# Patient Record
Sex: FEMALE | Race: WHITE | Employment: OTHER | URBAN - NONMETROPOLITAN AREA
[De-identification: names, ages, dates, MRNs, and addresses within clinical notes are randomized per-mention and may not be internally consistent; named-entity substitution may affect disease eponyms.]

---

## 2017-01-18 RX ORDER — HYDROCODONE BITARTRATE AND ACETAMINOPHEN 10; 325 MG/1; MG/1
1 TABLET ORAL 4 TIMES DAILY PRN
Qty: 120 TABLET | Refills: 0 | Status: SHIPPED | OUTPATIENT
Start: 2017-01-26 | End: 2017-02-16 | Stop reason: SDUPTHER

## 2017-02-17 RX ORDER — HYDROCODONE BITARTRATE AND ACETAMINOPHEN 10; 325 MG/1; MG/1
1 TABLET ORAL 4 TIMES DAILY PRN
Qty: 120 TABLET | Refills: 0 | Status: SHIPPED | OUTPATIENT
Start: 2017-02-25 | End: 2017-03-23 | Stop reason: SDUPTHER

## 2017-03-23 ENCOUNTER — HOSPITAL ENCOUNTER (OUTPATIENT)
Dept: PAIN MANAGEMENT | Age: 67
Discharge: HOME | End: 2017-03-23
Payer: MEDICARE

## 2017-03-23 VITALS
OXYGEN SATURATION: 100 % | RESPIRATION RATE: 18 BRPM | SYSTOLIC BLOOD PRESSURE: 122 MMHG | DIASTOLIC BLOOD PRESSURE: 52 MMHG | HEART RATE: 63 BPM

## 2017-03-23 VITALS
RESPIRATION RATE: 18 BRPM | TEMPERATURE: 97.1 F | HEART RATE: 67 BPM | HEIGHT: 67 IN | BODY MASS INDEX: 23.23 KG/M2 | SYSTOLIC BLOOD PRESSURE: 105 MMHG | DIASTOLIC BLOOD PRESSURE: 53 MMHG | WEIGHT: 148 LBS | OXYGEN SATURATION: 93 %

## 2017-03-23 DIAGNOSIS — G89.29 CHRONIC PAIN OF BOTH KNEES: ICD-10-CM

## 2017-03-23 DIAGNOSIS — M51.36 LUMBAR DEGENERATIVE DISC DISEASE: ICD-10-CM

## 2017-03-23 DIAGNOSIS — M54.10 BACK PAIN WITH LEFT-SIDED RADICULOPATHY: ICD-10-CM

## 2017-03-23 DIAGNOSIS — M54.50 CHRONIC BILATERAL LOW BACK PAIN WITHOUT SCIATICA: ICD-10-CM

## 2017-03-23 DIAGNOSIS — M25.562 CHRONIC PAIN OF BOTH KNEES: ICD-10-CM

## 2017-03-23 DIAGNOSIS — M51.16 LUMBAR DISC DISEASE WITH RADICULOPATHY: ICD-10-CM

## 2017-03-23 DIAGNOSIS — G89.29 CHRONIC BILATERAL LOW BACK PAIN WITHOUT SCIATICA: ICD-10-CM

## 2017-03-23 DIAGNOSIS — M25.561 CHRONIC PAIN OF BOTH KNEES: ICD-10-CM

## 2017-03-23 PROCEDURE — 62323 NJX INTERLAMINAR LMBR/SAC: CPT

## 2017-03-23 PROCEDURE — 6360000002 HC RX W HCPCS

## 2017-03-23 PROCEDURE — G0260 INJ FOR SACROILIAC JT ANESTH: HCPCS

## 2017-03-23 PROCEDURE — 99152 MOD SED SAME PHYS/QHP 5/>YRS: CPT

## 2017-03-23 PROCEDURE — 2500000003 HC RX 250 WO HCPCS

## 2017-03-23 PROCEDURE — 2580000003 HC RX 258

## 2017-03-23 PROCEDURE — 3209999900 FLUORO FOR SURGICAL PROCEDURES

## 2017-03-23 RX ORDER — METHYLPREDNISOLONE ACETATE 80 MG/ML
INJECTION, SUSPENSION INTRA-ARTICULAR; INTRALESIONAL; INTRAMUSCULAR; SOFT TISSUE
Status: COMPLETED | OUTPATIENT
Start: 2017-03-23 | End: 2017-03-23

## 2017-03-23 RX ORDER — TRIAMCINOLONE ACETONIDE 40 MG/ML
INJECTION, SUSPENSION INTRA-ARTICULAR; INTRAMUSCULAR
Status: COMPLETED | OUTPATIENT
Start: 2017-03-23 | End: 2017-03-23

## 2017-03-23 RX ORDER — TIZANIDINE 4 MG/1
4 TABLET ORAL 3 TIMES DAILY PRN
Qty: 45 TABLET | Refills: 2 | Status: SHIPPED | OUTPATIENT
Start: 2017-03-23 | End: 2017-07-05 | Stop reason: SDUPTHER

## 2017-03-23 RX ORDER — 0.9 % SODIUM CHLORIDE 0.9 %
VIAL (ML) INJECTION
Status: COMPLETED | OUTPATIENT
Start: 2017-03-23 | End: 2017-03-23

## 2017-03-23 RX ORDER — LIDOCAINE HYDROCHLORIDE 10 MG/ML
INJECTION, SOLUTION EPIDURAL; INFILTRATION; INTRACAUDAL; PERINEURAL
Status: COMPLETED | OUTPATIENT
Start: 2017-03-23 | End: 2017-03-23

## 2017-03-23 RX ORDER — MIDAZOLAM HYDROCHLORIDE 1 MG/ML
INJECTION INTRAMUSCULAR; INTRAVENOUS
Status: COMPLETED | OUTPATIENT
Start: 2017-03-23 | End: 2017-03-23

## 2017-03-23 RX ORDER — HYDROCODONE BITARTRATE AND ACETAMINOPHEN 10; 325 MG/1; MG/1
1 TABLET ORAL 4 TIMES DAILY PRN
Qty: 120 TABLET | Refills: 0 | Status: SHIPPED | OUTPATIENT
Start: 2017-03-27 | End: 2017-04-13 | Stop reason: SDUPTHER

## 2017-03-23 RX ORDER — BUPIVACAINE HYDROCHLORIDE 2.5 MG/ML
INJECTION, SOLUTION EPIDURAL; INFILTRATION; INTRACAUDAL
Status: COMPLETED | OUTPATIENT
Start: 2017-03-23 | End: 2017-03-23

## 2017-03-23 RX ORDER — BUPIVACAINE HYDROCHLORIDE 5 MG/ML
INJECTION, SOLUTION EPIDURAL; INTRACAUDAL
Status: COMPLETED | OUTPATIENT
Start: 2017-03-23 | End: 2017-03-23

## 2017-03-23 RX ADMIN — METHYLPREDNISOLONE ACETATE 80 MG: 80 INJECTION, SUSPENSION INTRA-ARTICULAR; INTRALESIONAL; INTRAMUSCULAR; SOFT TISSUE at 17:11

## 2017-03-23 RX ADMIN — MIDAZOLAM HYDROCHLORIDE 2 MG: 1 INJECTION INTRAMUSCULAR; INTRAVENOUS at 17:03

## 2017-03-23 RX ADMIN — BUPIVACAINE HYDROCHLORIDE 4.5 ML: 5 INJECTION, SOLUTION EPIDURAL; INTRACAUDAL at 17:11

## 2017-03-23 RX ADMIN — BUPIVACAINE HYDROCHLORIDE 5 ML: 2.5 INJECTION, SOLUTION EPIDURAL; INFILTRATION; INTRACAUDAL at 17:10

## 2017-03-23 RX ADMIN — Medication 4 ML: at 17:10

## 2017-03-23 RX ADMIN — TRIAMCINOLONE ACETONIDE 20 MG: 40 INJECTION, SUSPENSION INTRA-ARTICULAR; INTRAMUSCULAR at 17:11

## 2017-03-23 RX ADMIN — MIDAZOLAM HYDROCHLORIDE 2 MG: 1 INJECTION INTRAMUSCULAR; INTRAVENOUS at 17:07

## 2017-03-23 RX ADMIN — LIDOCAINE HYDROCHLORIDE 3 ML: 10 INJECTION, SOLUTION EPIDURAL; INFILTRATION; INTRACAUDAL; PERINEURAL at 17:07

## 2017-03-23 ASSESSMENT — PAIN DESCRIPTION - DESCRIPTORS: DESCRIPTORS: ACHING;CONSTANT;THROBBING;RADIATING

## 2017-03-23 ASSESSMENT — PAIN - FUNCTIONAL ASSESSMENT
PAIN_FUNCTIONAL_ASSESSMENT: 0-10
PAIN_FUNCTIONAL_ASSESSMENT: 0-10

## 2017-03-23 ASSESSMENT — ACTIVITIES OF DAILY LIVING (ADL): EFFECT OF PAIN ON DAILY ACTIVITIES: DAILY CHORES AND ACTIVITIES

## 2017-03-27 RX ORDER — GABAPENTIN 300 MG/1
600 CAPSULE ORAL 4 TIMES DAILY
Qty: 240 CAPSULE | Refills: 5 | Status: SHIPPED | OUTPATIENT
Start: 2017-03-27 | End: 2017-10-10 | Stop reason: SDUPTHER

## 2017-04-14 RX ORDER — HYDROCODONE BITARTRATE AND ACETAMINOPHEN 10; 325 MG/1; MG/1
1 TABLET ORAL 4 TIMES DAILY PRN
Qty: 120 TABLET | Refills: 0 | Status: SHIPPED | OUTPATIENT
Start: 2017-04-26 | End: 2017-05-16 | Stop reason: SDUPTHER

## 2017-05-05 ENCOUNTER — HOSPITAL ENCOUNTER (OUTPATIENT)
Dept: PAIN MANAGEMENT | Age: 67
Discharge: HOME | End: 2017-05-05

## 2017-05-17 RX ORDER — HYDROCODONE BITARTRATE AND ACETAMINOPHEN 10; 325 MG/1; MG/1
1 TABLET ORAL 4 TIMES DAILY PRN
Qty: 120 TABLET | Refills: 0 | Status: SHIPPED | OUTPATIENT
Start: 2017-05-26 | End: 2017-06-26 | Stop reason: SDUPTHER

## 2017-05-31 ENCOUNTER — PROCEDURE VISIT (OUTPATIENT)
Dept: UROLOGY | Age: 67
End: 2017-05-31
Payer: MEDICARE

## 2017-05-31 VITALS
WEIGHT: 145 LBS | HEART RATE: 73 BPM | HEIGHT: 66 IN | OXYGEN SATURATION: 95 % | TEMPERATURE: 97.4 F | BODY MASS INDEX: 23.3 KG/M2

## 2017-05-31 DIAGNOSIS — N39.41 URGE INCONTINENCE: ICD-10-CM

## 2017-05-31 DIAGNOSIS — R31.29 MICROSCOPIC HEMATURIA: Primary | ICD-10-CM

## 2017-05-31 LAB
BILIRUBIN, POC: NORMAL
BLOOD URINE, POC: NORMAL
CLARITY, POC: NORMAL
COLOR, POC: NORMAL
GLUCOSE URINE, POC: NORMAL
KETONES, POC: NORMAL
LEUKOCYTE EST, POC: NORMAL
NITRITE, POC: NORMAL
PH, POC: 6
PROTEIN, POC: NORMAL
SPECIFIC GRAVITY, POC: 1.01
UROBILINOGEN, POC: 1

## 2017-05-31 PROCEDURE — 52000 CYSTOURETHROSCOPY: CPT | Performed by: UROLOGY

## 2017-05-31 PROCEDURE — 81002 URINALYSIS NONAUTO W/O SCOPE: CPT | Performed by: UROLOGY

## 2017-05-31 PROCEDURE — 4004F PT TOBACCO SCREEN RCVD TLK: CPT | Performed by: UROLOGY

## 2017-06-26 ENCOUNTER — HOSPITAL ENCOUNTER (OUTPATIENT)
Dept: PAIN MANAGEMENT | Age: 67
Discharge: HOME | End: 2017-06-26
Payer: MEDICARE

## 2017-06-26 VITALS
RESPIRATION RATE: 18 BRPM | HEART RATE: 56 BPM | HEIGHT: 66 IN | DIASTOLIC BLOOD PRESSURE: 68 MMHG | BODY MASS INDEX: 22.98 KG/M2 | WEIGHT: 143 LBS | OXYGEN SATURATION: 99 % | TEMPERATURE: 97.6 F | SYSTOLIC BLOOD PRESSURE: 143 MMHG

## 2017-06-26 DIAGNOSIS — M25.562 CHRONIC PAIN OF BOTH KNEES: ICD-10-CM

## 2017-06-26 DIAGNOSIS — M25.561 CHRONIC PAIN OF BOTH KNEES: ICD-10-CM

## 2017-06-26 DIAGNOSIS — G89.29 CHRONIC BILATERAL LOW BACK PAIN WITHOUT SCIATICA: ICD-10-CM

## 2017-06-26 DIAGNOSIS — G89.29 CHRONIC PAIN OF BOTH KNEES: ICD-10-CM

## 2017-06-26 DIAGNOSIS — M51.16 LUMBAR DISC DISEASE WITH RADICULOPATHY: ICD-10-CM

## 2017-06-26 DIAGNOSIS — M54.10 BACK PAIN WITH LEFT-SIDED RADICULOPATHY: ICD-10-CM

## 2017-06-26 DIAGNOSIS — M54.50 CHRONIC BILATERAL LOW BACK PAIN WITHOUT SCIATICA: ICD-10-CM

## 2017-06-26 PROCEDURE — 99214 OFFICE O/P EST MOD 30 MIN: CPT

## 2017-06-26 PROCEDURE — 80307 DRUG TEST PRSMV CHEM ANLYZR: CPT

## 2017-06-26 RX ORDER — HYDROCODONE BITARTRATE AND ACETAMINOPHEN 10; 325 MG/1; MG/1
1 TABLET ORAL 4 TIMES DAILY PRN
Qty: 120 TABLET | Refills: 0 | Status: SHIPPED | OUTPATIENT
Start: 2017-06-26 | End: 2017-07-13 | Stop reason: SDUPTHER

## 2017-06-26 ASSESSMENT — ACTIVITIES OF DAILY LIVING (ADL): EFFECT OF PAIN ON DAILY ACTIVITIES: DAILY CHORES AND ACTIVITIES

## 2017-06-26 ASSESSMENT — PAIN DESCRIPTION - ORIENTATION: ORIENTATION: LOWER

## 2017-06-26 ASSESSMENT — PAIN SCALES - GENERAL: PAINLEVEL_OUTOF10: 7

## 2017-06-26 ASSESSMENT — PAIN DESCRIPTION - DIRECTION: RADIATING_TOWARDS: DOWN THE LEFT LEG

## 2017-06-26 ASSESSMENT — PAIN DESCRIPTION - FREQUENCY: FREQUENCY: CONTINUOUS

## 2017-06-26 ASSESSMENT — PAIN DESCRIPTION - PAIN TYPE: TYPE: CHRONIC PAIN

## 2017-06-26 ASSESSMENT — PAIN DESCRIPTION - ONSET: ONSET: ON-GOING

## 2017-06-26 ASSESSMENT — PAIN DESCRIPTION - PROGRESSION: CLINICAL_PROGRESSION: GRADUALLY WORSENING

## 2017-06-26 ASSESSMENT — PAIN DESCRIPTION - LOCATION: LOCATION: BACK

## 2017-07-02 LAB
AMPHETAMINES, URINE: NEGATIVE NG/ML
BARBITURATES, URINE: NEGATIVE NG/ML
BENZODIAZEPINES, URINE: ABNORMAL NG/ML
BENZODIAZEPINES, URINE: NEGATIVE NG/ML
CANNABINOIDS, URINE: NEGATIVE NG/ML
COCAINE METABOLITE, URINE: NEGATIVE NG/ML
CODEINE, URINE: NEGATIVE
CREATININE, URINE: 20.7 MG/DL (ref 20–300)
ETHANOL U, QUAN: NEGATIVE %
FENTANYL URINE: NEGATIVE PG/ML
HYDROCODONE, UR CONF: 958 NG/ML
HYDROCODONE, URINE: POSITIVE
HYDROMORPHONE, UR CONF: 548 NG/ML
HYDROMORPHONE, URINE: POSITIVE
MEPERIDINE, UR: NEGATIVE NG/ML
METHADONE SCREEN, URINE: NEGATIVE NG/ML
MORPHINE URINE: NEGATIVE
OPIATES, URINE: ABNORMAL NG/ML
OPIATES, URINE: POSITIVE NG/ML
OXYCODONE/OXYMORPHONE, UR: NEGATIVE NG/ML
PH, URINE: 6.4 (ref 4.5–8.9)
PHENCYCLIDINE, URINE: NEGATIVE NG/ML
PROPOXYPHENE, URINE: NEGATIVE NG/ML

## 2017-07-05 RX ORDER — TIZANIDINE 4 MG/1
4 TABLET ORAL 3 TIMES DAILY PRN
Qty: 45 TABLET | Refills: 2 | Status: SHIPPED | OUTPATIENT
Start: 2017-07-05 | End: 2017-10-10 | Stop reason: SDUPTHER

## 2017-07-13 ENCOUNTER — HOSPITAL ENCOUNTER (OUTPATIENT)
Dept: PAIN MANAGEMENT | Age: 67
Discharge: HOME | End: 2017-07-13
Payer: MEDICARE

## 2017-07-13 VITALS
HEIGHT: 66 IN | OXYGEN SATURATION: 98 % | WEIGHT: 148 LBS | SYSTOLIC BLOOD PRESSURE: 120 MMHG | DIASTOLIC BLOOD PRESSURE: 51 MMHG | RESPIRATION RATE: 16 BRPM | HEART RATE: 68 BPM | TEMPERATURE: 97.7 F | BODY MASS INDEX: 23.78 KG/M2

## 2017-07-13 DIAGNOSIS — M25.561 CHRONIC PAIN OF BOTH KNEES: ICD-10-CM

## 2017-07-13 DIAGNOSIS — G89.29 CHRONIC PAIN OF BOTH KNEES: ICD-10-CM

## 2017-07-13 DIAGNOSIS — M54.10 BACK PAIN WITH LEFT-SIDED RADICULOPATHY: ICD-10-CM

## 2017-07-13 DIAGNOSIS — M51.16 LUMBAR DISC DISEASE WITH RADICULOPATHY: ICD-10-CM

## 2017-07-13 DIAGNOSIS — M25.562 CHRONIC PAIN OF BOTH KNEES: ICD-10-CM

## 2017-07-13 DIAGNOSIS — M54.50 CHRONIC BILATERAL LOW BACK PAIN WITHOUT SCIATICA: ICD-10-CM

## 2017-07-13 DIAGNOSIS — M51.36 LUMBAR DEGENERATIVE DISC DISEASE: ICD-10-CM

## 2017-07-13 DIAGNOSIS — G89.29 CHRONIC BILATERAL LOW BACK PAIN WITHOUT SCIATICA: ICD-10-CM

## 2017-07-13 PROCEDURE — G0260 INJ FOR SACROILIAC JT ANESTH: HCPCS

## 2017-07-13 PROCEDURE — 2580000003 HC RX 258

## 2017-07-13 PROCEDURE — 62323 NJX INTERLAMINAR LMBR/SAC: CPT

## 2017-07-13 PROCEDURE — 2500000003 HC RX 250 WO HCPCS

## 2017-07-13 PROCEDURE — 99152 MOD SED SAME PHYS/QHP 5/>YRS: CPT

## 2017-07-13 PROCEDURE — 6360000002 HC RX W HCPCS

## 2017-07-13 PROCEDURE — 3209999900 FLUORO FOR SURGICAL PROCEDURES

## 2017-07-13 RX ORDER — HYDROCODONE BITARTRATE AND ACETAMINOPHEN 10; 325 MG/1; MG/1
1 TABLET ORAL 4 TIMES DAILY PRN
Qty: 120 TABLET | Refills: 0 | Status: SHIPPED | OUTPATIENT
Start: 2017-07-27 | End: 2017-08-15 | Stop reason: SDUPTHER

## 2017-07-13 RX ORDER — METHYLPREDNISOLONE ACETATE 80 MG/ML
INJECTION, SUSPENSION INTRA-ARTICULAR; INTRALESIONAL; INTRAMUSCULAR; SOFT TISSUE
Status: COMPLETED | OUTPATIENT
Start: 2017-07-13 | End: 2017-07-13

## 2017-07-13 RX ORDER — LIDOCAINE HYDROCHLORIDE 10 MG/ML
INJECTION, SOLUTION EPIDURAL; INFILTRATION; INTRACAUDAL; PERINEURAL
Status: COMPLETED | OUTPATIENT
Start: 2017-07-13 | End: 2017-07-13

## 2017-07-13 RX ORDER — BUPIVACAINE HYDROCHLORIDE 5 MG/ML
INJECTION, SOLUTION EPIDURAL; INTRACAUDAL
Status: COMPLETED | OUTPATIENT
Start: 2017-07-13 | End: 2017-07-13

## 2017-07-13 RX ORDER — MIDAZOLAM HYDROCHLORIDE 1 MG/ML
INJECTION INTRAMUSCULAR; INTRAVENOUS
Status: COMPLETED | OUTPATIENT
Start: 2017-07-13 | End: 2017-07-13

## 2017-07-13 RX ORDER — BUPIVACAINE HYDROCHLORIDE 2.5 MG/ML
INJECTION, SOLUTION EPIDURAL; INFILTRATION; INTRACAUDAL
Status: COMPLETED | OUTPATIENT
Start: 2017-07-13 | End: 2017-07-13

## 2017-07-13 RX ORDER — TRIAMCINOLONE ACETONIDE 40 MG/ML
INJECTION, SUSPENSION INTRA-ARTICULAR; INTRAMUSCULAR
Status: COMPLETED | OUTPATIENT
Start: 2017-07-13 | End: 2017-07-13

## 2017-07-13 RX ORDER — 0.9 % SODIUM CHLORIDE 0.9 %
VIAL (ML) INJECTION
Status: COMPLETED | OUTPATIENT
Start: 2017-07-13 | End: 2017-07-13

## 2017-07-13 RX ADMIN — LIDOCAINE HYDROCHLORIDE 3 ML: 10 INJECTION, SOLUTION EPIDURAL; INFILTRATION; INTRACAUDAL; PERINEURAL at 16:09

## 2017-07-13 RX ADMIN — METHYLPREDNISOLONE ACETATE 80 MG: 80 INJECTION, SUSPENSION INTRA-ARTICULAR; INTRALESIONAL; INTRAMUSCULAR; SOFT TISSUE at 16:10

## 2017-07-13 RX ADMIN — TRIAMCINOLONE ACETONIDE 20 MG: 40 INJECTION, SUSPENSION INTRA-ARTICULAR; INTRAMUSCULAR at 16:11

## 2017-07-13 RX ADMIN — BUPIVACAINE HYDROCHLORIDE 5 ML: 2.5 INJECTION, SOLUTION EPIDURAL; INFILTRATION; INTRACAUDAL at 16:10

## 2017-07-13 RX ADMIN — BUPIVACAINE HYDROCHLORIDE 4.5 ML: 5 INJECTION, SOLUTION EPIDURAL; INTRACAUDAL at 16:11

## 2017-07-13 RX ADMIN — Medication 4 ML: at 16:10

## 2017-07-13 RX ADMIN — MIDAZOLAM HYDROCHLORIDE 2 MG: 1 INJECTION INTRAMUSCULAR; INTRAVENOUS at 16:08

## 2017-07-13 ASSESSMENT — PAIN DESCRIPTION - PAIN TYPE: TYPE: CHRONIC PAIN

## 2017-07-13 ASSESSMENT — PAIN DESCRIPTION - DIRECTION: RADIATING_TOWARDS: LOWER BACK PAIN THAT RADIATES OUT TO THE HIPS AND DOWN THE LEGS.

## 2017-07-13 ASSESSMENT — PAIN DESCRIPTION - LOCATION: LOCATION: BACK;HIP;LEG

## 2017-07-13 ASSESSMENT — PAIN DESCRIPTION - ONSET: ONSET: ON-GOING

## 2017-07-13 ASSESSMENT — PAIN DESCRIPTION - ORIENTATION: ORIENTATION: RIGHT;LEFT;LOWER

## 2017-07-13 ASSESSMENT — PAIN DESCRIPTION - PROGRESSION: CLINICAL_PROGRESSION: NOT CHANGED

## 2017-07-13 ASSESSMENT — PAIN DESCRIPTION - FREQUENCY: FREQUENCY: CONTINUOUS

## 2017-07-13 ASSESSMENT — PAIN SCALES - GENERAL: PAINLEVEL_OUTOF10: 8

## 2017-08-16 RX ORDER — HYDROCODONE BITARTRATE AND ACETAMINOPHEN 10; 325 MG/1; MG/1
1 TABLET ORAL 4 TIMES DAILY PRN
Qty: 120 TABLET | Refills: 0 | Status: SHIPPED | OUTPATIENT
Start: 2017-08-26 | End: 2017-09-14 | Stop reason: SDUPTHER

## 2017-08-28 ENCOUNTER — HOSPITAL ENCOUNTER (OUTPATIENT)
Dept: PAIN MANAGEMENT | Age: 67
Discharge: HOME | End: 2017-08-28
Payer: MEDICARE

## 2017-08-28 VITALS
HEART RATE: 71 BPM | BODY MASS INDEX: 23.14 KG/M2 | TEMPERATURE: 97.7 F | RESPIRATION RATE: 18 BRPM | DIASTOLIC BLOOD PRESSURE: 66 MMHG | OXYGEN SATURATION: 97 % | SYSTOLIC BLOOD PRESSURE: 108 MMHG | HEIGHT: 66 IN | WEIGHT: 144 LBS

## 2017-08-28 DIAGNOSIS — M25.561 CHRONIC PAIN OF BOTH KNEES: ICD-10-CM

## 2017-08-28 DIAGNOSIS — M54.10 BACK PAIN WITH LEFT-SIDED RADICULOPATHY: ICD-10-CM

## 2017-08-28 DIAGNOSIS — G89.29 CHRONIC PAIN OF BOTH KNEES: ICD-10-CM

## 2017-08-28 DIAGNOSIS — M51.16 LUMBAR DISC DISEASE WITH RADICULOPATHY: ICD-10-CM

## 2017-08-28 DIAGNOSIS — M54.50 CHRONIC BILATERAL LOW BACK PAIN WITHOUT SCIATICA: ICD-10-CM

## 2017-08-28 DIAGNOSIS — M25.562 CHRONIC PAIN OF BOTH KNEES: ICD-10-CM

## 2017-08-28 DIAGNOSIS — G89.29 CHRONIC BILATERAL LOW BACK PAIN WITHOUT SCIATICA: ICD-10-CM

## 2017-08-28 PROCEDURE — 99214 OFFICE O/P EST MOD 30 MIN: CPT

## 2017-08-28 ASSESSMENT — PAIN DESCRIPTION - PROGRESSION: CLINICAL_PROGRESSION: NOT CHANGED

## 2017-08-28 ASSESSMENT — PAIN SCALES - GENERAL: PAINLEVEL_OUTOF10: 6

## 2017-08-28 ASSESSMENT — PAIN DESCRIPTION - LOCATION: LOCATION: BACK

## 2017-08-28 ASSESSMENT — ACTIVITIES OF DAILY LIVING (ADL): EFFECT OF PAIN ON DAILY ACTIVITIES: LIMITS ACTIVITY

## 2017-08-28 ASSESSMENT — PAIN DESCRIPTION - ORIENTATION: ORIENTATION: LOWER;RIGHT;LEFT

## 2017-08-28 ASSESSMENT — PAIN DESCRIPTION - ONSET: ONSET: ON-GOING

## 2017-08-28 ASSESSMENT — PAIN DESCRIPTION - PAIN TYPE: TYPE: CHRONIC PAIN

## 2017-08-28 ASSESSMENT — PAIN DESCRIPTION - FREQUENCY: FREQUENCY: CONTINUOUS

## 2017-09-15 RX ORDER — HYDROCODONE BITARTRATE AND ACETAMINOPHEN 10; 325 MG/1; MG/1
1 TABLET ORAL 4 TIMES DAILY PRN
Qty: 120 TABLET | Refills: 0 | Status: SHIPPED | OUTPATIENT
Start: 2017-09-25 | End: 2017-10-13 | Stop reason: SDUPTHER

## 2017-10-10 RX ORDER — TIZANIDINE 4 MG/1
4 TABLET ORAL 3 TIMES DAILY PRN
Qty: 45 TABLET | Refills: 5 | OUTPATIENT
Start: 2017-10-10

## 2017-10-10 RX ORDER — GABAPENTIN 300 MG/1
600 CAPSULE ORAL 4 TIMES DAILY
Qty: 240 CAPSULE | Refills: 5 | OUTPATIENT
Start: 2017-10-10

## 2017-10-10 RX ORDER — GABAPENTIN 300 MG/1
600 CAPSULE ORAL 4 TIMES DAILY
Qty: 240 CAPSULE | Refills: 5 | Status: SHIPPED | OUTPATIENT
Start: 2017-10-10 | End: 2018-03-23 | Stop reason: SDUPTHER

## 2017-10-10 RX ORDER — TIZANIDINE 4 MG/1
4 TABLET ORAL 3 TIMES DAILY PRN
Qty: 45 TABLET | Refills: 5 | Status: SHIPPED | OUTPATIENT
Start: 2017-10-10 | End: 2018-03-23 | Stop reason: SDUPTHER

## 2017-10-13 RX ORDER — HYDROCODONE BITARTRATE AND ACETAMINOPHEN 10; 325 MG/1; MG/1
1 TABLET ORAL 4 TIMES DAILY PRN
Qty: 120 TABLET | Refills: 0 | Status: SHIPPED | OUTPATIENT
Start: 2017-10-25 | End: 2017-11-14 | Stop reason: SDUPTHER

## 2017-11-14 RX ORDER — HYDROCODONE BITARTRATE AND ACETAMINOPHEN 10; 325 MG/1; MG/1
1 TABLET ORAL 4 TIMES DAILY PRN
Qty: 120 TABLET | Refills: 0 | Status: SHIPPED | OUTPATIENT
Start: 2017-11-24 | End: 2017-12-27 | Stop reason: SDUPTHER

## 2017-12-18 ENCOUNTER — TELEPHONE (OUTPATIENT)
Dept: PAIN MANAGEMENT | Age: 67
End: 2017-12-18

## 2017-12-18 NOTE — TELEPHONE ENCOUNTER
Patient called stating that she is in the hospital with the flu and will not be able to make her appointment on Wednesday 12/20/17. Rescheduled patient for 1/5/18 at 1:00 pm.  Instructed her to try her best to make this appointment because she will need her pain script. Instructed her to bring a .

## 2017-12-20 ENCOUNTER — HOSPITAL ENCOUNTER (OUTPATIENT)
Dept: PAIN MANAGEMENT | Age: 67
Discharge: HOME | End: 2017-12-20
Payer: MEDICARE

## 2017-12-27 RX ORDER — HYDROCODONE BITARTRATE AND ACETAMINOPHEN 10; 325 MG/1; MG/1
1 TABLET ORAL 4 TIMES DAILY PRN
Qty: 32 TABLET | Refills: 0 | Status: SHIPPED | OUTPATIENT
Start: 2017-12-27 | End: 2018-01-05 | Stop reason: SDUPTHER

## 2018-01-05 ENCOUNTER — HOSPITAL ENCOUNTER (OUTPATIENT)
Dept: PAIN MANAGEMENT | Age: 68
Discharge: HOME | End: 2018-01-05
Payer: MEDICARE

## 2018-01-05 VITALS
BODY MASS INDEX: 23.95 KG/M2 | RESPIRATION RATE: 20 BRPM | HEART RATE: 57 BPM | DIASTOLIC BLOOD PRESSURE: 42 MMHG | OXYGEN SATURATION: 97 % | SYSTOLIC BLOOD PRESSURE: 107 MMHG | TEMPERATURE: 97.5 F | HEIGHT: 66 IN | WEIGHT: 149 LBS

## 2018-01-05 DIAGNOSIS — M54.10 BACK PAIN WITH LEFT-SIDED RADICULOPATHY: ICD-10-CM

## 2018-01-05 DIAGNOSIS — G89.29 CHRONIC BILATERAL LOW BACK PAIN WITHOUT SCIATICA: ICD-10-CM

## 2018-01-05 DIAGNOSIS — M25.562 CHRONIC PAIN OF BOTH KNEES: ICD-10-CM

## 2018-01-05 DIAGNOSIS — M25.561 CHRONIC PAIN OF BOTH KNEES: ICD-10-CM

## 2018-01-05 DIAGNOSIS — M54.50 CHRONIC BILATERAL LOW BACK PAIN WITHOUT SCIATICA: ICD-10-CM

## 2018-01-05 DIAGNOSIS — G89.29 CHRONIC PAIN OF BOTH KNEES: ICD-10-CM

## 2018-01-05 DIAGNOSIS — M51.36 LUMBAR DEGENERATIVE DISC DISEASE: ICD-10-CM

## 2018-01-05 DIAGNOSIS — M51.16 LUMBAR DISC DISEASE WITH RADICULOPATHY: ICD-10-CM

## 2018-01-05 PROCEDURE — 6360000002 HC RX W HCPCS

## 2018-01-05 PROCEDURE — 62323 NJX INTERLAMINAR LMBR/SAC: CPT

## 2018-01-05 PROCEDURE — 99152 MOD SED SAME PHYS/QHP 5/>YRS: CPT

## 2018-01-05 PROCEDURE — G0260 INJ FOR SACROILIAC JT ANESTH: HCPCS

## 2018-01-05 PROCEDURE — 3209999900 FLUORO FOR SURGICAL PROCEDURES

## 2018-01-05 PROCEDURE — 2580000003 HC RX 258

## 2018-01-05 PROCEDURE — 2500000003 HC RX 250 WO HCPCS

## 2018-01-05 RX ORDER — BUPIVACAINE HYDROCHLORIDE 5 MG/ML
INJECTION, SOLUTION EPIDURAL; INTRACAUDAL
Status: COMPLETED | OUTPATIENT
Start: 2018-01-05 | End: 2018-01-05

## 2018-01-05 RX ORDER — TRIAMCINOLONE ACETONIDE 40 MG/ML
INJECTION, SUSPENSION INTRA-ARTICULAR; INTRAMUSCULAR
Status: COMPLETED | OUTPATIENT
Start: 2018-01-05 | End: 2018-01-05

## 2018-01-05 RX ORDER — LIDOCAINE HYDROCHLORIDE 10 MG/ML
INJECTION, SOLUTION EPIDURAL; INFILTRATION; INTRACAUDAL; PERINEURAL
Status: COMPLETED | OUTPATIENT
Start: 2018-01-05 | End: 2018-01-05

## 2018-01-05 RX ORDER — BUPIVACAINE HYDROCHLORIDE 2.5 MG/ML
INJECTION, SOLUTION EPIDURAL; INFILTRATION; INTRACAUDAL
Status: COMPLETED | OUTPATIENT
Start: 2018-01-05 | End: 2018-01-05

## 2018-01-05 RX ORDER — HYDROCODONE BITARTRATE AND ACETAMINOPHEN 10; 325 MG/1; MG/1
1 TABLET ORAL 4 TIMES DAILY PRN
Qty: 120 TABLET | Refills: 0 | Status: SHIPPED | OUTPATIENT
Start: 2018-01-05 | End: 2018-01-23 | Stop reason: SDUPTHER

## 2018-01-05 RX ORDER — 0.9 % SODIUM CHLORIDE 0.9 %
VIAL (ML) INJECTION
Status: COMPLETED | OUTPATIENT
Start: 2018-01-05 | End: 2018-01-05

## 2018-01-05 RX ORDER — HYDROCODONE BITARTRATE AND ACETAMINOPHEN 10; 325 MG/1; MG/1
1 TABLET ORAL 4 TIMES DAILY PRN
Qty: 32 TABLET | Refills: 0 | Status: SHIPPED | OUTPATIENT
Start: 2018-01-05 | End: 2018-01-05 | Stop reason: SDUPTHER

## 2018-01-05 RX ORDER — METHYLPREDNISOLONE ACETATE 80 MG/ML
INJECTION, SUSPENSION INTRA-ARTICULAR; INTRALESIONAL; INTRAMUSCULAR; SOFT TISSUE
Status: COMPLETED | OUTPATIENT
Start: 2018-01-05 | End: 2018-01-05

## 2018-01-05 RX ORDER — MIDAZOLAM HYDROCHLORIDE 1 MG/ML
INJECTION INTRAMUSCULAR; INTRAVENOUS
Status: COMPLETED | OUTPATIENT
Start: 2018-01-05 | End: 2018-01-05

## 2018-01-05 RX ADMIN — LIDOCAINE HYDROCHLORIDE 3 ML: 10 INJECTION, SOLUTION EPIDURAL; INFILTRATION; INTRACAUDAL; PERINEURAL at 14:53

## 2018-01-05 RX ADMIN — MIDAZOLAM HYDROCHLORIDE 2 MG: 1 INJECTION INTRAMUSCULAR; INTRAVENOUS at 14:52

## 2018-01-05 RX ADMIN — METHYLPREDNISOLONE ACETATE 80 MG: 80 INJECTION, SUSPENSION INTRA-ARTICULAR; INTRALESIONAL; INTRAMUSCULAR; SOFT TISSUE at 14:54

## 2018-01-05 RX ADMIN — BUPIVACAINE HYDROCHLORIDE 2.5 ML: 2.5 INJECTION, SOLUTION EPIDURAL; INFILTRATION; INTRACAUDAL at 14:54

## 2018-01-05 RX ADMIN — Medication 1.5 ML: at 14:54

## 2018-01-05 RX ADMIN — TRIAMCINOLONE ACETONIDE 40 MG: 40 INJECTION, SUSPENSION INTRA-ARTICULAR; INTRAMUSCULAR at 14:55

## 2018-01-05 RX ADMIN — BUPIVACAINE HYDROCHLORIDE 9 ML: 5 INJECTION, SOLUTION EPIDURAL; INTRACAUDAL at 14:55

## 2018-01-05 ASSESSMENT — ACTIVITIES OF DAILY LIVING (ADL): EFFECT OF PAIN ON DAILY ACTIVITIES: DAILY CHORES AND ACTIVITIES

## 2018-01-05 ASSESSMENT — PAIN DESCRIPTION - DESCRIPTORS: DESCRIPTORS: ACHING;CONSTANT;RADIATING;THROBBING

## 2018-01-05 ASSESSMENT — PAIN - FUNCTIONAL ASSESSMENT: PAIN_FUNCTIONAL_ASSESSMENT: 0-10

## 2018-01-05 NOTE — PROCEDURES
DATE: 1/5/2018      REASON FOR VISIT: Lumbar Degenerative Disc Disease, Lumbar Radiculopathy  PROCEDURE: Lumbar Epidural Steroid Injection. [] Moderate Sedation    DESCRIPTION OF PROCEDURE:              After obtaining informed consent, the patient was taken to the procedure room, positioned prone, and sterilely prepped.  The procedure was performed under fluoroscopic guidance.  First 5 ml of 1% Xylocaine was used at L3-4 for local anesthesia.  A 19-gauge CCP [place] needle was advanced to the epidural space.  The was confirmed on the fluoroscope with an injection of [x] 2ml   Contrast.  Then, [x] 5ml of 0.25% Marcaine, [x] 4ml of Normal Saline, and [x] 80 mg of Depo Medrol was gently injected.           There were no complications. DIAGNOSES:  [] Low Back Pain  [x] *Lumbar Radiculopathy  [x] *Lumbar Degenerative Disc Disease  [] *Lumbar Spinal Stenosis  [] *Lumbar Postlaminectomy Syndrome  [] Other            REASON FOR VISIT: Bilateral Sacroilitis  PROCEDURE:  Sacroiliac Joint Injection. [] Moderate Sedation    DESCRIPTION OF PROCEDURE:    After obtaining informed consent, the patient was taken to the procedure room, positioned prone, and sterilely prepped. The fluoroscope was positioned for guidance. Fluoroscopic imaging confirmation, of intra-articular needle positioning, was confirmed within the [x] Left [] Right sacroiliac joint. Then, 5ml of 0.5% Marcaine and 20 mg of Kenalog was gently injected. There were no complications. [x] The fluoroscope was repositioned to the [] Left [x] Right and fluoroscopic imaging confirmation, of intra-articular needle positioning, was confirmed. The 5ml of 0.5%Marcaine and 20 mg Kenalog was gently injected. There were no complications. DIAGNOSES:  [x] *Sacroiliitis  [] Left [] Right [x] Bilateral  [] Unspecified Arthropathy involving pelvic region and thigh.       .  Procedure:  Level of Consciousness: [x]Alert [x]Oriented []Disoriented []Lethargic  Anxiety
[x]WNL                    []Comments:      Informed Consent:  The risks and benefits of the procedure have been discussed with either the patient or if they cannot consent their representative. Assessment:  Patient examined and appropriate for the planned sedation and procedure. Plan:  Proceed with planned sedation and procedure as above. Mallampati Airway Assessment: Adequate      ASA STATUS:  []1. Normal Healty  [x]2. Mild Systemice Disease, doesn't limit activity eg HTN, mild DM  []3. Severe Systemic Disease, does limit activity eg stable angina, DM with vascular         disease          []4. Severe Systemic Disease constant threat eg CHF, renal failure  []5.  Moribund not expected to survive without procedure          José Miguel Jones RN

## 2018-01-05 NOTE — PROGRESS NOTES
Where na  Labs during the last 12 months: Yes    Education Provided:  [x] Review of Jean Cuellar  [] Agreement Review  [] Compliance Issues Discussed    [] Cognitive Behavior Needs [x] Exercise [] Review of Test [] Financial Issues  [x] Tobacco/Alcohol Use [x] Teaching [] New Patient [] Picture Obtained    Physician Plan:  [] Outgoing Referral  [] Pharmacy Consult  [] Test Ordered   [] Obtained Test Results / Consult Notes  [] UDS due at next visit, verified per EPIC      [] Suspected Physical Abuse or Suicide Risk assessed - IF YES COMPLETE QUESTIONS BELOW    If any of the following questions are answered yes - contact attending physician for referral:    Has been considering harming self to escape stress, pain problems? [] YES  [x] NO  Has a suicide plan? [] YES  [x] NO  Has attempted suicide in the past?   [] YES  [x] NO  Has a close friend or family member who committed suicide? [] YES  [x] NO    Patient Referred To :      Additional Notes:    Assessment Completed by:  Electronically signed by Azalia Chiu RN on 1/5/2018 at 1:26 PM

## 2018-01-23 RX ORDER — HYDROCODONE BITARTRATE AND ACETAMINOPHEN 10; 325 MG/1; MG/1
1 TABLET ORAL 4 TIMES DAILY PRN
Qty: 120 TABLET | Refills: 0 | Status: SHIPPED | OUTPATIENT
Start: 2018-02-04 | End: 2018-02-20 | Stop reason: SDUPTHER

## 2018-02-20 RX ORDER — HYDROCODONE BITARTRATE AND ACETAMINOPHEN 10; 325 MG/1; MG/1
1 TABLET ORAL 4 TIMES DAILY PRN
Qty: 120 TABLET | Refills: 0 | Status: SHIPPED | OUTPATIENT
Start: 2018-03-06 | End: 2018-03-23 | Stop reason: SDUPTHER

## 2018-03-14 ENCOUNTER — TELEPHONE (OUTPATIENT)
Dept: PAIN MANAGEMENT | Age: 68
End: 2018-03-14

## 2018-03-23 ENCOUNTER — HOSPITAL ENCOUNTER (OUTPATIENT)
Dept: PAIN MANAGEMENT | Age: 68
Discharge: HOME | End: 2018-03-23
Payer: MEDICARE

## 2018-03-23 VITALS
HEART RATE: 70 BPM | RESPIRATION RATE: 18 BRPM | BODY MASS INDEX: 24.59 KG/M2 | HEIGHT: 66 IN | OXYGEN SATURATION: 96 % | DIASTOLIC BLOOD PRESSURE: 61 MMHG | TEMPERATURE: 97.1 F | WEIGHT: 153 LBS | SYSTOLIC BLOOD PRESSURE: 106 MMHG

## 2018-03-23 DIAGNOSIS — M54.50 CHRONIC BILATERAL LOW BACK PAIN WITHOUT SCIATICA: ICD-10-CM

## 2018-03-23 DIAGNOSIS — M25.562 CHRONIC PAIN OF BOTH KNEES: ICD-10-CM

## 2018-03-23 DIAGNOSIS — G89.29 CHRONIC PAIN OF BOTH KNEES: ICD-10-CM

## 2018-03-23 DIAGNOSIS — G89.29 CHRONIC BILATERAL LOW BACK PAIN WITHOUT SCIATICA: ICD-10-CM

## 2018-03-23 DIAGNOSIS — M51.16 LUMBAR DISC DISEASE WITH RADICULOPATHY: ICD-10-CM

## 2018-03-23 DIAGNOSIS — M54.10 BACK PAIN WITH LEFT-SIDED RADICULOPATHY: ICD-10-CM

## 2018-03-23 DIAGNOSIS — M25.561 CHRONIC PAIN OF BOTH KNEES: ICD-10-CM

## 2018-03-23 PROCEDURE — 99214 OFFICE O/P EST MOD 30 MIN: CPT

## 2018-03-23 RX ORDER — TIZANIDINE 4 MG/1
4 TABLET ORAL 2 TIMES DAILY PRN
Qty: 45 TABLET | Refills: 2 | Status: SHIPPED | OUTPATIENT
Start: 2018-03-23 | End: 2018-06-21 | Stop reason: SDUPTHER

## 2018-03-23 RX ORDER — HYDROCODONE BITARTRATE AND ACETAMINOPHEN 10; 325 MG/1; MG/1
1 TABLET ORAL 4 TIMES DAILY PRN
Qty: 120 TABLET | Refills: 0 | Status: SHIPPED | OUTPATIENT
Start: 2018-04-05 | End: 2018-04-24 | Stop reason: SDUPTHER

## 2018-03-23 RX ORDER — GABAPENTIN 300 MG/1
600 CAPSULE ORAL 4 TIMES DAILY
Qty: 240 CAPSULE | Refills: 2 | Status: SHIPPED | OUTPATIENT
Start: 2018-03-23 | End: 2018-06-21 | Stop reason: SDUPTHER

## 2018-03-23 ASSESSMENT — PAIN DESCRIPTION - ONSET: ONSET: ON-GOING

## 2018-03-23 ASSESSMENT — PAIN DESCRIPTION - LOCATION: LOCATION: BACK

## 2018-03-23 ASSESSMENT — PAIN DESCRIPTION - PROGRESSION: CLINICAL_PROGRESSION: NOT CHANGED

## 2018-03-23 ASSESSMENT — PAIN DESCRIPTION - DIRECTION: RADIATING_TOWARDS: INTO BILATERAL LEGS

## 2018-03-23 ASSESSMENT — PAIN DESCRIPTION - DESCRIPTORS: DESCRIPTORS: ACHING;CONSTANT;THROBBING;RADIATING

## 2018-03-23 ASSESSMENT — ACTIVITIES OF DAILY LIVING (ADL): EFFECT OF PAIN ON DAILY ACTIVITIES: LIMITS ACTIVITIES

## 2018-03-23 ASSESSMENT — PAIN DESCRIPTION - PAIN TYPE: TYPE: CHRONIC PAIN

## 2018-03-23 ASSESSMENT — PAIN DESCRIPTION - ORIENTATION: ORIENTATION: LOWER

## 2018-03-23 ASSESSMENT — PAIN DESCRIPTION - FREQUENCY: FREQUENCY: CONTINUOUS

## 2018-03-23 ASSESSMENT — PAIN SCALES - GENERAL: PAINLEVEL_OUTOF10: 7

## 2018-03-23 NOTE — PROGRESS NOTES
[facility]  Patient Pain Assessment  Progress Note      Chief Complaint   Patient presents with    Lower Back Pain     Pain Assessment  Pain Assessment: 0-10  Pain Level: 7  Pain Type: Chronic pain  Pain Location: Back  Pain Orientation: Lower  Pain Radiating Towards: into bilateral legs  Pain Descriptors: Aching, Constant, Throbbing, Radiating  Pain Frequency: Continuous  Pain Onset: On-going  Clinical Progression: Not changed  Effect of Pain on Daily Activities: limits activities       []  Issues of Concern:     [x]  Reports current medication is helping, but continues to have on-going pain    [x]  Reports current pain medication increases ability to do activities of daily living     []  No current narcotic pain medications      [x]  Discussed possible medication side effects, risk of tolerance and/or dependence, alternative treatments    [x]  Encouraged to set goals of decreasing daily narcotic intake    [x]  Discussed effects of long term narcotic use    [x]  Reviewed pain management contract     [x]  Injection options discussed; she does not want to repeat at present time     []Yes [x]No  Current medication side effects     []Yes [x]No   Acute bladder or bowel changes    Previous Procedure / Percentage of pain control / Imaging / PT History:  Percentage of Pain Relief after Last Procedure:  0%    How long lasted:  0 days     Radiology exams received during the last 12 months: No  MRI exams received in the past 2 years: No  Physical therapy during the last 6 months: No     BMI: Body mass index is 24.69 kg/m².     [x]  Nutrient rich, low fat, low carbohydrate diet discussed   [x]  Positive effect of weight management on pain control discussed    Activity:   [x] Exercise discussed as beneficial to pain reduction, encouraged stretching exercises and to set daily goals    Tobacco use:    [x] Cessation discussed, as applicable    Social History     Social History    Marital status:  Spouse name: N/A    Number of children: N/A    Years of education: N/A     Social History Main Topics    Smoking status: Current Every Day Smoker     Packs/day: 0.50     Years: 30.00     Types: Cigarettes    Smokeless tobacco: Never Used    Alcohol use No    Drug use: No    Sexual activity: Not Asked     Other Topics Concern    None     Social History Narrative    None                                                            Past Medical History:       Diagnosis Date    Arthritis     Bilateral low back pain without sciatica 12/22/2015    Cancer ([address]     Hx of blood clots     Hyperlipidemia     Pain in both knees 12/22/2015    Thyroid disease     Urinary incontinence      Surgical History:  Past Surgical History:   Procedure Laterality Date    CHOLECYSTECTOMY, LAPAROSCOPIC      HYSTERECTOMY      JOINT REPLACEMENT      LOBECTOMY      RIGHT PARTIAL    SKIN CANCER EXCISION       Family History:  family history includes Cancer in her mother; Heart Attack in her father; Hypertension in her mother. Allergies:  Codeine; Erythromycin; Pcn [penicillins]; and Talwin [pentazocine]     Medications:  Current Outpatient Prescriptions   Medication Sig Dispense Refill    HYDROcodone-acetaminophen (NORCO)  MG per tablet Take 1 tablet by mouth 4 times daily as needed for Pain (may fill 3/6/18) for up to 30 days.  120 tablet 0    gabapentin (NEURONTIN) 300 MG capsule Take 2 capsules by mouth 4 times daily 240 capsule 5    tiZANidine (ZANAFLEX) 4 MG tablet Take 1 tablet by mouth 3 times daily as needed (muscle spasms - month supply) 45 tablet 5    nystatin (MYCOSTATIN) 938370 UNIT/ML suspension       promethazine (PHENERGAN) 25 MG tablet       omeprazole (PRILOSEC) 40 MG delayed release capsule TK 1 C PO QD PRN  5    sucralfate (CARAFATE) 1 GM/10ML suspension Take 1 g by mouth 4 times daily      ALPRAZolam (XANAX) 0.5 MG tablet Take 0.5 mg by mouth 3 times daily as needed      levothyroxine (SYNTHROID) 100 MCG tablet Take 100 mcg by mouth daily       No current facility-administered medications for this encounter. UDS:     [x] Reviewed and monitoring, see labs         Vitals:  /61   Pulse 70   Temp 97.1 °F (36.2 °C) (Oral)   Resp 18   Ht 5' 6\" (1.676 m)   Wt 153 lb (69.4 kg)   SpO2 96%   BMI 24.69 kg/m²       Physical Exam:  General appearance: no acute distress   Head: NCAT, EOMI  Skin: Warm, Dry   Musculoskeletal: ambulatory per self, steady gait  Neurologic: alert and oriented X 3, speech clear  Mood and affect: appropriate, no SI or HI    Assessment:    *      Lumbar DDD  *      Lumbar Radiculopathy    Plan:   [x]  Patient is to call with any questions or concerns which may arise prior to the next office visit    [x]  Continue current medications per our office, see medication tabWILLY reviewed   []  Add. .. []  Discontinue. .. []  Imaging order given to patient   []  PT order given to patient   []  Procedure scheduled for next visit   []  . .. Controlled Substance Monitoring: Discussed with patient possible medication side effects, risk of tolerance and/or dependence, and alternative treatments. Discussed the effects of long term narcotic use. Patient encouraged to set daily goals of exercising and decreasing daily narcotic intake.       Electronically signed by LIVE Fang

## 2018-04-30 RX ORDER — HYDROCODONE BITARTRATE AND ACETAMINOPHEN 10; 325 MG/1; MG/1
1 TABLET ORAL 4 TIMES DAILY PRN
Qty: 120 TABLET | Refills: 0 | Status: SHIPPED | OUTPATIENT
Start: 2018-05-05 | End: 2018-05-29 | Stop reason: SDUPTHER

## 2018-05-29 RX ORDER — HYDROCODONE BITARTRATE AND ACETAMINOPHEN 10; 325 MG/1; MG/1
1 TABLET ORAL 4 TIMES DAILY PRN
Qty: 120 TABLET | Refills: 0 | Status: SHIPPED | OUTPATIENT
Start: 2018-06-04 | End: 2018-06-21 | Stop reason: SDUPTHER

## 2018-06-21 ENCOUNTER — HOSPITAL ENCOUNTER (OUTPATIENT)
Dept: PAIN MANAGEMENT | Age: 68
Discharge: HOME | End: 2018-06-21
Payer: MEDICARE

## 2018-06-21 VITALS
HEIGHT: 66 IN | OXYGEN SATURATION: 97 % | HEART RATE: 65 BPM | BODY MASS INDEX: 23.63 KG/M2 | DIASTOLIC BLOOD PRESSURE: 62 MMHG | TEMPERATURE: 98 F | SYSTOLIC BLOOD PRESSURE: 117 MMHG | RESPIRATION RATE: 18 BRPM | WEIGHT: 147 LBS

## 2018-06-21 DIAGNOSIS — M51.16 LUMBAR DISC DISEASE WITH RADICULOPATHY: ICD-10-CM

## 2018-06-21 DIAGNOSIS — M54.16 LUMBAR RADICULOPATHY: Primary | ICD-10-CM

## 2018-06-21 DIAGNOSIS — M54.10 BACK PAIN WITH LEFT-SIDED RADICULOPATHY: ICD-10-CM

## 2018-06-21 DIAGNOSIS — G89.29 CHRONIC PAIN OF BOTH KNEES: ICD-10-CM

## 2018-06-21 DIAGNOSIS — M25.562 CHRONIC PAIN OF BOTH KNEES: ICD-10-CM

## 2018-06-21 DIAGNOSIS — M54.50 CHRONIC BILATERAL LOW BACK PAIN WITHOUT SCIATICA: ICD-10-CM

## 2018-06-21 DIAGNOSIS — M25.561 CHRONIC PAIN OF BOTH KNEES: ICD-10-CM

## 2018-06-21 DIAGNOSIS — G89.29 CHRONIC BILATERAL LOW BACK PAIN WITHOUT SCIATICA: ICD-10-CM

## 2018-06-21 PROCEDURE — 99213 OFFICE O/P EST LOW 20 MIN: CPT | Performed by: NURSE PRACTITIONER

## 2018-06-21 PROCEDURE — 99213 OFFICE O/P EST LOW 20 MIN: CPT

## 2018-06-21 RX ORDER — PANTOPRAZOLE SODIUM 40 MG/1
40 TABLET, DELAYED RELEASE ORAL DAILY
Status: ON HOLD | COMMUNITY
Start: 2018-06-15 | End: 2022-04-28 | Stop reason: HOSPADM

## 2018-06-21 RX ORDER — GABAPENTIN 300 MG/1
600 CAPSULE ORAL 4 TIMES DAILY
Qty: 240 CAPSULE | Refills: 0 | Status: SHIPPED | OUTPATIENT
Start: 2018-06-21 | End: 2019-05-02 | Stop reason: ALTCHOICE

## 2018-06-21 RX ORDER — HYDROCODONE BITARTRATE AND ACETAMINOPHEN 10; 325 MG/1; MG/1
1 TABLET ORAL 4 TIMES DAILY PRN
Qty: 120 TABLET | Refills: 0 | Status: SHIPPED | OUTPATIENT
Start: 2018-07-05 | End: 2018-08-06 | Stop reason: SDUPTHER

## 2018-06-21 RX ORDER — TIZANIDINE 4 MG/1
4 TABLET ORAL 2 TIMES DAILY PRN
Qty: 45 TABLET | Refills: 2 | Status: SHIPPED | OUTPATIENT
Start: 2018-06-21 | End: 2018-09-26 | Stop reason: SDUPTHER

## 2018-06-21 ASSESSMENT — PAIN DESCRIPTION - PAIN TYPE: TYPE: CHRONIC PAIN

## 2018-06-21 ASSESSMENT — ENCOUNTER SYMPTOMS
CONSTIPATION: 0
BACK PAIN: 1

## 2018-06-21 ASSESSMENT — PAIN DESCRIPTION - PROGRESSION: CLINICAL_PROGRESSION: NOT CHANGED

## 2018-06-21 ASSESSMENT — PAIN DESCRIPTION - FREQUENCY: FREQUENCY: CONTINUOUS

## 2018-06-21 ASSESSMENT — PAIN DESCRIPTION - DIRECTION: RADIATING_TOWARDS: RADIATES INTO BILATERAL LOWER EXTREMITIES

## 2018-06-21 ASSESSMENT — PAIN DESCRIPTION - ORIENTATION: ORIENTATION: LOWER

## 2018-06-21 ASSESSMENT — PAIN DESCRIPTION - ONSET: ONSET: ON-GOING

## 2018-06-21 ASSESSMENT — ACTIVITIES OF DAILY LIVING (ADL): EFFECT OF PAIN ON DAILY ACTIVITIES: LIMITS ACTIVITY

## 2018-06-21 ASSESSMENT — PAIN SCALES - GENERAL: PAINLEVEL_OUTOF10: 6

## 2018-06-21 ASSESSMENT — PAIN DESCRIPTION - LOCATION: LOCATION: BACK;HEAD

## 2018-06-21 ASSESSMENT — PAIN DESCRIPTION - DESCRIPTORS: DESCRIPTORS: ACHING;CONSTANT;THROBBING;RADIATING

## 2018-08-03 ENCOUNTER — TELEPHONE (OUTPATIENT)
Dept: PAIN MANAGEMENT | Age: 68
End: 2018-08-03

## 2018-08-03 NOTE — TELEPHONE ENCOUNTER
Patient called stating she needed to talk to a nurse because she has problems with her medications and her pharmacy told her to call us. I placed call to patient's [facility] in [facility] and spoke with the pharmacist.  They stated that her Tizanidine was filled without a problem but that she has no refills on her gabapentin. Reviewed chart and noted that Lizabeth Renner escribed a gabapentin refill on June 21 with a stop date of September 21, however the refill stated zero. I requested that the pharmacist change the refill amount to two refills. Also in reviewing the record, I dont see where the patient called a refill request for Norco for August.  Called patient and she stated that Pastor Palmer has never had to call for her refill before, Dr. Bartolo Zuleta always calls it in and no one told me\". Tried to explain to the patient that it has always been the procedure/practice that refills have to be requested each month for narcotics. Patient not understanding and continuing to state that she has never had to call it in. Instructed patient that I would forward her request to the prescription line, but that it would not be processed until at least Tuesday 8/7. Patient did clarify with me that she is staying at [facility] ([facility] and not transferring to Dr. Yesica Gallagher office.

## 2018-08-06 DIAGNOSIS — M54.16 LUMBAR RADICULOPATHY: ICD-10-CM

## 2018-08-07 RX ORDER — HYDROCODONE BITARTRATE AND ACETAMINOPHEN 10; 325 MG/1; MG/1
1 TABLET ORAL 4 TIMES DAILY PRN
Qty: 120 TABLET | Refills: 0 | Status: SHIPPED | OUTPATIENT
Start: 2018-08-07 | End: 2018-09-04 | Stop reason: SDUPTHER

## 2018-08-10 ENCOUNTER — OFFICE VISIT (OUTPATIENT)
Dept: GASTROENTEROLOGY | Facility: CLINIC | Age: 68
End: 2018-08-10

## 2018-08-10 VITALS
WEIGHT: 149 LBS | DIASTOLIC BLOOD PRESSURE: 80 MMHG | SYSTOLIC BLOOD PRESSURE: 138 MMHG | BODY MASS INDEX: 23.95 KG/M2 | HEIGHT: 66 IN | OXYGEN SATURATION: 99 % | TEMPERATURE: 97.2 F | HEART RATE: 75 BPM

## 2018-08-10 DIAGNOSIS — R11.2 NAUSEA AND VOMITING, INTRACTABILITY OF VOMITING NOT SPECIFIED, UNSPECIFIED VOMITING TYPE: ICD-10-CM

## 2018-08-10 DIAGNOSIS — R10.10 PAIN OF UPPER ABDOMEN: Primary | ICD-10-CM

## 2018-08-10 DIAGNOSIS — Z72.0 TOBACCO USE: ICD-10-CM

## 2018-08-10 DIAGNOSIS — K59.00 CONSTIPATION, UNSPECIFIED CONSTIPATION TYPE: ICD-10-CM

## 2018-08-10 PROCEDURE — 99204 OFFICE O/P NEW MOD 45 MIN: CPT | Performed by: NURSE PRACTITIONER

## 2018-08-10 RX ORDER — PHENOL 1.4 %
600 AEROSOL, SPRAY (ML) MUCOUS MEMBRANE DAILY
Status: ON HOLD | COMMUNITY
End: 2018-11-02

## 2018-08-10 RX ORDER — SUCRALFATE ORAL 1 G/10ML
1 SUSPENSION ORAL 3 TIMES DAILY PRN
COMMUNITY

## 2018-08-10 RX ORDER — GABAPENTIN 300 MG/1
600 CAPSULE ORAL
COMMUNITY
Start: 2018-06-21 | End: 2018-09-19

## 2018-08-10 RX ORDER — LEVOTHYROXINE SODIUM 0.1 MG/1
100 TABLET ORAL DAILY
COMMUNITY
Start: 2015-10-20

## 2018-08-10 RX ORDER — DICYCLOMINE HYDROCHLORIDE 10 MG/1
10 CAPSULE ORAL 3 TIMES DAILY PRN
COMMUNITY

## 2018-08-10 RX ORDER — PANTOPRAZOLE SODIUM 40 MG/1
40 TABLET, DELAYED RELEASE ORAL DAILY
COMMUNITY
Start: 2018-06-15

## 2018-08-10 RX ORDER — PROMETHAZINE HYDROCHLORIDE 25 MG/1
25 TABLET ORAL EVERY 8 HOURS PRN
COMMUNITY
Start: 2016-10-03 | End: 2018-11-03 | Stop reason: HOSPADM

## 2018-08-10 RX ORDER — HYDROCODONE BITARTRATE AND ACETAMINOPHEN 10; 325 MG/1; MG/1
TABLET ORAL
COMMUNITY
Start: 2018-08-07 | End: 2018-09-06

## 2018-08-10 RX ORDER — TIZANIDINE 4 MG/1
4 TABLET ORAL EVERY 8 HOURS
COMMUNITY
Start: 2018-06-21

## 2018-08-10 RX ORDER — ALPRAZOLAM 0.5 MG/1
0.5 TABLET ORAL 3 TIMES DAILY PRN
COMMUNITY

## 2018-08-10 RX ORDER — ONDANSETRON 4 MG/1
4 TABLET, FILM COATED ORAL EVERY 6 HOURS PRN
COMMUNITY
End: 2018-11-03 | Stop reason: HOSPADM

## 2018-08-10 NOTE — PROGRESS NOTES
"[facility] GASTROENTEROLOGY - OFFICE NOTE    8/10/2018    Sammi Sorenson   1950    Primary Physician: Zak Alan MD    Chief Complaint   Patient presents with   • Abdominal Pain     diarrhea vs constipation,    • Nausea   • Vomiting         HISTORY OF PRESENT ILLNESS    Sammi Sorenson is a 67 y.o. female presents  with nausea x 9 months. Occurs daily. Has vomiting daily.  She also has upper abdominal pain.  Started on pantoprazole 2 months ago which has not helped.  Started Carafate 2 weeks ago which has not helped.  She started on Bentyl a few weeks ago which has helped some.  She was also on steroids recently \"for my stomach\", prescribed by Dr. Alan.  She does have constipation.  She has a bowel movement once every 4-5 days.  Typically after several days of not having a bowel movement she will have diarrhea.  She has been taking MiraLAX over the last several months daily.  She was also started on Linzess within the last 2 weeks and initially was on the 72 µg.  She was increased to 145 µg daily recently.  States that she has been hospitalized several times at [place] for these symptoms.  No black or bright red blood per rectum.  No fevers.  Denies weight loss.  No hematemesis.  No aspirin or NSAIDs.    Was hospitalized at [facility] July 2018 for same symptoms.  Labs chemistry panel showed normal LFTs, normal amylase and lipase, normal troponin, normal white blood cell count, hemoglobin normal, platelet count low.  Ultrasound of the abdomen/right upper quadrant 07/25/2018 at [place], findings within normal limits, post cholecystectomy.  CT scan of the abdomen and pelvis without contrast 07/25/2018 at [place], nothing acute, diverticulosis without diverticulitis.  CT scan of the chest without contrast on July 25 impression was bilateral lower airway thickening, suggesting small airways infection/inflammation, with associated mucus plugging right lower lobe, stable scattered " micro-nodules.      Last colonoscopy April 2015 noting polyps (tubular adenomatous), diverticulosis, internal hemorrhoid, recommended recall 3 years.  Last EGD April 2015 hiatal hernia, H. pylori was negative.    Past Medical History:   Diagnosis Date   • Anxiety    • Colon polyp    • Disease of thyroid gland    • Diverticulosis    • Hemorrhoid    • Hiatal hernia    • History of adenomatous polyp of colon    • Ovarian cancer (CMS/HCC)        Past Surgical History:   Procedure Laterality Date   • CHOLECYSTECTOMY     • COLONOSCOPY  04/10/2015    5 polyps, tubular adenomas, melanosis, diverticulosis, internal hemorrhoids,    • CYST REMOVAL     • ENDOSCOPY  04/10/2015    hiatus hernia   • HYSTERECTOMY     • LUNG LOBECTOMY     • REPLACEMENT TOTAL KNEE BILATERAL         Outpatient Prescriptions Marked as Taking for the 8/10/18 encounter (Office Visit) with Crissy Ewing APRN   Medication Sig Dispense Refill   • ALPRAZolam (XANAX) 0.5 MG tablet Take 0.5 mg by mouth.     • calcium carbonate (OS-SAMY) 600 MG tablet Take 600 mg by mouth Daily.     • dicyclomine (BENTYL) 10 MG capsule Take 10 mg by mouth 3 (Three) Times a Day As Needed.     • gabapentin (NEURONTIN) 300 MG capsule Take 600 mg by mouth.     • HYDROcodone-acetaminophen (NORCO)  MG per tablet Take  by mouth.     • levothyroxine (SYNTHROID, LEVOTHROID) 100 MCG tablet Take  by mouth.     • ondansetron (ZOFRAN) 4 MG tablet Take 4 mg by mouth Every 6 (Six) Hours As Needed for Nausea or Vomiting.     • pantoprazole (PROTONIX) 40 MG EC tablet 40 mg 2 (Two) Times a Day.     • POTASSIUM PO Take  by mouth Daily.     • sucralfate (CARAFATE) 1 GM/10ML suspension Take 1 g by mouth.     • tiZANidine (ZANAFLEX) 4 MG tablet Take 4 mg by mouth.         Allergies   Allergen Reactions   • Azithromycin Nausea And Vomiting     All mycin   • Bactrim [Sulfamethoxazole-Trimethoprim] Other (See Comments)     Muscle pain,joint stiffness   • Penicillins Other (See Comments)      "whelps   • Sulfa Antibiotics Nausea And Vomiting     Muscle pain     • Talwin [Pentazocine] Nausea And Vomiting       Social History     Social History   • Marital status:      Spouse name: N/A   • Number of children: N/A   • Years of education: N/A     Occupational History   • Not on file.     Social History Main Topics   • Smoking status: Current Every Day Smoker   • Smokeless tobacco: Never Used   • Alcohol use No   • Drug use: No   • Sexual activity: Defer     Other Topics Concern   • Not on file     Social History Narrative   • No narrative on file       No family history on file.    Review of Systems   Constitutional: Negative for appetite change, chills, fatigue, fever and unexpected weight change.   HENT: Negative for sore throat and trouble swallowing.    Eyes: Negative for visual disturbance.   Respiratory: Negative for cough, chest tightness, shortness of breath and wheezing.    Cardiovascular: Negative for chest pain and palpitations.   Gastrointestinal: Positive for abdominal pain, constipation, nausea and vomiting. Negative for abdominal distention, anal bleeding, blood in stool, diarrhea and rectal pain.        As mentioned in hpi   Genitourinary: Negative for difficulty urinating and hematuria.   Musculoskeletal: Negative for arthralgias and back pain.   Skin: Negative for color change and rash.   Neurological: Positive for headaches (chronic). Negative for dizziness, seizures, syncope and light-headedness.   Hematological: Negative for adenopathy.   Psychiatric/Behavioral: Negative for confusion. The patient is not nervous/anxious.         Vitals:    08/10/18 1033   BP: 138/80   BP Location: Left arm   Patient Position: Sitting   Cuff Size: Adult   Pulse: 75   Temp: 97.2 °F (36.2 °C)   SpO2: 99%   Weight: 67.6 kg (149 lb)   Height: 167.6 cm (66\")      Body mass index is 24.05 kg/m².    Physical Exam   Constitutional: She appears well-developed and well-nourished. No distress.   HENT:   Head: " Normocephalic and atraumatic.   Eyes: EOM are normal. No scleral icterus.   Neck: Neck supple. No JVD present.   Cardiovascular: Normal rate, regular rhythm and normal heart sounds.    Pulmonary/Chest: Effort normal and breath sounds normal. No stridor.   Abdominal: Soft. Bowel sounds are normal. She exhibits no distension and no mass. There is tenderness (shireen tenderness). There is no rebound and no guarding.   Musculoskeletal: Normal range of motion. She exhibits no deformity.   Neurological: She is alert.   Skin: Skin is warm and dry. No rash noted.   Psychiatric: She has a normal mood and affect. Her behavior is normal.   Vitals reviewed.              ASSESSMENT AND PLAN    Assessment/Plan     Sammi was seen today for abdominal pain, nausea and vomiting.    Diagnoses and all orders for this visit:    Pain of upper abdomen  -     External Facility Surgical/Procedural Request; Future    Nausea and vomiting, intractability of vomiting not specified, unspecified vomiting type  -     External Facility Surgical/Procedural Request; Future    Constipation, unspecified constipation type    Tobacco use    Differential diagnosis discussed.  Recommend continue taking pantoprazole daily.  Plan for EGD.  ER worsening symptoms.    Regards to constipation, recommend continue MiraLAX on a daily basis and increase as needed as discussed.  He is going to continue taking the Linzess on a daily basis for now as well.  Recommend increase water intake and take a daily fiber supplement.  May consider repeating colonoscopy however at this point we will proceed with upper endoscopy since she is localizing her pain to the upper abdomen.  Also I am not so sure she could tolerate a prep at this point.    EGD   Risk, benefits, and alternatives of endoscopy were explained in full.  They understand that there is a risk of bleeding, perforation, and infection.  The risk of perforation goes up with esophageal dilation.  Other options to  evaluate UGI complaints could involve barium swallow or UGI series, but these would be diagnostic tests only.  Patient was given time to ask questions.  I answered them to their satisfaction and they are agreeable to proceeding        I advised Sammi of the risks of continuing to use tobacco, and I provided her with tobacco cessation educational materials in the After Visit Summary.     During this visit, I spent > 3  minutes counseling the patient regarding tobacco cessation.       Body mass index is 24.05 kg/m².    Patient's Body mass index is 24.05 kg/m². BMI is within normal parameters. No follow-up required.                 HUMA Brewer    EMR Dragon/transcription disclaimer:  Much of this encounter note is electronic transcription/translation of spoken language to printed text.  The electronic translation of spoken language may be erroneous, or at times, nonsensical words or phrases may be inadvertently transcribed.  Although I have reviewed the note for such errors, some may still exist.

## 2018-08-28 ENCOUNTER — OUTSIDE FACILITY SERVICE (OUTPATIENT)
Dept: GASTROENTEROLOGY | Facility: CLINIC | Age: 68
End: 2018-08-28

## 2018-08-28 PROCEDURE — 43239 EGD BIOPSY SINGLE/MULTIPLE: CPT | Performed by: INTERNAL MEDICINE

## 2018-09-04 ENCOUNTER — TELEPHONE (OUTPATIENT)
Dept: GASTROENTEROLOGY | Facility: CLINIC | Age: 68
End: 2018-09-04

## 2018-09-04 DIAGNOSIS — M54.16 LUMBAR RADICULOPATHY: ICD-10-CM

## 2018-09-04 NOTE — TELEPHONE ENCOUNTER
Rec. tx for h. Pylori with Biaxin 500mg bid for 10 days and Amoxicillin 1 gram bid for 10 days.

## 2018-09-04 NOTE — TELEPHONE ENCOUNTER
Called PT.  She is allergic to Penicillins.  Said she can't take any of the cillins,  She breaks out in large whelps.

## 2018-09-05 RX ORDER — HYDROCODONE BITARTRATE AND ACETAMINOPHEN 10; 325 MG/1; MG/1
1 TABLET ORAL 4 TIMES DAILY PRN
Qty: 120 TABLET | Refills: 0 | Status: SHIPPED | OUTPATIENT
Start: 2018-09-06 | End: 2018-09-26 | Stop reason: SDUPTHER

## 2018-09-06 ENCOUNTER — TELEPHONE (OUTPATIENT)
Dept: GASTROENTEROLOGY | Facility: CLINIC | Age: 68
End: 2018-09-06

## 2018-09-06 NOTE — TELEPHONE ENCOUNTER
Pt states she has been taking linzess daily along with stool softner and miralax 2-3 times a day and is having BMs every 3-4 days. States she took 2 linzess this morning.

## 2018-09-06 NOTE — TELEPHONE ENCOUNTER
She can increase her MiraLAX to take even more.  She also should be scheduled for colonoscopy exam already please make certain that she is scheduled

## 2018-09-07 ENCOUNTER — PREP FOR SURGERY (OUTPATIENT)
Dept: OTHER | Facility: HOSPITAL | Age: 68
End: 2018-09-07

## 2018-09-07 DIAGNOSIS — Z86.010 HX OF COLONIC POLYPS: Primary | ICD-10-CM

## 2018-09-25 ENCOUNTER — OUTSIDE FACILITY SERVICE (OUTPATIENT)
Dept: GASTROENTEROLOGY | Facility: CLINIC | Age: 68
End: 2018-09-25

## 2018-09-25 PROCEDURE — 45378 DIAGNOSTIC COLONOSCOPY: CPT | Performed by: INTERNAL MEDICINE

## 2018-09-26 DIAGNOSIS — M54.16 LUMBAR RADICULOPATHY: ICD-10-CM

## 2018-09-26 RX ORDER — TIZANIDINE 4 MG/1
4 TABLET ORAL 2 TIMES DAILY PRN
Qty: 45 TABLET | Refills: 2 | Status: SHIPPED | OUTPATIENT
Start: 2018-09-27 | End: 2018-11-06 | Stop reason: SDUPTHER

## 2018-09-26 RX ORDER — HYDROCODONE BITARTRATE AND ACETAMINOPHEN 10; 325 MG/1; MG/1
1 TABLET ORAL 4 TIMES DAILY PRN
Qty: 76 TABLET | Refills: 0 | Status: SHIPPED | OUTPATIENT
Start: 2018-10-06 | End: 2019-02-21 | Stop reason: SDUPTHER

## 2018-10-29 NOTE — PROGRESS NOTES
YOB: 1950  Location: [facility]  Location Address: [address] [place] [address], [facility], [address] [place] [address]  Location Phone: [phone]    Chief Complaint   Patient presents with   • Swollen Glands     Left side of neck       History of Present Illness  Sammi Sorenson is a 68 y.o. female.  Sammi Sorenson is here for evaluation of ENT complaints. The patient has had problems with submandibular mass. Patient states the mass appeared one month ago. The area is swollen, tender and red. She has headaches and ear pain. She was seen by Dr. Gallegos who gave her ten days of antibiotics and ordered a CT. Dr. Gallegos referred her to Dr. Alan who ordered another CT and started her on ten days of Cipro.  She had a fine-needle aspiration which was negative for purulent discharge.  Dr. Alan admitted patient and she was placed on IV antibiotics-Levaquin. She is having GI problems associated with nausea which is been present for over 10 months.  She has been evaluated by Dr. Blankenship according to the patient and he felt as though her problems have been related to H. pylori.  She still has difficulty with nausea however.  This antedates the difficulty with her submandibular gland.      I discussed the patient with Dr. Alan and reviewed her chart from the hospital admission and since she was at no time febrile and did never exhibited a high white blood count.  Patient has had difficulty with this for over a month.                     Past Medical History:   Diagnosis Date   • Anxiety    • Chronic abdominal pain    • Chronic back pain    • Chronic gastritis    • CKD (chronic kidney disease)    • Colon polyp    • COPD (chronic obstructive pulmonary disease) (CMS/HCC)    • Disease of thyroid gland    • Diverticulosis    • Dyslipidemia    • GERD (gastroesophageal reflux disease)    • Hemorrhoid    • Hiatal hernia    • History of adenomatous polyp of colon    • Migraine    • Neuropathy    • Osteoarthritis    • Ovarian  cancer (CMS/HCC)        Past Surgical History:   Procedure Laterality Date   • CHOLECYSTECTOMY     • COLONOSCOPY  04/10/2015    5 polyps, tubular adenomas, melanosis, diverticulosis, internal hemorrhoids,    • CYST REMOVAL     • ENDOSCOPY  04/10/2015    hiatus hernia   • HYSTERECTOMY     • LUNG LOBECTOMY     • REPLACEMENT TOTAL KNEE BILATERAL         Outpatient Prescriptions Marked as Taking for the 10/30/18 encounter (Office Visit) with Duncan Ewing MD   Medication Sig Dispense Refill   • ALPRAZolam (XANAX) 0.5 MG tablet Take 0.5 mg by mouth.     • dicyclomine (BENTYL) 10 MG capsule Take 10 mg by mouth 3 (Three) Times a Day As Needed.     • gabapentin (NEURONTIN) 300 MG capsule Take 600 mg by mouth.     • HYDROcodone-acetaminophen (NORCO)  MG per tablet Take  by mouth.     • levothyroxine (SYNTHROID, LEVOTHROID) 100 MCG tablet Take  by mouth.     • linaclotide (LINZESS) 290 MCG capsule capsule Take 1 capsule by mouth Every Morning Before Breakfast. 30 capsule 3   • ondansetron (ZOFRAN) 4 MG tablet Take 4 mg by mouth Every 6 (Six) Hours As Needed for Nausea or Vomiting.     • pantoprazole (PROTONIX) 40 MG EC tablet 40 mg 2 (Two) Times a Day.     • sucralfate (CARAFATE) 1 GM/10ML suspension Take 1 g by mouth.     • tiZANidine (ZANAFLEX) 4 MG tablet Take 4 mg by mouth.         Azithromycin; Bactrim [sulfamethoxazole-trimethoprim]; Codeine; Penicillins; Sulfa antibiotics; and Talwin [pentazocine]    History reviewed. No pertinent family history.    Social History     Social History   • Marital status:      Spouse name: N/A   • Number of children: N/A   • Years of education: N/A     Occupational History   • Not on file.     Social History Main Topics   • Smoking status: Current Every Day Smoker   • Smokeless tobacco: Never Used   • Alcohol use No   • Drug use: No   • Sexual activity: Defer     Other Topics Concern   • Not on file     Social History Narrative   • No narrative on file       Review of  Systems   Constitutional: Negative.    HENT: Positive for ear pain.         Swelling, tenderness and redness of submandibular gland   Eyes: Negative.    Respiratory: Negative.    Cardiovascular: Negative.    Gastrointestinal: Negative.    Endocrine: Negative.    Genitourinary: Negative.    Musculoskeletal: Negative.    Allergic/Immunologic: Negative.    Neurological: Positive for headaches.   Hematological: Negative.    Psychiatric/Behavioral: Negative.        Vitals:    10/30/18 0912   BP: 140/80   Temp: 97.5 °F (36.4 °C)       Body mass index is 24.37 kg/m².    Objective     Physical Exam  CONSTITUTIONAL: well nourished, alert, oriented, in no acute distress     COMMUNICATION AND VOICE: able to communicate normally, normal voice quality    HEAD: normocephalic, no lesions, atraumatic, no tenderness, no masses     FACE: appearance normal, no lesions, no tenderness, no deformities, facial motion symmetric    SALIVARY GLANDS: parotid glands with no tenderness, no swelling, no masses, submandibular glands with normal size, nontender on the right.  Left submandibular gland is associated with a obvious mass externally which is currently protuberant and nontender but not significantly erythematous    EYES: ocular motility normal, eyelids normal, orbits normal, no proptosis, conjunctiva normal , pupils equal, round     EARS:  Hearing: response to conversational voice normal bilaterally   External Ears: auricles without lesions  Otoscopic: tympanic membrane appearance normal, no lesions, no perforation, normal mobility, no fluid    NOSE:  External Nose: structure normal, no tenderness on palpation, no nasal discharge, no lesions, no evidence of trauma, nostrils patent   Intranasal Exam: nasal mucosa normal, vestibule within normal limits, inferior turbinate normal, nasal septum midline   Nasopharynx:     ORAL:  Lips: upper and lower lips without lesion   Teeth: She is edentulous.  Dentures removed for exam   Gums: gingivae  healthy   Oral Mucosa: oral mucosa normal, no mucosal lesions   Floor of Mouth: Warthin’s duct patent, mucosa normal with no purulent expression on bimanual palpation though the mass in the left upper neck is exquisitely tender.  There is no floor of mouth edema or erythema or palpable stone in the duct.  Tongue: lingual mucosa normal without lesions, normal tongue mobility   Palate: soft and hard palates with normal mucosa and structure  Oropharynx: oropharyngeal mucosa normal    HYPOPHARYNX:   LARYNX:   NECK: Approximate 2 cm firm but mobile mass is appreciated level I in the left neck.  It is  from the scan which moves separately.  As noted above there is mild to moderate tenderness but no overlying erythema of significance.    THYROID: no overt thyromegaly, no tenderness, nodules or mass present on palpation, position midline     LYMPH NODES: no lymphadenopathy    CHEST/RES  PIRATORY: respiratory effort normal, normal breath sounds     CARDIOVASCULAR: rate and rhythm normal, extremities without cyanosis or edema      NEUROLOGIC/PSYCHIATRIC: oriented to time, place and person, mood normal, affect appropriate, CN II-XII intact grossly    Assessment/Plan   Sammi was seen today for swollen glands.    Diagnoses and all orders for this visit:    Mass of left submandibular region  -     Case Request; Standing  -     Comprehensive Metabolic Panel; Future  -     CBC and Differential; Future  -     ECG 12 Lead; Future  -     XR Chest 2 View; Future  -     Case Request    Sialoadenitis of submandibular gland  Comments:  Cannot rule out  Orders:  -     Case Request; Standing  -     Comprehensive Metabolic Panel; Future  -     CBC and Differential; Future  -     ECG 12 Lead; Future  -     XR Chest 2 View; Future  -     Case Request    Tobacco abuse    Other orders  -     Follow Anesthesia Guidelines / Standing Orders; Future  -     Obtain Informed Consent; Future  -     Follow Anesthesia Guidelines / Standing  Orders; Standing  -     NPO Diet; Standing  -     Verify NPO Status; Standing  -     Obtain Informed Consent; Standing      SUBMANDIBULAR GLAND/LYMPH NODE EXCISION (Left)  Orders Placed This Encounter   Procedures   • XR Chest 2 View     Standing Status:   Future     Standing Expiration Date:   10/30/2019     Order Specific Question:   Reason for Exam:     Answer:   Excision of left submandibular gland\mass   • Comprehensive Metabolic Panel     Standing Status:   Future     Standing Expiration Date:   10/30/2019   • Follow Anesthesia Guidelines / Standing Orders     Standing Status:   Future   • Obtain Informed Consent     Standing Status:   Future     Order Specific Question:   Informed Consent Given For     Answer:   Excision of left submandibular gland\mass   • ECG 12 Lead     Standing Status:   Future     Standing Expiration Date:   10/30/2019     Order Specific Question:   Reason for Exam:     Answer:   Excision of left submandibular gland\mass   • CBC and Differential     Standing Status:   Future     Standing Expiration Date:   10/30/2019     Order Specific Question:   Manual Differential     Answer:   No     No Follow-up on file.       Patient Instructions   SUBMANDIBULAR GLAND EXCISION/Excision of left neck mass: The risks, benefits, and alternatives of submandibular gland excision including but not limited to pain, bleeding, infection, marginal mandibular nerve weakness, tongue numbness, persistent and or recurrent disease, and risks of the anesthesia were discussed full with the patient and questions were answered. Questions were answered. No guarantees were made or implied.      I reiterated to the patient that the risk of injury to the marginal mandibular branch of the facial nerve and the lingual nerve was higher in the presence of active salivary gland infection.    Patient and family were instructed on the proper use of scheduled prescription drugs including their impact on driving and the potential  effects during pregnancy.  The potential for overdose was discussed and their safe storage and proper disposal.  The website www.[employer].[place].gov which contains other materials in this regard.      I advised the patient of the risks in continuing to use tobacco and recommended complete cessation, The inherent risks including the risk of disability, development of a malignancy and/or death was discussed.  The patient indicated understanding.

## 2018-10-30 ENCOUNTER — OFFICE VISIT (OUTPATIENT)
Dept: OTOLARYNGOLOGY | Facility: CLINIC | Age: 68
End: 2018-10-30

## 2018-10-30 ENCOUNTER — APPOINTMENT (OUTPATIENT)
Dept: PREADMISSION TESTING | Facility: HOSPITAL | Age: 68
End: 2018-10-30

## 2018-10-30 VITALS
WEIGHT: 151.46 LBS | SYSTOLIC BLOOD PRESSURE: 148 MMHG | RESPIRATION RATE: 20 BRPM | BODY MASS INDEX: 24.34 KG/M2 | HEIGHT: 66 IN | HEART RATE: 64 BPM | OXYGEN SATURATION: 98 % | DIASTOLIC BLOOD PRESSURE: 57 MMHG

## 2018-10-30 VITALS
HEIGHT: 66 IN | BODY MASS INDEX: 24.27 KG/M2 | TEMPERATURE: 97.5 F | WEIGHT: 151 LBS | DIASTOLIC BLOOD PRESSURE: 80 MMHG | SYSTOLIC BLOOD PRESSURE: 140 MMHG

## 2018-10-30 DIAGNOSIS — R22.0 MASS OF LEFT SUBMANDIBULAR REGION: Primary | ICD-10-CM

## 2018-10-30 DIAGNOSIS — K11.20 SIALOADENITIS OF SUBMANDIBULAR GLAND: ICD-10-CM

## 2018-10-30 DIAGNOSIS — R22.0 MASS OF LEFT SUBMANDIBULAR REGION: ICD-10-CM

## 2018-10-30 DIAGNOSIS — Z72.0 TOBACCO ABUSE: ICD-10-CM

## 2018-10-30 PROCEDURE — 99204 OFFICE O/P NEW MOD 45 MIN: CPT | Performed by: OTOLARYNGOLOGY

## 2018-10-30 RX ORDER — HYDROCODONE BITARTRATE AND ACETAMINOPHEN 10; 325 MG/1; MG/1
1 TABLET ORAL EVERY 6 HOURS PRN
Status: ON HOLD | COMMUNITY
Start: 2018-10-06 | End: 2018-11-02

## 2018-10-30 RX ORDER — GABAPENTIN 300 MG/1
900 CAPSULE ORAL 3 TIMES DAILY
COMMUNITY
Start: 2018-06-21 | End: 2022-03-29

## 2018-10-30 NOTE — PATIENT INSTRUCTIONS
SUBMANDIBULAR GLAND EXCISION/Excision of left neck mass: The risks, benefits, and alternatives of submandibular gland excision including but not limited to pain, bleeding, infection, marginal mandibular nerve weakness, tongue numbness, persistent and or recurrent disease, and risks of the anesthesia were discussed full with the patient and questions were answered. Questions were answered. No guarantees were made or implied.      I reiterated to the patient that the risk of injury to the marginal mandibular branch of the facial nerve and the lingual nerve was higher in the presence of active salivary gland infection.    Patient and family were instructed on the proper use of scheduled prescription drugs including their impact on driving and the potential effects during pregnancy.  The potential for overdose was discussed and their safe storage and proper disposal.  The website www.[employer].ky.gov which contains other materials in this regard.      I advised the patient of the risks in continuing to use tobacco and recommended complete cessation, The inherent risks including the risk of disability, development of a malignancy and/or death was discussed.  The patient indicated understanding.

## 2018-11-01 ENCOUNTER — TELEPHONE (OUTPATIENT)
Dept: PAIN MANAGEMENT | Age: 68
End: 2018-11-01

## 2018-11-01 NOTE — TELEPHONE ENCOUNTER
Patient called to report that she is having surgery tomorrow and she wanted to know if we will give her something for pain. She said that the other doctors wont give her anything because she is in pain management and she doesn't want to have surgery without pain medicine. Returned her call and instructed her per her contract that she can receive pain medication from the surgeon. Let her know that the providers here do not manage post op pain and they do want the surgeon to prescribe post op pain medication. Instructed her to let the surgeon know and that she will not be in violation of her contract to fill that script. Patient verbalized understanding.

## 2018-11-02 ENCOUNTER — ANESTHESIA (OUTPATIENT)
Dept: PERIOP | Facility: HOSPITAL | Age: 68
End: 2018-11-02

## 2018-11-02 ENCOUNTER — HOSPITAL ENCOUNTER (OUTPATIENT)
Facility: HOSPITAL | Age: 68
Discharge: HOME | End: 2018-11-03
Attending: OTOLARYNGOLOGY | Admitting: OTOLARYNGOLOGY

## 2018-11-02 ENCOUNTER — ANESTHESIA EVENT (OUTPATIENT)
Dept: PERIOP | Facility: HOSPITAL | Age: 68
End: 2018-11-02

## 2018-11-02 DIAGNOSIS — R22.0 MASS OF LEFT SUBMANDIBULAR REGION: ICD-10-CM

## 2018-11-02 DIAGNOSIS — K11.20 SIALOADENITIS OF SUBMANDIBULAR GLAND: ICD-10-CM

## 2018-11-02 PROCEDURE — 42440 EXCISE SUBMAXILLARY GLAND: CPT | Performed by: OTOLARYNGOLOGY

## 2018-11-02 PROCEDURE — 25010000002 PROPOFOL 10 MG/ML EMULSION: Performed by: NURSE ANESTHETIST, CERTIFIED REGISTERED

## 2018-11-02 PROCEDURE — 25810000003 POTASSIUM CHLORIDE PER 2 MEQ: Performed by: OTOLARYNGOLOGY

## 2018-11-02 PROCEDURE — 63710000001 MUPIROCIN 2 % OINTMENT 22 G TUBE: Performed by: OTOLARYNGOLOGY

## 2018-11-02 PROCEDURE — 25010000002 DEXAMETHASONE PER 1 MG: Performed by: NURSE ANESTHETIST, CERTIFIED REGISTERED

## 2018-11-02 PROCEDURE — 88304 TISSUE EXAM BY PATHOLOGIST: CPT | Performed by: OTOLARYNGOLOGY

## 2018-11-02 PROCEDURE — 63710000001 GABAPENTIN 300 MG CAPSULE: Performed by: OTOLARYNGOLOGY

## 2018-11-02 PROCEDURE — 25010000002 ONDANSETRON PER 1 MG: Performed by: OTOLARYNGOLOGY

## 2018-11-02 PROCEDURE — A9270 NON-COVERED ITEM OR SERVICE: HCPCS | Performed by: OTOLARYNGOLOGY

## 2018-11-02 PROCEDURE — 25010000002 NEOSTIGMINE PER 0.5 MG: Performed by: NURSE ANESTHETIST, CERTIFIED REGISTERED

## 2018-11-02 PROCEDURE — 87075 CULTR BACTERIA EXCEPT BLOOD: CPT | Performed by: OTOLARYNGOLOGY

## 2018-11-02 PROCEDURE — 87205 SMEAR GRAM STAIN: CPT | Performed by: OTOLARYNGOLOGY

## 2018-11-02 PROCEDURE — 63710000001 TIZANIDINE 4 MG TABLET: Performed by: OTOLARYNGOLOGY

## 2018-11-02 PROCEDURE — 63710000001 PANTOPRAZOLE 40 MG TABLET DELAYED-RELEASE: Performed by: OTOLARYNGOLOGY

## 2018-11-02 PROCEDURE — 25010000002 DEXAMETHASONE PER 1 MG: Performed by: ANESTHESIOLOGY

## 2018-11-02 PROCEDURE — 63710000001 SUCRALFATE 1 GM/10ML SUSPENSION: Performed by: OTOLARYNGOLOGY

## 2018-11-02 PROCEDURE — 25010000002 METOCLOPRAMIDE PER 10 MG: Performed by: ANESTHESIOLOGY

## 2018-11-02 PROCEDURE — 87070 CULTURE OTHR SPECIMN AEROBIC: CPT | Performed by: OTOLARYNGOLOGY

## 2018-11-02 PROCEDURE — 25010000002 FENTANYL CITRATE (PF) 100 MCG/2ML SOLUTION: Performed by: ANESTHESIOLOGY

## 2018-11-02 PROCEDURE — 25010000002 ONDANSETRON PER 1 MG: Performed by: NURSE ANESTHETIST, CERTIFIED REGISTERED

## 2018-11-02 PROCEDURE — 25010000002 FENTANYL CITRATE (PF) 100 MCG/2ML SOLUTION: Performed by: NURSE ANESTHETIST, CERTIFIED REGISTERED

## 2018-11-02 PROCEDURE — 63710000001 HYDROCODONE-ACETAMINOPHEN 5-325 MG TABLET: Performed by: OTOLARYNGOLOGY

## 2018-11-02 PROCEDURE — 87015 SPECIMEN INFECT AGNT CONCNTJ: CPT | Performed by: OTOLARYNGOLOGY

## 2018-11-02 RX ORDER — TIZANIDINE 4 MG/1
4 TABLET ORAL 3 TIMES DAILY
Status: DISCONTINUED | OUTPATIENT
Start: 2018-11-02 | End: 2018-11-03 | Stop reason: HOSPADM

## 2018-11-02 RX ORDER — LIDOCAINE HYDROCHLORIDE 20 MG/ML
INJECTION, SOLUTION INFILTRATION; PERINEURAL AS NEEDED
Status: DISCONTINUED | OUTPATIENT
Start: 2018-11-02 | End: 2018-11-02 | Stop reason: SURG

## 2018-11-02 RX ORDER — DEXAMETHASONE SODIUM PHOSPHATE 4 MG/ML
4 INJECTION, SOLUTION INTRA-ARTICULAR; INTRALESIONAL; INTRAMUSCULAR; INTRAVENOUS; SOFT TISSUE ONCE AS NEEDED
Status: COMPLETED | OUTPATIENT
Start: 2018-11-02 | End: 2018-11-02

## 2018-11-02 RX ORDER — LEVOTHYROXINE SODIUM 0.1 MG/1
100 TABLET ORAL
Status: DISCONTINUED | OUTPATIENT
Start: 2018-11-03 | End: 2018-11-03 | Stop reason: HOSPADM

## 2018-11-02 RX ORDER — ONDANSETRON 2 MG/ML
INJECTION INTRAMUSCULAR; INTRAVENOUS AS NEEDED
Status: DISCONTINUED | OUTPATIENT
Start: 2018-11-02 | End: 2018-11-02 | Stop reason: SURG

## 2018-11-02 RX ORDER — ACETAMINOPHEN 500 MG
1000 TABLET ORAL ONCE
Status: COMPLETED | OUTPATIENT
Start: 2018-11-02 | End: 2018-11-02

## 2018-11-02 RX ORDER — ONDANSETRON 4 MG/1
4 TABLET, FILM COATED ORAL EVERY 6 HOURS PRN
Status: DISCONTINUED | OUTPATIENT
Start: 2018-11-02 | End: 2018-11-03 | Stop reason: HOSPADM

## 2018-11-02 RX ORDER — PANTOPRAZOLE SODIUM 40 MG/1
40 TABLET, DELAYED RELEASE ORAL
Status: DISCONTINUED | OUTPATIENT
Start: 2018-11-02 | End: 2018-11-03 | Stop reason: HOSPADM

## 2018-11-02 RX ORDER — METOCLOPRAMIDE HYDROCHLORIDE 5 MG/ML
5 INJECTION INTRAMUSCULAR; INTRAVENOUS
Status: COMPLETED | OUTPATIENT
Start: 2018-11-02 | End: 2018-11-02

## 2018-11-02 RX ORDER — SODIUM CHLORIDE 0.9 % (FLUSH) 0.9 %
3 SYRINGE (ML) INJECTION AS NEEDED
Status: DISCONTINUED | OUTPATIENT
Start: 2018-11-02 | End: 2018-11-02 | Stop reason: HOSPADM

## 2018-11-02 RX ORDER — IPRATROPIUM BROMIDE AND ALBUTEROL SULFATE 2.5; .5 MG/3ML; MG/3ML
3 SOLUTION RESPIRATORY (INHALATION) ONCE AS NEEDED
Status: DISCONTINUED | OUTPATIENT
Start: 2018-11-02 | End: 2018-11-02 | Stop reason: HOSPADM

## 2018-11-02 RX ORDER — SODIUM CHLORIDE AND POTASSIUM CHLORIDE 150; 450 MG/100ML; MG/100ML
100 INJECTION, SOLUTION INTRAVENOUS CONTINUOUS
Status: DISCONTINUED | OUTPATIENT
Start: 2018-11-02 | End: 2018-11-03 | Stop reason: HOSPADM

## 2018-11-02 RX ORDER — FENTANYL CITRATE 50 UG/ML
INJECTION, SOLUTION INTRAMUSCULAR; INTRAVENOUS AS NEEDED
Status: DISCONTINUED | OUTPATIENT
Start: 2018-11-02 | End: 2018-11-02 | Stop reason: SURG

## 2018-11-02 RX ORDER — OXYCODONE AND ACETAMINOPHEN 10; 325 MG/1; MG/1
1 TABLET ORAL ONCE AS NEEDED
Status: DISCONTINUED | OUTPATIENT
Start: 2018-11-02 | End: 2018-11-02 | Stop reason: HOSPADM

## 2018-11-02 RX ORDER — SUCRALFATE ORAL 1 G/10ML
1 SUSPENSION ORAL
Status: DISCONTINUED | OUTPATIENT
Start: 2018-11-02 | End: 2018-11-03 | Stop reason: HOSPADM

## 2018-11-02 RX ORDER — OXYCODONE AND ACETAMINOPHEN 7.5; 325 MG/1; MG/1
2 TABLET ORAL ONCE AS NEEDED
Status: DISCONTINUED | OUTPATIENT
Start: 2018-11-02 | End: 2018-11-02 | Stop reason: HOSPADM

## 2018-11-02 RX ORDER — ONDANSETRON 2 MG/ML
4 INJECTION INTRAMUSCULAR; INTRAVENOUS ONCE AS NEEDED
Status: DISCONTINUED | OUTPATIENT
Start: 2018-11-02 | End: 2018-11-02 | Stop reason: HOSPADM

## 2018-11-02 RX ORDER — HYDROCODONE BITARTRATE AND ACETAMINOPHEN 5; 325 MG/1; MG/1
1 TABLET ORAL EVERY 4 HOURS PRN
Qty: 15 TABLET | Refills: 0 | Status: SHIPPED | OUTPATIENT
Start: 2018-11-02 | End: 2018-11-05

## 2018-11-02 RX ORDER — MEPERIDINE HYDROCHLORIDE 25 MG/ML
12.5 INJECTION INTRAMUSCULAR; INTRAVENOUS; SUBCUTANEOUS
Status: DISCONTINUED | OUTPATIENT
Start: 2018-11-02 | End: 2018-11-02 | Stop reason: HOSPADM

## 2018-11-02 RX ORDER — ALPRAZOLAM 0.5 MG/1
0.5 TABLET ORAL 3 TIMES DAILY PRN
Status: DISCONTINUED | OUTPATIENT
Start: 2018-11-02 | End: 2018-11-03 | Stop reason: HOSPADM

## 2018-11-02 RX ORDER — HYDROCODONE BITARTRATE AND ACETAMINOPHEN 5; 325 MG/1; MG/1
1 TABLET ORAL EVERY 4 HOURS PRN
Status: DISCONTINUED | OUTPATIENT
Start: 2018-11-02 | End: 2018-11-03 | Stop reason: HOSPADM

## 2018-11-02 RX ORDER — IBUPROFEN 600 MG/1
600 TABLET ORAL ONCE AS NEEDED
Status: DISCONTINUED | OUTPATIENT
Start: 2018-11-02 | End: 2018-11-02 | Stop reason: HOSPADM

## 2018-11-02 RX ORDER — GABAPENTIN 300 MG/1
600 CAPSULE ORAL 4 TIMES DAILY
Status: DISCONTINUED | OUTPATIENT
Start: 2018-11-02 | End: 2018-11-03 | Stop reason: HOSPADM

## 2018-11-02 RX ORDER — FENTANYL CITRATE 50 UG/ML
25 INJECTION, SOLUTION INTRAMUSCULAR; INTRAVENOUS AS NEEDED
Status: COMPLETED | OUTPATIENT
Start: 2018-11-02 | End: 2018-11-02

## 2018-11-02 RX ORDER — ONDANSETRON 2 MG/ML
4 INJECTION INTRAMUSCULAR; INTRAVENOUS ONCE AS NEEDED
Status: COMPLETED | OUTPATIENT
Start: 2018-11-02 | End: 2018-11-02

## 2018-11-02 RX ORDER — CLINDAMYCIN HYDROCHLORIDE 300 MG/1
300 CAPSULE ORAL 3 TIMES DAILY
Qty: 30 CAPSULE | Refills: 0 | Status: SHIPPED | OUTPATIENT
Start: 2018-11-02 | End: 2018-11-12

## 2018-11-02 RX ORDER — LIDOCAINE HYDROCHLORIDE 40 MG/ML
SOLUTION TOPICAL AS NEEDED
Status: DISCONTINUED | OUTPATIENT
Start: 2018-11-02 | End: 2018-11-02 | Stop reason: SURG

## 2018-11-02 RX ORDER — ROCURONIUM BROMIDE 10 MG/ML
INJECTION, SOLUTION INTRAVENOUS AS NEEDED
Status: DISCONTINUED | OUTPATIENT
Start: 2018-11-02 | End: 2018-11-02 | Stop reason: SURG

## 2018-11-02 RX ORDER — SODIUM CHLORIDE, SODIUM LACTATE, POTASSIUM CHLORIDE, CALCIUM CHLORIDE 600; 310; 30; 20 MG/100ML; MG/100ML; MG/100ML; MG/100ML
100 INJECTION, SOLUTION INTRAVENOUS CONTINUOUS
Status: DISCONTINUED | OUTPATIENT
Start: 2018-11-02 | End: 2018-11-02 | Stop reason: HOSPADM

## 2018-11-02 RX ORDER — SODIUM CHLORIDE 0.9 % (FLUSH) 0.9 %
1-10 SYRINGE (ML) INJECTION AS NEEDED
Status: DISCONTINUED | OUTPATIENT
Start: 2018-11-02 | End: 2018-11-02 | Stop reason: HOSPADM

## 2018-11-02 RX ORDER — DEXAMETHASONE SODIUM PHOSPHATE 4 MG/ML
INJECTION, SOLUTION INTRA-ARTICULAR; INTRALESIONAL; INTRAMUSCULAR; INTRAVENOUS; SOFT TISSUE AS NEEDED
Status: DISCONTINUED | OUTPATIENT
Start: 2018-11-02 | End: 2018-11-02 | Stop reason: SURG

## 2018-11-02 RX ORDER — SODIUM CHLORIDE, SODIUM LACTATE, POTASSIUM CHLORIDE, CALCIUM CHLORIDE 600; 310; 30; 20 MG/100ML; MG/100ML; MG/100ML; MG/100ML
1000 INJECTION, SOLUTION INTRAVENOUS CONTINUOUS
Status: DISCONTINUED | OUTPATIENT
Start: 2018-11-02 | End: 2018-11-02 | Stop reason: HOSPADM

## 2018-11-02 RX ORDER — NALOXONE HCL 0.4 MG/ML
0.4 VIAL (ML) INJECTION
Status: DISCONTINUED | OUTPATIENT
Start: 2018-11-02 | End: 2018-11-03 | Stop reason: HOSPADM

## 2018-11-02 RX ORDER — LABETALOL HYDROCHLORIDE 5 MG/ML
5 INJECTION, SOLUTION INTRAVENOUS
Status: DISCONTINUED | OUTPATIENT
Start: 2018-11-02 | End: 2018-11-02 | Stop reason: HOSPADM

## 2018-11-02 RX ORDER — LIDOCAINE HYDROCHLORIDE AND EPINEPHRINE 10; 10 MG/ML; UG/ML
INJECTION, SOLUTION INFILTRATION; PERINEURAL AS NEEDED
Status: DISCONTINUED | OUTPATIENT
Start: 2018-11-02 | End: 2018-11-02 | Stop reason: HOSPADM

## 2018-11-02 RX ORDER — PROPOFOL 10 MG/ML
VIAL (ML) INTRAVENOUS AS NEEDED
Status: DISCONTINUED | OUTPATIENT
Start: 2018-11-02 | End: 2018-11-02 | Stop reason: SURG

## 2018-11-02 RX ORDER — SODIUM CHLORIDE 0.9 % (FLUSH) 0.9 %
3 SYRINGE (ML) INJECTION EVERY 12 HOURS SCHEDULED
Status: DISCONTINUED | OUTPATIENT
Start: 2018-11-02 | End: 2018-11-02 | Stop reason: HOSPADM

## 2018-11-02 RX ORDER — IBUPROFEN 200 MG
400 TABLET ORAL EVERY 6 HOURS PRN
Status: DISCONTINUED | OUTPATIENT
Start: 2018-11-02 | End: 2018-11-03 | Stop reason: HOSPADM

## 2018-11-02 RX ORDER — GLYCOPYRROLATE 0.2 MG/ML
INJECTION INTRAMUSCULAR; INTRAVENOUS AS NEEDED
Status: DISCONTINUED | OUTPATIENT
Start: 2018-11-02 | End: 2018-11-02 | Stop reason: SURG

## 2018-11-02 RX ORDER — NALOXONE HCL 0.4 MG/ML
0.4 VIAL (ML) INJECTION AS NEEDED
Status: DISCONTINUED | OUTPATIENT
Start: 2018-11-02 | End: 2018-11-02 | Stop reason: HOSPADM

## 2018-11-02 RX ORDER — ONDANSETRON 2 MG/ML
INJECTION INTRAMUSCULAR; INTRAVENOUS
Status: DISPENSED
Start: 2018-11-02 | End: 2018-11-02

## 2018-11-02 RX ADMIN — HYDROCODONE BITARTRATE AND ACETAMINOPHEN 1 TABLET: 5; 325 TABLET ORAL at 14:29

## 2018-11-02 RX ADMIN — GABAPENTIN 600 MG: 300 CAPSULE ORAL at 20:30

## 2018-11-02 RX ADMIN — LIDOCAINE HYDROCHLORIDE 100 MG: 20 INJECTION, SOLUTION INFILTRATION; PERINEURAL at 10:26

## 2018-11-02 RX ADMIN — FENTANYL CITRATE 25 MCG: 50 INJECTION INTRAMUSCULAR; INTRAVENOUS at 12:12

## 2018-11-02 RX ADMIN — SUCRALFATE 1 G: 1 SUSPENSION ORAL at 17:13

## 2018-11-02 RX ADMIN — SODIUM CHLORIDE, POTASSIUM CHLORIDE, SODIUM LACTATE AND CALCIUM CHLORIDE 100 ML/HR: 600; 310; 30; 20 INJECTION, SOLUTION INTRAVENOUS at 12:12

## 2018-11-02 RX ADMIN — DEXAMETHASONE SODIUM PHOSPHATE 4 MG: 4 INJECTION, SOLUTION INTRAMUSCULAR; INTRAVENOUS at 10:30

## 2018-11-02 RX ADMIN — DEXAMETHASONE SODIUM PHOSPHATE 4 MG: 4 INJECTION, SOLUTION INTRA-ARTICULAR; INTRALESIONAL; INTRAMUSCULAR; INTRAVENOUS; SOFT TISSUE at 09:31

## 2018-11-02 RX ADMIN — PROPOFOL 100 MG: 10 INJECTION, EMULSION INTRAVENOUS at 10:26

## 2018-11-02 RX ADMIN — EPHEDRINE SULFATE 10 MG: 50 INJECTION INTRAMUSCULAR; INTRAVENOUS; SUBCUTANEOUS at 10:44

## 2018-11-02 RX ADMIN — ONDANSETRON HYDROCHLORIDE 4 MG: 2 SOLUTION INTRAMUSCULAR; INTRAVENOUS at 10:30

## 2018-11-02 RX ADMIN — POTASSIUM CHLORIDE AND SODIUM CHLORIDE 100 ML/HR: 450; 150 INJECTION, SOLUTION INTRAVENOUS at 14:05

## 2018-11-02 RX ADMIN — TIZANIDINE 4 MG: 4 TABLET ORAL at 20:30

## 2018-11-02 RX ADMIN — METOCLOPRAMIDE 5 MG: 5 INJECTION, SOLUTION INTRAMUSCULAR; INTRAVENOUS at 12:14

## 2018-11-02 RX ADMIN — METOCLOPRAMIDE 5 MG: 5 INJECTION, SOLUTION INTRAMUSCULAR; INTRAVENOUS at 12:09

## 2018-11-02 RX ADMIN — SODIUM CHLORIDE, POTASSIUM CHLORIDE, SODIUM LACTATE AND CALCIUM CHLORIDE 1000 ML: 600; 310; 30; 20 INJECTION, SOLUTION INTRAVENOUS at 08:55

## 2018-11-02 RX ADMIN — FENTANYL CITRATE 25 MCG: 50 INJECTION INTRAMUSCULAR; INTRAVENOUS at 12:15

## 2018-11-02 RX ADMIN — ACETAMINOPHEN 1000 MG: 500 TABLET, FILM COATED ORAL at 09:31

## 2018-11-02 RX ADMIN — GLYCOPYRROLATE 0.4 MG: 0.2 INJECTION, SOLUTION INTRAMUSCULAR; INTRAVENOUS at 11:15

## 2018-11-02 RX ADMIN — TIZANIDINE 4 MG: 4 TABLET ORAL at 17:13

## 2018-11-02 RX ADMIN — LIDOCAINE HYDROCHLORIDE 1 EACH: 40 SOLUTION TOPICAL at 10:27

## 2018-11-02 RX ADMIN — PANTOPRAZOLE SODIUM 40 MG: 40 TABLET, DELAYED RELEASE ORAL at 17:13

## 2018-11-02 RX ADMIN — GABAPENTIN 600 MG: 300 CAPSULE ORAL at 17:13

## 2018-11-02 RX ADMIN — ONDANSETRON HYDROCHLORIDE 4 MG: 2 INJECTION INTRAMUSCULAR; INTRAVENOUS at 19:41

## 2018-11-02 RX ADMIN — FENTANYL CITRATE 100 MCG: 50 INJECTION, SOLUTION INTRAMUSCULAR; INTRAVENOUS at 10:26

## 2018-11-02 RX ADMIN — FENTANYL CITRATE 25 MCG: 50 INJECTION INTRAMUSCULAR; INTRAVENOUS at 12:07

## 2018-11-02 RX ADMIN — LIDOCAINE HYDROCHLORIDE 0.5 ML: 10 INJECTION, SOLUTION EPIDURAL; INFILTRATION; INTRACAUDAL; PERINEURAL at 08:53

## 2018-11-02 RX ADMIN — FENTANYL CITRATE 25 MCG: 50 INJECTION INTRAMUSCULAR; INTRAVENOUS at 12:09

## 2018-11-02 RX ADMIN — Medication 3 MG: at 11:15

## 2018-11-02 RX ADMIN — ROCURONIUM BROMIDE 20 MG: 10 INJECTION INTRAVENOUS at 10:26

## 2018-11-02 NOTE — H&P (VIEW-ONLY)
YOB: 1950  Location: [facility]  Location Address: [address] [place] [address], [facility] [address], [address] [place] [address]  Location Phone: [phone]    Chief Complaint   Patient presents with   • Swollen Glands     Left side of neck       History of Present Illness  Sammi Sorenson is a 68 y.o. female.  Sammi Sorenson is here for evaluation of ENT complaints. The patient has had problems with submandibular mass. Patient states the mass appeared one month ago. The area is swollen, tender and red. She has headaches and ear pain. She was seen by Dr. Gallegos who gave her ten days of antibiotics and ordered a CT. Dr. Gallegos referred her to Dr. Alan who ordered another CT and started her on ten days of Cipro.  She had a fine-needle aspiration which was negative for purulent discharge.  Dr. Alan admitted patient and she was placed on IV antibiotics-Levaquin. She is having GI problems associated with nausea which is been present for over 10 months.  She has been evaluated by Dr. Blankenship according to the patient and he felt as though her problems have been related to H. pylori.  She still has difficulty with nausea however.  This antedates the difficulty with her submandibular gland.      I discussed the patient with Dr. Alan and reviewed her chart from the hospital admission and since she was at no time febrile and did never exhibited a high white blood count.  Patient has had difficulty with this for over a month.                     Past Medical History:   Diagnosis Date   • Anxiety    • Chronic abdominal pain    • Chronic back pain    • Chronic gastritis    • CKD (chronic kidney disease)    • Colon polyp    • COPD (chronic obstructive pulmonary disease) (CMS/HCC)    • Disease of thyroid gland    • Diverticulosis    • Dyslipidemia    • GERD (gastroesophageal reflux disease)    • Hemorrhoid    • Hiatal hernia    • History of adenomatous polyp of colon    • Migraine    • Neuropathy    • Osteoarthritis    • Ovarian  cancer (CMS/HCC)        Past Surgical History:   Procedure Laterality Date   • CHOLECYSTECTOMY     • COLONOSCOPY  04/10/2015    5 polyps, tubular adenomas, melanosis, diverticulosis, internal hemorrhoids,    • CYST REMOVAL     • ENDOSCOPY  04/10/2015    hiatus hernia   • HYSTERECTOMY     • LUNG LOBECTOMY     • REPLACEMENT TOTAL KNEE BILATERAL         Outpatient Prescriptions Marked as Taking for the 10/30/18 encounter (Office Visit) with Duncan Ewing MD   Medication Sig Dispense Refill   • ALPRAZolam (XANAX) 0.5 MG tablet Take 0.5 mg by mouth.     • dicyclomine (BENTYL) 10 MG capsule Take 10 mg by mouth 3 (Three) Times a Day As Needed.     • gabapentin (NEURONTIN) 300 MG capsule Take 600 mg by mouth.     • HYDROcodone-acetaminophen (NORCO)  MG per tablet Take  by mouth.     • levothyroxine (SYNTHROID, LEVOTHROID) 100 MCG tablet Take  by mouth.     • linaclotide (LINZESS) 290 MCG capsule capsule Take 1 capsule by mouth Every Morning Before Breakfast. 30 capsule 3   • ondansetron (ZOFRAN) 4 MG tablet Take 4 mg by mouth Every 6 (Six) Hours As Needed for Nausea or Vomiting.     • pantoprazole (PROTONIX) 40 MG EC tablet 40 mg 2 (Two) Times a Day.     • sucralfate (CARAFATE) 1 GM/10ML suspension Take 1 g by mouth.     • tiZANidine (ZANAFLEX) 4 MG tablet Take 4 mg by mouth.         Azithromycin; Bactrim [sulfamethoxazole-trimethoprim]; Codeine; Penicillins; Sulfa antibiotics; and Talwin [pentazocine]    History reviewed. No pertinent family history.    Social History     Social History   • Marital status:      Spouse name: N/A   • Number of children: N/A   • Years of education: N/A     Occupational History   • Not on file.     Social History Main Topics   • Smoking status: Current Every Day Smoker   • Smokeless tobacco: Never Used   • Alcohol use No   • Drug use: No   • Sexual activity: Defer     Other Topics Concern   • Not on file     Social History Narrative   • No narrative on file       Review of  Systems   Constitutional: Negative.    HENT: Positive for ear pain.         Swelling, tenderness and redness of submandibular gland   Eyes: Negative.    Respiratory: Negative.    Cardiovascular: Negative.    Gastrointestinal: Negative.    Endocrine: Negative.    Genitourinary: Negative.    Musculoskeletal: Negative.    Allergic/Immunologic: Negative.    Neurological: Positive for headaches.   Hematological: Negative.    Psychiatric/Behavioral: Negative.        Vitals:    10/30/18 0912   BP: 140/80   Temp: 97.5 °F (36.4 °C)       Body mass index is 24.37 kg/m².    Objective     Physical Exam  CONSTITUTIONAL: well nourished, alert, oriented, in no acute distress     COMMUNICATION AND VOICE: able to communicate normally, normal voice quality    HEAD: normocephalic, no lesions, atraumatic, no tenderness, no masses     FACE: appearance normal, no lesions, no tenderness, no deformities, facial motion symmetric    SALIVARY GLANDS: parotid glands with no tenderness, no swelling, no masses, submandibular glands with normal size, nontender on the right.  Left submandibular gland is associated with a obvious mass externally which is currently protuberant and nontender but not significantly erythematous    EYES: ocular motility normal, eyelids normal, orbits normal, no proptosis, conjunctiva normal , pupils equal, round     EARS:  Hearing: response to conversational voice normal bilaterally   External Ears: auricles without lesions  Otoscopic: tympanic membrane appearance normal, no lesions, no perforation, normal mobility, no fluid    NOSE:  External Nose: structure normal, no tenderness on palpation, no nasal discharge, no lesions, no evidence of trauma, nostrils patent   Intranasal Exam: nasal mucosa normal, vestibule within normal limits, inferior turbinate normal, nasal septum midline   Nasopharynx:     ORAL:  Lips: upper and lower lips without lesion   Teeth: She is edentulous.  Dentures removed for exam   Gums: gingivae  healthy   Oral Mucosa: oral mucosa normal, no mucosal lesions   Floor of Mouth: Warthin’s duct patent, mucosa normal with no purulent expression on bimanual palpation though the mass in the left upper neck is exquisitely tender.  There is no floor of mouth edema or erythema or palpable stone in the duct.  Tongue: lingual mucosa normal without lesions, normal tongue mobility   Palate: soft and hard palates with normal mucosa and structure  Oropharynx: oropharyngeal mucosa normal    HYPOPHARYNX:   LARYNX:   NECK: Approximate 2 cm firm but mobile mass is appreciated level I in the left neck.  It is  from the scan which moves separately.  As noted above there is mild to moderate tenderness but no overlying erythema of significance.    THYROID: no overt thyromegaly, no tenderness, nodules or mass present on palpation, position midline     LYMPH NODES: no lymphadenopathy    CHEST/RES  PIRATORY: respiratory effort normal, normal breath sounds     CARDIOVASCULAR: rate and rhythm normal, extremities without cyanosis or edema      NEUROLOGIC/PSYCHIATRIC: oriented to time, place and person, mood normal, affect appropriate, CN II-XII intact grossly    Assessment/Plan   Sammi was seen today for swollen glands.    Diagnoses and all orders for this visit:    Mass of left submandibular region  -     Case Request; Standing  -     Comprehensive Metabolic Panel; Future  -     CBC and Differential; Future  -     ECG 12 Lead; Future  -     XR Chest 2 View; Future  -     Case Request    Sialoadenitis of submandibular gland  Comments:  Cannot rule out  Orders:  -     Case Request; Standing  -     Comprehensive Metabolic Panel; Future  -     CBC and Differential; Future  -     ECG 12 Lead; Future  -     XR Chest 2 View; Future  -     Case Request    Tobacco abuse    Other orders  -     Follow Anesthesia Guidelines / Standing Orders; Future  -     Obtain Informed Consent; Future  -     Follow Anesthesia Guidelines / Standing  Orders; Standing  -     NPO Diet; Standing  -     Verify NPO Status; Standing  -     Obtain Informed Consent; Standing      SUBMANDIBULAR GLAND/LYMPH NODE EXCISION (Left)  Orders Placed This Encounter   Procedures   • XR Chest 2 View     Standing Status:   Future     Standing Expiration Date:   10/30/2019     Order Specific Question:   Reason for Exam:     Answer:   Excision of left submandibular gland\mass   • Comprehensive Metabolic Panel     Standing Status:   Future     Standing Expiration Date:   10/30/2019   • Follow Anesthesia Guidelines / Standing Orders     Standing Status:   Future   • Obtain Informed Consent     Standing Status:   Future     Order Specific Question:   Informed Consent Given For     Answer:   Excision of left submandibular gland\mass   • ECG 12 Lead     Standing Status:   Future     Standing Expiration Date:   10/30/2019     Order Specific Question:   Reason for Exam:     Answer:   Excision of left submandibular gland\mass   • CBC and Differential     Standing Status:   Future     Standing Expiration Date:   10/30/2019     Order Specific Question:   Manual Differential     Answer:   No     No Follow-up on file.       Patient Instructions   SUBMANDIBULAR GLAND EXCISION/Excision of left neck mass: The risks, benefits, and alternatives of submandibular gland excision including but not limited to pain, bleeding, infection, marginal mandibular nerve weakness, tongue numbness, persistent and or recurrent disease, and risks of the anesthesia were discussed full with the patient and questions were answered. Questions were answered. No guarantees were made or implied.      I reiterated to the patient that the risk of injury to the marginal mandibular branch of the facial nerve and the lingual nerve was higher in the presence of active salivary gland infection.    Patient and family were instructed on the proper use of scheduled prescription drugs including their impact on driving and the potential  effects during pregnancy.  The potential for overdose was discussed and their safe storage and proper disposal.  The website www.[employer].[place].gov which contains other materials in this regard.      I advised the patient of the risks in continuing to use tobacco and recommended complete cessation, The inherent risks including the risk of disability, development of a malignancy and/or death was discussed.  The patient indicated understanding.

## 2018-11-02 NOTE — ANESTHESIA PROCEDURE NOTES
Airway  Urgency: elective    Airway not difficult    General Information and Staff    Patient location during procedure: OR  CRNA: VIOLETA FARRELL    Indications and Patient Condition  Indications for airway management: airway protection    Preoxygenated: yes  Mask difficulty assessment: 1 - vent by mask    Final Airway Details  Final airway type: endotracheal airway      Successful airway: ETT  Cuffed: yes   Successful intubation technique: direct laryngoscopy  Endotracheal tube insertion site: oral  Blade: Duffy  Blade size: 2  ETT size: 7.0 mm  Cormack-Lehane Classification: grade I - full view of glottis  Placement verified by: chest auscultation and capnometry   Cuff volume (mL): 6  Measured from: teeth  ETT to teeth (cm): 20  Number of attempts at approach: 1

## 2018-11-02 NOTE — ANESTHESIA POSTPROCEDURE EVALUATION
Patient: Sammi Sorenson    Procedure Summary     Date:  11/02/18 Room / Location:   [facility] 02 /  [facility]    Anesthesia Start:  1024 Anesthesia Stop:  1127    Procedure:  Incision and drainage of left submandibular region abscess with excision of left submandibular region mass mass consistent with probable lymph node (Left Neck) Diagnosis:       Sialoadenitis of submandibular gland      Mass of left submandibular region      (Sialoadenitis of submandibular gland [K11.20])      (Mass of left submandibular region [R22.0])    Surgeon:  Duncan Ewing MD Provider:  Guanaco Simon CRNA    Anesthesia Type:  general ASA Status:  3          Anesthesia Type: general  Last vitals  BP   123/50 (11/02/18 1427)   Temp   98 °F (36.7 °C) (11/02/18 1427)   Pulse   75 (11/02/18 1427)   Resp   16 (11/02/18 1427)     SpO2   94 % (11/02/18 1427)     Post Anesthesia Care and Evaluation    Patient location during evaluation: PACU  Patient participation: complete - patient participated  Level of consciousness: awake and alert  Pain management: adequate  Airway patency: patent  Anesthetic complications: No anesthetic complications  PONV Status: controlled  Cardiovascular status: acceptable and hemodynamically stable  Respiratory status: acceptable  Hydration status: acceptable    Comments: Patient discharged from PACU prior to anesthesia evaluation based on Arash Score.  For details, see RN note.     /50 (BP Location: Right arm, Patient Position: Sitting)   Pulse 75   Temp 98 °F (36.7 °C) (Oral)   Resp 16   SpO2 94%

## 2018-11-02 NOTE — PLAN OF CARE
Problem: Patient Care Overview  Goal: Plan of Care Review  Outcome: Ongoing (interventions implemented as appropriate)  Pt had and I&D of a submandibular abscess neck incision mepilex  Clean dry intact pain meds given as ordered    Goal: Individualization and Mutuality  Outcome: Ongoing (interventions implemented as appropriate)    Goal: Discharge Needs Assessment  Outcome: Ongoing (interventions implemented as appropriate)

## 2018-11-02 NOTE — OP NOTE
OPERATIVE NOTE  11/2/2018    NAME: Sammi Sorenson    YOB: 1950  MRN: [id number]    PRE-OPERATIVE DIAGNOSIS:    Sialoadenitis of submandibular gland [K11.20]  Mass of left submandibular region [R22.0]    POST-OPERATIVE DIAGNOSIS:   Post-Op Diagnosis Codes:     * Sialoadenitis of submandibular gland [K11.20]     * Mass of left submandibular region [R22.0]    PROCEDURE PERFORMED:   Incision and drainage of left submandibular region abscess with excision of left submandibular region mass mass consistent with probable lymph node    SURGEON:   Duncan Ewing MD    ASSISTANT(S):   None    ANESTHESIA:   General Anesthesia via Endotracheal Tube    INDICATIONS: The patient is a 68 y.o. female with Sialoadenitis of submandibular gland [K11.20]  Mass of left submandibular region [R22.0]    PROCEDURE:  The patient was brought to the operating room, given General Anesthesia via Endotracheal Tube, and prepped and draped in the usual manner.        Approximately 8 mL 1% Xylocaine with epinephrine buffered 8-2 with pediatric bicarbonate solution was injected into the planned incision site 22 fingerbreadths below the body of the mandible on the left.  Incision was made with a #15 blade for approximately 5 cm and incision carried down through the superficial layer of deep cervical fascia.  Xomed nerve integrity monitor was utilized to monitor the facial nerve on the left throughout the case.  Marginal mandibular branch of the facial nerve was identified and retracted superiorly and kept from harm's way throughout the case.      Upon penetration of the subplatysmal space large amount of purulent debris exuded from the area immediately superiorly.  Aerobic and anaerobic cultures were taken approximately 7.5-10 mL of gross pus was suctioned from the wound.  Dissection demonstrated several small lymph nodes largest of which appeared necrotic and these were dissected utilizing DeBakey forceps hemostat and curved iris  scissors.  These were closely associated with the inferior aspect of the body of the mandible.  Facial artery and vein were also in close proximity posteriorly.  The mass are matted group of nodes were submitted for permanent pathologic examination and the wound was subsequently irrigated with copious amounts of bacitracin irrigation.  The submandibular gland palpated normally there are no masses induration or abnormalities noted within the substance of the gland.  Subsequently incision was closed with interrupted 4-0 Vicryl subcutaneously and 2 reapproximate the platysmal layer.  Interrupted 5-0 nylon was utilized to reapproximate the epidermis following placement of a rubber band drain in the depths of the wound which was brought out the posterior aspect of the incision.    Steri-Strip,s Mastisol, Bactroban and a sterile dressing were applied and the procedure terminated.     The patient tolerated the procedure well without complications and was transported to the postanesthesia care unit in stable condition.    SPECIMENS:  A: Aerobic and anaerobic culture of abscess drainage  B: Left submandibular mass consistent with necrotic node grossly    COMPLICATIONS: NONE    ESTIMATED BLOOD LOSS:  Minimal    Duncan Gage Ewing MD  11/2/2018

## 2018-11-02 NOTE — ANESTHESIA PREPROCEDURE EVALUATION
Anesthesia Evaluation     Patient summary reviewed   no history of anesthetic complications:  NPO Solid Status: > 8 hours  NPO Liquid Status: > 8 hours           Airway   Mallampati: II  TM distance: >3 FB  Neck ROM: full  Dental    (+) upper dentures, lower dentures and edentulous    Pulmonary - normal exam    breath sounds clear to auscultation  (+) a smoker (0.5 ppd) Current, COPD,   (-) asthma, recent URI, sleep apnea    ROS comment: No home O2 use at home  Cardiovascular - normal exam  Exercise tolerance: good (4-7 METS)    ECG reviewed  Rhythm: regular  Rate: normal    (-) pacemaker, hypertension, past MI, angina, cardiac stents, CABG      Neuro/Psych  (+) psychiatric history Anxiety,     (-) seizures, TIA, CVA  GI/Hepatic/Renal/Endo    (+)  GERD well controlled,  hypothyroidism,   (-) liver disease, no renal disease, diabetes, hyperthyroidism    Musculoskeletal         ROS comment: Fibromyalgia  Abdominal    Substance History      OB/GYN          Other                      Anesthesia Plan    ASA 3     general     intravenous induction   Anesthetic plan, all risks, benefits, and alternatives have been provided, discussed and informed consent has been obtained with: patient.

## 2018-11-03 VITALS
HEART RATE: 62 BPM | BODY MASS INDEX: 24.34 KG/M2 | DIASTOLIC BLOOD PRESSURE: 42 MMHG | OXYGEN SATURATION: 96 % | WEIGHT: 151.46 LBS | TEMPERATURE: 97.6 F | SYSTOLIC BLOOD PRESSURE: 114 MMHG | HEIGHT: 66 IN | RESPIRATION RATE: 16 BRPM

## 2018-11-03 PROCEDURE — 63710000001 HYDROCODONE-ACETAMINOPHEN 5-325 MG TABLET: Performed by: OTOLARYNGOLOGY

## 2018-11-03 PROCEDURE — 99024 POSTOP FOLLOW-UP VISIT: CPT | Performed by: NURSE PRACTITIONER

## 2018-11-03 PROCEDURE — A9270 NON-COVERED ITEM OR SERVICE: HCPCS | Performed by: OTOLARYNGOLOGY

## 2018-11-03 PROCEDURE — 63710000001 TIZANIDINE 4 MG TABLET: Performed by: OTOLARYNGOLOGY

## 2018-11-03 PROCEDURE — 63710000001 GABAPENTIN 300 MG CAPSULE: Performed by: OTOLARYNGOLOGY

## 2018-11-03 PROCEDURE — 63710000001 SUCRALFATE 1 GM/10ML SUSPENSION: Performed by: OTOLARYNGOLOGY

## 2018-11-03 PROCEDURE — 25810000003 POTASSIUM CHLORIDE PER 2 MEQ: Performed by: OTOLARYNGOLOGY

## 2018-11-03 PROCEDURE — 63710000001 PANTOPRAZOLE 40 MG TABLET DELAYED-RELEASE: Performed by: OTOLARYNGOLOGY

## 2018-11-03 PROCEDURE — 63710000001 LEVOTHYROXINE 100 MCG TABLET: Performed by: OTOLARYNGOLOGY

## 2018-11-03 RX ADMIN — TIZANIDINE 4 MG: 4 TABLET ORAL at 08:03

## 2018-11-03 RX ADMIN — SUCRALFATE 1 G: 1 SUSPENSION ORAL at 08:00

## 2018-11-03 RX ADMIN — POTASSIUM CHLORIDE AND SODIUM CHLORIDE 100 ML/HR: 450; 150 INJECTION, SOLUTION INTRAVENOUS at 00:56

## 2018-11-03 RX ADMIN — HYDROCODONE BITARTRATE AND ACETAMINOPHEN 1 TABLET: 5; 325 TABLET ORAL at 07:59

## 2018-11-03 RX ADMIN — HYDROCODONE BITARTRATE AND ACETAMINOPHEN 1 TABLET: 5; 325 TABLET ORAL at 01:40

## 2018-11-03 RX ADMIN — GABAPENTIN 600 MG: 300 CAPSULE ORAL at 08:00

## 2018-11-03 RX ADMIN — PANTOPRAZOLE SODIUM 40 MG: 40 TABLET, DELAYED RELEASE ORAL at 07:59

## 2018-11-03 RX ADMIN — LEVOTHYROXINE SODIUM 100 MCG: 100 TABLET ORAL at 05:25

## 2018-11-03 NOTE — PLAN OF CARE
Problem: Patient Care Overview  Goal: Plan of Care Review  Outcome: Ongoing (interventions implemented as appropriate)   11/03/18 0158   Coping/Psychosocial   Plan of Care Reviewed With patient   Plan of Care Review   Progress no change   OTHER   Outcome Summary pain pill requested and given; nausea med given       Problem: Skin and Soft Tissue Infection (Adult)  Goal: Signs and Symptoms of Listed Potential Problems Will be Absent, Minimized or Managed (Skin and Soft Tissue Infection)  Outcome: Ongoing (interventions implemented as appropriate)   11/03/18 0158   Goal/Outcome Evaluation   Problems Assessed (Skin and Soft Tissue Infection) pain;infection progression   Problems Present (Skin/Soft Tissue Inf) pain

## 2018-11-03 NOTE — DISCHARGE SUMMARY
ENT  HUAM Britt Dr, MD  Hospital Discharge Summary:    Date of Admission: 11/2/2018  Date of Discharge:  11/3/2018    Discharge Diagnosis: s/p excision of left neck abscess    Presenting Problem/History of Present Illness  Sialoadenitis of submandibular gland [K11.20]  Mass of left submandibular region [R22.0]  Mass of left submandibular region [R22.0]     Hospital Course  Patient is a 68 y.o. female presented with left neck swelling that has not been responsive to outpatient treatment therefore was admitted for surgery.  She has done great except for postop pain. VSS.  Rubber band drain discontinued, incision stable with sutures/steri strip - no bleeding or swelling.    Procedures Performed  Procedure(s):  Incision and drainage of left submandibular region abscess with excision of left submandibular region mass mass consistent with probable lymph node         Condition on Discharge:  good    Vital Signs  Temp:  [97.5 °F (36.4 °C)-98.6 °F (37 °C)] 97.6 °F (36.4 °C)  Heart Rate:  [56-87] 62  Resp:  [10-20] 16  BP: ()/(39-70) 114/42    Physical Exam:   Physical Exam    CONSTITUTIONAL: well nourished, alert, oriented, in no acute distress     COMMUNICATION AND VOICE: able to communicate normally, normal voice quality    HEAD: normocephalic, no lesions, atraumatic, no tenderness, no masses     FACE: appearance normal, no lesions, no tenderness, no deformities, facial motion symmetric    SALIVARY GLANDS: parotid glands with no tenderness, no swelling, no masses, submandibular glands with normal size, nontender    EYES: ocular motility normal, eyelids normal, orbits normal, no proptosis, conjunctiva normal , pupils equal, round     EARS:  Hearing: response to conversational voice normal bilaterally   External Ears: auricles without lesions    NOSE:  External Nose: structure normal, no tenderness on palpation, no nasal discharge, no lesions, no evidence of trauma, nostrils patent     ORAL:  Lips:  upper and lower lips without lesion   Teeth: dentition within normal limits for age   Gums: gingivae healthy   Oral Mucosa: oral mucosa normal, no mucosal lesions   Floor of Mouth: Warthin’s duct patent, mucosa normal  Tongue: lingual mucosa normal without lesions, normal tongue mobility   Palate: soft and hard palates with normal mucosa and structure  Oropharynx: oropharyngeal mucosa normal      NECK: left neck incision intact with sutures steri strips      LYMPH NODES: no lymphadenopathy    CHEST/RESPIRATORY: respiratory effort normal,     CARDIOVASCULAR:  extremities without cyanosis or edema      NEUROLOGIC/PSYCHIATRIC: oriented to time, place and person, mood normal, affect appropriate, CN II-XII intact grossly    Discharge Disposition  Home or Self Care    Discharge Medications     Discharge Medications      New Medications      Instructions Start Date   clindamycin 300 MG capsule  Commonly known as:  CLEOCIN   300 mg, Oral, 3 Times Daily      HYDROcodone-acetaminophen 5-325 MG per tablet  Commonly known as:  NORCO   1 tablet, Oral, Every 4 Hours PRN         Continue These Medications      Instructions Start Date   ALPRAZolam 0.5 MG tablet  Commonly known as:  XANAX   0.5 mg, Oral, 3 Times Daily PRN      dicyclomine 10 MG capsule  Commonly known as:  BENTYL   10 mg, Oral, 3 Times Daily PRN      gabapentin 300 MG capsule  Commonly known as:  NEURONTIN   600 mg, Oral, 4 Times Daily      levothyroxine 100 MCG tablet  Commonly known as:  SYNTHROID, LEVOTHROID   100 mcg, Oral, Daily      pantoprazole 40 MG EC tablet  Commonly known as:  PROTONIX   40 mg, 2 Times Daily      sucralfate 1 GM/10ML suspension  Commonly known as:  CARAFATE   1 g, Oral, 3 Times Daily Before Meals      tiZANidine 4 MG tablet  Commonly known as:  ZANAFLEX   4 mg, Oral, 3 Times Daily         Stop These Medications    linaclotide 290 MCG capsule capsule  Commonly known as:  LINZESS     ondansetron 4 MG tablet  Commonly known as:  ZOFRAN      promethazine 25 MG tablet  Commonly known as:  PHENERGAN            Discharge Diet:   Diet Instructions     Advance Diet as Tolerated             Activity at Discharge:   Activity Instructions     Discharge Activity       1) No driving for 1 week and no longer taking narcotics.   2) Return to school / work in 2 week.  3) May shower / sponge bathe for 48 hours.  4) Do not lift / push / pull more then 5 lbs.          Follow-up Appointments  No future appointments.  Additional Instructions for the Follow-ups that You Need to Schedule     Discharge Follow-up with Specified Provider: huma    As directed      To:  huma    Follow Up Details:  10 days                 HUMA Naranjo  11/03/18  8:27 AM

## 2018-11-05 LAB
CYTO UR: NORMAL
LAB AP CASE REPORT: NORMAL
PATH REPORT.FINAL DX SPEC: NORMAL
PATH REPORT.GROSS SPEC: NORMAL

## 2018-11-06 ENCOUNTER — HOSPITAL ENCOUNTER (OUTPATIENT)
Dept: PAIN MANAGEMENT | Age: 68
Discharge: HOME | End: 2018-11-06
Payer: MEDICARE

## 2018-11-06 VITALS
HEART RATE: 69 BPM | TEMPERATURE: 97.5 F | WEIGHT: 147 LBS | OXYGEN SATURATION: 98 % | RESPIRATION RATE: 18 BRPM | BODY MASS INDEX: 23.63 KG/M2 | SYSTOLIC BLOOD PRESSURE: 152 MMHG | HEIGHT: 66 IN | DIASTOLIC BLOOD PRESSURE: 77 MMHG

## 2018-11-06 DIAGNOSIS — M54.10 BACK PAIN WITH LEFT-SIDED RADICULOPATHY: ICD-10-CM

## 2018-11-06 DIAGNOSIS — G89.29 CHRONIC BILATERAL LOW BACK PAIN WITHOUT SCIATICA: ICD-10-CM

## 2018-11-06 DIAGNOSIS — M25.562 CHRONIC PAIN OF BOTH KNEES: ICD-10-CM

## 2018-11-06 DIAGNOSIS — G89.29 CHRONIC PAIN OF BOTH KNEES: ICD-10-CM

## 2018-11-06 DIAGNOSIS — M54.50 CHRONIC BILATERAL LOW BACK PAIN WITHOUT SCIATICA: ICD-10-CM

## 2018-11-06 DIAGNOSIS — M51.16 LUMBAR DISC DISEASE WITH RADICULOPATHY: ICD-10-CM

## 2018-11-06 DIAGNOSIS — M25.561 CHRONIC PAIN OF BOTH KNEES: ICD-10-CM

## 2018-11-06 PROCEDURE — 99213 OFFICE O/P EST LOW 20 MIN: CPT

## 2018-11-06 PROCEDURE — 99213 OFFICE O/P EST LOW 20 MIN: CPT | Performed by: NURSE PRACTITIONER

## 2018-11-06 RX ORDER — TIZANIDINE 4 MG/1
4 TABLET ORAL 2 TIMES DAILY PRN
Qty: 45 TABLET | Refills: 2 | Status: SHIPPED | OUTPATIENT
Start: 2018-11-06 | End: 2019-02-07 | Stop reason: SDUPTHER

## 2018-11-06 RX ORDER — GABAPENTIN 300 MG/1
600 CAPSULE ORAL 4 TIMES DAILY
Qty: 240 CAPSULE | Refills: 2 | Status: SHIPPED | OUTPATIENT
Start: 2018-11-06 | End: 2019-02-07 | Stop reason: SDUPTHER

## 2018-11-06 RX ORDER — GABAPENTIN 300 MG/1
600 CAPSULE ORAL 4 TIMES DAILY
Qty: 240 CAPSULE | Refills: 2 | Status: SHIPPED | OUTPATIENT
Start: 2018-11-06 | End: 2018-11-06 | Stop reason: CLARIF

## 2018-11-06 RX ORDER — HYDROCODONE BITARTRATE AND ACETAMINOPHEN 10; 325 MG/1; MG/1
1 TABLET ORAL EVERY 6 HOURS PRN
Qty: 120 TABLET | Refills: 0 | Status: SHIPPED | OUTPATIENT
Start: 2018-11-06 | End: 2018-11-06 | Stop reason: CLARIF

## 2018-11-06 RX ORDER — HYDROCODONE BITARTRATE AND ACETAMINOPHEN 10; 325 MG/1; MG/1
1 TABLET ORAL 4 TIMES DAILY PRN
Qty: 120 TABLET | Refills: 0 | Status: SHIPPED | OUTPATIENT
Start: 2018-11-06 | End: 2018-11-29 | Stop reason: SDUPTHER

## 2018-11-06 ASSESSMENT — ACTIVITIES OF DAILY LIVING (ADL): EFFECT OF PAIN ON DAILY ACTIVITIES: LIMITS ACTIVITY

## 2018-11-06 ASSESSMENT — PAIN DESCRIPTION - LOCATION: LOCATION: BACK

## 2018-11-06 ASSESSMENT — PAIN DESCRIPTION - DESCRIPTORS: DESCRIPTORS: ACHING;CONSTANT;THROBBING;RADIATING

## 2018-11-06 ASSESSMENT — ENCOUNTER SYMPTOMS
CONSTIPATION: 0
BACK PAIN: 1

## 2018-11-06 ASSESSMENT — PAIN DESCRIPTION - PROGRESSION: CLINICAL_PROGRESSION: NOT CHANGED

## 2018-11-06 ASSESSMENT — PAIN DESCRIPTION - ONSET: ONSET: ON-GOING

## 2018-11-06 ASSESSMENT — PAIN DESCRIPTION - ORIENTATION: ORIENTATION: LOWER

## 2018-11-06 ASSESSMENT — PAIN DESCRIPTION - DIRECTION: RADIATING_TOWARDS: INTO BILATERAL LOWER EXTREMITIES

## 2018-11-06 ASSESSMENT — PAIN DESCRIPTION - FREQUENCY: FREQUENCY: CONTINUOUS

## 2018-11-06 ASSESSMENT — PAIN DESCRIPTION - PAIN TYPE: TYPE: CHRONIC PAIN

## 2018-11-06 ASSESSMENT — PAIN SCALES - GENERAL: PAINLEVEL_OUTOF10: 8

## 2018-11-07 LAB
BACTERIA FLD CULT: NORMAL
BACTERIA SPEC ANAEROBE CULT: NORMAL
GRAM STN SPEC: NORMAL
GRAM STN SPEC: NORMAL

## 2018-11-20 ENCOUNTER — OFFICE VISIT (OUTPATIENT)
Dept: OTOLARYNGOLOGY | Facility: CLINIC | Age: 68
End: 2018-11-20

## 2018-11-20 VITALS
WEIGHT: 151.13 LBS | HEIGHT: 66 IN | SYSTOLIC BLOOD PRESSURE: 141 MMHG | HEART RATE: 76 BPM | BODY MASS INDEX: 24.29 KG/M2 | TEMPERATURE: 97.5 F | DIASTOLIC BLOOD PRESSURE: 72 MMHG

## 2018-11-20 DIAGNOSIS — L02.11 ABSCESS, NECK: ICD-10-CM

## 2018-11-20 DIAGNOSIS — K11.20 SIALOADENITIS OF SUBMANDIBULAR GLAND: Primary | ICD-10-CM

## 2018-11-20 PROCEDURE — 99024 POSTOP FOLLOW-UP VISIT: CPT | Performed by: NURSE PRACTITIONER

## 2018-11-20 RX ORDER — HYDROCODONE BITARTRATE AND ACETAMINOPHEN 10; 325 MG/1; MG/1
TABLET ORAL
Refills: 0 | Status: ON HOLD | COMMUNITY
Start: 2018-11-06 | End: 2019-07-23

## 2018-11-20 NOTE — PROGRESS NOTES
YOB: 1950  Location: [facility]  Location Address: [address] [place] [address], [facility], [address] [place] [address]  Location Phone: [phone]    Chief Complaint   Patient presents with   • neck abscess       History of Present Illness  Sammi Sorenson is a 68 y.o. female.  Sammi Sorenson is here for follow up of ENT complaints. The patient has had problems with s/p I&D neck abscess  The symptoms are localized to the left side. The patient has had improving symptoms. The symptoms have been present for the last several weeks The symptoms are aggravated by  no identifiable factors. The symptoms are improved by surgery.       Past Medical History:   Diagnosis Date   • Anxiety    • Chronic abdominal pain    • Chronic back pain    • Chronic gastritis    • Chronic pain     sees pain vilma. since    • CKD (chronic kidney disease)    • Colon polyp    • COPD (chronic obstructive pulmonary disease) (CMS/HCC)    • Disease of thyroid gland    • Diverticulosis    • Dyslipidemia    • GERD (gastroesophageal reflux disease)    • Hemorrhoid    • Hiatal hernia    • History of adenomatous polyp of colon    • Leaky heart valve    • Migraine    • Neuropathy    • Osteoarthritis    • Ovarian cancer (CMS/HCC)    • Submandibular gland mass        Past Surgical History:   Procedure Laterality Date   • CHOLECYSTECTOMY     • COLONOSCOPY  04/10/2015    5 polyps, tubular adenomas, melanosis, diverticulosis, internal hemorrhoids,    • CYST REMOVAL     • ENDOSCOPY  04/10/2015    hiatus hernia   • HYSTERECTOMY     • LUNG LOBECTOMY Right     [address] [place] [address]   • REPLACEMENT TOTAL KNEE BILATERAL         Outpatient Medications Marked as Taking for the 18 encounter (Office Visit) with Sandra Cline APRN   Medication Sig Dispense Refill   • ALPRAZolam (XANAX) 0.5 MG tablet Take 0.5 mg by mouth 3 (Three) Times a Day As Needed for Anxiety.     • dicyclomine (BENTYL) 10 MG capsule Take 10 mg by mouth 3 (Three) Times a Day As  Needed.     • gabapentin (NEURONTIN) 300 MG capsule Take 600 mg by mouth 4 (Four) Times a Day.     • HYDROcodone-acetaminophen (NORCO)  MG per tablet TK 1 T PO QID PRN P  0   • levothyroxine (SYNTHROID, LEVOTHROID) 100 MCG tablet Take 100 mcg by mouth Daily.     • pantoprazole (PROTONIX) 40 MG EC tablet 40 mg 2 (Two) Times a Day.     • sucralfate (CARAFATE) 1 GM/10ML suspension Take 1 g by mouth 3 (Three) Times a Day Before Meals.     • tiZANidine (ZANAFLEX) 4 MG tablet Take 4 mg by mouth 3 (Three) Times a Day.         Bactrim [sulfamethoxazole-trimethoprim]; Penicillins; Azithromycin; Talwin [pentazocine]; Codeine; and Sulfa antibiotics    Family History   Problem Relation Age of Onset   • No Known Problems Mother    • No Known Problems Father        Social History     Socioeconomic History   • Marital status:      Spouse name: Not on file   • Number of children: Not on file   • Years of education: Not on file   • Highest education level: Not on file   Social Needs   • Financial resource strain: Not on file   • Food insecurity - worry: Not on file   • Food insecurity - inability: Not on file   • Transportation needs - medical: Not on file   • Transportation needs - non-medical: Not on file   Occupational History   • Not on file   Tobacco Use   • Smoking status: Current Every Day Smoker     Packs/day: 0.50     Years: 20.00     Pack years: 10.00     Types: Cigarettes   • Smokeless tobacco: Never Used   Substance and Sexual Activity   • Alcohol use: No   • Drug use: No   • Sexual activity: Defer   Other Topics Concern   • Not on file   Social History Narrative   • Not on file       Review of Systems    Vitals:    11/20/18 1310   BP: 141/72   Pulse: 76   Temp: 97.5 °F (36.4 °C)       Body mass index is 24.39 kg/m².    Objective     Physical Exam  CONSTITUTIONAL: well nourished, alert, oriented, in no acute distress     COMMUNICATION AND VOICE: able to communicate normally, normal voice quality    HEAD:  normocephalic, no lesions, atraumatic, no tenderness, no masses     FACE: appearance normal, no lesions, no tenderness, no deformities, facial motion symmetric    SALIVARY GLANDS: parotid glands with no tenderness, no swelling, no masses, submandibular glands with normal size, nontender    EYES: ocular motility normal, eyelids normal, orbits normal, no proptosis, conjunctiva normal , pupils equal, round     EARS:  Hearing: response to conversational voice normal bilaterally   External Ears: auricles without lesions  Otoscopic: tympanic membrane appearance normal, no lesions, no perforation, normal mobility, no fluid    NOSE:  External Nose: structure normal, no tenderness on palpation, no nasal discharge, no lesions, no evidence of trauma, nostrils patent   Intranasal Exam: nasal mucosa normal, vestibule within normal limits, inferior turbinate normal, nasal septum midline     ORAL:  Lips: upper and lower lips without lesion   Teeth:dentures  Gums: gingivae healthy   Oral Mucosa: oral mucosa normal, no mucosal lesions   Floor of Mouth: Warthin’s duct patent, mucosa normal  Tongue: lingual mucosa normal without lesions, normal tongue mobility   Palate: soft and hard palates with normal mucosa and structure  Oropharynx: oropharyngeal mucosa normal    NECK: left neck incision healing well with sutures removed (mild swelling)    LYMPH NODES: no lymphadenopathy    CHEST/RESPIRATORY: respiratory effort normal,    CARDIOVASCULAR:  extremities without cyanosis or edema      NEUROLOGIC/PSYCHIATRIC: oriented to time, place and person, mood normal, affect appropriate, CN II-XII intact grossly    Assessment/Plan   Sammi was seen today for neck abscess.    Diagnoses and all orders for this visit:    Sialoadenitis of submandibular gland    Abscess, neck s/p I&D      * Surgery not found *  No orders of the defined types were placed in this encounter.    Return in about 4 weeks (around 12/18/2018).       Patient Instructions    Massage scar PRN    Call for problems or worsening symptoms

## 2018-11-29 DIAGNOSIS — M51.16 LUMBAR DISC DISEASE WITH RADICULOPATHY: ICD-10-CM

## 2018-11-29 RX ORDER — HYDROCODONE BITARTRATE AND ACETAMINOPHEN 10; 325 MG/1; MG/1
1 TABLET ORAL 4 TIMES DAILY PRN
Qty: 120 TABLET | Refills: 0 | Status: SHIPPED | OUTPATIENT
Start: 2018-12-06 | End: 2019-01-02 | Stop reason: SDUPTHER

## 2018-12-18 ENCOUNTER — OFFICE VISIT (OUTPATIENT)
Dept: OTOLARYNGOLOGY | Facility: CLINIC | Age: 68
End: 2018-12-18

## 2018-12-18 VITALS
SYSTOLIC BLOOD PRESSURE: 145 MMHG | DIASTOLIC BLOOD PRESSURE: 78 MMHG | HEART RATE: 69 BPM | HEIGHT: 66 IN | WEIGHT: 147 LBS | TEMPERATURE: 97.8 F | BODY MASS INDEX: 23.63 KG/M2

## 2018-12-18 DIAGNOSIS — K11.20 SIALOADENITIS OF SUBMANDIBULAR GLAND: Primary | ICD-10-CM

## 2018-12-18 PROCEDURE — 99024 POSTOP FOLLOW-UP VISIT: CPT | Performed by: NURSE PRACTITIONER

## 2018-12-18 RX ORDER — PROMETHAZINE HYDROCHLORIDE 25 MG/1
25 TABLET ORAL 2 TIMES DAILY PRN
COMMUNITY

## 2018-12-18 RX ORDER — NEOMYCIN SULFATE, POLYMYXIN B SULFATE, HYDROCORTISONE 3.5; 10000; 1 MG/ML; [USP'U]/ML; MG/ML
3 SOLUTION/ DROPS AURICULAR (OTIC) 2 TIMES DAILY
Qty: 10 ML | Refills: 0 | Status: ON HOLD | OUTPATIENT
Start: 2018-12-18 | End: 2019-07-23

## 2018-12-18 RX ORDER — POLYETHYLENE GLYCOL 3350 17 G/17G
17 POWDER, FOR SOLUTION ORAL DAILY
COMMUNITY

## 2018-12-19 NOTE — PROGRESS NOTES
YOB: 1950  Location: [facility]  Location Address: [address] [place] [address], [facility], [address] [place] [address]  Location Phone: [phone]    Chief Complaint   Patient presents with   • salivary gland       History of Present Illness  Sammi Sorenson is a 68 y.o. female.  Sammi Sorenson is here for follow up of ENT complaints. The patient has had problems with s/p I&D neck abscess  The symptoms are localized to the left side. The patient has had improving symptoms. The symptoms have been present for the last several weeks The symptoms are aggravated by  no identifiable factors. The symptoms are improved by surgery.  She is signifcantly improved.       Past Medical History:   Diagnosis Date   • Anxiety    • Chronic abdominal pain    • Chronic back pain    • Chronic gastritis    • Chronic pain     sees pain vilma. since    • CKD (chronic kidney disease)    • Colon polyp    • COPD (chronic obstructive pulmonary disease) (CMS/HCC)    • Disease of thyroid gland    • Diverticulosis    • Dyslipidemia    • GERD (gastroesophageal reflux disease)    • Hemorrhoid    • Hiatal hernia    • History of adenomatous polyp of colon    • Leaky heart valve    • Migraine    • Neuropathy    • Osteoarthritis    • Ovarian cancer (CMS/HCC)    • Submandibular gland mass        Past Surgical History:   Procedure Laterality Date   • CHOLECYSTECTOMY     • COLONOSCOPY  04/10/2015    5 polyps, tubular adenomas, melanosis, diverticulosis, internal hemorrhoids,    • CYST REMOVAL     • ENDOSCOPY  04/10/2015    hiatus hernia   • HYSTERECTOMY     • LUNG LOBECTOMY Right     ,[facility]   • REPLACEMENT TOTAL KNEE BILATERAL     • SUBMANDIBULAR GLAND EXCISION Left 2018    Procedure: Incision and drainage of left submandibular region abscess with excision of left submandibular region mass mass consistent with probable lymph node;  Surgeon: Duncan Ewing MD;  Location: [facility];  Service: ENT       Outpatient  Medications Marked as Taking for the 12/18/18 encounter (Office Visit) with Sandra Cline APRN   Medication Sig Dispense Refill   • ALPRAZolam (XANAX) 0.5 MG tablet Take 0.5 mg by mouth 3 (Three) Times a Day As Needed for Anxiety.     • dicyclomine (BENTYL) 10 MG capsule Take 10 mg by mouth 3 (Three) Times a Day As Needed.     • gabapentin (NEURONTIN) 300 MG capsule Take 600 mg by mouth 4 (Four) Times a Day.     • HYDROcodone-acetaminophen (NORCO)  MG per tablet TK 1 T PO QID PRN P  0   • levothyroxine (SYNTHROID, LEVOTHROID) 100 MCG tablet Take 100 mcg by mouth Daily.     • Linaclotide (LINZESS PO) Take  by mouth.     • pantoprazole (PROTONIX) 40 MG EC tablet 40 mg 2 (Two) Times a Day.     • polyethylene glycol (MIRALAX) powder Take 17 g by mouth Daily.     • promethazine (PHENERGAN) 25 MG tablet Take 25 mg by mouth Every 6 (Six) Hours As Needed for Nausea or Vomiting.     • sucralfate (CARAFATE) 1 GM/10ML suspension Take 1 g by mouth 3 (Three) Times a Day Before Meals.     • tiZANidine (ZANAFLEX) 4 MG tablet Take 4 mg by mouth 3 (Three) Times a Day.         Bactrim [sulfamethoxazole-trimethoprim]; Penicillins; Azithromycin; Talwin [pentazocine]; Codeine; and Sulfa antibiotics    Family History   Problem Relation Age of Onset   • No Known Problems Mother    • No Known Problems Father        Social History     Socioeconomic History   • Marital status:      Spouse name: Not on file   • Number of children: Not on file   • Years of education: Not on file   • Highest education level: Not on file   Social Needs   • Financial resource strain: Not on file   • Food insecurity - worry: Not on file   • Food insecurity - inability: Not on file   • Transportation needs - medical: Not on file   • Transportation needs - non-medical: Not on file   Occupational History   • Not on file   Tobacco Use   • Smoking status: Current Every Day Smoker     Packs/day: 0.50     Years: 20.00     Pack years: 10.00     Types:  Cigarettes   • Smokeless tobacco: Never Used   Substance and Sexual Activity   • Alcohol use: No   • Drug use: No   • Sexual activity: Defer   Other Topics Concern   • Not on file   Social History Narrative   • Not on file       Review of Systems   Constitutional: Negative.    HENT:        SEE HPI   Eyes: Negative.    Respiratory: Negative.    Cardiovascular: Negative.    Gastrointestinal: Negative.    Endocrine: Negative.    Genitourinary: Negative.    Musculoskeletal: Negative.    Skin: Negative.    Allergic/Immunologic: Negative.    Neurological: Negative.    Hematological: Negative.    Psychiatric/Behavioral: Negative.        Vitals:    12/18/18 1438   BP: 145/78   Pulse: 69   Temp: 97.8 °F (36.6 °C)       Body mass index is 23.73 kg/m².    Objective     Physical Exam  CONSTITUTIONAL: well nourished, alert, oriented, in no acute distress     COMMUNICATION AND VOICE: able to communicate normally, normal voice quality    HEAD: normocephalic, no lesions, atraumatic, no tenderness, no masses     FACE: appearance normal, no lesions, no tenderness, no deformities, facial motion symmetric    SALIVARY GLANDS: parotid glands with no tenderness, no swelling, no masses, submandibular glands with normal size, nontender    EYES: ocular motility normal, eyelids normal, orbits normal, no proptosis, conjunctiva normal , pupils equal, round     EARS:  Hearing: response to conversational voice normal bilaterally   External Ears: auricles without lesions  Otoscopic: tympanic membrane appearance normal, no lesions, no perforation, normal mobility, no fluid    NOSE:  External Nose: structure normal, no tenderness on palpation, no nasal discharge, no lesions, no evidence of trauma, nostrils patent   Intranasal Exam: nasal mucosa normal, vestibule within normal limits, inferior turbinate normal, nasal septum midline     ORAL:  Lips: upper and lower lips without lesion   Teeth:dentures  Gums: gingivae healthy   Oral Mucosa: oral mucosa  normal, no mucosal lesions   Floor of Mouth: Warthin’s duct patent, mucosa normal  Tongue: lingual mucosa normal without lesions, normal tongue mobility   Palate: soft and hard palates with normal mucosa and structure  Oropharynx: oropharyngeal mucosa normal    NECK: left neck incision well healed    LYMPH NODES: no lymphadenopathy    CHEST/RESPIRATORY: respiratory effort normal,    CARDIOVASCULAR:  extremities without cyanosis or edema      NEUROLOGIC/PSYCHIATRIC: oriented to time, place and person, mood normal, affect appropriate, CN II-XII intact grossly    Assessment/Plan   Sammi was seen today for salivary gland.    Diagnoses and all orders for this visit:    Sialoadenitis of submandibular gland resolved    Other orders  -     neomycin-polymyxin-hydrocortisone (CORTISPORIN) 1 % solution otic solution; Administer 3 drops to the right ear 2 (Two) Times a Day.      * Surgery not found *  No orders of the defined types were placed in this encounter.    Return if symptoms worsen or fail to improve.       Patient Instructions   Call for problems or worsening symptoms

## 2019-01-02 DIAGNOSIS — M51.16 LUMBAR DISC DISEASE WITH RADICULOPATHY: ICD-10-CM

## 2019-01-03 RX ORDER — HYDROCODONE BITARTRATE AND ACETAMINOPHEN 10; 325 MG/1; MG/1
1 TABLET ORAL 4 TIMES DAILY PRN
Qty: 120 TABLET | Refills: 0 | Status: SHIPPED | OUTPATIENT
Start: 2019-01-05 | End: 2019-02-07 | Stop reason: SDUPTHER

## 2019-02-07 DIAGNOSIS — M51.16 LUMBAR DISC DISEASE WITH RADICULOPATHY: ICD-10-CM

## 2019-02-07 RX ORDER — TIZANIDINE 4 MG/1
4 TABLET ORAL 2 TIMES DAILY PRN
Qty: 45 TABLET | Refills: 0 | Status: SHIPPED | OUTPATIENT
Start: 2019-02-07 | End: 2019-02-21 | Stop reason: SDUPTHER

## 2019-02-07 RX ORDER — GABAPENTIN 300 MG/1
600 CAPSULE ORAL 4 TIMES DAILY
Qty: 240 CAPSULE | Refills: 0 | Status: SHIPPED | OUTPATIENT
Start: 2019-02-07 | End: 2019-02-21 | Stop reason: SDUPTHER

## 2019-02-07 RX ORDER — HYDROCODONE BITARTRATE AND ACETAMINOPHEN 10; 325 MG/1; MG/1
1 TABLET ORAL 4 TIMES DAILY PRN
Qty: 52 TABLET | Refills: 0 | Status: SHIPPED | OUTPATIENT
Start: 2019-02-08 | End: 2019-02-21 | Stop reason: SDUPTHER

## 2019-02-11 ENCOUNTER — TELEPHONE (OUTPATIENT)
Dept: PAIN MANAGEMENT | Age: 69
End: 2019-02-11

## 2019-02-21 ENCOUNTER — HOSPITAL ENCOUNTER (OUTPATIENT)
Dept: PAIN MANAGEMENT | Age: 69
Discharge: HOME | End: 2019-02-21
Payer: MEDICARE

## 2019-02-21 VITALS
HEART RATE: 70 BPM | TEMPERATURE: 97.4 F | DIASTOLIC BLOOD PRESSURE: 73 MMHG | SYSTOLIC BLOOD PRESSURE: 119 MMHG | RESPIRATION RATE: 16 BRPM | OXYGEN SATURATION: 97 %

## 2019-02-21 DIAGNOSIS — R10.30 DEEP GROIN PAIN: ICD-10-CM

## 2019-02-21 DIAGNOSIS — M51.16 LUMBAR DISC DISEASE WITH RADICULOPATHY: ICD-10-CM

## 2019-02-21 PROCEDURE — 99214 OFFICE O/P EST MOD 30 MIN: CPT | Performed by: NURSE PRACTITIONER

## 2019-02-21 PROCEDURE — 99214 OFFICE O/P EST MOD 30 MIN: CPT

## 2019-02-21 RX ORDER — GABAPENTIN 300 MG/1
600 CAPSULE ORAL 4 TIMES DAILY
Qty: 240 CAPSULE | Refills: 2 | Status: SHIPPED | OUTPATIENT
Start: 2019-02-21 | End: 2019-05-02 | Stop reason: DRUGHIGH

## 2019-02-21 RX ORDER — TIZANIDINE 4 MG/1
4 TABLET ORAL 2 TIMES DAILY PRN
Qty: 45 TABLET | Refills: 2 | Status: SHIPPED | OUTPATIENT
Start: 2019-02-21 | End: 2019-05-02 | Stop reason: SDUPTHER

## 2019-02-21 RX ORDER — HYDROCODONE BITARTRATE AND ACETAMINOPHEN 10; 325 MG/1; MG/1
1 TABLET ORAL 4 TIMES DAILY PRN
Qty: 120 TABLET | Refills: 0 | Status: SHIPPED | OUTPATIENT
Start: 2019-02-21 | End: 2019-03-18 | Stop reason: SDUPTHER

## 2019-02-21 ASSESSMENT — ENCOUNTER SYMPTOMS
CONSTIPATION: 0
BACK PAIN: 1

## 2019-02-21 ASSESSMENT — PAIN DESCRIPTION - PAIN TYPE: TYPE: CHRONIC PAIN

## 2019-02-21 ASSESSMENT — PAIN SCALES - GENERAL: PAINLEVEL_OUTOF10: 7

## 2019-02-21 ASSESSMENT — PAIN DESCRIPTION - FREQUENCY: FREQUENCY: CONTINUOUS

## 2019-02-21 ASSESSMENT — PAIN DESCRIPTION - LOCATION: LOCATION: BACK

## 2019-02-21 ASSESSMENT — PAIN DESCRIPTION - PROGRESSION: CLINICAL_PROGRESSION: GRADUALLY WORSENING

## 2019-02-21 ASSESSMENT — PAIN - FUNCTIONAL ASSESSMENT: PAIN_FUNCTIONAL_ASSESSMENT: PREVENTS OR INTERFERES SOME ACTIVE ACTIVITIES AND ADLS

## 2019-02-21 ASSESSMENT — PAIN DESCRIPTION - ONSET: ONSET: ON-GOING

## 2019-02-24 PROBLEM — R10.30 DEEP GROIN PAIN: Status: ACTIVE | Noted: 2019-02-24

## 2019-03-01 ENCOUNTER — HOSPITAL ENCOUNTER (OUTPATIENT)
Dept: PAIN MANAGEMENT | Age: 69
Discharge: HOME | End: 2019-03-01
Payer: MEDICARE

## 2019-03-01 VITALS
TEMPERATURE: 96.9 F | RESPIRATION RATE: 18 BRPM | HEART RATE: 66 BPM | OXYGEN SATURATION: 98 % | DIASTOLIC BLOOD PRESSURE: 40 MMHG | SYSTOLIC BLOOD PRESSURE: 119 MMHG

## 2019-03-01 DIAGNOSIS — M51.36 LUMBAR DEGENERATIVE DISC DISEASE: ICD-10-CM

## 2019-03-01 PROCEDURE — 2580000003 HC RX 258

## 2019-03-01 PROCEDURE — 6360000002 HC RX W HCPCS

## 2019-03-01 PROCEDURE — 2500000003 HC RX 250 WO HCPCS

## 2019-03-01 PROCEDURE — 3209999900 FLUORO FOR SURGICAL PROCEDURES

## 2019-03-01 PROCEDURE — 62323 NJX INTERLAMINAR LMBR/SAC: CPT

## 2019-03-01 RX ORDER — 0.9 % SODIUM CHLORIDE 0.9 %
VIAL (ML) INJECTION
Status: COMPLETED | OUTPATIENT
Start: 2019-03-01 | End: 2019-03-01

## 2019-03-01 RX ORDER — METHYLPREDNISOLONE ACETATE 80 MG/ML
INJECTION, SUSPENSION INTRA-ARTICULAR; INTRALESIONAL; INTRAMUSCULAR; SOFT TISSUE
Status: COMPLETED | OUTPATIENT
Start: 2019-03-01 | End: 2019-03-01

## 2019-03-01 RX ORDER — LIDOCAINE HYDROCHLORIDE 10 MG/ML
INJECTION, SOLUTION EPIDURAL; INFILTRATION; INTRACAUDAL; PERINEURAL
Status: COMPLETED | OUTPATIENT
Start: 2019-03-01 | End: 2019-03-01

## 2019-03-01 RX ADMIN — METHYLPREDNISOLONE ACETATE 80 MG: 80 INJECTION, SUSPENSION INTRA-ARTICULAR; INTRALESIONAL; INTRAMUSCULAR; SOFT TISSUE at 11:14

## 2019-03-01 RX ADMIN — Medication 5 ML: at 11:14

## 2019-03-01 RX ADMIN — LIDOCAINE HYDROCHLORIDE 5 ML: 10 INJECTION, SOLUTION EPIDURAL; INFILTRATION; INTRACAUDAL; PERINEURAL at 11:13

## 2019-03-01 ASSESSMENT — PAIN SCALES - GENERAL: PAINLEVEL_OUTOF10: 9

## 2019-03-01 ASSESSMENT — PAIN - FUNCTIONAL ASSESSMENT: PAIN_FUNCTIONAL_ASSESSMENT: 0-10

## 2019-03-04 ENCOUNTER — TELEPHONE (OUTPATIENT)
Dept: PAIN MANAGEMENT | Age: 69
End: 2019-03-04

## 2019-03-18 DIAGNOSIS — M51.16 LUMBAR DISC DISEASE WITH RADICULOPATHY: ICD-10-CM

## 2019-03-18 RX ORDER — HYDROCODONE BITARTRATE AND ACETAMINOPHEN 10; 325 MG/1; MG/1
1 TABLET ORAL 4 TIMES DAILY PRN
Qty: 120 TABLET | Refills: 0 | Status: SHIPPED | OUTPATIENT
Start: 2019-03-23 | End: 2019-04-17 | Stop reason: SDUPTHER

## 2019-03-19 ENCOUNTER — TELEPHONE (OUTPATIENT)
Dept: PAIN MANAGEMENT | Age: 69
End: 2019-03-19

## 2019-04-17 DIAGNOSIS — M51.16 LUMBAR DISC DISEASE WITH RADICULOPATHY: ICD-10-CM

## 2019-04-17 RX ORDER — HYDROCODONE BITARTRATE AND ACETAMINOPHEN 10; 325 MG/1; MG/1
1 TABLET ORAL 4 TIMES DAILY PRN
Qty: 120 TABLET | Refills: 0 | Status: SHIPPED | OUTPATIENT
Start: 2019-04-22 | End: 2019-05-02 | Stop reason: SDUPTHER

## 2019-05-02 ENCOUNTER — HOSPITAL ENCOUNTER (OUTPATIENT)
Dept: PAIN MANAGEMENT | Age: 69
Discharge: HOME | End: 2019-05-02
Payer: MEDICARE

## 2019-05-02 VITALS
DIASTOLIC BLOOD PRESSURE: 68 MMHG | WEIGHT: 146.5 LBS | BODY MASS INDEX: 23.54 KG/M2 | RESPIRATION RATE: 18 BRPM | TEMPERATURE: 98.6 F | HEART RATE: 58 BPM | HEIGHT: 66 IN | SYSTOLIC BLOOD PRESSURE: 136 MMHG | OXYGEN SATURATION: 96 %

## 2019-05-02 DIAGNOSIS — G89.29 CHRONIC PAIN OF BOTH KNEES: ICD-10-CM

## 2019-05-02 DIAGNOSIS — M51.16 LUMBAR DISC DISEASE WITH RADICULOPATHY: ICD-10-CM

## 2019-05-02 DIAGNOSIS — M25.562 CHRONIC PAIN OF BOTH KNEES: ICD-10-CM

## 2019-05-02 DIAGNOSIS — M25.561 CHRONIC PAIN OF BOTH KNEES: ICD-10-CM

## 2019-05-02 DIAGNOSIS — M54.50 CHRONIC BILATERAL LOW BACK PAIN WITHOUT SCIATICA: ICD-10-CM

## 2019-05-02 DIAGNOSIS — M54.10 BACK PAIN WITH LEFT-SIDED RADICULOPATHY: ICD-10-CM

## 2019-05-02 DIAGNOSIS — G89.29 CHRONIC BILATERAL LOW BACK PAIN WITHOUT SCIATICA: ICD-10-CM

## 2019-05-02 PROCEDURE — 99214 OFFICE O/P EST MOD 30 MIN: CPT

## 2019-05-02 PROCEDURE — 99214 OFFICE O/P EST MOD 30 MIN: CPT | Performed by: NURSE PRACTITIONER

## 2019-05-02 PROCEDURE — 99213 OFFICE O/P EST LOW 20 MIN: CPT

## 2019-05-02 RX ORDER — HYDROCODONE BITARTRATE AND ACETAMINOPHEN 10; 325 MG/1; MG/1
1 TABLET ORAL 4 TIMES DAILY PRN
Qty: 120 TABLET | Refills: 0 | Status: SHIPPED | OUTPATIENT
Start: 2019-05-22 | End: 2019-06-17 | Stop reason: SDUPTHER

## 2019-05-02 RX ORDER — TIZANIDINE 4 MG/1
4 TABLET ORAL EVERY 8 HOURS PRN
Qty: 90 TABLET | Refills: 2 | Status: SHIPPED | OUTPATIENT
Start: 2019-05-02 | End: 2019-08-19 | Stop reason: SDUPTHER

## 2019-05-02 RX ORDER — GABAPENTIN 600 MG/1
900 TABLET ORAL 3 TIMES DAILY
Qty: 135 TABLET | Refills: 2 | Status: SHIPPED | OUTPATIENT
Start: 2019-05-22 | End: 2019-08-27 | Stop reason: SDUPTHER

## 2019-05-02 ASSESSMENT — PAIN DESCRIPTION - LOCATION: LOCATION: BACK

## 2019-05-02 ASSESSMENT — PAIN DESCRIPTION - PAIN TYPE: TYPE: CHRONIC PAIN

## 2019-05-02 ASSESSMENT — PAIN DESCRIPTION - ORIENTATION: ORIENTATION: LOWER

## 2019-05-02 ASSESSMENT — PAIN DESCRIPTION - DIRECTION: RADIATING_TOWARDS: INTO LEFT LOWER EXTREMITY

## 2019-05-02 ASSESSMENT — PAIN - FUNCTIONAL ASSESSMENT: PAIN_FUNCTIONAL_ASSESSMENT: PREVENTS OR INTERFERES SOME ACTIVE ACTIVITIES AND ADLS

## 2019-05-02 ASSESSMENT — PAIN SCALES - GENERAL: PAINLEVEL_OUTOF10: 8

## 2019-05-02 ASSESSMENT — PAIN DESCRIPTION - FREQUENCY: FREQUENCY: INTERMITTENT

## 2019-05-02 ASSESSMENT — PAIN DESCRIPTION - PROGRESSION: CLINICAL_PROGRESSION: NOT CHANGED

## 2019-05-02 ASSESSMENT — ACTIVITIES OF DAILY LIVING (ADL): EFFECT OF PAIN ON DAILY ACTIVITIES: LIMITS ACTIVITY

## 2019-05-02 ASSESSMENT — PAIN DESCRIPTION - DESCRIPTORS: DESCRIPTORS: SQUEEZING;TIGHTNESS

## 2019-05-02 ASSESSMENT — PAIN DESCRIPTION - ONSET: ONSET: ON-GOING

## 2019-05-02 NOTE — PROGRESS NOTES
[facility] Physical & Pain Medicine  Office Note    Patient Name: Alfonso Avilez    MR #: [id number]    Account #: [de-identified]    : 1950    Age: 77 y.o. Sex: female    Date: May 2, 2019    PCP: Francie Tejada MD    Chief Complaint:   Chief Complaint   Patient presents with    Lower Back Pain       History of Present Illness:     Alfonso Avilez is a 77 y.o. female who presents to the office for a follow up procedure. Patient had a lumbar epidural L3-4 on 3/1/2019. Patient reports she received 50% relief for about 1.5 months. Patient states that LESI gave her good relief but did not make the pain go completely away. However she was able to do aggravating factors a little bit longer before the pain became too severe that she would have to take a break and rest. Patient cancelled procedure appointment for left inguinal nerve block. Pain had resolved prior to injection. However patient states that if pain was to return she would like to get injection. Past Visit HPI:  2019  Alfonso Avilez is a 77 y.o. female who presents to office today for a 3 month follow up related to chronic low back pain and chronic migraines. Injections have been on hold due to chronic illness. Patient is finally feeling better and she is ready to start injections again.      Patient states that lowe back is hurting and she is ready to repeat LESI of L3/L4. She has had good results with this injection in the past. However patient has new complaint of pain in her left groin. She states this pain is a deep pain. She states this pain is sharp squeezing pain that is getting worse. 2018  Alfonso Avilez is a 77 y.o. female who presents to the office for 3 month follow-up of chronic low back pain and chronic migraines. Patient had surgery on 2018 at [facility]. Prior to that she was hospitalized on 10/23 at [facility] for glandular infection and she was placed on IV antibiotics.  She was referred to Dr. Gaby Alston who preformed surgery on . Patient has an incision that is erythremic with sutures in place and edges approximated. Drainage noted as well. Incision is on the of left submandibular and gland with mass was removed. Patient states she has missed previous appointments related to being in the hospital. She did receive pain medication from PCP during hospitalization and after surgery. Patient did bring arm band from hospitalizations for proof of admission. Currently, Patient is out of pain medication and she would like to resume injections as soon as her incision is fully healed. 2018  Charles Adkins is a 69 y.o. female who presents to the office for 3 month follow-up of chronic low back pian. Patient's last injection was 18 of LESI of L3/L4. Just days prior to the injection, patient had been hospitalized with the Flu. Patient had her injections and she became deathly ill and was admitted back into the hospital. Patient does not want to have any further injections. See pain assessment below. Opioids Prescribed: Norco 10mg 4 times daily PRN #120    Last Filled:     Was medication brought the appointment today:  No    Current PEG:    Past PE    Annual Screens    ORT Score: 5    PHQ-9 Score: 17    Current Pain Assessment  Pain Assessment  Pain Assessment: 0-10  Pain Level: 8  Patient's Stated Pain Goal: No pain  Pain Type: Chronic pain  Pain Location: Back  Pain Orientation: Lower  Pain Radiating Towards: into left lower extremity  Pain Descriptors: Squeezing, Tightness  Pain Frequency: Intermittent  Pain Onset: On-going  Clinical Progression: Not changed  Effect of Pain on Daily Activities: limits activity  Functional Pain Assessment: Prevents or interferes some active activities and ADLs  Non-Pharmaceutical Pain Intervention(s): Repositioned, Rest  Multiple Pain Sites: No    Employment: Retired    Previous Injury: No    Previous Physical Therapy In the last 6 months? Nursing note and vitals reviewed. LAST UDS: UDS Today    Labs:  Lab Results   Component Value Date     04/18/2011    K 4.6 04/18/2011     04/18/2011    CO2 29 04/18/2011    BUN 8 04/18/2011    CREATININE 1.0 11/10/2016    CREATININE 0.8 04/18/2011    GLUCOSE 129 04/18/2011    CALCIUM 10.1 04/18/2011        No results found for: WBC, HGB, HCT, MCV, PLT    Recent Imaging: None    Assessment:  Principal Problem:    Bilateral low back pain without sciatica  Active Problems:    Pain in both knees    Back pain with left-sided radiculopathy    Lumbar disc disease with radiculopathy  Resolved Problems:    * No resolved hospital problems. *      Plan:  1. Continue Zanaflex and Norco medications escribed to the pharmacy  2.  reschedule LESI L3/L4 as patient has had good results in the past.  3. Patient may call to reschedule Schedule Left Inguinal nerve block for groin pain. 4. Follow up in post procedures or sooner if needed  5. May discuss DUPREE treatments at next appointment  6. Increase gabapentin to 900 mg 3 times a day medications sent to pharmacy    Discussion: Discussed exam findings and plan of care with patient. Patient agreed with POC and questions were asked and answered. Activity: discussed exercise as beneficial to pain reduction, encouraged stretching exercisewith a focus on torso strengthening. Education Provided by Provider:  Review of [facility] Care / Agreement Review / ORT Calculated / PHQ-9 Reviewed / Compliance Issues Discussed / Cognitive Behavior Needs / Exercise / Review of Test / Financial Issues / Teaching / Smoking Cessation / Tobacco/Alcohol Use    Controlled Substance Monitoring:   Discussed with patient possible medication side effects, risk of tolerance, dependenceand alternative treatments. Discussed the growing epidemic in the [place] with the overprescribing and at times the abuse of narcotics. Discussed the detrimental effects of long term narcotic use in younger patients. Patientencouraged to set daily goals of exercising and if on narcotics decreasing daily narcotic intake. Discussed with the patient about the development of hyperalgesia with long term narcotic intake. CC:  MD Fredrick Kumari, LIVE - CNP, 5/2/2019 at 3:59 PM    EMR dragon/transcription disclaimer: Much of this encounter note is electronic transcription/translation of spoken language to printed tach. Electronic translation of spoken language may be erroneous, or at times, nonsensical words or phrases may be inadvertently transcribed.  Although, I have reviewed the note for such errors, some may still exist.    Education Provided by Nurse  Review of Patricia Ing / Agreement Review / ORT Calculated / PHQ-9 Reviewed / PEG calculated / Compliance Issues Discussed / Cognitive Behavior Needs / Exercise / Review of Test / Financial Issues / Teaching / Smoking Cessation / Tobacco/Alcohol Use Reviewed  Electronically signed by Judithe Mcburney, RN on 5/2/19 at 4:29 PM      Providers Plan   Outgoing Referral / Pharmacy Consult / Test ordered / Obtained Test Results / Consults    New/HP Patient Picture Obtained / Prescriptions ordered 3

## 2019-05-07 ASSESSMENT — ENCOUNTER SYMPTOMS
BACK PAIN: 1
CONSTIPATION: 0

## 2019-06-17 DIAGNOSIS — M51.16 LUMBAR DISC DISEASE WITH RADICULOPATHY: ICD-10-CM

## 2019-06-18 RX ORDER — HYDROCODONE BITARTRATE AND ACETAMINOPHEN 10; 325 MG/1; MG/1
1 TABLET ORAL 4 TIMES DAILY PRN
Qty: 120 TABLET | Refills: 0 | Status: SHIPPED | OUTPATIENT
Start: 2019-06-21 | End: 2019-07-17 | Stop reason: SDUPTHER

## 2019-07-17 DIAGNOSIS — M51.16 LUMBAR DISC DISEASE WITH RADICULOPATHY: ICD-10-CM

## 2019-07-18 RX ORDER — HYDROCODONE BITARTRATE AND ACETAMINOPHEN 10; 325 MG/1; MG/1
1 TABLET ORAL 4 TIMES DAILY PRN
Qty: 120 TABLET | Refills: 0 | Status: SHIPPED | OUTPATIENT
Start: 2019-07-21 | End: 2019-08-01 | Stop reason: ALTCHOICE

## 2019-07-22 ENCOUNTER — HOSPITAL ENCOUNTER (INPATIENT)
Facility: HOSPITAL | Age: 69
LOS: 5 days | Discharge: HOME | End: 2019-07-27
Attending: FAMILY MEDICINE | Admitting: FAMILY MEDICINE

## 2019-07-22 DIAGNOSIS — L03.211 FACIAL CELLULITIS: Primary | ICD-10-CM

## 2019-07-22 DIAGNOSIS — L03.211 FACIAL CELLULITIS: ICD-10-CM

## 2019-07-22 PROBLEM — L03.90 CELLULITIS: Status: ACTIVE | Noted: 2019-07-22

## 2019-07-22 RX ORDER — SODIUM CHLORIDE 9 MG/ML
50 INJECTION, SOLUTION INTRAVENOUS CONTINUOUS
Status: DISCONTINUED | OUTPATIENT
Start: 2019-07-23 | End: 2019-07-27 | Stop reason: HOSPADM

## 2019-07-22 RX ORDER — ESCITALOPRAM OXALATE 10 MG/1
10 TABLET ORAL DAILY
COMMUNITY

## 2019-07-22 RX ORDER — SODIUM CHLORIDE 9 MG/ML
75 INJECTION, SOLUTION INTRAVENOUS CONTINUOUS
Status: CANCELLED | OUTPATIENT
Start: 2019-07-22

## 2019-07-23 PROBLEM — L03.211 CELLULITIS OF FACE: Status: ACTIVE | Noted: 2019-07-23

## 2019-07-23 PROBLEM — R22.0 MASS OF LEFT SUBMANDIBULAR REGION: Status: RESOLVED | Noted: 2018-10-30 | Resolved: 2019-07-23

## 2019-07-23 PROBLEM — L03.211 CELLULITIS OF FACE: Status: ACTIVE | Noted: 2019-07-22

## 2019-07-23 PROBLEM — K11.20 SIALOADENITIS OF SUBMANDIBULAR GLAND: Status: RESOLVED | Noted: 2018-10-30 | Resolved: 2019-07-23

## 2019-07-23 LAB
ALBUMIN SERPL-MCNC: 3.9 G/DL (ref 3.5–5)
ALBUMIN/GLOB SERPL: 1.3 G/DL (ref 1.1–2.5)
ALP SERPL-CCNC: 56 U/L (ref 24–120)
ALT SERPL W P-5'-P-CCNC: 17 U/L (ref 0–54)
ANION GAP SERPL CALCULATED.3IONS-SCNC: 7 MMOL/L (ref 4–13)
AST SERPL-CCNC: 18 U/L (ref 7–45)
BASOPHILS # BLD AUTO: 0.02 10*3/MM3 (ref 0–0.2)
BASOPHILS NFR BLD AUTO: 0.3 % (ref 0–2)
BILIRUB SERPL-MCNC: 0.4 MG/DL (ref 0.1–1)
BUN BLD-MCNC: 14 MG/DL (ref 5–21)
BUN/CREAT SERPL: 20.3 (ref 7–25)
CALCIUM SPEC-SCNC: 9.1 MG/DL (ref 8.4–10.4)
CHLORIDE SERPL-SCNC: 101 MMOL/L (ref 98–110)
CO2 SERPL-SCNC: 29 MMOL/L (ref 24–31)
CREAT BLD-MCNC: 0.69 MG/DL (ref 0.5–1.4)
DEPRECATED RDW RBC AUTO: 41 FL (ref 40–54)
EOSINOPHIL # BLD AUTO: 0.03 10*3/MM3 (ref 0–0.7)
EOSINOPHIL NFR BLD AUTO: 0.4 % (ref 0–4)
ERYTHROCYTE [DISTWIDTH] IN BLOOD BY AUTOMATED COUNT: 12.1 % (ref 12–15)
GFR SERPL CREATININE-BSD FRML MDRD: 84 ML/MIN/1.73
GLOBULIN UR ELPH-MCNC: 3 GM/DL
GLUCOSE BLD-MCNC: 96 MG/DL (ref 70–100)
HCT VFR BLD AUTO: 39.9 % (ref 37–47)
HGB BLD-MCNC: 13.4 G/DL (ref 12–16)
IMM GRANULOCYTES # BLD AUTO: 0.02 10*3/MM3 (ref 0–0.05)
IMM GRANULOCYTES NFR BLD AUTO: 0.3 % (ref 0–5)
LYMPHOCYTES # BLD AUTO: 2.06 10*3/MM3 (ref 0.72–4.86)
LYMPHOCYTES NFR BLD AUTO: 27.8 % (ref 15–45)
MCH RBC QN AUTO: 30.8 PG (ref 28–32)
MCHC RBC AUTO-ENTMCNC: 33.6 G/DL (ref 33–36)
MCV RBC AUTO: 91.7 FL (ref 82–98)
MONOCYTES # BLD AUTO: 0.68 10*3/MM3 (ref 0.19–1.3)
MONOCYTES NFR BLD AUTO: 9.2 % (ref 4–12)
NEUTROPHILS # BLD AUTO: 4.6 10*3/MM3 (ref 1.87–8.4)
NEUTROPHILS NFR BLD AUTO: 62 % (ref 39–78)
NRBC BLD AUTO-RTO: 0 /100 WBC (ref 0–0.2)
PLATELET # BLD AUTO: 129 10*3/MM3 (ref 130–400)
PMV BLD AUTO: 12.2 FL (ref 6–12)
POTASSIUM BLD-SCNC: 3.7 MMOL/L (ref 3.5–5.3)
PROT SERPL-MCNC: 6.9 G/DL (ref 6.3–8.7)
RBC # BLD AUTO: 4.35 10*6/MM3 (ref 4.2–5.4)
SODIUM BLD-SCNC: 137 MMOL/L (ref 135–145)
WBC NRBC COR # BLD: 7.41 10*3/MM3 (ref 4.8–10.8)

## 2019-07-23 PROCEDURE — 80053 COMPREHEN METABOLIC PANEL: CPT | Performed by: FAMILY MEDICINE

## 2019-07-23 PROCEDURE — 94760 N-INVAS EAR/PLS OXIMETRY 1: CPT

## 2019-07-23 PROCEDURE — 85025 COMPLETE CBC W/AUTO DIFF WBC: CPT | Performed by: FAMILY MEDICINE

## 2019-07-23 PROCEDURE — 99222 1ST HOSP IP/OBS MODERATE 55: CPT | Performed by: OTOLARYNGOLOGY

## 2019-07-23 PROCEDURE — 25010000002 DEXAMETHASONE PER 1 MG: Performed by: OTOLARYNGOLOGY

## 2019-07-23 PROCEDURE — 94799 UNLISTED PULMONARY SVC/PX: CPT

## 2019-07-23 PROCEDURE — 25010000002 VANCOMYCIN 10 G RECONSTITUTED SOLUTION: Performed by: FAMILY MEDICINE

## 2019-07-23 PROCEDURE — 99221 1ST HOSP IP/OBS SF/LOW 40: CPT | Performed by: FAMILY MEDICINE

## 2019-07-23 PROCEDURE — 63710000001 PROMETHAZINE PER 25 MG: Performed by: FAMILY MEDICINE

## 2019-07-23 RX ORDER — HYDROCODONE BITARTRATE AND ACETAMINOPHEN 10; 325 MG/1; MG/1
1 TABLET ORAL EVERY 4 HOURS PRN
Status: DISCONTINUED | OUTPATIENT
Start: 2019-07-23 | End: 2019-07-24

## 2019-07-23 RX ORDER — GABAPENTIN 300 MG/1
900 CAPSULE ORAL 3 TIMES DAILY
Status: DISCONTINUED | OUTPATIENT
Start: 2019-07-23 | End: 2019-07-27 | Stop reason: HOSPADM

## 2019-07-23 RX ORDER — HYDROXYZINE HYDROCHLORIDE 25 MG/1
25 TABLET, FILM COATED ORAL 3 TIMES DAILY PRN
Status: DISCONTINUED | OUTPATIENT
Start: 2019-07-23 | End: 2019-07-27 | Stop reason: HOSPADM

## 2019-07-23 RX ORDER — CLINDAMYCIN PHOSPHATE 900 MG/50ML
900 INJECTION INTRAVENOUS EVERY 8 HOURS
Status: COMPLETED | OUTPATIENT
Start: 2019-07-23 | End: 2019-07-24

## 2019-07-23 RX ORDER — ESCITALOPRAM OXALATE 10 MG/1
10 TABLET ORAL DAILY
Status: DISCONTINUED | OUTPATIENT
Start: 2019-07-23 | End: 2019-07-27 | Stop reason: HOSPADM

## 2019-07-23 RX ORDER — ALPRAZOLAM 0.5 MG/1
0.5 TABLET ORAL 3 TIMES DAILY PRN
Status: DISCONTINUED | OUTPATIENT
Start: 2019-07-23 | End: 2019-07-24

## 2019-07-23 RX ORDER — PANTOPRAZOLE SODIUM 40 MG/1
40 TABLET, DELAYED RELEASE ORAL 2 TIMES DAILY
Status: DISCONTINUED | OUTPATIENT
Start: 2019-07-23 | End: 2019-07-27 | Stop reason: HOSPADM

## 2019-07-23 RX ORDER — ACETAMINOPHEN 325 MG/1
650 TABLET ORAL EVERY 4 HOURS PRN
Status: DISCONTINUED | OUTPATIENT
Start: 2019-07-23 | End: 2019-07-27 | Stop reason: HOSPADM

## 2019-07-23 RX ORDER — HYDROCODONE BITARTRATE AND ACETAMINOPHEN 10; 325 MG/1; MG/1
1 TABLET ORAL 4 TIMES DAILY PRN
COMMUNITY
End: 2022-04-19 | Stop reason: HOSPADM

## 2019-07-23 RX ORDER — DICYCLOMINE HYDROCHLORIDE 10 MG/1
10 CAPSULE ORAL 4 TIMES DAILY
Status: DISCONTINUED | OUTPATIENT
Start: 2019-07-23 | End: 2019-07-27 | Stop reason: HOSPADM

## 2019-07-23 RX ORDER — POLYETHYLENE GLYCOL 3350 17 G/17G
17 POWDER, FOR SOLUTION ORAL DAILY
Status: DISCONTINUED | OUTPATIENT
Start: 2019-07-23 | End: 2019-07-24

## 2019-07-23 RX ORDER — PROMETHAZINE HYDROCHLORIDE 25 MG/1
25 TABLET ORAL EVERY 6 HOURS PRN
Status: DISCONTINUED | OUTPATIENT
Start: 2019-07-23 | End: 2019-07-27 | Stop reason: HOSPADM

## 2019-07-23 RX ORDER — SUCRALFATE ORAL 1 G/10ML
1 SUSPENSION ORAL
Status: DISCONTINUED | OUTPATIENT
Start: 2019-07-23 | End: 2019-07-27 | Stop reason: HOSPADM

## 2019-07-23 RX ORDER — TIZANIDINE 4 MG/1
4 TABLET ORAL 3 TIMES DAILY
Status: DISCONTINUED | OUTPATIENT
Start: 2019-07-23 | End: 2019-07-25

## 2019-07-23 RX ORDER — LEVOTHYROXINE SODIUM 0.1 MG/1
100 TABLET ORAL
Status: DISCONTINUED | OUTPATIENT
Start: 2019-07-23 | End: 2019-07-27 | Stop reason: HOSPADM

## 2019-07-23 RX ORDER — DEXAMETHASONE SODIUM PHOSPHATE 4 MG/ML
10 INJECTION, SOLUTION INTRA-ARTICULAR; INTRALESIONAL; INTRAMUSCULAR; INTRAVENOUS; SOFT TISSUE EVERY 8 HOURS
Status: DISCONTINUED | OUTPATIENT
Start: 2019-07-23 | End: 2019-07-24

## 2019-07-23 RX ORDER — ONDANSETRON 2 MG/ML
4 INJECTION INTRAMUSCULAR; INTRAVENOUS EVERY 6 HOURS PRN
Status: DISCONTINUED | OUTPATIENT
Start: 2019-07-23 | End: 2019-07-27 | Stop reason: HOSPADM

## 2019-07-23 RX ADMIN — GABAPENTIN 900 MG: 300 CAPSULE ORAL at 20:26

## 2019-07-23 RX ADMIN — DEXAMETHASONE SODIUM PHOSPHATE 10 MG: 4 INJECTION, SOLUTION INTRA-ARTICULAR; INTRALESIONAL; INTRAMUSCULAR; INTRAVENOUS; SOFT TISSUE at 17:07

## 2019-07-23 RX ADMIN — TIZANIDINE 4 MG: 4 TABLET ORAL at 20:26

## 2019-07-23 RX ADMIN — HYDROCODONE BITARTRATE AND ACETAMINOPHEN 1 TABLET: 10; 325 TABLET ORAL at 20:37

## 2019-07-23 RX ADMIN — LINACLOTIDE 145 MCG: 145 CAPSULE, GELATIN COATED ORAL at 09:12

## 2019-07-23 RX ADMIN — CLINDAMYCIN IN 5 PERCENT DEXTROSE 900 MG: 18 INJECTION, SOLUTION INTRAVENOUS at 17:07

## 2019-07-23 RX ADMIN — TIZANIDINE 4 MG: 4 TABLET ORAL at 08:19

## 2019-07-23 RX ADMIN — PROMETHAZINE HYDROCHLORIDE 25 MG: 25 TABLET ORAL at 20:37

## 2019-07-23 RX ADMIN — DICYCLOMINE HYDROCHLORIDE 10 MG: 10 CAPSULE ORAL at 08:19

## 2019-07-23 RX ADMIN — VANCOMYCIN HYDROCHLORIDE 1250 MG: 10 INJECTION, POWDER, LYOPHILIZED, FOR SOLUTION INTRAVENOUS at 02:47

## 2019-07-23 RX ADMIN — GABAPENTIN 900 MG: 300 CAPSULE ORAL at 08:19

## 2019-07-23 RX ADMIN — SODIUM CHLORIDE 75 ML/HR: 9 INJECTION, SOLUTION INTRAVENOUS at 00:38

## 2019-07-23 RX ADMIN — HYDROMORPHONE HYDROCHLORIDE 1 MG: 1 INJECTION, SOLUTION INTRAMUSCULAR; INTRAVENOUS; SUBCUTANEOUS at 08:17

## 2019-07-23 RX ADMIN — PANTOPRAZOLE SODIUM 40 MG: 40 TABLET, DELAYED RELEASE ORAL at 02:21

## 2019-07-23 RX ADMIN — ALPRAZOLAM 0.5 MG: 0.5 TABLET ORAL at 08:24

## 2019-07-23 RX ADMIN — TIZANIDINE 4 MG: 4 TABLET ORAL at 15:36

## 2019-07-23 RX ADMIN — PANTOPRAZOLE SODIUM 40 MG: 40 TABLET, DELAYED RELEASE ORAL at 08:19

## 2019-07-23 RX ADMIN — HYDROXYZINE HYDROCHLORIDE 25 MG: 25 TABLET, FILM COATED ORAL at 15:34

## 2019-07-23 RX ADMIN — PANTOPRAZOLE SODIUM 40 MG: 40 TABLET, DELAYED RELEASE ORAL at 20:25

## 2019-07-23 RX ADMIN — VANCOMYCIN HYDROCHLORIDE 1250 MG: 10 INJECTION, POWDER, LYOPHILIZED, FOR SOLUTION INTRAVENOUS at 21:59

## 2019-07-23 RX ADMIN — LINACLOTIDE 145 MCG: 145 CAPSULE, GELATIN COATED ORAL at 09:13

## 2019-07-23 RX ADMIN — LEVOTHYROXINE SODIUM 100 MCG: 100 TABLET ORAL at 05:59

## 2019-07-23 RX ADMIN — HYDROCODONE BITARTRATE AND ACETAMINOPHEN 1 TABLET: 10; 325 TABLET ORAL at 05:59

## 2019-07-23 RX ADMIN — DICYCLOMINE HYDROCHLORIDE 10 MG: 10 CAPSULE ORAL at 17:07

## 2019-07-23 RX ADMIN — DICYCLOMINE HYDROCHLORIDE 10 MG: 10 CAPSULE ORAL at 12:48

## 2019-07-23 RX ADMIN — HYDROMORPHONE HYDROCHLORIDE 1 MG: 1 INJECTION, SOLUTION INTRAMUSCULAR; INTRAVENOUS; SUBCUTANEOUS at 15:31

## 2019-07-23 RX ADMIN — GABAPENTIN 900 MG: 300 CAPSULE ORAL at 15:33

## 2019-07-23 RX ADMIN — HYDROMORPHONE HYDROCHLORIDE 1 MG: 1 INJECTION, SOLUTION INTRAMUSCULAR; INTRAVENOUS; SUBCUTANEOUS at 00:37

## 2019-07-23 RX ADMIN — SODIUM CHLORIDE 75 ML/HR: 9 INJECTION, SOLUTION INTRAVENOUS at 15:32

## 2019-07-23 RX ADMIN — ESCITALOPRAM 10 MG: 10 TABLET, FILM COATED ORAL at 08:19

## 2019-07-24 LAB
ALBUMIN SERPL-MCNC: 3.7 G/DL (ref 3.5–5)
ALBUMIN/GLOB SERPL: 1.2 G/DL (ref 1.1–2.5)
ALP SERPL-CCNC: 53 U/L (ref 24–120)
ALT SERPL W P-5'-P-CCNC: 15 U/L (ref 0–54)
ANION GAP SERPL CALCULATED.3IONS-SCNC: 7 MMOL/L (ref 4–13)
AST SERPL-CCNC: 20 U/L (ref 7–45)
BASOPHILS # BLD AUTO: 0.01 10*3/MM3 (ref 0–0.2)
BASOPHILS NFR BLD AUTO: 0.1 % (ref 0–2)
BILIRUB SERPL-MCNC: 0.3 MG/DL (ref 0.1–1)
BUN BLD-MCNC: 12 MG/DL (ref 5–21)
BUN/CREAT SERPL: 22.6 (ref 7–25)
CALCIUM SPEC-SCNC: 9.1 MG/DL (ref 8.4–10.4)
CHLORIDE SERPL-SCNC: 107 MMOL/L (ref 98–110)
CO2 SERPL-SCNC: 25 MMOL/L (ref 24–31)
CREAT BLD-MCNC: 0.53 MG/DL (ref 0.5–1.4)
DEPRECATED RDW RBC AUTO: 39.3 FL (ref 40–54)
DEPRECATED RDW RBC AUTO: 39.7 FL (ref 40–54)
EOSINOPHIL # BLD AUTO: 0 10*3/MM3 (ref 0–0.7)
EOSINOPHIL NFR BLD AUTO: 0 % (ref 0–4)
ERYTHROCYTE [DISTWIDTH] IN BLOOD BY AUTOMATED COUNT: 12 % (ref 12–15)
ERYTHROCYTE [DISTWIDTH] IN BLOOD BY AUTOMATED COUNT: 12.1 % (ref 12–15)
GFR SERPL CREATININE-BSD FRML MDRD: 114 ML/MIN/1.73
GLOBULIN UR ELPH-MCNC: 3.2 GM/DL
GLUCOSE BLD-MCNC: 159 MG/DL (ref 70–100)
HCT VFR BLD AUTO: 33.6 % (ref 37–47)
HCT VFR BLD AUTO: 37.7 % (ref 37–47)
HGB BLD-MCNC: 11.3 G/DL (ref 12–16)
HGB BLD-MCNC: 12.7 G/DL (ref 12–16)
IMM GRANULOCYTES # BLD AUTO: 0.03 10*3/MM3 (ref 0–0.05)
IMM GRANULOCYTES NFR BLD AUTO: 0.3 % (ref 0–5)
LYMPHOCYTES # BLD AUTO: 1.59 10*3/MM3 (ref 0.72–4.86)
LYMPHOCYTES NFR BLD AUTO: 16.5 % (ref 15–45)
MCH RBC QN AUTO: 30.1 PG (ref 28–32)
MCH RBC QN AUTO: 30.4 PG (ref 28–32)
MCHC RBC AUTO-ENTMCNC: 33.6 G/DL (ref 33–36)
MCHC RBC AUTO-ENTMCNC: 33.7 G/DL (ref 33–36)
MCV RBC AUTO: 89.4 FL (ref 82–98)
MCV RBC AUTO: 90.2 FL (ref 82–98)
MONOCYTES # BLD AUTO: 0.74 10*3/MM3 (ref 0.19–1.3)
MONOCYTES NFR BLD AUTO: 7.7 % (ref 4–12)
NEUTROPHILS # BLD AUTO: 7.28 10*3/MM3 (ref 1.87–8.4)
NEUTROPHILS NFR BLD AUTO: 75.4 % (ref 39–78)
NRBC BLD AUTO-RTO: 0 /100 WBC (ref 0–0.2)
PLATELET # BLD AUTO: 105 10*3/MM3 (ref 130–400)
PLATELET # BLD AUTO: 125 10*3/MM3 (ref 130–400)
PMV BLD AUTO: 12.5 FL (ref 6–12)
PMV BLD AUTO: 12.8 FL (ref 6–12)
POTASSIUM BLD-SCNC: 4.2 MMOL/L (ref 3.5–5.3)
PROT SERPL-MCNC: 6.9 G/DL (ref 6.3–8.7)
RBC # BLD AUTO: 3.76 10*6/MM3 (ref 4.2–5.4)
RBC # BLD AUTO: 4.18 10*6/MM3 (ref 4.2–5.4)
SODIUM BLD-SCNC: 139 MMOL/L (ref 135–145)
VANCOMYCIN TROUGH SERPL-MCNC: 6.32 MCG/ML (ref 10–20)
WBC NRBC COR # BLD: 3.21 10*3/MM3 (ref 4.8–10.8)
WBC NRBC COR # BLD: 9.65 10*3/MM3 (ref 4.8–10.8)

## 2019-07-24 PROCEDURE — 25010000002 VANCOMYCIN PER 500 MG: Performed by: FAMILY MEDICINE

## 2019-07-24 PROCEDURE — 80202 ASSAY OF VANCOMYCIN: CPT | Performed by: FAMILY MEDICINE

## 2019-07-24 PROCEDURE — 80053 COMPREHEN METABOLIC PANEL: CPT | Performed by: FAMILY MEDICINE

## 2019-07-24 PROCEDURE — 85027 COMPLETE CBC AUTOMATED: CPT | Performed by: FAMILY MEDICINE

## 2019-07-24 PROCEDURE — 85025 COMPLETE CBC W/AUTO DIFF WBC: CPT | Performed by: INTERNAL MEDICINE

## 2019-07-24 PROCEDURE — 25010000002 DEXAMETHASONE PER 1 MG: Performed by: OTOLARYNGOLOGY

## 2019-07-24 PROCEDURE — 99232 SBSQ HOSP IP/OBS MODERATE 35: CPT | Performed by: OTOLARYNGOLOGY

## 2019-07-24 PROCEDURE — 99231 SBSQ HOSP IP/OBS SF/LOW 25: CPT | Performed by: FAMILY MEDICINE

## 2019-07-24 RX ORDER — ALPRAZOLAM 0.5 MG/1
0.5 TABLET ORAL EVERY 8 HOURS
Status: DISCONTINUED | OUTPATIENT
Start: 2019-07-25 | End: 2019-07-25

## 2019-07-24 RX ORDER — VANCOMYCIN HYDROCHLORIDE 1 G/200ML
1000 INJECTION, SOLUTION INTRAVENOUS EVERY 12 HOURS
Status: DISCONTINUED | OUTPATIENT
Start: 2019-07-24 | End: 2019-07-25

## 2019-07-24 RX ORDER — ALPRAZOLAM 0.5 MG/1
0.5 TABLET ORAL 3 TIMES DAILY
Status: DISCONTINUED | OUTPATIENT
Start: 2019-07-24 | End: 2019-07-24

## 2019-07-24 RX ORDER — HYDROCODONE BITARTRATE AND ACETAMINOPHEN 10; 325 MG/1; MG/1
1 TABLET ORAL 4 TIMES DAILY
Status: DISCONTINUED | OUTPATIENT
Start: 2019-07-24 | End: 2019-07-24

## 2019-07-24 RX ORDER — HYDROCODONE BITARTRATE AND ACETAMINOPHEN 10; 325 MG/1; MG/1
1 TABLET ORAL EVERY 6 HOURS
Status: DISCONTINUED | OUTPATIENT
Start: 2019-07-25 | End: 2019-07-27 | Stop reason: HOSPADM

## 2019-07-24 RX ADMIN — LINACLOTIDE 145 MCG: 145 CAPSULE, GELATIN COATED ORAL at 08:29

## 2019-07-24 RX ADMIN — POLYETHYLENE GLYCOL (3350) 17 G: 17 POWDER, FOR SOLUTION ORAL at 10:45

## 2019-07-24 RX ADMIN — CLINDAMYCIN IN 5 PERCENT DEXTROSE 900 MG: 18 INJECTION, SOLUTION INTRAVENOUS at 08:29

## 2019-07-24 RX ADMIN — GABAPENTIN 900 MG: 300 CAPSULE ORAL at 16:05

## 2019-07-24 RX ADMIN — DICYCLOMINE HYDROCHLORIDE 10 MG: 10 CAPSULE ORAL at 17:47

## 2019-07-24 RX ADMIN — MUPIROCIN: 20 OINTMENT TOPICAL at 10:46

## 2019-07-24 RX ADMIN — HYDROCODONE BITARTRATE AND ACETAMINOPHEN 1 TABLET: 10; 325 TABLET ORAL at 14:53

## 2019-07-24 RX ADMIN — SUCRALFATE 1 G: 1 SUSPENSION ORAL at 11:22

## 2019-07-24 RX ADMIN — ALPRAZOLAM 0.5 MG: 0.5 TABLET ORAL at 23:49

## 2019-07-24 RX ADMIN — ALPRAZOLAM 0.5 MG: 0.5 TABLET ORAL at 16:05

## 2019-07-24 RX ADMIN — HYDROCODONE BITARTRATE AND ACETAMINOPHEN 1 TABLET: 10; 325 TABLET ORAL at 20:40

## 2019-07-24 RX ADMIN — LEVOTHYROXINE SODIUM 100 MCG: 100 TABLET ORAL at 05:48

## 2019-07-24 RX ADMIN — GABAPENTIN 900 MG: 300 CAPSULE ORAL at 20:40

## 2019-07-24 RX ADMIN — GABAPENTIN 900 MG: 300 CAPSULE ORAL at 08:29

## 2019-07-24 RX ADMIN — ESCITALOPRAM 10 MG: 10 TABLET, FILM COATED ORAL at 08:29

## 2019-07-24 RX ADMIN — MUPIROCIN: 20 OINTMENT TOPICAL at 20:42

## 2019-07-24 RX ADMIN — SUCRALFATE 1 G: 1 SUSPENSION ORAL at 08:29

## 2019-07-24 RX ADMIN — DICYCLOMINE HYDROCHLORIDE 10 MG: 10 CAPSULE ORAL at 08:28

## 2019-07-24 RX ADMIN — VANCOMYCIN HYDROCHLORIDE 1000 MG: 1 INJECTION, SOLUTION INTRAVENOUS at 23:49

## 2019-07-24 RX ADMIN — POLYETHYLENE GLYCOL 3350 17 G: 17 POWDER, FOR SOLUTION ORAL at 10:47

## 2019-07-24 RX ADMIN — SUCRALFATE 1 G: 1 SUSPENSION ORAL at 17:47

## 2019-07-24 RX ADMIN — TIZANIDINE 4 MG: 4 TABLET ORAL at 20:41

## 2019-07-24 RX ADMIN — ALPRAZOLAM 0.5 MG: 0.5 TABLET ORAL at 08:36

## 2019-07-24 RX ADMIN — TIZANIDINE 4 MG: 4 TABLET ORAL at 16:05

## 2019-07-24 RX ADMIN — LINACLOTIDE 145 MCG: 145 CAPSULE, GELATIN COATED ORAL at 08:30

## 2019-07-24 RX ADMIN — DEXAMETHASONE SODIUM PHOSPHATE 10 MG: 4 INJECTION, SOLUTION INTRA-ARTICULAR; INTRALESIONAL; INTRAMUSCULAR; INTRAVENOUS; SOFT TISSUE at 00:35

## 2019-07-24 RX ADMIN — TIZANIDINE 4 MG: 4 TABLET ORAL at 08:29

## 2019-07-24 RX ADMIN — SODIUM CHLORIDE 75 ML/HR: 9 INJECTION, SOLUTION INTRAVENOUS at 07:41

## 2019-07-24 RX ADMIN — DICYCLOMINE HYDROCHLORIDE 10 MG: 10 CAPSULE ORAL at 11:22

## 2019-07-24 RX ADMIN — SODIUM CHLORIDE 75 ML/HR: 9 INJECTION, SOLUTION INTRAVENOUS at 21:52

## 2019-07-24 RX ADMIN — PANTOPRAZOLE SODIUM 40 MG: 40 TABLET, DELAYED RELEASE ORAL at 20:40

## 2019-07-24 RX ADMIN — CLINDAMYCIN IN 5 PERCENT DEXTROSE 900 MG: 18 INJECTION, SOLUTION INTRAVENOUS at 00:36

## 2019-07-24 RX ADMIN — DICYCLOMINE HYDROCHLORIDE 10 MG: 10 CAPSULE ORAL at 20:41

## 2019-07-24 RX ADMIN — PANTOPRAZOLE SODIUM 40 MG: 40 TABLET, DELAYED RELEASE ORAL at 08:29

## 2019-07-25 ENCOUNTER — TELEPHONE (OUTPATIENT)
Dept: PAIN MANAGEMENT | Age: 69
End: 2019-07-25

## 2019-07-25 LAB
ANION GAP SERPL CALCULATED.3IONS-SCNC: 5 MMOL/L (ref 4–13)
BUN BLD-MCNC: 10 MG/DL (ref 5–21)
BUN/CREAT SERPL: 20 (ref 7–25)
CALCIUM SPEC-SCNC: 8.7 MG/DL (ref 8.4–10.4)
CHLORIDE SERPL-SCNC: 110 MMOL/L (ref 98–110)
CO2 SERPL-SCNC: 26 MMOL/L (ref 24–31)
CREAT BLD-MCNC: 0.5 MG/DL (ref 0.5–1.4)
GFR SERPL CREATININE-BSD FRML MDRD: 122 ML/MIN/1.73
GLUCOSE BLD-MCNC: 100 MG/DL (ref 70–100)
POTASSIUM BLD-SCNC: 3.8 MMOL/L (ref 3.5–5.3)
SODIUM BLD-SCNC: 141 MMOL/L (ref 135–145)

## 2019-07-25 PROCEDURE — 99231 SBSQ HOSP IP/OBS SF/LOW 25: CPT | Performed by: FAMILY MEDICINE

## 2019-07-25 PROCEDURE — 25010000002 VANCOMYCIN PER 500 MG: Performed by: FAMILY MEDICINE

## 2019-07-25 PROCEDURE — 99232 SBSQ HOSP IP/OBS MODERATE 35: CPT | Performed by: OTOLARYNGOLOGY

## 2019-07-25 PROCEDURE — 80048 BASIC METABOLIC PNL TOTAL CA: CPT | Performed by: FAMILY MEDICINE

## 2019-07-25 RX ORDER — ALPRAZOLAM 0.5 MG/1
0.5 TABLET ORAL 3 TIMES DAILY
Status: DISCONTINUED | OUTPATIENT
Start: 2019-07-25 | End: 2019-07-27 | Stop reason: HOSPADM

## 2019-07-25 RX ORDER — MAGNESIUM CARB/ALUMINUM HYDROX 105-160MG
296 TABLET,CHEWABLE ORAL ONCE
Status: COMPLETED | OUTPATIENT
Start: 2019-07-25 | End: 2019-07-25

## 2019-07-25 RX ORDER — CLINDAMYCIN PHOSPHATE 600 MG/50ML
600 INJECTION INTRAVENOUS EVERY 8 HOURS
Status: DISCONTINUED | OUTPATIENT
Start: 2019-07-25 | End: 2019-07-27

## 2019-07-25 RX ORDER — TIZANIDINE 4 MG/1
4 TABLET ORAL 3 TIMES DAILY
Status: DISCONTINUED | OUTPATIENT
Start: 2019-07-25 | End: 2019-07-27 | Stop reason: HOSPADM

## 2019-07-25 RX ADMIN — HYDROCODONE BITARTRATE AND ACETAMINOPHEN 1 TABLET: 10; 325 TABLET ORAL at 15:10

## 2019-07-25 RX ADMIN — ALPRAZOLAM 0.5 MG: 0.5 TABLET ORAL at 15:10

## 2019-07-25 RX ADMIN — LINACLOTIDE 145 MCG: 145 CAPSULE, GELATIN COATED ORAL at 08:27

## 2019-07-25 RX ADMIN — GABAPENTIN 900 MG: 300 CAPSULE ORAL at 21:16

## 2019-07-25 RX ADMIN — POLYETHYLENE GLYCOL (3350) 17 G: 17 POWDER, FOR SOLUTION ORAL at 08:27

## 2019-07-25 RX ADMIN — GABAPENTIN 900 MG: 300 CAPSULE ORAL at 15:10

## 2019-07-25 RX ADMIN — PANTOPRAZOLE SODIUM 40 MG: 40 TABLET, DELAYED RELEASE ORAL at 21:17

## 2019-07-25 RX ADMIN — ALPRAZOLAM 0.5 MG: 0.5 TABLET ORAL at 08:28

## 2019-07-25 RX ADMIN — GABAPENTIN 900 MG: 300 CAPSULE ORAL at 08:27

## 2019-07-25 RX ADMIN — MUPIROCIN: 20 OINTMENT TOPICAL at 08:39

## 2019-07-25 RX ADMIN — MUPIROCIN: 20 OINTMENT TOPICAL at 21:21

## 2019-07-25 RX ADMIN — HYDROCODONE BITARTRATE AND ACETAMINOPHEN 1 TABLET: 10; 325 TABLET ORAL at 08:28

## 2019-07-25 RX ADMIN — DICYCLOMINE HYDROCHLORIDE 10 MG: 10 CAPSULE ORAL at 08:27

## 2019-07-25 RX ADMIN — CLINDAMYCIN IN 5 PERCENT DEXTROSE 600 MG: 12 INJECTION, SOLUTION INTRAVENOUS at 20:25

## 2019-07-25 RX ADMIN — SODIUM CHLORIDE 75 ML/HR: 9 INJECTION, SOLUTION INTRAVENOUS at 08:38

## 2019-07-25 RX ADMIN — LEVOTHYROXINE SODIUM 100 MCG: 100 TABLET ORAL at 05:09

## 2019-07-25 RX ADMIN — SODIUM CHLORIDE 75 ML/HR: 9 INJECTION, SOLUTION INTRAVENOUS at 19:57

## 2019-07-25 RX ADMIN — Medication 296 ML: at 18:39

## 2019-07-25 RX ADMIN — SUCRALFATE 1 G: 1 SUSPENSION ORAL at 08:33

## 2019-07-25 RX ADMIN — TIZANIDINE 4 MG: 4 TABLET ORAL at 08:27

## 2019-07-25 RX ADMIN — TIZANIDINE 4 MG: 4 TABLET ORAL at 21:16

## 2019-07-25 RX ADMIN — ESCITALOPRAM 10 MG: 10 TABLET, FILM COATED ORAL at 08:27

## 2019-07-25 RX ADMIN — VANCOMYCIN HYDROCHLORIDE 1000 MG: 1 INJECTION, SOLUTION INTRAVENOUS at 11:40

## 2019-07-25 RX ADMIN — TIZANIDINE 4 MG: 4 TABLET ORAL at 15:10

## 2019-07-25 RX ADMIN — HYDROCODONE BITARTRATE AND ACETAMINOPHEN 1 TABLET: 10; 325 TABLET ORAL at 03:53

## 2019-07-25 RX ADMIN — LINACLOTIDE 145 MCG: 145 CAPSULE, GELATIN COATED ORAL at 08:33

## 2019-07-25 RX ADMIN — PANTOPRAZOLE SODIUM 40 MG: 40 TABLET, DELAYED RELEASE ORAL at 08:32

## 2019-07-25 RX ADMIN — ALPRAZOLAM 0.5 MG: 0.5 TABLET ORAL at 21:16

## 2019-07-25 RX ADMIN — HYDROCODONE BITARTRATE AND ACETAMINOPHEN 1 TABLET: 10; 325 TABLET ORAL at 21:17

## 2019-07-26 PROBLEM — B37.0 THRUSH: Status: ACTIVE | Noted: 2019-07-26

## 2019-07-26 PROCEDURE — 99222 1ST HOSP IP/OBS MODERATE 55: CPT | Performed by: NURSE PRACTITIONER

## 2019-07-26 PROCEDURE — 99231 SBSQ HOSP IP/OBS SF/LOW 25: CPT | Performed by: FAMILY MEDICINE

## 2019-07-26 PROCEDURE — 99232 SBSQ HOSP IP/OBS MODERATE 35: CPT | Performed by: PHYSICIAN ASSISTANT

## 2019-07-26 RX ADMIN — DICYCLOMINE HYDROCHLORIDE 10 MG: 10 CAPSULE ORAL at 18:23

## 2019-07-26 RX ADMIN — LEVOTHYROXINE SODIUM 100 MCG: 100 TABLET ORAL at 06:01

## 2019-07-26 RX ADMIN — HYDROCODONE BITARTRATE AND ACETAMINOPHEN 1 TABLET: 10; 325 TABLET ORAL at 14:40

## 2019-07-26 RX ADMIN — TIZANIDINE 4 MG: 4 TABLET ORAL at 08:19

## 2019-07-26 RX ADMIN — DICYCLOMINE HYDROCHLORIDE 10 MG: 10 CAPSULE ORAL at 20:30

## 2019-07-26 RX ADMIN — SUCRALFATE 1 G: 1 SUSPENSION ORAL at 10:36

## 2019-07-26 RX ADMIN — MUPIROCIN: 20 OINTMENT TOPICAL at 20:33

## 2019-07-26 RX ADMIN — CLINDAMYCIN IN 5 PERCENT DEXTROSE 600 MG: 12 INJECTION, SOLUTION INTRAVENOUS at 20:32

## 2019-07-26 RX ADMIN — TIZANIDINE 4 MG: 4 TABLET ORAL at 21:56

## 2019-07-26 RX ADMIN — ALPRAZOLAM 0.5 MG: 0.5 TABLET ORAL at 08:19

## 2019-07-26 RX ADMIN — PANTOPRAZOLE SODIUM 40 MG: 40 TABLET, DELAYED RELEASE ORAL at 20:30

## 2019-07-26 RX ADMIN — TIZANIDINE 4 MG: 4 TABLET ORAL at 14:40

## 2019-07-26 RX ADMIN — NYSTATIN 500000 UNITS: 100000 SUSPENSION ORAL at 20:30

## 2019-07-26 RX ADMIN — HYDROCODONE BITARTRATE AND ACETAMINOPHEN 1 TABLET: 10; 325 TABLET ORAL at 08:19

## 2019-07-26 RX ADMIN — NYSTATIN 500000 UNITS: 100000 SUSPENSION ORAL at 18:23

## 2019-07-26 RX ADMIN — HYDROMORPHONE HYDROCHLORIDE 1 MG: 1 INJECTION, SOLUTION INTRAMUSCULAR; INTRAVENOUS; SUBCUTANEOUS at 22:11

## 2019-07-26 RX ADMIN — HYDROCODONE BITARTRATE AND ACETAMINOPHEN 1 TABLET: 10; 325 TABLET ORAL at 03:53

## 2019-07-26 RX ADMIN — HYDROCODONE BITARTRATE AND ACETAMINOPHEN 1 TABLET: 10; 325 TABLET ORAL at 20:31

## 2019-07-26 RX ADMIN — PANTOPRAZOLE SODIUM 40 MG: 40 TABLET, DELAYED RELEASE ORAL at 08:19

## 2019-07-26 RX ADMIN — HYDROMORPHONE HYDROCHLORIDE 1 MG: 1 INJECTION, SOLUTION INTRAMUSCULAR; INTRAVENOUS; SUBCUTANEOUS at 10:20

## 2019-07-26 RX ADMIN — SUCRALFATE 1 G: 1 SUSPENSION ORAL at 06:42

## 2019-07-26 RX ADMIN — GABAPENTIN 900 MG: 300 CAPSULE ORAL at 16:54

## 2019-07-26 RX ADMIN — GABAPENTIN 900 MG: 300 CAPSULE ORAL at 08:19

## 2019-07-26 RX ADMIN — ALPRAZOLAM 0.5 MG: 0.5 TABLET ORAL at 14:40

## 2019-07-26 RX ADMIN — NYSTATIN 500000 UNITS: 100000 SUSPENSION ORAL at 12:28

## 2019-07-26 RX ADMIN — DICYCLOMINE HYDROCHLORIDE 10 MG: 10 CAPSULE ORAL at 12:28

## 2019-07-26 RX ADMIN — DICYCLOMINE HYDROCHLORIDE 10 MG: 10 CAPSULE ORAL at 08:19

## 2019-07-26 RX ADMIN — LINACLOTIDE 145 MCG: 145 CAPSULE, GELATIN COATED ORAL at 06:42

## 2019-07-26 RX ADMIN — MUPIROCIN: 20 OINTMENT TOPICAL at 08:20

## 2019-07-26 RX ADMIN — SODIUM CHLORIDE 50 ML/HR: 9 INJECTION, SOLUTION INTRAVENOUS at 10:34

## 2019-07-26 RX ADMIN — CLINDAMYCIN IN 5 PERCENT DEXTROSE 600 MG: 12 INJECTION, SOLUTION INTRAVENOUS at 03:52

## 2019-07-26 RX ADMIN — SUCRALFATE 1 G: 1 SUSPENSION ORAL at 18:23

## 2019-07-26 RX ADMIN — GABAPENTIN 900 MG: 300 CAPSULE ORAL at 20:30

## 2019-07-26 RX ADMIN — CLINDAMYCIN IN 5 PERCENT DEXTROSE 600 MG: 12 INJECTION, SOLUTION INTRAVENOUS at 12:32

## 2019-07-26 RX ADMIN — ALPRAZOLAM 0.5 MG: 0.5 TABLET ORAL at 21:56

## 2019-07-26 RX ADMIN — ESCITALOPRAM 10 MG: 10 TABLET, FILM COATED ORAL at 08:19

## 2019-07-27 VITALS
OXYGEN SATURATION: 95 % | BODY MASS INDEX: 21.24 KG/M2 | RESPIRATION RATE: 16 BRPM | TEMPERATURE: 98.2 F | DIASTOLIC BLOOD PRESSURE: 41 MMHG | WEIGHT: 132.2 LBS | HEIGHT: 66 IN | SYSTOLIC BLOOD PRESSURE: 103 MMHG | HEART RATE: 64 BPM

## 2019-07-27 LAB
ALBUMIN SERPL-MCNC: 2.8 G/DL (ref 3.5–5)
ALBUMIN/GLOB SERPL: 1.1 G/DL (ref 1.1–2.5)
ALP SERPL-CCNC: 48 U/L (ref 24–120)
ALT SERPL W P-5'-P-CCNC: 22 U/L (ref 0–54)
ANION GAP SERPL CALCULATED.3IONS-SCNC: 4 MMOL/L (ref 4–13)
AST SERPL-CCNC: 12 U/L (ref 7–45)
BILIRUB SERPL-MCNC: <0.1 MG/DL (ref 0.1–1)
BUN BLD-MCNC: 12 MG/DL (ref 5–21)
BUN/CREAT SERPL: 16.9 (ref 7–25)
CALCIUM SPEC-SCNC: 8.2 MG/DL (ref 8.4–10.4)
CHLORIDE SERPL-SCNC: 105 MMOL/L (ref 98–110)
CO2 SERPL-SCNC: 29 MMOL/L (ref 24–31)
CREAT BLD-MCNC: 0.71 MG/DL (ref 0.5–1.4)
DEPRECATED RDW RBC AUTO: 43 FL (ref 40–54)
ERYTHROCYTE [DISTWIDTH] IN BLOOD BY AUTOMATED COUNT: 12.5 % (ref 12–15)
GFR SERPL CREATININE-BSD FRML MDRD: 81 ML/MIN/1.73
GLOBULIN UR ELPH-MCNC: 2.5 GM/DL
GLUCOSE BLD-MCNC: 81 MG/DL (ref 70–100)
HCT VFR BLD AUTO: 32.2 % (ref 37–47)
HGB BLD-MCNC: 10.3 G/DL (ref 12–16)
MCH RBC QN AUTO: 29.8 PG (ref 28–32)
MCHC RBC AUTO-ENTMCNC: 32 G/DL (ref 33–36)
MCV RBC AUTO: 93.1 FL (ref 82–98)
PLATELET # BLD AUTO: 114 10*3/MM3 (ref 130–400)
PMV BLD AUTO: 13.6 FL (ref 6–12)
POTASSIUM BLD-SCNC: 4.2 MMOL/L (ref 3.5–5.3)
PROT SERPL-MCNC: 5.3 G/DL (ref 6.3–8.7)
RBC # BLD AUTO: 3.46 10*6/MM3 (ref 4.2–5.4)
SODIUM BLD-SCNC: 138 MMOL/L (ref 135–145)
WBC NRBC COR # BLD: 6.43 10*3/MM3 (ref 4.8–10.8)

## 2019-07-27 PROCEDURE — 85027 COMPLETE CBC AUTOMATED: CPT | Performed by: FAMILY MEDICINE

## 2019-07-27 PROCEDURE — 80053 COMPREHEN METABOLIC PANEL: CPT | Performed by: FAMILY MEDICINE

## 2019-07-27 PROCEDURE — 99231 SBSQ HOSP IP/OBS SF/LOW 25: CPT | Performed by: OTOLARYNGOLOGY

## 2019-07-27 PROCEDURE — 99238 HOSP IP/OBS DSCHRG MGMT 30/<: CPT | Performed by: FAMILY MEDICINE

## 2019-07-27 RX ORDER — CLINDAMYCIN HYDROCHLORIDE 150 MG/1
450 CAPSULE ORAL EVERY 6 HOURS SCHEDULED
Qty: 21 CAPSULE | Refills: 0 | Status: SHIPPED | OUTPATIENT
Start: 2019-07-27 | End: 2019-07-29

## 2019-07-27 RX ORDER — CLINDAMYCIN HYDROCHLORIDE 150 MG/1
450 CAPSULE ORAL EVERY 6 HOURS SCHEDULED
Status: DISCONTINUED | OUTPATIENT
Start: 2019-07-27 | End: 2019-07-27 | Stop reason: HOSPADM

## 2019-07-27 RX ADMIN — HYDROCODONE BITARTRATE AND ACETAMINOPHEN 1 TABLET: 10; 325 TABLET ORAL at 14:35

## 2019-07-27 RX ADMIN — CLINDAMYCIN IN 5 PERCENT DEXTROSE 600 MG: 12 INJECTION, SOLUTION INTRAVENOUS at 03:09

## 2019-07-27 RX ADMIN — HYDROCODONE BITARTRATE AND ACETAMINOPHEN 1 TABLET: 10; 325 TABLET ORAL at 03:08

## 2019-07-27 RX ADMIN — HYDROMORPHONE HYDROCHLORIDE 1 MG: 1 INJECTION, SOLUTION INTRAMUSCULAR; INTRAVENOUS; SUBCUTANEOUS at 12:05

## 2019-07-27 RX ADMIN — TIZANIDINE 4 MG: 4 TABLET ORAL at 14:35

## 2019-07-27 RX ADMIN — GABAPENTIN 900 MG: 300 CAPSULE ORAL at 08:36

## 2019-07-27 RX ADMIN — LINACLOTIDE 145 MCG: 145 CAPSULE, GELATIN COATED ORAL at 08:38

## 2019-07-27 RX ADMIN — DICYCLOMINE HYDROCHLORIDE 10 MG: 10 CAPSULE ORAL at 12:05

## 2019-07-27 RX ADMIN — SUCRALFATE 1 G: 1 SUSPENSION ORAL at 12:05

## 2019-07-27 RX ADMIN — LEVOTHYROXINE SODIUM 100 MCG: 100 TABLET ORAL at 06:58

## 2019-07-27 RX ADMIN — SUCRALFATE 1 G: 1 SUSPENSION ORAL at 08:36

## 2019-07-27 RX ADMIN — GABAPENTIN 900 MG: 300 CAPSULE ORAL at 16:35

## 2019-07-27 RX ADMIN — ALPRAZOLAM 0.5 MG: 0.5 TABLET ORAL at 08:36

## 2019-07-27 RX ADMIN — HYDROCODONE BITARTRATE AND ACETAMINOPHEN 1 TABLET: 10; 325 TABLET ORAL at 08:36

## 2019-07-27 RX ADMIN — TIZANIDINE 4 MG: 4 TABLET ORAL at 08:36

## 2019-07-27 RX ADMIN — LINACLOTIDE 145 MCG: 145 CAPSULE, GELATIN COATED ORAL at 08:37

## 2019-07-27 RX ADMIN — PANTOPRAZOLE SODIUM 40 MG: 40 TABLET, DELAYED RELEASE ORAL at 08:36

## 2019-07-27 RX ADMIN — CLINDAMYCIN HYDROCHLORIDE 450 MG: 150 CAPSULE ORAL at 12:30

## 2019-07-27 RX ADMIN — POLYETHYLENE GLYCOL (3350) 17 G: 17 POWDER, FOR SOLUTION ORAL at 08:36

## 2019-07-27 RX ADMIN — NYSTATIN 500000 UNITS: 100000 SUSPENSION ORAL at 12:06

## 2019-07-27 RX ADMIN — ALPRAZOLAM 0.5 MG: 0.5 TABLET ORAL at 14:35

## 2019-07-27 RX ADMIN — SODIUM CHLORIDE 50 ML/HR: 9 INJECTION, SOLUTION INTRAVENOUS at 08:38

## 2019-07-27 RX ADMIN — MUPIROCIN: 20 OINTMENT TOPICAL at 08:41

## 2019-07-27 RX ADMIN — DICYCLOMINE HYDROCHLORIDE 10 MG: 10 CAPSULE ORAL at 08:36

## 2019-07-27 RX ADMIN — DIPHENHYDRAMINE HYDROCHLORIDE 30 ML: 12.5 SOLUTION ORAL at 16:35

## 2019-07-27 RX ADMIN — NYSTATIN 500000 UNITS: 100000 SUSPENSION ORAL at 08:36

## 2019-07-27 RX ADMIN — ESCITALOPRAM 10 MG: 10 TABLET, FILM COATED ORAL at 08:36

## 2019-07-28 ENCOUNTER — READMISSION MANAGEMENT (OUTPATIENT)
Dept: CALL CENTER | Facility: HOSPITAL | Age: 69
End: 2019-07-28

## 2019-07-28 NOTE — OUTREACH NOTE
Prep Survey      Responses   Facility patient discharged from?  [facility]   Is patient eligible?  Yes   Discharge diagnosis  cellulitis of face,  thrush   Does the patient have one of the following disease processes/diagnoses(primary or secondary)?  Other   Does the patient have Home health ordered?  No   Is there a DME ordered?  No   Comments regarding appointments  see AVS   Prep survey completed?  Yes          Mary Ann Maya RN

## 2019-07-29 ENCOUNTER — READMISSION MANAGEMENT (OUTPATIENT)
Dept: CALL CENTER | Facility: HOSPITAL | Age: 69
End: 2019-07-29

## 2019-07-29 ENCOUNTER — NURSE TRIAGE (OUTPATIENT)
Dept: CALL CENTER | Facility: HOSPITAL | Age: 69
End: 2019-07-29

## 2019-07-29 NOTE — TELEPHONE ENCOUNTER
"Transferred call to [facility] department    Reason for Disposition  • Health Information question, no triage required and triager able to answer question    Additional Information  • Negative: [1] Caller is not with the adult (patient) AND [2] reporting urgent symptoms  • Negative: Lab result questions  • Negative: Medication questions  • Negative: Caller cannot be reached by phone  • Negative: Caller has already spoken to PCP or another triager  • Negative: RN needs further essential information from caller in order to complete triage  • Negative: Requesting regular office appointment  • Negative: [1] Caller requesting NON-URGENT health information AND [2] PCP's office is the best resource    Answer Assessment - Initial Assessment Questions  1. REASON FOR CALL or QUESTION: \"What is your reason for calling today?\" or \"How can I best help you?\" or \"What question do you have that I can help answer?\"      Request medical records    Protocols used: INFORMATION ONLY CALL-ADULT-      "

## 2019-07-29 NOTE — OUTREACH NOTE
Medical Week 1 Survey      Responses   Facility patient discharged from?  [place] [facility]   Does the patient have one of the following disease processes/diagnoses(primary or secondary)?  Other   Is there a successful TCM telephone encounter documented?  No   Week 1 attempt successful?  No   Unsuccessful attempts  Attempt 1          Ruby Peralta RN

## 2019-07-30 ENCOUNTER — READMISSION MANAGEMENT (OUTPATIENT)
Dept: CALL CENTER | Facility: HOSPITAL | Age: 69
End: 2019-07-30

## 2019-07-30 NOTE — OUTREACH NOTE
Medical Week 1 Survey      Responses   Facility patient discharged from?  [place]   Does the patient have one of the following disease processes/diagnoses(primary or secondary)?  Other   Is there a successful TCM telephone encounter documented?  No   Week 1 attempt successful?  Yes   Call start time  1149   Call end time  1158   Discharge diagnosis  cellulitis of face,  thrush   Is patient permission given to speak with other caregiver?  No   Meds reviewed with patient/caregiver?  Yes   Is the patient having any side effects they believe may be caused by any medication additions or changes?  Yes   Side effects comments   Bactroban makes her face burn worse, had to stop using it   Does the patient have all medications ordered at discharge?  Yes   Is the patient taking all medications as directed (includes completed medication regime)?  Yes   Comments regarding appointments  see AVS   Does the patient have a primary care provider?   Yes   Does the patient have an appointment with their PCP within 7 days of discharge?  Yes   Has the patient kept scheduled appointments due by today?  N/A   Has home health visited the patient within 72 hours of discharge?  N/A   Psychosocial issues?  No   Did the patient receive a copy of their discharge instructions?  Yes   Nursing interventions  Reviewed instructions with patient   What is the patient's perception of their health status since discharge?  Returned to baseline/stable   Is the patient/caregiver able to teach back signs and symptoms related to disease process for when to call PCP?  Yes   Is the patient/caregiver able to teach back signs and symptoms related to disease process for when to call 911?  Yes   Is the patient/caregiver able to teach back the hierarchy of who to call/visit for symptoms/problems? PCP, Specialist, Home health nurse, Urgent Care, ED, 911  Yes   Week 1 call completed?  Yes   Wrap up additional comments  Thrush is improved but still present. Face is  still red but not as red. Had to stop using bactroban ointment due to burning sensation. It broke her face out.  Temp 99.  Leg edema is decreased but still present. States she will keep her appts.           Megha Martins RN

## 2019-08-01 ENCOUNTER — HOSPITAL ENCOUNTER (OUTPATIENT)
Dept: PAIN MANAGEMENT | Age: 69
Discharge: HOME | End: 2019-08-01
Payer: MEDICARE

## 2019-08-01 VITALS
DIASTOLIC BLOOD PRESSURE: 76 MMHG | HEIGHT: 66 IN | HEART RATE: 61 BPM | BODY MASS INDEX: 21.41 KG/M2 | WEIGHT: 133.25 LBS | TEMPERATURE: 97.8 F | SYSTOLIC BLOOD PRESSURE: 157 MMHG | OXYGEN SATURATION: 99 %

## 2019-08-01 DIAGNOSIS — G89.29 CHRONIC PAIN OF BOTH KNEES: ICD-10-CM

## 2019-08-01 DIAGNOSIS — M25.562 CHRONIC PAIN OF BOTH KNEES: ICD-10-CM

## 2019-08-01 DIAGNOSIS — M54.50 CHRONIC BILATERAL LOW BACK PAIN WITHOUT SCIATICA: ICD-10-CM

## 2019-08-01 DIAGNOSIS — G89.29 CHRONIC BILATERAL LOW BACK PAIN WITHOUT SCIATICA: ICD-10-CM

## 2019-08-01 DIAGNOSIS — M25.561 CHRONIC PAIN OF BOTH KNEES: ICD-10-CM

## 2019-08-01 DIAGNOSIS — M51.16 LUMBAR DISC DISEASE WITH RADICULOPATHY: ICD-10-CM

## 2019-08-01 PROCEDURE — 99213 OFFICE O/P EST LOW 20 MIN: CPT | Performed by: NURSE PRACTITIONER

## 2019-08-01 PROCEDURE — 99213 OFFICE O/P EST LOW 20 MIN: CPT

## 2019-08-01 RX ORDER — OXYCODONE AND ACETAMINOPHEN 10; 325 MG/1; MG/1
1 TABLET ORAL EVERY 6 HOURS PRN
Qty: 12 TABLET | Refills: 0 | Status: SHIPPED | OUTPATIENT
Start: 2019-08-01 | End: 2019-08-01 | Stop reason: SDUPTHER

## 2019-08-01 ASSESSMENT — PAIN DESCRIPTION - DIRECTION: RADIATING_TOWARDS: INTO LEFT LOWER EXTREMITY

## 2019-08-01 ASSESSMENT — PAIN DESCRIPTION - ONSET: ONSET: ON-GOING

## 2019-08-01 ASSESSMENT — PAIN DESCRIPTION - FREQUENCY: FREQUENCY: INTERMITTENT

## 2019-08-01 ASSESSMENT — PAIN DESCRIPTION - LOCATION: LOCATION: BACK

## 2019-08-01 ASSESSMENT — ENCOUNTER SYMPTOMS
DIARRHEA: 1
SINUS PRESSURE: 1
BACK PAIN: 1
SINUS PAIN: 1
CONSTIPATION: 0

## 2019-08-01 ASSESSMENT — PAIN DESCRIPTION - ORIENTATION: ORIENTATION: LOWER

## 2019-08-01 ASSESSMENT — PAIN DESCRIPTION - PROGRESSION: CLINICAL_PROGRESSION: NOT CHANGED

## 2019-08-01 ASSESSMENT — PAIN - FUNCTIONAL ASSESSMENT: PAIN_FUNCTIONAL_ASSESSMENT: PREVENTS OR INTERFERES SOME ACTIVE ACTIVITIES AND ADLS

## 2019-08-01 ASSESSMENT — PAIN DESCRIPTION - PAIN TYPE: TYPE: CHRONIC PAIN

## 2019-08-01 ASSESSMENT — PAIN DESCRIPTION - DESCRIPTORS: DESCRIPTORS: TIGHTNESS;SQUEEZING

## 2019-08-01 ASSESSMENT — ACTIVITIES OF DAILY LIVING (ADL): EFFECT OF PAIN ON DAILY ACTIVITIES: LIMITS ACTIVITY

## 2019-08-01 ASSESSMENT — PAIN SCALES - GENERAL: PAINLEVEL_OUTOF10: 9

## 2019-08-01 NOTE — PROGRESS NOTES
pantoprazole (PROTONIX) 40 MG tablet       nystatin (MYCOSTATIN) 228026 UNIT/ML suspension       promethazine (PHENERGAN) 25 MG tablet       sucralfate (CARAFATE) 1 GM/10ML suspension Take 1 g by mouth 4 times daily      ALPRAZolam (XANAX) 0.5 MG tablet Take 0.5 mg by mouth 2 times daily as needed. Ximena Gardner levothyroxine (SYNTHROID) 100 MCG tablet Take 100 mcg by mouth daily       No current facility-administered medications for this encounter. Review of Systems:  Review of Systems   Constitutional: Negative for activity change. HENT: Positive for facial swelling, sinus pressure and sinus pain. Gastrointestinal: Positive for diarrhea. Negative for constipation. Musculoskeletal: Positive for arthralgias, back pain, myalgias and neck stiffness. Negative for neck pain. Neurological: Positive for headaches. Negative for weakness and numbness. Psychiatric/Behavioral: Negative for agitation, self-injury and suicidal ideas. The patient is not nervous/anxious. 14 point ROS negative besides that noted in HPI    Physical Exam:    Vitals:    08/01/19 1427   BP: (!) 157/76   Pulse: 61   Temp: 97.8 °F (36.6 °C)   TempSrc: Temporal   SpO2: 99%   Weight: 133 lb 4 oz (60.4 kg)   Height: 5' 6\" (1.676 m)       Body mass index is 21.51 kg/m². Physical Exam   Constitutional: She is oriented to person, place, and time. She appears well-developed and well-nourished. No distress. HENT:   Head: Normocephalic. Right Ear: External ear normal.   Left Ear: External ear normal.   Nose: Nose normal.   Eyes: Pupils are equal, round, and reactive to light. Conjunctivae and EOM are normal.   Neck: Normal range of motion. Neck supple. Cardiovascular: Normal rate and regular rhythm. Pulmonary/Chest: Effort normal and breath sounds normal.   Abdominal: Soft. Bowel sounds are normal. There is no hepatosplenomegaly. Musculoskeletal:        Lumbar back: She exhibits tenderness, pain and spasm.    Neurological: She

## 2019-08-02 RX ORDER — OXYCODONE AND ACETAMINOPHEN 10; 325 MG/1; MG/1
1 TABLET ORAL EVERY 6 HOURS PRN
Qty: 120 TABLET | Refills: 0 | Status: SHIPPED | OUTPATIENT
Start: 2019-08-05 | End: 2019-08-26 | Stop reason: SDUPTHER

## 2019-08-06 ENCOUNTER — READMISSION MANAGEMENT (OUTPATIENT)
Dept: CALL CENTER | Facility: HOSPITAL | Age: 69
End: 2019-08-06

## 2019-08-07 ENCOUNTER — READMISSION MANAGEMENT (OUTPATIENT)
Dept: CALL CENTER | Facility: HOSPITAL | Age: 69
End: 2019-08-07

## 2019-08-07 NOTE — OUTREACH NOTE
Medical Week 2 Survey      Responses   Facility patient discharged from?  [place]   Does the patient have one of the following disease processes/diagnoses(primary or secondary)?  Other   Week 2 attempt successful?  Yes   Call start time  1321   Discharge diagnosis  cellulitis of face,  thrush   Call end time  1326   Meds reviewed with patient/caregiver?  Yes   Is the patient having any side effects they believe may be caused by any medication additions or changes?  Yes   Side effects comments   Vaginal yeast infection   Does the patient have all medications ordered at discharge?  Yes   Is the patient taking all medications as directed (includes completed medication regime)?  Yes   Does the patient have a primary care provider?   Yes   Has the patient kept scheduled appointments due by today?  Yes   Comments  Will also f/u with ENT 8/14   Psychosocial issues?  No   Did the patient receive a copy of their discharge instructions?  Yes   Nursing interventions  Reviewed instructions with patient   What is the patient's perception of their health status since discharge?  Same   Week 2 Call Completed?  Yes   Wrap up additional comments  Still with low grade fever, swelling and pain in both ears          Flora Bills RN

## 2019-08-11 ASSESSMENT — ENCOUNTER SYMPTOMS: FACIAL SWELLING: 1

## 2019-08-15 ENCOUNTER — READMISSION MANAGEMENT (OUTPATIENT)
Dept: CALL CENTER | Facility: HOSPITAL | Age: 69
End: 2019-08-15

## 2019-08-15 NOTE — OUTREACH NOTE
Medical Week 3 Survey      Responses   Facility patient discharged from?  [place]   Does the patient have one of the following disease processes/diagnoses(primary or secondary)?  Other   Week 3 attempt successful?  No   Unsuccessful attempts  Attempt 1          Ximena Dale RN

## 2019-08-16 ENCOUNTER — READMISSION MANAGEMENT (OUTPATIENT)
Dept: CALL CENTER | Facility: HOSPITAL | Age: 69
End: 2019-08-16

## 2019-08-16 NOTE — OUTREACH NOTE
Medical Week 3 Survey      Responses   Facility patient discharged from?  [place]   Does the patient have one of the following disease processes/diagnoses(primary or secondary)?  Other   Week 3 attempt successful?  Yes   Call start time  1505   Call end time  1510   Medication alerts for this patient  no changes in mediction   Meds reviewed with patient/caregiver?  Yes   Is the patient taking all medications as directed (includes completed medication regime)?  Yes   Comments regarding appointments  saw ENT who cleaned her ears and informed her the swelling will decrease   Has the patient kept scheduled appointments due by today?  Yes   What is the patient's perception of their health status since discharge?  Same [has chronic nausea, was told to stay out of sun and heat]   Week 3 Call Completed?  Yes   Wrap up additional comments  still has swelling of face           Ximena Dale RN

## 2019-08-19 DIAGNOSIS — M51.16 LUMBAR DISC DISEASE WITH RADICULOPATHY: ICD-10-CM

## 2019-08-19 RX ORDER — TIZANIDINE 4 MG/1
4 TABLET ORAL EVERY 8 HOURS PRN
Qty: 90 TABLET | Refills: 2 | Status: SHIPPED | OUTPATIENT
Start: 2019-08-19 | End: 2019-11-04 | Stop reason: SDUPTHER

## 2019-08-23 ENCOUNTER — READMISSION MANAGEMENT (OUTPATIENT)
Dept: CALL CENTER | Facility: HOSPITAL | Age: 69
End: 2019-08-23

## 2019-08-23 NOTE — OUTREACH NOTE
Medical Week 4 Survey      Responses   Facility patient discharged from?  [place]   Does the patient have one of the following disease processes/diagnoses(primary or secondary)?  Other   Week 4 attempt successful?  No          Candice Liu RN

## 2019-08-26 DIAGNOSIS — M54.50 CHRONIC BILATERAL LOW BACK PAIN WITHOUT SCIATICA: ICD-10-CM

## 2019-08-26 DIAGNOSIS — G89.29 CHRONIC BILATERAL LOW BACK PAIN WITHOUT SCIATICA: ICD-10-CM

## 2019-08-27 DIAGNOSIS — M51.16 LUMBAR DISC DISEASE WITH RADICULOPATHY: ICD-10-CM

## 2019-08-27 RX ORDER — OXYCODONE AND ACETAMINOPHEN 10; 325 MG/1; MG/1
1 TABLET ORAL EVERY 6 HOURS PRN
Qty: 120 TABLET | Refills: 0 | Status: SHIPPED | OUTPATIENT
Start: 2019-09-04 | End: 2019-10-03 | Stop reason: SDUPTHER

## 2019-08-28 RX ORDER — GABAPENTIN 600 MG/1
900 TABLET ORAL 3 TIMES DAILY
Qty: 135 TABLET | Refills: 2 | Status: SHIPPED | OUTPATIENT
Start: 2019-08-28 | End: 2019-11-04 | Stop reason: SDUPTHER

## 2019-09-04 ENCOUNTER — TELEPHONE (OUTPATIENT)
Dept: PAIN MANAGEMENT | Age: 69
End: 2019-09-04

## 2019-09-09 ENCOUNTER — HOSPITAL ENCOUNTER (OUTPATIENT)
Dept: PAIN MANAGEMENT | Age: 69
Discharge: HOME | End: 2019-09-09
Payer: MEDICARE

## 2019-09-09 VITALS
SYSTOLIC BLOOD PRESSURE: 110 MMHG | RESPIRATION RATE: 18 BRPM | HEART RATE: 63 BPM | DIASTOLIC BLOOD PRESSURE: 57 MMHG | WEIGHT: 132.5 LBS | OXYGEN SATURATION: 96 % | HEIGHT: 66 IN | BODY MASS INDEX: 21.29 KG/M2 | TEMPERATURE: 96.9 F

## 2019-09-09 DIAGNOSIS — M25.561 CHRONIC PAIN OF BOTH KNEES: ICD-10-CM

## 2019-09-09 DIAGNOSIS — G89.29 CHRONIC BILATERAL LOW BACK PAIN WITHOUT SCIATICA: ICD-10-CM

## 2019-09-09 DIAGNOSIS — M54.50 CHRONIC BILATERAL LOW BACK PAIN WITHOUT SCIATICA: ICD-10-CM

## 2019-09-09 DIAGNOSIS — G89.29 CHRONIC PAIN OF BOTH KNEES: ICD-10-CM

## 2019-09-09 DIAGNOSIS — M25.562 CHRONIC PAIN OF BOTH KNEES: ICD-10-CM

## 2019-09-09 DIAGNOSIS — M51.16 LUMBAR DISC DISEASE WITH RADICULOPATHY: ICD-10-CM

## 2019-09-09 PROCEDURE — 99213 OFFICE O/P EST LOW 20 MIN: CPT

## 2019-09-09 PROCEDURE — 99213 OFFICE O/P EST LOW 20 MIN: CPT | Performed by: NURSE PRACTITIONER

## 2019-09-09 RX ORDER — HYDROCODONE BITARTRATE AND ACETAMINOPHEN 10; 325 MG/1; MG/1
1 TABLET ORAL EVERY 6 HOURS PRN
Qty: 120 TABLET | Refills: 0 | Status: SHIPPED | OUTPATIENT
Start: 2019-09-09 | End: 2019-10-07 | Stop reason: SDUPTHER

## 2019-09-09 ASSESSMENT — ENCOUNTER SYMPTOMS
FACIAL SWELLING: 1
BACK PAIN: 1
SINUS PAIN: 1
NAUSEA: 1
SINUS PRESSURE: 1
VOMITING: 1
CONSTIPATION: 0

## 2019-09-09 ASSESSMENT — PAIN DESCRIPTION - PROGRESSION: CLINICAL_PROGRESSION: NOT CHANGED

## 2019-09-09 ASSESSMENT — PAIN DESCRIPTION - FREQUENCY: FREQUENCY: CONTINUOUS

## 2019-09-09 ASSESSMENT — PAIN DESCRIPTION - PAIN TYPE: TYPE: CHRONIC PAIN

## 2019-09-09 ASSESSMENT — ACTIVITIES OF DAILY LIVING (ADL): EFFECT OF PAIN ON DAILY ACTIVITIES: LIMITS ACTIVITY

## 2019-09-09 ASSESSMENT — PAIN - FUNCTIONAL ASSESSMENT: PAIN_FUNCTIONAL_ASSESSMENT: PREVENTS OR INTERFERES SOME ACTIVE ACTIVITIES AND ADLS

## 2019-09-09 ASSESSMENT — PAIN DESCRIPTION - ORIENTATION: ORIENTATION: LOWER

## 2019-09-09 ASSESSMENT — PAIN DESCRIPTION - LOCATION: LOCATION: BACK

## 2019-09-09 ASSESSMENT — PAIN DESCRIPTION - ONSET: ONSET: ON-GOING

## 2019-09-09 ASSESSMENT — PAIN SCALES - GENERAL: PAINLEVEL_OUTOF10: 6

## 2019-09-09 NOTE — PROGRESS NOTES
Ordered [x] Prescription Ordered/Changed [] Blood Thinner Request Form  [] Obtained Test Results / Consult Notes  [] UDS due at next visit, verified per EPIC      [] Suspected Physical Abuse or Suicide Risk assessed - IF YES COMPLETE QUESTIONS BELOW    If any of the following questions are answered yes - contact attending physician for referral:    Has been considering harming self to escape stress, pain problems? [] YES  [x] NO  Has a suicide plan? [] YES  [x] NO  Has attempted suicide in the past?   [] YES  [x] NO  Has a close friend or family member who committed suicide?   [] YES  [x] NO    Assessment Completed by:  Electronically signed by Nita Ferreira RN on 9/9/2019 at 9:39 AM

## 2019-10-03 ENCOUNTER — TELEPHONE (OUTPATIENT)
Dept: PAIN MANAGEMENT | Age: 69
End: 2019-10-03

## 2019-10-03 DIAGNOSIS — G89.29 CHRONIC BILATERAL LOW BACK PAIN WITHOUT SCIATICA: ICD-10-CM

## 2019-10-03 DIAGNOSIS — M54.50 CHRONIC BILATERAL LOW BACK PAIN WITHOUT SCIATICA: ICD-10-CM

## 2019-10-04 RX ORDER — OXYCODONE AND ACETAMINOPHEN 10; 325 MG/1; MG/1
1 TABLET ORAL EVERY 6 HOURS PRN
Qty: 120 TABLET | Refills: 0 | Status: SHIPPED | OUTPATIENT
Start: 2019-10-04 | End: 2019-10-07 | Stop reason: CLARIF

## 2019-10-07 DIAGNOSIS — M51.16 LUMBAR DISC DISEASE WITH RADICULOPATHY: ICD-10-CM

## 2019-10-07 RX ORDER — HYDROCODONE BITARTRATE AND ACETAMINOPHEN 10; 325 MG/1; MG/1
1 TABLET ORAL EVERY 6 HOURS PRN
Qty: 120 TABLET | Refills: 0 | Status: SHIPPED | OUTPATIENT
Start: 2019-10-09 | End: 2019-11-04 | Stop reason: SDUPTHER

## 2019-11-04 ENCOUNTER — HOSPITAL ENCOUNTER (OUTPATIENT)
Dept: PAIN MANAGEMENT | Age: 69
Discharge: HOME | End: 2019-11-04
Payer: MEDICARE

## 2019-11-04 VITALS
RESPIRATION RATE: 18 BRPM | BODY MASS INDEX: 21.56 KG/M2 | SYSTOLIC BLOOD PRESSURE: 110 MMHG | WEIGHT: 134.13 LBS | TEMPERATURE: 97.8 F | OXYGEN SATURATION: 96 % | DIASTOLIC BLOOD PRESSURE: 60 MMHG | HEART RATE: 61 BPM | HEIGHT: 66 IN

## 2019-11-04 DIAGNOSIS — M25.561 CHRONIC PAIN OF BOTH KNEES: ICD-10-CM

## 2019-11-04 DIAGNOSIS — M25.562 CHRONIC PAIN OF BOTH KNEES: ICD-10-CM

## 2019-11-04 DIAGNOSIS — M54.50 CHRONIC BILATERAL LOW BACK PAIN WITHOUT SCIATICA: ICD-10-CM

## 2019-11-04 DIAGNOSIS — M51.16 LUMBAR DISC DISEASE WITH RADICULOPATHY: ICD-10-CM

## 2019-11-04 DIAGNOSIS — G89.29 CHRONIC PAIN OF BOTH KNEES: ICD-10-CM

## 2019-11-04 DIAGNOSIS — G89.29 CHRONIC BILATERAL LOW BACK PAIN WITHOUT SCIATICA: ICD-10-CM

## 2019-11-04 PROCEDURE — 99213 OFFICE O/P EST LOW 20 MIN: CPT | Performed by: NURSE PRACTITIONER

## 2019-11-04 PROCEDURE — 99215 OFFICE O/P EST HI 40 MIN: CPT

## 2019-11-04 RX ORDER — GABAPENTIN 600 MG/1
900 TABLET ORAL 3 TIMES DAILY
Qty: 135 TABLET | Refills: 2 | Status: SHIPPED | OUTPATIENT
Start: 2019-11-04 | End: 2020-02-04 | Stop reason: SDUPTHER

## 2019-11-04 RX ORDER — HYDROCODONE BITARTRATE AND ACETAMINOPHEN 10; 325 MG/1; MG/1
1 TABLET ORAL EVERY 6 HOURS PRN
Qty: 120 TABLET | Refills: 0 | Status: SHIPPED | OUTPATIENT
Start: 2019-11-08 | End: 2019-12-03 | Stop reason: SDUPTHER

## 2019-11-04 RX ORDER — ESCITALOPRAM OXALATE 10 MG/1
10 TABLET ORAL DAILY
Status: ON HOLD | COMMUNITY
Start: 2019-10-08 | End: 2022-04-28 | Stop reason: SDUPTHER

## 2019-11-04 RX ORDER — TIZANIDINE 4 MG/1
4 TABLET ORAL EVERY 8 HOURS PRN
Qty: 90 TABLET | Refills: 2 | Status: SHIPPED | OUTPATIENT
Start: 2019-11-04 | End: 2020-01-27 | Stop reason: SDUPTHER

## 2019-11-04 ASSESSMENT — PAIN - FUNCTIONAL ASSESSMENT: PAIN_FUNCTIONAL_ASSESSMENT: PREVENTS OR INTERFERES SOME ACTIVE ACTIVITIES AND ADLS

## 2019-11-04 ASSESSMENT — PAIN DESCRIPTION - ONSET: ONSET: ON-GOING

## 2019-11-04 ASSESSMENT — PAIN DESCRIPTION - PROGRESSION: CLINICAL_PROGRESSION: GRADUALLY WORSENING

## 2019-11-04 ASSESSMENT — PAIN DESCRIPTION - FREQUENCY: FREQUENCY: CONTINUOUS

## 2019-11-04 ASSESSMENT — PAIN SCALES - GENERAL: PAINLEVEL_OUTOF10: 10

## 2019-11-04 ASSESSMENT — PAIN DESCRIPTION - LOCATION: LOCATION: BACK

## 2019-11-04 ASSESSMENT — PAIN DESCRIPTION - ORIENTATION: ORIENTATION: LOWER

## 2019-11-04 ASSESSMENT — PAIN DESCRIPTION - DIRECTION: RADIATING_TOWARDS: INTO BILATERAL LOWER EXTREMITIES

## 2019-11-04 ASSESSMENT — ACTIVITIES OF DAILY LIVING (ADL): EFFECT OF PAIN ON DAILY ACTIVITIES: LIMITS ACTIVITY

## 2019-11-04 ASSESSMENT — PAIN DESCRIPTION - PAIN TYPE: TYPE: CHRONIC PAIN

## 2019-11-10 ASSESSMENT — ENCOUNTER SYMPTOMS
FACIAL SWELLING: 1
CONSTIPATION: 0
BACK PAIN: 1

## 2019-12-03 DIAGNOSIS — M51.16 LUMBAR DISC DISEASE WITH RADICULOPATHY: ICD-10-CM

## 2019-12-04 RX ORDER — HYDROCODONE BITARTRATE AND ACETAMINOPHEN 10; 325 MG/1; MG/1
1 TABLET ORAL EVERY 6 HOURS PRN
Qty: 120 TABLET | Refills: 0 | Status: SHIPPED | OUTPATIENT
Start: 2019-12-08 | End: 2020-01-02 | Stop reason: SDUPTHER

## 2019-12-13 ENCOUNTER — HOSPITAL ENCOUNTER (OUTPATIENT)
Dept: PAIN MANAGEMENT | Age: 69
Discharge: HOME | End: 2019-12-13
Payer: MEDICARE

## 2019-12-13 VITALS
OXYGEN SATURATION: 96 % | RESPIRATION RATE: 18 BRPM | HEART RATE: 65 BPM | DIASTOLIC BLOOD PRESSURE: 72 MMHG | SYSTOLIC BLOOD PRESSURE: 141 MMHG

## 2019-12-13 DIAGNOSIS — R52 PAIN MANAGEMENT: ICD-10-CM

## 2019-12-13 PROCEDURE — 2580000003 HC RX 258

## 2019-12-13 PROCEDURE — 6360000002 HC RX W HCPCS

## 2019-12-13 PROCEDURE — 2500000003 HC RX 250 WO HCPCS

## 2019-12-13 PROCEDURE — 3209999900 FLUORO FOR SURGICAL PROCEDURES

## 2019-12-13 PROCEDURE — 62323 NJX INTERLAMINAR LMBR/SAC: CPT

## 2019-12-13 RX ORDER — SODIUM CHLORIDE 9 MG/ML
INJECTION INTRAVENOUS
Status: COMPLETED | OUTPATIENT
Start: 2019-12-13 | End: 2019-12-13

## 2019-12-13 RX ORDER — LIDOCAINE HYDROCHLORIDE 10 MG/ML
INJECTION, SOLUTION EPIDURAL; INFILTRATION; INTRACAUDAL; PERINEURAL
Status: COMPLETED | OUTPATIENT
Start: 2019-12-13 | End: 2019-12-13

## 2019-12-13 RX ORDER — METHYLPREDNISOLONE ACETATE 80 MG/ML
INJECTION, SUSPENSION INTRA-ARTICULAR; INTRALESIONAL; INTRAMUSCULAR; SOFT TISSUE
Status: COMPLETED | OUTPATIENT
Start: 2019-12-13 | End: 2019-12-13

## 2019-12-13 RX ADMIN — SODIUM CHLORIDE 5 ML: 9 INJECTION INTRAVENOUS at 09:10

## 2019-12-13 RX ADMIN — METHYLPREDNISOLONE ACETATE 80 MG: 80 INJECTION, SUSPENSION INTRA-ARTICULAR; INTRALESIONAL; INTRAMUSCULAR; SOFT TISSUE at 09:10

## 2019-12-13 RX ADMIN — LIDOCAINE HYDROCHLORIDE 5 ML: 10 INJECTION, SOLUTION EPIDURAL; INFILTRATION; INTRACAUDAL; PERINEURAL at 09:10

## 2019-12-13 ASSESSMENT — PAIN - FUNCTIONAL ASSESSMENT: PAIN_FUNCTIONAL_ASSESSMENT: 0-10

## 2019-12-18 ENCOUNTER — TELEPHONE (OUTPATIENT)
Dept: PAIN MANAGEMENT | Age: 69
End: 2019-12-18

## 2020-01-02 RX ORDER — HYDROCODONE BITARTRATE AND ACETAMINOPHEN 10; 325 MG/1; MG/1
1 TABLET ORAL EVERY 6 HOURS PRN
Qty: 120 TABLET | Refills: 0 | Status: SHIPPED | OUTPATIENT
Start: 2020-01-07 | End: 2020-02-04 | Stop reason: SDUPTHER

## 2020-01-27 RX ORDER — TIZANIDINE 4 MG/1
4 TABLET ORAL EVERY 8 HOURS PRN
Qty: 90 TABLET | Refills: 2 | Status: SHIPPED | OUTPATIENT
Start: 2020-02-01 | End: 2020-02-04 | Stop reason: SDUPTHER

## 2020-02-04 ENCOUNTER — HOSPITAL ENCOUNTER (OUTPATIENT)
Dept: PAIN MANAGEMENT | Age: 70
Discharge: HOME | End: 2020-02-04
Payer: MEDICARE

## 2020-02-04 ENCOUNTER — HOSPITAL ENCOUNTER (OUTPATIENT)
Dept: GENERAL RADIOLOGY | Age: 70
Discharge: HOME | End: 2020-02-04
Payer: MEDICARE

## 2020-02-04 VITALS
BODY MASS INDEX: 20.79 KG/M2 | HEIGHT: 66 IN | DIASTOLIC BLOOD PRESSURE: 70 MMHG | SYSTOLIC BLOOD PRESSURE: 113 MMHG | OXYGEN SATURATION: 96 % | HEART RATE: 79 BPM | WEIGHT: 129.38 LBS | RESPIRATION RATE: 18 BRPM | TEMPERATURE: 98.2 F

## 2020-02-04 PROBLEM — M25.552 HIP PAIN, ACUTE, LEFT: Status: ACTIVE | Noted: 2020-02-04
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
## 2020-02-04 PROCEDURE — 99214 OFFICE O/P EST MOD 30 MIN: CPT | Performed by: NURSE PRACTITIONER

## 2020-02-04 PROCEDURE — 72110 X-RAY EXAM L-2 SPINE 4/>VWS: CPT

## 2020-02-04 PROCEDURE — 73521 X-RAY EXAM HIPS BI 2 VIEWS: CPT

## 2020-02-04 PROCEDURE — 99214 OFFICE O/P EST MOD 30 MIN: CPT

## 2020-02-04 RX ORDER — TIZANIDINE 4 MG/1
4 TABLET ORAL EVERY 8 HOURS PRN
Qty: 90 TABLET | Refills: 2 | Status: SHIPPED | OUTPATIENT
Start: 2020-02-04 | End: 2020-05-05 | Stop reason: SDUPTHER

## 2020-02-04 RX ORDER — HYDROCODONE BITARTRATE AND ACETAMINOPHEN 10; 325 MG/1; MG/1
1 TABLET ORAL EVERY 6 HOURS PRN
Qty: 120 TABLET | Refills: 0 | Status: SHIPPED | OUTPATIENT
Start: 2020-02-06 | End: 2020-03-02 | Stop reason: SDUPTHER

## 2020-02-04 RX ORDER — GABAPENTIN 600 MG/1
900 TABLET ORAL 3 TIMES DAILY
Qty: 135 TABLET | Refills: 2 | Status: SHIPPED | OUTPATIENT
Start: 2020-02-04 | End: 2020-05-05 | Stop reason: SDUPTHER

## 2020-02-04 ASSESSMENT — PAIN DESCRIPTION - ORIENTATION: ORIENTATION: LOWER

## 2020-02-04 ASSESSMENT — PAIN SCALES - GENERAL: PAINLEVEL_OUTOF10: 10

## 2020-02-04 ASSESSMENT — PAIN DESCRIPTION - LOCATION: LOCATION: BACK

## 2020-02-04 ASSESSMENT — ENCOUNTER SYMPTOMS: BACK PAIN: 1

## 2020-02-04 ASSESSMENT — PAIN DESCRIPTION - PAIN TYPE: TYPE: CHRONIC PAIN

## 2020-02-04 NOTE — PROGRESS NOTES
Goal: No pain  Pain Type: Chronic pain  Pain Location: Back  Pain Orientation: Lower    Past Visit HPI:  11/4/19  Annual Review  presents to the office for annual exam with primary complaints of chronic low back pain. Patient has been established with this practice for years. She has been receiving LESI of L3-L4 however injections had to be postponed due to cellulitis to her face. Patient had numerous hospitalizations and antibiotics and treatment.      Patient takes Norco, gabapentin and Zanaflex. Patient states that current medication regimen helps to keep patient's pain at a tolerable level however she is ready to repeat injections. Patient denies any side effects to medications at this time. Past Medical Histoy  Past Medical History:   Diagnosis Date    Arthritis     Bilateral low back pain without sciatica 12/22/2015    Cancer ([facility] [address]     Hx of blood clots     Hyperlipidemia     Pain in both knees 12/22/2015    Thyroid disease     Urinary incontinence        Surgery History  Past Surgical History:   Procedure Laterality Date    CHOLECYSTECTOMY, LAPAROSCOPIC      CYST INCISION AND DRAINAGE      left Cheek    HYSTERECTOMY      JOINT REPLACEMENT      LOBECTOMY      RIGHT PARTIAL    SKIN CANCER EXCISION          Family History  family history includes Cancer in her mother; Heart Attack in her father; Hypertension in her mother. Social History    Social History     Tobacco Use    Smoking status: Current Every Day Smoker     Packs/day: 0.50     Years: 30.00     Pack years: 15.00     Types: Cigarettes    Smokeless tobacco: Never Used    Tobacco comment: on chantix   Substance Use Topics    Alcohol use: No       Allergies  Sulfamethoxazole-trimethoprim; Codeine; Erythromycin; Pcn [penicillins];  Talwin [pentazocine]; and Sulfa antibiotics     Current Medications  Current Outpatient Medications   Medication Sig Dispense Refill    tiZANidine (ZANAFLEX) 4 MG tablet Take 1 tablet by mouth every 8 hours as needed (muscle spasms) 90 tablet 2    HYDROcodone-acetaminophen (NORCO)  MG per tablet Take 1 tablet by mouth every 6 hours as needed for Pain for up to 30 days. (may fill 1/7/20) 120 tablet 0    escitalopram (LEXAPRO) 10 MG tablet Take 10 mg by mouth daily      gabapentin (NEURONTIN) 600 MG tablet Take 1.5 tablets by mouth 3 times daily for 90 days. 135 tablet 2    pantoprazole (PROTONIX) 40 MG tablet       nystatin (MYCOSTATIN) 495936 UNIT/ML suspension       promethazine (PHENERGAN) 25 MG tablet       sucralfate (CARAFATE) 1 GM/10ML suspension Take 1 g by mouth 4 times daily      ALPRAZolam (XANAX) 0.5 MG tablet Take 0.5 mg by mouth 2 times daily as needed. Donnalee Safer levothyroxine (SYNTHROID) 100 MCG tablet Take 100 mcg by mouth daily       No current facility-administered medications for this encounter. Review of Systems:  Review of Systems   Constitutional: Negative for activity change. Gastrointestinal: Negative for constipation. Musculoskeletal: Positive for arthralgias, back pain, gait problem and myalgias. Negative for neck pain. Neurological: Negative for weakness and numbness. Psychiatric/Behavioral: Negative for agitation, self-injury and suicidal ideas. The patient is not nervous/anxious. 14 point ROS negative besides that noted in HPI    Physical Exam:    Vitals:    02/04/20 1322   BP: 113/70   Pulse: 79   Resp: 18   Temp: 98.2 °F (36.8 °C)   TempSrc: Oral   SpO2: 96%   Weight: 129 lb 6 oz (58.7 kg)   Height: 5' 6\" (1.676 m)       Body mass index is 20.88 kg/m². Physical Exam  Vitals signs and nursing note reviewed. Constitutional:       General: She is not in acute distress. Appearance: She is well-developed. HENT:      Head: Normocephalic. Right Ear: External ear normal.      Left Ear: External ear normal.      Nose: Nose normal.   Eyes:      Conjunctiva/sclera: Conjunctivae normal.      Pupils: Pupils are equal, round, and reactive to light. Neck:      Vascular: No JVD. Trachea: No tracheal deviation. Cardiovascular:      Rate and Rhythm: Normal rate. Pulmonary:      Effort: Pulmonary effort is normal.   Abdominal:      General: There is no distension. Tenderness: There is no abdominal tenderness. Musculoskeletal:      Lumbar back: She exhibits tenderness, pain and spasm. Skin:     General: Skin is warm and dry. Neurological:      Mental Status: She is alert and oriented to person, place, and time. Cranial Nerves: No cranial nerve deficit. Psychiatric:         Behavior: Behavior normal.         Thought Content: Thought content normal.         Judgment: Judgment normal.     MRI exams received in the past 2 years:  No       When na                                              Where na       Imaging on chart: No         Imaging records requested: No     CT exam received during the last 12 months: No       When na                                              Where na       Imaging on chart: No         Imaging records requested: No     X-ray exam received during the last 12 months: No       When na                                              Where na       Imaging on chart: No         Imaging records requested: No     Nerve Conduction Study/EMG:  No       When na                                              Where na       Imaging on chart: No         Imaging records requested:  No     Physical therapy during the last 6 months: No       When: na                                               Where  na     Behavior Health No       When: na                                               Where  na     Labs  Yes       When: 7/2019                                             Where  Care Everywhere       Labs:  Lab Results   Component Value Date     04/18/2011    K 4.6 04/18/2011     04/18/2011    CO2 29 04/18/2011    BUN 8 04/18/2011    CREATININE 1.0 11/10/2016    CREATININE 0.8 04/18/2011    GLUCOSE 129 04/18/2011

## 2020-02-04 NOTE — PROGRESS NOTES
Nursing Admission Record    Current Issues / Falls / ER Visits: Follow up procedure    Was Percentage of Pain Relief after Lumbar Epidural L3-4 on 12/13/2019:  60 %    How long lasted:  6 weeks Yes  Were you able to decrease pain medication after treatment? Yes  Were you able to increase activity after treatment? Yes  Did you have any adverse reactions to treatment? No    Opioids Prescribed: Norco 10mg Q6HRS PRN #120    Was Medication Brought to Appt: No    Hx of any Neck/Back Surgeries? None    Is Patient currently taking a blood thinner? None    MRI exams received in the past 2 years:  No       When na                                              Where na       Imaging on chart: No         Imaging records requested: No     CT exam received during the last 12 months: No       When na                                              Where na       Imaging on chart: No         Imaging records requested: No     X-ray exam received during the last 12 months: No       When na                                              Where na       Imaging on chart: No         Imaging records requested: No     Nerve Conduction Study/EMG:  No       When na                                              Where na       Imaging on chart: No         Imaging records requested:  No     Physical therapy during the last 6 months: No       When: na                                               Where  na    Behavior Health No       When: na                        Where  na    Labs  Yes       When: 7/2019                                             Where  Care Everywhere    PEG Score: 9.7    Last PEG Score: 9    Annual ORT Score: 5    Annual PHQ Score: 16    Last UDS Results: 8/1/2019 compliant    Education Provided:  [x] Review of Grace Dawson  [] Agreement Review  [x] PEG Score Calculated [] PHQ Score Calculated [] ORT Score Calculated    [] Compliance Issues Discussed [] Cognitive Behavior Needs [x] Exercise [] Review of Test [] Financial Issues  [x]

## 2020-02-27 ASSESSMENT — ENCOUNTER SYMPTOMS: CONSTIPATION: 0

## 2020-03-03 RX ORDER — HYDROCODONE BITARTRATE AND ACETAMINOPHEN 10; 325 MG/1; MG/1
1 TABLET ORAL EVERY 6 HOURS PRN
Qty: 120 TABLET | Refills: 0 | Status: SHIPPED | OUTPATIENT
Start: 2020-03-07 | End: 2020-04-02 | Stop reason: SDUPTHER
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
## 2020-04-06 RX ORDER — HYDROCODONE BITARTRATE AND ACETAMINOPHEN 10; 325 MG/1; MG/1
1 TABLET ORAL EVERY 6 HOURS PRN
Qty: 120 TABLET | Refills: 0 | Status: SHIPPED | OUTPATIENT
Start: 2020-04-06 | End: 2020-05-04 | Stop reason: SDUPTHER

## 2020-05-04 RX ORDER — HYDROCODONE BITARTRATE AND ACETAMINOPHEN 10; 325 MG/1; MG/1
1 TABLET ORAL EVERY 6 HOURS PRN
Qty: 120 TABLET | Refills: 0 | Status: SHIPPED | OUTPATIENT
Start: 2020-05-06 | End: 2020-05-05 | Stop reason: SDUPTHER

## 2020-05-05 ENCOUNTER — HOSPITAL ENCOUNTER (OUTPATIENT)
Dept: PAIN MANAGEMENT | Age: 70
Discharge: HOME | End: 2020-05-05
Payer: MEDICARE

## 2020-05-05 VITALS
SYSTOLIC BLOOD PRESSURE: 96 MMHG | BODY MASS INDEX: 20.73 KG/M2 | DIASTOLIC BLOOD PRESSURE: 51 MMHG | HEART RATE: 62 BPM | OXYGEN SATURATION: 96 % | TEMPERATURE: 96.5 F | WEIGHT: 129 LBS | HEIGHT: 66 IN | RESPIRATION RATE: 18 BRPM

## 2020-05-05 PROBLEM — M53.3 SI (SACROILIAC) JOINT DYSFUNCTION: Status: ACTIVE | Noted: 2020-05-05

## 2020-05-05 PROCEDURE — 99214 OFFICE O/P EST MOD 30 MIN: CPT

## 2020-05-05 PROCEDURE — 99214 OFFICE O/P EST MOD 30 MIN: CPT | Performed by: NURSE PRACTITIONER

## 2020-05-05 RX ORDER — TIZANIDINE 4 MG/1
4 TABLET ORAL EVERY 8 HOURS PRN
Qty: 90 TABLET | Refills: 2 | Status: SHIPPED | OUTPATIENT
Start: 2020-05-05 | End: 2020-07-27 | Stop reason: SDUPTHER

## 2020-05-05 RX ORDER — HYDROCODONE BITARTRATE AND ACETAMINOPHEN 10; 325 MG/1; MG/1
1 TABLET ORAL EVERY 4 HOURS PRN
Qty: 150 TABLET | Refills: 0 | Status: SHIPPED | OUTPATIENT
Start: 2020-05-05 | End: 2020-06-01 | Stop reason: SDUPTHER

## 2020-05-05 RX ORDER — GABAPENTIN 600 MG/1
900 TABLET ORAL 3 TIMES DAILY
Qty: 135 TABLET | Refills: 2 | Status: SHIPPED | OUTPATIENT
Start: 2020-05-05 | End: 2020-07-27 | Stop reason: SDUPTHER

## 2020-05-05 ASSESSMENT — PAIN SCALES - GENERAL: PAINLEVEL_OUTOF10: 10

## 2020-05-05 ASSESSMENT — PAIN DESCRIPTION - LOCATION: LOCATION: BACK

## 2020-05-05 ASSESSMENT — PAIN DESCRIPTION - PAIN TYPE: TYPE: CHRONIC PAIN

## 2020-05-05 ASSESSMENT — ENCOUNTER SYMPTOMS
BACK PAIN: 1
CONSTIPATION: 0

## 2020-05-05 ASSESSMENT — PAIN DESCRIPTION - ORIENTATION: ORIENTATION: LOWER

## 2020-05-05 NOTE — PROGRESS NOTES
[facility]  Office Note    Patient Name: Florina Posada    MR #: [id number]    Account #: [de-identified]    : 1950    Age: 68 y.o. Sex: female    Date: 2020    PCP: Yony Nair MD    Chief Complaint:   Chief Complaint   Patient presents with    Lower Back Pain       History of Present Illness:     Florina Posada is a 68 y.o. female who presents for follow-up of imaging and office visit. X-rays reviewed. Patient does have hip arthritis. Patient is still having increase in pain since fall. Patient states that pain medication is barely controlling pain. If patient does increase activity pain medication is not controlling pain. Back Pain   This is a recurrent problem. The current episode started 1 to 4 weeks ago. The problem occurs constantly. The problem has been waxing and waning since onset. The pain is present in the lumbar spine and sacro-iliac. The quality of the pain is described as shooting, cramping and aching. The symptoms are aggravated by bending, sitting, standing and twisting. Pertinent negatives include no numbness or weakness. Last Procedure:     NA    Screening Tools:    PEG Score: 9.7     Last PEG Score: 9.7     Annual ORT Score: 5     Annual PHQ Score: 16    Current Pain Assessment  Pain Assessment  Pain Assessment: 0-10  Pain Level: 10  Pain Type: Chronic pain  Pain Location: Back  Pain Orientation: Lower    Past Visit HPI:  2020  presents to the office for follow up of procedure. Patient is having worsening left hip pain s/p fall from the shower. Patient had been ill she thinks she lost LOC in the shower. She remembers falling and hitting the space heater with her head. Patient never lost LOC after fall. Patient had headache with low back pain and left hip pain. Patient followed up with PCP post fall. However pain is getting worse. Patient can not sit on left hip. Patient denies radiating pain.  Patient states that pain is a constant ache. She cannot get comfortable. Patient is having problem ambulating due to pain. Patient is requesting xrays. Back Pain is a recurrent problem. The current episode started 1 to 4 weeks ago. The problem occurs constantly. The problem has been rapidly worsening since onset. The pain is present in the lumbar spine and sacro-iliac. The quality of the pain is described as aching and shooting. The symptoms are aggravated by bending, sitting and standing. Pertinent negatives include no numbness or weakness. 11/4/19  Annual Review  presents to the office for annual exam with primary complaints of chronic low back pain. Patient has been established with this practice for years. She has been receiving LESI of L3-L4 however injections had to be postponed due to cellulitis to her face. Patient had numerous hospitalizations and antibiotics and treatment.      Patient takes Norco, gabapentin and Zanaflex. Patient states that current medication regimen helps to keep patient's pain at a tolerable level however she is ready to repeat injections. Patient denies any side effects to medications at this time. Past Medical Histoy  Past Medical History:   Diagnosis Date    Arthritis     Bilateral low back pain without sciatica 12/22/2015    Cancer ([facility] [address]     Hx of blood clots     Hyperlipidemia     Pain in both knees 12/22/2015    Thyroid disease     Urinary incontinence        Surgery History  Past Surgical History:   Procedure Laterality Date    CHOLECYSTECTOMY, LAPAROSCOPIC      CYST INCISION AND DRAINAGE      left Cheek    HYSTERECTOMY      JOINT REPLACEMENT      LOBECTOMY      RIGHT PARTIAL    SKIN CANCER EXCISION          Family History  family history includes Cancer in her mother; Heart Attack in her father; Hypertension in her mother.     Social History    Social History     Tobacco Use    Smoking status: Current Every Day Smoker     Packs/day: 0.50     Years: 30.00     Pack years: 15.00 Types: Cigarettes    Smokeless tobacco: Never Used    Tobacco comment: on chantix   Substance Use Topics    Alcohol use: No       Allergies  Sulfamethoxazole-trimethoprim; Codeine; Erythromycin; Pcn [penicillins]; Talwin [pentazocine]; and Sulfa antibiotics     Current Medications  Current Outpatient Medications   Medication Sig Dispense Refill    gabapentin (NEURONTIN) 600 MG tablet Take 1.5 tablets by mouth 3 times daily for 90 days. 135 tablet 2    HYDROcodone-acetaminophen (NORCO)  MG per tablet Take 1 tablet by mouth every 4 hours as needed for Pain for up to 30 days. May fill 5/6/20 150 tablet 0    tiZANidine (ZANAFLEX) 4 MG tablet Take 1 tablet by mouth every 8 hours as needed (muscle spasms) 90 tablet 2    escitalopram (LEXAPRO) 10 MG tablet Take 10 mg by mouth daily      pantoprazole (PROTONIX) 40 MG tablet       nystatin (MYCOSTATIN) 747742 UNIT/ML suspension       promethazine (PHENERGAN) 25 MG tablet       sucralfate (CARAFATE) 1 GM/10ML suspension Take 1 g by mouth 4 times daily      ALPRAZolam (XANAX) 0.5 MG tablet Take 0.5 mg by mouth 2 times daily as needed. Miguel A Barrientos levothyroxine (SYNTHROID) 100 MCG tablet Take 100 mcg by mouth daily       No current facility-administered medications for this encounter. Review of Systems:  Review of Systems   Constitutional: Negative for activity change. Gastrointestinal: Negative for constipation. Musculoskeletal: Positive for arthralgias, back pain, gait problem and myalgias. Negative for neck pain. Neurological: Negative for weakness and numbness. Psychiatric/Behavioral: Negative for agitation, self-injury and suicidal ideas. The patient is not nervous/anxious.       14 point ROS negative besides that noted in HPI    Physical Exam:    Vitals:    05/05/20 0959   BP: (!) 96/51   Pulse: 62   Resp: 18   Temp: 96.5 °F (35.8 °C)   TempSrc: Temporal   SpO2: 96%   Weight: 129 lb (58.5 kg)   Height: 5' 6\" (1.676 m)       Body mass index

## 2020-05-13 ENCOUNTER — HOSPITAL ENCOUNTER (OUTPATIENT)
Dept: PAIN MANAGEMENT | Age: 70
Discharge: HOME | End: 2020-05-13
Payer: MEDICARE

## 2020-05-13 VITALS
SYSTOLIC BLOOD PRESSURE: 143 MMHG | HEART RATE: 63 BPM | OXYGEN SATURATION: 95 % | TEMPERATURE: 97.1 F | DIASTOLIC BLOOD PRESSURE: 57 MMHG | RESPIRATION RATE: 18 BRPM

## 2020-05-13 PROCEDURE — 20611 DRAIN/INJ JOINT/BURSA W/US: CPT

## 2020-05-13 PROCEDURE — 2500000003 HC RX 250 WO HCPCS

## 2020-05-13 PROCEDURE — 20552 NJX 1/MLT TRIGGER POINT 1/2: CPT | Performed by: NURSE PRACTITIONER

## 2020-05-13 PROCEDURE — 6360000002 HC RX W HCPCS

## 2020-05-13 PROCEDURE — 76942 ECHO GUIDE FOR BIOPSY: CPT | Performed by: NURSE PRACTITIONER

## 2020-05-13 RX ORDER — LIDOCAINE HYDROCHLORIDE 10 MG/ML
2 INJECTION, SOLUTION EPIDURAL; INFILTRATION; INTRACAUDAL; PERINEURAL ONCE
Status: DISCONTINUED | OUTPATIENT
Start: 2020-05-13 | End: 2020-05-15 | Stop reason: HOSPADM

## 2020-05-13 RX ORDER — TRIAMCINOLONE ACETONIDE 40 MG/ML
40 INJECTION, SUSPENSION INTRA-ARTICULAR; INTRAMUSCULAR ONCE
Status: DISCONTINUED | OUTPATIENT
Start: 2020-05-13 | End: 2020-05-15 | Stop reason: HOSPADM

## 2020-05-13 RX ORDER — BUPIVACAINE HYDROCHLORIDE 5 MG/ML
2 INJECTION, SOLUTION EPIDURAL; INTRACAUDAL ONCE
Status: DISCONTINUED | OUTPATIENT
Start: 2020-05-13 | End: 2020-05-15 | Stop reason: HOSPADM

## 2020-05-18 ENCOUNTER — TELEPHONE (OUTPATIENT)
Dept: PAIN MANAGEMENT | Age: 70
End: 2020-05-18

## 2020-06-01 RX ORDER — HYDROCODONE BITARTRATE AND ACETAMINOPHEN 10; 325 MG/1; MG/1
1 TABLET ORAL EVERY 4 HOURS PRN
Qty: 150 TABLET | Refills: 0 | Status: SHIPPED | OUTPATIENT
Start: 2020-06-05 | End: 2020-06-29 | Stop reason: SDUPTHER

## 2020-06-05 ENCOUNTER — HOSPITAL ENCOUNTER (OUTPATIENT)
Dept: PAIN MANAGEMENT | Age: 70
Discharge: HOME | End: 2020-06-05
Payer: MEDICARE

## 2020-06-05 VITALS
DIASTOLIC BLOOD PRESSURE: 58 MMHG | OXYGEN SATURATION: 94 % | SYSTOLIC BLOOD PRESSURE: 110 MMHG | RESPIRATION RATE: 18 BRPM | TEMPERATURE: 96.6 F | HEART RATE: 69 BPM

## 2020-06-05 PROCEDURE — 2580000003 HC RX 258

## 2020-06-05 PROCEDURE — 6360000002 HC RX W HCPCS

## 2020-06-05 PROCEDURE — 3209999900 FLUORO FOR SURGICAL PROCEDURES

## 2020-06-05 PROCEDURE — 2500000003 HC RX 250 WO HCPCS

## 2020-06-05 PROCEDURE — 62323 NJX INTERLAMINAR LMBR/SAC: CPT

## 2020-06-05 RX ORDER — LIDOCAINE HYDROCHLORIDE 10 MG/ML
INJECTION, SOLUTION EPIDURAL; INFILTRATION; INTRACAUDAL; PERINEURAL
Status: COMPLETED | OUTPATIENT
Start: 2020-06-05 | End: 2020-06-05

## 2020-06-05 RX ORDER — SODIUM CHLORIDE 9 MG/ML
INJECTION INTRAVENOUS
Status: COMPLETED | OUTPATIENT
Start: 2020-06-05 | End: 2020-06-05

## 2020-06-05 RX ORDER — METHYLPREDNISOLONE ACETATE 80 MG/ML
INJECTION, SUSPENSION INTRA-ARTICULAR; INTRALESIONAL; INTRAMUSCULAR; SOFT TISSUE
Status: COMPLETED | OUTPATIENT
Start: 2020-06-05 | End: 2020-06-05

## 2020-06-05 RX ADMIN — LIDOCAINE HYDROCHLORIDE 5 ML: 10 INJECTION, SOLUTION EPIDURAL; INFILTRATION; INTRACAUDAL; PERINEURAL at 09:13

## 2020-06-05 RX ADMIN — METHYLPREDNISOLONE ACETATE 80 MG: 80 INJECTION, SUSPENSION INTRA-ARTICULAR; INTRALESIONAL; INTRAMUSCULAR; SOFT TISSUE at 09:13

## 2020-06-05 RX ADMIN — SODIUM CHLORIDE 5 ML: 9 INJECTION INTRAVENOUS at 09:13

## 2020-06-05 ASSESSMENT — PAIN - FUNCTIONAL ASSESSMENT
PAIN_FUNCTIONAL_ASSESSMENT: 0-10
PAIN_FUNCTIONAL_ASSESSMENT: 0-10

## 2020-06-05 NOTE — INTERVAL H&P NOTE
Update History & Physical    The patient's History and Physical  was reviewed with the patient and I examined the patient. There was  NO CHANGE:[id number]}. The surgical site was confirmed by the patient and me. Plan: The risks, benefits, expected outcome, and alternative to the recommended procedure have been discussed with the patient. Patient understands and wants to proceed with the procedure.      Electronically signed by Maeve Gardner on 6/5/2020 at 9:15 AM

## 2020-06-11 ENCOUNTER — TELEPHONE (OUTPATIENT)
Dept: PAIN MANAGEMENT | Age: 70
End: 2020-06-11

## 2020-07-01 RX ORDER — HYDROCODONE BITARTRATE AND ACETAMINOPHEN 10; 325 MG/1; MG/1
1 TABLET ORAL EVERY 4 HOURS PRN
Qty: 150 TABLET | Refills: 0 | Status: SHIPPED | OUTPATIENT
Start: 2020-07-03 | End: 2020-08-03 | Stop reason: SDUPTHER

## 2020-07-27 ENCOUNTER — HOSPITAL ENCOUNTER (OUTPATIENT)
Dept: PAIN MANAGEMENT | Age: 70
Discharge: HOME | End: 2020-07-27
Payer: MEDICARE

## 2020-07-27 VITALS
RESPIRATION RATE: 20 BRPM | HEIGHT: 66 IN | DIASTOLIC BLOOD PRESSURE: 74 MMHG | SYSTOLIC BLOOD PRESSURE: 141 MMHG | WEIGHT: 148 LBS | HEART RATE: 72 BPM | BODY MASS INDEX: 23.78 KG/M2 | OXYGEN SATURATION: 98 % | TEMPERATURE: 98 F

## 2020-07-27 PROBLEM — Z02.89 PAIN MANAGEMENT CONTRACT AGREEMENT: Status: ACTIVE | Noted: 2020-07-27

## 2020-07-27 PROCEDURE — 99214 OFFICE O/P EST MOD 30 MIN: CPT | Performed by: NURSE PRACTITIONER

## 2020-07-27 PROCEDURE — 99213 OFFICE O/P EST LOW 20 MIN: CPT

## 2020-07-27 RX ORDER — TIZANIDINE 4 MG/1
4 TABLET ORAL EVERY 8 HOURS PRN
Qty: 90 TABLET | Refills: 2 | Status: SHIPPED | OUTPATIENT
Start: 2020-07-27 | End: 2020-11-26 | Stop reason: SDUPTHER

## 2020-07-27 RX ORDER — GABAPENTIN 600 MG/1
900 TABLET ORAL 3 TIMES DAILY
Qty: 135 TABLET | Refills: 2 | Status: SHIPPED | OUTPATIENT
Start: 2020-07-27 | End: 2020-11-23 | Stop reason: SDUPTHER

## 2020-07-27 ASSESSMENT — PAIN DESCRIPTION - FREQUENCY: FREQUENCY: CONTINUOUS

## 2020-07-27 ASSESSMENT — PAIN DESCRIPTION - ONSET: ONSET: ON-GOING

## 2020-07-27 ASSESSMENT — PAIN DESCRIPTION - PROGRESSION: CLINICAL_PROGRESSION: GRADUALLY WORSENING

## 2020-07-27 ASSESSMENT — ENCOUNTER SYMPTOMS
BACK PAIN: 1
CONSTIPATION: 0

## 2020-07-27 ASSESSMENT — PAIN SCALES - GENERAL: PAINLEVEL_OUTOF10: 7

## 2020-07-27 ASSESSMENT — PAIN DESCRIPTION - ORIENTATION: ORIENTATION: LEFT

## 2020-07-27 ASSESSMENT — PAIN DESCRIPTION - LOCATION: LOCATION: BACK;HIP

## 2020-07-27 ASSESSMENT — PAIN DESCRIPTION - DESCRIPTORS: DESCRIPTORS: ACHING;CONSTANT;DISCOMFORT

## 2020-07-27 ASSESSMENT — PAIN - FUNCTIONAL ASSESSMENT: PAIN_FUNCTIONAL_ASSESSMENT: PREVENTS OR INTERFERES SOME ACTIVE ACTIVITIES AND ADLS

## 2020-07-27 ASSESSMENT — PAIN DESCRIPTION - PAIN TYPE: TYPE: CHRONIC PAIN

## 2020-07-27 NOTE — PROGRESS NOTES
Nursing Admission Record    Current Issues / Falls / ER Visits: Follow up procedure    Was Percentage of Pain Relief after Left Sacroiliac Joint Injection on 5/13/2020:  60 %    How long lasted:  6 weeks   Were you able to decrease pain medication after treatment? No  Were you able to increase activity after treatment? Yes  Did you have any adverse reactions to treatment? No    Was Percentage of Pain Relief after Lumbar Epidural L3-4 on 6/5/2020:  60 %    How long lasted:  6 weeks   Were you able to decrease pain medication after treatment? No  Were you able to increase activity after treatment? Yes  Did you have any adverse reactions to treatment? No    Opioids Prescribed: Norco 10mg Q6HRS PRN     Was Medication Brought to Appt: N/A     Hx of any Neck/Back Surgeries? None     Is Patient currently taking a blood thinner? None     MRI exams received in the past 2 years:  No       When na                                              Where na       Imaging on chart: No         Imaging records requested: No     CT exam received during the last 12 months: No       When na                                              Where na       Imaging on chart: No         Imaging records requested: No     X-ray exam received during the last 12 months: Yes XR Bilateral Hips/pelvis & XR Lumbar Spine       When 2020                                              Where [facility]       Imaging on chart: Yes         Imaging records requested: No     Nerve Conduction Study/EMG:  No       When na                                              Where na       Imaging on chart: No         Imaging records requested:  No     Physical therapy: No       When: na                                               Where  na     Behavior Health No       When: na                                               Where  na     Labs  Yes       When: 7/2019                                             Where  Care Everywhere    PEG Score: 9    Last PEG Score: 9.7    Annual ORT Score: 5    Annual PHQ Score: 16    Last UDS Results: UDS Today    Education Provided:  [x] Review of Damon  [] Agreement Review  [x] PEG Score Calculated [] PHQ Score Calculated [] ORT Score Calculated    [] Compliance Issues Discussed [] Cognitive Behavior Needs [x] Exercise [] Review of Test [] Financial Issues  [x] Tobacco/Alcohol Use Reviewed [x] Teaching [] New Patient [] Picture Obtained    Physician Plan:  [] Outgoing Referral  [] Pharmacy Consult  [] Test Ordered [x] Prescription Ordered/Changed [] Blood Thinner Request Form  [] Obtained Test Results / Consult Notes  [] UDS due at next visit, verified per EPIC      [] Suspected Physical Abuse or Suicide Risk assessed - IF YES COMPLETE QUESTIONS BELOW    If any of the following questions are answered yes - contact attending physician for referral:    Has been considering harming self to escape stress, pain problems? [] YES  [] NO  Has a suicide plan? [] YES  [] NO  Has attempted suicide in the past?   [] YES  [] NO  Has a close friend or family member who committed suicide?   [] YES  [] NO    Assessment Completed by:  Electronically signed by Carmella Nair RN on 7/27/2020 at 11:58 AM

## 2020-07-27 NOTE — PROGRESS NOTES
[facility] Physical & Pain Medicine  Office Note    Patient Name: Nai Mercado    MR #: [id number]    Account #: [de-identified]    : 1950    Age: 68 y.o. Sex: female    Date: 2020    PCP: Eduardo Sullivan MD    Chief Complaint:   Chief Complaint   Patient presents with    Back Pain       History of Present Illness:     Nai Mercado is a 68 y.o. female who presents after procedure follow up. Patient had LESI of L3/L4 and Left SI. Patient had good results from injections and she would like to repeat. However, patient has extreme tenderness in the buttocks especially while sitting down. This tenderness remained after injections. Back Pain   This is a recurrent problem. The current episode started more than 1 month ago. The problem occurs constantly. The pain is present in the lumbar spine and sacro-iliac. The quality of the pain is described as aching, cramping and shooting. The symptoms are aggravated by bending, sitting, standing and twisting. Pertinent negatives include no numbness or weakness. Last Procedure:     Was Percentage of Pain Relief after Left Sacroiliac Joint Injection on 2020:  60 %    How long lasted:  6 weeks   Were you able to decrease pain medication after treatment? No  Were you able to increase activity after treatment? Yes  Did you have any adverse reactions to treatment? No     Was Percentage of Pain Relief after Lumbar Epidural L3-4 on 2020:  60 %    How long lasted:  6 weeks   Were you able to decrease pain medication after treatment? No  Were you able to increase activity after treatment? Yes  Did you have any adverse reactions to treatment?  No    Screening Tools:    PEG Score: 9     Last PEG Score: 9.7     Annual ORT Score: 5     Annual PHQ Score: 16    Current Pain Assessment  Pain Assessment  Pain Assessment: 0-10  Pain Level: 7  Patient's Stated Pain Goal: 1  Pain Type: Chronic pain  Pain Location: Back, Hip  Pain Orientation: Left  Pain Radiating Towards: increased in left hip  Pain Descriptors: Aching, Constant, Discomfort  Pain Frequency: Continuous  Pain Onset: On-going  Clinical Progression: Gradually worsening  Functional Pain Assessment: Prevents or interferes some active activities and ADLs    Past Visit HPI:  5/5/2020  presents for follow-up of imaging and office visit. X-rays reviewed. Patient does have hip arthritis. Patient is still having increase in pain since fall. Patient states that pain medication is barely controlling pain. If patient does increase activity pain medication is not controlling pain. Back Pain is a recurrent problem. The current episode started 1 to 4 weeks ago. The problem occurs constantly. The problem has been waxing and waning since onset. The pain is present in the lumbar spine and sacro-iliac. The quality of the pain is described as shooting, cramping and aching. The symptoms are aggravated by bending, sitting, standing and twisting. Pertinent negatives include no numbness or weakness. 2/4/2020  presents to the office for follow up of procedure. Patient is having worsening left hip pain s/p fall from the shower. Patient had been ill she thinks she lost LOC in the shower. She remembers falling and hitting the space heater with her head. Patient never lost LOC after fall. Patient had headache with low back pain and left hip pain. Patient followed up with PCP post fall. However pain is getting worse. Patient can not sit on left hip. Patient denies radiating pain. Patient states that pain is a constant ache. She cannot get comfortable. Patient is having problem ambulating due to pain. Patient is requesting xrays. Back Pain is a recurrent problem. The current episode started 1 to 4 weeks ago. The problem occurs constantly. The problem has been rapidly worsening since onset. The pain is present in the lumbar spine and sacro-iliac. The quality of the pain is described as aching and shooting.  The symptoms are aggravated by bending, sitting and standing. Pertinent negatives include no numbness or weakness. 11/4/19  Annual Review  presents to the office for annual exam with primary complaints of chronic low back pain. Patient has been established with this practice for years. She has been receiving LESI of L3-L4 however injections had to be postponed due to cellulitis to her face. Patient had numerous hospitalizations and antibiotics and treatment.      Patient takes Norco, gabapentin and Zanaflex. Patient states that current medication regimen helps to keep patient's pain at a tolerable level however she is ready to repeat injections. Patient denies any side effects to medications at this time. Past Medical Histoy  Past Medical History:   Diagnosis Date    Arthritis     Bilateral low back pain without sciatica 12/22/2015    Cancer ([facility] [address]     Hx of blood clots     Hyperlipidemia     Pain in both knees 12/22/2015    Thyroid disease     Urinary incontinence        Surgery History  Past Surgical History:   Procedure Laterality Date    CHOLECYSTECTOMY, LAPAROSCOPIC      CYST INCISION AND DRAINAGE      left Cheek    HYSTERECTOMY      JOINT REPLACEMENT      LOBECTOMY      RIGHT PARTIAL    SKIN CANCER EXCISION          Family History  family history includes Cancer in her mother; Heart Attack in her father; Hypertension in her mother. Social History    Social History     Tobacco Use    Smoking status: Current Every Day Smoker     Packs/day: 0.50     Years: 30.00     Pack years: 15.00     Types: Cigarettes    Smokeless tobacco: Never Used    Tobacco comment: on chantix   Substance Use Topics    Alcohol use: No       Allergies  Sulfamethoxazole-trimethoprim; Codeine; Erythromycin; Pcn [penicillins];  Talwin [pentazocine]; and Sulfa antibiotics     Current Medications  Current Outpatient Medications   Medication Sig Dispense Refill    HYDROcodone-acetaminophen (NORCO)  MG per tablet Take 1 tablet by mouth every 4 hours as needed for Pain for up to 30 days. May fill 7/3/20 must last till 8/4/20 150 tablet 0    gabapentin (NEURONTIN) 600 MG tablet Take 1.5 tablets by mouth 3 times daily for 90 days. 135 tablet 2    tiZANidine (ZANAFLEX) 4 MG tablet Take 1 tablet by mouth every 8 hours as needed (muscle spasms) 90 tablet 2    escitalopram (LEXAPRO) 10 MG tablet Take 10 mg by mouth daily      pantoprazole (PROTONIX) 40 MG tablet       nystatin (MYCOSTATIN) 896760 UNIT/ML suspension       promethazine (PHENERGAN) 25 MG tablet       sucralfate (CARAFATE) 1 GM/10ML suspension Take 1 g by mouth 4 times daily      ALPRAZolam (XANAX) 0.5 MG tablet Take 0.5 mg by mouth 2 times daily as needed. Jeannette Beards levothyroxine (SYNTHROID) 100 MCG tablet Take 100 mcg by mouth daily       No current facility-administered medications for this encounter. Review of Systems:  Review of Systems   Constitutional: Negative for activity change. Gastrointestinal: Negative for constipation. Musculoskeletal: Positive for arthralgias, back pain, gait problem and myalgias. Negative for neck pain. Neurological: Negative for weakness and numbness. Psychiatric/Behavioral: Negative for agitation, self-injury and suicidal ideas. The patient is not nervous/anxious. 14 point ROS negative besides that noted in HPI    Physical Exam:    Vitals:    07/27/20 1147 07/27/20 1151   BP: (!) 141/74    Pulse: 72    Resp: 20    Temp: 98 °F (36.7 °C)    TempSrc: Temporal    SpO2: 98%    Weight: 148 lb (67.1 kg)    Height:  5' 6\" (1.676 m)       Body mass index is 23.89 kg/m². Physical Exam  Vitals signs and nursing note reviewed. Constitutional:       General: She is not in acute distress. Appearance: She is well-developed. HENT:      Head: Normocephalic.       Right Ear: External ear normal.      Left Ear: External ear normal.      Nose: Nose normal.   Eyes:      Conjunctiva/sclera: Conjunctivae normal. Pupils: Pupils are equal, round, and reactive to light. Neck:      Vascular: No JVD. Trachea: No tracheal deviation. Cardiovascular:      Rate and Rhythm: Normal rate. Pulmonary:      Effort: Pulmonary effort is normal.   Abdominal:      General: There is no distension. Tenderness: There is no abdominal tenderness. Musculoskeletal:      Lumbar back: She exhibits tenderness, pain and spasm. Comments: Patient points to on left SI joint as the source of low back pain  TTP of SI joint on left  positive Kalia's on left, positive Distraction on left, positive Thigh Thrust on left  TTP to left ischial bursa   Skin:     General: Skin is warm and dry. Neurological:      Mental Status: She is alert and oriented to person, place, and time. Cranial Nerves: No cranial nerve deficit. Psychiatric:         Behavior: Behavior normal.         Thought Content: Thought content normal.         Judgment: Judgment normal.     MRI exams received in the past 2 years:  No       When na                                              Where na       Imaging on chart: No         Imaging records requested: No     CT exam received during the last 12 months: No       When na                                              Where na       Imaging on chart: No         Imaging records requested: No     X-ray exam received during the last 12 months: Yes XR Bilateral Hips/pelvis & XR Lumbar Spine       When 2020                                              Where [facility]       Imaging on chart: Yes         Imaging records requested: No     Nerve Conduction Study/EMG:  No       When na                                              Where na       Imaging on chart: No         Imaging records requested:  No     Physical therapy during the last 6 months: No       When: na                                               Where  na     Behavior Health No       When: na                                               Where  na     Labs Yes       When: 7/2019                                             Where  Care Everywhere       Labs:  Lab Results   Component Value Date     04/18/2011    K 4.6 04/18/2011     04/18/2011    CO2 29 04/18/2011    BUN 8 04/18/2011    CREATININE 1.0 11/10/2016    CREATININE 0.8 04/18/2011    GLUCOSE 129 04/18/2011    CALCIUM 10.1 04/18/2011        No results found for: WBC, HGB, HCT, MCV, PLT      Assessment:  Principal Problem:    Bilateral low back pain without sciatica  Active Problems:    Pain in both knees    Lumbar disc disease with radiculopathy    Hip pain, acute, left    SI (sacroiliac) joint dysfunction    Pain management contract agreement  Resolved Problems:    * No resolved hospital problems. *      Plan: For Chronic low back pain   Schedule Repeat Left SI and Left ischial bursa with US with NP   Schedule Repeat LESI L3/L4 under fluoroscopy with Dr Phoenix Ball Hydrocodone 10 mg Take 1 tablet by mouth every 4 hours as needed for Pain for up to 30 days. Disp-150 tablet,R-0 will decrease prescription at next fill. Continue zanaflex Take 1 tablet by mouth every 8 hours as needed (muscle spasms), Disp-90 tablet,R-2 with refill sent at the appointment   Continue gabapentin Take 1.5 tablets by mouth 3 times daily for 90 days. , Disp-135 tablet,R-2 with refill sent at the appointment    [x] Follow up    [] 4 weeks   [x] 6-8 weeks   [] 10-12 weeks   [] 3 months  [x] Post procedure to evaluate effectiveness of treatment  [] To evaluate medications changes made at office visit. [] To review diagnostics ordered at last visit. [] To evaluate response to therapy    [x] For management of controlled substance  [x] Random UDS as indicated by ORT score or if indicated by abberent behaviors    Discussion: Discussed exam findings and plan of care with patient. Patient agreed with POC and questions were asked and answered.      Activity: Discussed exercise as beneficial to pain reduction, encouraged daily stretching with a focus on torso strengthening. Goal:  Pain Management Goals of Therapy:   [] Resolution in pain  [x] Decrease in pain level  [x] Improvement in ADL's  [x] Increase in activities with less pain  [] Decrease in medication      Controlled substance monitoring:    [x] Discussed medication side effects, risk of tolerance and/or dependence, and/or alternative treatment  [] Discussed the detrimental effects of long term narcotic use in younger patients especially women of childbearing years. [x] No signs and symptoms of potential drug abuse or diversion were identified  [] Signs of potential drug abuse or diversion were identified   [] ORT Score   [] UDS non-compliant   [] See Note  [] Random urine drug screen sent today  [x] Random urine drug screen not completed today   [] Deferred New Patient  [x] Compliant  8/1/19  [] Not Compliant see note  [] Medication agreement with provider signed today  [x] Medication agreement with provider on file under media  [] Medication regimen effective and continued   [] New patient continuing current medication while developing POC   [] On going assessment and evaluation of medication regimen  [x] Medication regimen not effective see plan for changes  [x] Lisa Holm reviewed & on file under media     CC:  MD Ziggy Gomez, APRN - CNP, 7/27/2020 at 11:53 AM    EMR dragon/transcription disclaimer: Much of this encounter note is electronic transcription/translation of spoken language to printed tach. Electronic translation of spoken language may be erroneous, or at times, nonsensical words or phrases may be inadvertently transcribed.  Although, I have reviewed the note for such errors, some may still exist.

## 2020-08-03 NOTE — TELEPHONE ENCOUNTER
Patient was seen in the office on 7/27/20. Script for norco was not sent at that time. Her script is due on 8/4/20. Sending script to NP to send to the pharmacy.

## 2020-08-05 RX ORDER — HYDROCODONE BITARTRATE AND ACETAMINOPHEN 10; 325 MG/1; MG/1
1 TABLET ORAL EVERY 4 HOURS PRN
Qty: 150 TABLET | Refills: 0 | Status: SHIPPED | OUTPATIENT
Start: 2020-08-05 | End: 2020-08-27 | Stop reason: SDUPTHER

## 2020-08-23 NOTE — PROGRESS NOTES
LESI of L3/L4. Just days prior to the injection, patient had been hospitalized with the Flu. Patient had her injections and she became deathly ill and was admitted back into the hospital. Patient does not want to have any further injections. See pain assessment below. Current PE.3    Past PE    Annual Screens:    ORT Score: 5    PHQ-9 Score: 17    Current Pain Assessment  Pain Assessment  Pain Assessment: 0-10  Pain Level: 6  Patient's Stated Pain Goal: No pain  Pain Type: Chronic pain  Pain Location: Back  Pain Orientation: Lower  Pain Frequency: Continuous  Pain Onset: On-going  Clinical Progression: Not changed  Effect of Pain on Daily Activities: limits activity  Functional Pain Assessment: Prevents or interferes some active activities and ADLs  Non-Pharmaceutical Pain Intervention(s): Repositioned, Rest  Multiple Pain Sites: No    Past Medical Histoy  Past Medical History:   Diagnosis Date    Arthritis     Bilateral low back pain without sciatica 2015    Cancer ([facility] [address]     Hx of blood clots     Hyperlipidemia     Pain in both knees 2015    Thyroid disease     Urinary incontinence        Surgery History  Past Surgical History:   Procedure Laterality Date    CHOLECYSTECTOMY, LAPAROSCOPIC      CYST INCISION AND DRAINAGE      left Cheek    HYSTERECTOMY      JOINT REPLACEMENT      LOBECTOMY      RIGHT PARTIAL    SKIN CANCER EXCISION          Family History  family history includes Cancer in her mother; Heart Attack in her father; Hypertension in her mother. Social History    Social History     Tobacco Use    Smoking status: Current Every Day Smoker     Packs/day: 0.50     Years: 30.00     Pack years: 15.00     Types: Cigarettes    Smokeless tobacco: Never Used    Tobacco comment: on chantix   Substance Use Topics    Alcohol use: No       Allergies  Sulfamethoxazole-trimethoprim; Codeine; Erythromycin; Pcn [penicillins];  Talwin [pentazocine]; and Sulfa antibiotics     Current External ear normal.   Left Ear: External ear normal.   Nose: Nose normal.   Eyes: Pupils are equal, round, and reactive to light. Conjunctivae and EOM are normal.   Neck: Normal range of motion. Neck supple. Cardiovascular: Normal rate and regular rhythm. Pulmonary/Chest: Effort normal and breath sounds normal.   Abdominal: Soft. Bowel sounds are normal. There is no hepatosplenomegaly. Musculoskeletal:        Lumbar back: She exhibits tenderness, pain and spasm. Neurological: She is alert and oriented to person, place, and time. No cranial nerve deficit. Skin: Skin is warm and dry. Psychiatric: She has a normal mood and affect. Her behavior is normal. Judgment and thought content normal.   Nursing note and vitals reviewed. LAST UDS: 5/2/19 + for alprozolam.  UDS repeated today however patient did have current prescription for alprazolam.    Labs:  Per Care Every 7/24/19 CMP renal function normal & Platelets 556    Recent Imaging: none    Assessment:  Principal Problem:    Bilateral low back pain without sciatica  Active Problems:    Pain in both knees    Lumbar disc disease with radiculopathy    Deep groin pain  Resolved Problems:    * No resolved hospital problems. *      Plan:  1. Discontinue Percocet. Patient instructed to take remaining medication to pharmacy when she goes to refill new prescription of hydrocodone 10 1 every 6 hours as needed for pain with a quantity 120.  2.  Patient does not need any refills of gabapentin or tizanidine at this time. 3.  Keep previously scheduled follow-up in November. Will discuss repeating LESI at this time. Patient still has evidence of cellulitis therefore do not want to do epidural injection until completely cleared. Discussion: Discussed exam findings and plan of care with patient. Patient agreed with POC and questions were asked and answered.      Activity: discussed exercise as beneficial to pain reduction, encouraged stretching exercise with a present

## 2020-08-26 ENCOUNTER — HOSPITAL ENCOUNTER (OUTPATIENT)
Dept: PAIN MANAGEMENT | Age: 70
Discharge: HOME | End: 2020-08-26
Payer: MEDICARE

## 2020-08-26 ENCOUNTER — HOSPITAL ENCOUNTER (OUTPATIENT)
Dept: GENERAL RADIOLOGY | Age: 70
Discharge: HOME | End: 2020-08-26
Payer: MEDICARE

## 2020-08-26 PROBLEM — M70.72 ISCHIAL BURSITIS OF LEFT SIDE: Status: ACTIVE | Noted: 2020-08-26

## 2020-08-26 PROCEDURE — 2500000003 HC RX 250 WO HCPCS

## 2020-08-26 PROCEDURE — 20611 DRAIN/INJ JOINT/BURSA W/US: CPT

## 2020-08-26 PROCEDURE — 73560 X-RAY EXAM OF KNEE 1 OR 2: CPT

## 2020-08-26 PROCEDURE — 71046 X-RAY EXAM CHEST 2 VIEWS: CPT

## 2020-08-26 PROCEDURE — 76942 ECHO GUIDE FOR BIOPSY: CPT | Performed by: NURSE PRACTITIONER

## 2020-08-26 PROCEDURE — 20611 DRAIN/INJ JOINT/BURSA W/US: CPT | Performed by: NURSE PRACTITIONER

## 2020-08-26 PROCEDURE — 20552 NJX 1/MLT TRIGGER POINT 1/2: CPT | Performed by: NURSE PRACTITIONER

## 2020-08-26 PROCEDURE — 6360000002 HC RX W HCPCS

## 2020-08-26 RX ORDER — LIDOCAINE HYDROCHLORIDE 10 MG/ML
2 INJECTION, SOLUTION EPIDURAL; INFILTRATION; INTRACAUDAL; PERINEURAL ONCE
Status: DISCONTINUED | OUTPATIENT
Start: 2020-08-26 | End: 2020-08-28 | Stop reason: HOSPADM

## 2020-08-26 RX ORDER — TRIAMCINOLONE ACETONIDE 40 MG/ML
40 INJECTION, SUSPENSION INTRA-ARTICULAR; INTRAMUSCULAR ONCE
Status: DISCONTINUED | OUTPATIENT
Start: 2020-08-26 | End: 2020-08-28 | Stop reason: HOSPADM

## 2020-08-26 RX ORDER — LIDOCAINE HYDROCHLORIDE 10 MG/ML
1 INJECTION, SOLUTION EPIDURAL; INFILTRATION; INTRACAUDAL; PERINEURAL ONCE
Status: DISCONTINUED | OUTPATIENT
Start: 2020-08-26 | End: 2020-08-28 | Stop reason: HOSPADM

## 2020-08-26 RX ORDER — BUPIVACAINE HYDROCHLORIDE 5 MG/ML
1 INJECTION, SOLUTION EPIDURAL; INTRACAUDAL ONCE
Status: DISCONTINUED | OUTPATIENT
Start: 2020-08-26 | End: 2020-08-28 | Stop reason: HOSPADM

## 2020-08-26 RX ORDER — BUPIVACAINE HYDROCHLORIDE 5 MG/ML
2 INJECTION, SOLUTION EPIDURAL; INTRACAUDAL ONCE
Status: DISCONTINUED | OUTPATIENT
Start: 2020-08-26 | End: 2020-08-28 | Stop reason: HOSPADM

## 2020-08-26 NOTE — PROCEDURES
[facility] Physical & Pain Medicine    Patient Name: Titus Romano    : 1950    Age: 68 y.o. Sex: female    Date: 2020    Pre-op Diagnosis: left  Sacroiliac Joint(s) Dysfunction/ Sacroiliitis    Post-op Diagnosis: left  Sacroiliac Joint(s) Dysfunction/ Sacroliliits    Procedure: Ultrasound Guided Injection of  left Sacroiliac Joint(s)     Performing Procedure:  NALINI Kendrick - BC, VA-BC    No data found. left  Sacroiliac Joint(s)     Description of Procedure:    After voluntary, informed and signed consent obtained the patient was placed in a prone position. Appropriate time out was obtained per policy. The Sacroiliac Joint(s) was palpated for area of maximal tenderness. The area was prepped in an aseptic fashion with CHG swab. The ultrasound transducer was used to confirm the appropriate location. The skin was sprayed with Gebauer's Solution. Under aseptic technique and direct ultrasound visualization a 22 gauge 3 inch spinal needle was introduced into the Sacroiliac Joint(s). After a negative aspiration, a solution of 2 ml of 0.5% Marcaine Plain and 2 ml of 1% Lidocaine Plain and 1 ml of Kenalog (40 mg/ml) was injected into the Sacroiliac Joint(s). The needle was withdrawn and a sterile dressing applied. If this was a bilateral procedure, the same steps were followed on the opposite side. Discharge: The patient tolerated the procedure well. There were no complications during the procedure and the patient was discharged home with discharge instructions. The patient has been instructed to contact the office should there be any complications or questions to arise between today and their next appointment. Plan:    The patient will follow up in the office in approximately 6 weeks. LIVE Graf Arm, CNP, 2020 at 3:14 PM   [facility] Physical & Pain Medicine    Patient Name: Titus Romano    : 1950    Age: 68 y.o.     Sex: female    Date: August 26, 2020      Preop Diagnosis: left  Ishial  Bursitis    Postop Diagnosis: left  Ishial  Bursitis    Procedure:  Injection of left  Ishial  Bursa(s)    Performing Procedure:  LAKIA Orlando    No data found. Description of Procedure:    left  Ishial Bursa(s)    After voluntary, informed and signed consent obtained the patient was placed in a lateral recumbent position. Appropriate time out was obtained per policy. The ischial bursa(s) was palpated for area of maximal tenderness. The area was prepped in an aseptic fashion with CHG swab. The ultrasound transducer was used to confirm the appropriate location. The skin was sprayed with Gebauer's Solution. Under aseptic technique a 22 gauge 3 inch spinal needle was introduced into the Ishial Bursa(s). After a negative aspiration a solution of 1 ml of 0.5% Marcaine Plain and 1 ml of 1% Lidocaine Plain and 1 ml of Kenalog (40mg/ml) was injected into the Ischial Bursa(s). The needle was withdrawn and a sterile dressing applied. If this was a bilateral procedure the same steps were followed on the opposite side. Discharge: The patient tolerated the procedure well. There were no complications during the procedure and the patient was discharged home with discharge instructions. The patient has been instructed to contact the office should there be any complications or questions to arise between today and their next appointment. Plan:    The patient will follow up in the office in approximately 6 weeks.      LIVE Ortiz CNP, 8/26/2020 at 3:14 PM

## 2020-08-26 NOTE — PROGRESS NOTES
Subjective:      Shavon Doyle is a 68 y.o. female who presents for evaluation of pain. Records reviewed, and patient examined. Nature of the pain: acute post fall  Location of the pain: left knee and right chest  Intensity: severe  Exacerbating/relieving factors: movement    Review of Systems  Musculoskeletal:positive for arthralgias, bone pain and myalgias      Objective: There were no vitals taken for this visit. General appearance: alert, appears stated age, cooperative, fatigued and mild distress  Back: right chest bruised from fall and patient is tender  Skin: Skin color, texture, turgor normal. No rashes or lesions or bruising to right side of ribs  Radiographs: none and ordering chest xray and left knee      Assessment:      Assessment: questionable bruised ribs vs fractured and knee injury. Plan:       ordered xray of chest and left knee to evaluate for injury.

## 2020-08-30 RX ORDER — HYDROCODONE BITARTRATE AND ACETAMINOPHEN 10; 325 MG/1; MG/1
1 TABLET ORAL EVERY 4 HOURS PRN
Qty: 150 TABLET | Refills: 0 | Status: SHIPPED | OUTPATIENT
Start: 2020-09-04 | End: 2020-09-28 | Stop reason: SDUPTHER

## 2020-08-31 ENCOUNTER — TELEPHONE (OUTPATIENT)
Dept: PAIN MANAGEMENT | Age: 70
End: 2020-08-31

## 2020-09-03 ENCOUNTER — TELEPHONE (OUTPATIENT)
Dept: PAIN MANAGEMENT | Age: 70
End: 2020-09-03

## 2020-09-11 ENCOUNTER — HOSPITAL ENCOUNTER (OUTPATIENT)
Dept: PAIN MANAGEMENT | Age: 70
Discharge: HOME | End: 2020-09-11
Payer: MEDICARE

## 2020-09-11 VITALS
TEMPERATURE: 97.4 F | OXYGEN SATURATION: 97 % | RESPIRATION RATE: 16 BRPM | SYSTOLIC BLOOD PRESSURE: 133 MMHG | HEART RATE: 72 BPM | DIASTOLIC BLOOD PRESSURE: 48 MMHG

## 2020-09-11 PROCEDURE — 62323 NJX INTERLAMINAR LMBR/SAC: CPT

## 2020-09-11 PROCEDURE — 6360000002 HC RX W HCPCS

## 2020-09-11 PROCEDURE — 3209999900 FLUORO FOR SURGICAL PROCEDURES

## 2020-09-11 PROCEDURE — 2580000003 HC RX 258

## 2020-09-11 PROCEDURE — 2500000003 HC RX 250 WO HCPCS

## 2020-09-11 RX ORDER — SODIUM CHLORIDE 9 MG/ML
INJECTION INTRAVENOUS
Status: COMPLETED | OUTPATIENT
Start: 2020-09-11 | End: 2020-09-11

## 2020-09-11 RX ORDER — METHYLPREDNISOLONE ACETATE 80 MG/ML
INJECTION, SUSPENSION INTRA-ARTICULAR; INTRALESIONAL; INTRAMUSCULAR; SOFT TISSUE
Status: COMPLETED | OUTPATIENT
Start: 2020-09-11 | End: 2020-09-11

## 2020-09-11 RX ORDER — LIDOCAINE HYDROCHLORIDE 10 MG/ML
INJECTION, SOLUTION EPIDURAL; INFILTRATION; INTRACAUDAL; PERINEURAL
Status: COMPLETED | OUTPATIENT
Start: 2020-09-11 | End: 2020-09-11

## 2020-09-11 RX ADMIN — SODIUM CHLORIDE 5 ML: 9 INJECTION INTRAVENOUS at 09:45

## 2020-09-11 RX ADMIN — LIDOCAINE HYDROCHLORIDE 5 ML: 10 INJECTION, SOLUTION EPIDURAL; INFILTRATION; INTRACAUDAL; PERINEURAL at 09:45

## 2020-09-11 RX ADMIN — METHYLPREDNISOLONE ACETATE 80 MG: 80 INJECTION, SUSPENSION INTRA-ARTICULAR; INTRALESIONAL; INTRAMUSCULAR; SOFT TISSUE at 09:45

## 2020-09-11 ASSESSMENT — PAIN - FUNCTIONAL ASSESSMENT
PAIN_FUNCTIONAL_ASSESSMENT: 0-10
PAIN_FUNCTIONAL_ASSESSMENT: 0-10

## 2020-09-11 NOTE — INTERVAL H&P NOTE
Update History & Physical    The patient's History and Physical  was reviewed with the patient and I examined the patient. There was  NO CHANGE:[id number]}. The surgical site was confirmed by the patient and me. Plan: The risks, benefits, expected outcome, and alternative to the recommended procedure have been discussed with the patient. Patient understands and wants to proceed with the procedure.      Electronically signed by Jessica Mancera MD on 9/11/2020 at 10:04 AM

## 2020-09-17 ENCOUNTER — TELEPHONE (OUTPATIENT)
Dept: PAIN MANAGEMENT | Age: 70
End: 2020-09-17

## 2020-09-17 NOTE — TELEPHONE ENCOUNTER
I called the patient as a follow up from her injection and she reported that she is doing better and not hurting as bad. Her pain was a 8/10 everyday and now it is a 4/5.

## 2020-09-30 RX ORDER — HYDROCODONE BITARTRATE AND ACETAMINOPHEN 10; 325 MG/1; MG/1
1 TABLET ORAL EVERY 4 HOURS PRN
Qty: 150 TABLET | Refills: 0 | Status: SHIPPED | OUTPATIENT
Start: 2020-10-04 | End: 2020-10-26 | Stop reason: SDUPTHER

## 2020-10-26 RX ORDER — HYDROCODONE BITARTRATE AND ACETAMINOPHEN 10; 325 MG/1; MG/1
1 TABLET ORAL EVERY 4 HOURS PRN
Qty: 150 TABLET | Refills: 0 | Status: SHIPPED | OUTPATIENT
Start: 2020-11-03 | End: 2020-11-23 | Stop reason: SDUPTHER

## 2020-11-26 RX ORDER — GABAPENTIN 600 MG/1
900 TABLET ORAL 3 TIMES DAILY
Qty: 135 TABLET | Refills: 2 | Status: SHIPPED | OUTPATIENT
Start: 2020-11-27 | End: 2021-03-03 | Stop reason: SDUPTHER

## 2020-11-26 RX ORDER — HYDROCODONE BITARTRATE AND ACETAMINOPHEN 10; 325 MG/1; MG/1
1 TABLET ORAL EVERY 4 HOURS PRN
Qty: 150 TABLET | Refills: 0 | Status: SHIPPED | OUTPATIENT
Start: 2020-12-03 | End: 2021-03-29

## 2020-11-26 RX ORDER — TIZANIDINE 4 MG/1
4 TABLET ORAL EVERY 8 HOURS PRN
Qty: 90 TABLET | Refills: 2 | Status: SHIPPED | OUTPATIENT
Start: 2020-11-26 | End: 2021-03-05 | Stop reason: SDUPTHER

## 2020-12-09 ENCOUNTER — HOSPITAL ENCOUNTER (OUTPATIENT)
Dept: PAIN MANAGEMENT | Age: 70
Discharge: HOME | End: 2020-12-09
Payer: MEDICARE

## 2020-12-09 VITALS
HEART RATE: 69 BPM | HEIGHT: 66 IN | BODY MASS INDEX: 25.17 KG/M2 | SYSTOLIC BLOOD PRESSURE: 147 MMHG | OXYGEN SATURATION: 97 % | DIASTOLIC BLOOD PRESSURE: 69 MMHG | RESPIRATION RATE: 20 BRPM | TEMPERATURE: 98.4 F | WEIGHT: 156.6 LBS

## 2020-12-09 PROCEDURE — 99214 OFFICE O/P EST MOD 30 MIN: CPT | Performed by: NURSE PRACTITIONER

## 2020-12-09 PROCEDURE — 99213 OFFICE O/P EST LOW 20 MIN: CPT

## 2020-12-09 RX ORDER — HYDROCODONE BITARTRATE AND ACETAMINOPHEN 10; 325 MG/1; MG/1
1 TABLET ORAL
Qty: 150 TABLET | Refills: 0 | Status: SHIPPED | OUTPATIENT
Start: 2020-12-09 | End: 2021-01-27 | Stop reason: SDUPTHER

## 2020-12-09 ASSESSMENT — ENCOUNTER SYMPTOMS
CONSTIPATION: 0
BACK PAIN: 1

## 2020-12-09 ASSESSMENT — PAIN DESCRIPTION - LOCATION: LOCATION: BACK

## 2020-12-09 ASSESSMENT — PAIN DESCRIPTION - FREQUENCY: FREQUENCY: INTERMITTENT

## 2020-12-09 ASSESSMENT — PAIN DESCRIPTION - ORIENTATION: ORIENTATION: LOWER

## 2020-12-09 ASSESSMENT — PAIN DESCRIPTION - PAIN TYPE: TYPE: CHRONIC PAIN

## 2020-12-09 ASSESSMENT — PAIN SCALES - GENERAL: PAINLEVEL_OUTOF10: 9

## 2020-12-09 ASSESSMENT — PAIN DESCRIPTION - ONSET: ONSET: ON-GOING

## 2020-12-09 ASSESSMENT — PAIN DESCRIPTION - PROGRESSION: CLINICAL_PROGRESSION: GRADUALLY WORSENING

## 2020-12-09 ASSESSMENT — PAIN DESCRIPTION - DESCRIPTORS: DESCRIPTORS: ACHING;BURNING;CONSTANT;RADIATING

## 2020-12-09 NOTE — PROGRESS NOTES
Clinic Documentation      Education Provided:  [x] Review of Arnita Councilman  [] Agreement Review  [x] PEG Score Calculated [] PHQ Score Calculated [] ORT Score Calculated    [] Compliance Issues Discussed [] Cognitive Behavior Needs [x] Exercise [] Review of Test [] Financial Issues  [x] Tobacco/Alcohol Use Reviewed [x] Teaching [] New Patient [] Picture Obtained    Physician Plan:  [] Outgoing Referral  [] Pharmacy Consult  [] Test Ordered [x] Prescription Ordered/Changed   [] Obtained Test Results / Consult Notes        Complete if patient is withholding blood thinner for procedure     Blood Thinner Patient is currently taking:      [] Plavix (Hold for 7 days)  [] Aspirin (Hold for 5 days)     [] Pletal (Hold for 2 days)  [] Pradaxa (Hold for 3 days)    [] Effient (Hold for 7 days)  [] Xarelto (Hold for 2 days)    [] Eliquis (Hold for 2 days)  [] Brilinta (Hold for 7 days)    [] Coumadin (Hold for 5 days) - (INR needs to be drawn the day prior to procedure- INR < 2.0)    [] Aggrenox (Hold for 7 days)        [] Patient will stop medication on their own.    [] Blood Thinner Form Faxed for approval to hold.    Provider form faxed to: [email or web address]    Assessment Completed by:  Electronically signed by Solange Nolasco RN on 12/9/2020 at 8:08 AM

## 2020-12-09 NOTE — PROGRESS NOTES
[facility] Physical & Pain Medicine  Office Note    Patient Name: Esme Mendez    MR #: [id number]    Account #: [de-identified]    : 1950    Age: 65 y.o. Sex: female    Date: 2020    PCP: Nico Perea MD    Chief Complaint:   Chief Complaint   Patient presents with    Lower Back Pain    Hip Pain       History of Present Illness:     Esme Mendez is a 65 y.o. female who presents for procedure follow-up. Patient had left sacroiliac joint injection and left ischial bursa injection on 2020 and Lumbar Epidural L3-4 on 2020. Patient had at least 85% relief of pain from procedure(s) for at least 6 weeks and was able to increase activity after procedure. Patient received enough pain relief from injections that the patient would like to repeat the injection(s). When patient was seen on 2020 for procedure, she was having left knee and right right-sided pain and chest due to a fall. Patient had assessment completed and ordered x-ray of chest and left knee. Chest x-ray ray revealed no acute process and left knee indicated well-seated normally aligned prosthetic components and no acute bony abnormalities. Patient presents today for sooner appointment due to increase in pain. Patient has fallen three times since last injection. Patient in unable to sit fully on left hip. Patient had been seen by PCP, no imaging was ordered. Reviewed past imaging and patient noted to have moderate to severe hip arthritis. Back Pain   This is a chronic problem. The current episode started more than 1 month ago. The problem occurs constantly. The problem has been gradually worsening since onset. The pain is present in the lumbar spine and sacro-iliac. The quality of the pain is described as aching, cramping and shooting. The symptoms are aggravated by bending, sitting, standing and twisting. Pertinent negatives include no numbness or weakness. Hip Pain   This is a chronic problem.  The current episode started more than 1 year ago. The problem occurs constantly. The problem has been gradually worsening. Associated symptoms include arthralgias and myalgias. Pertinent negatives include no neck pain, numbness or weakness. Screening Tools:    PEG Score: 9.3     Last PEG Score: 9     Annual ORT Score: 5     Annual PHQ Score: 16    Current Pain Assessment  Pain Assessment  Pain Assessment: 0-10  Pain Level: 9  Pain Type: Chronic pain  Pain Location: Back  Pain Orientation: Lower  Pain Descriptors: Aching, Burning, Constant, Radiating  Pain Frequency: Intermittent  Pain Onset: On-going  Clinical Progression: Gradually worsening    Past Visit HPI:  7/27/2020  presents after procedure follow up. Patient had LESI of L3/L4 and Left SI. Patient had good results from injections and she would like to repeat. However, patient has extreme tenderness in the buttocks especially while sitting down. This tenderness remained after injections. 5/5/2020  presents for follow-up of imaging and office visit. X-rays reviewed. Patient does have hip arthritis. Patient is still having increase in pain since fall. Patient states that pain medication is barely controlling pain. If patient does increase activity pain medication is not controlling pain. Back Pain is a recurrent problem. The current episode started 1 to 4 weeks ago. The problem occurs constantly. The problem has been waxing and waning since onset. The pain is present in the lumbar spine and sacro-iliac. The quality of the pain is described as shooting, cramping and aching. The symptoms are aggravated by bending, sitting, standing and twisting. Pertinent negatives include no numbness or weakness. 2/4/2020  presents to the office for follow up of procedure. Patient is having worsening left hip pain s/p fall from the shower. Patient had been ill she thinks she lost LOC in the shower. She remembers falling and hitting the space heater with her head. Patient never lost LOC after fall. Patient had headache with low back pain and left hip pain. Patient followed up with PCP post fall. However pain is getting worse. Patient can not sit on left hip. Patient denies radiating pain. Patient states that pain is a constant ache. She cannot get comfortable. Patient is having problem ambulating due to pain. Patient is requesting xrays. Back Pain is a recurrent problem. The current episode started 1 to 4 weeks ago. The problem occurs constantly. The problem has been rapidly worsening since onset. The pain is present in the lumbar spine and sacro-iliac. The quality of the pain is described as aching and shooting. The symptoms are aggravated by bending, sitting and standing. Pertinent negatives include no numbness or weakness. 11/4/19  Annual Review  presents to the office for annual exam with primary complaints of chronic low back pain. Patient has been established with this practice for years. She has been receiving LESI of L3-L4 however injections had to be postponed due to cellulitis to her face. Patient had numerous hospitalizations and antibiotics and treatment.      Patient takes Norco, gabapentin and Zanaflex. Patient states that current medication regimen helps to keep patient's pain at a tolerable level however she is ready to repeat injections. Patient denies any side effects to medications at this time.     Past Medical Histoy  Past Medical History:   Diagnosis Date    Arthritis     Bilateral low back pain without sciatica 12/22/2015    [facility] ([facility] [address]     Hx of blood clots     Hyperlipidemia     Pain in both knees 12/22/2015    Thyroid disease     Urinary incontinence        Surgery History  Past Surgical History:   Procedure Laterality Date    CHOLECYSTECTOMY, LAPAROSCOPIC      CYST INCISION AND DRAINAGE      left Cheek    HYSTERECTOMY      JOINT REPLACEMENT      LOBECTOMY      RIGHT PARTIAL    SKIN CANCER EXCISION          Family History  family history includes Cancer in her mother; Heart Attack in her father; Hypertension in her mother. Social History    Social History     Tobacco Use    Smoking status: Current Every Day Smoker     Packs/day: 0.50     Years: 30.00     Pack years: 15.00     Types: Cigarettes    Smokeless tobacco: Never Used    Tobacco comment: on chantix   Substance Use Topics    Alcohol use: No       Allergies  Sulfamethoxazole-trimethoprim; Codeine; Erythromycin; Pcn [penicillins]; Talwin [pentazocine]; and Sulfa antibiotics     Current Medications  Current Outpatient Medications   Medication Sig Dispense Refill    gabapentin (NEURONTIN) 600 MG tablet Take 1.5 tablets by mouth 3 times daily for 90 days. 135 tablet 2    HYDROcodone-acetaminophen (NORCO)  MG per tablet Take 1 tablet by mouth every 4 hours as needed for Pain for up to 30 days. May fill 12/3/20 150 tablet 0    tiZANidine (ZANAFLEX) 4 MG tablet Take 1 tablet by mouth every 8 hours as needed (muscle spasms) 90 tablet 2    escitalopram (LEXAPRO) 10 MG tablet Take 10 mg by mouth daily      pantoprazole (PROTONIX) 40 MG tablet       nystatin (MYCOSTATIN) 158432 UNIT/ML suspension       promethazine (PHENERGAN) 25 MG tablet       sucralfate (CARAFATE) 1 GM/10ML suspension Take 1 g by mouth 4 times daily      ALPRAZolam (XANAX) 0.5 MG tablet Take 0.5 mg by mouth 2 times daily as needed. Kirby Cao levothyroxine (SYNTHROID) 100 MCG tablet Take 100 mcg by mouth daily       No current facility-administered medications for this encounter. Review of Systems:  Review of Systems   Constitutional: Negative for activity change. Gastrointestinal: Negative for constipation. Musculoskeletal: Positive for arthralgias, back pain, gait problem and myalgias. Negative for neck pain. Neurological: Negative for weakness and numbness. Psychiatric/Behavioral: Negative for agitation, self-injury and suicidal ideas. The patient is not nervous/anxious. 14 point ROS negative besides that noted in HPI    Physical Exam:    Vitals:    12/09/20 1402   BP: (!) 147/69   Pulse: 69   Resp: 20   Temp: 98.4 °F (36.9 °C)   TempSrc: Temporal   SpO2: 97%   Weight: 156 lb 9.6 oz (71 kg)   Height: 5' 6\" (1.676 m)       Body mass index is 25.28 kg/m². Physical Exam  Vitals signs and nursing note reviewed. Constitutional:       General: She is not in acute distress. Appearance: She is well-developed. HENT:      Head: Normocephalic. Right Ear: External ear normal.      Left Ear: External ear normal.      Nose: Nose normal.   Eyes:      Conjunctiva/sclera: Conjunctivae normal.      Pupils: Pupils are equal, round, and reactive to light. Neck:      Vascular: No JVD. Trachea: No tracheal deviation. Cardiovascular:      Rate and Rhythm: Normal rate. Pulmonary:      Effort: Pulmonary effort is normal.   Abdominal:      General: There is no distension. Tenderness: There is no abdominal tenderness. Musculoskeletal:      Left hip: She exhibits tenderness and bony tenderness. Lumbar back: She exhibits tenderness, pain and spasm. Comments: Patient points to on left SI joint as the source of low back pain  TTP of SI joint on left  positive Kalia's on left, positive Distraction on left, positive Thigh Thrust on left  TTP to left ischial bursa    Above assessment the same but continues with gradually worsening. Skin:     General: Skin is warm and dry. Neurological:      Mental Status: She is alert and oriented to person, place, and time. Cranial Nerves: No cranial nerve deficit. Psychiatric:         Behavior: Behavior normal.         Thought Content:  Thought content normal.         Judgment: Judgment normal.          Labs:  Lab Results   Component Value Date     04/18/2011    K 4.6 04/18/2011     04/18/2011    CO2 29 04/18/2011    BUN 8 04/18/2011    CREATININE 1.0 11/10/2016    CREATININE 0.8 04/18/2011    GLUCOSE 129 04/18/2011    CALCIUM 10.1 04/18/2011        No results found for: WBC, HGB, HCT, MCV, PLT      Assessment:  Active Problems:    Bilateral low back pain without sciatica    Pain in both knees    Lumbar disc disease with radiculopathy    Deep groin pain    Hip pain, acute, left    SI (sacroiliac) joint dysfunction    Pain management contract agreement    Ischial bursitis of left side  Resolved Problems:    * No resolved hospital problems. *      Plan:  1. Chronic bilateral low back pain without sciatica  - HYDROcodone-acetaminophen (NORCO)  MG per tablet; Take 1 tablet by mouth every 4-6 hours as needed for Pain (not to exceed 5 per day) for up to 30 days. Intended supply: 30 days  Dispense: 150 tablet; Refill: 0    -Re Schedule Left SI and Left ischial bursa with US with NP    -Re Schedule LESI L3/L4 under fluoroscopy with Dr Long Orona    -Schedule left hip with fluoroscopy with Dr. Long Orona. Per xray of bilateral hips and pelvis patient has moderate to severe arthritis in left hip. Will see if injection improves pain.     -Continue zanaflex Take 1 tablet by mouth every 8 hours as needed (muscle spasms), Disp-90 tablet,R-2 with no refill sent at the appointment     Continue gabapentin Take 1.5 tablets by mouth 3 times daily for 90 days. , Disp-135 tablet,R-2 with no refill sent at the appointment    [x] Follow up    [] 4 weeks   [x] 6-8 weeks   [] 10-12 weeks   [] 3 months  [x] Post procedure to evaluate effectiveness of treatment  [] To evaluate medications changes made at office visit. [] To review diagnostics ordered at last visit. [] To evaluate response to therapy    [x] For management of controlled substance  [x] Random UDS as indicated by ORT score or if indicated by abberent behaviors    Discussion: Discussed exam findings and plan of care with patient. Patient agreed with POC and questions were asked and answered.      Activity: Discussed exercise as beneficial to pain reduction, encouraged daily stretching with a focus on torso strengthening. Goal:  Pain Management Goals of Therapy:   [] Resolution in pain  [x] Decrease in pain level  [x] Improvement in ADL's  [x] Increase in activities with less pain  [] Decrease in medication      Controlled substance monitoring:    [x] Discussed medication side effects, risk of tolerance and/or dependence, and/or alternative treatment  [] Discussed the detrimental effects of long term narcotic use in younger patients especially women of childbearing years. [x] No signs and symptoms of potential drug abuse or diversion were identified  [] Signs of potential drug abuse or diversion were identified   [] ORT Score   [] UDS non-compliant   [] See Note  [] Random urine drug screen sent today  [x] Random urine drug screen not completed today   [] Deferred New Patient  [x] Compliant  8/1/19  [] Not Compliant see note  [] Medication agreement with provider signed today  [x] Medication agreement with provider on file under media  [] Medication regimen effective and continued   [] New patient continuing current medication while developing POC   [x] On going assessment and evaluation of medication regimen  [] Medication regimen not effective see plan for changes  [x] Jinx File reviewed & on file under media     CC:  MD Aldo Ralph, APRN - CNP, 12/9/2020 at 2:27 PM    EMR dragon/transcription disclaimer: Much of this encounter note is electronic transcription/translation of spoken language to printed tach. Electronic translation of spoken language may be erroneous, or at times, nonsensical words or phrases may be inadvertently transcribed.  Although, I have reviewed the note for such errors, some may still exist.

## 2020-12-23 ENCOUNTER — HOSPITAL ENCOUNTER (OUTPATIENT)
Dept: PAIN MANAGEMENT | Age: 70
Discharge: HOME | End: 2020-12-23
Payer: MEDICARE

## 2020-12-23 VITALS
SYSTOLIC BLOOD PRESSURE: 154 MMHG | DIASTOLIC BLOOD PRESSURE: 58 MMHG | TEMPERATURE: 97.4 F | OXYGEN SATURATION: 96 % | RESPIRATION RATE: 18 BRPM | HEART RATE: 67 BPM

## 2020-12-23 PROCEDURE — 76942 ECHO GUIDE FOR BIOPSY: CPT | Performed by: NURSE PRACTITIONER

## 2020-12-23 PROCEDURE — 20611 DRAIN/INJ JOINT/BURSA W/US: CPT

## 2020-12-23 PROCEDURE — 20610 DRAIN/INJ JOINT/BURSA W/O US: CPT

## 2020-12-23 PROCEDURE — 20610 DRAIN/INJ JOINT/BURSA W/O US: CPT | Performed by: NURSE PRACTITIONER

## 2020-12-23 PROCEDURE — 2500000003 HC RX 250 WO HCPCS

## 2020-12-23 PROCEDURE — 20552 NJX 1/MLT TRIGGER POINT 1/2: CPT | Performed by: NURSE PRACTITIONER

## 2020-12-23 PROCEDURE — 6360000002 HC RX W HCPCS

## 2020-12-23 RX ORDER — TRIAMCINOLONE ACETONIDE 40 MG/ML
80 INJECTION, SUSPENSION INTRA-ARTICULAR; INTRAMUSCULAR ONCE
Status: COMPLETED | OUTPATIENT
Start: 2020-12-23 | End: 2020-12-23

## 2020-12-23 RX ORDER — LIDOCAINE HYDROCHLORIDE 10 MG/ML
3 INJECTION, SOLUTION EPIDURAL; INFILTRATION; INTRACAUDAL; PERINEURAL ONCE
Status: COMPLETED | OUTPATIENT
Start: 2020-12-23 | End: 2020-12-23

## 2020-12-23 RX ORDER — BUPIVACAINE HYDROCHLORIDE 5 MG/ML
3 INJECTION, SOLUTION EPIDURAL; INTRACAUDAL ONCE
Status: COMPLETED | OUTPATIENT
Start: 2020-12-23 | End: 2020-12-23

## 2020-12-23 RX ADMIN — TRIAMCINOLONE ACETONIDE 80 MG: 40 INJECTION, SUSPENSION INTRA-ARTICULAR; INTRAMUSCULAR at 10:49

## 2020-12-23 RX ADMIN — BUPIVACAINE HYDROCHLORIDE 15 MG: 5 INJECTION, SOLUTION EPIDURAL; INTRACAUDAL at 10:49

## 2020-12-23 RX ADMIN — LIDOCAINE HYDROCHLORIDE 3 ML: 10 INJECTION, SOLUTION EPIDURAL; INFILTRATION; INTRACAUDAL; PERINEURAL at 10:49

## 2020-12-23 ASSESSMENT — PAIN SCALES - GENERAL: PAINLEVEL_OUTOF10: 5

## 2020-12-23 NOTE — PROCEDURES
[facility] Physical & Pain Medicine    Patient Name: Dale Garcia    : 1950    Age: 65 y.o. Sex: female    Date: 2020      Preop Diagnosis: left  Ishial  Bursitis    Postop Diagnosis: left  Ishial  Bursitis    Procedure: Ultrasound Guided Injection of left  Ishial  Bursa(s)    Performing Procedure:  Venkatesh Pinon [facility]    Patient Vitals for the past 24 hrs:   BP Temp Temp src Pulse Resp SpO2   20 1040 (!) 154/58 97.4 °F (36.3 °C) Temporal 67 18 96 %       Description of Procedure:    left  Ishial Bursa(s)    After voluntary, informed and signed consent obtained the patient was placed in a lateral recumbent position. Appropriate time out was obtained per policy. The ischial bursa(s) was palpated for area of maximal tenderness. The area was prepped in an aseptic fashion with CHG swab. The ultrasound transducer was used to confirm the appropriate location. The skin was sprayed with Gebauer's Solution. Under aseptic technique and direct ultrasound visualization a 22 gauge 3 inch spinal needle was introduced into the Ishial Bursa(s). After a negative aspiration a solution of 1 ml of 0.5% Marcaine Plain and 1 ml of 1% Lidocaine Plain and 1 ml of Kenalog (40mg/ml) was injected into the Ischial Bursa(s). The needle was withdrawn and a sterile dressing applied. If this was a bilateral procedure the same steps were followed on the opposite side. Discharge: The patient tolerated the procedure well. There were no complications during the procedure and the patient was discharged home with discharge instructions. The patient has been instructed to contact the office should there be any complications or questions to arise between today and their next appointment. Plan:    The patient will follow up in the office in approximately 6 weeks.      LIVE Parekh CNP, 2020 at 12:11 PM     [facility] Physical & Pain Medicine    Patient Name: Amanda Carrillo    : 1950    Age: 65 y.o. Sex: female    Date: 2020    Pre-op Diagnosis: left  Sacroiliac Joint(s) Dysfunction/ Sacroiliitis    Post-op Diagnosis: left  Sacroiliac Joint(s) Dysfunction/ Sacroliliits    Procedure: Ultrasound Guided Injection of  left Sacroiliac Joint(s)     Performing Procedure:  [facility]    Patient Vitals for the past 24 hrs:   BP Temp Temp src Pulse Resp SpO2   20 1040 (!) 154/58 97.4 °F (36.3 °C) Temporal 67 18 96 %       left  Sacroiliac Joint(s)     Description of Procedure:    After voluntary, informed and signed consent obtained the patient was placed in a prone position. Appropriate time out was obtained per policy. The Sacroiliac Joint(s) was palpated for area of maximal tenderness. The area was prepped in an aseptic fashion with CHG swab. The ultrasound transducer was used to confirm the appropriate location. The skin was sprayed with Gebauer's Solution. Under aseptic technique and direct ultrasound visualization a 22 gauge 3 inch spinal needle was introduced into the Sacroiliac Joint(s). After a negative aspiration, a solution of 2 ml of 0.5% Marcaine Plain and 2 ml of 1% Lidocaine Plain and 1 ml of Kenalog (40 mg/ml) was injected into the Sacroiliac Joint(s). The needle was withdrawn and a sterile dressing applied. If this was a bilateral procedure, the same steps were followed on the opposite side. Discharge: The patient tolerated the procedure well. There were no complications during the procedure and the patient was discharged home with discharge instructions. The patient has been instructed to contact the office should there be any complications or questions to arise between today and their next appointment. Plan:    The patient will follow up in the office in approximately 6 weeks.      LIVE Mckeon CNP, 2020 at 12:11 PM

## 2020-12-28 ENCOUNTER — TELEPHONE (OUTPATIENT)
Dept: PAIN MANAGEMENT | Age: 70
End: 2020-12-28

## 2021-01-22 ENCOUNTER — HOSPITAL ENCOUNTER (OUTPATIENT)
Dept: PAIN MANAGEMENT | Age: 71
Discharge: HOME | End: 2021-01-22
Payer: MEDICARE

## 2021-01-22 VITALS
RESPIRATION RATE: 20 BRPM | TEMPERATURE: 97.3 F | SYSTOLIC BLOOD PRESSURE: 135 MMHG | DIASTOLIC BLOOD PRESSURE: 66 MMHG | OXYGEN SATURATION: 97 % | HEART RATE: 66 BPM

## 2021-01-22 DIAGNOSIS — M51.36 LUMBAR DEGENERATIVE DISC DISEASE: ICD-10-CM

## 2021-01-22 PROCEDURE — 3209999900 FLUORO FOR SURGICAL PROCEDURES

## 2021-01-22 PROCEDURE — 2500000003 HC RX 250 WO HCPCS

## 2021-01-22 PROCEDURE — 62323 NJX INTERLAMINAR LMBR/SAC: CPT

## 2021-01-22 PROCEDURE — 2580000003 HC RX 258

## 2021-01-22 PROCEDURE — 6360000002 HC RX W HCPCS

## 2021-01-22 RX ORDER — SODIUM CHLORIDE 9 MG/ML
INJECTION INTRAVENOUS
Status: COMPLETED | OUTPATIENT
Start: 2021-01-22 | End: 2021-01-22

## 2021-01-22 RX ORDER — LIDOCAINE HYDROCHLORIDE 10 MG/ML
INJECTION, SOLUTION EPIDURAL; INFILTRATION; INTRACAUDAL; PERINEURAL
Status: COMPLETED | OUTPATIENT
Start: 2021-01-22 | End: 2021-01-22

## 2021-01-22 RX ORDER — METHYLPREDNISOLONE ACETATE 80 MG/ML
INJECTION, SUSPENSION INTRA-ARTICULAR; INTRALESIONAL; INTRAMUSCULAR; SOFT TISSUE
Status: COMPLETED | OUTPATIENT
Start: 2021-01-22 | End: 2021-01-22

## 2021-01-22 RX ADMIN — LIDOCAINE HYDROCHLORIDE 5 ML: 10 INJECTION, SOLUTION EPIDURAL; INFILTRATION; INTRACAUDAL; PERINEURAL at 09:58

## 2021-01-22 RX ADMIN — SODIUM CHLORIDE 5 ML: 9 INJECTION INTRAVENOUS at 09:58

## 2021-01-22 RX ADMIN — METHYLPREDNISOLONE ACETATE 80 MG: 80 INJECTION, SUSPENSION INTRA-ARTICULAR; INTRALESIONAL; INTRAMUSCULAR; SOFT TISSUE at 09:58

## 2021-01-22 ASSESSMENT — PAIN DESCRIPTION - LOCATION: LOCATION: BACK

## 2021-01-22 ASSESSMENT — PAIN SCALES - GENERAL: PAINLEVEL_OUTOF10: 7

## 2021-01-22 NOTE — PROGRESS NOTES
Procedure:  Level of Consciousness: [x]Alert [x]Oriented []Disoriented []Lethargic  Anxiety Level: [x]Calm []Anxious []Depressed []Other  Skin: [x]Warm [x]Dry []Cool []Moist []Intact []Other  Cardiovascular: [x]Palpitations: [x]Never []Occasionally []Frequently  Chest Pain: [x]No []Yes  Respiratory:  [x]Unlabored []Labored []Cough ([] Productive []Unproductive)  HCG Required: [x]No []Yes   Results: []Negative []Positive  Knowledge Level:        [x]Patient/Other verbalized understanding of pre-procedure instructions. [x]Assessment of post-op care needs (transportation, responsible caregiver)        [x]Able to discuss health care problems and how to deal with it.   Factors that Affect Teaching:        Language Barrier: [x]No []Yes - why:        Hearing Loss:        [x]No []Yes            Corrective Device:  []Yes [x]No        Vision Loss:           []No [x]Yes            Corrective Device:  [x]Yes []No        Memory Loss:       [x]No []Yes            []Short Term []Long Term  Motivational Level:  [x]Asks Questions                  []Extremely Anxious       [x]Seems Interested               []Seems Uninterested                  [x]Denies need for Education  Risk for Injury:  [x]Patient oriented to person, place and time  [x]History of frequent falls/loss of balance  Nutritional:  []Change in appetite   []Weight Gain   []Weight Loss  Functional:  []Requires assistance with ADL's

## 2021-01-22 NOTE — INTERVAL H&P NOTE
Update History & Physical    The patient's History and Physical  was reviewed with the patient and I examined the patient. There was NO CHANGE:[id number]}. The surgical site was confirmed by the patient and me. Plan: The risks, benefits, expected outcome, and alternative to the recommended procedure have been discussed with the patient. Patient understands and wants to proceed with the procedure.      Electronically signed by Tatyana Valiente MD on 1/22/2021 at 10:03 AM

## 2021-01-27 DIAGNOSIS — M54.50 CHRONIC BILATERAL LOW BACK PAIN WITHOUT SCIATICA: Chronic | ICD-10-CM

## 2021-01-27 DIAGNOSIS — G89.29 CHRONIC BILATERAL LOW BACK PAIN WITHOUT SCIATICA: Chronic | ICD-10-CM

## 2021-01-27 RX ORDER — HYDROCODONE BITARTRATE AND ACETAMINOPHEN 10; 325 MG/1; MG/1
1 TABLET ORAL
Qty: 150 TABLET | Refills: 0 | Status: SHIPPED | OUTPATIENT
Start: 2021-02-10 | End: 2021-03-03 | Stop reason: SDUPTHER

## 2021-02-24 ENCOUNTER — TELEPHONE (OUTPATIENT)
Dept: PAIN MANAGEMENT | Age: 71
End: 2021-02-24
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
## 2021-02-26 ENCOUNTER — HOSPITAL ENCOUNTER (OUTPATIENT)
Dept: PAIN MANAGEMENT | Age: 71
Discharge: HOME | End: 2021-02-26
Payer: MEDICARE

## 2021-02-26 VITALS
OXYGEN SATURATION: 97 % | RESPIRATION RATE: 18 BRPM | DIASTOLIC BLOOD PRESSURE: 73 MMHG | BODY MASS INDEX: 25.07 KG/M2 | HEIGHT: 66 IN | TEMPERATURE: 96.2 F | SYSTOLIC BLOOD PRESSURE: 145 MMHG | WEIGHT: 156 LBS | HEART RATE: 64 BPM

## 2021-02-26 DIAGNOSIS — M51.16 LUMBAR DISC DISEASE WITH RADICULOPATHY: ICD-10-CM

## 2021-02-26 DIAGNOSIS — M70.62 TROCHANTERIC BURSITIS OF LEFT HIP: ICD-10-CM

## 2021-02-26 PROCEDURE — 6360000002 HC RX W HCPCS

## 2021-02-26 PROCEDURE — 3209999900 FLUORO FOR SURGICAL PROCEDURES

## 2021-02-26 PROCEDURE — 20610 DRAIN/INJ JOINT/BURSA W/O US: CPT

## 2021-02-26 PROCEDURE — 2500000003 HC RX 250 WO HCPCS

## 2021-02-26 RX ORDER — BUPIVACAINE HYDROCHLORIDE 5 MG/ML
INJECTION, SOLUTION EPIDURAL; INTRACAUDAL
Status: COMPLETED | OUTPATIENT
Start: 2021-02-26 | End: 2021-02-26

## 2021-02-26 RX ORDER — SODIUM CHLORIDE 9 MG/ML
INJECTION INTRAVENOUS
Status: COMPLETED | OUTPATIENT
Start: 2021-02-26 | End: 2021-02-26

## 2021-02-26 RX ORDER — METHYLPREDNISOLONE ACETATE 80 MG/ML
INJECTION, SUSPENSION INTRA-ARTICULAR; INTRALESIONAL; INTRAMUSCULAR; SOFT TISSUE
Status: COMPLETED | OUTPATIENT
Start: 2021-02-26 | End: 2021-02-26

## 2021-02-26 RX ORDER — LIDOCAINE HYDROCHLORIDE 10 MG/ML
INJECTION, SOLUTION EPIDURAL; INFILTRATION; INTRACAUDAL; PERINEURAL
Status: COMPLETED | OUTPATIENT
Start: 2021-02-26 | End: 2021-02-26

## 2021-02-26 RX ADMIN — LIDOCAINE HYDROCHLORIDE 5 ML: 10 INJECTION, SOLUTION EPIDURAL; INFILTRATION; INTRACAUDAL; PERINEURAL at 10:40

## 2021-02-26 RX ADMIN — METHYLPREDNISOLONE ACETATE 80 MG: 80 INJECTION, SUSPENSION INTRA-ARTICULAR; INTRALESIONAL; INTRAMUSCULAR; SOFT TISSUE at 10:41

## 2021-02-26 RX ADMIN — SODIUM CHLORIDE 1 ML: 9 INJECTION INTRAVENOUS at 10:41

## 2021-02-26 RX ADMIN — BUPIVACAINE HYDROCHLORIDE 1 ML: 5 INJECTION, SOLUTION EPIDURAL; INTRACAUDAL at 10:41

## 2021-02-26 ASSESSMENT — PAIN - FUNCTIONAL ASSESSMENT: PAIN_FUNCTIONAL_ASSESSMENT: 0-10

## 2021-02-26 NOTE — INTERVAL H&P NOTE
Update History & Physical    The patient's History and Physical was reviewed with the patient and I examined the patient. There was NO CHANGE:[id number]}. The surgical site was confirmed by the patient and me. Plan: The risks, benefits, expected outcome, and alternative to the recommended procedure have been discussed with the patient. Patient understands and wants to proceed with the procedure.      Electronically signed by Shalom Duran MD on 2/26/2021 at 10:38 AM

## 2021-03-02 ENCOUNTER — TELEPHONE (OUTPATIENT)
Dept: PAIN MANAGEMENT | Age: 71
End: 2021-03-02

## 2021-03-02 DIAGNOSIS — M25.561 CHRONIC PAIN OF BOTH KNEES: ICD-10-CM

## 2021-03-02 DIAGNOSIS — M25.562 CHRONIC PAIN OF BOTH KNEES: ICD-10-CM

## 2021-03-02 DIAGNOSIS — M54.50 CHRONIC BILATERAL LOW BACK PAIN WITHOUT SCIATICA: ICD-10-CM

## 2021-03-02 DIAGNOSIS — G89.29 CHRONIC BILATERAL LOW BACK PAIN WITHOUT SCIATICA: ICD-10-CM

## 2021-03-02 DIAGNOSIS — M51.16 LUMBAR DISC DISEASE WITH RADICULOPATHY: ICD-10-CM

## 2021-03-02 DIAGNOSIS — G89.29 CHRONIC PAIN OF BOTH KNEES: ICD-10-CM

## 2021-03-02 NOTE — TELEPHONE ENCOUNTER
Spoke with pt concerning her injection she had on 02/26. Pt states she is currently at [facility] ER with a sore throat and fever. She was not able to give me pain scores due to feeling so bad. Pt does have a follow up scheduled. No further questions at this time.

## 2021-03-03 DIAGNOSIS — M54.50 CHRONIC BILATERAL LOW BACK PAIN WITHOUT SCIATICA: Chronic | ICD-10-CM

## 2021-03-03 DIAGNOSIS — M51.16 LUMBAR DISC DISEASE WITH RADICULOPATHY: ICD-10-CM

## 2021-03-03 DIAGNOSIS — G89.29 CHRONIC BILATERAL LOW BACK PAIN WITHOUT SCIATICA: Chronic | ICD-10-CM

## 2021-03-05 RX ORDER — HYDROCODONE BITARTRATE AND ACETAMINOPHEN 10; 325 MG/1; MG/1
1 TABLET ORAL
Qty: 150 TABLET | Refills: 0 | Status: SHIPPED | OUTPATIENT
Start: 2021-03-12 | End: 2021-04-22

## 2021-03-05 RX ORDER — GABAPENTIN 600 MG/1
900 TABLET ORAL 3 TIMES DAILY
Qty: 135 TABLET | Refills: 2 | Status: SHIPPED | OUTPATIENT
Start: 2021-03-11 | End: 2021-03-29 | Stop reason: SDUPTHER

## 2021-03-05 RX ORDER — TIZANIDINE 4 MG/1
4 TABLET ORAL EVERY 8 HOURS PRN
Qty: 90 TABLET | Refills: 2 | Status: SHIPPED | OUTPATIENT
Start: 2021-03-05 | End: 2021-06-21 | Stop reason: DRUGHIGH

## 2021-03-29 ENCOUNTER — HOSPITAL ENCOUNTER (OUTPATIENT)
Dept: PAIN MANAGEMENT | Age: 71
Discharge: HOME | End: 2021-03-29
Payer: MEDICARE

## 2021-03-29 VITALS
HEART RATE: 68 BPM | DIASTOLIC BLOOD PRESSURE: 49 MMHG | RESPIRATION RATE: 16 BRPM | OXYGEN SATURATION: 96 % | BODY MASS INDEX: 25.23 KG/M2 | SYSTOLIC BLOOD PRESSURE: 89 MMHG | TEMPERATURE: 98.4 F | HEIGHT: 66 IN | WEIGHT: 157 LBS

## 2021-03-29 DIAGNOSIS — M54.50 CHRONIC BILATERAL LOW BACK PAIN WITHOUT SCIATICA: ICD-10-CM

## 2021-03-29 DIAGNOSIS — G89.29 CHRONIC MYOFASCIAL PAIN: Primary | ICD-10-CM

## 2021-03-29 DIAGNOSIS — M25.561 CHRONIC PAIN OF BOTH KNEES: ICD-10-CM

## 2021-03-29 DIAGNOSIS — M51.16 LUMBAR DISC DISEASE WITH RADICULOPATHY: ICD-10-CM

## 2021-03-29 DIAGNOSIS — M79.18 CHRONIC MYOFASCIAL PAIN: Primary | ICD-10-CM

## 2021-03-29 DIAGNOSIS — G89.29 CHRONIC BILATERAL LOW BACK PAIN WITHOUT SCIATICA: ICD-10-CM

## 2021-03-29 DIAGNOSIS — G89.29 CHRONIC PAIN OF BOTH KNEES: ICD-10-CM

## 2021-03-29 DIAGNOSIS — M25.562 CHRONIC PAIN OF BOTH KNEES: ICD-10-CM

## 2021-03-29 PROCEDURE — 99213 OFFICE O/P EST LOW 20 MIN: CPT | Performed by: NURSE PRACTITIONER

## 2021-03-29 PROCEDURE — 99213 OFFICE O/P EST LOW 20 MIN: CPT

## 2021-03-29 RX ORDER — HYDROCODONE BITARTRATE AND ACETAMINOPHEN 10; 325 MG/1; MG/1
1 TABLET ORAL EVERY 6 HOURS PRN
Qty: 120 TABLET | Refills: 0 | Status: SHIPPED | OUTPATIENT
Start: 2021-04-12 | End: 2021-04-22 | Stop reason: SDUPTHER

## 2021-03-29 RX ORDER — TIZANIDINE 4 MG/1
6 TABLET ORAL 3 TIMES DAILY
Qty: 135 TABLET | Refills: 2 | Status: SHIPPED | OUTPATIENT
Start: 2021-03-29 | End: 2021-06-21 | Stop reason: SDUPTHER

## 2021-03-29 RX ORDER — GABAPENTIN 600 MG/1
900 TABLET ORAL 3 TIMES DAILY
Qty: 135 TABLET | Refills: 2 | Status: SHIPPED | OUTPATIENT
Start: 2021-03-29 | End: 2021-06-21 | Stop reason: SDUPTHER

## 2021-03-29 ASSESSMENT — ENCOUNTER SYMPTOMS
BACK PAIN: 1
CONSTIPATION: 0

## 2021-03-29 ASSESSMENT — PAIN DESCRIPTION - LOCATION: LOCATION: BACK;HIP

## 2021-03-29 ASSESSMENT — PAIN DESCRIPTION - PAIN TYPE: TYPE: CHRONIC PAIN

## 2021-03-29 NOTE — PROGRESS NOTES
[facility] Physical & Pain Medicine  Office Note    Patient Name: Guillaume Cain    MR #: [id number]    Account #: [de-identified]    : 1950    Age: 65 y.o. Sex: female    Date: 3/29/2021    PCP: Elisa Reno MD    Chief Complaint:   Chief Complaint   Patient presents with    Lower Back Pain       History of Present Illness:     Guillaume Cain is a 65 y.o. female who presents for follow up of procedures. Patient had Left SI and Left Ischial bursa injection on 2020, patient had LESI of L3/L4 on 2021. Patient had at least 85% relief of pain from procedure(s) for at least 6 weeks and was able to increase activity after procedure. Patient received enough pain relief from injections that the patient would like to repeat the injection(s). Patient had Left hip injection on 2021. Patient states that she did not sustain enough relief from this injection as she did the other injections listed above. Patient states that she does not feel she needs norco 5 times per day any longer. Medication was increased due to fall with exacerbation of pain. Hip Pain  This is a chronic problem. The current episode started more than 1 year ago. The problem occurs constantly. The problem has been waxing and waning. Associated symptoms include arthralgias and myalgias. Pertinent negatives include no neck pain, numbness or weakness. Back Pain  This is a chronic problem. The current episode started more than 1 month ago. The problem occurs constantly. The problem has been waxing and waning since onset. The pain is present in the lumbar spine and sacro-iliac. The quality of the pain is described as aching, cramping and shooting. The symptoms are aggravated by bending, sitting, standing and twisting. Pertinent negatives include no numbness or weakness.      Screening Tools:    PEG Score: 8     Last PEG Score: 9.3     Annual ORT Score: 5     Annual PHQ Score: 16    Current Pain Assessment  Pain Assessment  Pain Assessment: 0-10  Pain Level: 6  Pain Type: Chronic pain  Pain Location: Back, Hip  Pain Orientation: Lower, Left  POSS Score (Patient Ctrl Analgesia): 1    Past Visit HPI:  12/9/2020  presents for procedure follow-up. Patient had left sacroiliac joint injection and left ischial bursa injection on 8/26/2020 and Lumbar Epidural L3-4 on 9/11/2020. Patient had at least 85% relief of pain from procedure(s) for at least 6 weeks and was able to increase activity after procedure. Patient received enough pain relief from injections that the patient would like to repeat the injection(s). When patient was seen on 8/26/2020 for procedure, she was having left knee and right right-sided pain and chest due to a fall. Patient had assessment completed and ordered x-ray of chest and left knee. Chest x-ray ray revealed no acute process and left knee indicated well-seated normally aligned prosthetic components and no acute bony abnormalities. Patient presents today for sooner appointment due to increase in pain. Patient has fallen three times since last injection. Patient in unable to sit fully on left hip. Patient had been seen by PCP, no imaging was ordered. Reviewed past imaging and patient noted to have moderate to severe hip arthritis. 7/27/2020  presents after procedure follow up. Patient had LESI of L3/L4 and Left SI. Patient had good results from injections and she would like to repeat. However, patient has extreme tenderness in the buttocks especially while sitting down. This tenderness remained after injections. 5/5/2020  presents for follow-up of imaging and office visit. X-rays reviewed. Patient does have hip arthritis. Patient is still having increase in pain since fall. Patient states that pain medication is barely controlling pain. If patient does increase activity pain medication is not controlling pain. Back Pain is a recurrent problem.  The current episode started 1 to 4 weeks side effects to medications at this time. Past Medical Histoy  Past Medical History:   Diagnosis Date    Arthritis     Bilateral low back pain without sciatica 12/22/2015    Cancer ([address]     Hx of blood clots     Hyperlipidemia     Pain in both knees 12/22/2015    Thyroid disease     Urinary incontinence        Surgery History  Past Surgical History:   Procedure Laterality Date    CHOLECYSTECTOMY, LAPAROSCOPIC      CYST INCISION AND DRAINAGE      left Cheek    HYSTERECTOMY      JOINT REPLACEMENT      LOBECTOMY      RIGHT PARTIAL    SKIN CANCER EXCISION          Family History  family history includes Cancer in her mother; Heart Attack in her father; Hypertension in her mother. Social History    Social History     Tobacco Use    Smoking status: Current Every Day Smoker     Packs/day: 0.50     Years: 30.00     Pack years: 15.00     Types: Cigarettes    Smokeless tobacco: Never Used    Tobacco comment: on chantix   Substance Use Topics    Alcohol use: No       Allergies  Sulfamethoxazole-trimethoprim, Codeine, Erythromycin, Pcn [penicillins], Talwin [pentazocine], and Sulfa antibiotics     Current Medications  Current Outpatient Medications   Medication Sig Dispense Refill    HYDROcodone-acetaminophen (NORCO)  MG per tablet Take 1 tablet by mouth every 4-6 hours as needed for Pain (not to exceed 5 per day) for up to 30 days. (may fill 3/12/21) 150 tablet 0    gabapentin (NEURONTIN) 600 MG tablet Take 1.5 tablets by mouth 3 times daily for 90 days.  May fill 3/11/21 135 tablet 2    tiZANidine (ZANAFLEX) 4 MG tablet Take 1 tablet by mouth every 8 hours as needed (muscle spasms) 90 tablet 2    escitalopram (LEXAPRO) 10 MG tablet Take 10 mg by mouth daily      pantoprazole (PROTONIX) 40 MG tablet       nystatin (MYCOSTATIN) 464384 UNIT/ML suspension       promethazine (PHENERGAN) 25 MG tablet       sucralfate (CARAFATE) 1 GM/10ML suspension Take 1 g by mouth 4 times daily      ALPRAZolam (XANAX) 0.5 MG tablet Take 0.5 mg by mouth 2 times daily as needed. Shree Paiz levothyroxine (SYNTHROID) 100 MCG tablet Take 100 mcg by mouth daily       No current facility-administered medications for this encounter. Review of Systems:  Review of Systems   Constitutional: Negative for activity change. Gastrointestinal: Negative for constipation. Musculoskeletal: Positive for arthralgias, back pain, gait problem and myalgias. Negative for neck pain. Neurological: Negative for weakness and numbness. Psychiatric/Behavioral: Negative for agitation, self-injury and suicidal ideas. The patient is not nervous/anxious. 14 point ROS negative besides that noted in HPI    Physical Exam:    Vitals:    03/29/21 1440   BP: (!) 89/49   Pulse: 68   Resp: 16   Temp: 98.4 °F (36.9 °C)   TempSrc: Temporal   SpO2: 96%   Weight: 157 lb (71.2 kg)   Height: 5' 6\" (1.676 m)       Body mass index is 25.34 kg/m². Physical Exam  Vitals signs and nursing note reviewed. Constitutional:       General: She is not in acute distress. Appearance: She is well-developed. HENT:      Head: Normocephalic. Right Ear: External ear normal.      Left Ear: External ear normal.      Nose: Nose normal.   Eyes:      Conjunctiva/sclera: Conjunctivae normal.      Pupils: Pupils are equal, round, and reactive to light. Neck:      Vascular: No JVD. Trachea: No tracheal deviation. Cardiovascular:      Rate and Rhythm: Normal rate. Pulmonary:      Effort: Pulmonary effort is normal.   Abdominal:      General: There is no distension. Tenderness: There is no abdominal tenderness. Musculoskeletal:      Left hip: She exhibits tenderness and bony tenderness. Lumbar back: She exhibits tenderness, pain and spasm.       Comments: Slight improvement in assessment  Patient points to on left SI joint as the source of low back pain  TTP of SI joint on left  positive Kalia's on left, positive Distraction on left, positive Thigh Thrust on left  TTP to left ischial bursa     Skin:     General: Skin is warm and dry. Neurological:      Mental Status: She is alert and oriented to person, place, and time. Cranial Nerves: No cranial nerve deficit. Psychiatric:         Behavior: Behavior normal.         Thought Content: Thought content normal.         Judgment: Judgment normal.          Labs:  Lab Results   Component Value Date     04/18/2011    K 4.6 04/18/2011     04/18/2011    CO2 29 04/18/2011    BUN 8 04/18/2011    CREATININE 1.0 11/10/2016    CREATININE 0.8 04/18/2011    GLUCOSE 129 04/18/2011    CALCIUM 10.1 04/18/2011        No results found for: WBC, HGB, HCT, MCV, PLT      Assessment:  Active Problems:    Bilateral low back pain without sciatica    Pain in both knees    Lumbar disc disease with radiculopathy    Hip pain, acute, left    SI (sacroiliac) joint dysfunction    Pain management contract agreement    Ischial bursitis of left side  Resolved Problems:    * No resolved hospital problems. *      Plan:  Lumbar disc disease with radiculopathy  - gabapentin (NEURONTIN) 600 MG tablet; Take 1.5 tablets by mouth 3 times daily for 90 days. Dispense: 135 tablet; Refill: 2    Chronic bilateral low back pain without sciatica  - HYDROcodone-acetaminophen (NORCO)  MG per tablet; Take 1 tablet by mouth every 6 hours as needed for Pain for up to 30 days. Intended supply: 30 days  Dispense: 120 tablet; Refill: 0    Chronic myofascial pain  - tiZANidine (ZANAFLEX) 4 MG tablet; Take 1.5 tablets by mouth 3 times daily  Dispense: 135 tablet; Refill: 2    Continue medications with decrease back to QID on pain medication. Prescriptions sent at the time of the appointment.      Repeat  Left SI and Left ischial bursa with US with NP    Repeat LESI L3/L4 under fluoroscopy with Dr Ramesh Rees    [x] Follow up    [] 4 weeks   [x] 6-8 weeks   [] 10-12 weeks   [] 3 months  [x] Post procedure to evaluate effectiveness of treatment  [] To evaluate medications changes made at office visit. [] To review diagnostics ordered at last visit. [] To evaluate response to therapy    [x] For management of controlled substance  [x] Random UDS as indicated by ORT score or if indicated by abberent behaviors    Discussion: Discussed exam findings and plan of care with patient. Patient agreed with POC and questions were asked and answered. Activity: Discussed exercise as beneficial to pain reduction, encouraged daily stretching with a focus on torso strengthening. Goal:  Pain Management Goals of Therapy:   [] Resolution in pain  [x] Decrease in pain level  [x] Improvement in ADL's  [x] Increase in activities with less pain  [] Decrease in medication      Controlled substance monitoring:    [x] Discussed medication side effects, risk of tolerance and/or dependence, and/or alternative treatment  [] Discussed the detrimental effects of long term narcotic use in younger patients especially women of childbearing years.   [x] No signs and symptoms of potential drug abuse or diversion were identified  [] Signs of potential drug abuse or diversion were identified   [] ORT Score   [] UDS non-compliant   [] See Note  [] Random urine drug screen sent today  [x] Random urine drug screen not completed today   [] Deferred New Patient  [x] Compliant  7/27/2020  [] Not Compliant see note  [] Medication agreement with provider signed today  [x] Medication agreement with provider on file under media  [x] Medication regimen effective and continued   [] New patient continuing current medication while developing POC   [] On going assessment and evaluation of medication regimen  [] Medication regimen not effective see plan for changes  [x] Walter Mon reviewed & on file under media     CC:  MD Tao Freire, APRN - CNP, 3/29/2021 at 3:01 PM    EMR dragon/transcription disclaimer: Much of this encounter note is electronic transcription/translation of spoken language to printed tach. Electronic translation of spoken language may be erroneous, or at times, nonsensical words or phrases may be inadvertently transcribed.  Although, I have reviewed the note for such errors, some may still exist.

## 2021-03-29 NOTE — PROGRESS NOTES
Clinic Documentation      Education Provided:  [x] Review of Brianne Birmingham  [] Agreement Review  [x] PEG Score Calculated [] PHQ Score Calculated [] ORT Score Calculated    [] Compliance Issues Discussed [] Cognitive Behavior Needs [x] Exercise [] Review of Test [] Financial Issues  [x] Tobacco/Alcohol Use Reviewed [x] Teaching [] New Patient [] Picture Obtained    Physician Plan:  [] Outgoing Referral  [] Pharmacy Consult  [] Test Ordered [x] Prescription Ordered/Changed   [] Obtained Test Results / Consult Notes        Complete if patient is withholding blood thinner for procedure     Blood Thinner Patient is currently taking:      [] Plavix (Hold for 7 days)  [] Aspirin (Hold for 5 days)     [] Pletal (Hold for 2 days)  [] Pradaxa (Hold for 3 days)    [] Effient (Hold for 7 days)  [] Xarelto (Hold for 2 days)    [] Eliquis (Hold for 2 days)  [] Brilinta (Hold for 7 days)    [] Coumadin (Hold for 5 days) - (INR needs to be drawn the day prior to procedure- INR < 2.0)    [] Aggrenox (Hold for 7 days)        [] Patient will stop medication on their own.    [] Blood Thinner Form Faxed for approval to hold.    Provider form faxed to:     Assessment Completed by:  Electronically signed by Pj Hendricks RN on 3/29/2021 at 2:13 PM

## 2021-04-14 ENCOUNTER — TELEPHONE (OUTPATIENT)
Dept: PAIN MANAGEMENT | Age: 71
End: 2021-04-14

## 2021-04-14 ENCOUNTER — HOSPITAL ENCOUNTER (OUTPATIENT)
Dept: PAIN MANAGEMENT | Age: 71
Discharge: HOME | End: 2021-04-14
Payer: MEDICARE

## 2021-04-14 VITALS
SYSTOLIC BLOOD PRESSURE: 116 MMHG | RESPIRATION RATE: 18 BRPM | DIASTOLIC BLOOD PRESSURE: 43 MMHG | HEART RATE: 64 BPM | OXYGEN SATURATION: 97 % | TEMPERATURE: 96.2 F

## 2021-04-14 PROCEDURE — 20611 DRAIN/INJ JOINT/BURSA W/US: CPT

## 2021-04-14 PROCEDURE — 76942 ECHO GUIDE FOR BIOPSY: CPT | Performed by: NURSE PRACTITIONER

## 2021-04-14 PROCEDURE — 2500000003 HC RX 250 WO HCPCS

## 2021-04-14 PROCEDURE — 20611 DRAIN/INJ JOINT/BURSA W/US: CPT | Performed by: NURSE PRACTITIONER

## 2021-04-14 PROCEDURE — 6360000002 HC RX W HCPCS

## 2021-04-14 PROCEDURE — 20552 NJX 1/MLT TRIGGER POINT 1/2: CPT | Performed by: NURSE PRACTITIONER

## 2021-04-14 RX ORDER — TRIAMCINOLONE ACETONIDE 40 MG/ML
40 INJECTION, SUSPENSION INTRA-ARTICULAR; INTRAMUSCULAR ONCE
Status: DISCONTINUED | OUTPATIENT
Start: 2021-04-14 | End: 2021-04-16 | Stop reason: HOSPADM

## 2021-04-14 RX ORDER — BUPIVACAINE HYDROCHLORIDE 5 MG/ML
2 INJECTION, SOLUTION EPIDURAL; INTRACAUDAL ONCE
Status: DISCONTINUED | OUTPATIENT
Start: 2021-04-14 | End: 2021-04-16 | Stop reason: HOSPADM

## 2021-04-14 RX ORDER — LIDOCAINE HYDROCHLORIDE 10 MG/ML
2 INJECTION, SOLUTION EPIDURAL; INFILTRATION; INTRACAUDAL; PERINEURAL ONCE
Status: DISCONTINUED | OUTPATIENT
Start: 2021-04-14 | End: 2021-04-16 | Stop reason: HOSPADM

## 2021-04-14 RX ORDER — BUPIVACAINE HYDROCHLORIDE 5 MG/ML
1 INJECTION, SOLUTION EPIDURAL; INTRACAUDAL ONCE
Status: DISCONTINUED | OUTPATIENT
Start: 2021-04-14 | End: 2021-04-16 | Stop reason: HOSPADM

## 2021-04-14 RX ORDER — LIDOCAINE HYDROCHLORIDE 10 MG/ML
1 INJECTION, SOLUTION EPIDURAL; INFILTRATION; INTRACAUDAL; PERINEURAL ONCE
Status: DISCONTINUED | OUTPATIENT
Start: 2021-04-14 | End: 2021-04-16 | Stop reason: HOSPADM

## 2021-04-14 NOTE — PROGRESS NOTES
S: At procedure appointment, patient states that she is having increased pain between her shoulder blades and in her neck. O: Patient has palpable tender knots ie trigger points identified. A: Myofacial Pain in cervical and thoracic spine    P: Schedule patient for cervical and thoracic trigger point injections. Patient states that her pain medication she filled this morning is not even knocking the edge off her pain. Patient states that pills look different than other pills. Patient showed NP her pain medication. Pills are identified as hydrocodone 10. Several patient's have made similar complaints in the last few weeks.  At next refill date, will change patient to Percocet 7.5 mg QID prn # 120

## 2021-04-14 NOTE — PROCEDURES
[facility] Physical & Pain Medicine    Patient Name: Cecile Trevino    : 1950    Age: 65 y.o. Sex: female    Date: 2021      Preop Diagnosis: left  Ishial  Bursitis    Postop Diagnosis: left  Ishial  Bursitis    Procedure: Ultrasound Guided Injection of left  Ishial  Bursa(s)    [facility]    Patient Vitals for the past 24 hrs:   BP Temp Temp src Pulse Resp SpO2   21 1305 (!) 116/43 96.2 °F (35.7 °C) Temporal 64 18 97 %       Description of Procedure:    left  Ishial Bursa(s)    After voluntary, informed and signed consent obtained the patient was placed in a lateral recumbent position. Appropriate time out was obtained per policy. The ischial bursa(s) was palpated for area of maximal tenderness. The area was prepped in an aseptic fashion with CHG swab. The ultrasound transducer was used to confirm the appropriate location. The skin was sprayed with Gebauer's Solution. Under aseptic technique and direct ultrasound visualization a 22 gauge 3 inch spinal needle was introduced into the Ishial Bursa(s). After a negative aspiration a solution of 1 ml of 0.5% Marcaine Plain and 1 ml of 1% Lidocaine Plain and 1 ml of Kenalog (40mg/ml) was injected into the Ischial Bursa(s). The needle was withdrawn and a sterile dressing applied. If this was a bilateral procedure the same steps were followed on the opposite side. Discharge: The patient tolerated the procedure well. There were no complications during the procedure and the patient was discharged home with discharge instructions. The patient has been instructed to contact the office should there be any complications or questions to arise between today and their next appointment. Plan:    The patient will follow up in the office in approximately 6 weeks.      LIVE Pizarro CNP, 2021 at 2:04 PM     [facility] Physical & Pain Medicine    Patient Name: Maddie Hernandez Keith Fix    : 1950    Age: 65 y.o. Sex: female    Date: 2021    Pre-op Diagnosis: left  Sacroiliac Joint(s) Dysfunction/ Sacroiliitis    Post-op Diagnosis: left  Sacroiliac Joint(s) Dysfunction/ Sacroliliits    Procedure: Ultrasound Guided Injection of  left Sacroiliac Joint(s)     Performing Procedure:  [facility]    Patient Vitals for the past 24 hrs:   BP Temp Temp src Pulse Resp SpO2   21 1305 (!) 116/43 96.2 °F (35.7 °C) Temporal 64 18 97 %       left  Sacroiliac Joint(s)     Description of Procedure:    After voluntary, informed and signed consent obtained the patient was placed in a prone position. Appropriate time out was obtained per policy. The Sacroiliac Joint(s) was palpated for area of maximal tenderness. The area was prepped in an aseptic fashion with CHG swab. The ultrasound transducer was used to confirm the appropriate location. The skin was sprayed with Gebauer's Solution. Under aseptic technique and direct ultrasound visualization a 22 gauge 3 inch spinal needle was introduced into the Sacroiliac Joint(s). After a negative aspiration, a solution of 2 ml of 0.5% Marcaine Plain and 2 ml of 1% Lidocaine Plain and 1 ml of Kenalog (40 mg/ml) was injected into the Sacroiliac Joint(s). The needle was withdrawn and a sterile dressing applied. If this was a bilateral procedure, the same steps were followed on the opposite side. Discharge: The patient tolerated the procedure well. There were no complications during the procedure and the patient was discharged home with discharge instructions. The patient has been instructed to contact the office should there be any complications or questions to arise between today and their next appointment. Plan:    The patient will follow up in the office in approximately 6 weeks.      LIVE Nettles CNP, 2021 at 2:04 PM

## 2021-04-15 ENCOUNTER — TELEPHONE (OUTPATIENT)
Dept: PAIN MANAGEMENT | Age: 71
End: 2021-04-15

## 2021-04-15 DIAGNOSIS — G89.29 CHRONIC BILATERAL LOW BACK PAIN WITHOUT SCIATICA: Primary | ICD-10-CM

## 2021-04-15 DIAGNOSIS — M54.50 CHRONIC BILATERAL LOW BACK PAIN WITHOUT SCIATICA: Primary | ICD-10-CM

## 2021-04-15 NOTE — TELEPHONE ENCOUNTER
Patient left message on third floor scheduling line that she needs to speak to Anabelle Alanis. I called her back. Patient did not get message from prescription line regarding increasing her Norco.  I explained that Anabelle Alanis wants her to increase the dose, and that she is sending a script for Percocet that she may fill after she has used the Ky Boss.  Patient verbalized understanding and will start her dose increase at this time.

## 2021-04-20 RX ORDER — OXYCODONE AND ACETAMINOPHEN 7.5; 325 MG/1; MG/1
1 TABLET ORAL 4 TIMES DAILY PRN
Qty: 120 TABLET | Refills: 0 | Status: SHIPPED | OUTPATIENT
Start: 2021-04-20 | End: 2021-04-22

## 2021-04-21 ENCOUNTER — TELEPHONE (OUTPATIENT)
Dept: PAIN MANAGEMENT | Age: 71
End: 2021-04-21

## 2021-04-22 DIAGNOSIS — M54.50 CHRONIC BILATERAL LOW BACK PAIN WITHOUT SCIATICA: ICD-10-CM

## 2021-04-22 DIAGNOSIS — G89.29 CHRONIC BILATERAL LOW BACK PAIN WITHOUT SCIATICA: ICD-10-CM

## 2021-04-23 RX ORDER — HYDROCODONE BITARTRATE AND ACETAMINOPHEN 10; 325 MG/1; MG/1
1 TABLET ORAL EVERY 6 HOURS PRN
Qty: 120 TABLET | Refills: 0 | Status: SHIPPED | OUTPATIENT
Start: 2021-05-12 | End: 2021-06-01 | Stop reason: SDUPTHER

## 2021-06-01 DIAGNOSIS — G89.29 CHRONIC BILATERAL LOW BACK PAIN WITHOUT SCIATICA: ICD-10-CM

## 2021-06-01 DIAGNOSIS — M54.50 CHRONIC BILATERAL LOW BACK PAIN WITHOUT SCIATICA: ICD-10-CM

## 2021-06-01 RX ORDER — HYDROCODONE BITARTRATE AND ACETAMINOPHEN 10; 325 MG/1; MG/1
1 TABLET ORAL EVERY 6 HOURS PRN
Qty: 120 TABLET | Refills: 0 | Status: SHIPPED | OUTPATIENT
Start: 2021-06-11 | End: 2021-06-21 | Stop reason: ALTCHOICE

## 2021-06-01 NOTE — TELEPHONE ENCOUNTER
Patient left message on 5/31/21. Office was closed. Patient has an appointment on 6/21/21 with Jean Marie Tucker.

## 2021-06-21 ENCOUNTER — HOSPITAL ENCOUNTER (OUTPATIENT)
Dept: PAIN MANAGEMENT | Age: 71
Discharge: HOME | End: 2021-06-21
Payer: MEDICARE

## 2021-06-21 VITALS
OXYGEN SATURATION: 94 % | HEIGHT: 66 IN | TEMPERATURE: 98.4 F | SYSTOLIC BLOOD PRESSURE: 115 MMHG | WEIGHT: 150 LBS | BODY MASS INDEX: 24.11 KG/M2 | HEART RATE: 59 BPM | DIASTOLIC BLOOD PRESSURE: 55 MMHG | RESPIRATION RATE: 18 BRPM

## 2021-06-21 DIAGNOSIS — G89.29 CHRONIC MYOFASCIAL PAIN: ICD-10-CM

## 2021-06-21 DIAGNOSIS — M25.561 CHRONIC PAIN OF BOTH KNEES: ICD-10-CM

## 2021-06-21 DIAGNOSIS — M25.562 CHRONIC PAIN OF BOTH KNEES: ICD-10-CM

## 2021-06-21 DIAGNOSIS — G89.29 CHRONIC BILATERAL LOW BACK PAIN WITHOUT SCIATICA: ICD-10-CM

## 2021-06-21 DIAGNOSIS — M51.16 LUMBAR DISC DISEASE WITH RADICULOPATHY: ICD-10-CM

## 2021-06-21 DIAGNOSIS — G89.29 CHRONIC PAIN OF BOTH KNEES: ICD-10-CM

## 2021-06-21 DIAGNOSIS — M54.50 CHRONIC BILATERAL LOW BACK PAIN WITHOUT SCIATICA: ICD-10-CM

## 2021-06-21 DIAGNOSIS — M79.18 CHRONIC MYOFASCIAL PAIN: ICD-10-CM

## 2021-06-21 PROCEDURE — 99213 OFFICE O/P EST LOW 20 MIN: CPT

## 2021-06-21 PROCEDURE — 99214 OFFICE O/P EST MOD 30 MIN: CPT | Performed by: NURSE PRACTITIONER

## 2021-06-21 RX ORDER — TIZANIDINE 4 MG/1
6 TABLET ORAL 3 TIMES DAILY
Qty: 135 TABLET | Refills: 2 | Status: SHIPPED | OUTPATIENT
Start: 2021-06-21 | End: 2021-08-04 | Stop reason: SDUPTHER

## 2021-06-21 RX ORDER — GABAPENTIN 600 MG/1
900 TABLET ORAL 3 TIMES DAILY
Qty: 135 TABLET | Refills: 2 | Status: SHIPPED | OUTPATIENT
Start: 2021-06-21 | End: 2021-12-06

## 2021-06-21 ASSESSMENT — PAIN DESCRIPTION - PAIN TYPE: TYPE: CHRONIC PAIN

## 2021-06-21 ASSESSMENT — ENCOUNTER SYMPTOMS
BACK PAIN: 1
CONSTIPATION: 0

## 2021-06-21 ASSESSMENT — PAIN DESCRIPTION - ORIENTATION: ORIENTATION: LOWER

## 2021-06-21 ASSESSMENT — PAIN DESCRIPTION - LOCATION: LOCATION: BACK

## 2021-06-21 ASSESSMENT — PAIN SCALES - GENERAL: PAINLEVEL_OUTOF10: 7

## 2021-06-21 ASSESSMENT — PAIN DESCRIPTION - DIRECTION: RADIATING_TOWARDS: INTO LLE

## 2021-06-21 NOTE — PROGRESS NOTES
Clinic Documentation      Education Provided:  [x] Review of Carlene Landeros  [] Agreement Review  [x] PEG Score Calculated [] PHQ Score Calculated [] ORT Score Calculated    [] Compliance Issues Discussed [] Cognitive Behavior Needs [x] Exercise [] Review of Test [] Financial Issues  [x] Tobacco/Alcohol Use Reviewed [x] Teaching [] New Patient [] Picture Obtained    Physician Plan:  [] Outgoing Referral  [] Pharmacy Consult  [] Test Ordered [x] Prescription Ordered/Changed   [] Obtained Test Results / Consult Notes        Complete if patient is withholding blood thinner for procedure     Blood Thinner Patient is currently taking:      [] Plavix (Hold for 7 days)  [] Aspirin (Hold for 5 days)     [] Pletal (Hold for 2 days)  [] Pradaxa (Hold for 3 days)    [] Effient (Hold for 7 days)  [] Xarelto (Hold for 2 days)    [] Eliquis (Hold for 2 days)  [] Brilinta (Hold for 7 days)    [] Coumadin (Hold for 5 days) - (INR needs to be drawn the day prior to procedure- INR < 2.0)    [] Aggrenox (Hold for 7 days)        [] Patient will stop medication on their own.    [] Blood Thinner Form Faxed for approval to hold.    Provider form faxed to:     Assessment Completed by:  Electronically signed by Gui Nettles RN on 6/21/2021 at 7:48 AM

## 2021-06-21 NOTE — PROGRESS NOTES
Formerly named [facility] Physical & Pain Medicine  Office Note    Patient Name: Anthony Canchola    MR #: [id number]    Account #: [de-identified]    : 1950    Age: 65 y.o. Sex: female    Date: 2021    PCP: Raisa Daugherty MD    Chief Complaint:   Chief Complaint   Patient presents with    Lower Back Pain       History of Present Illness:     Anthony Canchola is a 65 y.o. female who presents for procedure follow up and early office visit. Patient is unable to afford LESI of L3/L4 due to procedure center cost.     Patient had LEFT SI and Left Ischial Bursa on 2021. Patient had at least 85% relief of pain from procedure(s) for at least 6 weeks and was able to increase activity after procedure. Patient received enough pain relief from injections that the patient would like to repeat the injection(s). However, procedure has completely warn off. Patient did like Percocet 7.5 as these made her sleepy but did help with the paint Patient was switched back to Noroco 10 however pharmacy is substituting with a different generic and these are not working. Hip Pain  This is a chronic problem. The current episode started more than 1 year ago. The problem occurs constantly. The problem has been waxing and waning. Associated symptoms include arthralgias and myalgias. Pertinent negatives include no neck pain, numbness or weakness. Back Pain  This is a chronic problem. The current episode started more than 1 month ago. The problem occurs constantly. The problem has been waxing and waning since onset. The pain is present in the lumbar spine and sacro-iliac. The quality of the pain is described as aching, cramping and shooting. The symptoms are aggravated by bending, sitting, standing and twisting. Pertinent negatives include no numbness or weakness.      Screening Tools:    PEG Score: 8     Last PEG Score: 8     Annual ORT Score: 5     Annual PHQ Score: 16    Current Pain Assessment  Pain Assessment  Pain Assessment: 0-10  Pain Level: 7  Pain Type: Chronic pain  Pain Location: Back  Pain Orientation: Lower  Pain Radiating Towards: into LLE  POSS Score (Patient Ctrl Analgesia): 1    Past Visit HPI:  12/9/2020  presents for procedure follow-up. Patient had left sacroiliac joint injection and left ischial bursa injection on 8/26/2020 and Lumbar Epidural L3-4 on 9/11/2020. Patient had at least 85% relief of pain from procedure(s) for at least 6 weeks and was able to increase activity after procedure. Patient received enough pain relief from injections that the patient would like to repeat the injection(s). When patient was seen on 8/26/2020 for procedure, she was having left knee and right right-sided pain and chest due to a fall. Patient had assessment completed and ordered x-ray of chest and left knee. Chest x-ray ray revealed no acute process and left knee indicated well-seated normally aligned prosthetic components and no acute bony abnormalities. Patient presents today for sooner appointment due to increase in pain. Patient has fallen three times since last injection. Patient in unable to sit fully on left hip. Patient had been seen by PCP, no imaging was ordered. Reviewed past imaging and patient noted to have moderate to severe hip arthritis. 7/27/2020  presents after procedure follow up. Patient had LESI of L3/L4 and Left SI. Patient had good results from injections and she would like to repeat. However, patient has extreme tenderness in the buttocks especially while sitting down. This tenderness remained after injections. 5/5/2020  presents for follow-up of imaging and office visit. X-rays reviewed. Patient does have hip arthritis. Patient is still having increase in pain since fall. Patient states that pain medication is barely controlling pain. If patient does increase activity pain medication is not controlling pain. Back Pain is a recurrent problem.  The current episode started 1 to 4 weeks ago. The problem occurs constantly. The problem has been waxing and waning since onset. The pain is present in the lumbar spine and sacro-iliac. The quality of the pain is described as shooting, cramping and aching. The symptoms are aggravated by bending, sitting, standing and twisting. Pertinent negatives include no numbness or weakness. 2/4/2020  presents to the office for follow up of procedure. Patient is having worsening left hip pain s/p fall from the shower. Patient had been ill she thinks she lost LOC in the shower. She remembers falling and hitting the space heater with her head. Patient never lost LOC after fall. Patient had headache with low back pain and left hip pain. Patient followed up with PCP post fall. However pain is getting worse. Patient can not sit on left hip. Patient denies radiating pain. Patient states that pain is a constant ache. She cannot get comfortable. Patient is having problem ambulating due to pain. Patient is requesting xrays. Back Pain is a recurrent problem. The current episode started 1 to 4 weeks ago. The problem occurs constantly. The problem has been rapidly worsening since onset. The pain is present in the lumbar spine and sacro-iliac. The quality of the pain is described as aching and shooting. The symptoms are aggravated by bending, sitting and standing. Pertinent negatives include no numbness or weakness. 11/4/19  Annual Review  presents to the office for annual exam with primary complaints of chronic low back pain. Patient has been established with this practice for years. She has been receiving LESI of L3-L4 however injections had to be postponed due to cellulitis to her face. Patient had numerous hospitalizations and antibiotics and treatment.      Patient takes Norco, gabapentin and Zanaflex. Patient states that current medication regimen helps to keep patient's pain at a tolerable level however she is ready to repeat injections.   Patient denies any side effects to medications at this time. Past Medical Histoy  Past Medical History:   Diagnosis Date    Arthritis     Bilateral low back pain without sciatica 12/22/2015    Cancer ([address]     Hx of blood clots     Hyperlipidemia     Pain in both knees 12/22/2015    Thyroid disease     Urinary incontinence        Surgery History  Past Surgical History:   Procedure Laterality Date    CHOLECYSTECTOMY, LAPAROSCOPIC      CYST INCISION AND DRAINAGE      left Cheek    HYSTERECTOMY      JOINT REPLACEMENT      LOBECTOMY      RIGHT PARTIAL    SKIN CANCER EXCISION          Family History  family history includes Cancer in her mother; Heart Attack in her father; Hypertension in her mother. Social History    Social History     Tobacco Use    Smoking status: Current Every Day Smoker     Packs/day: 0.50     Years: 30.00     Pack years: 15.00     Types: Cigarettes    Smokeless tobacco: Never Used    Tobacco comment: on chantix   Substance Use Topics    Alcohol use: No       Allergies  Sulfamethoxazole-trimethoprim, Codeine, Erythromycin, Pcn [penicillins], Talwin [pentazocine], and Sulfa antibiotics     Current Medications  Current Outpatient Medications   Medication Sig Dispense Refill    HYDROcodone-acetaminophen (NORCO)  MG per tablet Take 1 tablet by mouth every 6 hours as needed for Pain for up to 30 days. (may fill 6/11/21) 120 tablet 0    tiZANidine (ZANAFLEX) 4 MG tablet Take 1.5 tablets by mouth 3 times daily 135 tablet 2    gabapentin (NEURONTIN) 600 MG tablet Take 1.5 tablets by mouth 3 times daily for 90 days.  135 tablet 2    tiZANidine (ZANAFLEX) 4 MG tablet Take 1 tablet by mouth every 8 hours as needed (muscle spasms) 90 tablet 2    escitalopram (LEXAPRO) 10 MG tablet Take 10 mg by mouth daily      pantoprazole (PROTONIX) 40 MG tablet       nystatin (MYCOSTATIN) 591906 UNIT/ML suspension       promethazine (PHENERGAN) 25 MG tablet       sucralfate (CARAFATE) 1 GM/10ML suspension Take 1 g by mouth 4 times daily      ALPRAZolam (XANAX) 0.5 MG tablet Take 0.5 mg by mouth 2 times daily as needed. Maurisio Mckeon levothyroxine (SYNTHROID) 100 MCG tablet Take 100 mcg by mouth daily       No current facility-administered medications for this encounter. Review of Systems:  Review of Systems   Constitutional: Positive for activity change. Gastrointestinal: Negative for constipation. Musculoskeletal: Positive for arthralgias, back pain, gait problem and myalgias. Negative for neck pain. Neurological: Negative for weakness and numbness. Psychiatric/Behavioral: Negative for agitation, self-injury and suicidal ideas. The patient is not nervous/anxious. 14 point ROS negative besides that noted in HPI    Physical Exam:    Vitals:    06/21/21 0808   BP: (!) 115/55   Pulse: 59   Resp: 18   Temp: 98.4 °F (36.9 °C)   TempSrc: Temporal   SpO2: 94%   Weight: 150 lb (68 kg)   Height: 5' 6\" (1.676 m)       Body mass index is 24.21 kg/m². Physical Exam  Vitals and nursing note reviewed. Constitutional:       General: She is not in acute distress. Appearance: She is well-developed. HENT:      Head: Normocephalic. Right Ear: External ear normal.      Left Ear: External ear normal.      Nose: Nose normal.   Eyes:      Conjunctiva/sclera: Conjunctivae normal.      Pupils: Pupils are equal, round, and reactive to light. Neck:      Vascular: No JVD. Trachea: No tracheal deviation. Cardiovascular:      Rate and Rhythm: Normal rate. Pulmonary:      Effort: Pulmonary effort is normal.   Abdominal:      General: There is no distension. Tenderness: There is no abdominal tenderness. Musculoskeletal:      Lumbar back: Spasms and tenderness present. Left hip: Tenderness and bony tenderness present. Comments: More tenderness noted.   Piriformis TTP and ropelike   Patient points to on left SI joint as the source of low back pain  TTP of SI joint on left  positive Kalia's on left, positive Distraction on left, positive Thigh Thrust on left  TTP to left ischial bursa     Skin:     General: Skin is warm and dry. Neurological:      Mental Status: She is alert and oriented to person, place, and time. Cranial Nerves: No cranial nerve deficit. Psychiatric:         Behavior: Behavior normal.         Thought Content: Thought content normal.         Judgment: Judgment normal.          Labs:  Lab Results   Component Value Date     04/18/2011    K 4.6 04/18/2011     04/18/2011    CO2 29 04/18/2011    BUN 8 04/18/2011    CREATININE 1.0 11/10/2016    CREATININE 0.8 04/18/2011    GLUCOSE 129 04/18/2011    CALCIUM 10.1 04/18/2011        No results found for: WBC, HGB, HCT, MCV, PLT      Assessment:  Active Problems:    Bilateral low back pain without sciatica    Pain in both knees    Lumbar disc disease with radiculopathy    Hip pain, acute, left    SI (sacroiliac) joint dysfunction    Pain management contract agreement    Ischial bursitis of left side  Resolved Problems:    * No resolved hospital problems. *      Plan:  Lumbar disc disease with radiculopathy  Continue with refill sent - gabapentin (NEURONTIN) 600 MG tablet; Take 1.5 tablets by mouth 3 times daily for 90 days. Dispense: 135 tablet; Refill: 2    Chronic bilateral low back pain without sciatica  Stop - HYDROcodone-acetaminophen (NORCO)  MG per tablet; Take 1 tablet by mouth every 6 hours as needed for Pain for up to 30 days. Intended supply: 30 days  Dispense: 120 tablet; Refill: 0    Change to Percocet 5 mg QID prn 120 if medication makes patient drowsy will change back to Index as  ANDRE. If medication is not strong enough, patient may be increased to Percocet 7.5 mg QID prn #120 at [place] [facility]. Chronic myofascial pain  Continue with refill sent- tiZANidine (ZANAFLEX) 4 MG tablet; Take 1.5 tablets by mouth 3 times daily  Dispense: 135 tablet;  Refill: 2    Will reschedule Left SI and Left

## 2021-06-22 RX ORDER — OXYCODONE HYDROCHLORIDE AND ACETAMINOPHEN 5; 325 MG/1; MG/1
1 TABLET ORAL EVERY 6 HOURS PRN
Qty: 120 TABLET | Refills: 0 | Status: SHIPPED | OUTPATIENT
Start: 2021-07-10 | End: 2021-08-09

## 2021-07-14 ENCOUNTER — HOSPITAL ENCOUNTER (OUTPATIENT)
Dept: PAIN MANAGEMENT | Age: 71
Discharge: HOME | End: 2021-07-14
Payer: MEDICARE

## 2021-07-14 ENCOUNTER — TELEPHONE (OUTPATIENT)
Dept: PAIN MANAGEMENT | Age: 71
End: 2021-07-14

## 2021-07-14 VITALS
TEMPERATURE: 96.7 F | SYSTOLIC BLOOD PRESSURE: 120 MMHG | HEART RATE: 60 BPM | DIASTOLIC BLOOD PRESSURE: 68 MMHG | RESPIRATION RATE: 18 BRPM | OXYGEN SATURATION: 6 %

## 2021-07-14 DIAGNOSIS — G57.02 PIRIFORMIS SYNDROME OF LEFT SIDE: ICD-10-CM

## 2021-07-14 PROCEDURE — 20611 DRAIN/INJ JOINT/BURSA W/US: CPT | Performed by: NURSE PRACTITIONER

## 2021-07-14 PROCEDURE — 76942 ECHO GUIDE FOR BIOPSY: CPT | Performed by: NURSE PRACTITIONER

## 2021-07-14 PROCEDURE — 2500000003 HC RX 250 WO HCPCS

## 2021-07-14 PROCEDURE — 6360000002 HC RX W HCPCS

## 2021-07-14 PROCEDURE — 20611 DRAIN/INJ JOINT/BURSA W/US: CPT

## 2021-07-14 PROCEDURE — 20553 NJX 1/MLT TRIGGER POINTS 3/>: CPT

## 2021-07-14 PROCEDURE — 20553 NJX 1/MLT TRIGGER POINTS 3/>: CPT | Performed by: NURSE PRACTITIONER

## 2021-07-14 RX ORDER — BUPIVACAINE HYDROCHLORIDE 5 MG/ML
2 INJECTION, SOLUTION EPIDURAL; INTRACAUDAL ONCE
Status: DISCONTINUED | OUTPATIENT
Start: 2021-07-14 | End: 2021-07-16 | Stop reason: HOSPADM

## 2021-07-14 RX ORDER — LIDOCAINE HYDROCHLORIDE 10 MG/ML
10 INJECTION, SOLUTION EPIDURAL; INFILTRATION; INTRACAUDAL; PERINEURAL ONCE
Status: DISCONTINUED | OUTPATIENT
Start: 2021-07-14 | End: 2021-07-16 | Stop reason: HOSPADM

## 2021-07-14 RX ORDER — BUPIVACAINE HYDROCHLORIDE 5 MG/ML
1 INJECTION, SOLUTION EPIDURAL; INTRACAUDAL ONCE
Status: DISCONTINUED | OUTPATIENT
Start: 2021-07-14 | End: 2021-07-16 | Stop reason: HOSPADM

## 2021-07-14 RX ORDER — LIDOCAINE HYDROCHLORIDE 10 MG/ML
1 INJECTION, SOLUTION EPIDURAL; INFILTRATION; INTRACAUDAL; PERINEURAL ONCE
Status: DISCONTINUED | OUTPATIENT
Start: 2021-07-14 | End: 2021-07-16 | Stop reason: HOSPADM

## 2021-07-14 RX ORDER — BUPIVACAINE HYDROCHLORIDE 5 MG/ML
10 INJECTION, SOLUTION EPIDURAL; INTRACAUDAL ONCE
Status: DISCONTINUED | OUTPATIENT
Start: 2021-07-14 | End: 2021-07-16 | Stop reason: HOSPADM

## 2021-07-14 RX ORDER — TRIAMCINOLONE ACETONIDE 40 MG/ML
40 INJECTION, SUSPENSION INTRA-ARTICULAR; INTRAMUSCULAR ONCE
Status: DISCONTINUED | OUTPATIENT
Start: 2021-07-14 | End: 2021-07-16 | Stop reason: HOSPADM

## 2021-07-14 RX ORDER — LIDOCAINE HYDROCHLORIDE 10 MG/ML
2 INJECTION, SOLUTION EPIDURAL; INFILTRATION; INTRACAUDAL; PERINEURAL ONCE
Status: DISCONTINUED | OUTPATIENT
Start: 2021-07-14 | End: 2021-07-16 | Stop reason: HOSPADM

## 2021-07-14 NOTE — PROCEDURES
[facility] Physical & Pain Medicine    Patient Name: Carlo Barrios    : 1950    Age: 65 y.o. Sex: female    Date: 2021      Preop Diagnosis: left  Ishial  Bursitis    Postop Diagnosis: left  Ishial  Bursitis    Procedure: Ultrasound Guided Injection of left  Ishial  Bursa(s)    Performing Procedure:  Praveena Shrestha, [facility]    Patient Vitals for the past 24 hrs:   BP Temp Temp src Pulse Resp SpO2   21 1341 120/68 96.7 °F (35.9 °C) Temporal 60 18 (!) 6 %       Description of Procedure:    left  Ishial Bursa(s)    After voluntary, informed and signed consent obtained the patient was placed in a lateral recumbent position. Appropriate time out was obtained per policy. The ischial bursa(s) was palpated for area of maximal tenderness. The area was prepped in an aseptic fashion with CHG swab. The ultrasound transducer was used to confirm the appropriate location. The skin was sprayed with Gebauer's Solution. Under aseptic technique and direct ultrasound visualization a 22 gauge 3 inch spinal needle was introduced into the Ishial Bursa(s). After a negative aspiration a solution of 1 ml of 0.5% Marcaine Plain and 1 ml of 1% Lidocaine Plain and 1 ml of Kenalog (40mg/ml) was injected into the Ischial Bursa(s). The needle was withdrawn and a sterile dressing applied. If this was a bilateral procedure the same steps were followed on the opposite side. Discharge: The patient tolerated the procedure well. There were no complications during the procedure and the patient was discharged home with discharge instructions. The patient has been instructed to contact the office should there be any complications or questions to arise between today and their next appointment. Plan:    The patient will follow up in the office in approximately 6 weeks.      LIVE Razo CNP, 2021 at 4:21 PM     [facility] Physical & Pain Medicine    Patient Name: [facility]    Patient Vitals for the past 24 hrs:   BP Temp Temp src Pulse Resp SpO2   07/14/21 1341 120/68 96.7 °F (35.9 °C) Temporal 60 18 (!) 6 %       left  Piriformis Trigger Point Injections    Description of Procedure:    After a brief physical assessment and failure to improve with conservative measures the patient presented for Piriformis Trigger Point Injections The indications, limitations and possible complications were discussed with the patient and the patient elected to proceed with the procedure. After voluntary, informed and signed consent obtained the patient was placed in a  right Lateral Recumbent Position     Appropriate time out was obtained per policy. The area of maximal tenderness was palpated over the on left Gluteus minimus,  Gluteus Johnny, and Piriformis. The skin overlying these areas was marked. The areas were prepped using aseptic technique CHG prep. The proposed injection sites were the sprayed with Gebauer's Solution while protecting patient eyes. Each trigger point of the muscles - Gluteus minimus,  Gluteus Johnny, and Piriformis was injected with approximately 1-2 ml of a solution of 5 ml of 1% Lidocaine Plain and 5 ml of 0.5% Marcaine Plain after negative aspiration. IF this was a bilateral procedure, the patient was rolled over to the opposite side and the same steps were followed. Discharge: The patient tolerated the procedure well. There were no complications during the procedure and the patient was discharged home with discharge instructions. The patient has been instructed to contact the office should there be any complications or questions to arise between today and their next appointment. Plan:   Will return to the office in  6 weeks for Procedure Follow-up  and Office Visit      LIVE Kaur CNP, 7/14/2021 at 4:21 PM

## 2021-07-14 NOTE — PROGRESS NOTES
Procedure:  Level of Consciousness: [x]Alert [x]Oriented []Disoriented []Lethargic  Anxiety Level: []Calm []Anxious []Depressed []Other  Skin: [x]Warm [x]Dry []Cool []Moist []Intact []Other  Cardiovascular: [x]Palpitations: []Never []Occasionally []Frequently  Chest Pain: [x]No []Yes  Respiratory:  [x]Unlabored []Labored []Cough ([] Productive []Unproductive)  HCG Required: [x]No []Yes   Results: []Negative []Positive  Knowledge Level:        [x]Patient/Other verbalized understanding of pre-procedure instructions. [x]Assessment of post-op care needs (transportation, responsible caregiver)        [x]Able to discuss health care problems and how to deal with it.   Factors that Affect Teaching:        Language Barrier: [x]No []Yes - why:        Hearing Loss:        [x]No []Yes            Corrective Device:  []Yes []No        Vision Loss:           []No [x]Yes            Corrective Device:  [x]Yes []No        Memory Loss:       [x]No []Yes            []Short Term []Long Term  Motivational Level:  [x]Asks Questions                  []Extremely Anxious       [x]Seems Interested               []Seems Uninterested                  [x]Denies need for Education  Risk for Injury:  [x]Patient oriented to person, place and time  []History of frequent falls/loss of balance  Nutritional:  []Change in appetite   []Weight Gain   []Weight Loss  Functional:  []Requires assistance with ADL's

## 2021-07-21 ENCOUNTER — TELEPHONE (OUTPATIENT)
Dept: PAIN MANAGEMENT | Age: 71
End: 2021-07-21

## 2021-07-21 DIAGNOSIS — M25.561 CHRONIC PAIN OF BOTH KNEES: ICD-10-CM

## 2021-07-21 DIAGNOSIS — M54.50 CHRONIC BILATERAL LOW BACK PAIN WITHOUT SCIATICA: ICD-10-CM

## 2021-07-21 DIAGNOSIS — M51.16 LUMBAR DISC DISEASE WITH RADICULOPATHY: ICD-10-CM

## 2021-07-21 DIAGNOSIS — M25.562 CHRONIC PAIN OF BOTH KNEES: ICD-10-CM

## 2021-07-21 DIAGNOSIS — G89.29 CHRONIC PAIN OF BOTH KNEES: ICD-10-CM

## 2021-07-21 DIAGNOSIS — G89.29 CHRONIC BILATERAL LOW BACK PAIN WITHOUT SCIATICA: ICD-10-CM

## 2021-07-21 NOTE — TELEPHONE ENCOUNTER
Spoke with pt concerning her injections she had on 07/14. Pt states she was quite sick that day. She had been having issues with her stomach and after she left she does not remember getting home. Pt states she was put in the hospital for her bowel and stomach issues, which were of no relation to her injections. She has not felt well and is unable to give accurate information on how well the injections have helped due to current situation. Pt has no questions at this time for us.

## 2021-08-02 DIAGNOSIS — M54.50 CHRONIC BILATERAL LOW BACK PAIN WITHOUT SCIATICA: ICD-10-CM

## 2021-08-02 DIAGNOSIS — G89.29 CHRONIC BILATERAL LOW BACK PAIN WITHOUT SCIATICA: ICD-10-CM

## 2021-08-02 RX ORDER — HYDROCODONE BITARTRATE AND ACETAMINOPHEN 10; 325 MG/1; MG/1
1 TABLET ORAL EVERY 6 HOURS PRN
Qty: 120 TABLET | Refills: 0 | Status: SHIPPED | OUTPATIENT
Start: 2021-08-10 | End: 2021-09-08 | Stop reason: SDUPTHER

## 2021-08-03 DIAGNOSIS — M79.18 CHRONIC MYOFASCIAL PAIN: ICD-10-CM

## 2021-08-03 DIAGNOSIS — G89.29 CHRONIC MYOFASCIAL PAIN: ICD-10-CM

## 2021-08-04 RX ORDER — TIZANIDINE 4 MG/1
6 TABLET ORAL 3 TIMES DAILY
Qty: 135 TABLET | Refills: 2 | Status: SHIPPED | OUTPATIENT
Start: 2021-08-04 | End: 2021-12-01 | Stop reason: SDUPTHER

## 2021-09-08 DIAGNOSIS — G89.29 CHRONIC BILATERAL LOW BACK PAIN WITHOUT SCIATICA: ICD-10-CM

## 2021-09-08 DIAGNOSIS — M54.50 CHRONIC BILATERAL LOW BACK PAIN WITHOUT SCIATICA: ICD-10-CM

## 2021-09-10 RX ORDER — HYDROCODONE BITARTRATE AND ACETAMINOPHEN 10; 325 MG/1; MG/1
1 TABLET ORAL EVERY 6 HOURS PRN
Qty: 120 TABLET | Refills: 0 | Status: SHIPPED | OUTPATIENT
Start: 2021-09-10 | End: 2021-09-20 | Stop reason: SDUPTHER

## 2021-09-20 ENCOUNTER — HOSPITAL ENCOUNTER (OUTPATIENT)
Dept: PAIN MANAGEMENT | Age: 71
Discharge: HOME | End: 2021-09-20
Payer: MEDICARE

## 2021-09-20 VITALS
WEIGHT: 148 LBS | DIASTOLIC BLOOD PRESSURE: 77 MMHG | HEIGHT: 66 IN | SYSTOLIC BLOOD PRESSURE: 182 MMHG | HEART RATE: 64 BPM | BODY MASS INDEX: 23.78 KG/M2 | TEMPERATURE: 97.8 F | RESPIRATION RATE: 18 BRPM | OXYGEN SATURATION: 97 %

## 2021-09-20 DIAGNOSIS — M54.50 CHRONIC BILATERAL LOW BACK PAIN WITHOUT SCIATICA: ICD-10-CM

## 2021-09-20 DIAGNOSIS — M25.562 CHRONIC PAIN OF BOTH KNEES: ICD-10-CM

## 2021-09-20 DIAGNOSIS — G89.29 CHRONIC PAIN OF BOTH KNEES: ICD-10-CM

## 2021-09-20 DIAGNOSIS — G89.29 CHRONIC BILATERAL LOW BACK PAIN WITHOUT SCIATICA: ICD-10-CM

## 2021-09-20 DIAGNOSIS — M51.16 LUMBAR DISC DISEASE WITH RADICULOPATHY: ICD-10-CM

## 2021-09-20 DIAGNOSIS — M25.561 CHRONIC PAIN OF BOTH KNEES: ICD-10-CM

## 2021-09-20 PROCEDURE — 99214 OFFICE O/P EST MOD 30 MIN: CPT | Performed by: NURSE PRACTITIONER

## 2021-09-20 PROCEDURE — 99215 OFFICE O/P EST HI 40 MIN: CPT

## 2021-09-20 ASSESSMENT — ENCOUNTER SYMPTOMS
CONSTIPATION: 0
BACK PAIN: 1

## 2021-09-20 ASSESSMENT — PAIN DESCRIPTION - PAIN TYPE: TYPE: CHRONIC PAIN

## 2021-09-20 ASSESSMENT — PAIN DESCRIPTION - LOCATION: LOCATION: BACK

## 2021-09-20 ASSESSMENT — PAIN SCALES - GENERAL: PAINLEVEL_OUTOF10: 6

## 2021-09-20 ASSESSMENT — PAIN DESCRIPTION - ORIENTATION: ORIENTATION: LOWER

## 2021-09-20 NOTE — PROGRESS NOTES
HPI:  12/9/2020  presents for procedure follow-up. Patient had left sacroiliac joint injection and left ischial bursa injection on 8/26/2020 and Lumbar Epidural L3-4 on 9/11/2020. Patient had at least 85% relief of pain from procedure(s) for at least 6 weeks and was able to increase activity after procedure. Patient received enough pain relief from injections that the patient would like to repeat the injection(s). When patient was seen on 8/26/2020 for procedure, she was having left knee and right right-sided pain and chest due to a fall. Patient had assessment completed and ordered x-ray of chest and left knee. Chest x-ray ray revealed no acute process and left knee indicated well-seated normally aligned prosthetic components and no acute bony abnormalities. Patient presents today for sooner appointment due to increase in pain. Patient has fallen three times since last injection. Patient in unable to sit fully on left hip. Patient had been seen by PCP, no imaging was ordered. Reviewed past imaging and patient noted to have moderate to severe hip arthritis. 7/27/2020  presents after procedure follow up. Patient had LESI of L3/L4 and Left SI. Patient had good results from injections and she would like to repeat. However, patient has extreme tenderness in the buttocks especially while sitting down. This tenderness remained after injections. 5/5/2020  presents for follow-up of imaging and office visit. X-rays reviewed. Patient does have hip arthritis. Patient is still having increase in pain since fall. Patient states that pain medication is barely controlling pain. If patient does increase activity pain medication is not controlling pain. Back Pain is a recurrent problem. The current episode started 1 to 4 weeks ago. The problem occurs constantly. The problem has been waxing and waning since onset. The pain is present in the lumbar spine and sacro-iliac.  The quality of the pain is described as shooting, cramping and aching. The symptoms are aggravated by bending, sitting, standing and twisting. Pertinent negatives include no numbness or weakness. 2/4/2020  presents to the office for follow up of procedure. Patient is having worsening left hip pain s/p fall from the shower. Patient had been ill she thinks she lost LOC in the shower. She remembers falling and hitting the space heater with her head. Patient never lost LOC after fall. Patient had headache with low back pain and left hip pain. Patient followed up with PCP post fall. However pain is getting worse. Patient can not sit on left hip. Patient denies radiating pain. Patient states that pain is a constant ache. She cannot get comfortable. Patient is having problem ambulating due to pain. Patient is requesting xrays. Back Pain is a recurrent problem. The current episode started 1 to 4 weeks ago. The problem occurs constantly. The problem has been rapidly worsening since onset. The pain is present in the lumbar spine and sacro-iliac. The quality of the pain is described as aching and shooting. The symptoms are aggravated by bending, sitting and standing. Pertinent negatives include no numbness or weakness. 11/4/19  Annual Review  presents to the office for annual exam with primary complaints of chronic low back pain. Patient has been established with this practice for years. She has been receiving LESI of L3-L4 however injections had to be postponed due to cellulitis to her face. Patient had numerous hospitalizations and antibiotics and treatment.      Patient takes Norco, gabapentin and Zanaflex. Patient states that current medication regimen helps to keep patient's pain at a tolerable level however she is ready to repeat injections. Patient denies any side effects to medications at this time.     Past Medical Histoy  Past Medical History:   Diagnosis Date    Arthritis     Bilateral low back pain without sciatica 12/22/2015    Cancer ([facility] [address]     Hx of blood clots     Hyperlipidemia     Pain in both knees 12/22/2015    Thyroid disease     Urinary incontinence        Surgery History  Past Surgical History:   Procedure Laterality Date    CHOLECYSTECTOMY, LAPAROSCOPIC      CYST INCISION AND DRAINAGE      left Cheek    HYSTERECTOMY      JOINT REPLACEMENT      LOBECTOMY      RIGHT PARTIAL    SKIN CANCER EXCISION          Family History  family history includes Cancer in her mother; Heart Attack in her father; Hypertension in her mother. Social History    Social History     Tobacco Use    Smoking status: Current Every Day Smoker     Packs/day: 0.50     Years: 30.00     Pack years: 15.00     Types: Cigarettes    Smokeless tobacco: Never Used    Tobacco comment: on chantix   Substance Use Topics    Alcohol use: No       Allergies  Sulfamethoxazole-trimethoprim, Codeine, Erythromycin, Pcn [penicillins], Talwin [pentazocine], and Sulfa antibiotics     Current Medications  Current Outpatient Medications   Medication Sig Dispense Refill    HYDROcodone-acetaminophen (NORCO)  MG per tablet Take 1 tablet by mouth every 6 hours as needed for Pain (med change) for up to 30 days. 120 tablet 0    tiZANidine (ZANAFLEX) 4 MG tablet Take 1.5 tablets by mouth 3 times daily 135 tablet 2    gabapentin (NEURONTIN) 600 MG tablet Take 1.5 tablets by mouth 3 times daily for 90 days. 135 tablet 2    escitalopram (LEXAPRO) 10 MG tablet Take 10 mg by mouth daily      pantoprazole (PROTONIX) 40 MG tablet       nystatin (MYCOSTATIN) 110459 UNIT/ML suspension       promethazine (PHENERGAN) 25 MG tablet       sucralfate (CARAFATE) 1 GM/10ML suspension Take 1 g by mouth 4 times daily      ALPRAZolam (XANAX) 0.5 MG tablet Take 0.5 mg by mouth 2 times daily as needed. Elisabeth Beltran levothyroxine (SYNTHROID) 100 MCG tablet Take 100 mcg by mouth daily       No current facility-administered medications for this encounter.        Review of Systems:  Review of Systems   Constitutional: Positive for activity change. Gastrointestinal: Negative for constipation. Musculoskeletal: Positive for arthralgias, back pain, gait problem and myalgias. Negative for neck pain. Neurological: Negative for weakness and numbness. Psychiatric/Behavioral: Negative for agitation, self-injury and suicidal ideas. The patient is not nervous/anxious. 14 point ROS negative besides that noted in HPI    Physical Exam:    Vitals:    09/20/21 1503   BP: (!) 182/77   Pulse: 64   Resp: 18   Temp: 97.8 °F (36.6 °C)   TempSrc: Temporal   SpO2: 97%   Weight: 148 lb (67.1 kg)   Height: 5' 6\" (1.676 m)       Body mass index is 23.89 kg/m². Physical Exam  Vitals and nursing note reviewed. Constitutional:       General: She is not in acute distress. Appearance: She is well-developed. HENT:      Head: Normocephalic. Right Ear: External ear normal.      Left Ear: External ear normal.      Nose: Nose normal.   Eyes:      Conjunctiva/sclera: Conjunctivae normal.      Pupils: Pupils are equal, round, and reactive to light. Neck:      Vascular: No JVD. Trachea: No tracheal deviation. Cardiovascular:      Rate and Rhythm: Normal rate. Pulmonary:      Effort: Pulmonary effort is normal.   Abdominal:      General: There is no distension. Tenderness: There is no abdominal tenderness. Musculoskeletal:      Lumbar back: Spasms and tenderness present. Left hip: Tenderness and bony tenderness present. Comments: Piriformis TTP and ropelike   Patient points to on left SI joint as the source of low back pain  TTP of SI joint on left  positive Kalia's on left, positive Distraction on left, positive Thigh Thrust on left  TTP to left ischial bursa     Skin:     General: Skin is warm and dry. Neurological:      Mental Status: She is alert and oriented to person, place, and time. Cranial Nerves: No cranial nerve deficit.    Psychiatric:         Behavior: Behavior evaluate response to therapy    [x] For management of controlled substance  [x] Random UDS as indicated by ORT score or if indicated by abberent behaviors    Discussion: Discussed exam findings and plan of care with patient. Patient agreed with POC and questions were asked and answered. Activity: Discussed exercise as beneficial to pain reduction, encouraged daily stretching with a focus on torso strengthening. Goal:  Pain Management Goals of Therapy:   [] Resolution in pain  [x] Decrease in pain level  [x] Improvement in ADL's  [x] Increase in activities with less pain  [] Decrease in medication      Controlled substance monitoring:    [x] Discussed medication side effects, risk of tolerance and/or dependence, and/or alternative treatment  [] Discussed the detrimental effects of long term narcotic use in younger patients especially women of childbearing years. [x] No signs and symptoms of potential drug abuse or diversion were identified  [] Signs of potential drug abuse or diversion were identified   [] ORT Score   [] UDS non-compliant   [] See Note  [x] Random urine drug screen sent today  [] Random urine drug screen not completed today   [] Deferred New Patient  [] Compliant  7/27/2020  [] Not Compliant see note  [x] Medication agreement with provider signed today  [] Medication agreement with provider on file under media  [] Medication regimen effective and continued   [] New patient continuing current medication while developing POC   [x] On going assessment and evaluation of medication regimen  [] Medication regimen not effective see plan for changes  [x] Rajendra Mosley reviewed & on file under media     CC:  MD Bridget Quinn, APRN - CNP, 9/20/2021 at 3:39 PM    EMR dragon/transcription disclaimer: Much of this encounter note is electronic transcription/translation of spoken language to printed tach.  Electronic translation of spoken language may be erroneous, or at times, nonsensical words or phrases may be inadvertently transcribed.  Although, I have reviewed the note for such errors, some may still exist.

## 2021-09-20 NOTE — PROGRESS NOTES
Clinic Documentation      Education Provided:  [x] Review of Dale Romelia  [x] Agreement Review  [x] PEG Score Calculated [x] PHQ Score Calculated [x] ORT Score Calculated    [] Compliance Issues Discussed [] Cognitive Behavior Needs [x] Exercise [] Review of Test [] Financial Issues  [x] Tobacco/Alcohol Use Reviewed [x] Teaching [] New Patient [] Picture Obtained    Physician Plan:  [] Outgoing Referral  [] Pharmacy Consult  [] Test Ordered [x] Prescription Ordered/Changed   [] Obtained Test Results / Consult Notes        Complete if patient is withholding blood thinner for procedure     Blood Thinner Patient is currently taking:      [] Plavix (Hold for 7 days)  [] Aspirin (Hold for 5 days)     [] Pletal (Hold for 2 days)  [] Pradaxa (Hold for 3 days)    [] Effient (Hold for 7 days)  [] Xarelto (Hold for 2 days)    [] Eliquis (Hold for 2 days)  [] Brilinta (Hold for 7 days)    [] Coumadin (Hold for 5 days) - (INR needs to be drawn the day prior to procedure- INR < 2.0)    [] Aggrenox (Hold for 7 days)        [] Patient will stop medication on their own.    [] Blood Thinner Form Faxed for approval to hold.    Provider form faxed to:     Assessment Completed by:  Electronically signed by Estevan Eddy RN on 9/20/2021 at 2:50 PM

## 2021-09-21 RX ORDER — HYDROCODONE BITARTRATE AND ACETAMINOPHEN 10; 325 MG/1; MG/1
1 TABLET ORAL EVERY 6 HOURS PRN
Qty: 120 TABLET | Refills: 0 | Status: SHIPPED | OUTPATIENT
Start: 2021-10-09 | End: 2021-10-28 | Stop reason: SDUPTHER

## 2021-10-20 ENCOUNTER — HOSPITAL ENCOUNTER (OUTPATIENT)
Dept: PAIN MANAGEMENT | Age: 71
Discharge: HOME | End: 2021-10-20
Payer: MEDICARE

## 2021-10-20 VITALS
HEART RATE: 78 BPM | SYSTOLIC BLOOD PRESSURE: 142 MMHG | TEMPERATURE: 96.2 F | DIASTOLIC BLOOD PRESSURE: 46 MMHG | RESPIRATION RATE: 18 BRPM | OXYGEN SATURATION: 95 %

## 2021-10-20 DIAGNOSIS — M51.16 LUMBAR DISC DISEASE WITH RADICULOPATHY: ICD-10-CM

## 2021-10-20 PROCEDURE — 76942 ECHO GUIDE FOR BIOPSY: CPT | Performed by: NURSE PRACTITIONER

## 2021-10-20 PROCEDURE — 20553 NJX 1/MLT TRIGGER POINTS 3/>: CPT

## 2021-10-20 PROCEDURE — 20611 DRAIN/INJ JOINT/BURSA W/US: CPT

## 2021-10-20 PROCEDURE — 20553 NJX 1/MLT TRIGGER POINTS 3/>: CPT | Performed by: NURSE PRACTITIONER

## 2021-10-20 PROCEDURE — 20611 DRAIN/INJ JOINT/BURSA W/US: CPT | Performed by: NURSE PRACTITIONER

## 2021-10-20 PROCEDURE — 2500000003 HC RX 250 WO HCPCS

## 2021-10-20 PROCEDURE — 99213 OFFICE O/P EST LOW 20 MIN: CPT | Performed by: NURSE PRACTITIONER

## 2021-10-20 PROCEDURE — 6360000002 HC RX W HCPCS

## 2021-10-20 RX ORDER — BUPIVACAINE HYDROCHLORIDE 5 MG/ML
2 INJECTION, SOLUTION EPIDURAL; INTRACAUDAL ONCE
Status: DISCONTINUED | OUTPATIENT
Start: 2021-10-20 | End: 2021-10-22 | Stop reason: HOSPADM

## 2021-10-20 RX ORDER — TRIAMCINOLONE ACETONIDE 40 MG/ML
40 INJECTION, SUSPENSION INTRA-ARTICULAR; INTRAMUSCULAR ONCE
Status: DISCONTINUED | OUTPATIENT
Start: 2021-10-20 | End: 2021-10-22 | Stop reason: HOSPADM

## 2021-10-20 RX ORDER — BUPIVACAINE HYDROCHLORIDE 5 MG/ML
1 INJECTION, SOLUTION EPIDURAL; INTRACAUDAL ONCE
Status: DISCONTINUED | OUTPATIENT
Start: 2021-10-20 | End: 2021-10-22 | Stop reason: HOSPADM

## 2021-10-20 RX ORDER — BUPIVACAINE HYDROCHLORIDE 5 MG/ML
10 INJECTION, SOLUTION EPIDURAL; INTRACAUDAL ONCE
Status: DISCONTINUED | OUTPATIENT
Start: 2021-10-20 | End: 2021-10-22 | Stop reason: HOSPADM

## 2021-10-20 RX ORDER — LIDOCAINE HYDROCHLORIDE 10 MG/ML
1 INJECTION, SOLUTION EPIDURAL; INFILTRATION; INTRACAUDAL; PERINEURAL ONCE
Status: DISCONTINUED | OUTPATIENT
Start: 2021-10-20 | End: 2021-10-22 | Stop reason: HOSPADM

## 2021-10-20 RX ORDER — LIDOCAINE HYDROCHLORIDE 10 MG/ML
10 INJECTION, SOLUTION EPIDURAL; INFILTRATION; INTRACAUDAL; PERINEURAL ONCE
Status: DISCONTINUED | OUTPATIENT
Start: 2021-10-20 | End: 2021-10-22 | Stop reason: HOSPADM

## 2021-10-20 RX ORDER — LIDOCAINE HYDROCHLORIDE 10 MG/ML
2 INJECTION, SOLUTION EPIDURAL; INFILTRATION; INTRACAUDAL; PERINEURAL ONCE
Status: DISCONTINUED | OUTPATIENT
Start: 2021-10-20 | End: 2021-10-22 | Stop reason: HOSPADM

## 2021-10-20 NOTE — PROCEDURES
[facility] Physical & Pain Medicine    Patient Name: Stephanie Macias    : 1950    Age: 66 y.o. Sex: female    Date: 2021      Preop Diagnosis: left  Ishial  Bursitis    Postop Diagnosis: left  Ishial  Bursitis    Procedure: Ultrasound Guided Injection of left  Ishial  Bursa(s)    Performing Procedure:  [facility]    Patient Vitals for the past 24 hrs:   BP Temp Temp src Pulse Resp SpO2   10/20/21 1011 (!) 142/46 96.2 °F (35.7 °C) Temporal 78 18 95 %       Description of Procedure:    left  Ishial Bursa(s)    After voluntary, informed and signed consent obtained the patient was placed in a lateral recumbent position. Appropriate time out was obtained per policy. The ischial bursa(s) was palpated for area of maximal tenderness. The area was prepped in an aseptic fashion with CHG swab. The ultrasound transducer was used to confirm the appropriate location. The skin was sprayed with Gebauer's Solution. Under aseptic technique and direct ultrasound visualization a 22 gauge 3 inch spinal needle was introduced into the Ishial Bursa(s). After a negative aspiration a solution of 1 ml of 0.5% Marcaine Plain and 1 ml of 1% Lidocaine Plain and 1 ml of Kenalog (40mg/ml) was injected into the Ischial Bursa(s). The needle was withdrawn and a sterile dressing applied. If this was a bilateral procedure the same steps were followed on the opposite side. Discharge: The patient tolerated the procedure well. There were no complications during the procedure and the patient was discharged home with discharge instructions. The patient has been instructed to contact the office should there be any complications or questions to arise between today and their next appointment. Plan:    The patient will follow up in the office in approximately 6 weeks.      LIVE Vance CNP, 10/20/2021 at 10:56 AM     [facility] Physical & Pain Medicine    Patient Name: Stephanie Macias    : 1950    Age: 66 y.o. Sex: female    Date: 2021    Pre-op Diagnosis: left  Sacroiliac Joint(s) Dysfunction/ Sacroiliitis    Post-op Diagnosis: left  Sacroiliac Joint(s) Dysfunction/ Sacroliliits    Procedure: Ultrasound Guided Injection of  left Sacroiliac Joint(s)     Performing Procedure:  NALINI Young [facility]    Patient Vitals for the past 24 hrs:   BP Temp Temp src Pulse Resp SpO2   10/20/21 1011 (!) 142/46 96.2 °F (35.7 °C) Temporal 78 18 95 %       left  Sacroiliac Joint(s)    Description of Procedure:    After voluntary, informed and signed consent obtained the patient was placed in a prone position. Appropriate time out was obtained per policy. The Sacroiliac Joint(s) was palpated for area of maximal tenderness. The area was prepped in an aseptic fashion with CHG swab. The ultrasound transducer was used to confirm the appropriate location. The skin was sprayed with Gebauer's Solution. Under aseptic technique and direct ultrasound visualization a 22 gauge 3 inch spinal needle was introduced into the Sacroiliac Joint(s). After a negative aspiration, a solution of 2 ml of 0.5% Marcaine Plain and 2 ml of 1% Lidocaine Plain and 1 ml of Kenalog (40mg/ml) was injected into the Sacroiliac Joint(s). The needle was withdrawn and a sterile dressing applied. If this was a bilateral procedure, the same steps were followed on the opposite side. Discharge: The patient tolerated the procedure well. There were no complications during the procedure and the patient was discharged home with discharge instructions. The patient has been instructed to contact the office should there be any complications or questions to arise between today and their next appointment.     Pre-op Diagnosis: Myofascial Pain/ Muscle Spasms    Post-op Diagnosis: Myofascial Pain/ Muscle Spasms    Procedure: Piriformis Trigger Point Injections    Performing Procedure: NALINI Young  BC    Patient Vitals for the past 24 hrs:   BP Temp Temp src Pulse Resp SpO2   10/20/21 1011 (!) 142/46 96.2 °F (35.7 °C) Temporal 78 18 95 %       left  Piriformis Trigger Point Injections    Description of Procedure:    After a brief physical assessment and failure to improve with conservative measures the patient presented for Piriformis Trigger Point Injections The indications, limitations and possible complications were discussed with the patient and the patient elected to proceed with the procedure. After voluntary, informed and signed consent obtained the patient was placed in a  right Lateral Recumbent Position     Appropriate time out was obtained per policy. The area of maximal tenderness was palpated over the on left Gluteus minimus,  Gluteus Johnny, and Piriformis. The skin overlying these areas was marked. The areas were prepped using aseptic technique CHG prep. The proposed injection sites were the sprayed with Gebauer's Solution while protecting patient eyes. Each trigger point of the muscles - Gluteus minimus,  Gluteus Johnny, and Piriformis was injected with approximately 1-2 ml of a solution of 5 ml of 1% Lidocaine Plain and 5 ml of 0.5% Marcaine Plain after negative aspiration. IF this was a bilateral procedure, the patient was rolled over to the opposite side and the same steps were followed. Discharge: The patient tolerated the procedure well. There were no complications during the procedure and the patient was discharged home with discharge instructions. The patient has been instructed to contact the office should there be any complications or questions to arise between today and their next appointment. Plan:   Will return to the office in  6 weeks for Procedure Follow-up  and Office Visit      LIVE Underwood CNP, 10/20/2021 at 10:56 AM

## 2021-10-20 NOTE — PROGRESS NOTES
[facility] Physical & Pain Medicine    SOAP Note    Patient Name: Aylin Sherman    MR #: [id number]    Account [de-identified]    : 1950    Age: 66 y.o. Sex: female    Date: 10/20/2021    PCP: Aylin Cha MD    Allergies  Sulfamethoxazole-trimethoprim, Codeine, Erythromycin, Pcn [penicillins], Talwin [pentazocine], and Sulfa antibiotics    Current Medications  Current Outpatient Medications   Medication Sig Dispense Refill    HYDROcodone-acetaminophen (NORCO)  MG per tablet Take 1 tablet by mouth every 6 hours as needed for Pain (med change) for up to 30 days. May fill 10/9/2021 early fill due to pharmacy being closed on sundays. Must last until 2021 120 tablet 0    tiZANidine (ZANAFLEX) 4 MG tablet Take 1.5 tablets by mouth 3 times daily 135 tablet 2    gabapentin (NEURONTIN) 600 MG tablet Take 1.5 tablets by mouth 3 times daily for 90 days. 135 tablet 2    escitalopram (LEXAPRO) 10 MG tablet Take 10 mg by mouth daily      pantoprazole (PROTONIX) 40 MG tablet       nystatin (MYCOSTATIN) 951863 UNIT/ML suspension       promethazine (PHENERGAN) 25 MG tablet       sucralfate (CARAFATE) 1 GM/10ML suspension Take 1 g by mouth 4 times daily      ALPRAZolam (XANAX) 0.5 MG tablet Take 0.5 mg by mouth 2 times daily as needed. [facility] levothyroxine (SYNTHROID) 100 MCG tablet Take 100 mcg by mouth daily       No current facility-administered medications for this encounter. Patient Vitals for the past 24 hrs:   BP Temp Temp src Pulse Resp SpO2   10/20/21 1011 (!) 142/46 96.2 °F (35.7 °C) Temporal 78 18 95 %       Subjective: Patient presents for SI and ischial bursa injection today. [place] [facility] Patient had CT of the lumbar spine on 8/3/2021 which indicated disc osteophyte formation facet arthritis throughout is and neuroforaminal narrowing with most significant at the L5-S1.     States that pain does run down bilateral lower extremities with the left being worse than the right. Patient states that pain radiates to the foot. Objective:      Bilateral positive SLR    Assessment:     Lumbar radiculopathy as the result to neural foraminal narrowing most significant at the L5-S1    Plan:   1. Lumbar disc disease with radiculopathy  Schedule LESI of L5-S1 under fluoroscopy with Dr. Dariusz Ge. Depending on how patient responds may need to have a follow-up MRI of lumbar spine versus neurosurgical consultation.       Juana Newman, LIVE - CNP, 10/20/2021 at 10:58 AM

## 2021-10-27 ENCOUNTER — TELEPHONE (OUTPATIENT)
Dept: PAIN MANAGEMENT | Age: 71
End: 2021-10-27

## 2021-10-27 DIAGNOSIS — M51.16 LUMBAR DISC DISEASE WITH RADICULOPATHY: ICD-10-CM

## 2021-10-28 DIAGNOSIS — G89.29 CHRONIC BILATERAL LOW BACK PAIN WITHOUT SCIATICA: ICD-10-CM

## 2021-10-28 DIAGNOSIS — M54.50 CHRONIC BILATERAL LOW BACK PAIN WITHOUT SCIATICA: ICD-10-CM

## 2021-11-02 RX ORDER — HYDROCODONE BITARTRATE AND ACETAMINOPHEN 10; 325 MG/1; MG/1
1 TABLET ORAL EVERY 6 HOURS PRN
Qty: 120 TABLET | Refills: 0 | Status: SHIPPED | OUTPATIENT
Start: 2021-11-09 | End: 2021-11-29 | Stop reason: SDUPTHER

## 2021-11-29 DIAGNOSIS — G89.29 CHRONIC MYOFASCIAL PAIN: ICD-10-CM

## 2021-11-29 DIAGNOSIS — M54.50 CHRONIC BILATERAL LOW BACK PAIN WITHOUT SCIATICA: ICD-10-CM

## 2021-11-29 DIAGNOSIS — G89.29 CHRONIC BILATERAL LOW BACK PAIN WITHOUT SCIATICA: ICD-10-CM

## 2021-11-29 DIAGNOSIS — M79.18 CHRONIC MYOFASCIAL PAIN: ICD-10-CM

## 2021-12-01 RX ORDER — TIZANIDINE 4 MG/1
6 TABLET ORAL 3 TIMES DAILY
Qty: 135 TABLET | Refills: 2 | Status: SHIPPED | OUTPATIENT
Start: 2021-12-01 | End: 2022-02-22 | Stop reason: SDUPTHER

## 2021-12-01 RX ORDER — HYDROCODONE BITARTRATE AND ACETAMINOPHEN 10; 325 MG/1; MG/1
1 TABLET ORAL EVERY 6 HOURS PRN
Qty: 120 TABLET | Refills: 0 | Status: SHIPPED | OUTPATIENT
Start: 2021-12-09 | End: 2021-12-27 | Stop reason: SDUPTHER

## 2021-12-06 ENCOUNTER — HOSPITAL ENCOUNTER (OUTPATIENT)
Dept: PAIN MANAGEMENT | Age: 71
Discharge: HOME | End: 2021-12-06
Payer: MEDICARE

## 2021-12-06 VITALS
TEMPERATURE: 97.5 F | WEIGHT: 145 LBS | HEART RATE: 62 BPM | SYSTOLIC BLOOD PRESSURE: 154 MMHG | BODY MASS INDEX: 23.3 KG/M2 | OXYGEN SATURATION: 93 % | HEIGHT: 66 IN | DIASTOLIC BLOOD PRESSURE: 66 MMHG

## 2021-12-06 PROCEDURE — 99213 OFFICE O/P EST LOW 20 MIN: CPT | Performed by: NURSE PRACTITIONER

## 2021-12-06 PROCEDURE — 99213 OFFICE O/P EST LOW 20 MIN: CPT

## 2021-12-06 RX ORDER — LINACLOTIDE 290 UG/1
290 CAPSULE, GELATIN COATED ORAL DAILY
Status: ON HOLD | COMMUNITY
Start: 2021-11-24 | End: 2022-04-28 | Stop reason: HOSPADM

## 2021-12-06 RX ORDER — POLYETHYLENE GLYCOL 3350 17 G/17G
17 POWDER, FOR SOLUTION ORAL DAILY
Status: ON HOLD | COMMUNITY
End: 2022-04-28 | Stop reason: HOSPADM

## 2021-12-06 ASSESSMENT — ENCOUNTER SYMPTOMS
BACK PAIN: 1
CONSTIPATION: 0

## 2021-12-06 ASSESSMENT — PAIN DESCRIPTION - INTENSITY: RATING_2: 3

## 2021-12-06 ASSESSMENT — PAIN DESCRIPTION - PAIN TYPE
TYPE_2: ACUTE PAIN
TYPE: CHRONIC PAIN

## 2021-12-06 ASSESSMENT — PAIN DESCRIPTION - ORIENTATION
ORIENTATION: LOWER
ORIENTATION_2: LEFT

## 2021-12-06 ASSESSMENT — PAIN DESCRIPTION - LOCATION
LOCATION: BACK
LOCATION_2: BREAST

## 2021-12-06 ASSESSMENT — PAIN SCALES - GENERAL: PAINLEVEL_OUTOF10: 8

## 2021-12-06 NOTE — PROGRESS NOTES
[facility]– [place] Physical & Pain Medicine  Office Note    Patient Name: Stephanie Macias    MR #: [id number]    Account #: [de-identified]    : /[id number]    Age: 66 y.o. Sex: female    Date: 2021    PCP: Jenifer Bob MD    Chief Complaint:   Chief Complaint   Patient presents with    Lower Back Pain       History of Present Illness:     Stephanie Macias is a 66 y.o. female who presents for procedure follow up. Patient had Left SI joint, Left Ischal Bursa injection and Left Piriformis on 10/20/2021. Patient had at least 85% relief of pain from procedure(s) for at least 6 weeks and was able to increase activity after procedure. Patient received enough pain relief from injections that the patient would like to repeat the injection(s). Patient had LESI scheduled but she canceled due to being sick. Hip Pain  This is a chronic problem. The current episode started more than 1 year ago. The problem occurs constantly. The problem has been waxing and waning. Associated symptoms include arthralgias and myalgias. Pertinent negatives include no neck pain, numbness or weakness. Back Pain  This is a chronic problem. The current episode started more than 1 month ago. The problem occurs constantly. The problem has been waxing and waning since onset. The pain is present in the lumbar spine and sacro-iliac. The quality of the pain is described as aching, cramping and shooting. The symptoms are aggravated by bending, sitting, standing and twisting. Pertinent negatives include no numbness or weakness. Screening Tools:    PEG Score: 8.7     Last PEG Score: 9.7     Annual ORT Score: 5     Annual PHQ Score: 16    Current Pain Assessment  Pain Assessment  Pain Assessment: 0-10  Pain Level: 8  Pain Type: Chronic pain  Pain Location: Back  Pain Orientation: Lower  Multiple Pain Sites: Yes    Past Visit HPI:  2020  presents for procedure follow-up.   Patient had left sacroiliac joint injection and left ischial bursa injection on 8/26/2020 and Lumbar Epidural L3-4 on 9/11/2020. Patient had at least 85% relief of pain from procedure(s) for at least 6 weeks and was able to increase activity after procedure. Patient received enough pain relief from injections that the patient would like to repeat the injection(s). When patient was seen on 8/26/2020 for procedure, she was having left knee and right right-sided pain and chest due to a fall. Patient had assessment completed and ordered x-ray of chest and left knee. Chest x-ray ray revealed no acute process and left knee indicated well-seated normally aligned prosthetic components and no acute bony abnormalities. Patient presents today for sooner appointment due to increase in pain. Patient has fallen three times since last injection. Patient in unable to sit fully on left hip. Patient had been seen by PCP, no imaging was ordered. Reviewed past imaging and patient noted to have moderate to severe hip arthritis. 7/27/2020  presents after procedure follow up. Patient had LESI of L3/L4 and Left SI. Patient had good results from injections and she would like to repeat. However, patient has extreme tenderness in the buttocks especially while sitting down. This tenderness remained after injections. 5/5/2020  presents for follow-up of imaging and office visit. X-rays reviewed. Patient does have hip arthritis. Patient is still having increase in pain since fall. Patient states that pain medication is barely controlling pain. If patient does increase activity pain medication is not controlling pain. Back Pain is a recurrent problem. The current episode started 1 to 4 weeks ago. The problem occurs constantly. The problem has been waxing and waning since onset. The pain is present in the lumbar spine and sacro-iliac. The quality of the pain is described as shooting, cramping and aching. The symptoms are aggravated by bending, sitting, standing and twisting. Pertinent negatives include no numbness or weakness. 2/4/2020  presents to the office for follow up of procedure. Patient is having worsening left hip pain s/p fall from the shower. Patient had been ill she thinks she lost LOC in the shower. She remembers falling and hitting the space heater with her head. Patient never lost LOC after fall. Patient had headache with low back pain and left hip pain. Patient followed up with PCP post fall. However pain is getting worse. Patient can not sit on left hip. Patient denies radiating pain. Patient states that pain is a constant ache. She cannot get comfortable. Patient is having problem ambulating due to pain. Patient is requesting xrays. Back Pain is a recurrent problem. The current episode started 1 to 4 weeks ago. The problem occurs constantly. The problem has been rapidly worsening since onset. The pain is present in the lumbar spine and sacro-iliac. The quality of the pain is described as aching and shooting. The symptoms are aggravated by bending, sitting and standing. Pertinent negatives include no numbness or weakness. 11/4/19  Annual Review  presents to the office for annual exam with primary complaints of chronic low back pain. Patient has been established with this practice for years. She has been receiving LESI of L3-L4 however injections had to be postponed due to cellulitis to her face. Patient had numerous hospitalizations and antibiotics and treatment.      Patient takes Norco, gabapentin and Zanaflex. Patient states that current medication regimen helps to keep patient's pain at a tolerable level however she is ready to repeat injections. Patient denies any side effects to medications at this time.     Past Medical Histoy  Past Medical History:   Diagnosis Date    Arthritis     Bilateral low back pain without sciatica 12/22/2015    Cancer ([facility] [address]     Hx of blood clots     Hyperlipidemia     Pain in both knees 12/22/2015    Thyroid activity change. Gastrointestinal: Negative for constipation. Musculoskeletal: Positive for arthralgias, back pain, gait problem and myalgias. Negative for neck pain. Neurological: Negative for weakness and numbness. Psychiatric/Behavioral: Negative for agitation, self-injury and suicidal ideas. The patient is not nervous/anxious. 14 point ROS negative besides that noted in HPI    Physical Exam:    Vitals:    12/06/21 1353   BP: (!) 154/66   Pulse: 62   Temp: 97.5 °F (36.4 °C)   TempSrc: Temporal   SpO2: 93%   Weight: 145 lb (65.8 kg)   Height: 5' 6\" (1.676 m)       Body mass index is 23.4 kg/m². Physical Exam  Vitals and nursing note reviewed. Constitutional:       General: She is not in acute distress. Appearance: She is well-developed. HENT:      Head: Normocephalic. Right Ear: External ear normal.      Left Ear: External ear normal.      Nose: Nose normal.   Eyes:      Conjunctiva/sclera: Conjunctivae normal.      Pupils: Pupils are equal, round, and reactive to light. Neck:      Vascular: No JVD. Trachea: No tracheal deviation. Cardiovascular:      Rate and Rhythm: Normal rate. Pulmonary:      Effort: Pulmonary effort is normal.   Abdominal:      General: There is no distension. Tenderness: There is no abdominal tenderness. Musculoskeletal:      Lumbar back: Spasms, tenderness and bony tenderness present. Left hip: Tenderness and bony tenderness present. Comments: Piriformis TTP and ropelike   Patient points to on left SI joint as the source of low back pain  TTP of SI joint on left  positive Kalia's on left, positive Distraction on left, positive Thigh Thrust on left  TTP to left ischial bursa     Skin:     General: Skin is warm and dry. Neurological:      Mental Status: She is alert and oriented to person, place, and time. Cranial Nerves: No cranial nerve deficit.    Psychiatric:         Behavior: Behavior normal.         Thought Content: Thought content normal.         Judgment: Judgment normal.          Labs:  Lab Results   Component Value Date     04/18/2011    K 4.6 04/18/2011     04/18/2011    CO2 29 04/18/2011    BUN 8 04/18/2011    CREATININE 1.0 11/10/2016    CREATININE 0.8 04/18/2011    GLUCOSE 129 04/18/2011    CALCIUM 10.1 04/18/2011        No results found for: WBC, HGB, HCT, MCV, PLT      Assessment:  Active Problems:    Bilateral low back pain without sciatica    Pain in both knees    Lumbar disc disease with radiculopathy    SI (sacroiliac) joint dysfunction    Pain management contract agreement    Ischial bursitis of left side    Piriformis syndrome of left side  Resolved Problems:    * No resolved hospital problems. *      Plan:  Lumbar disc disease with radiculopathy  Gabapentin discontinued due to falling. Chronic bilateral low back pain without sciatica  Refill sent with fill date of 12/9/2021 May increase quantity to 150 for January  HYDROcodone-acetaminophen (NORCO)  MG per tablet; Take 1 tablet by mouth every 6 hours as needed for Pain for up to 30 days. Intended supply: 30 days  Dispense: 120 tablet; Refill: 0    Chronic myofascial pain  Continue no refill needed- tiZANidine (ZANAFLEX) 4 MG tablet; Take 1.5 tablets by mouth 3 times daily  Dispense: 135 tablet; Refill: 2    Repeat Left SI and Left ischial bursa with toradol US with NP with Piriformis Trigger point injections patient had a good response to previous injection    [x] Follow up    [] 4 weeks   [x] 6-8 weeks   [] 10-12 weeks   [] 3 months  [x] Post procedure to evaluate effectiveness of treatment  [] To evaluate medications changes made at office visit. [] To review diagnostics ordered at last visit. [] To evaluate response to therapy    [x] For management of controlled substance  [x] Random UDS as indicated by ORT score or if indicated by abberent behaviors    Discussion: Discussed exam findings and plan of care with patient.  Patient agreed with POC and questions were asked and answered. Activity: Discussed exercise as beneficial to pain reduction, encouraged daily stretching with a focus on torso strengthening. Goal:  Pain Management Goals of Therapy:   [] Resolution in pain  [x] Decrease in pain level  [x] Improvement in ADL's  [x] Increase in activities with less pain  [] Decrease in medication      Controlled substance monitoring:    [x] Discussed medication side effects, risk of tolerance and/or dependence, and/or alternative treatment  [] Discussed the detrimental effects of long term narcotic use in younger patients especially women of childbearing years. [x] No signs and symptoms of potential drug abuse or diversion were identified  [] Signs of potential drug abuse or diversion were identified   [] ORT Score   [] UDS non-compliant   [] See Note  [] Random urine drug screen sent today  [x] Random urine drug screen not completed today   [] Deferred New Patient  [x] Compliant  9/20/2021  [] Not Compliant see note  [] Medication agreement with provider signed today  [x] Medication agreement with provider on file under media  [] Medication regimen effective and continued   [] New patient continuing current medication while developing POC   [x] On going assessment and evaluation of medication regimen  [] Medication regimen not effective see plan for changes  [x] Climmie Amel reviewed & on file under media     CC:  MD Alis Rivera, APRN - CNP, 12/6/2021 at 2:27 PM    EMR dragon/transcription disclaimer: Much of this encounter note is electronic transcription/translation of spoken language to printed tach. Electronic translation of spoken language may be erroneous, or at times, nonsensical words or phrases may be inadvertently transcribed.  Although, I have reviewed the note for such errors, some may still exist.

## 2021-12-06 NOTE — PROGRESS NOTES
Clinic Documentation      Education Provided:  [x] Review of Usman Wood  [] Agreement Review  [x] PEG Score Calculated [] PHQ Score Calculated [] ORT Score Calculated    [] Compliance Issues Discussed [] Cognitive Behavior Needs [x] Exercise [] Review of Test [] Financial Issues  [x] Tobacco/Alcohol Use Reviewed [x] Teaching [] New Patient [] Picture Obtained    Physician Plan:  [] Outgoing Referral  [] Pharmacy Consult  [] Test Ordered [x] Prescription Ordered/Changed   [] Obtained Test Results / Consult Notes        Complete if patient is withholding blood thinner for procedure     Blood Thinner Patient is currently taking:      [] Plavix (Hold for 7 days)  [] Aspirin (Hold for 5 days)     [] Pletal (Hold for 2 days)  [] Pradaxa (Hold for 3 days)    [] Effient (Hold for 7 days)  [] Xarelto (Hold for 2 days)    [] Eliquis (Hold for 2 days)  [] Brilinta (Hold for 7 days)    [] Coumadin (Hold for 5 days) - (INR needs to be drawn the day prior to procedure- INR < 2.0)    [] Aggrenox (Hold for 7 days)        [] Patient will stop medication on their own.    [] Blood Thinner Form Faxed for approval to hold.    Provider form faxed to:     Assessment Completed by:  Electronically signed by Robinson Kang RN on 12/6/2021 at 1:37 PM

## 2021-12-22 ENCOUNTER — HOSPITAL ENCOUNTER (OUTPATIENT)
Dept: PAIN MANAGEMENT | Age: 71
Discharge: HOME | End: 2021-12-22
Payer: MEDICARE

## 2021-12-22 VITALS
HEART RATE: 68 BPM | SYSTOLIC BLOOD PRESSURE: 140 MMHG | DIASTOLIC BLOOD PRESSURE: 77 MMHG | OXYGEN SATURATION: 98 % | TEMPERATURE: 96.7 F | RESPIRATION RATE: 18 BRPM

## 2021-12-22 PROCEDURE — 20553 NJX 1/MLT TRIGGER POINTS 3/>: CPT | Performed by: NURSE PRACTITIONER

## 2021-12-22 PROCEDURE — 20611 DRAIN/INJ JOINT/BURSA W/US: CPT | Performed by: NURSE PRACTITIONER

## 2021-12-22 PROCEDURE — 6360000002 HC RX W HCPCS: Performed by: NURSE PRACTITIONER

## 2021-12-22 PROCEDURE — 2500000003 HC RX 250 WO HCPCS

## 2021-12-22 PROCEDURE — 6360000002 HC RX W HCPCS

## 2021-12-22 PROCEDURE — 20611 DRAIN/INJ JOINT/BURSA W/US: CPT

## 2021-12-22 PROCEDURE — 20553 NJX 1/MLT TRIGGER POINTS 3/>: CPT

## 2021-12-22 PROCEDURE — 76942 ECHO GUIDE FOR BIOPSY: CPT | Performed by: NURSE PRACTITIONER

## 2021-12-22 RX ORDER — LIDOCAINE HYDROCHLORIDE 10 MG/ML
1 INJECTION, SOLUTION EPIDURAL; INFILTRATION; INTRACAUDAL; PERINEURAL ONCE
Status: DISCONTINUED | OUTPATIENT
Start: 2021-12-22 | End: 2021-12-24 | Stop reason: HOSPADM

## 2021-12-22 RX ORDER — BUPIVACAINE HYDROCHLORIDE 5 MG/ML
10 INJECTION, SOLUTION EPIDURAL; INTRACAUDAL ONCE
Status: DISCONTINUED | OUTPATIENT
Start: 2021-12-22 | End: 2021-12-24 | Stop reason: HOSPADM

## 2021-12-22 RX ORDER — LIDOCAINE HYDROCHLORIDE 10 MG/ML
10 INJECTION, SOLUTION EPIDURAL; INFILTRATION; INTRACAUDAL; PERINEURAL ONCE
Status: DISCONTINUED | OUTPATIENT
Start: 2021-12-22 | End: 2021-12-24 | Stop reason: HOSPADM

## 2021-12-22 RX ORDER — BUPIVACAINE HYDROCHLORIDE 5 MG/ML
2 INJECTION, SOLUTION EPIDURAL; INTRACAUDAL ONCE
Status: DISCONTINUED | OUTPATIENT
Start: 2021-12-22 | End: 2021-12-24 | Stop reason: HOSPADM

## 2021-12-22 RX ORDER — LIDOCAINE HYDROCHLORIDE 10 MG/ML
2 INJECTION, SOLUTION EPIDURAL; INFILTRATION; INTRACAUDAL; PERINEURAL ONCE
Status: DISCONTINUED | OUTPATIENT
Start: 2021-12-22 | End: 2021-12-24 | Stop reason: HOSPADM

## 2021-12-22 RX ORDER — BUPIVACAINE HYDROCHLORIDE 5 MG/ML
1 INJECTION, SOLUTION EPIDURAL; INTRACAUDAL ONCE
Status: DISCONTINUED | OUTPATIENT
Start: 2021-12-22 | End: 2021-12-24 | Stop reason: HOSPADM

## 2021-12-22 RX ORDER — KETOROLAC TROMETHAMINE 30 MG/ML
60 INJECTION, SOLUTION INTRAMUSCULAR; INTRAVENOUS ONCE
Status: DISCONTINUED | OUTPATIENT
Start: 2021-12-22 | End: 2021-12-24 | Stop reason: HOSPADM

## 2021-12-22 NOTE — PROCEDURES
[facility] Physical & Pain Medicine    Patient Name: David Markham    : 1950    Age: 66 y.o. Sex: female    Date: 2021    Pre-op Diagnosis: left  Sacroiliac Joint(s) Dysfunction/ Sacroiliitis    Post-op Diagnosis: left  Sacroiliac Joint(s) Dysfunction/ Sacroliliits    Procedure: Ultrasound Guided Injection of  left Sacroiliac Joint(s)     Performing Procedure:  NALINI Garcia    Patient Vitals for the past 24 hrs:   BP Temp Temp src Pulse Resp SpO2   21 1113 (!) 140/77 96.7 °F (35.9 °C) Temporal 68 18 98 %       left  Sacroiliac Joint(s)    Description of Procedure:    After voluntary, informed and signed consent obtained the patient was placed in a prone position. Appropriate time out was obtained per policy. The Sacroiliac Joint(s) was palpated for area of maximal tenderness. The area was prepped in an aseptic fashion with CHG swab. The ultrasound transducer was used to confirm the appropriate location. The skin was sprayed with Gebauer's Solution. Under aseptic technique and direct ultrasound visualization a 22 gauge 3 inch spinal needle was introduced into the Sacroiliac Joint(s). After a negative aspiration, a solution of 2 ml of 0.5% Marcaine Plain and 2 ml of 1% Lidocaine Plain and 1 ml of Toradol (30mg/ml) was injected into the Sacroiliac Joint(s). The needle was withdrawn and a sterile dressing applied. If this was a bilateral procedure, the same steps were followed on the opposite side. Discharge: The patient tolerated the procedure well. There were no complications during the procedure and the patient was discharged home with discharge instructions. The patient has been instructed to contact the office should there be any complications or questions to arise between today and their next appointment.     Pre-op Diagnosis: Myofascial Pain/ Muscle Spasms    Post-op Diagnosis: Myofascial Pain/ Muscle Spasms    Procedure: Piriformis Trigger Name: Panda Ortega    : 1950    Age: 66 y.o. Sex: female    Date: 2021      Preop Diagnosis: left  Ishial  Bursitis    Postop Diagnosis: left  Ishial  Bursitis    Procedure: Ultrasound Guided Injection of left  Ishial  Bursa(s)    [facility]    Patient Vitals for the past 24 hrs:   BP Temp Temp src Pulse Resp SpO2   21 1113 (!) 140/77 96.7 °F (35.9 °C) Temporal 68 18 98 %       Description of Procedure:    left  Ishial Bursa(s)    After voluntary, informed and signed consent obtained the patient was placed in a lateral recumbent position. Appropriate time out was obtained per policy. The ischial bursa(s) was palpated for area of maximal tenderness. The area was prepped in an aseptic fashion with CHG swab. The ultrasound transducer was used to confirm the appropriate location. The skin was sprayed with Gebauer's Solution. Under aseptic technique and direct ultrasound visualization a 22 gauge 3 inch spinal needle was introduced into the Ishial Bursa(s). After a negative aspiration a solution of 1 ml of 0.5% Marcaine Plain and 1 ml of 1% Lidocaine Plain and 1 ml of toradol (30mg/ml) was injected into the Ischial Bursa(s). The needle was withdrawn and a sterile dressing applied. If this was a bilateral procedure the same steps were followed on the opposite side. Discharge: The patient tolerated the procedure well. There were no complications during the procedure and the patient was discharged home with discharge instructions. The patient has been instructed to contact the office should there be any complications or questions to arise between today and their next appointment. Plan:    The patient will follow up in the office in approximately 6 weeks.      LIVE Harden - CNP, 2021 at 11:42 AM

## 2021-12-27 DIAGNOSIS — M54.50 CHRONIC BILATERAL LOW BACK PAIN WITHOUT SCIATICA: ICD-10-CM

## 2021-12-27 DIAGNOSIS — G89.29 CHRONIC BILATERAL LOW BACK PAIN WITHOUT SCIATICA: ICD-10-CM

## 2021-12-29 RX ORDER — HYDROCODONE BITARTRATE AND ACETAMINOPHEN 10; 325 MG/1; MG/1
1 TABLET ORAL EVERY 6 HOURS PRN
Qty: 120 TABLET | Refills: 0 | Status: SHIPPED | OUTPATIENT
Start: 2022-01-08 | End: 2022-01-03

## 2022-01-03 DIAGNOSIS — G89.29 CHRONIC BILATERAL LOW BACK PAIN WITHOUT SCIATICA: ICD-10-CM

## 2022-01-03 DIAGNOSIS — M54.50 CHRONIC BILATERAL LOW BACK PAIN WITHOUT SCIATICA: ICD-10-CM

## 2022-01-04 RX ORDER — HYDROCODONE BITARTRATE AND ACETAMINOPHEN 10; 325 MG/1; MG/1
1 TABLET ORAL
Qty: 150 TABLET | Refills: 0 | Status: SHIPPED | OUTPATIENT
Start: 2022-01-04 | End: 2022-01-24 | Stop reason: SDUPTHER

## 2022-01-24 DIAGNOSIS — M54.50 CHRONIC BILATERAL LOW BACK PAIN WITHOUT SCIATICA: ICD-10-CM

## 2022-01-24 DIAGNOSIS — G89.29 CHRONIC BILATERAL LOW BACK PAIN WITHOUT SCIATICA: ICD-10-CM

## 2022-01-26 RX ORDER — HYDROCODONE BITARTRATE AND ACETAMINOPHEN 10; 325 MG/1; MG/1
1 TABLET ORAL EVERY 6 HOURS PRN
Qty: 120 TABLET | Refills: 0 | Status: SHIPPED | OUTPATIENT
Start: 2022-02-03 | End: 2022-02-22 | Stop reason: SDUPTHER

## 2022-02-21 ENCOUNTER — HOSPITAL ENCOUNTER (OUTPATIENT)
Facility: HOSPITAL | Age: 72
Setting detail: SURGERY ADMIT
End: 2022-02-21
Attending: ORTHOPAEDIC SURGERY | Admitting: ORTHOPAEDIC SURGERY

## 2022-02-22 ENCOUNTER — HOSPITAL ENCOUNTER (OUTPATIENT)
Dept: PAIN MANAGEMENT | Age: 72
Discharge: HOME | End: 2022-02-22
Payer: MEDICARE

## 2022-02-22 VITALS
HEART RATE: 61 BPM | DIASTOLIC BLOOD PRESSURE: 70 MMHG | HEIGHT: 65 IN | TEMPERATURE: 98 F | SYSTOLIC BLOOD PRESSURE: 124 MMHG | WEIGHT: 146 LBS | RESPIRATION RATE: 18 BRPM | BODY MASS INDEX: 24.32 KG/M2 | OXYGEN SATURATION: 94 %

## 2022-02-22 DIAGNOSIS — G89.29 CHRONIC BILATERAL LOW BACK PAIN WITHOUT SCIATICA: ICD-10-CM

## 2022-02-22 DIAGNOSIS — G89.29 CHRONIC MYOFASCIAL PAIN: ICD-10-CM

## 2022-02-22 DIAGNOSIS — M54.50 CHRONIC BILATERAL LOW BACK PAIN WITHOUT SCIATICA: ICD-10-CM

## 2022-02-22 DIAGNOSIS — M79.18 CHRONIC MYOFASCIAL PAIN: ICD-10-CM

## 2022-02-22 PROCEDURE — 99213 OFFICE O/P EST LOW 20 MIN: CPT

## 2022-02-22 PROCEDURE — 99214 OFFICE O/P EST MOD 30 MIN: CPT | Performed by: NURSE PRACTITIONER

## 2022-02-22 RX ORDER — ONDANSETRON HYDROCHLORIDE 8 MG/1
8 TABLET, FILM COATED ORAL PRN
Status: ON HOLD | COMMUNITY
Start: 2022-02-01 | End: 2022-04-19

## 2022-02-22 RX ORDER — TIZANIDINE 4 MG/1
6 TABLET ORAL 3 TIMES DAILY
Qty: 135 TABLET | Refills: 2 | Status: ON HOLD | OUTPATIENT
Start: 2022-02-22 | End: 2022-04-28 | Stop reason: HOSPADM

## 2022-02-22 RX ORDER — TRIAMTERENE AND HYDROCHLOROTHIAZIDE 37.5; 25 MG/1; MG/1
1 CAPSULE ORAL DAILY
Status: ON HOLD | COMMUNITY
Start: 2022-02-11 | End: 2022-04-19

## 2022-02-22 RX ORDER — HYDROCODONE BITARTRATE AND ACETAMINOPHEN 10; 325 MG/1; MG/1
1 TABLET ORAL EVERY 4 HOURS PRN
Qty: 150 TABLET | Refills: 0 | Status: SHIPPED | OUTPATIENT
Start: 2022-02-22 | End: 2022-03-16 | Stop reason: SDUPTHER

## 2022-02-22 ASSESSMENT — PAIN SCALES - GENERAL: PAINLEVEL_OUTOF10: 8

## 2022-02-22 ASSESSMENT — PAIN DESCRIPTION - ORIENTATION
ORIENTATION_2: LEFT
ORIENTATION: LOWER

## 2022-02-22 ASSESSMENT — PAIN DESCRIPTION - LOCATION
LOCATION_2: HIP
LOCATION: BACK

## 2022-02-22 ASSESSMENT — ENCOUNTER SYMPTOMS
BACK PAIN: 1
CONSTIPATION: 0

## 2022-02-22 ASSESSMENT — PAIN DESCRIPTION - INTENSITY: RATING_2: 10

## 2022-02-22 ASSESSMENT — PAIN DESCRIPTION - PAIN TYPE
TYPE: CHRONIC PAIN
TYPE_2: CHRONIC PAIN

## 2022-02-22 NOTE — PROGRESS NOTES
Clinic Documentation      Education Provided:  [x] Review of Sánchez Bras  [] Agreement Review  [x] PEG Score Calculated [] PHQ Score Calculated [] ORT Score Calculated    [] Compliance Issues Discussed [] Cognitive Behavior Needs [x] Exercise [] Review of Test [] Financial Issues  [x] Tobacco/Alcohol Use Reviewed [x] Teaching [] New Patient [] Picture Obtained    Physician Plan:  [] Outgoing Referral  [] Pharmacy Consult  [] Test Ordered [x] Prescription Ordered/Changed   [] Obtained Test Results / Consult Notes        Complete if patient is withholding blood thinner for procedure     Blood Thinner Patient is currently taking:      [] Plavix (Hold for 7 days)  [] Aspirin (Hold for 5 days)     [] Pletal (Hold for 2 days)  [] Pradaxa (Hold for 3 days)    [] Effient (Hold for 7 days)  [] Xarelto (Hold for 2 days)    [] Eliquis (Hold for 2 days)  [] Brilinta (Hold for 7 days)    [] Coumadin (Hold for 5 days) - (INR needs to be drawn the day prior to procedure- INR < 2.0)    [] Aggrenox (Hold for 7 days)        [] Patient will stop medication on their own.    [] Blood Thinner Form Faxed for approval to hold.    Provider form faxed to:     Assessment Completed by:  Electronically signed by Shasha Cam RN on 2/22/2022 at 3:53 PM

## 2022-02-22 NOTE — PROGRESS NOTES
[facility]  Office Note    Patient Name: James Fitch    MR #: [id number]    Account #: [de-identified]    : /[id number]    Age: 66 y.o. Sex: female    Date: 2022    PCP: Urszula Maldonado MD    Chief Complaint:   Chief Complaint   Patient presents with    Lower Back Pain       History of Present Illness:     James Fitch is a 66 y.o. female who presents for procedure follow up. Patient had Left SI joint, Left Ischal Bursa injection and Left Piriformis on 2021. Patient had at least 85% relief of pain from procedure(s) for at least 6 weeks and was able to increase activity after procedure. Patient received enough pain relief from injections that the patient would like to repeat the injection(s). Since last appointment, patient has been seen by orthopedic surgeon. Patient is going to have back surgery. Patient states that back surgery is going to be a 3 part surgery. Dr. Agueda Orr will be doing the low back surgery       Hip Pain  This is a chronic problem. The current episode started more than 1 year ago. The problem occurs constantly. The problem has been waxing and waning. Associated symptoms include arthralgias and myalgias. Pertinent negatives include no neck pain, numbness or weakness. Back Pain  This is a chronic problem. The current episode started more than 1 month ago. The problem occurs constantly. The problem has been waxing and waning since onset. The pain is present in the lumbar spine and sacro-iliac. The quality of the pain is described as aching, cramping and shooting. The symptoms are aggravated by bending, sitting, standing and twisting. Pertinent negatives include no numbness or weakness.      Screening Tools:    PEG Score: 8.7     Last PEG Score: 9.7     Annual ORT Score: 5     Annual PHQ Score: 16    Current Pain Assessment  Pain Assessment  Pain Assessment: 0-10  Pain Level: 8  Pain Type: Chronic pain  Pain Location: Back  Pain Orientation: Lower    Past Visit HPI:  12/9/2020  presents for procedure follow-up. Patient had left sacroiliac joint injection and left ischial bursa injection on 8/26/2020 and Lumbar Epidural L3-4 on 9/11/2020. Patient had at least 85% relief of pain from procedure(s) for at least 6 weeks and was able to increase activity after procedure. Patient received enough pain relief from injections that the patient would like to repeat the injection(s). When patient was seen on 8/26/2020 for procedure, she was having left knee and right right-sided pain and chest due to a fall. Patient had assessment completed and ordered x-ray of chest and left knee. Chest x-ray ray revealed no acute process and left knee indicated well-seated normally aligned prosthetic components and no acute bony abnormalities. Patient presents today for sooner appointment due to increase in pain. Patient has fallen three times since last injection. Patient in unable to sit fully on left hip. Patient had been seen by PCP, no imaging was ordered. Reviewed past imaging and patient noted to have moderate to severe hip arthritis. 7/27/2020  presents after procedure follow up. Patient had LESI of L3/L4 and Left SI. Patient had good results from injections and she would like to repeat. However, patient has extreme tenderness in the buttocks especially while sitting down. This tenderness remained after injections. 5/5/2020  presents for follow-up of imaging and office visit. X-rays reviewed. Patient does have hip arthritis. Patient is still having increase in pain since fall. Patient states that pain medication is barely controlling pain. If patient does increase activity pain medication is not controlling pain. Back Pain is a recurrent problem. The current episode started 1 to 4 weeks ago. The problem occurs constantly. The problem has been waxing and waning since onset. The pain is present in the lumbar spine and sacro-iliac.  The quality of the pain is described as shooting, cramping and aching. The symptoms are aggravated by bending, sitting, standing and twisting. Pertinent negatives include no numbness or weakness. 2/4/2020  presents to the office for follow up of procedure. Patient is having worsening left hip pain s/p fall from the shower. Patient had been ill she thinks she lost LOC in the shower. She remembers falling and hitting the space heater with her head. Patient never lost LOC after fall. Patient had headache with low back pain and left hip pain. Patient followed up with PCP post fall. However pain is getting worse. Patient can not sit on left hip. Patient denies radiating pain. Patient states that pain is a constant ache. She cannot get comfortable. Patient is having problem ambulating due to pain. Patient is requesting xrays. Back Pain is a recurrent problem. The current episode started 1 to 4 weeks ago. The problem occurs constantly. The problem has been rapidly worsening since onset. The pain is present in the lumbar spine and sacro-iliac. The quality of the pain is described as aching and shooting. The symptoms are aggravated by bending, sitting and standing. Pertinent negatives include no numbness or weakness. 11/4/19  Annual Review  presents to the office for annual exam with primary complaints of chronic low back pain. Patient has been established with this practice for years. She has been receiving LESI of L3-L4 however injections had to be postponed due to cellulitis to her face. Patient had numerous hospitalizations and antibiotics and treatment.      Patient takes Norco, gabapentin and Zanaflex. Patient states that current medication regimen helps to keep patient's pain at a tolerable level however she is ready to repeat injections. Patient denies any side effects to medications at this time.     Past Medical Histoy  Past Medical History:   Diagnosis Date    Arthritis     Bilateral low back pain without sciatica 12/22/2015    Cancer ([facility] [address]     Hx of blood clots     Hyperlipidemia     Pain in both knees 12/22/2015    Thyroid disease     Urinary incontinence        Surgery History  Past Surgical History:   Procedure Laterality Date    CHOLECYSTECTOMY, LAPAROSCOPIC      CYST INCISION AND DRAINAGE      left Cheek    HYSTERECTOMY      JOINT REPLACEMENT      LOBECTOMY      RIGHT PARTIAL    SKIN CANCER EXCISION          Family History  family history includes Cancer in her mother; Heart Attack in her father; Hypertension in her mother. Social History    Social History     Tobacco Use    Smoking status: Current Every Day Smoker     Packs/day: 0.50     Years: 30.00     Pack years: 15.00     Types: Cigarettes    Smokeless tobacco: Never Used   Substance Use Topics    Alcohol use: No       Allergies  Sulfamethoxazole-trimethoprim, Codeine, Erythromycin, Pcn [penicillins], Talwin [pentazocine], and Sulfa antibiotics     Current Medications  Current Outpatient Medications   Medication Sig Dispense Refill    ondansetron (ZOFRAN) 8 MG tablet Take 8 mg by mouth as needed      triamterene-hydroCHLOROthiazide (DYAZIDE) 37.5-25 MG per capsule Take 1 capsule by mouth daily      HYDROcodone-acetaminophen (NORCO)  MG per tablet Take 1 tablet by mouth every 6 hours as needed for Pain (decrease per np) for up to 30 days.  May fill 2/3/22 120 tablet 0    LINZESS 290 MCG CAPS capsule Take 290 mcg by mouth daily      polyethylene glycol (GLYCOLAX) 17 GM/SCOOP powder Take 17 g by mouth daily      tiZANidine (ZANAFLEX) 4 MG tablet Take 1.5 tablets by mouth 3 times daily 135 tablet 2    escitalopram (LEXAPRO) 10 MG tablet Take 10 mg by mouth daily      pantoprazole (PROTONIX) 40 MG tablet       nystatin (MYCOSTATIN) 348666 UNIT/ML suspension       promethazine (PHENERGAN) 25 MG tablet       sucralfate (CARAFATE) 1 GM/10ML suspension Take 1 g by mouth 4 times daily      ALPRAZolam (XANAX) 0.5 MG tablet Take 0.5 mg by mouth 2 times daily as needed. Isadora Rios levothyroxine (SYNTHROID) 100 MCG tablet Take 100 mcg by mouth daily       No current facility-administered medications for this encounter. Review of Systems:  Review of Systems   Constitutional: Positive for activity change. Gastrointestinal: Negative for constipation. Musculoskeletal: Positive for arthralgias, back pain, gait problem and myalgias. Negative for neck pain. Neurological: Negative for weakness and numbness. Psychiatric/Behavioral: Negative for agitation, self-injury and suicidal ideas. The patient is not nervous/anxious. 14 point ROS negative besides that noted in HPI    Physical Exam:    Vitals:    02/22/22 1512   BP: 124/70   Pulse: 61   Resp: 18   Temp: 98 °F (36.7 °C)   TempSrc: Temporal   SpO2: 94%   Weight: 146 lb (66.2 kg)   Height: 5' 5\" (1.651 m)       Body mass index is 24.3 kg/m². Physical Exam  Vitals and nursing note reviewed. Constitutional:       General: She is not in acute distress. Appearance: She is well-developed. HENT:      Head: Normocephalic. Right Ear: External ear normal.      Left Ear: External ear normal.      Nose: Nose normal.   Eyes:      Conjunctiva/sclera: Conjunctivae normal.      Pupils: Pupils are equal, round, and reactive to light. Neck:      Vascular: No JVD. Trachea: No tracheal deviation. Cardiovascular:      Rate and Rhythm: Normal rate. Pulmonary:      Effort: Pulmonary effort is normal.   Abdominal:      General: There is no distension. Tenderness: There is no abdominal tenderness. Musculoskeletal:      Lumbar back: Spasms, tenderness and bony tenderness present. Left hip: Tenderness and bony tenderness present.       Comments: Piriformis TTP and ropelike   Patient points to on left SI joint as the source of low back pain  TTP of SI joint on left  positive Kalia's on left, positive Distraction on left, positive Thigh Thrust on left  TTP to left ischial bursa     Skin:     General: Skin is warm and dry. Neurological:      Mental Status: She is alert and oriented to person, place, and time. Cranial Nerves: No cranial nerve deficit. Psychiatric:         Behavior: Behavior normal.         Thought Content: Thought content normal.         Judgment: Judgment normal.          Labs:  Lab Results   Component Value Date     04/18/2011    K 4.6 04/18/2011     04/18/2011    CO2 29 04/18/2011    BUN 8 04/18/2011    CREATININE 1.0 11/10/2016    CREATININE 0.8 04/18/2011    GLUCOSE 129 04/18/2011    CALCIUM 10.1 04/18/2011        No results found for: WBC, HGB, HCT, MCV, PLT      Assessment:  Active Problems:    Bilateral low back pain without sciatica    Pain in both knees    Lumbar disc disease with radiculopathy    Hip pain, acute, left    SI (sacroiliac) joint dysfunction    Pain management contract agreement    Ischial bursitis of left side    Piriformis syndrome of left side  Resolved Problems:    * No resolved hospital problems. *      Plan:  Lumbar disc disease with radiculopathy  Gabapentin discontinued due to falling. Chronic bilateral low back pain without sciatica  Continue medication with refill sent at appointment see refill encounter Will continue dose increase until after patient has surgery. HYDROcodone-acetaminophen (NORCO)  MG per tablet; Take 1 tablet by mouth every 4-6 hours as needed for Pain not to exceed 5 tablets in a 24 hour period for up to 30 days. Intended supply: 30 days  Dispense: 150 tablet; Refill: 0    Chronic myofascial pain  Continue medication with refill sent at appointment see refill encounter. tiZANidine (ZANAFLEX) 4 MG tablet; Take 1.5 tablets by mouth 3 times daily  Dispense: 135 tablet;  Refill: 2    Hold injections until after surgery Left SI and Left ischial bursa with toradol US with NP with Piriformis Trigger point injections patient had a good response to previous injection    Discussed effective and continued   [] New patient continuing current medication while developing POC   [x] On going assessment and evaluation of medication regimen  [] Medication regimen not effective see plan for changes  [x] Jackie Lambert reviewed & on file under media     CC:  MD Andres Alonzo, APRN - CNP, 2/22/2022 at 3:36 PM    EMR dragon/transcription disclaimer: Much of this encounter note is electronic transcription/translation of spoken language to printed tach. Electronic translation of spoken language may be erroneous, or at times, nonsensical words or phrases may be inadvertently transcribed.  Although, I have reviewed the note for such errors, some may still exist.

## 2022-02-22 NOTE — TELEPHONE ENCOUNTER
Requested Prescriptions     Signed Prescriptions Disp Refills    HYDROcodone-acetaminophen (NORCO)  MG per tablet 150 tablet 0     Sig: Take 1 tablet by mouth every 4 hours as needed for Pain for up to 30 days. Do not exceed 5 per day.  Early refill and Continue this instruction and quantity until after surgery in April     Authorizing Provider: Jung Hunter    tiZANidine (ZANAFLEX) 4 MG tablet 135 tablet 2     Sig: Take 1.5 tablets by mouth 3 times daily     Authorizing Provider: Jung Hunter

## 2022-03-16 DIAGNOSIS — G89.29 CHRONIC BILATERAL LOW BACK PAIN WITHOUT SCIATICA: ICD-10-CM

## 2022-03-16 DIAGNOSIS — M54.50 CHRONIC BILATERAL LOW BACK PAIN WITHOUT SCIATICA: ICD-10-CM

## 2022-03-16 RX ORDER — HYDROCODONE BITARTRATE AND ACETAMINOPHEN 10; 325 MG/1; MG/1
1 TABLET ORAL EVERY 4 HOURS PRN
Qty: 150 TABLET | Refills: 0 | Status: SHIPPED | OUTPATIENT
Start: 2022-03-24 | End: 2022-04-18 | Stop reason: SDUPTHER

## 2022-03-25 ENCOUNTER — TELEPHONE (OUTPATIENT)
Dept: VASCULAR SURGERY | Facility: CLINIC | Age: 72
End: 2022-03-25

## 2022-03-25 NOTE — TELEPHONE ENCOUNTER
Left message letting Ms Everett know we needed to move her appointment on Tuesday, March 29, 2022 to 830 am with Dr Nelson as he will be in surgery at 10. I also advised Ms everett if she has any questions, concerns or needs to reschedule to please call the office at [phone].

## 2022-03-28 ENCOUNTER — TELEPHONE (OUTPATIENT)
Dept: VASCULAR SURGERY | Facility: CLINIC | Age: 72
End: 2022-03-28

## 2022-03-28 NOTE — TELEPHONE ENCOUNTER
Left message letting Ms Sorenson know we had to move her appointment to 145 pm as Dr Nelson will be in surgery tomorrow, Tuesday, March 29, 2022. I advised Ms Sorenson if she had any questions, concerns or needs to reschedule to please call the office at [phone].

## 2022-03-29 ENCOUNTER — OFFICE VISIT (OUTPATIENT)
Dept: VASCULAR SURGERY | Facility: CLINIC | Age: 72
End: 2022-03-29

## 2022-03-29 ENCOUNTER — APPOINTMENT (OUTPATIENT)
Dept: PREADMISSION TESTING | Facility: HOSPITAL | Age: 72
End: 2022-03-29

## 2022-03-29 ENCOUNTER — TRANSCRIBE ORDERS (OUTPATIENT)
Dept: LAB | Facility: HOSPITAL | Age: 72
End: 2022-03-29

## 2022-03-29 ENCOUNTER — PATIENT ROUNDING (BHMG ONLY) (OUTPATIENT)
Dept: VASCULAR SURGERY | Facility: CLINIC | Age: 72
End: 2022-03-29

## 2022-03-29 VITALS
BODY MASS INDEX: 21.21 KG/M2 | HEIGHT: 66 IN | OXYGEN SATURATION: 97 % | DIASTOLIC BLOOD PRESSURE: 68 MMHG | SYSTOLIC BLOOD PRESSURE: 108 MMHG | WEIGHT: 132 LBS | HEART RATE: 57 BPM

## 2022-03-29 DIAGNOSIS — G89.29 CHRONIC BILATERAL LOW BACK PAIN WITHOUT SCIATICA: Primary | ICD-10-CM

## 2022-03-29 DIAGNOSIS — Z01.818 PREOP TESTING: Primary | ICD-10-CM

## 2022-03-29 DIAGNOSIS — M54.50 CHRONIC BILATERAL LOW BACK PAIN WITHOUT SCIATICA: Primary | ICD-10-CM

## 2022-03-29 PROBLEM — M53.3 SI (SACROILIAC) JOINT DYSFUNCTION: Status: ACTIVE | Noted: 2020-05-05

## 2022-03-29 PROBLEM — M70.72 ISCHIAL BURSITIS OF LEFT SIDE: Status: ACTIVE | Noted: 2020-08-26

## 2022-03-29 PROBLEM — G57.02 PIRIFORMIS SYNDROME OF LEFT SIDE: Status: ACTIVE | Noted: 2021-07-14

## 2022-03-29 PROBLEM — M25.552 HIP PAIN, ACUTE, LEFT: Status: ACTIVE | Noted: 2020-02-04

## 2022-03-29 PROBLEM — Z02.89 PAIN MANAGEMENT CONTRACT AGREEMENT: Status: ACTIVE | Noted: 2020-07-27

## 2022-03-29 PROBLEM — R10.30 DEEP GROIN PAIN: Status: ACTIVE | Noted: 2019-02-24

## 2022-03-29 PROCEDURE — 99204 OFFICE O/P NEW MOD 45 MIN: CPT | Performed by: SURGERY

## 2022-03-29 NOTE — PROGRESS NOTES
03/29/2022      JESSIKA Reese MD  [address] [place] [id number]    Sammi Sorenson  1950    Chief Complaint   Patient presents with   • Pre-op Exam     ALIF with Dr Reese on Monday, April 11th, 2022. Patient denies any stroke like symptoms.   • Current Smoker     Pt verified Current Every Day Smoker     • Med Management     Verbally verified medications with patient    • Other     Patient states she has a history of blood clot in the Left Leg when she had knee surgery        Dear JESSIKA Reese MD:      HPI  I had the pleasure of seeing your patient Sammi Sorenson in the office today.  Thank you kindly for this consultation.  As you recall, Sammi Sorenson is a 71 y.o.  female who you are currently following for chronic back pain.  Sammi Sorensonis scheduled for anterior lumbar interbody fusion of L5-S1 with Dr. Reese on 4/11/22.  She previously developed a DVT in the left leg following knee surgery.  She is daily smoker.     Past Medical History:   Diagnosis Date   • Anxiety    • Cellulitis of face 7/23/2019   • Chronic abdominal pain    • Chronic back pain    • Chronic gastritis    • Chronic pain     sees pain vilma. since 2014   • CKD (chronic kidney disease)    • Colon polyp    • COPD (chronic obstructive pulmonary disease) (HCC)    • Disease of thyroid gland    • Diverticulosis    • Dyslipidemia    • GERD (gastroesophageal reflux disease)    • Hemorrhoid    • Hiatal hernia    • History of adenomatous polyp of colon    • Leaky heart valve    • Migraine    • Neuropathy    • Osteoarthritis    • Ovarian cancer (HCC)    • Submandibular gland mass        Past Surgical History:   Procedure Laterality Date   • CHOLECYSTECTOMY     • COLONOSCOPY  04/10/2015    5 polyps, tubular adenomas, melanosis, diverticulosis, internal hemorrhoids,    • CYST REMOVAL     • ENDOSCOPY  04/10/2015    hiatus hernia   • HYSTERECTOMY     • LUNG LOBECTOMY Right     2002,[facility]   • REPLACEMENT  TOTAL KNEE BILATERAL     • SUBMANDIBULAR GLAND EXCISION Left 11/2/2018    Procedure: Incision and drainage of left submandibular region abscess with excision of left submandibular region mass mass consistent with probable lymph node;  Surgeon: Duncan Ewing MD;  Location[facility] OR;  Service: ENT       Family History   Problem Relation Age of Onset   • No Known Problems Mother    • No Known Problems Father        Social History     Socioeconomic History   • Marital status:    Tobacco Use   • Smoking status: Current Every Day Smoker     Packs/day: 0.50     Years: 20.00     Pack years: 10.00     Types: Cigarettes   • Smokeless tobacco: Never Used   • Tobacco comment: has tried chantix; made her sick   Substance and Sexual Activity   • Alcohol use: No   • Drug use: No   • Sexual activity: Defer       Allergies   Allergen Reactions   • Bactrim [Sulfamethoxazole-Trimethoprim] Other (See Comments)     Muscle pain,joint stiffness   • Penicillins Rash and Other (See Comments)     whelps   • Azithromycin Nausea And Vomiting     All mycin   • Talwin [Pentazocine] Nausea And Vomiting   • Codeine Nausea Only   • Sulfa Antibiotics Nausea And Vomiting     Muscle pain         Current Outpatient Medications   Medication Instructions   • ALPRAZolam (XANAX) 0.5 mg, Oral, 3 Times Daily PRN   • dicyclomine (BENTYL) 10 mg, Oral, 3 Times Daily PRN   • escitalopram (LEXAPRO) 10 mg, Oral, Daily   • HYDROcodone-acetaminophen (NORCO)  MG per tablet 1 tablet, Oral, 4 Times Daily PRN   • levothyroxine (SYNTHROID, LEVOTHROID) 100 mcg, Oral, Daily   • linaclotide (LINZESS) 290 mcg, Oral, Every Morning Before Breakfast   • nystatin (MYCOSTATIN) 500,000 Units, Oral, 4 Times Daily   • pantoprazole (PROTONIX) 40 mg, 2 Times Daily   • polyethylene glycol (MIRALAX) 17 g, Oral, Daily   • promethazine (PHENERGAN) 25 mg, Oral, 2 Times Daily PRN   • sucralfate (CARAFATE) 1 g, Oral, 3 Times Daily Before Meals   • tiZANidine (ZANAFLEX)  "4 mg, Oral, Every 8 Hours PRN         Review of Systems   Constitutional: Negative.    HENT: Negative.    Eyes: Negative.    Respiratory: Negative.    Cardiovascular: Negative.    Gastrointestinal: Negative.    Endocrine: Negative.    Genitourinary: Negative.    Musculoskeletal: Positive for arthralgias and back pain.   Skin: Negative.    Allergic/Immunologic: Negative.    Neurological: Negative.    Hematological: Negative.    Psychiatric/Behavioral: Negative.    All other systems reviewed and are negative.      /68 (BP Location: Right arm, Patient Position: Sitting, Cuff Size: Adult)   Pulse 57   Ht 167.6 cm (66\")   Wt 59.9 kg (132 lb)   SpO2 97%   BMI 21.31 kg/m²   Physical Exam  Vitals and nursing note reviewed.   Constitutional:       Appearance: Normal appearance. She is well-developed.   HENT:      Head: Normocephalic and atraumatic.   Eyes:      General: No scleral icterus.     Pupils: Pupils are equal, round, and reactive to light.   Neck:      Thyroid: No thyromegaly.      Vascular: No carotid bruit or JVD.   Cardiovascular:      Rate and Rhythm: Normal rate and regular rhythm.      Pulses:           Carotid pulses are 2+ on the right side and 2+ on the left side.       Femoral pulses are 2+ on the right side and 2+ on the left side.       Popliteal pulses are 2+ on the right side and 2+ on the left side.        Dorsalis pedis pulses are 2+ on the right side and 2+ on the left side.        Posterior tibial pulses are 2+ on the right side and 2+ on the left side.      Heart sounds: Normal heart sounds.   Pulmonary:      Effort: Pulmonary effort is normal.      Breath sounds: Normal breath sounds.   Abdominal:      General: Bowel sounds are normal. There is no distension or abdominal bruit.      Palpations: Abdomen is soft. There is no mass.      Tenderness: There is no abdominal tenderness.   Musculoskeletal:         General: Normal range of motion.      Cervical back: Neck supple.      Comments: " Lumbar pain   Lymphadenopathy:      Cervical: No cervical adenopathy.   Skin:     General: Skin is warm and dry.   Neurological:      General: No focal deficit present.      Mental Status: She is alert and oriented to person, place, and time.      Cranial Nerves: No cranial nerve deficit.      Sensory: No sensory deficit.   Psychiatric:         Mood and Affect: Mood normal.         Behavior: Behavior normal.         Thought Content: Thought content normal.         Judgment: Judgment normal.         No results found.    Patient Active Problem List   Diagnosis   • Tobacco abuse   • Cellulitis of face   • Thrush   • Bilateral low back pain without sciatica   • Deep groin pain   • Hip pain, acute, left   • Ischial bursitis of left side   • Lumbar disc disease with radiculopathy   • Pain in both knees   • Pain management contract agreement   • Piriformis syndrome of left side   • SI (sacroiliac) joint dysfunction         ICD-10-CM ICD-9-CM   1. Chronic bilateral low back pain without sciatica  M54.50 724.2    G89.29 338.29       Plan: After thoroughly evaluating Sammi Sorenson, I believe the best course of action is to proceed with anterior lumbar interbody fusion of L5-S1.  Risks of ALIF were discussed and include, but are not limited to, bleeding, infection, nerve damage, vessel damage, retrograde ejaculation, bowel damage, ureteral damage, DVT, MI, stroke, and death.  The patient understands these risks and wishes to proceed with procedure.  The patient can continue taking their current medication regimen as previously planned.  This was all discussed in full with complete understanding.    Thank you for allowing me to participate in the care of your patient.  Please do not hesitate with any questions or concerns.  I will keep you aware of any further encounters with Sammi Sorenson.        Sincerely yours,         Juan Francisco Nelson, DO

## 2022-03-29 NOTE — PROGRESS NOTES
March 29, 2022    Hello, may I speak with Sammi Sorenson?    My name is Renee Bui      I am  with [facility]  [address], [address]  [place] [place] [address] [phone].    Before we get started may I verify your date of birth? 1950    I am calling to officially welcome you to our practice and ask about your recent visit. Is this a good time to talk? Yes     Tell me about your visit with us. What things went well?  Everything went well       We're always looking for ways to make our patients' experiences even better. Do you have recommendations on ways we may improve?  no    Overall were you satisfied with your first visit to our practice? Yes all questions answered.       I appreciate you taking the time to speak with me today. Is there anything else I can do for you? no      Thank you, and have a great day.

## 2022-04-08 ENCOUNTER — PRE-ADMISSION TESTING (OUTPATIENT)
Dept: PREADMISSION TESTING | Facility: HOSPITAL | Age: 72
End: 2022-04-08

## 2022-04-08 ENCOUNTER — HOSPITAL ENCOUNTER (OUTPATIENT)
Dept: GENERAL RADIOLOGY | Facility: HOSPITAL | Age: 72
Discharge: HOME | End: 2022-04-08

## 2022-04-08 ENCOUNTER — LAB (OUTPATIENT)
Dept: LAB | Facility: HOSPITAL | Age: 72
End: 2022-04-08

## 2022-04-08 VITALS
WEIGHT: 142.2 LBS | RESPIRATION RATE: 16 BRPM | DIASTOLIC BLOOD PRESSURE: 61 MMHG | BODY MASS INDEX: 23.69 KG/M2 | HEIGHT: 65 IN | OXYGEN SATURATION: 98 % | SYSTOLIC BLOOD PRESSURE: 149 MMHG | HEART RATE: 65 BPM

## 2022-04-08 LAB
ALBUMIN SERPL-MCNC: 4.1 G/DL (ref 3.5–5.2)
ALBUMIN/GLOB SERPL: 1.5 G/DL
ALP SERPL-CCNC: 59 U/L (ref 39–117)
ALT SERPL W P-5'-P-CCNC: <5 U/L (ref 1–33)
ANION GAP SERPL CALCULATED.3IONS-SCNC: 9 MMOL/L (ref 5–15)
APTT PPP: 34.8 SECONDS (ref 24.1–35)
AST SERPL-CCNC: 14 U/L (ref 1–32)
BILIRUB SERPL-MCNC: 0.2 MG/DL (ref 0–1.2)
BILIRUB UR QL STRIP: NEGATIVE
BUN SERPL-MCNC: 7 MG/DL (ref 8–23)
BUN/CREAT SERPL: 8 (ref 7–25)
CALCIUM SPEC-SCNC: 9 MG/DL (ref 8.6–10.5)
CHLORIDE SERPL-SCNC: 103 MMOL/L (ref 98–107)
CLARITY UR: CLEAR
CO2 SERPL-SCNC: 30 MMOL/L (ref 22–29)
COLOR UR: YELLOW
CREAT SERPL-MCNC: 0.87 MG/DL (ref 0.57–1)
DEPRECATED RDW RBC AUTO: 43.7 FL (ref 37–54)
EGFRCR SERPLBLD CKD-EPI 2021: 70.5 ML/MIN/1.73
ERYTHROCYTE [DISTWIDTH] IN BLOOD BY AUTOMATED COUNT: 13.2 % (ref 12.3–15.4)
GLOBULIN UR ELPH-MCNC: 2.8 GM/DL
GLUCOSE SERPL-MCNC: 67 MG/DL (ref 65–99)
GLUCOSE UR STRIP-MCNC: NEGATIVE MG/DL
HCT VFR BLD AUTO: 40.9 % (ref 34–46.6)
HGB BLD-MCNC: 13.2 G/DL (ref 12–15.9)
HGB UR QL STRIP.AUTO: NEGATIVE
INR PPP: 1.02 (ref 0.91–1.09)
KETONES UR QL STRIP: NEGATIVE
LEUKOCYTE ESTERASE UR QL STRIP.AUTO: NEGATIVE
MCH RBC QN AUTO: 29.1 PG (ref 26.6–33)
MCHC RBC AUTO-ENTMCNC: 32.3 G/DL (ref 31.5–35.7)
MCV RBC AUTO: 90.3 FL (ref 79–97)
NITRITE UR QL STRIP: NEGATIVE
PH UR STRIP.AUTO: 6 [PH] (ref 5–8)
PLATELET # BLD AUTO: 177 10*3/MM3 (ref 140–450)
PMV BLD AUTO: 12.5 FL (ref 6–12)
POTASSIUM SERPL-SCNC: 3.3 MMOL/L (ref 3.5–5.2)
PROT SERPL-MCNC: 6.9 G/DL (ref 6–8.5)
PROT UR QL STRIP: NEGATIVE
PROTHROMBIN TIME: 13 SECONDS (ref 11.9–14.6)
RBC # BLD AUTO: 4.53 10*6/MM3 (ref 3.77–5.28)
SARS-COV-2 ORF1AB RESP QL NAA+PROBE: NOT DETECTED
SODIUM SERPL-SCNC: 142 MMOL/L (ref 136–145)
SP GR UR STRIP: 1.01 (ref 1–1.03)
UROBILINOGEN UR QL STRIP: NORMAL
WBC NRBC COR # BLD: 5.76 10*3/MM3 (ref 3.4–10.8)

## 2022-04-08 PROCEDURE — U0004 COV-19 TEST NON-CDC HGH THRU: HCPCS | Performed by: ORTHOPAEDIC SURGERY

## 2022-04-08 PROCEDURE — 85610 PROTHROMBIN TIME: CPT

## 2022-04-08 PROCEDURE — U0005 INFEC AGEN DETEC AMPLI PROBE: HCPCS | Performed by: ORTHOPAEDIC SURGERY

## 2022-04-08 PROCEDURE — 71046 X-RAY EXAM CHEST 2 VIEWS: CPT

## 2022-04-08 PROCEDURE — C9803 HOPD COVID-19 SPEC COLLECT: HCPCS | Performed by: ORTHOPAEDIC SURGERY

## 2022-04-08 PROCEDURE — 36415 COLL VENOUS BLD VENIPUNCTURE: CPT

## 2022-04-08 PROCEDURE — 85730 THROMBOPLASTIN TIME PARTIAL: CPT

## 2022-04-08 PROCEDURE — 93010 ELECTROCARDIOGRAM REPORT: CPT | Performed by: INTERNAL MEDICINE

## 2022-04-08 PROCEDURE — 81003 URINALYSIS AUTO W/O SCOPE: CPT

## 2022-04-08 PROCEDURE — 93005 ELECTROCARDIOGRAM TRACING: CPT

## 2022-04-08 PROCEDURE — 80053 COMPREHEN METABOLIC PANEL: CPT

## 2022-04-08 PROCEDURE — 85027 COMPLETE CBC AUTOMATED: CPT

## 2022-04-08 NOTE — DISCHARGE INSTRUCTIONS
Before you come to the hospital      Do not eat, drink, smoke, or chew gum after midnight the night before surgery. This includes no mints.    Arrival time: AS DIRECTED BY OFFICE     Only one family member or friend will be allowed per patient      YOU MAY TAKE THE FOLLOWING MEDICATION(S) THE MORNING OF SURGERY WITH A SIP OF WATER: xanax and hydrocodone          ALL OTHER HOME MEDICATION CHECK WITH YOUR PHYSICIAN                            (especially if you are taking diabetes medicines or blood thinners)     Do not take any Erectile Dysfunction medications (EX: CIALIS, VIAGRA) 24 hours prior to surgery      If you were given and instructed to use a germ- killing soap, use as directed the night before surgery and the morning of surgery before coming to the hospital.                 MANAGING PAIN AFTER SURGERY    We know you are probably wondering what your pain will be like after surgery.  Following surgery it is unrealistic to expect you will not have pain.   Pain is how our bodies let us know that something is wrong or cautions us to be careful.  That said, our goal is to make your pain tolerable.    Methods we may use to treat your pain include (oral or IV medications, PCAs, epidurals, nerve blocks, etc.)   While some procedures require IV pain medications for a short time after surgery, transitioning to pain medications by mouth allows for better management of pain.   Your nurse will encourage you to take oral pain medications whenever possible.  IV medications work almost immediately, but only last a short while.  Taking medications by mouth allows for a more constant level of medication in your blood stream for a longer period of time.      Once your pain is out of control it is harder to get back under control.  It is important you are aware when your next dose of pain medication is due.  If you are admitted, your nurse may write the time of your next dose on the white board in your room to help you  remember.      We are interested in your pain and encourage you to inform us about aggravating factors during your visit.   Many times a simple repositioning every few hours can make a big difference.    If your physician says it is okay, do not let your pain prevent you from getting out of bed. Be sure to call your nurse for assistance prior to getting up so you do not fall.      Before surgery, please decide your tolerable pain goal.  These faces help describe the pain ratings we use on a 0-10 scale.   Be prepared to tell us your goal and whether or not you take pain or anxiety medications at home.

## 2022-04-09 LAB
QT INTERVAL: 470 MS
QTC INTERVAL: 453 MS

## 2022-04-11 ENCOUNTER — ANESTHESIA EVENT (OUTPATIENT)
Dept: PERIOP | Facility: HOSPITAL | Age: 72
End: 2022-04-11

## 2022-04-11 ENCOUNTER — HOSPITAL ENCOUNTER (INPATIENT)
Facility: HOSPITAL | Age: 72
LOS: 8 days | Discharge: NURSING, REHAB OR LONG-TERM CARE | End: 2022-04-19
Attending: ORTHOPAEDIC SURGERY | Admitting: ORTHOPAEDIC SURGERY

## 2022-04-11 ENCOUNTER — ANESTHESIA (OUTPATIENT)
Dept: PERIOP | Facility: HOSPITAL | Age: 72
End: 2022-04-11

## 2022-04-11 ENCOUNTER — APPOINTMENT (OUTPATIENT)
Dept: GENERAL RADIOLOGY | Facility: HOSPITAL | Age: 72
End: 2022-04-11

## 2022-04-11 DIAGNOSIS — Z78.9 DECREASED ACTIVITIES OF DAILY LIVING (ADL): ICD-10-CM

## 2022-04-11 DIAGNOSIS — M51.16 LUMBAR DISC DISEASE WITH RADICULOPATHY: Primary | ICD-10-CM

## 2022-04-11 DIAGNOSIS — Z74.09 IMPAIRED MOBILITY: ICD-10-CM

## 2022-04-11 PROBLEM — M51.9 LUMBOSACRAL DISC DISEASE: Status: ACTIVE | Noted: 2022-04-11

## 2022-04-11 PROBLEM — M54.40 LUMBAGO OF MULTIPLE SITES IN SPINE WITH SCIATICA: Status: ACTIVE | Noted: 2022-04-11

## 2022-04-11 LAB
ABO GROUP BLD: NORMAL
BLD GP AB SCN SERPL QL: NEGATIVE
RH BLD: POSITIVE
T&S EXPIRATION DATE: NORMAL

## 2022-04-11 PROCEDURE — 25010000002 MIDAZOLAM PER 1 MG: Performed by: ANESTHESIOLOGY

## 2022-04-11 PROCEDURE — 74018 RADEX ABDOMEN 1 VIEW: CPT

## 2022-04-11 PROCEDURE — 97165 OT EVAL LOW COMPLEX 30 MIN: CPT

## 2022-04-11 PROCEDURE — C1713 ANCHOR/SCREW BN/BN,TIS/BN: HCPCS | Performed by: ORTHOPAEDIC SURGERY

## 2022-04-11 PROCEDURE — 72100 X-RAY EXAM L-S SPINE 2/3 VWS: CPT

## 2022-04-11 PROCEDURE — 0WJH0ZZ INSPECTION OF RETROPERITONEUM, OPEN APPROACH: ICD-10-PCS | Performed by: SURGERY

## 2022-04-11 PROCEDURE — C1889 IMPLANT/INSERT DEVICE, NOC: HCPCS | Performed by: ORTHOPAEDIC SURGERY

## 2022-04-11 PROCEDURE — 25010000002 FENTANYL CITRATE (PF) 250 MCG/5ML SOLUTION: Performed by: NURSE ANESTHETIST, CERTIFIED REGISTERED

## 2022-04-11 PROCEDURE — 76000 FLUOROSCOPY <1 HR PHYS/QHP: CPT

## 2022-04-11 PROCEDURE — 25010000002 FENTANYL CITRATE (PF) 50 MCG/ML SOLUTION: Performed by: ANESTHESIOLOGY

## 2022-04-11 PROCEDURE — 25010000002 ONDANSETRON PER 1 MG: Performed by: NURSE ANESTHETIST, CERTIFIED REGISTERED

## 2022-04-11 PROCEDURE — 25010000002 ONDANSETRON PER 1 MG: Performed by: ANESTHESIOLOGY

## 2022-04-11 PROCEDURE — 25010000002 DEXAMETHASONE PER 1 MG: Performed by: ANESTHESIOLOGY

## 2022-04-11 PROCEDURE — 0SG30A0 FUSION OF LUMBOSACRAL JOINT WITH INTERBODY FUSION DEVICE, ANTERIOR APPROACH, ANTERIOR COLUMN, OPEN APPROACH: ICD-10-PCS | Performed by: ORTHOPAEDIC SURGERY

## 2022-04-11 PROCEDURE — 01NR0ZZ RELEASE SACRAL NERVE, OPEN APPROACH: ICD-10-PCS | Performed by: ORTHOPAEDIC SURGERY

## 2022-04-11 PROCEDURE — 0 HYDROMORPHONE 1 MG/ML SOLUTION: Performed by: ORTHOPAEDIC SURGERY

## 2022-04-11 PROCEDURE — 25010000002 PROPOFOL 10 MG/ML EMULSION: Performed by: NURSE ANESTHETIST, CERTIFIED REGISTERED

## 2022-04-11 PROCEDURE — 86900 BLOOD TYPING SEROLOGIC ABO: CPT | Performed by: ORTHOPAEDIC SURGERY

## 2022-04-11 PROCEDURE — 4A11X4G MONITORING OF PERIPHERAL NERVOUS ELECTRICAL ACTIVITY, INTRAOPERATIVE, EXTERNAL APPROACH: ICD-10-PCS | Performed by: ORTHOPAEDIC SURGERY

## 2022-04-11 PROCEDURE — 0SB40ZZ EXCISION OF LUMBOSACRAL DISC, OPEN APPROACH: ICD-10-PCS | Performed by: ORTHOPAEDIC SURGERY

## 2022-04-11 PROCEDURE — 25010000002 HYDROMORPHONE PER 4 MG: Performed by: ANESTHESIOLOGY

## 2022-04-11 PROCEDURE — 86850 RBC ANTIBODY SCREEN: CPT | Performed by: ORTHOPAEDIC SURGERY

## 2022-04-11 PROCEDURE — 86901 BLOOD TYPING SEROLOGIC RH(D): CPT | Performed by: ORTHOPAEDIC SURGERY

## 2022-04-11 PROCEDURE — 22558 ARTHRD ANT NTRBD MIN DSC LUM: CPT | Performed by: SURGERY

## 2022-04-11 PROCEDURE — 97161 PT EVAL LOW COMPLEX 20 MIN: CPT | Performed by: PHYSICAL THERAPIST

## 2022-04-11 DEVICE — 18MM SACRAL PLATE
Type: IMPLANTABLE DEVICE | Site: SPINE LUMBAR | Status: FUNCTIONAL
Brand: ASPIDA

## 2022-04-11 DEVICE — HEMOST ABS SURGIFOAM SZ100 8X12 10MM: Type: IMPLANTABLE DEVICE | Site: SPINE LUMBAR | Status: FUNCTIONAL

## 2022-04-11 DEVICE — IDENTITI ALIF SA LATERAL SCREW, Ø5 X 30 MM
Type: IMPLANTABLE DEVICE | Site: SPINE LUMBAR | Status: FUNCTIONAL
Brand: IDENTITI

## 2022-04-11 DEVICE — LIGACLIP MCA MULTIPLE CLIP APPLIERS, 30 MEDIUM CLIPS
Type: IMPLANTABLE DEVICE | Site: ABDOMEN | Status: FUNCTIONAL
Brand: LIGACLIP

## 2022-04-11 DEVICE — LIGACLIP MCA MULTIPLE CLIP APPLIERS, 20 SMALL CLIPS
Type: IMPLANTABLE DEVICE | Site: ABDOMEN | Status: FUNCTIONAL
Brand: LIGACLIP

## 2022-04-11 DEVICE — ALIF SCREW, 6.0MM X 30MM
Type: IMPLANTABLE DEVICE | Site: SPINE LUMBAR | Status: FUNCTIONAL
Brand: ASPIDA

## 2022-04-11 DEVICE — KT HEMOST ABS SURGIFOAM PORCN 1GRAM: Type: IMPLANTABLE DEVICE | Site: SPINE LUMBAR | Status: FUNCTIONAL

## 2022-04-11 DEVICE — IDENTITI ALIF SA GRAFT BOLT, Ø8.5 X 30 MM
Type: IMPLANTABLE DEVICE | Site: SPINE LUMBAR | Status: FUNCTIONAL
Brand: IDENTITI

## 2022-04-11 DEVICE — IDENTITI ALIF SA SPACER, 9 X 38 X 28 MM, 15°
Type: IMPLANTABLE DEVICE | Site: SPINE LUMBAR | Status: FUNCTIONAL
Brand: IDENTITI

## 2022-04-11 RX ORDER — PANTOPRAZOLE SODIUM 40 MG/1
40 TABLET, DELAYED RELEASE ORAL EVERY MORNING
Status: DISCONTINUED | OUTPATIENT
Start: 2022-04-11 | End: 2022-04-19 | Stop reason: HOSPADM

## 2022-04-11 RX ORDER — ONDANSETRON 2 MG/ML
INJECTION INTRAMUSCULAR; INTRAVENOUS AS NEEDED
Status: DISCONTINUED | OUTPATIENT
Start: 2022-04-11 | End: 2022-04-11 | Stop reason: SURG

## 2022-04-11 RX ORDER — NALOXONE HCL 0.4 MG/ML
0.4 VIAL (ML) INJECTION
Status: DISCONTINUED | OUTPATIENT
Start: 2022-04-11 | End: 2022-04-16

## 2022-04-11 RX ORDER — HYDROMORPHONE HYDROCHLORIDE 1 MG/ML
0.5 INJECTION, SOLUTION INTRAMUSCULAR; INTRAVENOUS; SUBCUTANEOUS
Status: DISCONTINUED | OUTPATIENT
Start: 2022-04-11 | End: 2022-04-11 | Stop reason: HOSPADM

## 2022-04-11 RX ORDER — LIDOCAINE HYDROCHLORIDE 10 MG/ML
0.5 INJECTION, SOLUTION EPIDURAL; INFILTRATION; INTRACAUDAL; PERINEURAL ONCE AS NEEDED
Status: DISCONTINUED | OUTPATIENT
Start: 2022-04-11 | End: 2022-04-11 | Stop reason: HOSPADM

## 2022-04-11 RX ORDER — PROPOFOL 10 MG/ML
VIAL (ML) INTRAVENOUS AS NEEDED
Status: DISCONTINUED | OUTPATIENT
Start: 2022-04-11 | End: 2022-04-11 | Stop reason: SURG

## 2022-04-11 RX ORDER — MIDAZOLAM HYDROCHLORIDE 1 MG/ML
1 INJECTION INTRAMUSCULAR; INTRAVENOUS
Status: DISCONTINUED | OUTPATIENT
Start: 2022-04-11 | End: 2022-04-11 | Stop reason: HOSPADM

## 2022-04-11 RX ORDER — HYDROCODONE BITARTRATE AND ACETAMINOPHEN 7.5; 325 MG/1; MG/1
2 TABLET ORAL EVERY 4 HOURS PRN
Status: DISCONTINUED | OUTPATIENT
Start: 2022-04-11 | End: 2022-04-18

## 2022-04-11 RX ORDER — SODIUM CHLORIDE 0.9 % (FLUSH) 0.9 %
3 SYRINGE (ML) INJECTION EVERY 12 HOURS SCHEDULED
Status: DISCONTINUED | OUTPATIENT
Start: 2022-04-11 | End: 2022-04-11 | Stop reason: HOSPADM

## 2022-04-11 RX ORDER — SODIUM CHLORIDE, SODIUM LACTATE, POTASSIUM CHLORIDE, CALCIUM CHLORIDE 600; 310; 30; 20 MG/100ML; MG/100ML; MG/100ML; MG/100ML
100 INJECTION, SOLUTION INTRAVENOUS CONTINUOUS PRN
Status: DISCONTINUED | OUTPATIENT
Start: 2022-04-11 | End: 2022-04-11 | Stop reason: HOSPADM

## 2022-04-11 RX ORDER — HYDROCODONE BITARTRATE AND ACETAMINOPHEN 7.5; 325 MG/1; MG/1
1 TABLET ORAL EVERY 4 HOURS PRN
Status: DISCONTINUED | OUTPATIENT
Start: 2022-04-11 | End: 2022-04-18

## 2022-04-11 RX ORDER — ACETAMINOPHEN 500 MG
1000 TABLET ORAL ONCE
Status: COMPLETED | OUTPATIENT
Start: 2022-04-11 | End: 2022-04-11

## 2022-04-11 RX ORDER — ALPRAZOLAM 0.5 MG/1
0.5 TABLET ORAL 3 TIMES DAILY PRN
Status: DISCONTINUED | OUTPATIENT
Start: 2022-04-11 | End: 2022-04-16

## 2022-04-11 RX ORDER — SODIUM CHLORIDE 9 MG/ML
100 INJECTION, SOLUTION INTRAVENOUS CONTINUOUS
Status: DISCONTINUED | OUTPATIENT
Start: 2022-04-11 | End: 2022-04-18

## 2022-04-11 RX ORDER — METOPROLOL TARTRATE 5 MG/5ML
INJECTION INTRAVENOUS AS NEEDED
Status: DISCONTINUED | OUTPATIENT
Start: 2022-04-11 | End: 2022-04-11

## 2022-04-11 RX ORDER — AMOXICILLIN 250 MG
1 CAPSULE ORAL 2 TIMES DAILY
Status: DISCONTINUED | OUTPATIENT
Start: 2022-04-11 | End: 2022-04-18

## 2022-04-11 RX ORDER — ACETAMINOPHEN 325 MG/1
650 TABLET ORAL EVERY 4 HOURS PRN
Status: DISCONTINUED | OUTPATIENT
Start: 2022-04-11 | End: 2022-04-19 | Stop reason: HOSPADM

## 2022-04-11 RX ORDER — SODIUM CHLORIDE, SODIUM LACTATE, POTASSIUM CHLORIDE, CALCIUM CHLORIDE 600; 310; 30; 20 MG/100ML; MG/100ML; MG/100ML; MG/100ML
1000 INJECTION, SOLUTION INTRAVENOUS CONTINUOUS
Status: DISCONTINUED | OUTPATIENT
Start: 2022-04-11 | End: 2022-04-11

## 2022-04-11 RX ORDER — SODIUM CHLORIDE 0.9 % (FLUSH) 0.9 %
3-10 SYRINGE (ML) INJECTION AS NEEDED
Status: DISCONTINUED | OUTPATIENT
Start: 2022-04-11 | End: 2022-04-11 | Stop reason: HOSPADM

## 2022-04-11 RX ORDER — CLINDAMYCIN PHOSPHATE 900 MG/50ML
900 INJECTION INTRAVENOUS ONCE
Status: COMPLETED | OUTPATIENT
Start: 2022-04-11 | End: 2022-04-11

## 2022-04-11 RX ORDER — FENTANYL CITRATE 50 UG/ML
25 INJECTION, SOLUTION INTRAMUSCULAR; INTRAVENOUS
Status: DISCONTINUED | OUTPATIENT
Start: 2022-04-11 | End: 2022-04-11 | Stop reason: HOSPADM

## 2022-04-11 RX ORDER — SODIUM CHLORIDE 0.9 % (FLUSH) 0.9 %
10 SYRINGE (ML) INJECTION AS NEEDED
Status: DISCONTINUED | OUTPATIENT
Start: 2022-04-11 | End: 2022-04-19 | Stop reason: HOSPADM

## 2022-04-11 RX ORDER — OXYCODONE AND ACETAMINOPHEN 10; 325 MG/1; MG/1
1 TABLET ORAL ONCE AS NEEDED
Status: COMPLETED | OUTPATIENT
Start: 2022-04-11 | End: 2022-04-11

## 2022-04-11 RX ORDER — PHENYLEPHRINE HCL IN 0.9% NACL 1 MG/10 ML
SYRINGE (ML) INTRAVENOUS AS NEEDED
Status: DISCONTINUED | OUTPATIENT
Start: 2022-04-11 | End: 2022-04-11 | Stop reason: SURG

## 2022-04-11 RX ORDER — SODIUM CHLORIDE, SODIUM LACTATE, POTASSIUM CHLORIDE, CALCIUM CHLORIDE 600; 310; 30; 20 MG/100ML; MG/100ML; MG/100ML; MG/100ML
100 INJECTION, SOLUTION INTRAVENOUS CONTINUOUS
Status: DISCONTINUED | OUTPATIENT
Start: 2022-04-11 | End: 2022-04-11

## 2022-04-11 RX ORDER — ROCURONIUM BROMIDE 10 MG/ML
INJECTION, SOLUTION INTRAVENOUS AS NEEDED
Status: DISCONTINUED | OUTPATIENT
Start: 2022-04-11 | End: 2022-04-11 | Stop reason: SURG

## 2022-04-11 RX ORDER — NALOXONE HCL 0.4 MG/ML
0.04 VIAL (ML) INJECTION AS NEEDED
Status: DISCONTINUED | OUTPATIENT
Start: 2022-04-11 | End: 2022-04-11 | Stop reason: HOSPADM

## 2022-04-11 RX ORDER — SODIUM CHLORIDE 0.9 % (FLUSH) 0.9 %
10 SYRINGE (ML) INJECTION AS NEEDED
Status: DISCONTINUED | OUTPATIENT
Start: 2022-04-11 | End: 2022-04-11 | Stop reason: HOSPADM

## 2022-04-11 RX ORDER — POLYETHYLENE GLYCOL 3350 17 G/17G
17 POWDER, FOR SOLUTION ORAL DAILY PRN
Status: DISCONTINUED | OUTPATIENT
Start: 2022-04-11 | End: 2022-04-12

## 2022-04-11 RX ORDER — LEVOTHYROXINE SODIUM 0.1 MG/1
100 TABLET ORAL
Status: DISCONTINUED | OUTPATIENT
Start: 2022-04-11 | End: 2022-04-19 | Stop reason: HOSPADM

## 2022-04-11 RX ORDER — SODIUM CHLORIDE 0.9 % (FLUSH) 0.9 %
3 SYRINGE (ML) INJECTION AS NEEDED
Status: DISCONTINUED | OUTPATIENT
Start: 2022-04-11 | End: 2022-04-11 | Stop reason: HOSPADM

## 2022-04-11 RX ORDER — KETAMINE HCL IN NACL, ISO-OSM 100MG/10ML
SYRINGE (ML) INJECTION AS NEEDED
Status: DISCONTINUED | OUTPATIENT
Start: 2022-04-11 | End: 2022-04-11 | Stop reason: SURG

## 2022-04-11 RX ORDER — ESCITALOPRAM OXALATE 10 MG/1
10 TABLET ORAL DAILY
Status: DISCONTINUED | OUTPATIENT
Start: 2022-04-11 | End: 2022-04-19 | Stop reason: HOSPADM

## 2022-04-11 RX ORDER — DROPERIDOL 2.5 MG/ML
0.62 INJECTION, SOLUTION INTRAMUSCULAR; INTRAVENOUS ONCE AS NEEDED
Status: DISCONTINUED | OUTPATIENT
Start: 2022-04-11 | End: 2022-04-11 | Stop reason: HOSPADM

## 2022-04-11 RX ORDER — DEXAMETHASONE SODIUM PHOSPHATE 4 MG/ML
4 INJECTION, SOLUTION INTRA-ARTICULAR; INTRALESIONAL; INTRAMUSCULAR; INTRAVENOUS; SOFT TISSUE ONCE AS NEEDED
Status: COMPLETED | OUTPATIENT
Start: 2022-04-11 | End: 2022-04-11

## 2022-04-11 RX ORDER — SODIUM CHLORIDE 0.9 % (FLUSH) 0.9 %
3 SYRINGE (ML) INJECTION EVERY 12 HOURS SCHEDULED
Status: DISCONTINUED | OUTPATIENT
Start: 2022-04-11 | End: 2022-04-19 | Stop reason: HOSPADM

## 2022-04-11 RX ORDER — SODIUM CHLORIDE 0.9 % (FLUSH) 0.9 %
10 SYRINGE (ML) INJECTION EVERY 12 HOURS SCHEDULED
Status: DISCONTINUED | OUTPATIENT
Start: 2022-04-11 | End: 2022-04-11 | Stop reason: HOSPADM

## 2022-04-11 RX ORDER — ONDANSETRON 2 MG/ML
4 INJECTION INTRAMUSCULAR; INTRAVENOUS EVERY 6 HOURS PRN
Status: DISCONTINUED | OUTPATIENT
Start: 2022-04-11 | End: 2022-04-19 | Stop reason: HOSPADM

## 2022-04-11 RX ORDER — TIZANIDINE 4 MG/1
4 TABLET ORAL EVERY 8 HOURS PRN
Status: DISCONTINUED | OUTPATIENT
Start: 2022-04-11 | End: 2022-04-19 | Stop reason: HOSPADM

## 2022-04-11 RX ORDER — CLINDAMYCIN PHOSPHATE 600 MG/50ML
600 INJECTION INTRAVENOUS EVERY 8 HOURS
Status: COMPLETED | OUTPATIENT
Start: 2022-04-11 | End: 2022-04-11

## 2022-04-11 RX ORDER — LIDOCAINE HYDROCHLORIDE 20 MG/ML
INJECTION, SOLUTION EPIDURAL; INFILTRATION; INTRACAUDAL; PERINEURAL AS NEEDED
Status: DISCONTINUED | OUTPATIENT
Start: 2022-04-11 | End: 2022-04-11 | Stop reason: SURG

## 2022-04-11 RX ORDER — FENTANYL CITRATE 50 UG/ML
INJECTION, SOLUTION INTRAMUSCULAR; INTRAVENOUS AS NEEDED
Status: DISCONTINUED | OUTPATIENT
Start: 2022-04-11 | End: 2022-04-11 | Stop reason: SURG

## 2022-04-11 RX ORDER — MAGNESIUM HYDROXIDE 1200 MG/15ML
LIQUID ORAL AS NEEDED
Status: DISCONTINUED | OUTPATIENT
Start: 2022-04-11 | End: 2022-04-11 | Stop reason: HOSPADM

## 2022-04-11 RX ORDER — LABETALOL HYDROCHLORIDE 5 MG/ML
INJECTION, SOLUTION INTRAVENOUS AS NEEDED
Status: DISCONTINUED | OUTPATIENT
Start: 2022-04-11 | End: 2022-04-11 | Stop reason: SURG

## 2022-04-11 RX ORDER — ONDANSETRON 2 MG/ML
4 INJECTION INTRAMUSCULAR; INTRAVENOUS AS NEEDED
Status: DISCONTINUED | OUTPATIENT
Start: 2022-04-11 | End: 2022-04-11 | Stop reason: HOSPADM

## 2022-04-11 RX ORDER — LABETALOL HYDROCHLORIDE 5 MG/ML
5 INJECTION, SOLUTION INTRAVENOUS
Status: DISCONTINUED | OUTPATIENT
Start: 2022-04-11 | End: 2022-04-11 | Stop reason: HOSPADM

## 2022-04-11 RX ORDER — PROMETHAZINE HYDROCHLORIDE 25 MG/1
25 TABLET ORAL EVERY 8 HOURS PRN
Status: DISCONTINUED | OUTPATIENT
Start: 2022-04-11 | End: 2022-04-19 | Stop reason: HOSPADM

## 2022-04-11 RX ORDER — FLUMAZENIL 0.1 MG/ML
0.2 INJECTION INTRAVENOUS AS NEEDED
Status: DISCONTINUED | OUTPATIENT
Start: 2022-04-11 | End: 2022-04-11 | Stop reason: HOSPADM

## 2022-04-11 RX ORDER — OXYCODONE HCL 20 MG/1
20 TABLET, FILM COATED, EXTENDED RELEASE ORAL ONCE
Status: COMPLETED | OUTPATIENT
Start: 2022-04-11 | End: 2022-04-11

## 2022-04-11 RX ORDER — ONDANSETRON 4 MG/1
4 TABLET, FILM COATED ORAL EVERY 6 HOURS PRN
Status: DISCONTINUED | OUTPATIENT
Start: 2022-04-11 | End: 2022-04-19 | Stop reason: HOSPADM

## 2022-04-11 RX ORDER — NEOSTIGMINE METHYLSULFATE 5 MG/5 ML
SYRINGE (ML) INTRAVENOUS AS NEEDED
Status: DISCONTINUED | OUTPATIENT
Start: 2022-04-11 | End: 2022-04-11 | Stop reason: SURG

## 2022-04-11 RX ADMIN — DEXAMETHASONE SODIUM PHOSPHATE 4 MG: 4 INJECTION, SOLUTION INTRA-ARTICULAR; INTRALESIONAL; INTRAMUSCULAR; INTRAVENOUS; SOFT TISSUE at 07:10

## 2022-04-11 RX ADMIN — SODIUM CHLORIDE, POTASSIUM CHLORIDE, SODIUM LACTATE AND CALCIUM CHLORIDE 1000 ML: 600; 310; 30; 20 INJECTION, SOLUTION INTRAVENOUS at 11:36

## 2022-04-11 RX ADMIN — SODIUM CHLORIDE 100 ML/HR: 9 INJECTION, SOLUTION INTRAVENOUS at 12:23

## 2022-04-11 RX ADMIN — PROPOFOL 50 MG: 10 INJECTION, EMULSION INTRAVENOUS at 08:17

## 2022-04-11 RX ADMIN — GLYCOPYRROLATE 0.4 MG: 0.2 INJECTION, SOLUTION INTRAMUSCULAR; INTRAVENOUS at 09:20

## 2022-04-11 RX ADMIN — PANTOPRAZOLE SODIUM 40 MG: 40 TABLET, DELAYED RELEASE ORAL at 12:23

## 2022-04-11 RX ADMIN — PROPOFOL 100 MG: 10 INJECTION, EMULSION INTRAVENOUS at 07:30

## 2022-04-11 RX ADMIN — FENTANYL CITRATE 50 MCG: 50 INJECTION, SOLUTION INTRAMUSCULAR; INTRAVENOUS at 07:49

## 2022-04-11 RX ADMIN — OXYCODONE AND ACETAMINOPHEN 1 TABLET: 325; 10 TABLET ORAL at 10:52

## 2022-04-11 RX ADMIN — FENTANYL CITRATE 100 MCG: 50 INJECTION, SOLUTION INTRAMUSCULAR; INTRAVENOUS at 08:14

## 2022-04-11 RX ADMIN — Medication 3 ML: at 12:24

## 2022-04-11 RX ADMIN — LABETALOL HYDROCHLORIDE 10 MG: 5 INJECTION INTRAVENOUS at 08:53

## 2022-04-11 RX ADMIN — LABETALOL HYDROCHLORIDE 10 MG: 5 INJECTION INTRAVENOUS at 08:20

## 2022-04-11 RX ADMIN — HYDROMORPHONE HYDROCHLORIDE 0.5 MG: 1 INJECTION, SOLUTION INTRAMUSCULAR; INTRAVENOUS; SUBCUTANEOUS at 10:15

## 2022-04-11 RX ADMIN — FENTANYL CITRATE 50 MCG: 50 INJECTION, SOLUTION INTRAMUSCULAR; INTRAVENOUS at 07:30

## 2022-04-11 RX ADMIN — SODIUM CHLORIDE, POTASSIUM CHLORIDE, SODIUM LACTATE AND CALCIUM CHLORIDE 100 ML/HR: 600; 310; 30; 20 INJECTION, SOLUTION INTRAVENOUS at 07:07

## 2022-04-11 RX ADMIN — Medication 10 MG: at 08:26

## 2022-04-11 RX ADMIN — TIZANIDINE 4 MG: 4 TABLET ORAL at 23:01

## 2022-04-11 RX ADMIN — Medication 25 MG: at 07:30

## 2022-04-11 RX ADMIN — HYDROCODONE BITARTRATE AND ACETAMINOPHEN 2 TABLET: 7.5; 325 TABLET ORAL at 16:10

## 2022-04-11 RX ADMIN — FENTANYL CITRATE 25 MCG: 50 INJECTION INTRAMUSCULAR; INTRAVENOUS at 10:53

## 2022-04-11 RX ADMIN — HYDROMORPHONE HYDROCHLORIDE 1 MG: 1 INJECTION, SOLUTION INTRAMUSCULAR; INTRAVENOUS; SUBCUTANEOUS at 21:40

## 2022-04-11 RX ADMIN — Medication 3 ML: at 20:08

## 2022-04-11 RX ADMIN — DOCUSATE SODIUM 50 MG AND SENNOSIDES 8.6 MG 1 TABLET: 8.6; 5 TABLET, FILM COATED ORAL at 12:23

## 2022-04-11 RX ADMIN — LIDOCAINE HYDROCHLORIDE 100 MG: 20 INJECTION, SOLUTION EPIDURAL; INFILTRATION; INTRACAUDAL; PERINEURAL at 07:30

## 2022-04-11 RX ADMIN — FENTANYL CITRATE 25 MCG: 50 INJECTION INTRAMUSCULAR; INTRAVENOUS at 10:58

## 2022-04-11 RX ADMIN — DOCUSATE SODIUM 50 MG AND SENNOSIDES 8.6 MG 1 TABLET: 8.6; 5 TABLET, FILM COATED ORAL at 20:08

## 2022-04-11 RX ADMIN — MIDAZOLAM 1 MG: 1 INJECTION INTRAMUSCULAR; INTRAVENOUS at 07:10

## 2022-04-11 RX ADMIN — CLINDAMYCIN IN 5 PERCENT DEXTROSE 600 MG: 12 INJECTION, SOLUTION INTRAVENOUS at 23:06

## 2022-04-11 RX ADMIN — LEVOTHYROXINE SODIUM 100 MCG: 100 TABLET ORAL at 12:23

## 2022-04-11 RX ADMIN — ROCURONIUM BROMIDE 50 MG: 10 SOLUTION INTRAVENOUS at 07:30

## 2022-04-11 RX ADMIN — Medication 15 MG: at 08:14

## 2022-04-11 RX ADMIN — FENTANYL CITRATE 50 MCG: 50 INJECTION, SOLUTION INTRAMUSCULAR; INTRAVENOUS at 08:03

## 2022-04-11 RX ADMIN — CLINDAMYCIN IN 5 PERCENT DEXTROSE 900 MG: 18 INJECTION, SOLUTION INTRAVENOUS at 07:13

## 2022-04-11 RX ADMIN — OXYCODONE HYDROCHLORIDE 20 MG: 20 TABLET, FILM COATED, EXTENDED RELEASE ORAL at 06:14

## 2022-04-11 RX ADMIN — HYDROCODONE BITARTRATE AND ACETAMINOPHEN 2 TABLET: 7.5; 325 TABLET ORAL at 20:08

## 2022-04-11 RX ADMIN — ONDANSETRON 4 MG: 2 INJECTION INTRAMUSCULAR; INTRAVENOUS at 09:20

## 2022-04-11 RX ADMIN — CLINDAMYCIN IN 5 PERCENT DEXTROSE 900 MG: 18 INJECTION, SOLUTION INTRAVENOUS at 07:41

## 2022-04-11 RX ADMIN — Medication 3 MG: at 09:20

## 2022-04-11 RX ADMIN — CLINDAMYCIN IN 5 PERCENT DEXTROSE 600 MG: 12 INJECTION, SOLUTION INTRAVENOUS at 16:59

## 2022-04-11 RX ADMIN — ALPRAZOLAM 0.5 MG: 0.5 TABLET ORAL at 23:01

## 2022-04-11 RX ADMIN — Medication 100 MCG: at 07:30

## 2022-04-11 RX ADMIN — LABETALOL HYDROCHLORIDE 10 MG: 5 INJECTION INTRAVENOUS at 09:23

## 2022-04-11 RX ADMIN — ESCITALOPRAM 10 MG: 10 TABLET, FILM COATED ORAL at 12:23

## 2022-04-11 RX ADMIN — SODIUM CHLORIDE 100 ML/HR: 9 INJECTION, SOLUTION INTRAVENOUS at 23:06

## 2022-04-11 RX ADMIN — SODIUM CHLORIDE, POTASSIUM CHLORIDE, SODIUM LACTATE AND CALCIUM CHLORIDE 1000 ML: 600; 310; 30; 20 INJECTION, SOLUTION INTRAVENOUS at 06:41

## 2022-04-11 RX ADMIN — ACETAMINOPHEN 1000 MG: 500 TABLET, FILM COATED ORAL at 07:10

## 2022-04-11 RX ADMIN — ONDANSETRON 4 MG: 2 INJECTION INTRAMUSCULAR; INTRAVENOUS at 10:22

## 2022-04-11 NOTE — THERAPY EVALUATION
Patient Name: Sammi Sorenson  : 1950    MRN: [id number]                              Today's Date: 2022       Admit Date: 2022    Visit Dx:     ICD-10-CM ICD-9-CM   1. Decreased activities of daily living (ADL)  Z78.9 V49.89   2. Impaired mobility  Z74.09 799.89     Patient Active Problem List   Diagnosis   • Tobacco abuse   • Cellulitis of face   • Thrush   • Chronic bilateral low back pain with bilateral sciatica   • Deep groin pain   • Hip pain, acute, left   • Ischial bursitis of left side   • Lumbar disc disease with radiculopathy   • Pain in both knees   • Pain management contract agreement   • Piriformis syndrome of left side   • SI (sacroiliac) joint dysfunction   • Lumbosacral disc disease   • Lumbago of multiple sites in spine with sciatica     Past Medical History:   Diagnosis Date   • Anxiety    • Cellulitis of face 2019   • Chronic abdominal pain    • Chronic back pain    • Chronic gastritis    • Chronic pain     sees pain vilma. since    • CKD (chronic kidney disease)    • Colon polyp    • COPD (chronic obstructive pulmonary disease) (HCC)    • Disease of thyroid gland    • Diverticulosis    • Dyslipidemia    • GERD (gastroesophageal reflux disease)    • Hemorrhoid    • Hiatal hernia    • History of adenomatous polyp of colon    • Leaky heart valve    • Lumbosacral disc disease 2022   • Migraine    • Neuropathy    • Osteoarthritis    • Ovarian cancer (HCC)    • Submandibular gland mass      Past Surgical History:   Procedure Laterality Date   • CARDIAC CATHETERIZATION      no stent   • CHOLECYSTECTOMY     • COLONOSCOPY  04/10/2015    5 polyps, tubular adenomas, melanosis, diverticulosis, internal hemorrhoids,    • CYST REMOVAL     • ENDOSCOPY  04/10/2015    hiatus hernia   • HYSTERECTOMY     • LUNG LOBECTOMY Right     ,[facility]   • REPLACEMENT TOTAL KNEE BILATERAL     • SUBMANDIBULAR GLAND EXCISION Left 2018    Procedure: Incision and drainage of  left submandibular region abscess with excision of left submandibular region mass mass consistent with probable lymph node;  Surgeon: Duncan Ewing MD;  Location: [facility] OR;  Service: ENT      General Information     Row Name 04/11/22 1400          Physical Therapy Time and Intention    Document Type evaluation  s/p ALIF L5-S1 due to back pain, B buttock, thigh, leg radiculopathy with L>R, neurogenic claudication  -MS     Mode of Treatment physical therapy;co-treatment  -MS     Row Name 04/11/22 1400          General Information    Patient Profile Reviewed yes  -MS     Prior Level of Function independent:;all household mobility;community mobility;ADL's  -MS     Existing Precautions/Restrictions fall;brace worn when out of bed;spinal  -MS     Barriers to Rehab cognitive status  -MS     Row Name 04/11/22 1400          Living Environment    People in Home child(johanna), adult  -MS     Row Name 04/11/22 1400          Home Main Entrance    Number of Stairs, Main Entrance --  pt too lethergic to answer  -MS     Row Name 04/11/22 1400          Cognition    Orientation Status (Cognition) oriented to;person  pt very drowsy  -MS     Row Name 04/11/22 1400          Safety Issues, Functional Mobility    Safety Issues Affecting Function (Mobility) ability to follow commands  -MS     Impairments Affecting Function (Mobility) balance;strength;pain;motor control;cognition  -MS           User Key  (r) = Recorded By, (t) = Taken By, (c) = Cosigned By    Initials Name Provider Type    MS Cal Mercy R, PT, DPT, NCS Physical Therapist               Mobility     Row Name 04/11/22 1400          Bed Mobility    Bed Mobility rolling left;sidelying-sit;sit-sidelying  -MS     Rolling Left [place] (Bed Mobility) contact guard;verbal cues;nonverbal cues (demo/gesture)  -MS     Sidelying-Sit Casa (Bed Mobility) contact guard;verbal cues;nonverbal cues (demo/gesture)  -MS     Sit-Sidelying Casa (Bed Mobility) contact  guard;minimum assist (75% patient effort);verbal cues;nonverbal cues (demo/gesture)  -MS     Row Name 04/11/22 1400          Sit-Stand Transfer    Sit-Stand [place] (Transfers) minimum assist (75% patient effort);2 person assist  -MS     [facility] Name 04/11/22 1400          Gait/Stairs (Locomotion)    [place] Level (Gait) minimum assist (75% patient effort);verbal cues;nonverbal cues (demo/gesture)  -MS     Assistive Device (Gait) --  HHA x2  -MS     Distance in Feet (Gait) 10ft with posterior lean. pt required to be guided and I provided the initating weight shift to allow for steps  -MS           User Key  (r) = Recorded By, (t) = Taken By, (c) = Cosigned By    Initials Name Provider Type    Mercy Damon, PT, DPT, NCS Physical Therapist               Obj/Interventions     [facility] Name 04/11/22 1400          Range of Motion Comprehensive    General Range of Motion no range of motion deficits identified  -MS     Row Name 04/11/22 1400          Strength Comprehensive (MMT)    Comment, General Manual Muscle Testing (MMT) Assessment functionally 4/5, pt too drowsy for testing  -MS     Row Name 04/11/22 1400          Motor Skills    Motor Skills --  fully body tremor with activity  -MS     Row Name 04/11/22 1400          Balance    Balance Assessment sitting static balance;sitting dynamic balance;standing static balance;standing dynamic balance  -MS     Static Sitting Balance standby assist  -MS     Dynamic Sitting Balance contact guard  -MS     Position, Sitting Balance sitting edge of bed  -MS     Static Standing Balance minimal assist;moderate assist  -MS     Dynamic Standing Balance moderate assist  -MS     [facility] Name 04/11/22 1400          Sensory Assessment (Somatosensory)    Sensory Assessment (Somatosensory) --  pt was unable to attend to directions for testing. pt would always move the option leg that I was touching and asking her to move  -MS           User Key  (r) = Recorded By, (t) = Taken By, (c) =  Cosigned By    Initials Name Provider Type    MS Mccall Mercy R, PT, DPT, NCS Physical Therapist               Goals/Plan     Row Name 04/11/22 1400          Bed Mobility Goal 1 (PT)    Activity/Assistive Device (Bed Mobility Goal 1, PT) bed mobility activities, all  -MS     [place] Level/Cues Needed (Bed Mobility Goal 1, PT) independent  -MS     Time Frame (Bed Mobility Goal 1, PT) long term goal (LTG);by discharge  -MS     Progress/Outcomes (Bed Mobility Goal 1, PT) goal ongoing  -MS     Row Name 04/11/22 1400          Transfer Goal 1 (PT)    Activity/Assistive Device (Transfer Goal 1, PT) sit-to-stand/stand-to-sit;bed-to-chair/chair-to-bed  -MS     [place] Level/Cues Needed (Transfer Goal 1, PT) independent  -MS     Time Frame (Transfer Goal 1, PT) long term goal (LTG);by discharge  -MS     Progress/Outcome (Transfer Goal 1, PT) goal ongoing  -MS     Row Name 04/11/22 1400          Gait Training Goal 1 (PT)    Activity/Assistive Device (Gait Training Goal 1, PT) gait (walking locomotion);assistive device use;improve balance and speed;increase endurance/gait distance;walker, rolling  -MS     [place] Level (Gait Training Goal 1, PT) modified independence  -MS     Distance (Gait Training Goal 1, PT) 50ft  -MS     Time Frame (Gait Training Goal 1, PT) long term goal (LTG);by discharge  -MS     Progress/Outcome (Gait Training Goal 1, PT) goal ongoing  -MS           User Key  (r) = Recorded By, (t) = Taken By, (c) = Cosigned By    Initials Name Provider Type    MS MccallMercy, PT, DPT, NCS Physical Therapist               Clinical Impression     Row Name 04/11/22 1400          Pain    Pretreatment Pain Rating 7/10  -MS     Posttreatment Pain Rating 7/10  -MS     Pain Location incisional  -MS     Pain Location - abdomen  -MS     Pain Intervention(s) Medication (See MAR);Repositioned;Ambulation/increased activity  -MS     Row Name 04/11/22 1400          Plan of Care Review    Plan of Care Reviewed  With patient  -MS     Progress no change  -MS     Outcome Evaluation The patient presents drowsy and oriented to self only. She was fit for a LSO by the [employer] rep. She demonstrates no focal neurological deficits in strength, sensation, or coordiation. She does demonstrates full body tremors during activity only. She has a posterior lean in standing and during gait. She requires assist for weight shifting to initate the gait sequence. PT will continue to work with the pt to continue education for spinal restrictions, use of LSO, and increase activity between each surgery.  -MS     Row Name 04/11/22 1400          Therapy Assessment/Plan (PT)    Patient/Family Therapy Goals Statement (PT) pt unable to state  -MS     Rehab Potential (PT) good, to achieve stated therapy goals  -MS     Criteria for Skilled Interventions Met (PT) yes;meets criteria;skilled treatment is necessary  -MS     Predicted Duration of Therapy Intervention (PT) until discharge  -MS     Row Name 04/11/22 1400          Positioning and Restraints    Post Treatment Position bed  -MS     In Bed side lying left;call light within reach;encouraged to call for assist;side rails up x2;exit alarm on;SCD pump applied  -MS           User Key  (r) = Recorded By, (t) = Taken By, (c) = Cosigned By    Initials Name Provider Type    MS CalMercy R, PT, DPT, NCS Physical Therapist               Outcome Measures     Row Name 04/11/22 3309          How much help from another person do you currently need...    Turning from your back to your side while in flat bed without using bedrails? 2  -MS     Moving from lying on back to sitting on the side of a flat bed without bedrails? 2  -MS     Moving to and from a bed to a chair (including a wheelchair)? 2  -MS     Standing up from a chair using your arms (e.g., wheelchair, bedside chair)? 2  -MS     Climbing 3-5 steps with a railing? 1  -MS     To walk in hospital room? 2  -MS     AM-PAC 6 Clicks Score (PT) 11  -MS      Row Name 04/11/22 1534 04/11/22 1401       Functional Assessment    Outcome Measure Options AM-PAC 6 Clicks Basic Mobility (PT)  -MS AM-PAC 6 Clicks Daily Activity (OT)  -MW          User Key  (r) = Recorded By, (t) = Taken By, (c) = Cosigned By    Initials Name Provider Type    MS Mercy Mccall VALORIE, PT, DPT, NCS Physical Therapist    MW Felicia Perez, OTR/L Occupational Therapist                             Physical Therapy Education                 Title: PT OT SLP Therapies (In Progress)     Topic: Physical Therapy (In Progress)     Point: Mobility training (In Progress)     Learning Progress Summary           Patient Acceptance, E, NR by MS at 4/11/2022 1534    Comment: role of PT in her care, spinal restrictions, use of LSO                   Point: Home exercise program (Not Started)     Learner Progress:  Not documented in this visit.          Point: Body mechanics (Not Started)     Learner Progress:  Not documented in this visit.          Point: Precautions (In Progress)     Learning Progress Summary           Patient Acceptance, E, NR by MS at 4/11/2022 1534    Comment: role of PT in her care, spinal restrictions, use of LSO                               User Key     Initials Effective Dates Name Provider Type Discipline    MS 06/19/18 -  Mercy Mccall, PT, DPT, NCS Physical Therapist PT              PT Recommendation and Plan  Planned Therapy Interventions (PT): balance training, bed mobility training, gait training, patient/family education, orthotic fitting/training, transfer training  Plan of Care Reviewed With: patient  Progress: no change  Outcome Evaluation: The patient presents drowsy and oriented to self only. She was fit for a LSO by the [employer]'s rep. She demonstrates no focal neurological deficits in strength, sensation, or coordiation. She does demonstrates full body tremors during activity only. She has a posterior lean in standing and during gait. She requires assist for weight shifting to  initate the gait sequence. PT will continue to work with the pt to continue education for spinal restrictions, use of LSO, and increase activity between each surgery.     Time Calculation:    PT Charges     Row Name 04/11/22 1534             Time Calculation    Start Time 1400  -MS      Stop Time 1440  -MS      Time Calculation (min) 40 min  -MS      PT Received On 04/11/22  -MS      PT Goal Re-Cert Due Date 04/21/22  -MS              Untimed Charges    PT Eval/Re-eval Minutes 40  -MS              Total Minutes    Untimed Charges Total Minutes 40  -MS       Total Minutes 40  -MS            User Key  (r) = Recorded By, (t) = Taken By, (c) = Cosigned By    Initials Name Provider Type    MS Mercy Mccall, PT, DPT, NCS Physical Therapist              Therapy Charges for Today     Code Description Service Date Service Provider Modifiers Qty    61643700985 HC PT EVAL LOW COMPLEXITY 3 4/11/2022 Mercy Mccall PT, DPT, NCS GP 1          PT G-Codes  Outcome Measure Options: AM-PAC 6 Clicks Basic Mobility (PT)  AM-PAC 6 Clicks Score (PT): 11  AM-PAC 6 Clicks Score (OT): 14    Mercy Mccall, PT, DPT, NCS  4/11/2022

## 2022-04-11 NOTE — OP NOTE
LUMBAR ANTERIOR INTERBODY FUSION  Procedure Note    Sammi Sorenson  4/11/2022    Pre-op Diagnosis:    1. Increasing chronic back pain.  2. Bilateral buttock, thigh and leg radiculopathy, left worse than right.  3. Neurogenic claudication.  4. Multilevel thoracolumbar degenerative disk disease, severe, L4 to S1.  5. Facet arthropathy, L4 to S1.  6. Left central disc herniation, L4-5.  7. Foraminal disc herniation, left L5-S1.  8. Central and bilateral foraminal stenosis, L4 to S1, left worse than right.  9. Osteopenia, T-score -1.4, 02/17/2022.    Post-op Diagnosis:    same    Procedure/CPT® Codes:     1. Anterior discectomy decompression with bilateral neural foraminotomy L5-S1  2. Anterior lumbar interbody fusion L5-S1  3. Anterior spinal instrumentation L5-S1 ([employer]EC anterior plate and screws)  4. Use of titanium interbody biomechanical device for fusion L5-S1 ([employer] titanium spacer and screws)  5. Use of allograft bone matrix for fusion L5-S1 (MagnetOs)  6. Use of fluoroscopy for confirmation of surgical level, placement of interbody spacer and instrumentation  7. Intraoperative neural monitoring     Anesthesia: General     Surgeon: ADIN Reese MD     Co Surgeon: Dr. Juan Francisco Nelson D.O.     Assistant: Sven Canales PA-C     Estimated Blood Loss: 25 mL     Complications: None     Condition: Stable to PACU.     Indications:     The patient is a 71-year-old who sees Dr. Zak Alan for medical issues.  She presented to the office with complaints of increasing chronic back pain along with bilateral buttock, thigh, and leg radiculopathy, with the left side worse than the right.  Her symptoms were very consistent with neurogenic claudication.  Imaging studies revealed multilevel thoracolumbar degenerative disc disease and facet arthropathy that was severe from L4-S1.  She was noted to have a left central disc herniation at L4-5 and a foraminal disc herniation on the left side of L5-S1.  The disc herniations  together with the degenerative changes combined to form central and bilateral foraminal stenosis from L4-S1 that was worse on the left than the right and matched her symptoms.    After failing all conservative measures, it was mutually decided that surgery would be the best option.  Risks, benefits, and complications of surgery were discussed in detail. The patient appeared well informed and wished to proceed. We specifically discussed the risk of infection, blood loss, nerve root injury, CSF leak, and the possibility of incomplete resolution of symptoms. We also discussed the possible risk of a nonunion and the potential need for additional surgery in the event of a pseudoarthrosis or hardware failure.    We elected to proceed with a staged operation.  Today we are performing an anterior decompression and fusion of L5-S1, it is planned that we will be returning to surgery for a second lateral procedure at a later date to address L4-5 as well as a final posterior procedure involving a posterior spinal fusion with instrumentation spanning L4-S1.  The L5-S1 level is being addressed through an anterior approach as it is not accessible through a lateral approach.     Operative Procedure:     After obtaining informed consent and verifying the correct operative level, the patient was brought to the operating room and placed supine on an operating table. A general anesthetic was provided by the anesthesia service with the assistance of an endotracheal tube. Once this was appropriately positioned and secured, the anterior abdominal region was prepped and draped in usual sterile fashion. A surgical timeout was taken to confirm this was the correct patient, we were working at the correct level, and that preoperative antibiotics were given in a timely fashion.     At this point, Dr. Juan Francisco Nelson D.O. provided vascular access to the L5-S1 level. He performed a left sided anterior retroperitoneal approach to the L5-S1  segment. Please see his separate dictated operative report regarding the details on the approach itself.  When I entered the procedure, self retaining retractors were already in position with excellent exposure of the L5-S1 disc space.     After confirming we were at the correct level using fluoroscopy, I used a long handle 10 blade scalpel to cut into the L5-S1 disc space. A Hoang elevator was used to remove disc material off of the endplates. Disc material was retrieved using pituitaries and Kerrisons. A disc space distractor was then placed into the L5-S1 disc space and I used curettes to remove posterior disc material. Kerrisons were used to remove posterior osteophytes across the endplates of L5 and S1. There was high-grade stenosis centrally but also foraminally. I then performed a bilateral neural foraminotomy with Kerrisons and curettes. The decompression was much more involved than what is usually required for an anterior lumbar interbody fusion by itself and required significantly more time to perform.  This was due to the severe disc space collapse and high-grade foraminal stenosis.     After the decompression was completed bilaterally and centrally, I used a series of endplate scrapers to prepare the endplates for interbody fusion. Trial spacers were then malleted into position and it was felt that a 16 mm titanium spacer with 15 degrees lordosis from the [facility] instrumentation set would be the best fit to restore disc height also restoring foraminal height and providing some indirect decompression. The disc space was then thoroughly irrigated with saline solution.  Gelfoam powder with thrombin was used to control epidural bleeding.     A 16 mm titanium spacer from the [employer] instrumentation set was then packed as tightly as possible with an allograft bone matrix called MagnetOs. This spacer was then malleted into the L5-S1 disc space under fluoroscopic guidance. It was placed as an interbody biomechanical  device to assist with fusion.  I then placed an 8.5 mm x 30 mm graft bolt through the spacer into L5 and a 5.0 mm x 30 mm screw through the spacer into S1.  The screws were placed to help maintain position of the spacer as well as to assist with the fusion process.     A 4-hole anterior plate from the [facility] instrumentation set was then chosen. The 4 holes were drilled and four 30 mm screws were used to fix the plate across the L5-S1 segment augmenting the fusion.      We then inspected the entire operative field for any signs of bleeding.  Bleeding was once again controlled using thrombin with Gelfoam powder and bipolar cautery.  Once we ensured that adequate hemostasis was accomplished, final fluoroscopy imaging was taken to confirm adequate position of our implants. There was excellent restoration of disc height and the plate and screw construct appeared to be adequately positioned across L5-S1.     Please see Dr. Juan Francisco Nelson's separate dictated operative report regarding the closure of the wound. Once this was accomplished, the patient was extubated and sent to the recovery room in good stable condition. We estimated blood loss to be approximately 25 mL and the patient remained hemodynamically stable during the procedure.     Intraoperative neuro monitoring was ordered and carried out throughout the procedure to add an increased level of safety for the patient.  The interpreting physician was available by means of real-time continuous, bidirectional, remote audio and visual communication as needed throughout the entire procedure.  Modalities used during the procedure included SSEP, EEG, MEP, EMG, and TOF.  There were no neuro monitoring signal changes during the procedure.    Dr. Juan Francisco Nelson D.O. provided vascular access to the L5-S1 level and acted as a co-surgeon in this fashion.  Sven Canales PA-C provided critical assistance during the decompression at L5-S1 as well as during the placement of the  titanium spacer, bone graft and instrumentation to obtain a fusion across L5-S1.    ADIN Reese MD    Date: 4/11/2022  Time: 09:50 CDT

## 2022-04-11 NOTE — ANESTHESIA PROCEDURE NOTES
Airway  Urgency: elective    Date/Time: 4/11/2022 7:32 AM  Airway not difficult    General Information and Staff    Patient location during procedure: OR  CRNA: Veena Kinsey CRNA    Indications and Patient Condition  Indications for airway management: airway protection    Preoxygenated: yes  Mask difficulty assessment: 2 - vent by mask + OA or adjuvant +/- NMBA    Final Airway Details  Final airway type: endotracheal airway      Successful airway: ETT  Cuffed: yes   Successful intubation technique: direct laryngoscopy  Endotracheal tube insertion site: oral  Blade: Duffy  Blade size: 2  ETT size (mm): 7.5  Cormack-Lehane Classification: grade I - full view of glottis  Placement verified by: chest auscultation and capnometry   Cuff volume (mL): 6  Measured from: lips  ETT/EBT  to lips (cm): 22  Number of attempts at approach: 1  Assessment: lips, teeth, and gum same as pre-op and atraumatic intubation    Additional Comments  Atraumatic

## 2022-04-11 NOTE — PLAN OF CARE
Goal Outcome Evaluation:  Plan of Care Reviewed With: patient        Progress: no change  Outcome Evaluation: The patient presents drowsy and oriented to self only. She was fit for a LSO by the [employer] rep. She demonstrates no focal neurological deficits in strength, sensation, or coordiation. She does demonstrates full body tremors during activity only. She has a posterior lean in standing and during gait. She requires assist for weight shifting to initate the gait sequence. PT will continue to work with the pt to continue education for spinal restrictions, use of LSO, and increase activity between each surgery.

## 2022-04-11 NOTE — OP NOTE
Sammi Sorenson  4/11/2022     PREOPERATIVE DIAGNOSIS:   1. Increasing chronic back pain.  2. Bilateral buttock, thigh and leg radiculopathy, left worse than right.  3. Neurogenic claudication.  4. Multilevel thoracolumbar degenerative disk disease, severe, L4 to S1.  5. Facet arthropathy, L4 to S1.  6. Left central disc herniation, L4-5.  7. Foraminal disc herniation, left L5-S1.  8. Central and bilateral foraminal stenosis, L4 to S1, left worse than right.  9. Osteopenia, T-score -1.4, 02/17/2022.     POSTOPERATIVE DIAGNOSIS: same     PROCEDURE PERFORMED:   1.  Anterior lumbar interbody fusion of L5-S1 with instrumentation     SURGEON: Juan Francisco Nelson DO   COSURGEON: Rhys Reese MD     ANESTHESIA: General.    PREPARATION: Routine.    STAFF: Circulator: Adilson Brunner RN  Scrub Person: Amanda Mcintyre Melissa K  Vendor Representative: Rocio Parrish  Assistant: Vidal Canales PA    ESTIMATED BLOOD LOSS: 25 mL    SPECIMENS: None    COMPLICATIONS: None    INDICATIONS: Sammi Sorenson is a 71 y.o. female who you are currently following for chronic back pain.  Sammi Cooper scheduled for anterior lumbar interbody fusion of L5-S1 with Dr. Reese on 4/11/22.  She previously developed a DVT in the left leg following knee surgery.  She is daily smoker. The indications, risks, and possible complications of the procedure were explained to the patient, who voiced understanding and wished to proceed with surgery.     PROCEDURE IN DETAIL:   The patient was taken to the operating room and placed on the operating table in a supine position. After general anesthesia was obtained, the abdomen was prepped and draped in a sterile manner.  A transverse incision was then made in the left lower quadrant.  Careful dissection was made down through the subcutaneous tissues using the Bovie cautery to ensure hemostasis.  Any crossing veins were ligated with 3-0 silk suture and hemoclips.  The rectus  fascia was identified.  It was incised with the Bovie cautery.  Kocher clamps are placed on each side of the rectus fascia and subfascial planes were established in a cephalad and caudad direction.  Once the subfascial planes were established the attention was then turned to the left rectus muscle.  Blunt mobilization was made of the left rectus muscle including its blood supply medially and to the right.  Once it was fully mobilized the attention was then turned laterally to the peritoneal reflection.  Entrance into the retroperitoneal space was established with the use of a sponge stick and the Bovie cautery.  Continued blunt mobilization was made with my hand using a finger sweeping motion moving the peritoneal contents and sacral fat pad medially and to the right.  Once it was fully mobilized the Brau-Brunner retractor system was set up.  The retractor blades were set in place.  The left iliac vein was then carefully dissected free and placed safely behind the retractor blade.  The sacral vessels were carefully taken down with hemoclips.  At this point full exposure was established of the L5-S1 disc space.  The next part of the case will be dictated by Dr. Reese. Upon completion of Dr. Reese's part of the case the wound bed was irrigated with antibiotic saline and hemostasis was observed.  The retractor blades were carefully taken out one at a time.  The structures were then placed back in their normal anatomic positions.  The rectus fascia was then closed with a #1 PDS in a running fashion to meet in the midline.  The deep layers were closed with a 2-0 Vicryl in a running fashion.  The subcutaneous layers were closed with a 3-0 Vicryl in a running fashion.  The skin was then reapproximated using a 4-0 Monocryl in a subcuticular fashion.  The wound was then cleaned.  Sterile dressings were applied. The patient tolerated the procedure well. Sponge and needle counts were correct. The patient was then awakened  and extubated in the operating room and taken to the recovery room in good condition.    Juan Francisco Nelson,     Date: 4/11/2022 Time: 09:30 CDT

## 2022-04-11 NOTE — PLAN OF CARE
"Goal Outcome Evaluation:  Plan of Care Reviewed With: patient        Progress: no change  Outcome Evaluation: OT raffy completed. Pt very drowsy with need for vc throughout to maintain attention to task. Assist to come to EOB, posterior lean in sitting EOB with LE assessments. Pt demos full body tremor with all mobility and activity, pt is able to state she had \"some of this\" at baseline, does not report it is worse than her normal. With fxl mobility pt demos decreased motor control and planning taking very small steps and with posterior lean, need for HHA and min-modA to correct to prevent fall. Needs assist for all aspects of LSO and will need follow up brace training. OT indicated to provide brace training, spinal precautions within ADL/IADL, and fxl mobility. Pt with plan for 2nd and 3rd part surgeries later this week, will continue to follow and re-assess for d/c needs.  "

## 2022-04-11 NOTE — THERAPY EVALUATION
Patient Name: Sammi Sorenson  : 1950    MRN: [id number]                              Today's Date: 2022       Admit Date: 2022    Visit Dx:     ICD-10-CM ICD-9-CM   1. Decreased activities of daily living (ADL)  Z78.9 V49.89     Patient Active Problem List   Diagnosis   • Tobacco abuse   • Cellulitis of face   • Thrush   • Chronic bilateral low back pain with bilateral sciatica   • Deep groin pain   • Hip pain, acute, left   • Ischial bursitis of left side   • Lumbar disc disease with radiculopathy   • Pain in both knees   • Pain management contract agreement   • Piriformis syndrome of left side   • SI (sacroiliac) joint dysfunction   • Lumbosacral disc disease   • Lumbago of multiple sites in spine with sciatica     Past Medical History:   Diagnosis Date   • Anxiety    • Cellulitis of face 2019   • Chronic abdominal pain    • Chronic back pain    • Chronic gastritis    • Chronic pain     sees pain vilma. since    • CKD (chronic kidney disease)    • Colon polyp    • COPD (chronic obstructive pulmonary disease) (HCC)    • Disease of thyroid gland    • Diverticulosis    • Dyslipidemia    • GERD (gastroesophageal reflux disease)    • Hemorrhoid    • Hiatal hernia    • History of adenomatous polyp of colon    • Leaky heart valve    • Lumbosacral disc disease 2022   • Migraine    • Neuropathy    • Osteoarthritis    • Ovarian cancer (HCC)    • Submandibular gland mass      Past Surgical History:   Procedure Laterality Date   • CARDIAC CATHETERIZATION      no stent   • CHOLECYSTECTOMY     • COLONOSCOPY  04/10/2015    5 polyps, tubular adenomas, melanosis, diverticulosis, internal hemorrhoids,    • CYST REMOVAL     • ENDOSCOPY  04/10/2015    hiatus hernia   • HYSTERECTOMY     • LUNG LOBECTOMY Right     [address] [place] [address]   • REPLACEMENT TOTAL KNEE BILATERAL     • SUBMANDIBULAR GLAND EXCISION Left 2018    Procedure: Incision and drainage of left submandibular region abscess with  excision of left submandibular region mass mass consistent with probable lymph node;  Surgeon: Duncan Ewing MD;  Location: [facility] OR;  Service: ENT      General Information     Row Name 04/11/22 1401          OT Time and Intention    Document Type evaluation  -     Mode of Treatment occupational therapy  -     Row Name 04/11/22 1401          General Information    Patient Profile Reviewed yes  -MW     Prior Level of Function independent:;all household mobility;community mobility;ADL's  -MW     Existing Precautions/Restrictions fall;brace worn when out of bed;spinal  -MW     Barriers to Rehab previous functional deficit  -MW     Row Name 04/11/22 1401          Occupational Profile    Reason for Services/Referral (Occupational Profile) s/p anterior discectomy decompression w B neural foraminotomy L5-S1, ALIF L5-S1 3/11; 2nd and 3rd parts scheduled for this week  -MW     Environmental Supports and Barriers (Occupational Profile) unable to assess home environment during initial eval 2' drowsiness  -MW     Row Name 04/11/22 1401          Living Environment    People in Home child(johanna), adult  -     Row Name 04/11/22 1401          Cognition    Orientation Status (Cognition) oriented to;person  very drowsy, did not fully assess  -     Row Name 04/11/22 1401          Safety Issues, Functional Mobility    Impairments Affecting Function (Mobility) balance;strength;pain;motor control  -           User Key  (r) = Recorded By, (t) = Taken By, (c) = Cosigned By    Initials Name Provider Type    MW Felicia Perez, OTR/L Occupational Therapist                 Mobility/ADL's     Row Name 04/11/22 1401          Bed Mobility    Bed Mobility rolling left;sidelying-sit;sit-sidelying  -MW     Rolling Left [place] (Bed Mobility) contact guard;verbal cues;nonverbal cues (demo/gesture)  -MW     Sidelying-Sit [place] (Bed Mobility) contact guard;verbal cues;nonverbal cues (demo/gesture)  -MW     Sit-Sidelying  [place] (Bed Mobility) contact guard;minimum assist (75% patient effort);verbal cues;nonverbal cues (demo/gesture)  -     Comment, (Bed Mobility) pt very drowsy but in pain requiring increased time and effort to come to sitting  -     Row Name 04/11/22 1401          Transfers    Transfers sit-stand transfer;stand-sit transfer  -     Sit-Stand [place] (Transfers) minimum assist (75% patient effort);2 person assist  -     Stand-Sit [place] (Transfers) minimum assist (75% patient effort);verbal cues;nonverbal cues (demo/gesture)  -     Row Name 04/11/22 1401          Sit-Stand Transfer    Comment, (Sit-Stand Transfer) HHAx2  -[facility] Name 04/11/22 1401          Functional Mobility    Functional Mobility- Ind. Level minimum assist (75% patient effort);2 person assist required  -     Functional Mobility- Device --  HHAx2  -     Functional Mobility- Comment difficulty with motor control/planning to take steps, able to take a couple steps away from bed, great difficulty turning around with need for physical assist to prevent fall  -     Row Name 04/11/22 1401          Activities of Daily Living    BADL Assessment/Intervention upper body dressing  -[facility] Name 04/11/22 1401          Upper Body Dressing Assessment/Training    [place] Level (Upper Body Dressing) don;doff;maximum assist (25% patient effort)  -     Position (Upper Body Dressing) edge of bed sitting  -     Comment, (Upper Body Dressing) needs assist for all aspects of LSO, will need follow up training when more alert  -           User Key  (r) = Recorded By, (t) = Taken By, (c) = Cosigned By    Initials Name Provider Type    MW Felicia Perez, OTR/L Occupational Therapist               Obj/Interventions     Row Name 04/11/22 1401          Range of Motion Comprehensive    General Range of Motion bilateral upper extremity ROM WFL  -     Comment, General Range of Motion did not assess, obs functionally BUE  -MW      Row Name 04/11/22 1401          Strength Comprehensive (MMT)    Comment, General Manual Muscle Testing (MMT) Assessment BUE functionally 4-/5  -MW     Row Name 04/11/22 1401          Motor Skills    Motor Skills --  full body tremor with activity  -MW     Row Name 04/11/22 1401          Balance    Balance Assessment sitting static balance;sitting dynamic balance;standing static balance  -MW     Static Sitting Balance standby assist  -MW     Dynamic Sitting Balance standby assist;contact guard  -MW     Position, Sitting Balance sitting edge of bed  -MW     Static Standing Balance minimal assist;moderate assist  -MW     Comment, Balance HHA provided  -           User Key  (r) = Recorded By, (t) = Taken By, (c) = Cosigned By    Initials Name Provider Type    MW Felicia Perez, OTR/L Occupational Therapist               Goals/Plan     Row Name 04/11/22 1401          Transfer Goal 1 (OT)    Activity/Assistive Device (Transfer Goal 1, OT) sit-to-stand/stand-to-sit;bed-to-chair/chair-to-bed;toilet  -MW     [place] Level/Cues Needed (Transfer Goal 1, OT) supervision required  -MW     Time Frame (Transfer Goal 1, OT) long term goal (LTG);10 days  -MW     Progress/Outcome (Transfer Goal 1, OT) goal ongoing  -     Row Name 04/11/22 1401          Dressing Goal 1 (OT)    Activity/Device (Dressing Goal 1, OT) upper body dressing;lower body dressing  -MW     [place]/Cues Needed (Dressing Goal 1, OT) minimum assist (75% or more patient effort)  -MW     Time Frame (Dressing Goal 1, OT) long term goal (LTG);10 days  -MW     Strategies/Barriers (Dressing Goal 1, OT) to include LSO  -MW     Progress/Outcome (Dressing Goal 1, OT) goal ongoing  -     Row Name 04/11/22 1401          Toileting Goal 1 (OT)    Activity/Device (Toileting Goal 1, OT) toileting skills, all  -MW     [place] Level/Cues Needed (Toileting Goal 1, OT) minimum assist (75% or more patient effort)  -MW     Time Frame (Toileting Goal 1, OT)  "long term goal (LTG);10 days  -MW     Progress/Outcome (Toileting Goal 1, OT) goal ongoing  -MW     Row Name 04/11/22 1401          Therapy Assessment/Plan (OT)    Planned Therapy Interventions (OT) activity tolerance training;BADL retraining;cognitive/visual perception retraining;functional balance retraining;IADL retraining;occupation/activity based interventions;orthotic fabrication/fitting/training;patient/caregiver education/training;strengthening exercise;transfer/mobility retraining;neuromuscular control/coordination retraining  -MW           User Key  (r) = Recorded By, (t) = Taken By, (c) = Cosigned By    Initials Name Provider Type    MW Felicia Perez, OTR/L Occupational Therapist               Clinical Impression     Row Name 04/11/22 1401          Pain Assessment    Additional Documentation Pain Scale: FACES Pre/Post-Treatment (Group)  -MW     Row Name 04/11/22 1401          Pain Scale: FACES Pre/Post-Treatment    Pain: FACES Scale, Pretreatment 4-->hurts little more  -MW     Posttreatment Pain Rating 4-->hurts little more  -MW     Pain Location incisional  -MW     Row Name 04/11/22 1401          Plan of Care Review    Plan of Care Reviewed With patient  -MW     Progress no change  -MW     Outcome Evaluation OT eval completed. Pt very drowsy with need for vc throughout to maintain attention to task. Assist to come to EOB, posterior lean in sitting EOB with LE assessments. Pt demos full body tremor with all mobility and activity, pt is able to state she had \"some of this\" at baseline, does not report it is worse than her normal. With fxl mobility pt demos decreased motor control and planning taking very small steps and with posterior lean, need for HHA and min-modA to correct to prevent fall. Needs assist for all aspects of LSO and will need follow up brace training. OT indicated to provide brace training, spinal precautions within ADL/IADL, and fxl mobility. Pt with plan for 2nd and 3rd part surgeries " later this week, will continue to follow and re-assess for d/c needs.  -     Row Name 04/11/22 1401          Therapy Assessment/Plan (OT)    Rehab Potential (OT) good, to achieve stated therapy goals  -     Criteria for Skilled Therapeutic Interventions Met (OT) yes;skilled treatment is necessary  -     Therapy Frequency (OT) 5 times/wk  -MW     Predicted Duration of Therapy Intervention (OT) 10 days  -     Row Name 04/11/22 1401          Therapy Plan Review/Discharge Plan (OT)    Anticipated Discharge Disposition (OT) home with 24/7 care;home with home health;skilled nursing facility  -     Row Name 04/11/22 1401          Positioning and Restraints    Pre-Treatment Position in bed  -MW     Post Treatment Position bed  -MW     In Bed side lying left;call light within reach;encouraged to call for assist;exit alarm on;side rails up x2;SCD pump applied;pillow between legs  -           User Key  (r) = Recorded By, (t) = Taken By, (c) = Cosigned By    Initials Name Provider Type    Felicia Perez, OTR/L Occupational Therapist               Outcome Measures     Row Name 04/11/22 1401          How much help from another is currently needed...    Putting on and taking off regular lower body clothing? 2  -MW     Bathing (including washing, rinsing, and drying) 2  -MW     Toileting (which includes using toilet bed pan or urinal) 2  -MW     Putting on and taking off regular upper body clothing 2  -MW     Taking care of personal grooming (such as brushing teeth) 3  -MW     Eating meals 3  -MW     AM-PAC 6 Clicks Score (OT) 14  -     Row Name 04/11/22 1401          Functional Assessment    Outcome Measure Options AM-PAC 6 Clicks Daily Activity (OT)  -           User Key  (r) = Recorded By, (t) = Taken By, (c) = Cosigned By    Initials Name Provider Type    Felicia Perez, OTR/L Occupational Therapist                Occupational Therapy Education                 Title: PT OT SLP Therapies (In Progress)      Topic: Occupational Therapy (In Progress)     Point: ADL training (In Progress)     Description:   Instruct learner(s) on proper safety adaptation and remediation techniques during self care or transfers.   Instruct in proper use of assistive devices.              Learning Progress Summary           Patient Acceptance, E,D, NR by  at 4/11/2022 1452                   Point: Home exercise program (In Progress)     Description:   Instruct learner(s) on appropriate technique for monitoring, assisting and/or progressing therapeutic exercises/activities.              Learning Progress Summary           Patient Acceptance, E,D, NR by  at 4/11/2022 1452                   Point: Precautions (In Progress)     Description:   Instruct learner(s) on prescribed precautions during self-care and functional transfers.              Learning Progress Summary           Patient Acceptance, E,D, NR by  at 4/11/2022 1452                   Point: Body mechanics (In Progress)     Description:   Instruct learner(s) on proper positioning and spine alignment during self-care, functional mobility activities and/or exercises.              Learning Progress Summary           Patient Acceptance, E,D, NR by  at 4/11/2022 1452                               User Key     Initials Effective Dates Name Provider Type Discipline     08/28/18 -  Felicia Perez, OTR/L Occupational Therapist OT              OT Recommendation and Plan  Planned Therapy Interventions (OT): activity tolerance training, BADL retraining, cognitive/visual perception retraining, functional balance retraining, IADL retraining, occupation/activity based interventions, orthotic fabrication/fitting/training, patient/caregiver education/training, strengthening exercise, transfer/mobility retraining, neuromuscular control/coordination retraining  Therapy Frequency (OT): 5 times/wk  Plan of Care Review  Plan of Care Reviewed With: patient  Progress: no change  Outcome  "Evaluation: OT eval completed. Pt very drowsy with need for vc throughout to maintain attention to task. Assist to come to EOB, posterior lean in sitting EOB with LE assessments. Pt demos full body tremor with all mobility and activity, pt is able to state she had \"some of this\" at baseline, does not report it is worse than her normal. With fxl mobility pt demos decreased motor control and planning taking very small steps and with posterior lean, need for HHA and min-modA to correct to prevent fall. Needs assist for all aspects of LSO and will need follow up brace training. OT indicated to provide brace training, spinal precautions within ADL/IADL, and fxl mobility. Pt with plan for 2nd and 3rd part surgeries later this week, will continue to follow and re-assess for d/c needs.     Time Calculation:    Time Calculation- OT     Row Name 04/11/22 1452             Time Calculation- OT    OT Start Time 1401  -MW      OT Stop Time 1440  -MW      OT Time Calculation (min) 39 min  -MW      OT Received On 04/11/22  -MW      OT Goal Re-Cert Due Date 04/21/22  -MW            User Key  (r) = Recorded By, (t) = Taken By, (c) = Cosigned By    Initials Name Provider Type    MW Felicia Perez, OTR/L Occupational Therapist              Therapy Charges for Today     Code Description Service Date Service Provider Modifiers Qty    [id number]  OT EVAL LOW COMPLEXITY 3 4/11/2022 Felicia Perez OTR/L GO 1               Felicia Perez OTR/MARLEY  4/11/2022  "

## 2022-04-11 NOTE — ANESTHESIA PREPROCEDURE EVALUATION
Anesthesia Evaluation     Patient summary reviewed   no history of anesthetic complications:  NPO Solid Status: > 8 hours  NPO Liquid Status: > 8 hours           Airway   Mallampati: I  TM distance: >3 FB  Neck ROM: full  Dental    (+) edentulous    Pulmonary    (+) a smoker Current Abstained day of surgery, COPD,   (-) asthma, sleep apnea  Cardiovascular   Exercise tolerance: good (4-7 METS)    ECG reviewed    (+) hyperlipidemia,   (-) hypertension, past MI, CAD, dysrhythmias, cardiac stents      Neuro/Psych  (+) weakness (left leg), numbness (left leg),    (-) seizures, TIA  GI/Hepatic/Renal/Endo    (+)  GERD,  thyroid problem   (-) liver disease, diabetes    Musculoskeletal     (+) back pain,   Abdominal    Substance History      OB/GYN          Other                        Anesthesia Plan    ASA 2     general     intravenous induction     Anesthetic plan, all risks, benefits, and alternatives have been provided, discussed and informed consent has been obtained with: patient.        CODE STATUS:

## 2022-04-11 NOTE — ANESTHESIA POSTPROCEDURE EVALUATION
Patient: Sammi Sorenson    Procedure Summary     Date: 04/11/22 Room / Location: [facility] OR  /  [facility] OR    Anesthesia Start: 0728 Anesthesia Stop: 0937    Procedures:       ANTERIOR DECOMPRESSION, ANTERIOR LUMBAR INTERBODY FUSION WITH INSTRUMENTATION L5-S1 (N/A Spine Lumbar)      ANTERIOR LUMBAR EXPOSURE (N/A Abdomen) Diagnosis: (M54.16)    Surgeons: JESSIKA Reese MD; Juan Francisco Nelson DO Provider: Veena Kinsey CRNA    Anesthesia Type: general ASA Status: 2          Anesthesia Type: general    Vitals  Vitals Value Taken Time   /64 04/11/22 1130   Temp 98.4 °F (36.9 °C) 04/11/22 1130   Pulse 56 04/11/22 1132   Resp 9 04/11/22 1130   SpO2 97 % 04/11/22 1132   Vitals shown include unvalidated device data.        Post Anesthesia Care and Evaluation    Patient location during evaluation: PACU  Patient participation: complete - patient participated  Level of consciousness: awake and alert  Pain management: adequate  Airway patency: patent  Anesthetic complications: No anesthetic complications  PONV Status: none  Cardiovascular status: acceptable and hemodynamically stable  Respiratory status: acceptable  Hydration status: acceptable    Comments: Blood pressure 149/64, pulse 56, temperature 98.4 °F (36.9 °C), temperature source Temporal, resp. rate 9, SpO2 97 %.    Patient discharged from PACU based upon Arash score. Please see RN notes for further details

## 2022-04-12 LAB
ANION GAP SERPL CALCULATED.3IONS-SCNC: 11 MMOL/L (ref 5–15)
BASOPHILS # BLD AUTO: 0.02 10*3/MM3 (ref 0–0.2)
BASOPHILS NFR BLD AUTO: 0.3 % (ref 0–1.5)
BUN SERPL-MCNC: 8 MG/DL (ref 8–23)
BUN/CREAT SERPL: 9.1 (ref 7–25)
CALCIUM SPEC-SCNC: 8.6 MG/DL (ref 8.6–10.5)
CHLORIDE SERPL-SCNC: 105 MMOL/L (ref 98–107)
CO2 SERPL-SCNC: 26 MMOL/L (ref 22–29)
CREAT SERPL-MCNC: 0.88 MG/DL (ref 0.57–1)
DEPRECATED RDW RBC AUTO: 44.4 FL (ref 37–54)
EGFRCR SERPLBLD CKD-EPI 2021: 69.5 ML/MIN/1.73
EOSINOPHIL # BLD AUTO: 0 10*3/MM3 (ref 0–0.4)
EOSINOPHIL NFR BLD AUTO: 0 % (ref 0.3–6.2)
ERYTHROCYTE [DISTWIDTH] IN BLOOD BY AUTOMATED COUNT: 13.3 % (ref 12.3–15.4)
GLUCOSE SERPL-MCNC: 95 MG/DL (ref 65–99)
HCT VFR BLD AUTO: 32.6 % (ref 34–46.6)
HGB BLD-MCNC: 10.4 G/DL (ref 12–15.9)
IMM GRANULOCYTES # BLD AUTO: 0.05 10*3/MM3 (ref 0–0.05)
IMM GRANULOCYTES NFR BLD AUTO: 0.7 % (ref 0–0.5)
LYMPHOCYTES # BLD AUTO: 1.68 10*3/MM3 (ref 0.7–3.1)
LYMPHOCYTES NFR BLD AUTO: 23.2 % (ref 19.6–45.3)
MCH RBC QN AUTO: 29.2 PG (ref 26.6–33)
MCHC RBC AUTO-ENTMCNC: 31.9 G/DL (ref 31.5–35.7)
MCV RBC AUTO: 91.6 FL (ref 79–97)
MONOCYTES # BLD AUTO: 0.69 10*3/MM3 (ref 0.1–0.9)
MONOCYTES NFR BLD AUTO: 9.5 % (ref 5–12)
NEUTROPHILS NFR BLD AUTO: 4.79 10*3/MM3 (ref 1.7–7)
NEUTROPHILS NFR BLD AUTO: 66.3 % (ref 42.7–76)
NRBC BLD AUTO-RTO: 0 /100 WBC (ref 0–0.2)
PLATELET # BLD AUTO: 166 10*3/MM3 (ref 140–450)
PMV BLD AUTO: 12.6 FL (ref 6–12)
POTASSIUM SERPL-SCNC: 3.6 MMOL/L (ref 3.5–5.2)
RBC # BLD AUTO: 3.56 10*6/MM3 (ref 3.77–5.28)
SODIUM SERPL-SCNC: 142 MMOL/L (ref 136–145)
WBC NRBC COR # BLD: 7.23 10*3/MM3 (ref 3.4–10.8)

## 2022-04-12 PROCEDURE — 25010000002 ONDANSETRON PER 1 MG: Performed by: ORTHOPAEDIC SURGERY

## 2022-04-12 PROCEDURE — 97116 GAIT TRAINING THERAPY: CPT

## 2022-04-12 PROCEDURE — 63710000001 ONDANSETRON PER 8 MG: Performed by: ORTHOPAEDIC SURGERY

## 2022-04-12 PROCEDURE — 85025 COMPLETE CBC W/AUTO DIFF WBC: CPT | Performed by: ORTHOPAEDIC SURGERY

## 2022-04-12 PROCEDURE — 80048 BASIC METABOLIC PNL TOTAL CA: CPT | Performed by: ORTHOPAEDIC SURGERY

## 2022-04-12 PROCEDURE — 0 HYDROMORPHONE 1 MG/ML SOLUTION: Performed by: ORTHOPAEDIC SURGERY

## 2022-04-12 PROCEDURE — 97535 SELF CARE MNGMENT TRAINING: CPT

## 2022-04-12 RX ORDER — CLINDAMYCIN PHOSPHATE 600 MG/50ML
600 INJECTION INTRAVENOUS
Status: COMPLETED | OUTPATIENT
Start: 2022-04-13 | End: 2022-04-13

## 2022-04-12 RX ORDER — POLYETHYLENE GLYCOL 3350 17 G/17G
17 POWDER, FOR SOLUTION ORAL 2 TIMES DAILY
Status: DISCONTINUED | OUTPATIENT
Start: 2022-04-12 | End: 2022-04-18

## 2022-04-12 RX ADMIN — HYDROCODONE BITARTRATE AND ACETAMINOPHEN 2 TABLET: 7.5; 325 TABLET ORAL at 05:00

## 2022-04-12 RX ADMIN — HYDROMORPHONE HYDROCHLORIDE 1 MG: 1 INJECTION, SOLUTION INTRAMUSCULAR; INTRAVENOUS; SUBCUTANEOUS at 22:59

## 2022-04-12 RX ADMIN — SODIUM CHLORIDE 100 ML/HR: 9 INJECTION, SOLUTION INTRAVENOUS at 21:08

## 2022-04-12 RX ADMIN — HYDROMORPHONE HYDROCHLORIDE 1 MG: 1 INJECTION, SOLUTION INTRAMUSCULAR; INTRAVENOUS; SUBCUTANEOUS at 13:08

## 2022-04-12 RX ADMIN — HYDROMORPHONE HYDROCHLORIDE 1 MG: 1 INJECTION, SOLUTION INTRAMUSCULAR; INTRAVENOUS; SUBCUTANEOUS at 19:40

## 2022-04-12 RX ADMIN — DOCUSATE SODIUM 50 MG AND SENNOSIDES 8.6 MG 1 TABLET: 8.6; 5 TABLET, FILM COATED ORAL at 08:03

## 2022-04-12 RX ADMIN — LEVOTHYROXINE SODIUM 100 MCG: 100 TABLET ORAL at 05:00

## 2022-04-12 RX ADMIN — POLYETHYLENE GLYCOL 3350 17 G: 17 POWDER, FOR SOLUTION ORAL at 21:07

## 2022-04-12 RX ADMIN — POLYETHYLENE GLYCOL 3350 17 G: 17 POWDER, FOR SOLUTION ORAL at 09:13

## 2022-04-12 RX ADMIN — TIZANIDINE 4 MG: 4 TABLET ORAL at 08:06

## 2022-04-12 RX ADMIN — HYDROCODONE BITARTRATE AND ACETAMINOPHEN 2 TABLET: 7.5; 325 TABLET ORAL at 21:07

## 2022-04-12 RX ADMIN — HYDROCODONE BITARTRATE AND ACETAMINOPHEN 2 TABLET: 7.5; 325 TABLET ORAL at 09:16

## 2022-04-12 RX ADMIN — TIZANIDINE 4 MG: 4 TABLET ORAL at 21:07

## 2022-04-12 RX ADMIN — HYDROMORPHONE HYDROCHLORIDE 1 MG: 1 INJECTION, SOLUTION INTRAMUSCULAR; INTRAVENOUS; SUBCUTANEOUS at 08:06

## 2022-04-12 RX ADMIN — ONDANSETRON HYDROCHLORIDE 4 MG: 2 SOLUTION INTRAMUSCULAR; INTRAVENOUS at 21:07

## 2022-04-12 RX ADMIN — ALPRAZOLAM 0.5 MG: 0.5 TABLET ORAL at 19:40

## 2022-04-12 RX ADMIN — HYDROCODONE BITARTRATE AND ACETAMINOPHEN 2 TABLET: 7.5; 325 TABLET ORAL at 14:41

## 2022-04-12 RX ADMIN — ESCITALOPRAM 10 MG: 10 TABLET, FILM COATED ORAL at 08:03

## 2022-04-12 RX ADMIN — HYDROMORPHONE HYDROCHLORIDE 1 MG: 1 INJECTION, SOLUTION INTRAMUSCULAR; INTRAVENOUS; SUBCUTANEOUS at 16:39

## 2022-04-12 RX ADMIN — ONDANSETRON 4 MG: 4 TABLET, FILM COATED ORAL at 12:04

## 2022-04-12 RX ADMIN — ALPRAZOLAM 0.5 MG: 0.5 TABLET ORAL at 09:13

## 2022-04-12 RX ADMIN — HYDROMORPHONE HYDROCHLORIDE 1 MG: 1 INJECTION, SOLUTION INTRAMUSCULAR; INTRAVENOUS; SUBCUTANEOUS at 06:07

## 2022-04-12 RX ADMIN — HYDROMORPHONE HYDROCHLORIDE 1 MG: 1 INJECTION, SOLUTION INTRAMUSCULAR; INTRAVENOUS; SUBCUTANEOUS at 10:39

## 2022-04-12 RX ADMIN — DOCUSATE SODIUM 50 MG AND SENNOSIDES 8.6 MG 1 TABLET: 8.6; 5 TABLET, FILM COATED ORAL at 21:07

## 2022-04-12 RX ADMIN — PANTOPRAZOLE SODIUM 40 MG: 40 TABLET, DELAYED RELEASE ORAL at 06:03

## 2022-04-12 NOTE — PROGRESS NOTES
[facility] [place] [facility]  Spine Surgery  MARRY Troncoso   Progress Note        Subjective/Overnight Events:  Stable overnight, complaining of post operative pain, up with assist, anticipating surgery tomorrow, verbalized need for rehab placement after surgery completed    Vitals  Vitals:    04/11/22 1532 04/11/22 1930 04/11/22 2300 04/12/22 0309   BP: 124/52 137/52 119/51 127/53   BP Location: Right arm Left arm Left arm Left arm   Patient Position: Lying Lying Lying Lying   Pulse: 66 65 71 63   Resp: 15  20 18   Temp: 97.8 °F (36.6 °C) 98.1 °F (36.7 °C) 97.8 °F (36.6 °C) 97.8 °F (36.6 °C)   TempSrc: Oral Oral Oral Oral   SpO2: 94% 97% 96% 97%   Weight:       Height:           Current Facility-Administered Medications   Medication Dose Route Frequency Provider Last Rate Last Admin   • acetaminophen (TYLENOL) tablet 650 mg  650 mg Oral Q4H PRN JESSIKA Reese MD       • ALPRAZolam (XANAX) tablet 0.5 mg  0.5 mg Oral TID PRN JESSIKA Reese MD   0.5 mg at 04/11/22 2301   • [START ON 4/13/2022] clindamycin (CLEOCIN) 600 mg in dextrose 5% 50 mL IVPB (premix)  600 mg Intravenous On Call to OR Vidal Canales PA       • escitalopram (LEXAPRO) tablet 10 mg  10 mg Oral Daily JESSIKA Reese MD   10 mg at 04/11/22 1223   • HYDROcodone-acetaminophen (NORCO) 7.5-325 MG per tablet 1 tablet  1 tablet Oral Q4H PRN JESSIKA Reese MD       • HYDROcodone-acetaminophen (NORCO) 7.5-325 MG per tablet 2 tablet  2 tablet Oral Q4H PRN JESSIKA Reese MD   2 tablet at 04/12/22 0500   • HYDROmorphone (DILAUDID) injection 1 mg  1 mg Intravenous Q2H PRN JESSIKA Reese MD   1 mg at 04/12/22 0607    And   • naloxone (NARCAN) injection 0.4 mg  0.4 mg Intravenous Q5 Min PRN JESSIKA Reese MD       • levothyroxine (SYNTHROID, LEVOTHROID) tablet 100 mcg  100 mcg Oral Q AM JESSIKA Reese MD   100 mcg at 04/12/22 0500   • ondansetron (ZOFRAN) tablet 4 mg  4 mg Oral Q6H PRN  JESSIKA Reese MD        Or   • ondansetron (ZOFRAN) injection 4 mg  4 mg Intravenous Q6H PRN JESSIKA Reese MD       • pantoprazole (PROTONIX) EC tablet 40 mg  40 mg Oral QAM JESSIKA Reese MD   40 mg at 04/12/22 0603   • polyethylene glycol (MIRALAX) packet 17 g  17 g Oral Daily PRN JESSIKA Reese MD       • promethazine (PHENERGAN) tablet 25 mg  25 mg Oral Q8H PRN JESSIKA Reese MD       • sennosides-docusate (PERICOLACE) 8.6-50 MG per tablet 1 tablet  1 tablet Oral BID JESSIKA Reese MD   1 tablet at 04/11/22 2008   • sodium chloride 0.9 % flush 10 mL  10 mL Intravenous PRN JESSIKA Reese MD       • sodium chloride 0.9 % flush 3 mL  3 mL Intravenous Q12H JESSIKA Reese MD   3 mL at 04/11/22 2008   • sodium chloride 0.9 % infusion  100 mL/hr Intravenous Continuous JESSIKA Reese  mL/hr at 04/11/22 2306 100 mL/hr at 04/11/22 2306   • tiZANidine (ZANAFLEX) tablet 4 mg  4 mg Oral Q8H PRN JESSIKA Reese MD   4 mg at 04/11/22 2301       PHYSICAL EXAM:    Orientation:  alert and oriented to person, place, and time    Incision:  no significant drainage, no dehiscence, no significant erythema    Upper Extremity Motor :  5/5 bilaterally    Upper Motor Neuron Signs:  none     Lower Extremity Motor :  equal bilaterally    Lower Extremity Sensory:  Tibial nerve: Intact, Superficial peroneal nerve: Intact and Deep peroneal nerve: Intact    Flatus:  flatus    ABNORMAL EXAM FINDINGS:  Exhibits chronic tremors     LABS:    Lab Results (last 24 hours)     Procedure Component Value Units Date/Time    CBC & Differential [[id number]]  (Abnormal) Collected: 04/12/22 0632    Specimen: Blood Updated: 04/12/22 0713    Narrative:      The following orders were created for panel order CBC & Differential.  Procedure                               Abnormality         Status                     ---------                               -----------         ------                     CBC Auto  Differential[[id number]]        Abnormal            Final result                 Please view results for these tests on the individual orders.    CBC Auto Differential [[id number]]  (Abnormal) Collected: 04/12/22 0632    Specimen: Blood Updated: 04/12/22 0713     WBC 7.23 10*3/mm3      RBC 3.56 10*6/mm3      Hemoglobin 10.4 g/dL      Hematocrit 32.6 %      MCV 91.6 fL      MCH 29.2 pg      MCHC 31.9 g/dL      RDW 13.3 %      RDW-SD 44.4 fl      MPV 12.6 fL      Platelets 166 10*3/mm3      Neutrophil % 66.3 %      Lymphocyte % 23.2 %      Monocyte % 9.5 %      Eosinophil % 0.0 %      Basophil % 0.3 %      Immature Grans % 0.7 %      Neutrophils, Absolute 4.79 10*3/mm3      Lymphocytes, Absolute 1.68 10*3/mm3      Monocytes, Absolute 0.69 10*3/mm3      Eosinophils, Absolute 0.00 10*3/mm3      Basophils, Absolute 0.02 10*3/mm3      Immature Grans, Absolute 0.05 10*3/mm3      nRBC 0.0 /100 WBC     Basic Metabolic Panel [[id number]] Collected: 04/12/22 0632    Specimen: Blood Updated: 04/12/22 0703          ASSESSMENT AND PLAN:    Post operative day 1 status post ALIF L5/S1    1:  Activity Level:  Up with PT/OT  2:  Pain Control:  Continue current   3:  Discharge Planning:  Rehab after surgical procedures completed   4:  Other:  NPO after midnight, ABX on call to OR      Electronically signed by MARRY Troncoso 4/12/2022 07:21 CDT

## 2022-04-12 NOTE — CONSULTS
Consult Note    Referring Provider: Dr. Contreras  Reason for Consultation: Medical management    Patient Care Team:  Zak Alan MD as PCP - General (Internal Medicine)  Elia Blankenship MD as Consulting Physician (Gastroenterology)  Zak Alan MD as Referring Physician (Internal Medicine)  Duncan Ewing MD as Consulting Physician (Otolaryngology)  Vidal Canales PA as Physician Assistant (Physician Assistant)    Chief complaint low back pain with radiculopathy    Subjective .     History of present illness:  The patient presents today for surgical correction after failing conservative management.  Outpatient work-up has been reviewed through provided outpatient notes per attending.  They have been through anti-inflammatories, muscle relaxers, and pain medication.  The pain has progressed to the point in time where it is affecting their activities of daily living and after being explained all their options, elected to undergo surgical correction.  Their primary care physician does not attend here at ; therefore, I have been asked to take care of their primary medical needs in the perioperative period.  The postoperative pain is as expected.  There are no other precipitating or relieving factors. I have been requested by the Attending Physician to provide Medical Consultation in the perioperative period. The patient understands my role in their hospitalization and agrees with my treatment plan. They understand the importance of follow up with their PCP upon discharge  from [facility] for any concerns or abnormalities.  All questions were encouraged and answered to the best of my ability.      REVIEW OF SYSTEMS:    CONSTITUTIONAL:  Negative for anorexia, chills, fevers, night sweats and weight loss  EYES:  negative for eye dryness, icterus and redness  HEENT:   negative for dental problems, epistaxis, facial trauma and thrush  RESPIRATORY:  negative for chest  tightness, cough, dyspnea on exertion, pneumonia and sputum  CARDIOVASCULAR: negative for chest pain, dyspnea, exertional chest pressure/discomfort, irregular heart beat, palpitations, paroxysmal nocturnal dyspnea and syncope  GASTROINTESTINAL:  negative for abdominal pain, hematemesis, jaundice, melena and rectal bleeding. No significant changes in bowel habits preoperatively.   MUSCULOSKELETAL:  negative for muscle weakness, myalgias and neck pain, outside of surgical issues noted above  NEUROLOGICAL:   negative for dizziness, headaches, seizures, speech problems, tremors and vertigo  INTEGUMENT: negative for pruritus, rash, skin color change and skin lesion(s)         History    Past Medical History:   Diagnosis Date   • Anxiety    • Cellulitis of face 7/23/2019   • Chronic abdominal pain    • Chronic back pain    • Chronic gastritis    • Chronic pain     sees pain vilma. since 2014   • CKD (chronic kidney disease)    • Colon polyp    • COPD (chronic obstructive pulmonary disease) (HCC)    • Disease of thyroid gland    • Diverticulosis    • Dyslipidemia    • GERD (gastroesophageal reflux disease)    • Hemorrhoid    • Hiatal hernia    • History of adenomatous polyp of colon    • Leaky heart valve    • Lumbosacral disc disease 4/11/2022   • Migraine    • Neuropathy    • Osteoarthritis    • Ovarian cancer (HCC)    • Submandibular gland mass      Past Surgical History:   Procedure Laterality Date   • ANTERIOR LUMBAR EXPOSURE N/A 4/11/2022    Procedure: ANTERIOR LUMBAR EXPOSURE;  Surgeon: Juan Francisco Nelson DO;  Location: [facility] OR;  Service: Vascular;  Laterality: N/A;   • CARDIAC CATHETERIZATION      no stent   • CHOLECYSTECTOMY     • COLONOSCOPY  04/10/2015    5 polyps, tubular adenomas, melanosis, diverticulosis, internal hemorrhoids,    • CYST REMOVAL     • ENDOSCOPY  04/10/2015    hiatus hernia   • HYSTERECTOMY     • LUMBAR FUSION N/A 4/11/2022    Procedure: ANTERIOR DECOMPRESSION, ANTERIOR LUMBAR INTERBODY  FUSION WITH INSTRUMENTATION L5-S1;  Surgeon: JESSIKA Reese MD;  Location:  [facility] OR;  Service: Orthopedic Spine;  Laterality: N/A;   • LUNG LOBECTOMY Right     2002,[facility]   • REPLACEMENT TOTAL KNEE BILATERAL     • SUBMANDIBULAR GLAND EXCISION Left 11/02/2018    Procedure: Incision and drainage of left submandibular region abscess with excision of left submandibular region mass mass consistent with probable lymph node;  Surgeon: Duncan Ewing MD;  Location:  [facility] OR;  Service: ENT     Family History   Problem Relation Age of Onset   • No Known Problems Mother    • No Known Problems Father      Social History     Tobacco Use   • Smoking status: Current Every Day Smoker     Packs/day: 0.25     Years: 20.00     Pack years: 5.00     Types: Cigarettes   • Smokeless tobacco: Never Used   • Tobacco comment: has tried chantix; made her sick   Substance Use Topics   • Alcohol use: No   • Drug use: No     Medications Prior to Admission   Medication Sig Dispense Refill Last Dose   • ALPRAZolam (XANAX) 0.5 MG tablet Take 0.5 mg by mouth 3 (Three) Times a Day As Needed for Anxiety.   4/11/2022 at 0300   • dicyclomine (BENTYL) 10 MG capsule Take 10 mg by mouth 3 (Three) Times a Day As Needed.   4/10/2022 at 1700   • escitalopram (LEXAPRO) 10 MG tablet Take 10 mg by mouth Daily.   4/10/2022 at 0800   • HYDROcodone-acetaminophen (NORCO)  MG per tablet Take 1 tablet by mouth 4 (Four) Times a Day As Needed for Moderate Pain .   4/11/2022 at 0300   • levothyroxine (SYNTHROID, LEVOTHROID) 100 MCG tablet Take 100 mcg by mouth Daily.   4/10/2022 at 0800   • linaclotide (LINZESS) 290 MCG capsule capsule Take 1 capsule by mouth Every Morning Before Breakfast. 30 capsule 7 4/10/2022 at 0800   • pantoprazole (PROTONIX) 40 MG EC tablet 40 mg Daily.   4/10/2022 at 0800   • polyethylene glycol (MIRALAX) 17 GM/SCOOP powder Take 17 g by mouth Daily.   4/10/2022 at 0800   • promethazine (PHENERGAN) 25 MG tablet Take  25 mg by mouth 2 (Two) Times a Day As Needed for Nausea or Vomiting.   4/10/2022 at 1830   • sucralfate (CARAFATE) 1 GM/10ML suspension Take 1 g by mouth 3 (Three) Times a Day As Needed.   Past Month at Unknown time   • tiZANidine (ZANAFLEX) 4 MG tablet Take 4 mg by mouth Every 8 (Eight) Hours.   4/10/2022 at 2100   • nystatin (MYCOSTATIN) 765248 UNIT/ML suspension Take 5 mL by mouth 4 (Four) Times a Day. (Patient taking differently: Take 5 mL by mouth Daily As Needed.) 473 mL 0        Allergies:  Bactrim [sulfamethoxazole-trimethoprim], Penicillins, Azithromycin, Talwin [pentazocine], Codeine, Sulfa antibiotics, and Tegaderm chg dressing [chlorhexidine]    Objective     Vital Signs   Temp:  [97.3 °F (36.3 °C)-98.4 °F (36.9 °C)] 97.8 °F (36.6 °C)  Heart Rate:  [55-71] 63  Resp:  [8-20] 18  BP: (119-162)/(51-95) 127/53          Physical Exam:  Constitutional: oriented to person, place, and time. appears well-developed.   Head: Normocephalic and atraumatic.   Eyes: Pupils are equal, round, and reactive to light.  No icterus or erythema  Neck: Neck supple.  Without masses or carotid bruit  Cardiovascular: Regular rhythm and normal heart sounds.  No significant lift, rub or murmur noted  Pulmonary/Chest: Effort normal and breath sounds normal. CTAB, encourage deep breathing.  Abdominal: Soft. Bowel sounds are normal to hypoactive. No significant distension. There is no rebound and no guarding.   Musculoskeletal: Normal range of motion and no edema or tenderness outside of surgical area.   Neurological: Pt is alert and oriented to person, place, and time.  normal reflexes present.  Somewhat sedated from anesthesia and pain medicine noted  Skin: Skin is warm and dry.  No new rashes of concern.    Results Review:   I reviewed the patient's new imaging results and agree with the interpretation.      Assessment/Plan       Chronic bilateral low back pain with bilateral sciatica--postop care  1.  COPD--pulmonary toiletry, O2  supplementation as needed  2.  GERD--antireflux measures, continue with Protonix and Carafate  3.  ROSANNA--Lexapro, as needed Xanax  4.  Slow transit constipation--postop bowel regimen  5.  Acute postoperative blood loss anemia-stable, expected, being monitored with daily labs.  Check anemia profile, replace substrates as indicated.  Hemoglobin 10.4    Medically stable postop day #1 step 1 of 3 lumbar surgery  Encourage incentive spirometry every hour while awake  Early mobilization, maximize efforts with therapy  Wound care monitor for infection  Follow along with daily labs  Rehab placement after surgical procedures completed    I discussed the patient's findings and my recommendations with patient    MARRY Romo  04/12/22  07:24 CDT

## 2022-04-12 NOTE — PLAN OF CARE
Goal Outcome Evaluation:  Plan of Care Reviewed With: patient        Progress: improving  Outcome Evaluation: Pt presented in chair and was alert and oriented x4. Pt stated she could not sit any longer in the chair. Pt completed sit<>stand with CGA and v/c. Amb to bathroom with CGA. Pt completed toilet transfer with CGA. Demonstrated toileting skills with Mod I. Completed grooming task sink side with set up. Pt had 2 LOB while standing sink side which she corrected on her own. Pt would benefit from continued OT to improve balance to increase independence in functional ADLs. Will recommend care options following future surgeries pending progress.

## 2022-04-12 NOTE — PLAN OF CARE
Goal Outcome Evaluation:  Plan of Care Reviewed With: patient        Progress: improving  Outcome Evaluation: pt trans to EOB cga-min, min assist to shivani LSO, sit-stand cga, pt amb 50 feet rwx cga, bathroom trans cga, trans back to bed cga. pt would benefit from cont therapy

## 2022-04-12 NOTE — THERAPY TREATMENT NOTE
Acute Care - Physical Therapy Treatment Note  [facility]     Patient Name: Sammi Sorenson  : 1950  MRN: [id number]  Today's Date: 2022      Visit Dx:     ICD-10-CM ICD-9-CM   1. Decreased activities of daily living (ADL)  Z78.9 V49.89   2. Impaired mobility  Z74.09 799.89     Patient Active Problem List   Diagnosis   • Tobacco abuse   • Cellulitis of face   • Thrush   • Chronic bilateral low back pain with bilateral sciatica   • Deep groin pain   • Hip pain, acute, left   • Ischial bursitis of left side   • Lumbar disc disease with radiculopathy   • Pain in both knees   • Pain management contract agreement   • Piriformis syndrome of left side   • SI (sacroiliac) joint dysfunction   • Lumbosacral disc disease   • Lumbago of multiple sites in spine with sciatica     Past Medical History:   Diagnosis Date   • Anxiety    • Cellulitis of face 2019   • Chronic abdominal pain    • Chronic back pain    • Chronic gastritis    • Chronic pain     sees pain vilma. since    • CKD (chronic kidney disease)    • Colon polyp    • COPD (chronic obstructive pulmonary disease) (HCC)    • Disease of thyroid gland    • Diverticulosis    • Dyslipidemia    • GERD (gastroesophageal reflux disease)    • Hemorrhoid    • Hiatal hernia    • History of adenomatous polyp of colon    • Leaky heart valve    • Lumbosacral disc disease 2022   • Migraine    • Neuropathy    • Osteoarthritis    • Ovarian cancer (HCC)    • Submandibular gland mass      Past Surgical History:   Procedure Laterality Date   • ANTERIOR LUMBAR EXPOSURE N/A 2022    Procedure: ANTERIOR LUMBAR EXPOSURE;  Surgeon: Juan Francisco Nelson DO;  Location: [facility];  Service: Vascular;  Laterality: N/A;   • CARDIAC CATHETERIZATION      no stent   • CHOLECYSTECTOMY     • COLONOSCOPY  04/10/2015    5 polyps, tubular adenomas, melanosis, diverticulosis, internal hemorrhoids,    • CYST REMOVAL     • ENDOSCOPY  04/10/2015    hiatus hernia   • HYSTERECTOMY      • LUMBAR FUSION N/A 4/11/2022    Procedure: ANTERIOR DECOMPRESSION, ANTERIOR LUMBAR INTERBODY FUSION WITH INSTRUMENTATION L5-S1;  Surgeon: JESSIKA Reese MD;  Location: [facility] OR;  Service: Orthopedic Spine;  Laterality: N/A;   • LUNG LOBECTOMY Right     2002,[facility]   • REPLACEMENT TOTAL KNEE BILATERAL     • SUBMANDIBULAR GLAND EXCISION Left 11/02/2018    Procedure: Incision and drainage of left submandibular region abscess with excision of left submandibular region mass mass consistent with probable lymph node;  Surgeon: Duncan Ewing MD;  Location: [facility] OR;  Service: ENT     PT Assessment (last 12 hours)     PT Evaluation and Treatment     Row Name 04/12/22 1129 04/12/22 0946       Physical Therapy Time and Intention    Subjective Information complains of;pain  - --  -    Document Type therapy note (daily note)  - --    Mode of Treatment physical therapy  - physical therapy  [facility]    Session Not Performed -- patient/family declined treatment  -    Comment, Session Not Performed -- pt states she has been back and forth to bathroom several times this morning, states she has had enough walking today!!!  -    Comment [facility]  - --    Row Name 04/12/22 1129          General Information    Existing Precautions/Restrictions brace worn when out of bed;fall;spinal  -     Row Name 04/12/22 1129          Pain    Pretreatment Pain Rating 6/10  -     Posttreatment Pain Rating 8/10  -     Pain Location incisional  -     Pain Intervention(s) Medication (See MAR);Repositioned;Ambulation/increased activity  -     Row Name 04/12/22 1129          Bed Mobility    Bed Mobility sidelying-sit;sit-sidelying  -     Sidelying-Sit [place] (Bed Mobility) contact guard;verbal cues  -     Sit-Sidelying [place] (Bed Mobility) contact guard;verbal cues  -     Row Name 04/12/22 1129          Transfers    Transfers toilet transfer  -     Sit-Stand [place] (Transfers) contact  guard;verbal cues  -AH     Stand-Sit [place] (Transfers) contact guard;verbal cues  -AH     [place] (Toilet Transfer) contact guard;verbal cues  -     Assistive Device (Toilet Transfer) commode;grab bars/safety frame;walker, front-wheeled  -     Row Name 04/12/22 1129          Gait/Stairs (Locomotion)    [place] (Gait) contact guard;verbal cues  -     Assistive Device (Gait) walker, front-wheeled  -     Distance in Feet (Gait) 50  -AH     Row Name             Wound 04/11/22 0825 Left lower abdomen Incision    Wound - Properties Group Placement Date: 04/11/22  -DT Placement Time: 0825  -DT Present on Hospital Admission: N  -DT Side: Left  -DT Orientation: lower  -DT Location: abdomen  -DT Primary Wound Type: Incision  -DT     Retired Wound - Properties Group Placement Date: 04/11/22  -DT Placement Time: 0825  -DT Present on Hospital Admission: N  -DT Side: Left  -DT Orientation: lower  -DT Location: abdomen  -DT Primary Wound Type: Incision  -DT     Retired Wound - Properties Group Date first assessed: 04/11/22  -DT Time first assessed: 0825  -DT Present on Hospital Admission: N  -DT Side: Left  -DT Location: abdomen  -DT Primary Wound Type: Incision  -DT     Row Name 04/12/22 1129          Plan of Care Review    Plan of Care Reviewed With patient  -     Progress improving  -     Outcome Evaluation pt trans to EOB cga-min, min assist to shivani LSO, sit-stand cga, pt amb 50 feet rwx cga, bathroom trans cga, trans back to bed cga. pt would benefit from cont therapy  -     Row Name 04/12/22 1129          Positioning and Restraints    Pre-Treatment Position in bed  -     Post Treatment Position bed  -     In Bed side lying left;call light within reach;encouraged to call for assist;with family/caregiver;notified [facility]           User Key  (r) = Recorded By, (t) = Taken By, (c) = Cosigned By    Initials Name Provider Type    Shoshana Sow, PTA Physical Therapist Assistant     Adilson Razo, RN Registered Nurse                Physical Therapy Education                 Title: PT OT SLP Therapies (In Progress)     Topic: Physical Therapy (In Progress)     Point: Mobility training (In Progress)     Learning Progress Summary           Patient Acceptance, E, NR by MS at 4/11/2022 1534    Comment: role of PT in her care, spinal restrictions, use of LSO                   Point: Home exercise program (Not Started)     Learner Progress:  Not documented in this visit.          Point: Body mechanics (Not Started)     Learner Progress:  Not documented in this visit.          Point: Precautions (Done)     Learning Progress Summary           Patient Acceptance, E,TB, VU by  at 4/12/2022 1152    Comment: shivani/doff LSO    Acceptance, E, NR by MS at 4/11/2022 1534    Comment: role of PT in her care, spinal restrictions, use of LSO                               User Key     Initials Effective Dates Name Provider Type Discipline     06/16/21 -  Shoshana Duvall, PTA Physical Therapist Assistant PT    MS 06/19/18 -  Mercy Mcclal, PT, DPT, NCS Physical Therapist PT              PT Recommendation and Plan     Plan of Care Reviewed With: patient  Progress: improving  Outcome Evaluation: pt trans to EOB cga-min, min assist to shivani LSO, sit-stand cga, pt amb 50 feet rwx cga, bathroom trans cga, trans back to bed cga. pt would benefit from cont therapy   Outcome Measures     Row Name 04/12/22 0843             How much help from another is currently needed...    Putting on and taking off regular lower body clothing? 1 (P)   -CC      Bathing (including washing, rinsing, and drying) 2 (P)   -CC      Toileting (which includes using toilet bed pan or urinal) 3 (P)   -CC      Putting on and taking off regular upper body clothing 3 (P)   -CC      Taking care of personal grooming (such as brushing teeth) 4 (P)   -CC      Eating meals 4 (P)   -CC      AM-PAC 6 Clicks Score (OT) 17 (P)   -CC            User  Key  (r) = Recorded By, (t) = Taken By, (c) = Cosigned By    Initials Name Provider Type    CC Valarie Soares OTA Student OT Student                 Time Calculation:    PT Charges     Row Name 04/12/22 1152             Time Calculation    Start Time 1129  -      Stop Time 1147  -AH      Time Calculation (min) 18 min  -AH      PT Received On 04/12/22  -              Time Calculation- PT    Total Timed Code Minutes- PT 18 minute(s)  -AH              Timed Charges    53604 - Gait Training Minutes  18  -AH              Total Minutes    Timed Charges Total Minutes 18  -AH       Total Minutes 18  -AH            User Key  (r) = Recorded By, (t) = Taken By, (c) = Cosigned By    Initials Name Provider Type     Shoshana Duvall, PTA Physical Therapist Assistant              Therapy Charges for Today     Code Description Service Date Service Provider Modifiers Qty    [id number] HC GAIT TRAINING EA 15 MIN 4/12/2022 Shoshana Duvall PTA GP 1          PT G-Codes  Outcome Measure Options: AM-PAC 6 Clicks Basic Mobility (PT)  AM-PAC 6 Clicks Score (PT): 11  AM-PAC 6 Clicks Score (OT): (P) 17    Shoshana Duvall PTA  4/12/2022

## 2022-04-12 NOTE — PLAN OF CARE
Goal Outcome Evaluation:  Plan of Care Reviewed With: patient        Progress: no change  Outcome Evaluation: Pt AOx4. BUCIO. PPP. VSS. Up x1 to bthrm. 2L NC. VTE, scd's. Pt c/o pain, prn meds given. Dressing CDI. IVF fluids running per order. Part 1 of 2 back surgery today, L5-S1. Bed alarm set. Call light w/in reach. Safety maintained.

## 2022-04-12 NOTE — CASE MANAGEMENT/SOCIAL WORK
Discharge Planning Assessment  [facility]     Patient Name: Sammi Sorenson  MRN: [id number]  Today's Date: 4/12/2022    Admit Date: 4/11/2022     Discharge Needs Assessment     Row Name 04/12/22 1458       Living Environment    People in Home child(johanna), adult    Name(s) of People in Home Dtr    Current Living Arrangements home    Primary Care Provided by self    Provides Primary Care For no one    Family Caregiver if Needed child(johanna), adult    Quality of Family Relationships helpful;involved    Able to Return to Prior Arrangements other (see comments)    Living Arrangement Comments Possible rehab placement after 3rd surgery       Resource/Environmental Concerns    Resource/Environmental Concerns none    Transportation Concerns none       Transition Planning    Patient/Family Anticipates Transition to long-term care facility;inpatient rehabilitation facility;home with help/services    Patient/Family Anticipated Services at Transition home health care;skilled nursing;rehabilitation services    Transportation Anticipated family or friend will provide       Discharge Needs Assessment    Readmission Within the Last 30 Days no previous admission in last 30 days    Equipment Currently Used at Home none    Concerns to be Addressed discharge planning;adjustment to diagnosis/illness    Anticipated Changes Related to Illness none    Equipment Needed After Discharge other (see comments)  Depending on dc plan    Outpatient/Agency/Support Group Needs skilled nursing facility;inpatient rehabilitation facility;homecare agency    Discharge Facility/Level of Care Needs home with home health;nursing facility, skilled;rehabilitation facility    Discharge Coordination/Progress Pt resides at home with dtr and was independent prior to admit. Pt had first surgery yesterday (4-11). Pt has 2 surgeries remaining (4-13 & 4-15). Will follow up with pt/dtr after second surgery regarding dc plans/needs. Anticipate pt will need HH vs Snf.                Discharge Plan    No documentation.               Continued Care and Services - Admitted Since 4/11/2022    Coordination has not been started for this encounter.          Demographic Summary    No documentation.                Functional Status    No documentation.                Psychosocial    No documentation.                Abuse/Neglect    No documentation.                Legal    No documentation.                Substance Abuse    No documentation.                Patient Forms    No documentation.                   RUTHIE Plunkett

## 2022-04-12 NOTE — PLAN OF CARE
Problem: Adult Inpatient Plan of Care  Goal: Plan of Care Review  Outcome: Ongoing, Progressing  Flowsheets (Taken 4/12/2022 4855)  Progress: no change  Plan of Care Reviewed With: patient  Outcome Evaluation: A&Ox4. C/o severe pain in abdomen and back - prn norco, dilaudid, and xanaflex given with good results. Pt very anxious - prn xanax given with good results. Pt voiding frequently but little at a time with residual volume leftover when bladder scanned, upx1 with LSO brace to BR. RA. SCDs. Dressing CDI. Pt restless first half of shift but is now resting well after pain level has decreased. Pt exhibits strong tremors in both hands/arms that increase when in pain. C/o tingling in both arms and legs, worse in the left arm and left leg - states this started prior to surgery from falling frequently. Call light in reach. Safety maintained.

## 2022-04-12 NOTE — THERAPY TREATMENT NOTE
Acute Care - Occupational Therapy Treatment Note  [facility]     Patient Name: Sammi Sorenson  : 1950  MRN: [id number]  Today's Date: 2022             Admit Date: 2022       ICD-10-CM ICD-9-CM   1. Decreased activities of daily living (ADL)  Z78.9 V49.89   2. Impaired mobility  Z74.09 799.89     Patient Active Problem List   Diagnosis   • Tobacco abuse   • Cellulitis of face   • Thrush   • Chronic bilateral low back pain with bilateral sciatica   • Deep groin pain   • Hip pain, acute, left   • Ischial bursitis of left side   • Lumbar disc disease with radiculopathy   • Pain in both knees   • Pain management contract agreement   • Piriformis syndrome of left side   • SI (sacroiliac) joint dysfunction   • Lumbosacral disc disease   • Lumbago of multiple sites in spine with sciatica     Past Medical History:   Diagnosis Date   • Anxiety    • Cellulitis of face 2019   • Chronic abdominal pain    • Chronic back pain    • Chronic gastritis    • Chronic pain     sees pain vilma. since    • CKD (chronic kidney disease)    • Colon polyp    • COPD (chronic obstructive pulmonary disease) (HCC)    • Disease of thyroid gland    • Diverticulosis    • Dyslipidemia    • GERD (gastroesophageal reflux disease)    • Hemorrhoid    • Hiatal hernia    • History of adenomatous polyp of colon    • Leaky heart valve    • Lumbosacral disc disease 2022   • Migraine    • Neuropathy    • Osteoarthritis    • Ovarian cancer (HCC)    • Submandibular gland mass      Past Surgical History:   Procedure Laterality Date   • ANTERIOR LUMBAR EXPOSURE N/A 2022    Procedure: ANTERIOR LUMBAR EXPOSURE;  Surgeon: Juan Francisco Nelson DO;  Location: [facility];  Service: Vascular;  Laterality: N/A;   • CARDIAC CATHETERIZATION      no stent   • CHOLECYSTECTOMY     • COLONOSCOPY  04/10/2015    5 polyps, tubular adenomas, melanosis, diverticulosis, internal hemorrhoids,    • CYST REMOVAL     • ENDOSCOPY  04/10/2015    hiatus  hernia   • HYSTERECTOMY     • LUMBAR FUSION N/A 4/11/2022    Procedure: ANTERIOR DECOMPRESSION, ANTERIOR LUMBAR INTERBODY FUSION WITH INSTRUMENTATION L5-S1;  Surgeon: JESSIKA Reese MD;  Location:  [facility] OR;  Service: Orthopedic Spine;  Laterality: N/A;   • LUNG LOBECTOMY Right     2002,[facility]   • REPLACEMENT TOTAL KNEE BILATERAL     • SUBMANDIBULAR GLAND EXCISION Left 11/02/2018    Procedure: Incision and drainage of left submandibular region abscess with excision of left submandibular region mass mass consistent with probable lymph node;  Surgeon: Duncan Ewing MD;  Location:  [facility] OR;  Service: ENT         OT ASSESSMENT FLOWSHEET (last 12 hours)     OT Evaluation and Treatment     Row Name 04/12/22 0843                   OT Time and Intention    Subjective Information complains of;pain  -TS (r) CC (t) TS (c)        Document Type therapy note (daily note)  -TS (r) CC (t) TS (c)        Mode of Treatment occupational therapy  -TS (r) CC (t) TS (c)        Patient Effort good  -TS (r) CC (t) TS (c)                  General Information    Patient Profile Reviewed yes  -TS (r) CC (t) TS (c)        Existing Precautions/Restrictions fall;brace worn when out of bed;spinal  -TS (r) CC (t) TS (c)                  Pain Scale: FACES Pre/Post-Treatment    Pain: FACES Scale, Pretreatment 4-->hurts little more  -TS (r) CC (t) TS (c)        Posttreatment Pain Rating 4-->hurts little more  -TS (r) CC (t) TS (c)        Pain Location incisional  -TS (r) CC (t) TS (c)                  Cognition    Orientation Status (Cognition) oriented x 4  -TS (r) CC (t) TS (c)                  Activities of Daily Living    BADL Assessment/Intervention toileting  -TS (r) CC (t) TS (c)                  Functional Mobility    Functional Mobility- Ind. Level contact guard assist;minimum assist (75% patient effort)  -TS (r) CC (t) TS (c)        Functional Mobility- Comment Pt ambulated with CGA motor control/sequencing steps  were good.  -TS (r) CC (t) TS (c)                  Transfer Assessment/Treatment    Transfers toilet transfer;sit-stand transfer;stand-sit transfer  -TS (r) CC (t) TS (c)                  Transfers    Sit-Stand [place] (Transfers) contact guard;verbal cues  -TS (r) CC (t) TS (c)        Stand-Sit [place] (Transfers) contact guard;verbal cues  -TS (r) CC (t) TS (c)        [place] Level (Toilet Transfer) contact guard;verbal cues  -TS (r) CC (t) TS (c)                  Toilet Transfer    Type (Toilet Transfer) sit-stand;stand-sit  -TS (r) CC (t) TS (c)                  Balance    Dynamic Standing Balance minimal assist  -TS (r) CC (t) TS (c)        Comment, Balance 2 LOB while standing sink side pt corrected on her own.  -TS (r) CC (t) TS (c)                  Wound 04/11/22 0825 Left lower abdomen Incision    Wound - Properties Group Placement Date: 04/11/22  -DT Placement Time: 0825  -DT Present on Hospital Admission: N  -DT Side: Left  -DT Orientation: lower  -DT Location: abdomen  -DT Primary Wound Type: Incision  -DT        Retired Wound - Properties Group Placement Date: 04/11/22  -DT Placement Time: 0825  -DT Present on Hospital Admission: N  -DT Side: Left  -DT Orientation: lower  -DT Location: abdomen  -DT Primary Wound Type: Incision  -DT        Retired Wound - Properties Group Date first assessed: 04/11/22  -DT Time first assessed: 0825  -DT Present on Hospital Admission: N  -DT Side: Left  -DT Location: abdomen  -DT Primary Wound Type: Incision  -DT                  Plan of Care Review    Plan of Care Reviewed With patient  -TS (r) CC (t) TS (c)        Progress improving  -TS (r) CC (t) TS (c)        Outcome Evaluation Pt presented in chair and was alert and oriented x4. Pt stated she could not sit any longer in the chair. Pt completed sit<>stand with CGA and v/c. Amb to bathroom with CGA. Pt completed toilet transfer with CGA. Demonstrated toileting skills with Mod I. Completed grooming task  sink side with set up. Pt had 2 LOB while standing sink side which she corrected on her own. Pt would benefit from continued OT to improve balance to increase independence in functional ADLs. Will recommend care options following future surgeries pending progress.  -TS (r) CC (t) TS (c)                  Positioning and Restraints    Pre-Treatment Position sitting in chair/recliner  -TS (r) CC (t) TS (c)        Post Treatment Position bed  -TS (r) CC (t) TS (c)        In Bed notified nsg;fowlers;call light within reach;encouraged to call for assist;exit alarm on  -TS (r) CC (t) TS (c)                  Therapy Plan Review/Discharge Plan (OT)    Anticipated Discharge Disposition (OT) home with 24/7 care;home with home health;skilled nursing facility  -TS (r) CC (t) TS (c)              User Key  (r) = Recorded By, (t) = Taken By, (c) = Cosigned By    Initials Name Effective Dates    TS Latasha Zuniga COTA 06/16/21 -     DT Adilson Brunner RN 02/17/22 -     CC Valarie Soares OTA Student 03/16/22 -                  Occupational Therapy Education                 Title: PT OT SLP Therapies (In Progress)     Topic: Occupational Therapy (In Progress)     Point: ADL training (In Progress)     Description:   Instruct learner(s) on proper safety adaptation and remediation techniques during self care or transfers.   Instruct in proper use of assistive devices.              Learning Progress Summary           Patient Acceptance, E,D, NR by MW at 4/11/2022 1452                   Point: Home exercise program (In Progress)     Description:   Instruct learner(s) on appropriate technique for monitoring, assisting and/or progressing therapeutic exercises/activities.              Learning Progress Summary           Patient Acceptance, E,D, NR by  at 4/11/2022 1452                   Point: Precautions (In Progress)     Description:   Instruct learner(s) on prescribed precautions during self-care and functional transfers.               Learning Progress Summary           Patient Acceptance, E,D, NR by  at 4/11/2022 1452                   Point: Body mechanics (In Progress)     Description:   Instruct learner(s) on proper positioning and spine alignment during self-care, functional mobility activities and/or exercises.              Learning Progress Summary           Patient Acceptance, E,D, NR by  at 4/11/2022 1452                               User Key     Initials Effective Dates Name Provider Type Discipline     08/28/18 -  Felicia Perez, OTR/L Occupational Therapist OT                  OT Recommendation and Plan     Plan of Care Review  Plan of Care Reviewed With: patient  Progress: improving  Outcome Evaluation: Pt presented in chair and was alert and oriented x4. Pt stated she could not sit any longer in the chair. Pt completed sit<>stand with CGA and v/c. Amb to bathroom with CGA. Pt completed toilet transfer with CGA. Demonstrated toileting skills with Mod I. Completed grooming task sink side with set up. Pt had 2 LOB while standing sink side which she corrected on her own. Pt would benefit from continued OT to improve balance to increase independence in functional ADLs. Will recommend care options following future surgeries pending progress.  Plan of Care Reviewed With: patient  Outcome Evaluation: Pt presented in chair and was alert and oriented x4. Pt stated she could not sit any longer in the chair. Pt completed sit<>stand with CGA and v/c. Amb to bathroom with CGA. Pt completed toilet transfer with CGA. Demonstrated toileting skills with Mod I. Completed grooming task sink side with set up. Pt had 2 LOB while standing sink side which she corrected on her own. Pt would benefit from continued OT to improve balance to increase independence in functional ADLs. Will recommend care options following future surgeries pending progress.     Outcome Measures     Row Name 04/12/22 0843             How much help from another  is currently needed...    Putting on and taking off regular lower body clothing? 1  -TS (r) CC (t) TS (c)      Bathing (including washing, rinsing, and drying) 2  -TS (r) CC (t) TS (c)      Toileting (which includes using toilet bed pan or urinal) 3  -TS (r) CC (t) TS (c)      Putting on and taking off regular upper body clothing 3  -TS (r) CC (t) TS (c)      Taking care of personal grooming (such as brushing teeth) 4  -TS (r) CC (t) TS (c)      Eating meals 4  -TS (r) CC (t) TS (c)      AM-PAC 6 Clicks Score (OT) 17  -TS (r) CC (t)            User Key  (r) = Recorded By, (t) = Taken By, (c) = Cosigned By    Initials Name Provider Type     Latasha Zuniga COTA Occupational Therapist Assistant    Valarie Ambriz OTA Student OT Student                Time Calculation:    Time Calculation- OT     Row Name 04/12/22 1152 04/12/22 0843          Time Calculation- OT    OT Start Time -- 0843  -TS (r) CC (t) TS (c)     OT Stop Time -- 0900  -TS (r) CC (t) TS (c)     OT Time Calculation (min) -- 17 min  -TS (r) CC (t)     Total Timed Code Minutes- OT -- 17 minute(s)  -TS (r) CC (t) TS (c)     OT Received On -- 04/12/22  -TS (r) CC (t) TS (c)            Timed Charges    61167 - Gait Training Minutes  18  -AH --     74512 - OT Self Care/Mgmt Minutes -- 17  -TS (r) CC (t) TS (c)            Total Minutes    Timed Charges Total Minutes 18  -AH 17  -TS (r) CC (t)      Total Minutes 18  -AH 17  -TS (r) CC (t)           User Key  (r) = Recorded By, (t) = Taken By, (c) = Cosigned By    Initials Name Provider Type     Shoshana Duvall, ABIMAEL Physical Therapist Assistant     Latasha Zuniga COTA Occupational Therapist Assistant    Valarie Ambriz OTA Student OT Student                       DANYELLE Brandon Student  4/12/2022

## 2022-04-12 NOTE — PLAN OF CARE
Goal Outcome Evaluation:  Pt A&Ox4. Forgetful. Impulsive. Bed alarm on. Up x1 w/ LSO brace. PPP. N/t at basline. Dsg CDI. C/o of severe pain with PRN oral and IV meds given with little relief. NPO at midnight for part 2 in am. Call light in reach. Safety maintained. Will cont to monitor.

## 2022-04-13 ENCOUNTER — APPOINTMENT (OUTPATIENT)
Dept: GENERAL RADIOLOGY | Facility: HOSPITAL | Age: 72
End: 2022-04-13

## 2022-04-13 ENCOUNTER — ANESTHESIA (OUTPATIENT)
Dept: PERIOP | Facility: HOSPITAL | Age: 72
End: 2022-04-13

## 2022-04-13 ENCOUNTER — ANESTHESIA EVENT (OUTPATIENT)
Dept: PERIOP | Facility: HOSPITAL | Age: 72
End: 2022-04-13

## 2022-04-13 LAB
FERRITIN SERPL-MCNC: 52.31 NG/ML (ref 13–150)
IRON 24H UR-MRATE: 17 MCG/DL (ref 37–145)
IRON SATN MFR SERPL: 5 % (ref 20–50)
SARS-COV-2 RNA PNL SPEC NAA+PROBE: NOT DETECTED
TIBC SERPL-MCNC: 350 MCG/DL (ref 298–536)
TRANSFERRIN SERPL-MCNC: 235 MG/DL (ref 200–360)

## 2022-04-13 PROCEDURE — 82607 VITAMIN B-12: CPT | Performed by: ORTHOPAEDIC SURGERY

## 2022-04-13 PROCEDURE — 0SG00A0 FUSION OF LUMBAR VERTEBRAL JOINT WITH INTERBODY FUSION DEVICE, ANTERIOR APPROACH, ANTERIOR COLUMN, OPEN APPROACH: ICD-10-PCS | Performed by: ORTHOPAEDIC SURGERY

## 2022-04-13 PROCEDURE — 97168 OT RE-EVAL EST PLAN CARE: CPT

## 2022-04-13 PROCEDURE — C1889 IMPLANT/INSERT DEVICE, NOC: HCPCS | Performed by: ORTHOPAEDIC SURGERY

## 2022-04-13 PROCEDURE — 83540 ASSAY OF IRON: CPT | Performed by: PHYSICIAN ASSISTANT

## 2022-04-13 PROCEDURE — 25010000002 ONDANSETRON PER 1 MG: Performed by: NURSE ANESTHETIST, CERTIFIED REGISTERED

## 2022-04-13 PROCEDURE — C1713 ANCHOR/SCREW BN/BN,TIS/BN: HCPCS | Performed by: ORTHOPAEDIC SURGERY

## 2022-04-13 PROCEDURE — 82728 ASSAY OF FERRITIN: CPT | Performed by: PHYSICIAN ASSISTANT

## 2022-04-13 PROCEDURE — 97164 PT RE-EVAL EST PLAN CARE: CPT | Performed by: PHYSICAL THERAPIST

## 2022-04-13 PROCEDURE — 87635 SARS-COV-2 COVID-19 AMP PRB: CPT | Performed by: ORTHOPAEDIC SURGERY

## 2022-04-13 PROCEDURE — 97535 SELF CARE MNGMENT TRAINING: CPT

## 2022-04-13 PROCEDURE — 0 HYDROMORPHONE 1 MG/ML SOLUTION: Performed by: ORTHOPAEDIC SURGERY

## 2022-04-13 PROCEDURE — 25010000002 DEXAMETHASONE PER 1 MG: Performed by: NURSE ANESTHETIST, CERTIFIED REGISTERED

## 2022-04-13 PROCEDURE — 72100 X-RAY EXAM L-S SPINE 2/3 VWS: CPT

## 2022-04-13 PROCEDURE — 25010000002 PROPOFOL 10 MG/ML EMULSION: Performed by: NURSE ANESTHETIST, CERTIFIED REGISTERED

## 2022-04-13 PROCEDURE — 4A11X4G MONITORING OF PERIPHERAL NERVOUS ELECTRICAL ACTIVITY, INTRAOPERATIVE, EXTERNAL APPROACH: ICD-10-PCS | Performed by: ORTHOPAEDIC SURGERY

## 2022-04-13 PROCEDURE — 25010000002 HYDROMORPHONE PER 4 MG: Performed by: ANESTHESIOLOGY

## 2022-04-13 PROCEDURE — 76000 FLUOROSCOPY <1 HR PHYS/QHP: CPT

## 2022-04-13 PROCEDURE — 84466 ASSAY OF TRANSFERRIN: CPT | Performed by: PHYSICIAN ASSISTANT

## 2022-04-13 DEVICE — MAGNETOS PUTTY 5CC 1-2MM USA
Type: IMPLANTABLE DEVICE | Site: SPINE LUMBAR | Status: FUNCTIONAL
Brand: MAGNETOS

## 2022-04-13 DEVICE — SCRW XLF COROENT VAR TI 5.5X35MM: Type: IMPLANTABLE DEVICE | Site: SPINE LUMBAR | Status: FUNCTIONAL

## 2022-04-13 DEVICE — IMPLANTABLE DEVICE: Type: IMPLANTABLE DEVICE | Site: SPINE LUMBAR | Status: FUNCTIONAL

## 2022-04-13 DEVICE — PLT XLIF AMS XLP 2/SD TI 8MM: Type: IMPLANTABLE DEVICE | Site: SPINE LUMBAR | Status: FUNCTIONAL

## 2022-04-13 DEVICE — SCRW COROENT XLF VAR 5.5X50MM: Type: IMPLANTABLE DEVICE | Site: SPINE LUMBAR | Status: FUNCTIONAL

## 2022-04-13 RX ORDER — LIDOCAINE HYDROCHLORIDE 20 MG/ML
INJECTION, SOLUTION EPIDURAL; INFILTRATION; INTRACAUDAL; PERINEURAL AS NEEDED
Status: DISCONTINUED | OUTPATIENT
Start: 2022-04-13 | End: 2022-04-13 | Stop reason: SURG

## 2022-04-13 RX ORDER — SODIUM CHLORIDE, SODIUM LACTATE, POTASSIUM CHLORIDE, CALCIUM CHLORIDE 600; 310; 30; 20 MG/100ML; MG/100ML; MG/100ML; MG/100ML
100 INJECTION, SOLUTION INTRAVENOUS CONTINUOUS
Status: DISCONTINUED | OUTPATIENT
Start: 2022-04-13 | End: 2022-04-14 | Stop reason: HOSPADM

## 2022-04-13 RX ORDER — LABETALOL HYDROCHLORIDE 5 MG/ML
5 INJECTION, SOLUTION INTRAVENOUS
Status: DISCONTINUED | OUTPATIENT
Start: 2022-04-13 | End: 2022-04-13 | Stop reason: HOSPADM

## 2022-04-13 RX ORDER — SODIUM CHLORIDE 0.9 % (FLUSH) 0.9 %
3-10 SYRINGE (ML) INJECTION AS NEEDED
Status: DISCONTINUED | OUTPATIENT
Start: 2022-04-13 | End: 2022-04-13 | Stop reason: HOSPADM

## 2022-04-13 RX ORDER — PROPOFOL 10 MG/ML
VIAL (ML) INTRAVENOUS AS NEEDED
Status: DISCONTINUED | OUTPATIENT
Start: 2022-04-13 | End: 2022-04-13 | Stop reason: SURG

## 2022-04-13 RX ORDER — OXYCODONE AND ACETAMINOPHEN 10; 325 MG/1; MG/1
1 TABLET ORAL ONCE AS NEEDED
Status: DISCONTINUED | OUTPATIENT
Start: 2022-04-13 | End: 2022-04-13 | Stop reason: HOSPADM

## 2022-04-13 RX ORDER — SUFENTANIL CITRATE 50 UG/ML
INJECTION EPIDURAL; INTRAVENOUS AS NEEDED
Status: DISCONTINUED | OUTPATIENT
Start: 2022-04-13 | End: 2022-04-13 | Stop reason: SURG

## 2022-04-13 RX ORDER — ONDANSETRON 2 MG/ML
INJECTION INTRAMUSCULAR; INTRAVENOUS AS NEEDED
Status: DISCONTINUED | OUTPATIENT
Start: 2022-04-13 | End: 2022-04-13 | Stop reason: SURG

## 2022-04-13 RX ORDER — SODIUM CHLORIDE 0.9 % (FLUSH) 0.9 %
3 SYRINGE (ML) INJECTION EVERY 12 HOURS SCHEDULED
Status: DISCONTINUED | OUTPATIENT
Start: 2022-04-13 | End: 2022-04-13 | Stop reason: HOSPADM

## 2022-04-13 RX ORDER — KETAMINE HCL IN NACL, ISO-OSM 100MG/10ML
SYRINGE (ML) INJECTION AS NEEDED
Status: DISCONTINUED | OUTPATIENT
Start: 2022-04-13 | End: 2022-04-13 | Stop reason: SURG

## 2022-04-13 RX ORDER — ONDANSETRON 2 MG/ML
4 INJECTION INTRAMUSCULAR; INTRAVENOUS AS NEEDED
Status: DISCONTINUED | OUTPATIENT
Start: 2022-04-13 | End: 2022-04-13 | Stop reason: HOSPADM

## 2022-04-13 RX ORDER — DROPERIDOL 2.5 MG/ML
0.62 INJECTION, SOLUTION INTRAMUSCULAR; INTRAVENOUS ONCE AS NEEDED
Status: DISCONTINUED | OUTPATIENT
Start: 2022-04-13 | End: 2022-04-13 | Stop reason: HOSPADM

## 2022-04-13 RX ORDER — LIDOCAINE HYDROCHLORIDE 10 MG/ML
0.5 INJECTION, SOLUTION EPIDURAL; INFILTRATION; INTRACAUDAL; PERINEURAL ONCE AS NEEDED
Status: DISCONTINUED | OUTPATIENT
Start: 2022-04-13 | End: 2022-04-13 | Stop reason: HOSPADM

## 2022-04-13 RX ORDER — NALOXONE HCL 0.4 MG/ML
0.04 VIAL (ML) INJECTION AS NEEDED
Status: DISCONTINUED | OUTPATIENT
Start: 2022-04-13 | End: 2022-04-13 | Stop reason: HOSPADM

## 2022-04-13 RX ORDER — HYDROMORPHONE HYDROCHLORIDE 1 MG/ML
0.5 INJECTION, SOLUTION INTRAMUSCULAR; INTRAVENOUS; SUBCUTANEOUS
Status: DISCONTINUED | OUTPATIENT
Start: 2022-04-13 | End: 2022-04-13 | Stop reason: HOSPADM

## 2022-04-13 RX ORDER — SODIUM CHLORIDE 0.9 % (FLUSH) 0.9 %
10 SYRINGE (ML) INJECTION EVERY 12 HOURS SCHEDULED
Status: DISCONTINUED | OUTPATIENT
Start: 2022-04-13 | End: 2022-04-13 | Stop reason: HOSPADM

## 2022-04-13 RX ORDER — MAGNESIUM HYDROXIDE 1200 MG/15ML
LIQUID ORAL AS NEEDED
Status: DISCONTINUED | OUTPATIENT
Start: 2022-04-13 | End: 2022-04-13 | Stop reason: HOSPADM

## 2022-04-13 RX ORDER — DEXAMETHASONE SODIUM PHOSPHATE 4 MG/ML
INJECTION, SOLUTION INTRA-ARTICULAR; INTRALESIONAL; INTRAMUSCULAR; INTRAVENOUS; SOFT TISSUE AS NEEDED
Status: DISCONTINUED | OUTPATIENT
Start: 2022-04-13 | End: 2022-04-13 | Stop reason: SURG

## 2022-04-13 RX ORDER — OXYCODONE HCL 20 MG/1
20 TABLET, FILM COATED, EXTENDED RELEASE ORAL ONCE
Status: COMPLETED | OUTPATIENT
Start: 2022-04-13 | End: 2022-04-13

## 2022-04-13 RX ORDER — SODIUM CHLORIDE, SODIUM LACTATE, POTASSIUM CHLORIDE, CALCIUM CHLORIDE 600; 310; 30; 20 MG/100ML; MG/100ML; MG/100ML; MG/100ML
100 INJECTION, SOLUTION INTRAVENOUS CONTINUOUS PRN
Status: DISCONTINUED | OUTPATIENT
Start: 2022-04-13 | End: 2022-04-14 | Stop reason: HOSPADM

## 2022-04-13 RX ORDER — FENTANYL CITRATE 50 UG/ML
25 INJECTION, SOLUTION INTRAMUSCULAR; INTRAVENOUS
Status: DISCONTINUED | OUTPATIENT
Start: 2022-04-13 | End: 2022-04-13 | Stop reason: HOSPADM

## 2022-04-13 RX ORDER — FLUMAZENIL 0.1 MG/ML
0.2 INJECTION INTRAVENOUS AS NEEDED
Status: DISCONTINUED | OUTPATIENT
Start: 2022-04-13 | End: 2022-04-13 | Stop reason: HOSPADM

## 2022-04-13 RX ORDER — SODIUM CHLORIDE 0.9 % (FLUSH) 0.9 %
10 SYRINGE (ML) INJECTION AS NEEDED
Status: DISCONTINUED | OUTPATIENT
Start: 2022-04-13 | End: 2022-04-13 | Stop reason: HOSPADM

## 2022-04-13 RX ADMIN — DOCUSATE SODIUM 50 MG AND SENNOSIDES 8.6 MG 1 TABLET: 8.6; 5 TABLET, FILM COATED ORAL at 20:41

## 2022-04-13 RX ADMIN — HYDROMORPHONE HYDROCHLORIDE 1 MG: 1 INJECTION, SOLUTION INTRAMUSCULAR; INTRAVENOUS; SUBCUTANEOUS at 22:54

## 2022-04-13 RX ADMIN — ONDANSETRON 4 MG: 2 INJECTION INTRAMUSCULAR; INTRAVENOUS at 08:20

## 2022-04-13 RX ADMIN — ALPRAZOLAM 0.5 MG: 0.5 TABLET ORAL at 01:36

## 2022-04-13 RX ADMIN — LIDOCAINE HYDROCHLORIDE 75 MG: 20 INJECTION, SOLUTION EPIDURAL; INFILTRATION; INTRACAUDAL; PERINEURAL at 07:10

## 2022-04-13 RX ADMIN — ALPRAZOLAM 0.5 MG: 0.5 TABLET ORAL at 20:42

## 2022-04-13 RX ADMIN — SUFENTANIL CITRATE 30 MCG: 50 INJECTION EPIDURAL; INTRAVENOUS at 07:10

## 2022-04-13 RX ADMIN — HYDROMORPHONE HYDROCHLORIDE 1 MG: 1 INJECTION, SOLUTION INTRAMUSCULAR; INTRAVENOUS; SUBCUTANEOUS at 13:02

## 2022-04-13 RX ADMIN — TIZANIDINE 4 MG: 4 TABLET ORAL at 19:39

## 2022-04-13 RX ADMIN — PROPOFOL 50 MG: 10 INJECTION, EMULSION INTRAVENOUS at 07:25

## 2022-04-13 RX ADMIN — HYDROMORPHONE HYDROCHLORIDE 0.5 MG: 1 INJECTION, SOLUTION INTRAMUSCULAR; INTRAVENOUS; SUBCUTANEOUS at 08:48

## 2022-04-13 RX ADMIN — SODIUM CHLORIDE, POTASSIUM CHLORIDE, SODIUM LACTATE AND CALCIUM CHLORIDE 100 ML/HR: 600; 310; 30; 20 INJECTION, SOLUTION INTRAVENOUS at 07:00

## 2022-04-13 RX ADMIN — HYDROMORPHONE HYDROCHLORIDE 1 MG: 1 INJECTION, SOLUTION INTRAMUSCULAR; INTRAVENOUS; SUBCUTANEOUS at 15:36

## 2022-04-13 RX ADMIN — PROPOFOL 150 MG: 10 INJECTION, EMULSION INTRAVENOUS at 07:10

## 2022-04-13 RX ADMIN — Medication 30 MG: at 07:25

## 2022-04-13 RX ADMIN — HYDROMORPHONE HYDROCHLORIDE 1 MG: 1 INJECTION, SOLUTION INTRAMUSCULAR; INTRAVENOUS; SUBCUTANEOUS at 20:42

## 2022-04-13 RX ADMIN — Medication 8 MCG: at 07:53

## 2022-04-13 RX ADMIN — POLYETHYLENE GLYCOL 3350 17 G: 17 POWDER, FOR SOLUTION ORAL at 20:42

## 2022-04-13 RX ADMIN — OXYCODONE HYDROCHLORIDE 20 MG: 20 TABLET, FILM COATED, EXTENDED RELEASE ORAL at 06:35

## 2022-04-13 RX ADMIN — CLINDAMYCIN IN 5 PERCENT DEXTROSE 600 MG: 12 INJECTION, SOLUTION INTRAVENOUS at 07:19

## 2022-04-13 RX ADMIN — SODIUM CHLORIDE, POTASSIUM CHLORIDE, SODIUM LACTATE AND CALCIUM CHLORIDE 100 ML/HR: 600; 310; 30; 20 INJECTION, SOLUTION INTRAVENOUS at 07:01

## 2022-04-13 RX ADMIN — Medication 20 MG: at 08:12

## 2022-04-13 RX ADMIN — HYDROCODONE BITARTRATE AND ACETAMINOPHEN 2 TABLET: 7.5; 325 TABLET ORAL at 19:38

## 2022-04-13 RX ADMIN — VASOPRESSIN 0.5 UNITS: 20 INJECTION INTRAVENOUS at 07:30

## 2022-04-13 RX ADMIN — SUFENTANIL CITRATE 10 MCG: 50 INJECTION EPIDURAL; INTRAVENOUS at 07:54

## 2022-04-13 RX ADMIN — HYDROCODONE BITARTRATE AND ACETAMINOPHEN 2 TABLET: 7.5; 325 TABLET ORAL at 14:41

## 2022-04-13 RX ADMIN — Medication 4 MCG: at 08:00

## 2022-04-13 RX ADMIN — HYDROCODONE BITARTRATE AND ACETAMINOPHEN 2 TABLET: 7.5; 325 TABLET ORAL at 01:37

## 2022-04-13 RX ADMIN — Medication 3 ML: at 20:43

## 2022-04-13 RX ADMIN — DEXAMETHASONE SODIUM PHOSPHATE 4 MG: 4 INJECTION, SOLUTION INTRA-ARTICULAR; INTRALESIONAL; INTRAMUSCULAR; INTRAVENOUS; SOFT TISSUE at 08:02

## 2022-04-13 NOTE — ANESTHESIA PROCEDURE NOTES
Airway  Urgency: elective    Date/Time: 4/13/2022 7:12 AM  Airway not difficult    General Information and Staff    Patient location during procedure: OR  CRNA: Aniket Wood CRNA    Indications and Patient Condition  Indications for airway management: airway protection    Preoxygenated: yes  MILS maintained throughout  Mask difficulty assessment: 1 - vent by mask    Final Airway Details  Final airway type: endotracheal airway      Successful airway: ETT  Cuffed: yes   Successful intubation technique: direct laryngoscopy  Endotracheal tube insertion site: oral  Blade: Duffy  Blade size: 2  ETT size (mm): 7.5  Cormack-Lehane Classification: grade I - full view of glottis  Placement verified by: chest auscultation and capnometry   Cuff volume (mL): 5  Measured from: lips  ETT/EBT  to lips (cm): 20  Number of attempts at approach: 1  Assessment: lips, teeth, and gum same as pre-op and atraumatic intubation

## 2022-04-13 NOTE — PROGRESS NOTES
"    Daily Progress Note  Sammi Sorenson  MRN: [id number] LOS: 2    Admit Date: 2022 07:58 CDT    Subjective:         Interval History:    Reviewed overnight events and nursing notes.     ROS:  Review of Systems    DIET:  NPO Diet NPO Type: Sips with Meds    Medications:   lactated ringers, 100 mL/hr, Last Rate: 100 mL/hr (22)  lactated ringers, 100 mL/hr, Last Rate: 100 mL/hr (22)  sodium chloride, 100 mL/hr, Last Rate: 100 mL/hr (22)      [MAR Hold] escitalopram, 10 mg, Oral, Daily  [MAR Hold] levothyroxine, 100 mcg, Oral, Q AM  [MAR Hold] pantoprazole, 40 mg, Oral, QAM  [MAR Hold] polyethylene glycol, 17 g, Oral, BID  [MAR Hold] senna-docusate sodium, 1 tablet, Oral, BID  sodium chloride, 10 mL, Intravenous, Q12H  [MAR Hold] sodium chloride, 3 mL, Intravenous, Q12H  sodium chloride, 3 mL, Intravenous, Q12H          Objective:     Vitals: /62 (BP Location: Right arm, Patient Position: Lying)   Pulse 82   Temp 99.5 °F (37.5 °C) (Oral)   Resp 18   Ht 166.4 cm (65.5\")   Wt 68.5 kg (151 lb)   SpO2 95%   BMI 24.75 kg/m²    Intake/Output Summary (Last 24 hours) at 2022 0758  Last data filed at 2022 0724  Gross per 24 hour   Intake 600 ml   Output 1700 ml   Net -1100 ml    Temp (24hrs), Av °F (37.2 °C), Min:98.2 °F (36.8 °C), Max:99.5 °F (37.5 °C)    Glucose:  No results found for: POCGLU  Physical Examination:   Physical Exam    Labs:  Lab Results (last 24 hours)     Procedure Component Value Units Date/Time    Iron Profile [[id number]]  (Abnormal) Collected: 22    Specimen: Blood Updated: 22     Iron 17 mcg/dL      Iron Saturation 5 %      Transferrin 235 mg/dL      TIBC 350 mcg/dL     Ferritin [[id number]]  (Normal) Collected: 22    Specimen: Blood Updated: 22     Ferritin 52.31 ng/mL     Narrative:      Results may be falsely decreased if patient taking Biotin.      Vitamin B12 [[id number]] " Collected: 04/13/22 0542    Specimen: Blood Updated: 04/13/22 0618    COVID PRE-OP / PRE-PROCEDURE SCREENING ORDER (NO ISOLATION) - Swab, Nasal Cavity [[id number]]  (Normal) Collected: 04/13/22 0327    Specimen: Swab from Nasal Cavity Updated: 04/13/22 0457    Narrative:      The following orders were created for panel order COVID PRE-OP / PRE-PROCEDURE SCREENING ORDER (NO ISOLATION) - Swab, Nasal Cavity.  Procedure                               Abnormality         Status                     ---------                               -----------         ------                     COVID-19,[facility] IN-AIME...[[id number]]  Normal              Final result                 Please view results for these tests on the individual orders.    COVID-19,[facility] [employer] IN-HOUSE,Nasal Swab No Transport Media 3-4 HR TAT - Swab, Nasal Cavity [[id number]]  (Normal) Collected: 04/13/22 0327    Specimen: Swab from Nasal Cavity Updated: 04/13/22 0457     COVID19 Not Detected    Narrative:      Fact sheet for providers: https://www.fda.gov/media/255197/download     Fact sheet for patients: https://www.fda.gov/media/841413/download    Test performed by PCR.    Consider negative results in combination with clinical observations, patient history, and epidemiological information.           Imaging:  XR Abdomen 1 View    Result Date: 4/11/2022  XR ABDOMEN 1 VW- 4/11/2022 9:26 AM CDT  HISTORY: no count in OR   COMPARISON: None  FINDINGS:  Intraoperative film obtained following L5-S1 level anterior fusion. No retained surgical instrument is identified on this film. Bowel gas pattern is unremarkable. Moderate stool. Lower lumbar spondylosis changes.      Impression: 1. Intraoperative no count film following anterior spinal fusion. No retained surgical instrument identified.  Findings and recommendations were discussed with JESSIKA MOLINA on 4/11/2022 at 9:34 AM CDT. This report was finalized on 04/11/2022 09:34 by Dr Ramírez Garg, .    FL C Arm During  Surgery    Result Date: 4/11/2022  EXAMINATION: XR SPINE LUMBAR AP AND LATERAL-  4/11/2022 7:50 AM CDT  HISTORY: Anterior lumbar interbody fusion.  Number of fluoroscopic spot images obtained = 8.  Fluoroscopy dose in Air Kerma mGy = 13.964.  Fluoroscopy total dose area product (Gycm2) = 6.1380.  Fluoroscopy total exposure time = 17.6 seconds.  Spot images document anterior approach with discectomy and hardware placement at L5-S1.   This report was finalized on 04/11/2022 09:27 by Dr. Shayan Estevez MD.    XR Spine Lumbar AP & Lateral    Result Date: 4/11/2022  EXAMINATION: XR SPINE LUMBAR AP AND LATERAL-  4/11/2022 7:50 AM CDT  HISTORY: Anterior lumbar interbody fusion.  Number of fluoroscopic spot images obtained = 8.  Fluoroscopy dose in Air Kerma mGy = 13.964.  Fluoroscopy total dose area product (Gycm2) = 6.1380.  Fluoroscopy total exposure time = 17.6 seconds.  Spot images document anterior approach with discectomy and hardware placement at L5-S1.   This report was finalized on 04/11/2022 09:27 by Dr. Shayan Estevez MD.         Assessment and Plan:     Primary Problem:  <principal problem not specified>    Hospital Problem list:    Chronic bilateral low back pain with bilateral sciatica    Lumbosacral disc disease    Lumbago of multiple sites in spine with sciatica      Assessment: 71-year-old female who presents for chronic low back pain.  She underwent operative fixation on 4/11 which was uncomplicated.    Plan:    Perioperative care  Underwent operative fixation on 4/11 which was uncomplicated, going for second stage today.  Doing well.  We will continue bowel regimen, encouraged incentive spirometry and ambulation physical and Occupational Therapy    COPD  Not in exacerbation, continue inhalers.    Acute anemia, suspect blood loss from surgery  Checking labs today.  Will replace as needed.    Reviewed treatment plans with the patient and/or family.     Code Status:   Code Status and Medical Interventions:    Ordered at: 04/11/22 1152     Code Status (Patient has no pulse and is not breathing):    CPR (Attempt to Resuscitate)     Medical Interventions (Patient has pulse or is breathing):    Full Support       Electronically signed by Julian Brunner MD on 4/13/2022 at 07:58 CDT

## 2022-04-13 NOTE — THERAPY RE-EVALUATION
Patient Name: Sammi Sorenson  : 1950    MRN: [id number]                              Today's Date: 2022       Admit Date: 2022    Visit Dx:     ICD-10-CM ICD-9-CM   1. Decreased activities of daily living (ADL)  Z78.9 V49.89   2. Impaired mobility  Z74.09 799.89     Patient Active Problem List   Diagnosis   • Tobacco abuse   • Cellulitis of face   • Thrush   • Chronic bilateral low back pain with bilateral sciatica   • Deep groin pain   • Hip pain, acute, left   • Ischial bursitis of left side   • Lumbar disc disease with radiculopathy   • Pain in both knees   • Pain management contract agreement   • Piriformis syndrome of left side   • SI (sacroiliac) joint dysfunction   • Lumbosacral disc disease   • Lumbago of multiple sites in spine with sciatica     Past Medical History:   Diagnosis Date   • Anxiety    • Cellulitis of face 2019   • Chronic abdominal pain    • Chronic back pain    • Chronic gastritis    • Chronic pain     sees pain vilma. since    • CKD (chronic kidney disease)    • Colon polyp    • COPD (chronic obstructive pulmonary disease) (HCC)    • Disease of thyroid gland    • Diverticulosis    • Dyslipidemia    • GERD (gastroesophageal reflux disease)    • Hemorrhoid    • Hiatal hernia    • History of adenomatous polyp of colon    • Leaky heart valve    • Lumbosacral disc disease 2022   • Migraine    • Neuropathy    • Osteoarthritis    • Ovarian cancer (HCC)    • Submandibular gland mass      Past Surgical History:   Procedure Laterality Date   • ANTERIOR LUMBAR EXPOSURE N/A 2022    Procedure: ANTERIOR LUMBAR EXPOSURE;  Surgeon: Juan Francisco Nelson DO;  Location: [facility] OR;  Service: Vascular;  Laterality: N/A;   • CARDIAC CATHETERIZATION      no stent   • CHOLECYSTECTOMY     • COLONOSCOPY  04/10/2015    5 polyps, tubular adenomas, melanosis, diverticulosis, internal hemorrhoids,    • CYST REMOVAL     • ENDOSCOPY  04/10/2015    hiatus hernia   • HYSTERECTOMY     •  LUMBAR FUSION N/A 4/11/2022    Procedure: ANTERIOR DECOMPRESSION, ANTERIOR LUMBAR INTERBODY FUSION WITH INSTRUMENTATION L5-S1;  Surgeon: JESSIKA Reese MD;  Location:  [facility] OR;  Service: Orthopedic Spine;  Laterality: N/A;   • LUNG LOBECTOMY Right     2002,[facility]   • REPLACEMENT TOTAL KNEE BILATERAL     • SUBMANDIBULAR GLAND EXCISION Left 11/02/2018    Procedure: Incision and drainage of left submandibular region abscess with excision of left submandibular region mass mass consistent with probable lymph node;  Surgeon: Duncan Ewing MD;  Location:  [facility] OR;  Service: ENT      General Information     Row Name 04/13/22 1320          Physical Therapy Time and Intention    Document Type re-evaluation  S/P:RIGHT LATERAL LUMBAR INTERBODY FUSION WITH INSTRUMENTATION L4-5  -SB (r) JJ (t) SB (c)     Mode of Treatment physical therapy  -SB (r) JJ (t) SB (c)     Row Name 04/13/22 1320          General Information    Patient Profile Reviewed yes  -SB (r) JJ (t) SB (c)     Prior Level of Function independent:;all household mobility;community mobility;ADL's;gait;bed mobility;transfer  -SB (r) JJ (t) SB (c)     Existing Precautions/Restrictions brace worn when out of bed;fall;spinal  -SB (r) JJ (t) SB (c)     Barriers to Rehab previous functional deficit  -SB (r) JJ (t) SB (c)     Row Name 04/13/22 1320          Living Environment    People in Home child(johanna), adult  Daughter and sister in law  -SB (r) JJ (t) SB (c)     Row Name 04/13/22 1320          Home Main Entrance    Number of Stairs, Main Entrance two  -SB (r) JJ (t) SB (c)     Stair Railings, Main Entrance railing on right side (ascending)  -SB (r) JJ (t) SB (c)     Row Name 04/13/22 1320          Stairs Within Home, Primary    Stairs, Within Home, Primary 14 but does not need to use  -SB (r) JJ (t) SB (c)     Number of Stairs, Within Home, Primary other (see comments)  14 but does not need to use  -SB (r) JJ (t) SB (c)     Stair Railings, Within  Home, Primary railings on both sides of stairs  -SB (r) JJ (t) SB (c)     Row Name 04/13/22 1320          Cognition    Orientation Status (Cognition) oriented x 4  -SB (r) JJ (t) SB (c)     Row Name 04/13/22 1320          Safety Issues, Functional Mobility    Safety Issues Affecting Function (Mobility) safety precaution awareness;safety precautions follow-through/compliance;ability to follow commands  -SB (r) JJ (t) SB (c)     Impairments Affecting Function (Mobility) balance;pain;strength;endurance/activity tolerance  -SB (r) JJ (t) SB (c)           User Key  (r) = Recorded By, (t) = Taken By, (c) = Cosigned By    Initials Name Provider Type    Nitza Perrin, PT DPT Physical Therapist    Ashish Bravo, PT Student PT Student               Mobility     Row Name 04/13/22 1320          Bed Mobility    Bed Mobility rolling left  -SB (r) JJ (t) SB (c)     Rolling Left [place] (Bed Mobility) minimum assist (75% patient effort);verbal cues;nonverbal cues (demo/gesture)  -SB (r) JJ (t) SB (c)     Sidelying-Sit [place] (Bed Mobility) minimum assist (75% patient effort);verbal cues;nonverbal cues (demo/gesture)  -SB (r) JJ (t) SB (c)     Sit-Sidelying [place] (Bed Mobility) minimum assist (75% patient effort);nonverbal cues (demo/gesture);verbal cues  -SB (r) JJ (t) SB (c)     Assistive Device (Bed Mobility) head of bed elevated;bed rails  -SB (r) JJ (t) SB (c)     Row Name 04/13/22 1320          Transfers    Comment, (Transfers) Not assessed d/t increase in pt pain and drowsiness.  -SB (r) JJ (t) SB (c)     Row Name 04/13/22 1320          Gait/Stairs (Locomotion)    Distance in Feet (Gait) Not assessed d/t increase in pt pain and drowsiness.  -SB (r) JJ (t) SB (c)           User Key  (r) = Recorded By, (t) = Taken By, (c) = Cosigned By    Initials Name Provider Type    Nitza Perrin, PT DPT Physical Therapist    Ashish Bravo, PT Student PT Student               Obj/Interventions     Row Name  04/13/22 1320          Range of Motion Comprehensive    General Range of Motion bilateral lower extremity ROM WFL  -SB (r) JJ (t) SB (c)     Comment, General Range of Motion PROM WFL  -SB (r) JJ (t) SB (c)     Row Name 04/13/22 1320          Strength Comprehensive (MMT)    Comment, General Manual Muscle Testing (MMT) Assessment BLE Grossly3/5  -SB (r) JJ (t) SB (c)     Row Name 04/13/22 1320          Balance    Balance Assessment sitting static balance;sitting dynamic balance  -SB (r) JJ (t) SB (c)     Static Sitting Balance standby assist  -SB (r) JJ (t) SB (c)     Dynamic Sitting Balance standby assist  -SB (r) JJ (t) SB (c)     Position, Sitting Balance sitting edge of bed  -SB (r) JJ (t) SB (c)     Row Name 04/13/22 1320          Sensory Assessment (Somatosensory)    Sensory Assessment (Somatosensory) LE sensation intact  -SB (r) JJ (t) SB (c)           User Key  (r) = Recorded By, (t) = Taken By, (c) = Cosigned By    Initials Name Provider Type    SB Nitza Spears, PT DPT Physical Therapist    Ashish Bravo, PT Student PT Student               Goals/Plan     Row Name 04/13/22 1320          Bed Mobility Goal 1 (PT)    Activity/Assistive Device (Bed Mobility Goal 1, PT) rolling to right;rolling to left;sidelying to sit/sit to sidelying  -SB (r) JJ (t) SB (c)     [place] Level/Cues Needed (Bed Mobility Goal 1, PT) modified independence  -SB (r) JJ (t) SB (c)     Time Frame (Bed Mobility Goal 1, PT) long term goal (LTG);by discharge  -SB (r) JJ (t) SB (c)     Progress/Outcomes (Bed Mobility Goal 1, PT) goal ongoing;goal revised this date  -SB (r) JJ (t) SB (c)     Row Name 04/13/22 1320          Transfer Goal 1 (PT)    Activity/Assistive Device (Transfer Goal 1, PT) sit-to-stand/stand-to-sit;bed-to-chair/chair-to-bed  -SB (r) JJ (t) SB (c)     [place] Level/Cues Needed (Transfer Goal 1, PT) independent  -SB (r) JJ (t) SB (c)     Time Frame (Transfer Goal 1, PT) long term goal (LTG);by discharge  -SB  (r) JJ (t) SB (c)     Progress/Outcome (Transfer Goal 1, PT) goal ongoing  -SB (r) JJ (t) SB (c)     Row Name 04/13/22 1320          Gait Training Goal 1 (PT)    Activity/Assistive Device (Gait Training Goal 1, PT) gait (walking locomotion);assistive device use;improve balance and speed;increase endurance/gait distance;walker, rolling  -SB (r) JJ (t) SB (c)     [place] Level (Gait Training Goal 1, PT) modified independence  -SB (r) JJ (t) SB (c)     Distance (Gait Training Goal 1, PT) 50ft  -SB (r) JJ (t) SB (c)     Time Frame (Gait Training Goal 1, PT) long term goal (LTG);by discharge  -SB (r) JJ (t) SB (c)     Progress/Outcome (Gait Training Goal 1, PT) goal ongoing  -SB (r) JJ (t) SB (c)     Row Name 04/13/22 1320          Patient Education Goal (PT)    Activity (Patient Education Goal, PT) Pt will adhere to spinal precautions 100% of the time when performing functional mobility.  -SB (r) JJ (t) SB (c)     [place]/Cues/Accuracy (Memory Goal 2, PT) independent;demonstrates adequately  -SB (r) JJ (t) SB (c)     Time Frame (Patient Education Goal, PT) long term goal (LTG)  -SB (r) JJ (t) SB (c)     Progress/Outcome (Patient Education Goal, PT) goal ongoing  -SB (r) JJ (t) SB (c)     Row Name 04/13/22 1320          Therapy Assessment/Plan (PT)    Planned Therapy Interventions (PT) balance training;bed mobility training;gait training;orthotic fitting/training;patient/family education;strengthening;transfer training  -SB (r) JJ (t) SB (c)           User Key  (r) = Recorded By, (t) = Taken By, (c) = Cosigned By    Initials Name Provider Type    Nitza Perrin, PT DPT Physical Therapist    Ashish Bravo, PT Student PT Student               Clinical Impression     Row Name 04/13/22 1320          Pain    Pretreatment Pain Rating 9/10  -SB (r) JJ (t) SB (c)     Posttreatment Pain Rating 9/10  -SB (r) NANDO (t) OSWALD (c)     Pain Location - Side/Orientation Bilateral  -OSWALD (r) NANDO (t) OSWALD (c)     Pain Location  lateral  -SB (r) JJ (t) SB (c)     Pain Location - abdomen  -SB (r) JJ (t) SB (c)     Pre/Posttreatment Pain Comment Pt reports feeling like stomach wants to lock up.  -SB (r) JJ (t) SB (c)     Pain Intervention(s) Medication (See MAR);Repositioned;Ambulation/increased activity  -SB (r) JJ (t) SB (c)     Row Name 04/13/22 1320          Plan of Care Review    Plan of Care Reviewed With patient  -SB (r) JJ (t) SB (c)     Progress no change  -SB (r) JJ (t) SB (c)     Outcome Evaluation PT re-eval complete. Pt A&Ox4, on RA, in supine. Pt reports pain is 9/10 on the lateral aspect of her abdomen. Prior to functional mobility assessment pt educated on spinal precautions and LSO wear schedule. With bed mobility rolled left with Min A, bed rails and HOB elevated. Pt requires extra time and motivation during bed mobility. From sidelying pt sat EOB with VC, Min A adhering to spinal precautions. Pt could only tolerate sitting EOB for1-2 minutes before grimacing that the pain had increased in her abdomen. Pt performed sit to sidelying and back to supine with Min A and VC. T/f and amb not assessed at this time due to patient increase in pain and decreased activity tolerance. Pt will benefit from skilled PT to address deficits and increase independence with functional mobility. DC home with home health and assist vs inpatient rehab pending progress.  -SB (r) JJ (t) SB (c)     Row Name 04/13/22 1320          Therapy Assessment/Plan (PT)    Patient/Family Therapy Goals Statement (PT) Decrease pain and get better  -SB (r) JJ (t) SB (c)     Rehab Potential (PT) good, to achieve stated therapy goals  -SB (r) JJ (t) SB (c)     Criteria for Skilled Interventions Met (PT) yes;meets criteria;skilled treatment is necessary  -SB (r) JJ (t) SB (c)     Therapy Frequency (PT) 2 times/day  -SB (r) JJ (t) SB (c)     Predicted Duration of Therapy Intervention (PT) Until DC or goals met  -SB (r) JJ (t) SB (c)     Row Name 04/13/22 1320           Vital Signs    O2 Delivery Pre Treatment room air  -SB (r) JJ (t) SB (c)     O2 Delivery Intra Treatment room air  -SB (r) JJ (t) SB (c)     O2 Delivery Post Treatment room air  -SB (r) JJ (t) SB (c)     Pre Patient Position Supine  -SB (r) JJ (t) SB (c)     Row Name 04/13/22 1320          Positioning and Restraints    Pre-Treatment Position in bed  -SB (r) JJ (t) SB (c)     Post Treatment Position bed  -SB (r) JJ (t) SB (c)     In Bed fowlers;call light within reach;encouraged to call for assist;exit alarm on  -SB (r) JJ (t) SB (c)           User Key  (r) = Recorded By, (t) = Taken By, (c) = Cosigned By    Initials Name Provider Type    Nitza Perrin, PT DPT Physical Therapist    Ashish Bravo, PT Student PT Student               Outcome Measures     Row Name 04/13/22 1320          How much help from another person do you currently need...    Turning from your back to your side while in flat bed without using bedrails? 3  -SB (r) JJ (t) SB (c)     Moving from lying on back to sitting on the side of a flat bed without bedrails? 3  -SB (r) JJ (t) SB (c)     Moving to and from a bed to a chair (including a wheelchair)? 2  -SB (r) JJ (t) SB (c)     Standing up from a chair using your arms (e.g., wheelchair, bedside chair)? 2  -SB (r) JJ (t) SB (c)     Climbing 3-5 steps with a railing? 1  -SB (r) JJ (t) SB (c)     To walk in hospital room? 1  -SB (r) JJ (t) SB (c)     AM-PAC 6 Clicks Score (PT) 12  -SB (r) JJ (t)     Row Name 04/13/22 1320          Functional Assessment    Outcome Measure Options AM-PAC 6 Clicks Basic Mobility (PT)  -SB (r) JJ (t) SB (c)           User Key  (r) = Recorded By, (t) = Taken By, (c) = Cosigned By    Initials Name Provider Type    Nitza Perrin, PT DPT Physical Therapist    Ashish Bravo, PT Student PT Student                             Physical Therapy Education                 Title: PT OT SLP Therapies (In Progress)     Topic: Physical Therapy (In Progress)     Point:  Mobility training (Done)     Learning Progress Summary           Patient Acceptance, D,TB,E, NR,VU by J at 4/13/2022 1347    Comment: Pt educated on POC, LSO wear schedule, log roll technique and spinal precautions    Acceptance, E, NR by MS at 4/11/2022 1534    Comment: role of PT in her care, spinal restrictions, use of LSO                   Point: Home exercise program (Not Started)     Learner Progress:  Not documented in this visit.          Point: Body mechanics (Done)     Learning Progress Summary           Patient Acceptance, D,TB,E, NR,VU by J at 4/13/2022 1347    Comment: Pt educated on POC, LSO wear schedule, log roll technique and spinal precautions                   Point: Precautions (Done)     Learning Progress Summary           Patient Acceptance, D,TB,E, NR,VU by  at 4/13/2022 1347    Comment: Pt educated on POC, LSO wear schedule, log roll technique and spinal precautions    Acceptance, E,TB, VU by  at 4/12/2022 1152    Comment: shivani/doff LSO    Acceptance, E, NR by MS at 4/11/2022 1534    Comment: role of PT in her care, spinal restrictions, use of LSO                               User Key     Initials Effective Dates Name Provider Type Discipline     06/16/21 -  Shoshana Duvall, PTA Physical Therapist Assistant PT    MS 06/19/18 -  Mercy Mccall, PT, DPT, NCS Physical Therapist PT     03/07/22 -  Ashish Kay PT Student PT Student PT              PT Recommendation and Plan  Planned Therapy Interventions (PT): balance training, bed mobility training, gait training, orthotic fitting/training, patient/family education, strengthening, transfer training  Plan of Care Reviewed With: patient  Progress: no change  Outcome Evaluation: PT re-eval complete. Pt A&Ox4, on RA, in supine. Pt reports pain is 9/10 on the lateral aspect of her abdomen. Prior to functional mobility assessment pt educated on spinal precautions and LSO wear schedule. With bed mobility rolled left with Min A, bed  rails and HOB elevated. Pt requires extra time and motivation during bed mobility. From sidelying pt sat EOB with VC, Min A adhering to spinal precautions. Pt could only tolerate sitting EOB for1-2 minutes before grimacing that the pain had increased in her abdomen. Pt performed sit to sidelying and back to supine with Min A and VC. T/f and amb not assessed at this time due to patient increase in pain and decreased activity tolerance. Pt will benefit from skilled PT to address deficits and increase independence with functional mobility. DC home with home health and assist vs inpatient rehab pending progress.     Time Calculation:    PT Charges     Row Name 04/13/22 1320             Time Calculation    Start Time 1320  -SB (r) JJ (t) SB (c)      Stop Time 1347  -SB (r) JJ (t) SB (c)      Time Calculation (min) 27 min  -SB (r) JJ (t)      PT Received On 04/13/22  -SB (r) JJ (t) SB (c)      PT Goal Re-Cert Due Date 04/23/22  -SB (r) JJ (t) SB (c)            User Key  (r) = Recorded By, (t) = Taken By, (c) = Cosigned By    Initials Name Provider Type    Nitza Perrin, PT DPT Physical Therapist    Ashish Bravo, PT Student PT Student                  PT G-Codes  Outcome Measure Options: AM-PAC 6 Clicks Basic Mobility (PT)  AM-PAC 6 Clicks Score (PT): 12  AM-PAC 6 Clicks Score (OT): 17    Asihsh Kay PT Student  4/13/2022

## 2022-04-13 NOTE — THERAPY EVALUATION
Patient Name: Sammi Sorenson  : 1950    MRN: [id number]                              Today's Date: 2022       Admit Date: 2022    Visit Dx:     ICD-10-CM ICD-9-CM   1. Decreased activities of daily living (ADL)  Z78.9 V49.89   2. Impaired mobility  Z74.09 799.89     Patient Active Problem List   Diagnosis   • Tobacco abuse   • Cellulitis of face   • Thrush   • Chronic bilateral low back pain with bilateral sciatica   • Deep groin pain   • Hip pain, acute, left   • Ischial bursitis of left side   • Lumbar disc disease with radiculopathy   • Pain in both knees   • Pain management contract agreement   • Piriformis syndrome of left side   • SI (sacroiliac) joint dysfunction   • Lumbosacral disc disease   • Lumbago of multiple sites in spine with sciatica     Past Medical History:   Diagnosis Date   • Anxiety    • Cellulitis of face 2019   • Chronic abdominal pain    • Chronic back pain    • Chronic gastritis    • Chronic pain     sees pain vilma. since    • CKD (chronic kidney disease)    • Colon polyp    • COPD (chronic obstructive pulmonary disease) (HCC)    • Disease of thyroid gland    • Diverticulosis    • Dyslipidemia    • GERD (gastroesophageal reflux disease)    • Hemorrhoid    • Hiatal hernia    • History of adenomatous polyp of colon    • Leaky heart valve    • Lumbosacral disc disease 2022   • Migraine    • Neuropathy    • Osteoarthritis    • Ovarian cancer (HCC)    • Submandibular gland mass      Past Surgical History:   Procedure Laterality Date   • ANTERIOR LUMBAR EXPOSURE N/A 2022    Procedure: ANTERIOR LUMBAR EXPOSURE;  Surgeon: Juan Francisco Nelson DO;  Location: [facility] OR;  Service: Vascular;  Laterality: N/A;   • CARDIAC CATHETERIZATION      no stent   • CHOLECYSTECTOMY     • COLONOSCOPY  04/10/2015    5 polyps, tubular adenomas, melanosis, diverticulosis, internal hemorrhoids,    • CYST REMOVAL     • ENDOSCOPY  04/10/2015    hiatus hernia   • HYSTERECTOMY     •  LUMBAR FUSION N/A 4/11/2022    Procedure: ANTERIOR DECOMPRESSION, ANTERIOR LUMBAR INTERBODY FUSION WITH INSTRUMENTATION L5-S1;  Surgeon: JESSIKA Reese MD;  Location:  [facility] OR;  Service: Orthopedic Spine;  Laterality: N/A;   • LUNG LOBECTOMY Right     2002,[facility]   • REPLACEMENT TOTAL KNEE BILATERAL     • SUBMANDIBULAR GLAND EXCISION Left 11/02/2018    Procedure: Incision and drainage of left submandibular region abscess with excision of left submandibular region mass mass consistent with probable lymph node;  Surgeon: Duncan Ewing MD;  Location:  [facility] OR;  Service: ENT      General Information     Row Name 04/13/22 1320          Physical Therapy Time and Intention    Document Type re-evaluation  S/P:RIGHT LATERAL LUMBAR INTERBODY FUSION WITH INSTRUMENTATION L4-5  -SB (r) JJ (t) SB (c)     Mode of Treatment physical therapy  -SB (r) JJ (t) SB (c)     Row Name 04/13/22 1320          General Information    Patient Profile Reviewed yes  -SB (r) JJ (t) SB (c)     Prior Level of Function independent:;all household mobility;community mobility;ADL's;gait;bed mobility;transfer  -SB (r) JJ (t) SB (c)     Existing Precautions/Restrictions brace worn when out of bed;fall;spinal  -SB (r) JJ (t) SB (c)     Barriers to Rehab previous functional deficit  -SB (r) JJ (t) SB (c)     Row Name 04/13/22 1320          Living Environment    People in Home child(johanna), adult  Daughter and sister in law  -SB (r) JJ (t) SB (c)     Row Name 04/13/22 1320          Home Main Entrance    Number of Stairs, Main Entrance two  -SB (r) JJ (t) SB (c)     Stair Railings, Main Entrance railing on right side (ascending)  -SB (r) JJ (t) SB (c)     Row Name 04/13/22 1320          Stairs Within Home, Primary    Stairs, Within Home, Primary 14 but does not need to use  -SB (r) JJ (t) SB (c)     Number of Stairs, Within Home, Primary other (see comments)  14 but does not need to use  -SB (r) JJ (t) SB (c)     Stair Railings, Within  Home, Primary railings on both sides of stairs  -SB (r) JJ (t) SB (c)     Row Name 04/13/22 1320          Cognition    Orientation Status (Cognition) oriented x 4  -SB (r) JJ (t) SB (c)     Row Name 04/13/22 1320          Safety Issues, Functional Mobility    Safety Issues Affecting Function (Mobility) safety precaution awareness;safety precautions follow-through/compliance;ability to follow commands  -SB (r) JJ (t) SB (c)     Impairments Affecting Function (Mobility) balance;pain;strength;endurance/activity tolerance  -SB (r) JJ (t) SB (c)           User Key  (r) = Recorded By, (t) = Taken By, (c) = Cosigned By    Initials Name Provider Type    Nitza Perrin, PT DPT Physical Therapist    Ashish Bravo, PT Student PT Student               Mobility     Row Name 04/13/22 1320          Bed Mobility    Bed Mobility rolling left  -SB (r) JJ (t) SB (c)     Rolling Left [place] (Bed Mobility) minimum assist (75% patient effort);verbal cues;nonverbal cues (demo/gesture)  -SB (r) JJ (t) SB (c)     Sidelying-Sit [place] (Bed Mobility) minimum assist (75% patient effort);verbal cues;nonverbal cues (demo/gesture)  -SB (r) JJ (t) SB (c)     Sit-Sidelying [place] (Bed Mobility) minimum assist (75% patient effort);nonverbal cues (demo/gesture);verbal cues  -SB (r) JJ (t) SB (c)     Assistive Device (Bed Mobility) head of bed elevated;bed rails  -SB (r) JJ (t) SB (c)     Row Name 04/13/22 1320          Transfers    Comment, (Transfers) Not assessed d/t increase in pt pain and drowsiness.  -SB (r) JJ (t) SB (c)     Row Name 04/13/22 1320          Gait/Stairs (Locomotion)    Distance in Feet (Gait) Not assessed d/t increase in pt pain and drowsiness.  -SB (r) JJ (t) SB (c)           User Key  (r) = Recorded By, (t) = Taken By, (c) = Cosigned By    Initials Name Provider Type    Nitza Perrin, PT DPT Physical Therapist    Ashish Bravo, PT Student PT Student               Obj/Interventions     Row Name  04/13/22 1320          Range of Motion Comprehensive    General Range of Motion bilateral lower extremity ROM WFL  -SB (r) JJ (t) SB (c)     Comment, General Range of Motion PROM WFL  -SB (r) JJ (t) SB (c)     Row Name 04/13/22 1320          Strength Comprehensive (MMT)    Comment, General Manual Muscle Testing (MMT) Assessment BLE Grossly3/5  -SB (r) JJ (t) SB (c)     Row Name 04/13/22 1320          Balance    Balance Assessment sitting static balance;sitting dynamic balance  -SB (r) JJ (t) SB (c)     Static Sitting Balance standby assist  -SB (r) JJ (t) SB (c)     Dynamic Sitting Balance standby assist  -SB (r) JJ (t) SB (c)     Position, Sitting Balance sitting edge of bed  -SB (r) JJ (t) SB (c)     Row Name 04/13/22 1320          Sensory Assessment (Somatosensory)    Sensory Assessment (Somatosensory) LE sensation intact  -SB (r) JJ (t) SB (c)           User Key  (r) = Recorded By, (t) = Taken By, (c) = Cosigned By    Initials Name Provider Type    SB Nitza Spears, PT DPT Physical Therapist    Ashish Bravo, PT Student PT Student               Goals/Plan     Row Name 04/13/22 1320          Bed Mobility Goal 1 (PT)    Activity/Assistive Device (Bed Mobility Goal 1, PT) rolling to right;rolling to left;sidelying to sit/sit to sidelying  -SB (r) JJ (t) SB (c)     [place] Level/Cues Needed (Bed Mobility Goal 1, PT) modified independence  -SB (r) JJ (t) SB (c)     Time Frame (Bed Mobility Goal 1, PT) long term goal (LTG);by discharge  -SB (r) JJ (t) SB (c)     Progress/Outcomes (Bed Mobility Goal 1, PT) goal ongoing;goal revised this date  -SB (r) JJ (t) SB (c)     Row Name 04/13/22 1320          Transfer Goal 1 (PT)    Activity/Assistive Device (Transfer Goal 1, PT) sit-to-stand/stand-to-sit;bed-to-chair/chair-to-bed  -SB (r) JJ (t) SB (c)     [place] Level/Cues Needed (Transfer Goal 1, PT) independent  -SB (r) JJ (t) SB (c)     Time Frame (Transfer Goal 1, PT) long term goal (LTG);by discharge  -SB  (r) JJ (t) SB (c)     Progress/Outcome (Transfer Goal 1, PT) goal ongoing  -SB (r) JJ (t) SB (c)     Row Name 04/13/22 1320          Gait Training Goal 1 (PT)    Activity/Assistive Device (Gait Training Goal 1, PT) gait (walking locomotion);assistive device use;improve balance and speed;increase endurance/gait distance;walker, rolling  -SB (r) JJ (t) SB (c)     [place] Level (Gait Training Goal 1, PT) modified independence  -SB (r) JJ (t) SB (c)     Distance (Gait Training Goal 1, PT) 50ft  -SB (r) JJ (t) SB (c)     Time Frame (Gait Training Goal 1, PT) long term goal (LTG);by discharge  -SB (r) JJ (t) SB (c)     Progress/Outcome (Gait Training Goal 1, PT) goal ongoing  -SB (r) JJ (t) SB (c)     Row Name 04/13/22 1320          Patient Education Goal (PT)    Activity (Patient Education Goal, PT) Pt will adhere to spinal precautions 100% of the time when performing functional mobility.  -SB (r) JJ (t) SB (c)     [place]/Cues/Accuracy (Memory Goal 2, PT) independent;demonstrates adequately  -SB (r) JJ (t) SB (c)     Time Frame (Patient Education Goal, PT) long term goal (LTG)  -SB (r) JJ (t) SB (c)     Progress/Outcome (Patient Education Goal, PT) goal ongoing  -SB (r) JJ (t) SB (c)     Row Name 04/13/22 1320          Therapy Assessment/Plan (PT)    Planned Therapy Interventions (PT) balance training;bed mobility training;gait training;orthotic fitting/training;patient/family education;strengthening;transfer training  -SB (r) JJ (t) SB (c)           User Key  (r) = Recorded By, (t) = Taken By, (c) = Cosigned By    Initials Name Provider Type    Nitza Perrin, PT DPT Physical Therapist    Ashish Bravo, PT Student PT Student               Clinical Impression     Row Name 04/13/22 1320          Pain    Pretreatment Pain Rating 9/10  -SB (r) JJ (t) SB (c)     Posttreatment Pain Rating 9/10  -SB (r) NANDO (t) OSWALD (c)     Pain Location - Side/Orientation Bilateral  -OSWALD (r) NANDO (t) OSWALD (c)     Pain Location  lateral  -SB (r) JJ (t) SB (c)     Pain Location - abdomen  -SB (r) JJ (t) SB (c)     Pre/Posttreatment Pain Comment Pt reports feeling like stomach wants to lock up.  -SB (r) JJ (t) SB (c)     Pain Intervention(s) Medication (See MAR);Repositioned;Ambulation/increased activity  -SB (r) JJ (t) SB (c)     Row Name 04/13/22 1320          Plan of Care Review    Plan of Care Reviewed With patient  -SB (r) JJ (t) SB (c)     Progress no change  -SB (r) JJ (t) SB (c)     Outcome Evaluation PT re-eval complete. Pt A&Ox4, on RA, in supine. Pt reports pain is 9/10 on the lateral aspect of her abdomen. Prior to functional mobility assessment pt educated on spinal precautions and LSO wear schedule. With bed mobility rolled left with Min A, bed rails and HOB elevated. Pt requires extra time and motivation during bed mobility. From sidelying pt sat EOB with VC, Min A adhering to spinal precautions. Pt could only tolerate sitting EOB for1-2 minutes before grimacing that the pain had increased in her abdomen. Pt performed sit to sidelying and back to supine with Min A and VC. T/f and amb not assessed at this time due to patient increase in pain and decreased activity tolerance. Pt will benefit from skilled PT to address deficits and increase independence with functional mobility. DC home with home health and assist vs inpatient rehab pending progress.  -SB (r) JJ (t) SB (c)     Row Name 04/13/22 1320          Therapy Assessment/Plan (PT)    Patient/Family Therapy Goals Statement (PT) Decrease pain and get better  -SB (r) JJ (t) SB (c)     Rehab Potential (PT) good, to achieve stated therapy goals  -SB (r) JJ (t) SB (c)     Criteria for Skilled Interventions Met (PT) yes;meets criteria;skilled treatment is necessary  -SB (r) JJ (t) SB (c)     Therapy Frequency (PT) 2 times/day  -SB (r) JJ (t) SB (c)     Predicted Duration of Therapy Intervention (PT) Until DC or goals met  -SB (r) JJ (t) SB (c)     Row Name 04/13/22 1320           Vital Signs    O2 Delivery Pre Treatment room air  -SB (r) JJ (t) SB (c)     O2 Delivery Intra Treatment room air  -SB (r) JJ (t) SB (c)     O2 Delivery Post Treatment room air  -SB (r) JJ (t) SB (c)     Pre Patient Position Supine  -SB (r) JJ (t) SB (c)     Row Name 04/13/22 1320          Positioning and Restraints    Pre-Treatment Position in bed  -SB (r) JJ (t) SB (c)     Post Treatment Position bed  -SB (r) JJ (t) SB (c)     In Bed fowlers;call light within reach;encouraged to call for assist;exit alarm on  -SB (r) JJ (t) SB (c)           User Key  (r) = Recorded By, (t) = Taken By, (c) = Cosigned By    Initials Name Provider Type    Nitza Perrin, PT DPT Physical Therapist    Ashish Bravo, PT Student PT Student               Outcome Measures     Row Name 04/13/22 1320          How much help from another person do you currently need...    Turning from your back to your side while in flat bed without using bedrails? 3  -SB (r) JJ (t) SB (c)     Moving from lying on back to sitting on the side of a flat bed without bedrails? 3  -SB (r) JJ (t) SB (c)     Moving to and from a bed to a chair (including a wheelchair)? 2  -SB (r) JJ (t) SB (c)     Standing up from a chair using your arms (e.g., wheelchair, bedside chair)? 2  -SB (r) JJ (t) SB (c)     Climbing 3-5 steps with a railing? 1  -SB (r) JJ (t) SB (c)     To walk in hospital room? 1  -SB (r) JJ (t) SB (c)     AM-PAC 6 Clicks Score (PT) 12  -SB (r) JJ (t)     Row Name 04/13/22 1320          Functional Assessment    Outcome Measure Options AM-PAC 6 Clicks Basic Mobility (PT)  -SB (r) JJ (t) SB (c)           User Key  (r) = Recorded By, (t) = Taken By, (c) = Cosigned By    Initials Name Provider Type    Nitza Perrin, PT DPT Physical Therapist    Ashish Bravo, PT Student PT Student                             Physical Therapy Education                 Title: PT OT SLP Therapies (In Progress)     Topic: Physical Therapy (In Progress)     Point:  Mobility training (Done)     Learning Progress Summary           Patient Acceptance, D,TB,E, NR,VU by J at 4/13/2022 1347    Comment: Pt educated on POC, LSO wear schedule, log roll technique and spinal precautions    Acceptance, E, NR by MS at 4/11/2022 1534    Comment: role of PT in her care, spinal restrictions, use of LSO                   Point: Home exercise program (Not Started)     Learner Progress:  Not documented in this visit.          Point: Body mechanics (Done)     Learning Progress Summary           Patient Acceptance, D,TB,E, NR,VU by J at 4/13/2022 1347    Comment: Pt educated on POC, LSO wear schedule, log roll technique and spinal precautions                   Point: Precautions (Done)     Learning Progress Summary           Patient Acceptance, D,TB,E, NR,VU by  at 4/13/2022 1347    Comment: Pt educated on POC, LSO wear schedule, log roll technique and spinal precautions    Acceptance, E,TB, VU by  at 4/12/2022 1152    Comment: shivani/doff LSO    Acceptance, E, NR by MS at 4/11/2022 1534    Comment: role of PT in her care, spinal restrictions, use of LSO                               User Key     Initials Effective Dates Name Provider Type Discipline     06/16/21 -  Shoshana Duvall, PTA Physical Therapist Assistant PT    MS 06/19/18 -  Mercy Mccall, PT, DPT, NCS Physical Therapist PT     03/07/22 -  Ashish Kay PT Student PT Student PT              PT Recommendation and Plan  Planned Therapy Interventions (PT): balance training, bed mobility training, gait training, orthotic fitting/training, patient/family education, strengthening, transfer training  Plan of Care Reviewed With: patient  Progress: no change  Outcome Evaluation: PT re-eval complete. Pt A&Ox4, on RA, in supine. Pt reports pain is 9/10 on the lateral aspect of her abdomen. Prior to functional mobility assessment pt educated on spinal precautions and LSO wear schedule. With bed mobility rolled left with Min A, bed  rails and HOB elevated. Pt requires extra time and motivation during bed mobility. From sidelying pt sat EOB with VC, Min A adhering to spinal precautions. Pt could only tolerate sitting EOB for1-2 minutes before grimacing that the pain had increased in her abdomen. Pt performed sit to sidelying and back to supine with Min A and VC. T/f and amb not assessed at this time due to patient increase in pain and decreased activity tolerance. Pt will benefit from skilled PT to address deficits and increase independence with functional mobility. DC home with home health and assist vs inpatient rehab pending progress.     Time Calculation:    PT Charges     Row Name 04/13/22 1320             Time Calculation    Start Time 1320  -SB (r) JJ (t) SB (c)      Stop Time 1347  -SB (r) JJ (t) SB (c)      Time Calculation (min) 27 min  -SB (r) JJ (t)      PT Received On 04/13/22  -SB (r) JJ (t) SB (c)      PT Goal Re-Cert Due Date 04/23/22  -SB (r) JJ (t) SB (c)            User Key  (r) = Recorded By, (t) = Taken By, (c) = Cosigned By    Initials Name Provider Type    Nitza Perrin, PT DPT Physical Therapist    Ashish Bravo, PT Student PT Student                  PT G-Codes  Outcome Measure Options: AM-PAC 6 Clicks Basic Mobility (PT)  AM-PAC 6 Clicks Score (PT): 12  AM-PAC 6 Clicks Score (OT): 17    Ashish Kay PT Student  4/13/2022

## 2022-04-13 NOTE — PLAN OF CARE
Goal Outcome Evaluation:  Plan of Care Reviewed With: (P) patient        Progress: (P) no change  Outcome Evaluation: (P) PT re-eval complete. Pt A&Ox4, on RA, in supine. Pt reports pain is 9/10 on the lateral aspect of her abdomen. Prior to functional mobility assessment pt educated on spinal precautions and LSO wear schedule. With bed mobility rolled left with Min A, bed rails and HOB elevated. Pt requires extra time and motivation during bed mobility. From sidelying pt sat EOB with VC, Min A adhering to spinal precautions. Pt could only tolerate sitting EOB for1-2 minutes before grimacing that the pain had increased in her abdomen. Pt performed sit to sidelying and back to supine with Min A and VC. T/f and amb not assessed at this time due to patient increase in pain and decreased activity tolerance. Pt will benefit from skilled PT to address deficits and increase independence with functional mobility. DC home with home health and assist vs inpatient rehab pending progress.

## 2022-04-13 NOTE — PLAN OF CARE
Goal Outcome Evaluation:  Plan of Care Reviewed With: patient, daughter        Progress: no change   Pt alert and oriented x4. VSS. c/o pain relieved with PRN meds. BUCIO. PPP. SCDS for VTE. . Tolerating reg diet. Skin dressings cdi. Voiding via bedside commode. Up x 1 with LSO and walker. Call light within reach. Safety maintained.

## 2022-04-13 NOTE — ANESTHESIA PREPROCEDURE EVALUATION
Anesthesia Evaluation     Patient summary reviewed   no history of anesthetic complications:  NPO Solid Status: > 8 hours  NPO Liquid Status: > 8 hours           Airway   Mallampati: I  TM distance: >3 FB  Neck ROM: full  Dental    (+) edentulous    Pulmonary    (+) a smoker Current Abstained day of surgery, COPD,   (-) asthma, sleep apnea  Cardiovascular   Exercise tolerance: good (4-7 METS)    ECG reviewed    (+) hyperlipidemia,   (-) hypertension, past MI, CAD, dysrhythmias, cardiac stents      Neuro/Psych  (+) weakness (left leg), numbness (left leg),    (-) seizures, TIA  GI/Hepatic/Renal/Endo    (+)  GERD,  thyroid problem   (-) liver disease, diabetes    Musculoskeletal     (+) back pain,   Abdominal    Substance History      OB/GYN          Other   arthritis, blood dyscrasia anemia,   history of cancer                      Anesthesia Plan    ASA 2     general   (Pt very uncomfortable in holding; receiving opioid )  intravenous induction     Anesthetic plan, all risks, benefits, and alternatives have been provided, discussed and informed consent has been obtained with: patient.        CODE STATUS:

## 2022-04-13 NOTE — PLAN OF CARE
Goal Outcome Evaluation:  Plan of Care Reviewed With: patient, daughter        Progress: no change  Outcome Evaluation: OT re-eval completed this AM. Pt needing to use BR s/p return to room from surgery. SBA to roll, Ela to come to sitting. Ela and HHA to pivot to BSC. Donned LSO in sitting with maxA. Impulsive with need for cues for safety and physical assist to prvent fall. HHA to take a couple steps to chair. MaxA for LB dressing. Cont OT POC to increase independence with ADL and fxl mobility and decrease fall risk. Recommend d/c home w assist and HH vs inpatient rehab pending progress.

## 2022-04-13 NOTE — THERAPY RE-EVALUATION
Patient Name: Sammi Sorenson  : 1950    MRN: [id number]                              Today's Date: 2022       Admit Date: 2022    Visit Dx:     ICD-10-CM ICD-9-CM   1. Decreased activities of daily living (ADL)  Z78.9 V49.89   2. Impaired mobility  Z74.09 799.89     Patient Active Problem List   Diagnosis   • Tobacco abuse   • Cellulitis of face   • Thrush   • Chronic bilateral low back pain with bilateral sciatica   • Deep groin pain   • Hip pain, acute, left   • Ischial bursitis of left side   • Lumbar disc disease with radiculopathy   • Pain in both knees   • Pain management contract agreement   • Piriformis syndrome of left side   • SI (sacroiliac) joint dysfunction   • Lumbosacral disc disease   • Lumbago of multiple sites in spine with sciatica     Past Medical History:   Diagnosis Date   • Anxiety    • Cellulitis of face 2019   • Chronic abdominal pain    • Chronic back pain    • Chronic gastritis    • Chronic pain     sees pain vilma. since    • CKD (chronic kidney disease)    • Colon polyp    • COPD (chronic obstructive pulmonary disease) (HCC)    • Disease of thyroid gland    • Diverticulosis    • Dyslipidemia    • GERD (gastroesophageal reflux disease)    • Hemorrhoid    • Hiatal hernia    • History of adenomatous polyp of colon    • Leaky heart valve    • Lumbosacral disc disease 2022   • Migraine    • Neuropathy    • Osteoarthritis    • Ovarian cancer (HCC)    • Submandibular gland mass      Past Surgical History:   Procedure Laterality Date   • ANTERIOR LUMBAR EXPOSURE N/A 2022    Procedure: ANTERIOR LUMBAR EXPOSURE;  Surgeon: Juan Francisco Nelson DO;  Location: [facility] OR;  Service: Vascular;  Laterality: N/A;   • CARDIAC CATHETERIZATION      no stent   • CHOLECYSTECTOMY     • COLONOSCOPY  04/10/2015    5 polyps, tubular adenomas, melanosis, diverticulosis, internal hemorrhoids,    • CYST REMOVAL     • ENDOSCOPY  04/10/2015    hiatus hernia   • HYSTERECTOMY     •  LUMBAR FUSION N/A 4/11/2022    Procedure: ANTERIOR DECOMPRESSION, ANTERIOR LUMBAR INTERBODY FUSION WITH INSTRUMENTATION L5-S1;  Surgeon: JESSIKA Reese MD;  Location: [facility] OR;  Service: Orthopedic Spine;  Laterality: N/A;   • LUNG LOBECTOMY Right     2002,[facility]   • REPLACEMENT TOTAL KNEE BILATERAL     • SUBMANDIBULAR GLAND EXCISION Left 11/02/2018    Procedure: Incision and drainage of left submandibular region abscess with excision of left submandibular region mass mass consistent with probable lymph node;  Surgeon: Duncan Ewing MD;  Location: [facility] OR;  Service: ENT      General Information     Row Name 04/13/22 1110          OT Time and Intention    Document Type re-evaluation  -     Mode of Treatment occupational therapy  -     Row Name 04/13/22 1110          General Information    Patient Profile Reviewed yes  -     Existing Precautions/Restrictions brace worn when out of bed;fall;spinal  -     Row Name 04/13/22 1110          Occupational Profile    Reason for Services/Referral (Occupational Profile) S/P:RIGHT LATERAL LUMBAR INTERBODY FUSION WITH INSTRUMENTATION L4-5  -     Row Name 04/13/22 1110          Cognition    Orientation Status (Cognition) oriented x 4  -     Row Name 04/13/22 1110          Safety Issues, Functional Mobility    Impairments Affecting Function (Mobility) balance;pain;strength;endurance/activity tolerance;motor control  -           User Key  (r) = Recorded By, (t) = Taken By, (c) = Cosigned By    Initials Name Provider Type     Felicia Perez, OTR/L Occupational Therapist                 Mobility/ADL's     Row Name 04/13/22 1110          Bed Mobility    Rolling Left [place] (Bed Mobility) standby assist;verbal cues  -     Sidelying-Sit [place] (Bed Mobility) minimum assist (75% patient effort);verbal cues  -     Assistive Device (Bed Mobility) head of bed elevated;bed rails  -     Row Name 04/13/22 1110          Transfers     Transfers toilet transfer  -     Stand-Sit [place] (Transfers) minimum assist (75% patient effort);verbal cues;nonverbal cues (demo/gesture)  -     [place] Level (Toilet Transfer) minimum assist (75% patient effort);verbal cues;nonverbal cues (demo/gesture)  -     Assistive Device (Toilet Transfer) commode, 3-in-1  A  -MW     Row Name 04/13/22 1110          Stand-Sit Transfer    Assistive Device (Stand-Sit Transfers) --  [facility]  -[facility] 04/13/22 1110          Functional Mobility    Functional Mobility- Ind. Level contact guard assist;minimum assist (75% patient effort)  -     Functional Mobility- Comment a couple steps to chair, cues for safety  -[facility] Name 04/13/22 1110          Activities of Daily Living    BADL Assessment/Intervention toileting  -MW     Row Name 04/13/22 [facility]0          Toileting Assessment/Training    [place] Level (Toileting) adjust/manage clothing;change pad/brief;contact guard assist  -     Position (Toileting) supported standing;supported sitting  -           User Key  (r) = Recorded By, (t) = Taken By, (c) = Cosigned By    Initials Name Provider Type     Felicia Perez, OTR/L Occupational Therapist               Obj/Interventions     [facility] Name 04/13/22 1110          Range of Motion Comprehensive    General Range of Motion bilateral upper extremity ROM WFL  -MW     Row Name 04/13/22 [facility]0          Strength Comprehensive (MMT)    Comment, General Manual Muscle Testing (MMT) Assessment BUE 4/5  -[facility] Name 04/13/22 1110          Balance    Static Sitting Balance standby assist  -     Dynamic Sitting Balance standby assist  -MW     Position, Sitting Balance sitting edge of bed  -     Static Standing Balance minimal assist  -MW     Dynamic Standing Balance minimal assist  -MW     Position/Device Used, Standing Balance supported  -           User Key  (r) = Recorded By, (t) = Taken By, (c) = Cosigned By    Initials Name Provider Type      Felicia Perez, OTR/L Occupational Therapist               Goals/Plan     Row Name 04/13/22 1110          Transfer Goal 1 (OT)    Activity/Assistive Device (Transfer Goal 1, OT) sit-to-stand/stand-to-sit;bed-to-chair/chair-to-bed;toilet  -MW     [place] Level/Cues Needed (Transfer Goal 1, OT) supervision required  -MW     Time Frame (Transfer Goal 1, OT) long term goal (LTG);10 days  -MW     Progress/Outcome (Transfer Goal 1, OT) goal ongoing  -[facility] Name 04/13/22 1110          Dressing Goal 1 (OT)    Activity/Device (Dressing Goal 1, OT) upper body dressing;lower body dressing  -MW     [place]/Cues Needed (Dressing Goal 1, OT) minimum assist (75% or more patient effort)  -MW     Time Frame (Dressing Goal 1, OT) long term goal (LTG);10 days  -MW     Strategies/Barriers (Dressing Goal 1, OT) to include LSO  -MW     Progress/Outcome (Dressing Goal 1, OT) goal ongoing  -[facility] Name 04/13/22 1110          Toileting Goal 1 (OT)    Activity/Device (Toileting Goal 1, OT) toileting skills, all  -MW     [place] Level/Cues Needed (Toileting Goal 1, OT) supervision required  -MW     Time Frame (Toileting Goal 1, OT) long term goal (LTG);10 days  -MW     Progress/Outcome (Toileting Goal 1, OT) goal ongoing  -[facility] Name 04/13/22 1110          Therapy Assessment/Plan (OT)    Planned Therapy Interventions (OT) activity tolerance training;BADL retraining;functional balance retraining;IADL retraining;orthotic fabrication/fitting/training;occupation/activity based interventions;patient/caregiver education/training;transfer/mobility retraining;strengthening exercise  -MW           User Key  (r) = Recorded By, (t) = Taken By, (c) = Cosigned By    Initials Name Provider Type    Felicia Perez OTR/L Occupational Therapist               Clinical Impression     Row Name 04/13/22 1110          Pain Assessment    Pretreatment Pain Rating 8/10  -MW     Posttreatment Pain Rating 8/10  -MW     Pain Location  - back  -MW     Pain Intervention(s) Medication (See MAR);Repositioned;Ambulation/increased activity  -     Row Name 04/13/22 1110          Plan of Care Review    Plan of Care Reviewed With patient;daughter  -     Progress no change  -     Outcome Evaluation OT re-eval completed this AM. Pt needing to use BR s/p return to room from surgery. SBA to roll, Ela to come to sitting. Ela and HHA to pivot to BSC. Donned LSO in sitting with maxA. Impulsive with need for cues for safety and physical assist to prvent fall. HHA to take a couple steps to chair. MaxA for LB dressing. Cont OT POC to increase independence with ADL and fxl mobility and decrease fall risk. Recommend d/c home w assist and HH vs inpatient rehab pending progress.  -     Row Name 04/13/22 1110          Therapy Assessment/Plan (OT)    Rehab Potential (OT) good, to achieve stated therapy goals  -     Criteria for Skilled Therapeutic Interventions Met (OT) yes;skilled treatment is necessary  -     Therapy Frequency (OT) 5 times/wk  -MW     Predicted Duration of Therapy Intervention (OT) 10 days  -     Row Name 04/13/22 1110          Therapy Plan Review/Discharge Plan (OT)    Anticipated Discharge Disposition (OT) home with 24/7 care;home with home health;skilled nursing facility;inpatient rehabilitation facility  -     Row Name 04/13/22 1110          Positioning and Restraints    Pre-Treatment Position in bed  -MW     Post Treatment Position chair  -MW     In Chair sitting;call light within reach;encouraged to call for assist;with family/caregiver;with brace  -           User Key  (r) = Recorded By, (t) = Taken By, (c) = Cosigned By    Initials Name Provider Type    MW Felicia Perez, OTR/L Occupational Therapist               Outcome Measures     Row Name 04/13/22 1110          How much help from another is currently needed...    Putting on and taking off regular lower body clothing? 2  -MW     Bathing (including washing, rinsing, and  drying) 2  -MW     Toileting (which includes using toilet bed pan or urinal) 3  -MW     Putting on and taking off regular upper body clothing 3  -MW     Taking care of personal grooming (such as brushing teeth) 4  -MW     Eating meals 4  -MW     AM-PAC 6 Clicks Score (OT) 18  -MW     Row Name 04/13/22 1320          How much help from another person do you currently need...    Turning from your back to your side while in flat bed without using bedrails? 3  -SB (r) JJ (t) SB (c)     Moving from lying on back to sitting on the side of a flat bed without bedrails? 3  -SB (r) JJ (t) SB (c)     Moving to and from a bed to a chair (including a wheelchair)? 2  -SB (r) JJ (t) SB (c)     Standing up from a chair using your arms (e.g., wheelchair, bedside chair)? 2  -SB (r) JJ (t) SB (c)     Climbing 3-5 steps with a railing? 1  -SB (r) JJ (t) SB (c)     To walk in hospital room? 1  -SB (r) JJ (t) SB (c)     AM-PAC 6 Clicks Score (PT) 12  -SB (r) JJ (t)     Row Name 04/13/22 1320 04/13/22 1110       Functional Assessment    Outcome Measure Options AM-PAC 6 Clicks Basic Mobility (PT)  -SB (r) JJ (t) SB (c) AM-PAC 6 Clicks Daily Activity (OT)  -          User Key  (r) = Recorded By, (t) = Taken By, (c) = Cosigned By    Initials Name Provider Type     Felicia Perez, OTR/L Occupational Therapist    Nitza Perrin, PT DPT Physical Therapist    Ashish Bravo, MARTA Student PT Student                Occupational Therapy Education                 Title: PT OT SLP Therapies (In Progress)     Topic: Occupational Therapy (Done)     Point: ADL training (Done)     Description:   Instruct learner(s) on proper safety adaptation and remediation techniques during self care or transfers.   Instruct in proper use of assistive devices.              Learning Progress Summary           Patient Acceptance, E,D, VU,NR by HESHAM at 4/13/2022 1535    Acceptance, E,D, NR by HESHAM at 4/11/2022 1452                   Point: Home exercise program  (Done)     Description:   Instruct learner(s) on appropriate technique for monitoring, assisting and/or progressing therapeutic exercises/activities.              Learning Progress Summary           Patient Acceptance, E,D, VU,NR by  at 4/13/2022 1535    Acceptance, E,D, NR by  at 4/11/2022 1452                   Point: Precautions (Done)     Description:   Instruct learner(s) on prescribed precautions during self-care and functional transfers.              Learning Progress Summary           Patient Acceptance, E,D, VU,NR by  at 4/13/2022 1535    Acceptance, E,D, NR by  at 4/11/2022 1452                   Point: Body mechanics (Done)     Description:   Instruct learner(s) on proper positioning and spine alignment during self-care, functional mobility activities and/or exercises.              Learning Progress Summary           Patient Acceptance, E,D, VU,NR by  at 4/13/2022 1535    Acceptance, E,D, NR by  at 4/11/2022 1452                               User Key     Initials Effective Dates Name Provider Type Discipline     08/28/18 -  Felicia Perez, OTR/L Occupational Therapist OT              OT Recommendation and Plan  Planned Therapy Interventions (OT): activity tolerance training, BADL retraining, functional balance retraining, IADL retraining, orthotic fabrication/fitting/training, occupation/activity based interventions, patient/caregiver education/training, transfer/mobility retraining, strengthening exercise  Therapy Frequency (OT): 5 times/wk  Plan of Care Review  Plan of Care Reviewed With: patient, daughter  Progress: no change  Outcome Evaluation: OT re-eval completed this AM. Pt needing to use BR s/p return to room from surgery. SBA to roll, Ela to come to sitting. Ela and HHA to pivot to BSC. Donned LSO in sitting with maxA. Impulsive with need for cues for safety and physical assist to prvent fall. HHA to take a couple steps to chair. MaxA for LB dressing. Cont OT POC to increase  independence with ADL and fxl mobility and decrease fall risk. Recommend d/c home w assist and HH vs inpatient rehab pending progress.     Time Calculation:    Time Calculation- OT     Row Name 04/13/22 1535             Time Calculation- OT    OT Start Time 1110  +6 min chart review  -MW      OT Stop Time 1145  -MW      OT Time Calculation (min) 35 min  -MW      OT Received On 04/13/22  -MW      OT Goal Re-Cert Due Date 04/23/22  -            User Key  (r) = Recorded By, (t) = Taken By, (c) = Cosigned By    Initials Name Provider Type    MW Felicia Perez, OTR/L Occupational Therapist              Therapy Charges for Today     Code Description Service Date Service Provider Modifiers Qty    59607887411 HC OT RE-EVAL 2 4/13/2022 Felicia Perez OTR/L GO 1    38197606966 HC OT SELF CARE/MGMT/TRAIN EA 15 MIN 4/13/2022 Felicia Perez OTR/L GO 1               Felicia Perez OTR/MARLEY  4/13/2022

## 2022-04-13 NOTE — OP NOTE
LUMBAR LATERAL INTERBODY FUSION  Procedure Note    Sammi Sorenson  4/13/2022    Pre-op Diagnosis:     1. Increasing chronic back pain.  2. Bilateral buttock, thigh and leg radiculopathy, left worse than right.  3. Neurogenic claudication.  4. Multilevel thoracolumbar degenerative disk disease, severe, L4 to S1.  5. Facet arthropathy, L4 to S1.  6. Left central disc herniation, L4-5.  7. Foraminal disc herniation, left L5-S1.  8. Central and bilateral foraminal stenosis, L4 to S1, left worse than right.  9. Osteopenia, T-score -1.4, 02/17/2022.  10.  Status post anterior decompression, ALIF with instrumentation of L5-S1, 4/11/2022    Post-op Diagnosis:    same    Procedure/CPT® Codes:    1.  Right lateral lumbar interbody fusion L4-5  2.  Anterior spinal instrumentation L4-5 (Nu[employer]asive lateral plate and screws)  3.  Use of PEEK interbody biomechanical device for fusion L4-5 ([employer] porous PEEK spacer)  4.  Use of allograft bone matrix for fusion (MagnetOs)  5.  Use of fluoroscopy for confirmation of surgical level, placement of PEEK spacer and instrumentation  6.  Intraoperative neural monitoring    Anesthesia: General    Surgeon: ADIN Reese MD    Assistant: Sven Canales PA-C    Estimated Blood Loss: Minimal    Complications: None    Condition: Stable to PACU.    Indications:    The patient is a 71-year-old who sees Dr. Zak Alan for medical issues.  She presented to the office with complaints of increasing chronic back pain along with bilateral buttock, thigh, and leg radiculopathy, with the left side worse than the right.  Her symptoms were very consistent with neurogenic claudication.  Imaging studies revealed multilevel thoracolumbar degenerative disc disease and facet arthropathy that was severe from L4-S1.  She was noted to have a left central disc herniation at L4-5 and a foraminal disc herniation on the left side of L5-S1.  The disc herniations together with the degenerative changes combined to  form central and bilateral foraminal stenosis from L4-S1 that was worse on the left than the right and matched her symptoms.    After failing conservative measures, it was mutually decided that surgery would be the best option. Risks, benefits, and complications of surgery were discussed with the patient. The patient appeared well informed and wished to proceed. We specifically discussed the risk of infection, blood loss, nerve root injury, CSF leak, and the possibility of incomplete resolution of symptoms. We also discussed the possible risk of a nonunion and the potential need for additional surgery in the event of a pseudoarthrosis or hardware failure.     We elected proceed with a staged operation.  The first stage of the procedure was performed on 4/11/2022 and involved an anterior decompression and ALIF with instrumentation of L5-S1.  This level was addressed anteriorly and a separate stage as it cannot be accessed through a lateral approach.  Today we are addressing L4-5 with a lateral fusion.  It is planned we will be proceeding with a final posterior procedure at a later date involving a posterior spinal fusion with instrumentation spanning L4-S1.    Operative Procedure:    After obtaining informed consent and verifying the correct operative site, the patient was brought to the operating room and placed supine on operating table.  A general anesthetic was provided by the anesthesia service with the assistance of an endotracheal tube.  Once this was appropriately positioned and secured, the patient was carefully rotated into the lateral decubitus position with the right side up. All bony prominences were well-padded.  3 inch wide cloth tape was used to maintain the patient's position.  It was also used to tip open the pelvis slightly allowing better access to the lumbar spine through a lateral approach.  Fluoroscopy was then used to identify the L4-5 level, and the skin was marked for our planned incision.   The right flank was then prepped and draped in the usual sterile fashion.  A surgical timeout was taken to confirm this was the correct patient, we were working at the correct level, and that preoperative antibiotics were given in a timely fashion.    An oblique incision was created of the right flank using a 10 blade scalpel directly centered over the L4-5 segment. Dissection was carried bluntly through subcutaneous tissues. Blunt dissection was also carried between the external oblique and internal oblique musculature. The transversalis fascia was pierced allowing access to the retroperitoneal space. At this point, a series of neuromonitoring probes were used to safely access the L4-5 level. We used stimulated and free run EMG for this purpose. Once nerve roots were confirmed to be out of harm's way, self retaining retractors were placed for continuous exposure.     An annulotomy was then created at the L4-5 area using a 15 blade scalpel on a long handle. A Hoang elevator was used to remove disc material off of the endplates. Disc material was removed using Kerrisons, pituitaries, and forward angled curettes. Once all disc material had been retrieved, I used the Hoang elevator to divide the contralateral annulus. This assisted with mobilization of the vertebral body. We then used a series of endplate scrapers to prepare the endplates for interbody fusion. Trial spacers were malleted into position and for the disc space it was felt that a 10 mm x 55 mm implant would be the best fit to restore disc height. The disc space was then thoroughly irrigated with saline solution.     A porous PEEK spacer from the NuVasive instrumentation set measuring 10 mm x 55 mm x 22 mm was then packed with allograft bone matrix as tightly as possible. This PEEK spacer was then malleted into the L4-5 disc space under fluoroscopic guidance. It was placed as a PEEK interbody biomechanical device to assist with interbody fusion. Once this  spacer was confirmed to be properly positioned, I chose a 2-hole plate to help augment the fusion of L4-5. This plate was held into position using screws. I placed a 50 mm screw into L4 and I placed a shorter 35 mm screw into L5. The shorter screw was used to hopefully accommodate the placement of a pedicle screw on the contralateral L5 pedicle as part of the next stage of the procedure.     The wound was then irrigated thoroughly. A thorough inspection was then undertaken to ensure that we had adequate hemostasis. Bleeding at this point was controlled using thrombin with Gelfoam powder and bipolar cautery. Closure was then accomplished with a #1 Vicryl reapproximating the internal and external oblique musculature. Immediate subcutaneous cutaneous tissues were closed with a 3-0 Vicryl. And skin closure was accomplished using Mastisol and Steri-Strips. The wound was then sterilely dressed. The patient was then carefully rotated supine onto a hospital gurney, extubated, and sent to the recovery room in good stable condition.     The patient tolerated the procedure well. There were no complications. We estimate blood loss to be minimal. The patient remained hemodynamically stable.     Intraoperative neuro monitoring was ordered and carried out throughout the procedure to add an increased level of safety for the patient.  The interpreting physician was available by means of real-time continuous, bidirectional, remote audio and visual communication as needed throughout the entire procedure.  Modalities used during the procedure included SSEP, EEG, MEP, EMG, and TOF.  There were no neuro monitoring signal changes during the procedure.    Sven Canales PA-C provided critical assistance during the procedure. His assistance was medically necessary in order to allow the procedure to occur in the most safe and efficient manner.    ADIN Reese MD     Date: 4/13/2022  Time: 08:22 CDT

## 2022-04-13 NOTE — ANESTHESIA POSTPROCEDURE EVALUATION
"Patient: Sammi Sorenson    Procedure Summary     Date: 04/13/22 Room / Location:  [facility] OR  /  [facility]    Anesthesia Start: 0704 Anesthesia Stop: 0842    Procedure: RIGHT LATERAL LUMBAR INTERBODY FUSION WITH INSTRUMENTATION L4-5 (Right Spine Lumbar) Diagnosis: (M54.16)    Surgeons: JESSIKA Reese MD Provider: Aniket Wood CRNA    Anesthesia Type: general ASA Status: 2          Anesthesia Type: general    Vitals  Vitals Value Taken Time   /132 04/13/22 0943   Temp 98.9 °F (37.2 °C) 04/13/22 0930   Pulse 76 04/13/22 0945   Resp 14 04/13/22 0930   SpO2 95 % 04/13/22 0945   Vitals shown include unvalidated device data.        Post Anesthesia Care and Evaluation    Patient location during evaluation: PACU  Patient participation: complete - patient participated  Level of consciousness: awake and alert  Pain management: adequate  Airway patency: patent  Anesthetic complications: No anesthetic complications  PONV Status: none  Cardiovascular status: acceptable and hemodynamically stable  Respiratory status: acceptable  Hydration status: acceptable    Comments: Blood pressure 164/59, pulse 94, temperature 98.6 °F (37 °C), temperature source Oral, resp. rate 20, height 166.4 cm (65.5\"), weight 68.5 kg (151 lb), SpO2 94 %.    Patient discharged from PACU based upon Arash score. Please see RN notes for further details      "

## 2022-04-13 NOTE — PLAN OF CARE
Goal Outcome Evaluation:               Patient not sleeping well throughout the night.  Noncompliant with waiting for staff to assist with ambulating - advised risks of ambulating without staff, but patient repeatedly leaves bed without waiting for staff to assist.  Gait very unsteady.  Refuses LSO brace a lot of the time.  Patient has severe tremors to bilateral hands/arms when awake and staff in room, tremors cease when patient is resting in bed with eyes closed.  Ambulating to bathroom multiple times, has not waited for staff to enter room and assist throughout the night - bed alarm remains activated.  One instance of n/v.  Voiding.  Passing flatus - no BM.  Scheduled for surgery again this morning - currently NPO.  SCDs off due to severe fall risk d/t patient's severe impulsivity.

## 2022-04-14 LAB
ANION GAP SERPL CALCULATED.3IONS-SCNC: 9 MMOL/L (ref 5–15)
BUN SERPL-MCNC: 12 MG/DL (ref 8–23)
BUN/CREAT SERPL: 18.5 (ref 7–25)
CALCIUM SPEC-SCNC: 8.2 MG/DL (ref 8.6–10.5)
CHLORIDE SERPL-SCNC: 102 MMOL/L (ref 98–107)
CO2 SERPL-SCNC: 28 MMOL/L (ref 22–29)
CREAT SERPL-MCNC: 0.65 MG/DL (ref 0.57–1)
DEPRECATED RDW RBC AUTO: 45.1 FL (ref 37–54)
EGFRCR SERPLBLD CKD-EPI 2021: 93.1 ML/MIN/1.73
ERYTHROCYTE [DISTWIDTH] IN BLOOD BY AUTOMATED COUNT: 13.5 % (ref 12.3–15.4)
GLUCOSE SERPL-MCNC: 93 MG/DL (ref 65–99)
HCT VFR BLD AUTO: 28.8 % (ref 34–46.6)
HGB BLD-MCNC: 9.2 G/DL (ref 12–15.9)
MCH RBC QN AUTO: 29.3 PG (ref 26.6–33)
MCHC RBC AUTO-ENTMCNC: 31.9 G/DL (ref 31.5–35.7)
MCV RBC AUTO: 91.7 FL (ref 79–97)
PLATELET # BLD AUTO: 138 10*3/MM3 (ref 140–450)
PMV BLD AUTO: 12.7 FL (ref 6–12)
POTASSIUM SERPL-SCNC: 3.6 MMOL/L (ref 3.5–5.2)
RBC # BLD AUTO: 3.14 10*6/MM3 (ref 3.77–5.28)
SODIUM SERPL-SCNC: 139 MMOL/L (ref 136–145)
VIT B12 BLD-MCNC: 191 PG/ML (ref 211–946)
WBC NRBC COR # BLD: 7.27 10*3/MM3 (ref 3.4–10.8)

## 2022-04-14 PROCEDURE — 0 HYDROMORPHONE 1 MG/ML SOLUTION: Performed by: ORTHOPAEDIC SURGERY

## 2022-04-14 PROCEDURE — 85027 COMPLETE CBC AUTOMATED: CPT | Performed by: ORTHOPAEDIC SURGERY

## 2022-04-14 PROCEDURE — 97116 GAIT TRAINING THERAPY: CPT

## 2022-04-14 PROCEDURE — 80048 BASIC METABOLIC PNL TOTAL CA: CPT | Performed by: ORTHOPAEDIC SURGERY

## 2022-04-14 RX ORDER — FERROUS SULFATE 325(65) MG
325 TABLET ORAL
Status: DISCONTINUED | OUTPATIENT
Start: 2022-04-14 | End: 2022-04-19 | Stop reason: HOSPADM

## 2022-04-14 RX ADMIN — HYDROCODONE BITARTRATE AND ACETAMINOPHEN 2 TABLET: 7.5; 325 TABLET ORAL at 14:36

## 2022-04-14 RX ADMIN — HYDROCODONE BITARTRATE AND ACETAMINOPHEN 2 TABLET: 7.5; 325 TABLET ORAL at 20:37

## 2022-04-14 RX ADMIN — POLYETHYLENE GLYCOL 3350 17 G: 17 POWDER, FOR SOLUTION ORAL at 20:37

## 2022-04-14 RX ADMIN — SODIUM CHLORIDE 100 ML/HR: 9 INJECTION, SOLUTION INTRAVENOUS at 17:50

## 2022-04-14 RX ADMIN — TIZANIDINE 4 MG: 4 TABLET ORAL at 06:01

## 2022-04-14 RX ADMIN — FERROUS SULFATE TAB 325 MG (65 MG ELEMENTAL FE) 325 MG: 325 (65 FE) TAB at 09:23

## 2022-04-14 RX ADMIN — LEVOTHYROXINE SODIUM 100 MCG: 100 TABLET ORAL at 06:00

## 2022-04-14 RX ADMIN — DOCUSATE SODIUM 50 MG AND SENNOSIDES 8.6 MG 1 TABLET: 8.6; 5 TABLET, FILM COATED ORAL at 20:37

## 2022-04-14 RX ADMIN — HYDROCODONE BITARTRATE AND ACETAMINOPHEN 2 TABLET: 7.5; 325 TABLET ORAL at 04:42

## 2022-04-14 RX ADMIN — HYDROMORPHONE HYDROCHLORIDE 1 MG: 1 INJECTION, SOLUTION INTRAMUSCULAR; INTRAVENOUS; SUBCUTANEOUS at 17:49

## 2022-04-14 RX ADMIN — ALPRAZOLAM 0.5 MG: 0.5 TABLET ORAL at 04:42

## 2022-04-14 RX ADMIN — Medication 3 ML: at 20:38

## 2022-04-14 RX ADMIN — DOCUSATE SODIUM 50 MG AND SENNOSIDES 8.6 MG 1 TABLET: 8.6; 5 TABLET, FILM COATED ORAL at 09:23

## 2022-04-14 RX ADMIN — HYDROMORPHONE HYDROCHLORIDE 1 MG: 1 INJECTION, SOLUTION INTRAMUSCULAR; INTRAVENOUS; SUBCUTANEOUS at 06:12

## 2022-04-14 RX ADMIN — HYDROCODONE BITARTRATE AND ACETAMINOPHEN 1 TABLET: 7.5; 325 TABLET ORAL at 10:28

## 2022-04-14 RX ADMIN — Medication 3 ML: at 09:24

## 2022-04-14 RX ADMIN — POLYETHYLENE GLYCOL 3350 17 G: 17 POWDER, FOR SOLUTION ORAL at 09:23

## 2022-04-14 RX ADMIN — HYDROMORPHONE HYDROCHLORIDE 1 MG: 1 INJECTION, SOLUTION INTRAMUSCULAR; INTRAVENOUS; SUBCUTANEOUS at 12:37

## 2022-04-14 RX ADMIN — TIZANIDINE 4 MG: 4 TABLET ORAL at 14:36

## 2022-04-14 RX ADMIN — ESCITALOPRAM 10 MG: 10 TABLET, FILM COATED ORAL at 09:23

## 2022-04-14 RX ADMIN — HYDROMORPHONE HYDROCHLORIDE 1 MG: 1 INJECTION, SOLUTION INTRAMUSCULAR; INTRAVENOUS; SUBCUTANEOUS at 22:39

## 2022-04-14 RX ADMIN — PANTOPRAZOLE SODIUM 40 MG: 40 TABLET, DELAYED RELEASE ORAL at 06:01

## 2022-04-14 NOTE — PLAN OF CARE
Goal Outcome Evaluation:  Plan of Care Reviewed With: patient           Outcome Evaluation: Alert and oriented x4. VSS. Room air. SBA OOB with LSO. Voiding without difficulty. C/o pain 8-9, PRN medications given. Resting well in between but asking for pain medications more frequent than are available. Good appetite. Plan for part 3 tomorrow. NPO at midnight. All needs met at this time. Plan of care continued.

## 2022-04-14 NOTE — CASE MANAGEMENT/SOCIAL WORK
Continued Stay Note   [facility]     Patient Name: Sammi Sorenson  MRN: [id number]  Today's Date: 4/14/2022    Admit Date: 4/11/2022     Discharge Plan     Row Name 04/14/22 1058       Plan    Plan Comments Events noted. Pt last surgery is scheduled for tomorrow (4-15). Pt is ambulating 200ft with therapy . Will follow after last surgery regarding dc plans. Anticipate HH vs Snf/rehab.               Discharge Codes    No documentation.                     RUTHIE Plunkett

## 2022-04-14 NOTE — PLAN OF CARE
Goal Outcome Evaluation:              Outcome Evaluation: Aox4. VSS. Maintaining sats on room air. Up x1 to BSC with LSO brace, voiding. Appears to be resting well after PRN pain medicine administered. Dressing to abd and R flank CDI. SCD's bilaterally. Call light in reach.

## 2022-04-14 NOTE — PROGRESS NOTES
"    Daily Progress Note  Sammi Sorenson  MRN: [id number] LOS: 3    Admit Date: 2022 07:42 CDT    Subjective:         Interval History:    Reviewed overnight events and nursing notes.     Well this morning.  No complaints.  Tolerated surgery well yesterday.    ROS:  Review of Systems   Constitutional: Negative for chills and fever.   Respiratory: Negative for cough, chest tightness, shortness of breath and wheezing.    Cardiovascular: Negative for chest pain, palpitations and leg swelling.   Gastrointestinal: Negative for abdominal pain, blood in stool, diarrhea, nausea and vomiting.       DIET:  Diet Regular    Medications:   lactated ringers, 100 mL/hr, Last Rate: 100 mL/hr (22)  lactated ringers, 100 mL/hr, Last Rate: 100 mL/hr (22)  sodium chloride, 100 mL/hr, Last Rate: 100 mL/hr (22)      escitalopram, 10 mg, Oral, Daily  levothyroxine, 100 mcg, Oral, Q AM  pantoprazole, 40 mg, Oral, QAM  polyethylene glycol, 17 g, Oral, BID  senna-docusate sodium, 1 tablet, Oral, BID  sodium chloride, 3 mL, Intravenous, Q12H          Objective:     Vitals: /51 (BP Location: Left arm, Patient Position: Lying)   Pulse 58   Temp 98.2 °F (36.8 °C) (Oral)   Resp 16   Ht 166.4 cm (65.5\")   Wt 68.5 kg (151 lb)   SpO2 95%   BMI 24.75 kg/m²    Intake/Output Summary (Last 24 hours) at 2022 0742  Last data filed at 2022 1905  Gross per 24 hour   Intake --   Output 700 ml   Net -700 ml    Temp (24hrs), Av.5 °F (36.9 °C), Min:98.1 °F (36.7 °C), Max:99.2 °F (37.3 °C)    Glucose:  No results found for: POCGLU  Physical Examination:   Physical Exam  Constitutional:       General: She is not in acute distress.     Appearance: Normal appearance. She is not ill-appearing or toxic-appearing.   Cardiovascular:      Rate and Rhythm: Normal rate and regular rhythm.      Pulses: Normal pulses.      Heart sounds: Normal heart sounds. No murmur heard.  Pulmonary:      " Effort: Pulmonary effort is normal. No respiratory distress.      Breath sounds: Normal breath sounds. No stridor. No wheezing or rales.   Abdominal:      General: Abdomen is flat. Bowel sounds are normal. There is no distension.      Palpations: Abdomen is soft.      Tenderness: There is no abdominal tenderness.   Neurological:      Mental Status: She is alert.         Labs:  Lab Results (last 24 hours)     Procedure Component Value Units Date/Time    Basic Metabolic Panel [[id number]]  (Abnormal) Collected: 04/14/22 0628    Specimen: Blood Updated: 04/14/22 0734     Glucose 93 mg/dL      BUN 12 mg/dL      Creatinine 0.65 mg/dL      Sodium 139 mmol/L      Potassium 3.6 mmol/L      Chloride 102 mmol/L      CO2 28.0 mmol/L      Calcium 8.2 mg/dL      BUN/Creatinine Ratio 18.5     Anion Gap 9.0 mmol/L      eGFR 93.1 mL/min/1.73      Comment: National Kidney Foundation and American Society of Nephrology (ASN) Task Force recommended calculation based on the Chronic Kidney Disease Epidemiology Collaboration (CKD-EPI) equation refit without adjustment for race.       Narrative:      GFR Normal >60  Chronic Kidney Disease <60  Kidney Failure <15      CBC (No Diff) [[id number]]  (Abnormal) Collected: 04/14/22 0628    Specimen: Blood Updated: 04/14/22 0711     WBC 7.27 10*3/mm3      RBC 3.14 10*6/mm3      Hemoglobin 9.2 g/dL      Hematocrit 28.8 %      MCV 91.7 fL      MCH 29.3 pg      MCHC 31.9 g/dL      RDW 13.5 %      RDW-SD 45.1 fl      MPV 12.7 fL      Platelets 138 10*3/mm3     Vitamin B12 [[id number]]  (Abnormal) Collected: 04/13/22 0542    Specimen: Blood Updated: 04/14/22 0337     Vitamin B-12 191 pg/mL     Narrative:      Results may be falsely increased if patient taking Biotin.             Imaging:  FL C Arm During Surgery    Result Date: 4/13/2022  XR SPINE LUMBAR AP AND LATERAL- 4/13/2022 7:05 AM CDT  HISTORY: llif; Z78.9-Other specified health status; Z74.09-Other reduced mobility  COMPARISON: None  FLUOROSCOPY  TIME: 0.3 minutes  FLUOROSCOPY DOSE: 15.3 mGy  NUMBER OF IMAGES: 4      Impression:  Intraoperative fluoroscopic images during lateral lumbar fusion.  Please refer to the operative note for more details. This report was finalized on 04/13/2022 08:30 by Dr Ramírez Garg, .    XR Spine Lumbar AP & Lateral    Result Date: 4/13/2022  XR SPINE LUMBAR AP AND LATERAL- 4/13/2022 7:05 AM CDT  HISTORY: llif; Z78.9-Other specified health status; Z74.09-Other reduced mobility  COMPARISON: None  FLUOROSCOPY TIME: 0.3 minutes  FLUOROSCOPY DOSE: 15.3 mGy  NUMBER OF IMAGES: 4      Impression:  Intraoperative fluoroscopic images during lateral lumbar fusion.  Please refer to the operative note for more details. This report was finalized on 04/13/2022 08:30 by Dr Ramírez Garg, .         Assessment and Plan:     Primary Problem:  <principal problem not specified>    Hospital Problem list:    Chronic bilateral low back pain with bilateral sciatica    Lumbosacral disc disease    Lumbago of multiple sites in spine with sciatica      Assessment: 71-year-old female who presents for chronic low back pain.  She underwent operative fixation on 4/11 and 4/13 which was uncomplicated.     Plan:     Perioperative care  Underwent operative fixation on 4/11 and 4/13 which was tolerated well.  We will continue bowel regimen, encouraged incentive spirometry and ambulation physical and Occupational Therapy.  Planning for final stage on 4/15.     COPD  Not in exacerbation, continue inhalers.     Acute anemia, suspect blood loss from surgery  Mild iron deficiency anemia.  Will start some iron supplementation and will need outpatient follow-up for this.    Reviewed treatment plans with the patient and/or family.     Code Status:   Code Status and Medical Interventions:   Ordered at: 04/11/22 1152     Code Status (Patient has no pulse and is not breathing):    CPR (Attempt to Resuscitate)     Medical Interventions (Patient has pulse or is breathing):     Full Support       Electronically signed by Julian Brunner MD on 4/14/2022 at 07:42 CDT

## 2022-04-14 NOTE — THERAPY TREATMENT NOTE
Acute Care - Occupational Therapy Treatment Note  [facility]     Patient Name: Sammi Sorenson  : 1950  MRN: [id number]  Today's Date: 2022             Admit Date: 2022       ICD-10-CM ICD-9-CM   1. Decreased activities of daily living (ADL)  Z78.9 V49.89   2. Impaired mobility  Z74.09 799.89     Patient Active Problem List   Diagnosis   • Tobacco abuse   • Cellulitis of face   • Thrush   • Chronic bilateral low back pain with bilateral sciatica   • Deep groin pain   • Hip pain, acute, left   • Ischial bursitis of left side   • Lumbar disc disease with radiculopathy   • Pain in both knees   • Pain management contract agreement   • Piriformis syndrome of left side   • SI (sacroiliac) joint dysfunction   • Lumbosacral disc disease   • Lumbago of multiple sites in spine with sciatica     Past Medical History:   Diagnosis Date   • Anxiety    • Cellulitis of face 2019   • Chronic abdominal pain    • Chronic back pain    • Chronic gastritis    • Chronic pain     sees pain vilma. since    • CKD (chronic kidney disease)    • Colon polyp    • COPD (chronic obstructive pulmonary disease) (HCC)    • Disease of thyroid gland    • Diverticulosis    • Dyslipidemia    • GERD (gastroesophageal reflux disease)    • Hemorrhoid    • Hiatal hernia    • History of adenomatous polyp of colon    • Leaky heart valve    • Lumbosacral disc disease 2022   • Migraine    • Neuropathy    • Osteoarthritis    • Ovarian cancer (HCC)    • Submandibular gland mass      Past Surgical History:   Procedure Laterality Date   • ANTERIOR LUMBAR EXPOSURE N/A 2022    Procedure: ANTERIOR LUMBAR EXPOSURE;  Surgeon: Juan Francisco Nelson DO;  Location: [facility];  Service: Vascular;  Laterality: N/A;   • CARDIAC CATHETERIZATION      no stent   • CHOLECYSTECTOMY     • COLONOSCOPY  04/10/2015    5 polyps, tubular adenomas, melanosis, diverticulosis, internal hemorrhoids,    • CYST REMOVAL     • ENDOSCOPY  04/10/2015    hiatus  hernia   • HYSTERECTOMY     • LUMBAR FUSION N/A 4/11/2022    Procedure: ANTERIOR DECOMPRESSION, ANTERIOR LUMBAR INTERBODY FUSION WITH INSTRUMENTATION L5-S1;  Surgeon: JESSIKA Reese MD;  Location:  [facility];  Service: Orthopedic Spine;  Laterality: N/A;   • LUMBAR FUSION Right 4/13/2022    Procedure: RIGHT LATERAL LUMBAR INTERBODY FUSION WITH INSTRUMENTATION L4-5;  Surgeon: JESSIKA Reese MD;  Location:  [facility];  Service: Orthopedic Spine;  Laterality: Right;   • LUNG LOBECTOMY Right     2002,[facility]   • REPLACEMENT TOTAL KNEE BILATERAL     • SUBMANDIBULAR GLAND EXCISION Left 11/02/2018    Procedure: Incision and drainage of left submandibular region abscess with excision of left submandibular region mass mass consistent with probable lymph node;  Surgeon: Duncan Ewing MD;  Location:  [facility];  Service: ENT         OT ASSESSMENT FLOWSHEET (last 12 hours)     OT Evaluation and Treatment     Row Name 04/14/22 0905                   OT Time and Intention    Document Type therapy note (daily note)  -TS (r) CC (t) TS (c)        Mode of Treatment occupational therapy  -TS (r) CC (t) TS (c)        Session Not Performed other (see comments)  -TS (r) CC (t) TS (c)        Comment, Session Not Performed Pt has already completed self care. Declines Occupational therapy treatment and has 3rd surgery tomorrow.  -TS (r) CC (t) TS (c)                  Wound 04/11/22 0825 Left lower abdomen Incision    Wound - Properties Group Placement Date: 04/11/22  -DT Placement Time: 0825  -DT Present on Hospital Admission: N  -DT Side: Left  -DT Orientation: lower  -DT Location: abdomen  -DT Primary Wound Type: Incision  -DT        Retired Wound - Properties Group Placement Date: 04/11/22  -DT Placement Time: 0825  -DT Present on Hospital Admission: N  -DT Side: Left  -DT Orientation: lower  -DT Location: abdomen  -DT Primary Wound Type: Incision  -DT        Retired Wound - Properties Group Date first assessed:  04/11/22  -DT Time first assessed: 0825  -DT Present on Hospital Admission: N  -DT Side: Left  -DT Location: abdomen  -DT Primary Wound Type: Incision  -DT                  Wound 04/13/22 0739 Right lower flank Incision    Wound - Properties Group Placement Date: 04/13/22  -JR Placement Time: 0739  -JR Present on Hospital Admission: N  -JR Side: Right  -JR Orientation: lower  -JR Location: flank  -JR Primary Wound Type: Incision  -JR Additional Comments: mastisol, steri-strips, 4x4 gauze, mepilex  -JR        Retired Wound - Properties Group Placement Date: 04/13/22  -JR Placement Time: 0739  -JR Present on Hospital Admission: N  -JR Side: Right  -JR Orientation: lower  -JR Location: flank  -JR Primary Wound Type: Incision  -JR Additional Comments: mastisol, steri-strips, 4x4 gauze, mepilex  -JR        Retired Wound - Properties Group Date first assessed: 04/13/22  -JR Time first assessed: 0739  -JR Present on Hospital Admission: N  -JR Side: Right  -JR Location: flank  -JR Primary Wound Type: Incision  -JR Additional Comments: mastisol, steri-strips, 4x4 gauze, mepilex  -JR              User Key  (r) = Recorded By, (t) = Taken By, (c) = Cosigned By    Initials Name Effective Dates    TS Latasha Zuniga, HAGER 06/16/21 -     DT Adilson Brunner RN 02/17/22 -     JR Alice Delgado RN 02/17/22 -     CC Vlaarie Soares OTA Student 03/16/22 -                  Occupational Therapy Education                 Title: PT OT SLP Therapies (In Progress)     Topic: Occupational Therapy (Done)     Point: ADL training (Done)     Description:   Instruct learner(s) on proper safety adaptation and remediation techniques during self care or transfers.   Instruct in proper use of assistive devices.              Learning Progress Summary           Patient Acceptance, E,D, VU,NR by MW at 4/13/2022 1535    Acceptance, E,D, NR by MW at 4/11/2022 1452                   Point: Home exercise program (Done)     Description:   Instruct  learner(s) on appropriate technique for monitoring, assisting and/or progressing therapeutic exercises/activities.              Learning Progress Summary           Patient Acceptance, E,D, VU,NR by  at 4/13/2022 1535    Acceptance, E,D, NR by  at 4/11/2022 1452                   Point: Precautions (Done)     Description:   Instruct learner(s) on prescribed precautions during self-care and functional transfers.              Learning Progress Summary           Patient Acceptance, E,D, VU,NR by  at 4/13/2022 1535    Acceptance, E,D, NR by  at 4/11/2022 1452                   Point: Body mechanics (Done)     Description:   Instruct learner(s) on proper positioning and spine alignment during self-care, functional mobility activities and/or exercises.              Learning Progress Summary           Patient Acceptance, E,D, VU,NR by  at 4/13/2022 1535    Acceptance, E,D, NR by  at 4/11/2022 1452                               User Key     Initials Effective Dates Name Provider Type Discipline     08/28/18 -  Felicia Perez, OTR/L Occupational Therapist OT                  OT Recommendation and Plan     Plan of Care Review  Plan of Care Reviewed With: patient  Progress: improving  Outcome Evaluation: Pt presented in chair and was alert and oriented x4. Pt stated she could not sit any longer in the chair. Pt completed sit<>stand with CGA and v/c. Amb to bathroom with CGA. Pt completed toilet transfer with CGA. Demonstrated toileting skills with Mod I. Completed grooming task sink side with set up. Pt had 2 LOB while standing sink side which she corrected on her own. Pt would benefit from continued OT to improve balance to increase independence in functional ADLs. Will recommend care options following future surgeries pending progress.  Plan of Care Reviewed With: patient  Outcome Evaluation: Pt presented in chair and was alert and oriented x4. Pt stated she could not sit any longer in the chair. Pt completed  sit<>stand with CGA and v/c. Amb to bathroom with CGA. Pt completed toilet transfer with CGA. Demonstrated toileting skills with Mod I. Completed grooming task sink side with set up. Pt had 2 LOB while standing sink side which she corrected on her own. Pt would benefit from continued OT to improve balance to increase independence in functional ADLs. Will recommend care options following future surgeries pending progress.     Outcome Measures     Row Name 04/12/22 0843             How much help from another is currently needed...    Putting on and taking off regular lower body clothing? 1  -TS (r) CC (t) TS (c)      Bathing (including washing, rinsing, and drying) 2  -TS (r) CC (t) TS (c)      Toileting (which includes using toilet bed pan or urinal) 3  -TS (r) CC (t) TS (c)      Putting on and taking off regular upper body clothing 3  -TS (r) CC (t) TS (c)      Taking care of personal grooming (such as brushing teeth) 4  -TS (r) CC (t) TS (c)      Eating meals 4  -TS (r) CC (t) TS (c)      AM-PAC 6 Clicks Score (OT) 17  -TS (r) CC (t)            User Key  (r) = Recorded By, (t) = Taken By, (c) = Cosigned By    Initials Name Provider Type    TS Latasha Zuniga COTA Occupational Therapist Assistant    Valarie Ambriz OTA Student OT Student                Time Calculation:    Time Calculation- OT     Row Name 04/14/22 1018             Timed Charges    51615 - Gait Training Minutes  18  -AH              Total Minutes    Timed Charges Total Minutes 18  -AH       Total Minutes 18  -AH            User Key  (r) = Recorded By, (t) = Taken By, (c) = Cosigned By    Initials Name Provider Type     Shoshana Duvall PTA Physical Therapist Assistant                       DANYELLE Brandon Student  4/14/2022

## 2022-04-14 NOTE — THERAPY TREATMENT NOTE
Acute Care - Physical Therapy Treatment Note  [facility]     Patient Name: Sammi Sorenson  : 1950  MRN: [id number]  Today's Date: 2022      Visit Dx:     ICD-10-CM ICD-9-CM   1. Decreased activities of daily living (ADL)  Z78.9 V49.89   2. Impaired mobility  Z74.09 799.89     Patient Active Problem List   Diagnosis   • Tobacco abuse   • Cellulitis of face   • Thrush   • Chronic bilateral low back pain with bilateral sciatica   • Deep groin pain   • Hip pain, acute, left   • Ischial bursitis of left side   • Lumbar disc disease with radiculopathy   • Pain in both knees   • Pain management contract agreement   • Piriformis syndrome of left side   • SI (sacroiliac) joint dysfunction   • Lumbosacral disc disease   • Lumbago of multiple sites in spine with sciatica     Past Medical History:   Diagnosis Date   • Anxiety    • Cellulitis of face 2019   • Chronic abdominal pain    • Chronic back pain    • Chronic gastritis    • Chronic pain     sees pain vilma. since    • CKD (chronic kidney disease)    • Colon polyp    • COPD (chronic obstructive pulmonary disease) (HCC)    • Disease of thyroid gland    • Diverticulosis    • Dyslipidemia    • GERD (gastroesophageal reflux disease)    • Hemorrhoid    • Hiatal hernia    • History of adenomatous polyp of colon    • Leaky heart valve    • Lumbosacral disc disease 2022   • Migraine    • Neuropathy    • Osteoarthritis    • Ovarian cancer (HCC)    • Submandibular gland mass      Past Surgical History:   Procedure Laterality Date   • ANTERIOR LUMBAR EXPOSURE N/A 2022    Procedure: ANTERIOR LUMBAR EXPOSURE;  Surgeon: Juan Francisco Nelson DO;  Location: [facility];  Service: Vascular;  Laterality: N/A;   • CARDIAC CATHETERIZATION      no stent   • CHOLECYSTECTOMY     • COLONOSCOPY  04/10/2015    5 polyps, tubular adenomas, melanosis, diverticulosis, internal hemorrhoids,    • CYST REMOVAL     • ENDOSCOPY  04/10/2015    hiatus hernia   • HYSTERECTOMY      • LUMBAR FUSION N/A 4/11/2022    Procedure: ANTERIOR DECOMPRESSION, ANTERIOR LUMBAR INTERBODY FUSION WITH INSTRUMENTATION L5-S1;  Surgeon: JESSIKA Reese MD;  Location:  [facility];  Service: Orthopedic Spine;  Laterality: N/A;   • LUMBAR FUSION Right 4/13/2022    Procedure: RIGHT LATERAL LUMBAR INTERBODY FUSION WITH INSTRUMENTATION L4-5;  Surgeon: JESSIKA Reese MD;  Location:  [facility];  Service: Orthopedic Spine;  Laterality: Right;   • LUNG LOBECTOMY Right     2002,[facility]   • REPLACEMENT TOTAL KNEE BILATERAL     • SUBMANDIBULAR GLAND EXCISION Left 11/02/2018    Procedure: Incision and drainage of left submandibular region abscess with excision of left submandibular region mass mass consistent with probable lymph node;  Surgeon: Duncan Ewing MD;  Location:  [facility];  Service: ENT     PT Assessment (last 12 hours)     PT Evaluation and Treatment     Row Name 04/14/22 0958 04/14/22 0803       Physical Therapy Time and Intention    Subjective Information complains of;pain  - --    Document Type therapy note (daily note)  - --    Mode of Treatment physical therapy  - --    Session Not Performed -- other (see comments)  -    Comment, Session Not Performed -- breakfast  -    Row Name 04/14/22 0958          General Information    Existing Precautions/Restrictions brace worn when out of bed;fall;spinal  -     Row Name 04/14/22 0958          Pain    Pretreatment Pain Rating 6/10  -     Posttreatment Pain Rating 8/10  -     Pain Location - abdomen  -     Pain Intervention(s) Medication (See MAR);Repositioned;Ambulation/increased activity  -     Row Name 04/14/22 0958          Bed Mobility    Bed Mobility sidelying-sit;sit-sidelying  -     Sidelying-Sit [place] (Bed Mobility) contact guard;verbal cues  -     Sit-Sidelying [place] (Bed Mobility) contact guard;verbal cues  -     Row Name 04/14/22 0958          Transfers    Transfers toilet transfer  -      Sit-Stand [place] (Transfers) contact guard;verbal cues  -AH     Stand-Sit [place] (Transfers) contact guard;verbal cues  -AH     Row Name 04/14/22 0958          Gait/Stairs (Locomotion)    [place] Level (Gait) contact guard;verbal cues  -     Assistive Device (Gait) walker, front-wheeled  -     Distance in Feet (Gait) 200  -AH     Row Name             Wound 04/11/22 0825 Left lower abdomen Incision    Wound - Properties Group Placement Date: 04/11/22  -DT Placement Time: 0825  -DT Present on Hospital Admission: N  -DT Side: Left  -DT Orientation: lower  -DT Location: abdomen  -DT Primary Wound Type: Incision  -DT     Retired Wound - Properties Group Placement Date: 04/11/22  -DT Placement Time: 0825  -DT Present on Hospital Admission: N  -DT Side: Left  -DT Orientation: lower  -DT Location: abdomen  -DT Primary Wound Type: Incision  -DT     Retired Wound - Properties Group Date first assessed: 04/11/22  -DT Time first assessed: 0825  -DT Present on Hospital Admission: N  -DT Side: Left  -DT Location: abdomen  -DT Primary Wound Type: Incision  -DT     Row Name             Wound 04/13/22 0739 Right lower flank Incision    Wound - Properties Group Placement Date: 04/13/22  -JR Placement Time: 0739  -JR Present on Hospital Admission: N  -JR Side: Right  -JR Orientation: lower  -JR Location: flank  -JR Primary Wound Type: Incision  -JR Additional Comments: mastisol, steri-strips, 4x4 gauze, mepilex  -JR     Retired Wound - Properties Group Placement Date: 04/13/22  -JR Placement Time: 0739  -JR Present on Hospital Admission: N  -JR Side: Right  -JR Orientation: lower  -JR Location: flank  -JR Primary Wound Type: Incision  -JR Additional Comments: mastisol, steri-strips, 4x4 gauze, mepilex  -JR     Retired Wound - Properties Group Date first assessed: 04/13/22  -JR Time first assessed: 0739  -JR Present on Hospital Admission: N  -JR Side: Right  -JR Location: flank  -JR Primary Wound Type: Incision   -JR Additional Comments: mastisol, steri-strips, 4x4 gauze, mepilex  -JR     Row Name 04/14/22 0958          Positioning and Restraints    Pre-Treatment Position in bed  -     Post Treatment Position bed  -     In Bed fowlers;call light within reach;encouraged to call for assist;SCD pump applied  -           User Key  (r) = Recorded By, (t) = Taken By, (c) = Cosigned By    Initials Name Provider Type     Shoshana Duvall PTA Physical Therapist Assistant    Adilson Razo RN Registered Nurse    Alice Foy RN Registered Nurse                Physical Therapy Education                 Title: PT OT SLP Therapies (In Progress)     Topic: Physical Therapy (In Progress)     Point: Mobility training (Done)     Learning Progress Summary           Patient Acceptance, D,TB,E, NR,VU by  at 4/13/2022 1347    Comment: Pt educated on POC, LSO wear schedule, log roll technique and spinal precautions    Acceptance, E, NR by MS at 4/11/2022 1534    Comment: role of PT in her care, spinal restrictions, use of LSO                   Point: Home exercise program (Not Started)     Learner Progress:  Not documented in this visit.          Point: Body mechanics (Done)     Learning Progress Summary           Patient Acceptance, D,TB,E, NR,VU by  at 4/13/2022 1347    Comment: Pt educated on POC, LSO wear schedule, log roll technique and spinal precautions                   Point: Precautions (Done)     Learning Progress Summary           Patient Acceptance, D,TB,E, NR,VU by  at 4/13/2022 1347    Comment: Pt educated on POC, LSO wear schedule, log roll technique and spinal precautions    Acceptance, E,TB, VU by  at 4/12/2022 1152    Comment: shivani/doff LSO    Acceptance, E, NR by MS at 4/11/2022 1534    Comment: role of PT in her care, spinal restrictions, use of LSO                               User Key     Initials Effective Dates Name Provider Type [facility] 06/16/21 -  Shoshana Duvall PTA Physical  Therapist Assistant PT    MS 06/19/18 -  Mercy Mccall, PT, DPT, NCS Physical Therapist PT    JJ 03/07/22 -  Ashish Kay, PT Student PT Student PT              PT Recommendation and Plan     Plan of Care Reviewed With: patient  Progress: improving  Outcome Evaluation: pt trans to EOB cga-min, min assist to shivani LSO, sit-stand cga, pt amb 50 feet rwx cga, bathroom trans cga, trans back to bed cga. pt would benefit from cont therapy   Outcome Measures     Row Name 04/12/22 0843             How much help from another is currently needed...    Putting on and taking off regular lower body clothing? 1  -TS (r) CC (t) TS (c)      Bathing (including washing, rinsing, and drying) 2  -TS (r) CC (t) TS (c)      Toileting (which includes using toilet bed pan or urinal) 3  -TS (r) CC (t) TS (c)      Putting on and taking off regular upper body clothing 3  -TS (r) CC (t) TS (c)      Taking care of personal grooming (such as brushing teeth) 4  -TS (r) CC (t) TS (c)      Eating meals 4  -TS (r) CC (t) TS (c)      AM-PAC 6 Clicks Score (OT) 17  -TS (r) CC (t)            User Key  (r) = Recorded By, (t) = Taken By, (c) = Cosigned By    Initials Name Provider Type     Latasha Zuniga COTA Occupational Therapist Assistant    CC Valarie Soares OTA Student OT Student                 Time Calculation:    PT Charges     Row Name 04/14/22 1018             Time Calculation    Start Time 0958  -      Stop Time 1016  -      Time Calculation (min) 18 min  -AH      PT Received On 04/14/22  -              Time Calculation- PT    Total Timed Code Minutes- PT 18 minute(s)  -AH              Timed Charges    64388 - Gait Training Minutes  18  -AH              Total Minutes    Timed Charges Total Minutes 18  -AH       Total Minutes 18  -AH            User Key  (r) = Recorded By, (t) = Taken By, (c) = Cosigned By    Initials Name Provider Type     Shoshana Duvall, PTA Physical Therapist Assistant              Therapy Charges  for Today     Code Description Service Date Service Provider Modifiers Qty    [id number] HC GAIT TRAINING EA 15 MIN 4/14/2022 Shoshana Duvall, PTA GP 1          PT G-Codes  Outcome Measure Options: AM-PAC 6 Clicks Basic Mobility (PT)  AM-PAC 6 Clicks Score (PT): 12  AM-PAC 6 Clicks Score (OT): 18    Shoshana Duvall, ABIMAEL  4/14/2022

## 2022-04-15 ENCOUNTER — ANESTHESIA (OUTPATIENT)
Dept: PERIOP | Facility: HOSPITAL | Age: 72
End: 2022-04-15

## 2022-04-15 ENCOUNTER — APPOINTMENT (OUTPATIENT)
Dept: GENERAL RADIOLOGY | Facility: HOSPITAL | Age: 72
End: 2022-04-15

## 2022-04-15 ENCOUNTER — ANESTHESIA EVENT (OUTPATIENT)
Dept: PERIOP | Facility: HOSPITAL | Age: 72
End: 2022-04-15

## 2022-04-15 PROBLEM — D62 ANEMIA DUE TO ACUTE BLOOD LOSS: Status: ACTIVE | Noted: 2022-04-15

## 2022-04-15 LAB
ANION GAP SERPL CALCULATED.3IONS-SCNC: 8 MMOL/L (ref 5–15)
BUN SERPL-MCNC: 11 MG/DL (ref 8–23)
BUN/CREAT SERPL: 17.2 (ref 7–25)
CALCIUM SPEC-SCNC: 8.4 MG/DL (ref 8.6–10.5)
CHLORIDE SERPL-SCNC: 105 MMOL/L (ref 98–107)
CO2 SERPL-SCNC: 28 MMOL/L (ref 22–29)
CREAT SERPL-MCNC: 0.64 MG/DL (ref 0.57–1)
DEPRECATED RDW RBC AUTO: 47 FL (ref 37–54)
EGFRCR SERPLBLD CKD-EPI 2021: 93.4 ML/MIN/1.73
ERYTHROCYTE [DISTWIDTH] IN BLOOD BY AUTOMATED COUNT: 13.8 % (ref 12.3–15.4)
GLUCOSE SERPL-MCNC: 113 MG/DL (ref 65–99)
HCT VFR BLD AUTO: 31 % (ref 34–46.6)
HGB BLD-MCNC: 9.8 G/DL (ref 12–15.9)
MCH RBC QN AUTO: 29.4 PG (ref 26.6–33)
MCHC RBC AUTO-ENTMCNC: 31.6 G/DL (ref 31.5–35.7)
MCV RBC AUTO: 93.1 FL (ref 79–97)
PLATELET # BLD AUTO: 151 10*3/MM3 (ref 140–450)
PMV BLD AUTO: 12.8 FL (ref 6–12)
POTASSIUM SERPL-SCNC: 3.3 MMOL/L (ref 3.5–5.2)
RBC # BLD AUTO: 3.33 10*6/MM3 (ref 3.77–5.28)
SODIUM SERPL-SCNC: 141 MMOL/L (ref 136–145)
WBC NRBC COR # BLD: 5.98 10*3/MM3 (ref 3.4–10.8)

## 2022-04-15 PROCEDURE — 0 HYDROMORPHONE 1 MG/ML SOLUTION: Performed by: ORTHOPAEDIC SURGERY

## 2022-04-15 PROCEDURE — 94664 DEMO&/EVAL PT USE INHALER: CPT

## 2022-04-15 PROCEDURE — C1713 ANCHOR/SCREW BN/BN,TIS/BN: HCPCS | Performed by: ORTHOPAEDIC SURGERY

## 2022-04-15 PROCEDURE — 80048 BASIC METABOLIC PNL TOTAL CA: CPT | Performed by: ORTHOPAEDIC SURGERY

## 2022-04-15 PROCEDURE — 25010000002 DROPERIDOL PER 5 MG: Performed by: ANESTHESIOLOGY

## 2022-04-15 PROCEDURE — 76000 FLUOROSCOPY <1 HR PHYS/QHP: CPT

## 2022-04-15 PROCEDURE — 4A11X4G MONITORING OF PERIPHERAL NERVOUS ELECTRICAL ACTIVITY, INTRAOPERATIVE, EXTERNAL APPROACH: ICD-10-PCS | Performed by: ORTHOPAEDIC SURGERY

## 2022-04-15 PROCEDURE — 25010000002 DEXAMETHASONE PER 1 MG: Performed by: ANESTHESIOLOGY

## 2022-04-15 PROCEDURE — 25010000002 PROPOFOL 10 MG/ML EMULSION: Performed by: NURSE ANESTHETIST, CERTIFIED REGISTERED

## 2022-04-15 PROCEDURE — 94799 UNLISTED PULMONARY SVC/PX: CPT

## 2022-04-15 PROCEDURE — 25010000002 ONDANSETRON PER 1 MG: Performed by: ANESTHESIOLOGY

## 2022-04-15 PROCEDURE — 25010000002 DEXAMETHASONE PER 1 MG: Performed by: NURSE ANESTHETIST, CERTIFIED REGISTERED

## 2022-04-15 PROCEDURE — 25010000002 HYDROMORPHONE PER 4 MG: Performed by: ANESTHESIOLOGY

## 2022-04-15 PROCEDURE — 85027 COMPLETE CBC AUTOMATED: CPT | Performed by: ORTHOPAEDIC SURGERY

## 2022-04-15 PROCEDURE — 25010000002 FENTANYL CITRATE (PF) 50 MCG/ML SOLUTION: Performed by: ANESTHESIOLOGY

## 2022-04-15 PROCEDURE — 25010000002 ONDANSETRON PER 1 MG: Performed by: NURSE ANESTHETIST, CERTIFIED REGISTERED

## 2022-04-15 PROCEDURE — 97168 OT RE-EVAL EST PLAN CARE: CPT | Performed by: OCCUPATIONAL THERAPIST

## 2022-04-15 PROCEDURE — 97535 SELF CARE MNGMENT TRAINING: CPT | Performed by: OCCUPATIONAL THERAPIST

## 2022-04-15 PROCEDURE — 72100 X-RAY EXAM L-S SPINE 2/3 VWS: CPT

## 2022-04-15 PROCEDURE — 94640 AIRWAY INHALATION TREATMENT: CPT

## 2022-04-15 PROCEDURE — 97164 PT RE-EVAL EST PLAN CARE: CPT | Performed by: PHYSICAL THERAPIST

## 2022-04-15 PROCEDURE — 0SG30K1 FUSION OF LUMBOSACRAL JOINT WITH NONAUTOLOGOUS TISSUE SUBSTITUTE, POSTERIOR APPROACH, POSTERIOR COLUMN, OPEN APPROACH: ICD-10-PCS | Performed by: ORTHOPAEDIC SURGERY

## 2022-04-15 PROCEDURE — 25010000002 CEFAZOLIN PER 500 MG: Performed by: ORTHOPAEDIC SURGERY

## 2022-04-15 PROCEDURE — C1769 GUIDE WIRE: HCPCS | Performed by: ORTHOPAEDIC SURGERY

## 2022-04-15 PROCEDURE — C1889 IMPLANT/INSERT DEVICE, NOC: HCPCS | Performed by: ORTHOPAEDIC SURGERY

## 2022-04-15 DEVICE — SET SCREW
Type: IMPLANTABLE DEVICE | Site: SPINE LUMBAR | Status: FUNCTIONAL
Brand: INVICTUS

## 2022-04-15 DEVICE — CANNULATED EXTENDED TAB POLYAXIAL REDUCTION SCREW, 6.5 MM X 45 MM
Type: IMPLANTABLE DEVICE | Site: SPINE LUMBAR | Status: FUNCTIONAL
Brand: INVICTUS

## 2022-04-15 DEVICE — CANNULATED EXTENDED TAB POLYAXIAL REDUCTION SCREW, 6.5 MM X 50 MM
Type: IMPLANTABLE DEVICE | Site: SPINE LUMBAR | Status: FUNCTIONAL
Brand: INVICTUS

## 2022-04-15 DEVICE — TI MIS LORDOTIC ROD, 5.5 MM X 60 MM
Type: IMPLANTABLE DEVICE | Site: SPINE LUMBAR | Status: FUNCTIONAL
Brand: INVICTUS

## 2022-04-15 DEVICE — MAGNETOS PUTTY 10CC 1-2MM USA
Type: IMPLANTABLE DEVICE | Site: SPINE LUMBAR | Status: FUNCTIONAL
Brand: MAGNETOS

## 2022-04-15 RX ORDER — FENTANYL CITRATE 50 UG/ML
25 INJECTION, SOLUTION INTRAMUSCULAR; INTRAVENOUS
Status: DISCONTINUED | OUTPATIENT
Start: 2022-04-15 | End: 2022-04-15 | Stop reason: HOSPADM

## 2022-04-15 RX ORDER — LIDOCAINE HYDROCHLORIDE 20 MG/ML
INJECTION, SOLUTION EPIDURAL; INFILTRATION; INTRACAUDAL; PERINEURAL AS NEEDED
Status: DISCONTINUED | OUTPATIENT
Start: 2022-04-15 | End: 2022-04-15 | Stop reason: SURG

## 2022-04-15 RX ORDER — SODIUM CHLORIDE 0.9 % (FLUSH) 0.9 %
3 SYRINGE (ML) INJECTION EVERY 12 HOURS SCHEDULED
Status: DISCONTINUED | OUTPATIENT
Start: 2022-04-15 | End: 2022-04-15 | Stop reason: HOSPADM

## 2022-04-15 RX ORDER — DROPERIDOL 2.5 MG/ML
0.62 INJECTION, SOLUTION INTRAMUSCULAR; INTRAVENOUS ONCE AS NEEDED
Status: COMPLETED | OUTPATIENT
Start: 2022-04-15 | End: 2022-04-15

## 2022-04-15 RX ORDER — MAGNESIUM HYDROXIDE 1200 MG/15ML
LIQUID ORAL AS NEEDED
Status: DISCONTINUED | OUTPATIENT
Start: 2022-04-15 | End: 2022-04-15 | Stop reason: HOSPADM

## 2022-04-15 RX ORDER — FLUMAZENIL 0.1 MG/ML
0.2 INJECTION INTRAVENOUS AS NEEDED
Status: DISCONTINUED | OUTPATIENT
Start: 2022-04-15 | End: 2022-04-15 | Stop reason: HOSPADM

## 2022-04-15 RX ORDER — NALOXONE HCL 0.4 MG/ML
0.04 VIAL (ML) INJECTION AS NEEDED
Status: DISCONTINUED | OUTPATIENT
Start: 2022-04-15 | End: 2022-04-15 | Stop reason: HOSPADM

## 2022-04-15 RX ORDER — EPHEDRINE SULFATE 50 MG/ML
INJECTION, SOLUTION INTRAVENOUS AS NEEDED
Status: DISCONTINUED | OUTPATIENT
Start: 2022-04-15 | End: 2022-04-15 | Stop reason: SURG

## 2022-04-15 RX ORDER — SODIUM CHLORIDE 0.9 % (FLUSH) 0.9 %
3-10 SYRINGE (ML) INJECTION AS NEEDED
Status: DISCONTINUED | OUTPATIENT
Start: 2022-04-15 | End: 2022-04-15 | Stop reason: HOSPADM

## 2022-04-15 RX ORDER — OXYCODONE AND ACETAMINOPHEN 10; 325 MG/1; MG/1
1 TABLET ORAL ONCE AS NEEDED
Status: DISCONTINUED | OUTPATIENT
Start: 2022-04-15 | End: 2022-04-15 | Stop reason: HOSPADM

## 2022-04-15 RX ORDER — LABETALOL HYDROCHLORIDE 5 MG/ML
5 INJECTION, SOLUTION INTRAVENOUS
Status: DISCONTINUED | OUTPATIENT
Start: 2022-04-15 | End: 2022-04-15 | Stop reason: HOSPADM

## 2022-04-15 RX ORDER — SUCCINYLCHOLINE/SOD CL,ISO/PF 200MG/10ML
SYRINGE (ML) INTRAVENOUS AS NEEDED
Status: DISCONTINUED | OUTPATIENT
Start: 2022-04-15 | End: 2022-04-15 | Stop reason: SURG

## 2022-04-15 RX ORDER — SODIUM CHLORIDE, SODIUM LACTATE, POTASSIUM CHLORIDE, CALCIUM CHLORIDE 600; 310; 30; 20 MG/100ML; MG/100ML; MG/100ML; MG/100ML
100 INJECTION, SOLUTION INTRAVENOUS CONTINUOUS PRN
Status: DISCONTINUED | OUTPATIENT
Start: 2022-04-15 | End: 2022-04-19 | Stop reason: HOSPADM

## 2022-04-15 RX ORDER — SODIUM CHLORIDE, SODIUM LACTATE, POTASSIUM CHLORIDE, CALCIUM CHLORIDE 600; 310; 30; 20 MG/100ML; MG/100ML; MG/100ML; MG/100ML
INJECTION, SOLUTION INTRAVENOUS CONTINUOUS PRN
Status: DISCONTINUED | OUTPATIENT
Start: 2022-04-15 | End: 2022-04-15 | Stop reason: SURG

## 2022-04-15 RX ORDER — OXYCODONE HCL 20 MG/1
20 TABLET, FILM COATED, EXTENDED RELEASE ORAL ONCE
Status: DISCONTINUED | OUTPATIENT
Start: 2022-04-15 | End: 2022-04-15 | Stop reason: HOSPADM

## 2022-04-15 RX ORDER — IPRATROPIUM BROMIDE AND ALBUTEROL SULFATE 2.5; .5 MG/3ML; MG/3ML
3 SOLUTION RESPIRATORY (INHALATION)
Status: DISCONTINUED | OUTPATIENT
Start: 2022-04-15 | End: 2022-04-19 | Stop reason: HOSPADM

## 2022-04-15 RX ORDER — SODIUM CHLORIDE, SODIUM LACTATE, POTASSIUM CHLORIDE, CALCIUM CHLORIDE 600; 310; 30; 20 MG/100ML; MG/100ML; MG/100ML; MG/100ML
100 INJECTION, SOLUTION INTRAVENOUS CONTINUOUS
Status: DISCONTINUED | OUTPATIENT
Start: 2022-04-15 | End: 2022-04-18

## 2022-04-15 RX ORDER — SODIUM CHLORIDE 0.9 % (FLUSH) 0.9 %
10 SYRINGE (ML) INJECTION AS NEEDED
Status: DISCONTINUED | OUTPATIENT
Start: 2022-04-15 | End: 2022-04-15 | Stop reason: HOSPADM

## 2022-04-15 RX ORDER — NEOSTIGMINE METHYLSULFATE 5 MG/5 ML
SYRINGE (ML) INTRAVENOUS AS NEEDED
Status: DISCONTINUED | OUTPATIENT
Start: 2022-04-15 | End: 2022-04-15 | Stop reason: SURG

## 2022-04-15 RX ORDER — ONDANSETRON 2 MG/ML
4 INJECTION INTRAMUSCULAR; INTRAVENOUS AS NEEDED
Status: DISCONTINUED | OUTPATIENT
Start: 2022-04-15 | End: 2022-04-15 | Stop reason: HOSPADM

## 2022-04-15 RX ORDER — ROCURONIUM BROMIDE 10 MG/ML
INJECTION, SOLUTION INTRAVENOUS AS NEEDED
Status: DISCONTINUED | OUTPATIENT
Start: 2022-04-15 | End: 2022-04-15 | Stop reason: SURG

## 2022-04-15 RX ORDER — ACETAMINOPHEN 500 MG
1000 TABLET ORAL ONCE
Status: COMPLETED | OUTPATIENT
Start: 2022-04-15 | End: 2022-04-15

## 2022-04-15 RX ORDER — DEXAMETHASONE SODIUM PHOSPHATE 4 MG/ML
4 INJECTION, SOLUTION INTRA-ARTICULAR; INTRALESIONAL; INTRAMUSCULAR; INTRAVENOUS; SOFT TISSUE ONCE AS NEEDED
Status: COMPLETED | OUTPATIENT
Start: 2022-04-15 | End: 2022-04-15

## 2022-04-15 RX ORDER — DEXAMETHASONE SODIUM PHOSPHATE 4 MG/ML
INJECTION, SOLUTION INTRA-ARTICULAR; INTRALESIONAL; INTRAMUSCULAR; INTRAVENOUS; SOFT TISSUE AS NEEDED
Status: DISCONTINUED | OUTPATIENT
Start: 2022-04-15 | End: 2022-04-15 | Stop reason: SURG

## 2022-04-15 RX ORDER — SUFENTANIL CITRATE 50 UG/ML
INJECTION EPIDURAL; INTRAVENOUS AS NEEDED
Status: DISCONTINUED | OUTPATIENT
Start: 2022-04-15 | End: 2022-04-15 | Stop reason: SURG

## 2022-04-15 RX ORDER — ONDANSETRON 2 MG/ML
INJECTION INTRAMUSCULAR; INTRAVENOUS AS NEEDED
Status: DISCONTINUED | OUTPATIENT
Start: 2022-04-15 | End: 2022-04-15 | Stop reason: SURG

## 2022-04-15 RX ORDER — HYDROMORPHONE HYDROCHLORIDE 1 MG/ML
0.5 INJECTION, SOLUTION INTRAMUSCULAR; INTRAVENOUS; SUBCUTANEOUS
Status: DISCONTINUED | OUTPATIENT
Start: 2022-04-15 | End: 2022-04-15 | Stop reason: HOSPADM

## 2022-04-15 RX ORDER — PROPOFOL 10 MG/ML
VIAL (ML) INTRAVENOUS AS NEEDED
Status: DISCONTINUED | OUTPATIENT
Start: 2022-04-15 | End: 2022-04-15 | Stop reason: SURG

## 2022-04-15 RX ORDER — SODIUM CHLORIDE 0.9 % (FLUSH) 0.9 %
10 SYRINGE (ML) INJECTION EVERY 12 HOURS SCHEDULED
Status: DISCONTINUED | OUTPATIENT
Start: 2022-04-15 | End: 2022-04-15 | Stop reason: HOSPADM

## 2022-04-15 RX ORDER — LIDOCAINE HYDROCHLORIDE 10 MG/ML
0.5 INJECTION, SOLUTION EPIDURAL; INFILTRATION; INTRACAUDAL; PERINEURAL ONCE AS NEEDED
Status: DISCONTINUED | OUTPATIENT
Start: 2022-04-15 | End: 2022-04-15 | Stop reason: HOSPADM

## 2022-04-15 RX ADMIN — SODIUM CHLORIDE 100 ML/HR: 9 INJECTION, SOLUTION INTRAVENOUS at 03:57

## 2022-04-15 RX ADMIN — Medication 3 ML: at 10:47

## 2022-04-15 RX ADMIN — IPRATROPIUM BROMIDE AND ALBUTEROL SULFATE 3 ML: .5; 3 SOLUTION RESPIRATORY (INHALATION) at 14:49

## 2022-04-15 RX ADMIN — SUFENTANIL CITRATE 15 MCG: 50 INJECTION EPIDURAL; INTRAVENOUS at 07:55

## 2022-04-15 RX ADMIN — PANTOPRAZOLE SODIUM 40 MG: 40 TABLET, DELAYED RELEASE ORAL at 10:45

## 2022-04-15 RX ADMIN — DEXAMETHASONE SODIUM PHOSPHATE 4 MG: 4 INJECTION, SOLUTION INTRA-ARTICULAR; INTRALESIONAL; INTRAMUSCULAR; INTRAVENOUS; SOFT TISSUE at 07:52

## 2022-04-15 RX ADMIN — Medication 3 ML: at 20:15

## 2022-04-15 RX ADMIN — SODIUM CHLORIDE 100 ML/HR: 9 INJECTION, SOLUTION INTRAVENOUS at 21:59

## 2022-04-15 RX ADMIN — DOCUSATE SODIUM 50 MG AND SENNOSIDES 8.6 MG 1 TABLET: 8.6; 5 TABLET, FILM COATED ORAL at 10:44

## 2022-04-15 RX ADMIN — ROCURONIUM BROMIDE 5 MG: 10 SOLUTION INTRAVENOUS at 07:08

## 2022-04-15 RX ADMIN — IPRATROPIUM BROMIDE AND ALBUTEROL SULFATE 3 ML: .5; 3 SOLUTION RESPIRATORY (INHALATION) at 10:57

## 2022-04-15 RX ADMIN — FENTANYL CITRATE 25 MCG: 50 INJECTION INTRAMUSCULAR; INTRAVENOUS at 09:50

## 2022-04-15 RX ADMIN — ONDANSETRON 4 MG: 2 INJECTION INTRAMUSCULAR; INTRAVENOUS at 09:37

## 2022-04-15 RX ADMIN — Medication 4 MG: at 08:42

## 2022-04-15 RX ADMIN — DEXAMETHASONE SODIUM PHOSPHATE 4 MG: 4 INJECTION, SOLUTION INTRA-ARTICULAR; INTRALESIONAL; INTRAMUSCULAR; INTRAVENOUS; SOFT TISSUE at 06:53

## 2022-04-15 RX ADMIN — CEFAZOLIN SODIUM 2 G: 10 INJECTION, POWDER, FOR SOLUTION INTRAVENOUS at 15:48

## 2022-04-15 RX ADMIN — DROPERIDOL 0.62 MG: 2.5 INJECTION, SOLUTION INTRAMUSCULAR; INTRAVENOUS at 09:51

## 2022-04-15 RX ADMIN — SODIUM CHLORIDE, POTASSIUM CHLORIDE, SODIUM LACTATE AND CALCIUM CHLORIDE: 600; 310; 30; 20 INJECTION, SOLUTION INTRAVENOUS at 06:56

## 2022-04-15 RX ADMIN — Medication 160 MG: at 07:08

## 2022-04-15 RX ADMIN — HYDROMORPHONE HYDROCHLORIDE 0.5 MG: 1 INJECTION, SOLUTION INTRAMUSCULAR; INTRAVENOUS; SUBCUTANEOUS at 09:34

## 2022-04-15 RX ADMIN — ESCITALOPRAM 10 MG: 10 TABLET, FILM COATED ORAL at 10:44

## 2022-04-15 RX ADMIN — CEFAZOLIN SODIUM 2 G: 1 INJECTION, POWDER, FOR SOLUTION INTRAMUSCULAR; INTRAVENOUS at 07:15

## 2022-04-15 RX ADMIN — HYDROMORPHONE HYDROCHLORIDE 0.5 MG: 1 INJECTION, SOLUTION INTRAMUSCULAR; INTRAVENOUS; SUBCUTANEOUS at 09:49

## 2022-04-15 RX ADMIN — FENTANYL CITRATE 25 MCG: 50 INJECTION INTRAMUSCULAR; INTRAVENOUS at 09:45

## 2022-04-15 RX ADMIN — SUFENTANIL CITRATE 15 MCG: 50 INJECTION EPIDURAL; INTRAVENOUS at 07:08

## 2022-04-15 RX ADMIN — DOCUSATE SODIUM 50 MG AND SENNOSIDES 8.6 MG 1 TABLET: 8.6; 5 TABLET, FILM COATED ORAL at 20:15

## 2022-04-15 RX ADMIN — ACETAMINOPHEN 1000 MG: 500 TABLET, FILM COATED ORAL at 06:53

## 2022-04-15 RX ADMIN — IPRATROPIUM BROMIDE AND ALBUTEROL SULFATE 3 ML: .5; 3 SOLUTION RESPIRATORY (INHALATION) at 20:36

## 2022-04-15 RX ADMIN — PROPOFOL 120 MG: 10 INJECTION, EMULSION INTRAVENOUS at 07:08

## 2022-04-15 RX ADMIN — SODIUM CHLORIDE, POTASSIUM CHLORIDE, SODIUM LACTATE AND CALCIUM CHLORIDE: 600; 310; 30; 20 INJECTION, SOLUTION INTRAVENOUS at 07:32

## 2022-04-15 RX ADMIN — SODIUM CHLORIDE, POTASSIUM CHLORIDE, SODIUM LACTATE AND CALCIUM CHLORIDE: 600; 310; 30; 20 INJECTION, SOLUTION INTRAVENOUS at 06:53

## 2022-04-15 RX ADMIN — ROCURONIUM BROMIDE 30 MG: 10 SOLUTION INTRAVENOUS at 07:25

## 2022-04-15 RX ADMIN — SODIUM CHLORIDE 100 ML/HR: 9 INJECTION, SOLUTION INTRAVENOUS at 10:44

## 2022-04-15 RX ADMIN — HYDROMORPHONE HYDROCHLORIDE 1 MG: 1 INJECTION, SOLUTION INTRAMUSCULAR; INTRAVENOUS; SUBCUTANEOUS at 03:44

## 2022-04-15 RX ADMIN — EPHEDRINE SULFATE 10 MG: 50 INJECTION INTRAVENOUS at 07:39

## 2022-04-15 RX ADMIN — POLYETHYLENE GLYCOL 3350 17 G: 17 POWDER, FOR SOLUTION ORAL at 10:44

## 2022-04-15 RX ADMIN — LIDOCAINE HYDROCHLORIDE 100 MG: 20 INJECTION, SOLUTION EPIDURAL; INFILTRATION; INTRACAUDAL; PERINEURAL at 07:08

## 2022-04-15 RX ADMIN — Medication 10 ML: at 10:46

## 2022-04-15 RX ADMIN — POLYETHYLENE GLYCOL 3350 17 G: 17 POWDER, FOR SOLUTION ORAL at 20:15

## 2022-04-15 RX ADMIN — ONDANSETRON 4 MG: 2 INJECTION INTRAMUSCULAR; INTRAVENOUS at 08:32

## 2022-04-15 RX ADMIN — HYDROCODONE BITARTRATE AND ACETAMINOPHEN 2 TABLET: 7.5; 325 TABLET ORAL at 17:41

## 2022-04-15 RX ADMIN — HYDROMORPHONE HYDROCHLORIDE 1 MG: 1 INJECTION, SOLUTION INTRAMUSCULAR; INTRAVENOUS; SUBCUTANEOUS at 15:41

## 2022-04-15 RX ADMIN — GLYCOPYRROLATE 0.3 MG: 0.2 INJECTION, SOLUTION INTRAMUSCULAR; INTRAVENOUS at 08:42

## 2022-04-15 NOTE — THERAPY RE-EVALUATION
Patient Name: Sammi Sorenson  : 1950    MRN: [id number]                              Today's Date: 4/15/2022       Admit Date: 2022    Visit Dx:     ICD-10-CM ICD-9-CM   1. Decreased activities of daily living (ADL)  Z78.9 V49.89   2. Impaired mobility  Z74.09 799.89     Patient Active Problem List   Diagnosis   • Tobacco abuse   • Cellulitis of face   • Thrush   • Chronic bilateral low back pain with bilateral sciatica   • Deep groin pain   • Hip pain, acute, left   • Ischial bursitis of left side   • Lumbar disc disease with radiculopathy   • Pain in both knees   • Pain management contract agreement   • Piriformis syndrome of left side   • SI (sacroiliac) joint dysfunction   • Lumbosacral disc disease   • Lumbago of multiple sites in spine with sciatica   • Anemia due to acute blood loss     Past Medical History:   Diagnosis Date   • Anxiety    • Cellulitis of face 2019   • Chronic abdominal pain    • Chronic back pain    • Chronic gastritis    • Chronic pain     sees pain vilma. since    • CKD (chronic kidney disease)    • Colon polyp    • COPD (chronic obstructive pulmonary disease) (HCC)    • Disease of thyroid gland    • Diverticulosis    • Dyslipidemia    • GERD (gastroesophageal reflux disease)    • Hemorrhoid    • Hiatal hernia    • History of adenomatous polyp of colon    • Leaky heart valve    • Lumbosacral disc disease 2022   • Migraine    • Neuropathy    • Osteoarthritis    • Ovarian cancer (HCC)    • Submandibular gland mass      Past Surgical History:   Procedure Laterality Date   • ANTERIOR LUMBAR EXPOSURE N/A 2022    Procedure: ANTERIOR LUMBAR EXPOSURE;  Surgeon: Juan Francisco Nelson DO;  Location: [facility] OR;  Service: Vascular;  Laterality: N/A;   • CARDIAC CATHETERIZATION      no stent   • CHOLECYSTECTOMY     • COLONOSCOPY  04/10/2015    5 polyps, tubular adenomas, melanosis, diverticulosis, internal hemorrhoids,    • CYST REMOVAL     • ENDOSCOPY  04/10/2015     hiatus hernia   • HYSTERECTOMY     • LUMBAR FUSION N/A 4/11/2022    Procedure: ANTERIOR DECOMPRESSION, ANTERIOR LUMBAR INTERBODY FUSION WITH INSTRUMENTATION L5-S1;  Surgeon: JESSIKA Reese MD;  Location:  [facility] OR;  Service: Orthopedic Spine;  Laterality: N/A;   • LUMBAR FUSION Right 4/13/2022    Procedure: RIGHT LATERAL LUMBAR INTERBODY FUSION WITH INSTRUMENTATION L4-5;  Surgeon: JESSIKA Reese MD;  Location:  [facility] OR;  Service: Orthopedic Spine;  Laterality: Right;   • LUNG LOBECTOMY Right     2002,[facility]   • REPLACEMENT TOTAL KNEE BILATERAL     • SUBMANDIBULAR GLAND EXCISION Left 11/02/2018    Procedure: Incision and drainage of left submandibular region abscess with excision of left submandibular region mass mass consistent with probable lymph node;  Surgeon: Duncan Ewing MD;  Location:  [facility] OR;  Service: ENT      General Information     Row Name 04/15/22 1415 04/15/22 1410       OT Time and Intention    Document Type re-evaluation  - --  -    Mode of Treatment occupational therapy  - --    Row Name 04/15/22 1112          OT Time and Intention    Document Type --  -     Row Name 04/15/22 1415          General Information    Patient Profile Reviewed yes  -     Existing Precautions/Restrictions brace worn when out of bed;fall;spinal  -     Row Name 04/15/22 1415          Living Environment    People in Home child(johanna), adult  -     Row Name 04/15/22 1415          Home Main Entrance    Number of Stairs, Main Entrance two  -     Stair Railings, Main Entrance railing on right side (ascending)  -     Row Name 04/15/22 1415          Cognition    Orientation Status (Cognition) oriented x 4  -     Row Name 04/15/22 1415          Safety Issues, Functional Mobility    Safety Issues Affecting Function (Mobility) safety precautions follow-through/compliance;safety precaution awareness  -     Impairments Affecting Function (Mobility)  balance;pain;strength;endurance/activity tolerance  -           User Key  (r) = Recorded By, (t) = Taken By, (c) = Cosigned By    Initials Name Provider Type     Jacqui Hobbs OTR/L Occupational Therapist                 Mobility/ADL's     Row Name 04/15/22 1415          Bed Mobility    Bed Mobility sidelying-sit  -     Sidelying-Sit [place] (Bed Mobility) contact guard;verbal cues  -     Assistive Device (Bed Mobility) bed rails  -CH     Row Name 04/15/22 1415          Transfers    Transfers sit-stand transfer;stand-sit transfer  -     Sit-Stand [place] (Transfers) contact guard;verbal cues;nonverbal cues (demo/gesture)  -     Stand-Sit [place] (Transfers) contact guard;verbal cues  -CH     Row Name 04/15/22 1415          Functional Mobility    Functional Mobility- Ind. Level contact guard assist;nonverbal cues required (demo/gesture);verbal cues required  -CH     Row Name 04/15/22 1415          Activities of Daily Living    BADL Assessment/Intervention upper body dressing  -CH     Row Name 04/15/22 1415          Upper Body Dressing Assessment/Training    [place] Level (Upper Body Dressing) maximum assist (25% patient effort);doff  -     Position (Upper Body Dressing) edge of bed sitting  -     Comment, (Upper Body Dressing) NEELAM  -           User Key  (r) = Recorded By, (t) = Taken By, (c) = Cosigned By    Initials Name Provider Type     Jacqui Hobbs OTR/L Occupational Therapist               Obj/Interventions     Row Name 04/15/22 1415          Range of Motion Comprehensive    General Range of Motion no range of motion deficits identified  -[facility] Name 04/15/22 1415          Strength Comprehensive (MMT)    General Manual Muscle Testing (MMT) Assessment no strength deficits identified  -CH     Row Name 04/15/22 1415          Balance    Balance Assessment sitting static balance;sitting dynamic balance;sit to stand dynamic balance;standing static balance;standing  dynamic balance  -     Static Sitting Balance supervision  -     Dynamic Sitting Balance supervision  -     Position, Sitting Balance sitting edge of bed  -     Sit to Stand Dynamic Balance contact guard;minimal assist  -     Static Standing Balance contact guard  -     Dynamic Standing Balance minimal assist;contact guard  -     Balance Interventions sitting;standing;sit to stand;supported;static;dynamic  -CH           User Key  (r) = Recorded By, (t) = Taken By, (c) = Cosigned By    Initials Name Provider Type     Jacqui Hobbs, OTR/L Occupational Therapist               Goals/Plan     Row Name 04/15/22 1415          Transfer Goal 1 (OT)    Activity/Assistive Device (Transfer Goal 1, OT) sit-to-stand/stand-to-sit;bed-to-chair/chair-to-bed;toilet  -     [place] Level/Cues Needed (Transfer Goal 1, OT) supervision required  -CH     Time Frame (Transfer Goal 1, OT) long term goal (LTG);10 days  -CH     Progress/Outcome (Transfer Goal 1, OT) goal ongoing  -     Row Name 04/15/22 1415          Dressing Goal 1 (OT)    Activity/Device (Dressing Goal 1, OT) upper body dressing;lower body dressing  -     [place]/Cues Needed (Dressing Goal 1, OT) minimum assist (75% or more patient effort)  -CH     Time Frame (Dressing Goal 1, OT) long term goal (LTG);10 days  -     Strategies/Barriers (Dressing Goal 1, OT) to include LSO  -CH     Progress/Outcome (Dressing Goal 1, OT) goal ongoing  -     Row Name 04/15/22 1415          Toileting Goal 1 (OT)    Activity/Device (Toileting Goal 1, OT) toileting skills, all  -     [place] Level/Cues Needed (Toileting Goal 1, OT) supervision required  -     Time Frame (Toileting Goal 1, OT) long term goal (LTG);10 days  -CH     Progress/Outcome (Toileting Goal 1, OT) goal ongoing  -     Row Name 04/15/22 1415          Therapy Assessment/Plan (OT)    Planned Therapy Interventions (OT) activity tolerance training;adaptive equipment training;BADL  retraining;orthotic fabrication/fitting/training;occupation/activity based interventions;patient/caregiver education/training;transfer/mobility retraining;functional balance retraining  -           User Key  (r) = Recorded By, (t) = Taken By, (c) = Cosigned By    Initials Name Provider Type     Jacqui Hobbs, OTR/L Occupational Therapist               Clinical Impression     Row Name 04/15/22 1519          Pain Assessment    Pretreatment Pain Rating 9/10  -     Posttreatment Pain Rating 9/10  -     Pain Location - back  -     Pain Intervention(s) Medication (See MAR)  -     Row Name 04/15/22 1519          Plan of Care Review    Plan of Care Reviewed With patient  -     Progress no change  -     Outcome Evaluation Pt. cont to demo weakness, decreased act esther, imbalance, pain with mobility, & inability to recall spinal precautions.  Ms. Sorenson was able to verbalized her LSO wearing schedule but she cont to need assist to manage the LSO.  RN reinforced dressing during OT re eval.  Cont OT POC to improve pt. fxn & indep during ADLs & to cont edu & safety training prior to DC.  -     Row Name 04/15/22 1519          Therapy Assessment/Plan (OT)    Rehab Potential (OT) good, to achieve stated therapy goals  -     Criteria for Skilled Therapeutic Interventions Met (OT) yes;skilled treatment is necessary  -     Therapy Frequency (OT) 5 times/wk  -     Row Name 04/15/22 1519          Therapy Plan Review/Discharge Plan (OT)    Anticipated Discharge Disposition (OT) home with 24/7 care;home with home health;skilled nursing facility;inpatient rehabilitation facility  -     Row Name 04/15/22 1519          Positioning and Restraints    Pre-Treatment Position sitting in chair/recliner  -     Post Treatment Position bed  -     In Bed side lying right;call light within reach;encouraged to call for assist;pillow between legs  -           User Key  (r) = Recorded By, (t) = Taken By, (c) = Cosigned By     Initials Name Provider Type    Jacqui Danielle, OTR/L Occupational Therapist               Outcome Measures     Row Name 04/15/22 1415          How much help from another is currently needed...    Putting on and taking off regular lower body clothing? 2  -CH     Bathing (including washing, rinsing, and drying) 2  -CH     Toileting (which includes using toilet bed pan or urinal) 3  -CH     Putting on and taking off regular upper body clothing 3  -CH     Taking care of personal grooming (such as brushing teeth) 4  -CH     Eating meals 4  -CH     AM-PAC 6 Clicks Score (OT) 18  -CH     Row Name 04/15/22 1400          How much help from another person do you currently need...    Turning from your back to your side while in flat bed without using bedrails? 3  -MS     Moving from lying on back to sitting on the side of a flat bed without bedrails? 3  -MS     Moving to and from a bed to a chair (including a wheelchair)? 2  -MS     Standing up from a chair using your arms (e.g., wheelchair, bedside chair)? 2  -MS     Climbing 3-5 steps with a railing? 1  -MS     To walk in hospital room? 1  -MS     AM-PAC 6 Clicks Score (PT) 12  -MS     Row Name 04/15/22 1415 04/15/22 1400       Functional Assessment    Outcome Measure Options AM-PAC 6 Clicks Daily Activity (OT)  -CH AM-PAC 6 Clicks Basic Mobility (PT)  -MS          User Key  (r) = Recorded By, (t) = Taken By, (c) = Cosigned By    Initials Name Provider Type    Jacqui Danielle, OTR/L Occupational Therapist    Mercy Damon, PT, DPT, NCS Physical Therapist                Occupational Therapy Education                 Title: PT OT SLP Therapies (In Progress)     Topic: Occupational Therapy (Done)     Point: ADL training (Done)     Description:   Instruct learner(s) on proper safety adaptation and remediation techniques during self care or transfers.   Instruct in proper use of assistive devices.              Learning Progress Summary           Patient Acceptance,  E,D, VU,NR by  at 4/15/2022 1523    Acceptance, E,D, VU,NR by  at 4/13/2022 1535    Acceptance, E,D, NR by  at 4/11/2022 1452                   Point: Home exercise program (Done)     Description:   Instruct learner(s) on appropriate technique for monitoring, assisting and/or progressing therapeutic exercises/activities.              Learning Progress Summary           Patient Acceptance, E,D, VU,NR by  at 4/13/2022 1535    Acceptance, E,D, NR by  at 4/11/2022 1452                   Point: Precautions (Done)     Description:   Instruct learner(s) on prescribed precautions during self-care and functional transfers.              Learning Progress Summary           Patient Acceptance, E,D, VU,NR by  at 4/15/2022 1523    Acceptance, E,D, VU,NR by  at 4/13/2022 1535    Acceptance, E,D, NR by  at 4/11/2022 1452                   Point: Body mechanics (Done)     Description:   Instruct learner(s) on proper positioning and spine alignment during self-care, functional mobility activities and/or exercises.              Learning Progress Summary           Patient Acceptance, E,D, VU,NR by  at 4/15/2022 1523    Acceptance, E,D, VU,NR by  at 4/13/2022 1535    Acceptance, E,D, NR by  at 4/11/2022 1452                               User Key     Initials Effective Dates Name Provider Type Discipline     06/16/21 -  Jacqui Hobbs, OTR/L Occupational Therapist OT     08/28/18 -  Felicia Perez, OTR/L Occupational Therapist OT              OT Recommendation and Plan  Planned Therapy Interventions (OT): activity tolerance training, adaptive equipment training, BADL retraining, orthotic fabrication/fitting/training, occupation/activity based interventions, patient/caregiver education/training, transfer/mobility retraining, functional balance retraining  Therapy Frequency (OT): 5 times/wk  Plan of Care Review  Plan of Care Reviewed With: patient  Progress: no change  Outcome Evaluation: Pt. cont to demo  weakness, decreased act esther, imbalance, pain with mobility, & inability to recall spinal precautions.  Ms. Sorenson was able to verbalized her LSO wearing schedule but she cont to need assist to manage the LSO.  RN reinforced dressing during OT re eval.  Cont OT POC to improve pt. fxn & indep during ADLs & to cont edu & safety training prior to DC.     Time Calculation:    Time Calculation- OT     Row Name 04/15/22 1415             Time Calculation- OT    OT Start Time 1415  -CH      OT Stop Time 1455  -CH      OT Time Calculation (min) 40 min  -CH      Total Timed Code Minutes- OT 10 minute(s)  -CH      OT Received On 04/15/22  -      OT Goal Re-Cert Due Date 04/25/22  -              Timed Charges    63940 - OT Self Care/Mgmt Minutes 10  -CH              Untimed Charges    OT Eval/Re-eval Minutes 30  -CH              Total Minutes    Timed Charges Total Minutes 10  -CH      Untimed Charges Total Minutes 30  -CH       Total Minutes 40  -CH            User Key  (r) = Recorded By, (t) = Taken By, (c) = Cosigned By    Initials Name Provider Type     Jacqui Hobbs OTR/L Occupational Therapist              Therapy Charges for Today     Code Description Service Date Service Provider Modifiers Qty    70157695792 HC OT SELF CARE/MGMT/TRAIN EA 15 MIN 4/15/2022 Jacqui Hobbs OTR/L GO 1    64899554799 HC OT RE-EVAL 2 4/15/2022 Jacqui Hobbs OTR/L GO 1               DANIELLE Canales/MARLEY  4/15/2022

## 2022-04-15 NOTE — PLAN OF CARE
Goal Outcome Evaluation:  Plan of Care Reviewed With: patient        Progress: no change  Outcome Evaluation: Pt. cont to demo weakness, decreased act esther, imbalance, pain with mobility, & inability to recall spinal precautions.  Ms. Sorenson was able to verbalized her LSO wearing schedule but she cont to need assist to manage the LSO.  RN reinforced dressing during OT re eval.  Cont OT POC to improve pt. fxn & indep during ADLs & to cont edu & safety training prior to DC.

## 2022-04-15 NOTE — PROGRESS NOTES
"    Daily Progress Note  Sammi Sorenson  MRN: [id number] LOS: 4    Admit Date: 2022   4/15/2022 06:55 CDT    Subjective:         Interval History:    Reviewed overnight events and nursing notes.       ROS:  Review of Systems    DIET:  NPO Diet NPO Type: Sips with Meds    Medications:   lactated ringers, 100 mL/hr  lactated ringers, 100 mL/hr  sodium chloride, 100 mL/hr, Last Rate: 100 mL/hr (04/15/22 0357)      ceFAZolin, 2 g, Intravenous, On Call to OR  escitalopram, 10 mg, Oral, Daily  [MAR Hold] ferrous sulfate, 325 mg, Oral, Daily With Breakfast  levothyroxine, 100 mcg, Oral, Q AM  oxyCODONE, 20 mg, Oral, Once  pantoprazole, 40 mg, Oral, QAM  polyethylene glycol, 17 g, Oral, BID  senna-docusate sodium, 1 tablet, Oral, BID  sodium chloride, 10 mL, Intravenous, Q12H  sodium chloride, 3 mL, Intravenous, Q12H  sodium chloride, 3 mL, Intravenous, Q12H          Objective:     Vitals: /52 (BP Location: Left arm, Patient Position: Lying)   Pulse 51   Temp 98.1 °F (36.7 °C) (Oral)   Resp 18   Ht 166.4 cm (65.5\")   Wt 68.5 kg (151 lb)   SpO2 94%   BMI 24.75 kg/m²      Intake/Output Summary (Last 24 hours) at 4/15/2022 0655  Last data filed at 4/15/2022 0357  Gross per 24 hour   Intake 1000 ml   Output 450 ml   Net 550 ml    Temp (24hrs), Av.1 °F (36.7 °C), Min:97.8 °F (36.6 °C), Max:98.7 °F (37.1 °C)    Glucose:  No results found for: POCGLU  Physical Examination:   Physical Exam    Labs:  Lab Results (last 24 hours)     Procedure Component Value Units Date/Time    Basic Metabolic Panel [[id number]]  (Abnormal) Collected: 22    Specimen: Blood Updated: 22     Glucose 93 mg/dL      BUN 12 mg/dL      Creatinine 0.65 mg/dL      Sodium 139 mmol/L      Potassium 3.6 mmol/L      Chloride 102 mmol/L      CO2 28.0 mmol/L      Calcium 8.2 mg/dL      BUN/Creatinine Ratio 18.5     Anion Gap 9.0 mmol/L      eGFR 93.1 mL/min/1.73      Comment: National Kidney Foundation and American Society " of Nephrology (ASN) Task Force recommended calculation based on the Chronic Kidney Disease Epidemiology Collaboration (CKD-EPI) equation refit without adjustment for race.       Narrative:      GFR Normal >60  Chronic Kidney Disease <60  Kidney Failure <15      CBC (No Diff) [[id number]]  (Abnormal) Collected: 04/14/22 0628    Specimen: Blood Updated: 04/14/22 0711     WBC 7.27 10*3/mm3      RBC 3.14 10*6/mm3      Hemoglobin 9.2 g/dL      Hematocrit 28.8 %      MCV 91.7 fL      MCH 29.3 pg      MCHC 31.9 g/dL      RDW 13.5 %      RDW-SD 45.1 fl      MPV 12.7 fL      Platelets 138 10*3/mm3            Imaging:  FL C Arm During Surgery    Result Date: 4/13/2022  XR SPINE LUMBAR AP AND LATERAL- 4/13/2022 7:05 AM CDT  HISTORY: llif; Z78.9-Other specified health status; Z74.09-Other reduced mobility  COMPARISON: None  FLUOROSCOPY TIME: 0.3 minutes  FLUOROSCOPY DOSE: 15.3 mGy  NUMBER OF IMAGES: 4      Impression:  Intraoperative fluoroscopic images during lateral lumbar fusion.  Please refer to the operative note for more details. This report was finalized on 04/13/2022 08:30 by Dr Ramírez Garg, .    XR Spine Lumbar AP & Lateral    Result Date: 4/13/2022  XR SPINE LUMBAR AP AND LATERAL- 4/13/2022 7:05 AM CDT  HISTORY: llif; Z78.9-Other specified health status; Z74.09-Other reduced mobility  COMPARISON: None  FLUOROSCOPY TIME: 0.3 minutes  FLUOROSCOPY DOSE: 15.3 mGy  NUMBER OF IMAGES: 4      Impression:  Intraoperative fluoroscopic images during lateral lumbar fusion.  Please refer to the operative note for more details. This report was finalized on 04/13/2022 08:30 by Dr Ramírez Garg, .         Assessment and Plan:     Primary Problem:  Chronic bilateral low back pain with bilateral sciatica    Hospital Problem list:    Chronic bilateral low back pain with bilateral sciatica    Lumbosacral disc disease    Lumbago of multiple sites in spine with sciatica    Anemia due to acute blood loss      Assessment: 71-year-old female  who presents for chronic low back pain.  She underwent operative fixation on 4/11 and 4/13 which was uncomplicated.     Plan:     Perioperative care  Underwent operative fixation on 4/11 and 4/13 which was tolerated well.  To OR today for final stage.  We will continue bowel regimen, encouraged incentive spirometry and ambulation physical and Occupational Therapy.  Final PT/OT recs following final operation today.     COPD  Not in exacerbation, nebs available.     Acute anemia, suspect blood loss from surgery  Mild iron deficiency anemia.  Will start some iron supplementation and will need outpatient follow-up for this.    Reviewed treatment plans with the patient and/or family.     Code Status:   Code Status and Medical Interventions:   Ordered at: 04/11/22 1152     Code Status (Patient has no pulse and is not breathing):    CPR (Attempt to Resuscitate)     Medical Interventions (Patient has pulse or is breathing):    Full Support       Electronically signed by Julian Brunner MD on 4/15/2022 at 06:55 CDT

## 2022-04-15 NOTE — THERAPY RE-EVALUATION
Patient Name: Sammi Sorenson  : 1950    MRN: [id number]                              Today's Date: 4/15/2022       Admit Date: 2022    Visit Dx:     ICD-10-CM ICD-9-CM   1. Decreased activities of daily living (ADL)  Z78.9 V49.89   2. Impaired mobility  Z74.09 799.89     Patient Active Problem List   Diagnosis   • Tobacco abuse   • Cellulitis of face   • Thrush   • Chronic bilateral low back pain with bilateral sciatica   • Deep groin pain   • Hip pain, acute, left   • Ischial bursitis of left side   • Lumbar disc disease with radiculopathy   • Pain in both knees   • Pain management contract agreement   • Piriformis syndrome of left side   • SI (sacroiliac) joint dysfunction   • Lumbosacral disc disease   • Lumbago of multiple sites in spine with sciatica   • Anemia due to acute blood loss     Past Medical History:   Diagnosis Date   • Anxiety    • Cellulitis of face 2019   • Chronic abdominal pain    • Chronic back pain    • Chronic gastritis    • Chronic pain     sees pain vilma. since    • CKD (chronic kidney disease)    • Colon polyp    • COPD (chronic obstructive pulmonary disease) (HCC)    • Disease of thyroid gland    • Diverticulosis    • Dyslipidemia    • GERD (gastroesophageal reflux disease)    • Hemorrhoid    • Hiatal hernia    • History of adenomatous polyp of colon    • Leaky heart valve    • Lumbosacral disc disease 2022   • Migraine    • Neuropathy    • Osteoarthritis    • Ovarian cancer (HCC)    • Submandibular gland mass      Past Surgical History:   Procedure Laterality Date   • ANTERIOR LUMBAR EXPOSURE N/A 2022    Procedure: ANTERIOR LUMBAR EXPOSURE;  Surgeon: Juan Francisco Nelson DO;  Location: [facility] OR;  Service: Vascular;  Laterality: N/A;   • CARDIAC CATHETERIZATION      no stent   • CHOLECYSTECTOMY     • COLONOSCOPY  04/10/2015    5 polyps, tubular adenomas, melanosis, diverticulosis, internal hemorrhoids,    • CYST REMOVAL     • ENDOSCOPY  04/10/2015     hiatus hernia   • HYSTERECTOMY     • LUMBAR FUSION N/A 4/11/2022    Procedure: ANTERIOR DECOMPRESSION, ANTERIOR LUMBAR INTERBODY FUSION WITH INSTRUMENTATION L5-S1;  Surgeon: JESSIKA Reese MD;  Location:  [facility] OR;  Service: Orthopedic Spine;  Laterality: N/A;   • LUMBAR FUSION Right 4/13/2022    Procedure: RIGHT LATERAL LUMBAR INTERBODY FUSION WITH INSTRUMENTATION L4-5;  Surgeon: JESSIKA Reese MD;  Location:  [facility] OR;  Service: Orthopedic Spine;  Laterality: Right;   • LUNG LOBECTOMY Right     2002,[facility]   • REPLACEMENT TOTAL KNEE BILATERAL     • SUBMANDIBULAR GLAND EXCISION Left 11/02/2018    Procedure: Incision and drainage of left submandibular region abscess with excision of left submandibular region mass mass consistent with probable lymph node;  Surgeon: Duncan Ewing MD;  Location:  [facility] OR;  Service: ENT      General Information     Row Name 04/15/22 1400          Physical Therapy Time and Intention    Document Type re-evaluation  s/p PSF L4-5, s/p R LLIF L4-5 on 4/13, s/p ALIF L5-S1 4/11 due to back pain, bilateral buttock, thigh, and leg radiculopathy L>R, neurogenic claudication  -MS     Mode of Treatment physical therapy;co-treatment  -MS     Row Name 04/15/22 1400          General Information    Patient Profile Reviewed yes  -MS     Existing Precautions/Restrictions brace worn when out of bed;fall;spinal  -MS     Barriers to Rehab physical barrier  -MS     Row Name 04/15/22 1400          Cognition    Orientation Status (Cognition) oriented x 4  -MS     Row Name 04/15/22 1400          Safety Issues, Functional Mobility    Safety Issues Affecting Function (Mobility) safety precautions follow-through/compliance  -MS     Impairments Affecting Function (Mobility) balance;pain;strength;endurance/activity tolerance  -MS           User Key  (r) = Recorded By, (t) = Taken By, (c) = Cosigned By    Initials Name Provider Type    Mercy Damon, PT, DPT, NCS Physical Therapist                Mobility     Row Name 04/15/22 1400          Bed Mobility    Bed Mobility sit-supine  -MS     Sit-Supine Robinson (Bed Mobility) contact guard;verbal cues  -MS     Row Name 04/15/22 1400          Sit-Stand Transfer    Sit-Stand Robinson (Transfers) contact guard;verbal cues;nonverbal cues (demo/gesture)  -MS     Row Name 04/15/22 1400          Gait/Stairs (Locomotion)    Robinson Level (Gait) minimum assist (75% patient effort);verbal cues;nonverbal cues (demo/gesture)  -MS     Assistive Device (Gait) --  HHA  -MS     Distance in Feet (Gait) 5 steps chair to bed. pt limited by pain  -MS           User Key  (r) = Recorded By, (t) = Taken By, (c) = Cosigned By    Initials Name Provider Type    Mercy Damon, PT, DPT, NCS Physical Therapist               Obj/Interventions     Row Name 04/15/22 1400          Range of Motion Comprehensive    General Range of Motion no range of motion deficits identified  -MS     Row Name 04/15/22 1400          Strength Comprehensive (MMT)    General Manual Muscle Testing (MMT) Assessment no strength deficits identified  -MS     Row Name 04/15/22 1400          Balance    Balance Assessment sitting static balance;sitting dynamic balance;standing static balance;standing dynamic balance  -MS     Static Sitting Balance supervision  -MS     Dynamic Sitting Balance supervision  -MS     Static Standing Balance contact guard  -MS     Dynamic Standing Balance minimal assist  -MS     Position/Device Used, Standing Balance supported  -MS     Row Name 04/15/22 1400          Sensory Assessment (Somatosensory)    Sensory Assessment (Somatosensory) sensation intact  -MS           User Key  (r) = Recorded By, (t) = Taken By, (c) = Cosigned By    Initials Name Provider Type    Mercy Damon, PT, DPT, NCS Physical Therapist               Goals/Plan     Row Name 04/15/22 1400          Bed Mobility Goal 1 (PT)    Activity/Assistive Device (Bed Mobility Goal 1, PT)  rolling to right;rolling to left;sidelying to sit/sit to sidelying  -MS     Wetzel Level/Cues Needed (Bed Mobility Goal 1, PT) independent  -MS     Time Frame (Bed Mobility Goal 1, PT) long term goal (LTG);by discharge  -MS     Progress/Outcomes (Bed Mobility Goal 1, PT) goal revised this date  -MS     Row Name 04/15/22 1400          Transfer Goal 1 (PT)    Activity/Assistive Device (Transfer Goal 1, PT) sit-to-stand/stand-to-sit;bed-to-chair/chair-to-bed  -MS     Wetzel Level/Cues Needed (Transfer Goal 1, PT) independent  -MS     Time Frame (Transfer Goal 1, PT) long term goal (LTG);by discharge  -MS     Progress/Outcome (Transfer Goal 1, PT) progress slower than expected;goal ongoing  -MS     Row Name 04/15/22 1400          Gait Training Goal 1 (PT)    Activity/Assistive Device (Gait Training Goal 1, PT) gait (walking locomotion);assistive device use;improve balance and speed;increase endurance/gait distance  -MS     Wetzel Level (Gait Training Goal 1, PT) independent  -MS     Distance (Gait Training Goal 1, PT) 100ft  -MS     Time Frame (Gait Training Goal 1, PT) long term goal (LTG);by discharge  -MS     Progress/Outcome (Gait Training Goal 1, PT) goal revised this date  -MS     Row Name 04/15/22 1400          Stairs Goal 1 (PT)    Activity/Assistive Device (Stairs Goal 1, PT) ascending stairs;descending stairs;step-to-step;using handrail, right  -MS     Wetzel Level/Cues Needed (Stairs Goal 1, PT) contact guard required  -MS     Number of Stairs (Stairs Goal 1, PT) 2  -MS     Time Frame (Stairs Goal 1, PT) long term goal (LTG);by discharge  -MS     Progress/Outcome (Stairs Goal 1, PT) goal ongoing  -MS     Row Name 04/15/22 1400          Patient Education Goal (PT)    Activity (Patient Education Goal, PT) Pt will adhere to spinal precautions 100% of the time when performing functional mobility.  -MS     Wetzel/Cues/Accuracy (Memory Goal 2, PT) independent;demonstrates adequately   -MS     Time Frame (Patient Education Goal, PT) long term goal (LTG);by discharge  -MS     Progress/Outcome (Patient Education Goal, PT) progress slower than expected;goal ongoing  -MS     Row Name 04/15/22 1400          Therapy Assessment/Plan (PT)    Planned Therapy Interventions (PT) balance training;bed mobility training;gait training;patient/family education;orthotic fitting/training;transfer training;stair training  -MS           User Key  (r) = Recorded By, (t) = Taken By, (c) = Cosigned By    Initials Name Provider Type    Mercy Damon R, PT, DPT, NCS Physical Therapist               Clinical Impression     Row Name 04/15/22 1400          Pain    Pretreatment Pain Rating 9/10  -MS     Posttreatment Pain Rating 9/10  -MS     Pain Location lower  -MS     Pain Location - back  -MS     Pain Intervention(s) Medication (See MAR);Repositioned;Ambulation/increased activity  -MS     Row Name 04/15/22 1400          Plan of Care Review    Plan of Care Reviewed With patient  -MS     Progress no change  -MS     Outcome Evaluation The patient presents drowsy but oriented x4. She was sitting up in the chair with her LSO on correctly. She demonstrates no focal deficits in strength or sensation. She does demonstrate full body tremor while standing. She stood long enough to have her dressing reenforced and to change her gown. She was limited by pain and drowsiness to walk. She was able to transfer back to bed. PT will continue to work with the patient to increase activity and decrease pain. Recommend discharge home with assist.  -MS     Row Name 04/15/22 1400          Therapy Assessment/Plan (PT)    Patient/Family Therapy Goals Statement (PT) decrease pain  -MS     Rehab Potential (PT) good, to achieve stated therapy goals  -MS     Criteria for Skilled Interventions Met (PT) yes;meets criteria;skilled treatment is necessary  -MS     Therapy Frequency (PT) 2 times/day  -MS     Predicted Duration of Therapy Intervention  (PT) until discharge  -MS     Row Name 04/15/22 1400          Positioning and Restraints    Post Treatment Position bed  -MS     In Bed side lying right;call light within reach;encouraged to call for assist;side rails up x2  -MS           User Key  (r) = Recorded By, (t) = Taken By, (c) = Cosigned By    Initials Name Provider Type    Mercy Damon VALORIE, PT, DPT, NCS Physical Therapist               Outcome Measures     Row Name 04/15/22 1400          How much help from another person do you currently need...    Turning from your back to your side while in flat bed without using bedrails? 3  -MS     Moving from lying on back to sitting on the side of a flat bed without bedrails? 3  -MS     Moving to and from a bed to a chair (including a wheelchair)? 2  -MS     Standing up from a chair using your arms (e.g., wheelchair, bedside chair)? 2  -MS     Climbing 3-5 steps with a railing? 1  -MS     To walk in hospital room? 1  -MS     AM-PAC 6 Clicks Score (PT) 12  -MS     Row Name 04/15/22 1400          Functional Assessment    Outcome Measure Options AM-PAC 6 Clicks Basic Mobility (PT)  -MS           User Key  (r) = Recorded By, (t) = Taken By, (c) = Cosigned By    Initials Name Provider Type    Mercy Damon VALORIE, PT, DPT, NCS Physical Therapist                             Physical Therapy Education                 Title: PT OT SLP Therapies (In Progress)     Topic: Physical Therapy (In Progress)     Point: Mobility training (Done)     Learning Progress Summary           Patient Acceptance, E, VU by MS at 4/15/2022 1428    Comment: role of PT in her care, spinal restrictions    Acceptance, D,TB,E, NR,VU by NANDO at 4/13/2022 1347    Comment: Pt educated on POC, LSO wear schedule, log roll technique and spinal precautions    Acceptance, E, NR by MS at 4/11/2022 1534    Comment: role of PT in her care, spinal restrictions, use of LSO                   Point: Home exercise program (Not Started)     Learner Progress:  Not  documented in this visit.          Point: Body mechanics (Done)     Learning Progress Summary           Patient Acceptance, D,TB,E, NR,VU by J at 4/13/2022 1347    Comment: Pt educated on POC, LSO wear schedule, log roll technique and spinal precautions                   Point: Precautions (Done)     Learning Progress Summary           Patient Acceptance, E, VU by MS at 4/15/2022 1428    Comment: role of PT in her care, spinal restrictions    Acceptance, D,TB,E, NR,VU by  at 4/13/2022 1347    Comment: Pt educated on POC, LSO wear schedule, log roll technique and spinal precautions    Acceptance, E,TB, VU by  at 4/12/2022 1152    Comment: shivani/doff LSO    Acceptance, E, NR by MS at 4/11/2022 1534    Comment: role of PT in her care, spinal restrictions, use of LSO                               User Key     Initials Effective Dates Name Provider Type Discipline     06/16/21 -  Shoshana Duvall, PTA Physical Therapist Assistant PT    MS 06/19/18 -  Mercy Mccall, PT, DPT, NCS Physical Therapist PT     03/07/22 -  Ashish Kay PT Student PT Student PT              PT Recommendation and Plan  Planned Therapy Interventions (PT): balance training, bed mobility training, gait training, patient/family education, orthotic fitting/training, transfer training, stair training  Plan of Care Reviewed With: patient  Progress: no change  Outcome Evaluation: The patient presents drowsy but oriented x4. She was sitting up in the chair with her LSO on correctly. She demonstrates no focal deficits in strength or sensation. She does demonstrate full body tremor while standing. She stood long enough to have her dressing reenforced and to change her gown. She was limited by pain and drowsiness to walk. She was able to transfer back to bed. PT will continue to work with the patient to increase activity and decrease pain. Recommend discharge home with assist.     Time Calculation:    PT Charges     Row Name 04/15/22 1400              Time Calculation    Start Time 1355  -MS      Stop Time 1425  -MS      Time Calculation (min) 30 min  -MS      PT Received On 04/15/22  -MS      PT Goal Re-Cert Due Date 04/25/22  -MS              Untimed Charges    PT Eval/Re-eval Minutes 30  -MS              Total Minutes    Untimed Charges Total Minutes 30  -MS       Total Minutes 30  -MS            User Key  (r) = Recorded By, (t) = Taken By, (c) = Cosigned By    Initials Name Provider Type    MS Mercy Mccall, PT, DPT, NCS Physical Therapist              Therapy Charges for Today     Code Description Service Date Service Provider Modifiers Qty    30502127265 HC PT RE-EVAL ESTABLISHED PLAN 2 4/15/2022 Mercy Mccall PT, DPT, NCS GP 1          PT G-Codes  Outcome Measure Options: AM-PAC 6 Clicks Basic Mobility (PT)  AM-PAC 6 Clicks Score (PT): 12  AM-PAC 6 Clicks Score (OT): 18    Mercy Mccall PT, DPT, NCS  4/15/2022

## 2022-04-15 NOTE — PLAN OF CARE
Goal Outcome Evaluation:  Plan of Care Reviewed With: patient           Outcome Evaluation: Alert and oriented times 4, complaints of pain to back radiating to LLE, medicated per orders with fair results, up with assist of one, walker and LSO brace, NPO since midnight for surgery,  , BUCIO, VSS, safety maintained, will continue to monitor.

## 2022-04-15 NOTE — PLAN OF CARE
Goal Outcome Evaluation:  Plan of Care Reviewed With: patient        Progress: no change  Outcome Evaluation: The patient presents drowsy but oriented x4. She was sitting up in the chair with her LSO on correctly. She demonstrates no focal deficits in strength or sensation. She does demonstrate full body tremor while standing. She stood long enough to have her dressing reenforced and to change her gown. She was limited by pain and drowsiness to walk. She was able to transfer back to bed. PT will continue to work with the patient to increase activity and decrease pain. Recommend discharge home with assist.

## 2022-04-15 NOTE — ANESTHESIA POSTPROCEDURE EVALUATION
"Patient: Sammi Sorenson    Procedure Summary     Date: 04/15/22 Room / Location:  [facility]  /  [facility]    Anesthesia Start: 0701 Anesthesia Stop: 0904    Procedure: POSTERIOR SPINAL FUSION WITH INSTRUMENTATION L4-S1 (N/A Spine Lumbar) Diagnosis: (M54.16)    Surgeons: JESSIKA Reese MD Provider: Alex Rush CRNA    Anesthesia Type: general ASA Status: 2          Anesthesia Type: general    Vitals  Vitals Value Taken Time   /49 04/15/22 1022   Temp 97.5 °F (36.4 °C) 04/15/22 1020   Pulse 50 04/15/22 1023   Resp 13 04/15/22 1020   SpO2 91 % 04/15/22 1022   Vitals shown include unvalidated device data.        Post Anesthesia Care and Evaluation    Patient location during evaluation: PACU  Patient participation: complete - patient participated  Level of consciousness: awake and alert  Pain management: adequate  Airway patency: patent  Anesthetic complications: No anesthetic complications  PONV Status: none  Cardiovascular status: acceptable and hemodynamically stable  Respiratory status: acceptable  Hydration status: acceptable    Comments: Blood pressure 120/49, pulse 57, temperature 97.6 °F (36.4 °C), temperature source Oral, resp. rate 14, height 166.4 cm (65.5\"), weight 68.5 kg (151 lb), SpO2 98 %.    Patient discharged from PACU based upon Arash score. Please see RN notes for further details      "

## 2022-04-15 NOTE — PLAN OF CARE
Goal Outcome Evaluation:  Plan of Care Reviewed With: patient        Progress: no change  Outcome Evaluation: Pt AOx4, can be confused at times. RA. FORTUNATO. PPP. VTE - scd's. VSS. Pt c/o pain, prn meds given. All dressing CDI and back reinforced. Pt up x1 to bsc. No c/o n/t this shift. LSO when out of bed. IVF infusing and ABX given. Bed alarm set. Call light within reach. Safety maintained.

## 2022-04-15 NOTE — OP NOTE
LUMBAR LAMINECTOMY POSTERIOR LUMBAR INTERBODY FUSION  Procedure Note    Sammi Sorenson  4/15/2022    Pre-op Diagnosis:     1. Increasing chronic back pain.  2. Bilateral buttock, thigh and leg radiculopathy, left worse than right.  3. Neurogenic claudication.  4. Multilevel thoracolumbar degenerative disk disease, severe, L4 to S1.  5. Facet arthropathy, L4 to S1.  6. Left central disc herniation, L4-5.  7. Foraminal disc herniation, left L5-S1.  8. Central and bilateral foraminal stenosis, L4 to S1, left worse than right.  9. Osteopenia, T-score -1.4, 02/17/2022.  10.  Status post anterior decompression, ALIF with instrumentation of L5-S1, 4/11/2022  11.  Status post right LLIF with instrumentation L4-5, 4/13/2022    Post-op Diagnosis:    same    Procedure/CPT® Codes:     1.  Posterior spinal fusion L4-S1  2.  Posterior spinal instrumentation L4-S1 ([employer] pedicle screws and rods)  3.  Use of locally obtained autograft bone for fusion  4.  Use of allograft bone matrix for fusion ([employer] MagnetOs)  5.  Use of fluoroscopy for confirmation of surgical level and placement of instrumentation  6.  Intraoperative neural monitoring with pedicle screw stimulation     Anesthesia: General     Surgeon: ADIN Reese MD     Assistant:   Sven Canales PA-C     Estimated Blood Loss: minimal     Complications: None     Condition: Stable to PACU.     Indications:     The patient is a 71-year-old who sees Dr. Zak Alan for medical issues.  She presented to the office with complaints of increasing chronic back pain along with bilateral buttock, thigh, and leg radiculopathy, with the left side worse than the right.  Her symptoms were very consistent with neurogenic claudication.  Imaging studies revealed multilevel thoracolumbar degenerative disc disease and facet arthropathy that was severe from L4-S1.  She was noted to have a left central disc herniation at L4-5 and a foraminal disc herniation on the left side of L5-S1.  The  disc herniations together with the degenerative changes combined to form central and bilateral foraminal stenosis from L4-S1 that was worse on the left than the right and matched her symptoms.      After failing conservative measures, it was mutually decided that surgery would be his best option. Risks, benefits, and complications of surgery were discussed with the patient. The patient appeared well informed and wished to proceed. We specifically discussed the risk of infection, blood loss, nerve root injury, CSF leak, and the possibility of incomplete resolution of symptoms. We also discussed the possible risk of a nonunion and the potential need for additional surgery in the event of a pseudoarthrosis or hardware failure.      We elected to proceed with a staged operation.    The first stage of the procedure was performed on 4/11/2022 and involved an anterior decompression and ALIF with instrumentation of L5-S1.  The second stage of the procedure was performed on 4/13/2022 and involved a right lateral fusion of L4-5.  Today we return to surgery for the final posterior stage the procedure involving a posterior spinal fusion with instrumentation from L4 to S1.     Operative Procedure:     After obtaining informed consent and verifying the correct operative site, the patient was brought to the operating room. A general anesthetic was provided by the anesthesia service with the assistance of an endotracheal tube. Once this was appropriately positioned and secured, the patient was carefully rotated prone onto a Imtiaz frame. All bony processes were well-padded. The lumbar region was prepped and draped in usual sterile fashion. A surgical timeout was taken to confirm this was the correct patient, we were working at the correct levels, and that preoperative antibiotics were given in a timely fashion.    Using fluoroscopy for guidance, bilateral [place] incisions were created overlying the L4-5 and L5-S1 segments using a  10 blade scalpel.  Dissection was carried through subcutaneous tissues using Bovie cautery.  The lumbar fascia was divided in line with the incisions and blunt dissection was carried between the multifidus and the longissimus down to the facet joints of L4-5 and L5-S1.  Dissection was carried laterally exposing the transverse processes of L4 and L5 as well as the sacral ala.  We then further exposed the L4-5 and L5-S1 facet joints working medially and exposing some of the interlaminar space at each segment.  The facet joint capsules were destroyed using Bovie cautery exposing as much bone as possible.      Next under fluoroscopic guidance, starting holes were created for the placement of pedicle screws at L4, L5, and S1.  I used a pedicle probe to gain access through the pedicle to the anterior vertebral bodies.  A ball-tipped feeler was used to ensure that there was no medial or lateral breech.  I then placed guidewires into the pedicle tracks to maintain position while I turned my attention to obtaining a posterior lateral fusion at L4-5 and L5-S1.     I thoroughly irrigated the wounds.  The high-speed bur was then used to decorticate the facet joints, destroying as much of the facet cartilage as possible bilaterally.  I also decorticated the lateral pars regions and the tranverse processes as well as the sacral ala and exposed interlaminar spaces at L4-5 and L5-S1.  The locally obtained autograft bone was left in situ in the posterolateral gutter.      On the right side from L4-S1, I added a synthetic bone graft matrix called MagnetOs to augment the fusion.  This bone graft was then packed as tightly as possible into the posterior lateral gutter at both L4-5 and L5-S1.  This constituted a posterior fusion of both L4-5 and L5-S1.     Next, over the existing guidewires, I placed pedicle screws from the [facility] instrumentation set.  We used 6.5 mm x 45 mm screws into each of the pedicles of S1 bilaterally and a 6.5 mm  x 50 mm screw bilaterally at L4 and on the left at L5 for a total of 5 screws.  I then used a neuro monitoring probe to stimulate the screws themselves confirming appropriate position ensuring no breach of the pedicles.  After confirming the screws were properly positioned, an appropriate-sized tracy was chosen to span the pedicle screws.  The tracy was tightened into position using set screws.  The setscrews were tightened using the appropriate antitorque wrench and torque limiting screwdriver provided by [facility].    The wounds were then thoroughly irrigated and an inspection was then undertaken to ensure that we had adequate hemostasis.  Bleeding at this point was controlled using thrombin with Gelfoam powder and bipolar cautery.  Final fluoroscopy imaging confirmed adequate position of the newly placed posterior instrumentation spanning L4 to S1.      Wound closure was then accomplished by reapproximating the fascia with a #1 Vicryl area immediate subcutaneous tissues were closed using a 2-0 Vicryl and skin closure was augmented using Mastisol and Steri-Strips.  The incisions were then washed and sterilely dressed with Bioclusive dressings.      The patient was then carefully rotated supine onto a hospital rHatch, extubated, and sent to the recovery room in good stable condition.  The patient tolerated the procedure well.  There were no complications.  We estimated blood loss to be minimal.      Intraoperative neuro monitoring was ordered and carried out throughout the procedure to add an increased level of safety for the patient.  The interpreting physician was available by means of real-time continuous, bidirectional, remote audio and visual communication as needed throughout the entire procedure.  Modalities used during the procedure included SSEP, EEG, EMG, TOF, and pedicle screw stimulation.  There were no neuro monitoring signal changes during the procedure.    Sven Canales PA-C provided critical assistance during  the procedure.  His assistance was medically necessary in order to allow the procedure to occur in the most safe and efficient manner.    ADIN Reese MD     Date: 4/15/2022  Time: 09:54 CDT

## 2022-04-15 NOTE — ANESTHESIA PROCEDURE NOTES
Airway  Urgency: elective    Date/Time: 4/15/2022 7:09 AM  Airway not difficult    General Information and Staff    Patient location during procedure: OR  CRNA: Alex Rush CRNA    Indications and Patient Condition  Indications for airway management: airway protection    Preoxygenated: yes  Mask difficulty assessment: 1 - vent by mask    Final Airway Details  Final airway type: endotracheal airway      Successful airway: ETT  Cuffed: yes   Successful intubation technique: direct laryngoscopy  Endotracheal tube insertion site: oral  Blade: Nanci  Blade size: 3.5  ETT size (mm): 7.5  Cormack-Lehane Classification: grade I - full view of glottis  Placement verified by: chest auscultation, capnometry and palpation of cuff   Cuff volume (mL): 7  Measured from: lips  ETT/EBT  to lips (cm): 22  Number of attempts at approach: 1  Assessment: lips, teeth, and gum same as pre-op and atraumatic intubation

## 2022-04-16 LAB
ANION GAP SERPL CALCULATED.3IONS-SCNC: 9 MMOL/L (ref 5–15)
BUN SERPL-MCNC: 10 MG/DL (ref 8–23)
BUN/CREAT SERPL: 18.5 (ref 7–25)
CALCIUM SPEC-SCNC: 8.1 MG/DL (ref 8.6–10.5)
CHLORIDE SERPL-SCNC: 106 MMOL/L (ref 98–107)
CO2 SERPL-SCNC: 26 MMOL/L (ref 22–29)
CREAT SERPL-MCNC: 0.54 MG/DL (ref 0.57–1)
DEPRECATED RDW RBC AUTO: 44.8 FL (ref 37–54)
EGFRCR SERPLBLD CKD-EPI 2021: 97.4 ML/MIN/1.73
ERYTHROCYTE [DISTWIDTH] IN BLOOD BY AUTOMATED COUNT: 13.5 % (ref 12.3–15.4)
GLUCOSE SERPL-MCNC: 126 MG/DL (ref 65–99)
HCT VFR BLD AUTO: 27.6 % (ref 34–46.6)
HGB BLD-MCNC: 8.9 G/DL (ref 12–15.9)
MCH RBC QN AUTO: 29.3 PG (ref 26.6–33)
MCHC RBC AUTO-ENTMCNC: 32.2 G/DL (ref 31.5–35.7)
MCV RBC AUTO: 90.8 FL (ref 79–97)
PLATELET # BLD AUTO: 160 10*3/MM3 (ref 140–450)
PMV BLD AUTO: 11.8 FL (ref 6–12)
POTASSIUM SERPL-SCNC: 3.5 MMOL/L (ref 3.5–5.2)
RBC # BLD AUTO: 3.04 10*6/MM3 (ref 3.77–5.28)
SODIUM SERPL-SCNC: 141 MMOL/L (ref 136–145)
WBC NRBC COR # BLD: 6.38 10*3/MM3 (ref 3.4–10.8)

## 2022-04-16 PROCEDURE — 94799 UNLISTED PULMONARY SVC/PX: CPT

## 2022-04-16 PROCEDURE — 94664 DEMO&/EVAL PT USE INHALER: CPT

## 2022-04-16 PROCEDURE — 80048 BASIC METABOLIC PNL TOTAL CA: CPT | Performed by: ORTHOPAEDIC SURGERY

## 2022-04-16 PROCEDURE — 0 HYDROMORPHONE 1 MG/ML SOLUTION: Performed by: ORTHOPAEDIC SURGERY

## 2022-04-16 PROCEDURE — 85027 COMPLETE CBC AUTOMATED: CPT | Performed by: ORTHOPAEDIC SURGERY

## 2022-04-16 PROCEDURE — 97535 SELF CARE MNGMENT TRAINING: CPT

## 2022-04-16 RX ORDER — NALOXONE HCL 0.4 MG/ML
0.4 VIAL (ML) INJECTION
Status: DISCONTINUED | OUTPATIENT
Start: 2022-04-16 | End: 2022-04-19 | Stop reason: HOSPADM

## 2022-04-16 RX ORDER — ALPRAZOLAM 0.5 MG/1
0.5 TABLET ORAL 3 TIMES DAILY
Status: DISCONTINUED | OUTPATIENT
Start: 2022-04-16 | End: 2022-04-19 | Stop reason: HOSPADM

## 2022-04-16 RX ADMIN — SODIUM CHLORIDE 100 ML/HR: 9 INJECTION, SOLUTION INTRAVENOUS at 08:20

## 2022-04-16 RX ADMIN — ESCITALOPRAM 10 MG: 10 TABLET, FILM COATED ORAL at 08:18

## 2022-04-16 RX ADMIN — LEVOTHYROXINE SODIUM 100 MCG: 100 TABLET ORAL at 05:44

## 2022-04-16 RX ADMIN — IPRATROPIUM BROMIDE AND ALBUTEROL SULFATE 3 ML: .5; 3 SOLUTION RESPIRATORY (INHALATION) at 10:02

## 2022-04-16 RX ADMIN — IPRATROPIUM BROMIDE AND ALBUTEROL SULFATE 3 ML: .5; 3 SOLUTION RESPIRATORY (INHALATION) at 13:53

## 2022-04-16 RX ADMIN — POLYETHYLENE GLYCOL 3350 17 G: 17 POWDER, FOR SOLUTION ORAL at 21:17

## 2022-04-16 RX ADMIN — POLYETHYLENE GLYCOL 3350 17 G: 17 POWDER, FOR SOLUTION ORAL at 08:18

## 2022-04-16 RX ADMIN — DOCUSATE SODIUM 50 MG AND SENNOSIDES 8.6 MG 1 TABLET: 8.6; 5 TABLET, FILM COATED ORAL at 08:17

## 2022-04-16 RX ADMIN — PANTOPRAZOLE SODIUM 40 MG: 40 TABLET, DELAYED RELEASE ORAL at 06:42

## 2022-04-16 RX ADMIN — HYDROCODONE BITARTRATE AND ACETAMINOPHEN 2 TABLET: 7.5; 325 TABLET ORAL at 09:33

## 2022-04-16 RX ADMIN — Medication 3 ML: at 08:18

## 2022-04-16 RX ADMIN — DOCUSATE SODIUM 50 MG AND SENNOSIDES 8.6 MG 1 TABLET: 8.6; 5 TABLET, FILM COATED ORAL at 21:17

## 2022-04-16 RX ADMIN — HYDROCODONE BITARTRATE AND ACETAMINOPHEN 2 TABLET: 7.5; 325 TABLET ORAL at 17:34

## 2022-04-16 RX ADMIN — TIZANIDINE 4 MG: 4 TABLET ORAL at 02:42

## 2022-04-16 RX ADMIN — TIZANIDINE 4 MG: 4 TABLET ORAL at 21:55

## 2022-04-16 RX ADMIN — HYDROMORPHONE HYDROCHLORIDE 1 MG: 1 INJECTION, SOLUTION INTRAMUSCULAR; INTRAVENOUS; SUBCUTANEOUS at 20:54

## 2022-04-16 RX ADMIN — ALPRAZOLAM 0.5 MG: 0.5 TABLET ORAL at 16:05

## 2022-04-16 RX ADMIN — HYDROMORPHONE HYDROCHLORIDE 1 MG: 1 INJECTION, SOLUTION INTRAMUSCULAR; INTRAVENOUS; SUBCUTANEOUS at 05:44

## 2022-04-16 RX ADMIN — Medication 3 ML: at 21:19

## 2022-04-16 RX ADMIN — HYDROCODONE BITARTRATE AND ACETAMINOPHEN 2 TABLET: 7.5; 325 TABLET ORAL at 13:25

## 2022-04-16 RX ADMIN — HYDROCODONE BITARTRATE AND ACETAMINOPHEN 2 TABLET: 7.5; 325 TABLET ORAL at 02:42

## 2022-04-16 RX ADMIN — ALPRAZOLAM 0.5 MG: 0.5 TABLET ORAL at 08:17

## 2022-04-16 RX ADMIN — IPRATROPIUM BROMIDE AND ALBUTEROL SULFATE 3 ML: .5; 3 SOLUTION RESPIRATORY (INHALATION) at 06:03

## 2022-04-16 RX ADMIN — FERROUS SULFATE TAB 325 MG (65 MG ELEMENTAL FE) 325 MG: 325 (65 FE) TAB at 08:18

## 2022-04-16 RX ADMIN — ALPRAZOLAM 0.5 MG: 0.5 TABLET ORAL at 21:17

## 2022-04-16 NOTE — PLAN OF CARE
Goal Outcome Evaluation:  Plan of Care Reviewed With: patient           Outcome Evaluation: Alert and oriented times 4, up with assist of one, walker and LSO brace, dressings to abd, right flank, and back CDI, continues to complain of back pain, medicated per orders, VSS, , BUCIO, safety maintained, will continue to monitor.

## 2022-04-16 NOTE — THERAPY TREATMENT NOTE
Patient Name: Sammi Sorenson  : 1950    MRN: [id number]                              Today's Date: 2022       Admit Date: 2022    Visit Dx: Therapist utilized gait belt, applied non-slipped socks, provided fall risk education/prevention, & facilitated muscle strengthening PRN to reduce patient falls risk during this session.      ICD-10-CM ICD-9-CM   1. Decreased activities of daily living (ADL)  Z78.9 V49.89   2. Impaired mobility  Z74.09 799.89     Patient Active Problem List   Diagnosis   • Tobacco abuse   • Cellulitis of face   • Thrush   • Chronic bilateral low back pain with bilateral sciatica   • Deep groin pain   • Hip pain, acute, left   • Ischial bursitis of left side   • Lumbar disc disease with radiculopathy   • Pain in both knees   • Pain management contract agreement   • Piriformis syndrome of left side   • SI (sacroiliac) joint dysfunction   • Lumbosacral disc disease   • Lumbago of multiple sites in spine with sciatica   • Anemia due to acute blood loss     Past Medical History:   Diagnosis Date   • Anxiety    • Cellulitis of face 2019   • Chronic abdominal pain    • Chronic back pain    • Chronic gastritis    • Chronic pain     sees pain vilma. since    • CKD (chronic kidney disease)    • Colon polyp    • COPD (chronic obstructive pulmonary disease) (HCC)    • Disease of thyroid gland    • Diverticulosis    • Dyslipidemia    • GERD (gastroesophageal reflux disease)    • Hemorrhoid    • Hiatal hernia    • History of adenomatous polyp of colon    • Leaky heart valve    • Lumbosacral disc disease 2022   • Migraine    • Neuropathy    • Osteoarthritis    • Ovarian cancer (HCC)    • Submandibular gland mass      Past Surgical History:   Procedure Laterality Date   • ANTERIOR LUMBAR EXPOSURE N/A 2022    Procedure: ANTERIOR LUMBAR EXPOSURE;  Surgeon: Juan Francisco Nelson DO;  Location: [facility] OR;  Service: Vascular;  Laterality: N/A;   • CARDIAC CATHETERIZATION      no  stent   • CHOLECYSTECTOMY     • COLONOSCOPY  04/10/2015    5 polyps, tubular adenomas, melanosis, diverticulosis, internal hemorrhoids,    • CYST REMOVAL     • ENDOSCOPY  04/10/2015    hiatus hernia   • HYSTERECTOMY     • LUMBAR FUSION N/A 4/11/2022    Procedure: ANTERIOR DECOMPRESSION, ANTERIOR LUMBAR INTERBODY FUSION WITH INSTRUMENTATION L5-S1;  Surgeon: JESSIKA Reese MD;  Location:  [facility] OR;  Service: Orthopedic Spine;  Laterality: N/A;   • LUMBAR FUSION Right 4/13/2022    Procedure: RIGHT LATERAL LUMBAR INTERBODY FUSION WITH INSTRUMENTATION L4-5;  Surgeon: JESSIKA Reese MD;  Location:  [facility] OR;  Service: Orthopedic Spine;  Laterality: Right;   • LUNG LOBECTOMY Right     2002,[facility]   • REPLACEMENT TOTAL KNEE BILATERAL     • SUBMANDIBULAR GLAND EXCISION Left 11/02/2018    Procedure: Incision and drainage of left submandibular region abscess with excision of left submandibular region mass mass consistent with probable lymph node;  Surgeon: Duncan Ewing MD;  Location:  [facility] OR;  Service: ENT      General Information     Row Name 04/16/22 0858          OT Time and Intention    Document Type therapy note (daily note)  -MT     Mode of Treatment occupational therapy  -MT     Row Name 04/16/22 0858          General Information    Patient Profile Reviewed yes  -MT     Existing Precautions/Restrictions brace worn when out of bed;fall;spinal  -MT     Row Name 04/16/22 0858          Cognition    Orientation Status (Cognition) oriented to;person;place;situation  -MT     Row Name 04/16/22 0858          Safety Issues, Functional Mobility    Impairments Affecting Function (Mobility) balance;pain;strength;endurance/activity tolerance;cognition  -MT     Cognitive Impairments, Mobility Safety/Performance insight into deficits/self-awareness;awareness, need for assistance  -MT           User Key  (r) = Recorded By, (t) = Taken By, (c) = Cosigned By    Initials Name Provider Type    MT Isidro  ALLEY Miles Occupational Therapist Assistant                 Mobility/ADL's     Row Name 04/16/22 0858          Bed Mobility    Comment, (Bed Mobility) seated EOB with exit alarm on post tx  -MT     Row Name 04/16/22 0858          Transfers    Sit-Stand [place] (Transfers) contact guard;verbal cues;nonverbal cues (demo/gesture)  -MT     Stand-Sit [place] (Transfers) contact guard;verbal cues  -MT     [place] Level (Toilet Transfer) contact guard;verbal cues  -MT     Assistive Device (Toilet Transfer) commode;walker, front-wheeled;grab bars/safety frame  -MT     Row Name 04/16/22 0858          Sit-Stand Transfer    Assistive Device (Sit-Stand Transfers) walker, front-wheeled  -MT     Row Name 04/16/22 0858          Stand-Sit Transfer    Assistive Device (Stand-Sit Transfers) walker, front-wheeled  -MT     Row Name 04/16/22 0858          Toilet Transfer    Type (Toilet Transfer) stand pivot/stand step  -MT     Row Name 04/16/22 0858          Functional Mobility    Functional Mobility- Ind. Level contact guard assist;nonverbal cues required (demo/gesture);verbal cues required  -MT     Functional Mobility- Device walker, front-wheeled  -MT     Functional Mobility- Comment in room BR>Bed continued cues not to twist/bend as pt attempted to gather clothing items and bag from different surfaces. Pt forgetful of precautions and need for LSO use  -MT     Row Name 04/16/22 0858          Activities of Daily Living    BADL Assessment/Intervention bathing;upper body dressing;toileting  -MT     Row Name 04/16/22 0858          Upper Body Dressing Assessment/Training    [place] Level (Upper Body Dressing) maximum assist (25% patient effort);don;doff  -MT     Position (Upper Body Dressing) unsupported sitting  -MT     Comment, (Upper Body Dressing) LSO MaxA  -MT     Row Name 04/16/22 0858          Toileting Assessment/Training    [place] Level (Toileting) adjust/manage clothing;change pad/brief;contact  guard assist  -MT     Assistive Devices (Toileting) commode  -MT     Position (Toileting) supported standing;unsupported sitting  -MT     Row Name 04/16/22 0858          Bathing Assessment/Intervention    Comment, (Bathing) sponge bathing with wipes: pt had began task prior to HAGER/L coming into BR. Pt reports done with all but back, depA for back hygiene  -MT           User Key  (r) = Recorded By, (t) = Taken By, (c) = Cosigned By    Initials Name Provider Type    Ginger Brown COTA Occupational Therapist Assistant               Obj/Interventions    No documentation.                Goals/Plan    No documentation.                Clinical Impression     Row Name 04/16/22 0858          Pain Scale: FACES Pre/Post-Treatment    Pain: FACES Scale, Pretreatment 2-->hurts little bit  -MT     Posttreatment Pain Rating 4-->hurts little more  -MT     Pain Location incisional  -MT     Pain Location - back  -MT     Row Name 04/16/22 0858          Therapy Plan Review/Discharge Plan (OT)    Anticipated Discharge Disposition (OT) inpatient rehabilitation facility;skilled nursing facility  -MT     Row Name 04/16/22 0858          Vital Signs    O2 Delivery Pre Treatment room air  -MT     Row Name 04/16/22 0858          Positioning and Restraints    Pre-Treatment Position bathroom  -MT     Post Treatment Position bed  -MT     In Bed sitting EOB;call light within reach;encouraged to call for assist;exit alarm on;side rails up x3;with other staff  food services with pt  -MT           User Key  (r) = Recorded By, (t) = Taken By, (c) = Cosigned By    Initials Name Provider Type    Ginger Brown COTA Occupational Therapist Assistant               Outcome Measures     Row Name 04/16/22 0858          How much help from another is currently needed...    Putting on and taking off regular lower body clothing? 2  -MT     Bathing (including washing, rinsing, and drying) 2  -MT     Toileting (which includes using toilet bed pan or  urinal) 3  -MT     Putting on and taking off regular upper body clothing 3  -MT     Taking care of personal grooming (such as brushing teeth) 4  -MT     Eating meals 4  -MT     AM-PAC 6 Clicks Score (OT) 18  -MT           User Key  (r) = Recorded By, (t) = Taken By, (c) = Cosigned By    Initials Name Provider Type    MT Ginger Felix COTA Occupational Therapist Assistant                Occupational Therapy Education                 Title: PT OT SLP Therapies (In Progress)     Topic: Occupational Therapy (In Progress)     Point: ADL training (In Progress)     Description:   Instruct learner(s) on proper safety adaptation and remediation techniques during self care or transfers.   Instruct in proper use of assistive devices.              Learning Progress Summary           Patient Acceptance, E, NR by MT at 4/16/2022 0923    Comment: spinal precautions and need for LSO/wearing schedule    Acceptance, E,D, VU,NR by  at 4/15/2022 1523    Acceptance, E,D, VU,NR by  at 4/13/2022 1535    Acceptance, E,D, NR by  at 4/11/2022 1452                   Point: Home exercise program (Done)     Description:   Instruct learner(s) on appropriate technique for monitoring, assisting and/or progressing therapeutic exercises/activities.              Learning Progress Summary           Patient Acceptance, E,D, VU,NR by  at 4/13/2022 1535    Acceptance, E,D, NR by  at 4/11/2022 1452                   Point: Precautions (In Progress)     Description:   Instruct learner(s) on prescribed precautions during self-care and functional transfers.              Learning Progress Summary           Patient Acceptance, E, NR by MT at 4/16/2022 0923    Comment: spinal precautions and need for LSO/wearing schedule    Acceptance, E,D, VU,NR by  at 4/15/2022 1523    Acceptance, E,D, VU,NR by  at 4/13/2022 1535    Acceptance, E,D, NR by  at 4/11/2022 1452                   Point: Body mechanics (Done)     Description:   Instruct  learner(s) on proper positioning and spine alignment during self-care, functional mobility activities and/or exercises.              Learning Progress Summary           Patient Acceptance, E,D, VU,NR by  at 4/15/2022 1523    Acceptance, E,D, VU,NR by  at 4/13/2022 1535    Acceptance, E,D, NR by  at 4/11/2022 1452                               User Key     Initials Effective Dates Name Provider Type Discipline     06/16/21 -  Jacqui Hobbs, OTR/L Occupational Therapist OT     08/28/18 -  Felicia Perez, OTR/L Occupational Therapist OT    MT 06/16/21 -  Ginger Felix COTA Occupational Therapist Assistant OT              OT Recommendation and Plan     Plan of Care Review  Plan of Care Reviewed With: patient  Progress: no change  Outcome Evaluation: Pt in BR completing sponge bath on commode. [place] UB bathing. MaxA don/doff LSO. Fxl mobility via RW in room BR>Bed continued cues not to twist/bend as pt attempted to gather clothing items and bag from different surfaces. Pt forgetful of precautions and need for LSO use. Recommend inpatient rehab vs SNF at d/c d/t decreased awareness of precautions/safety and decreased ADL fxn.     Time Calculation:    Time Calculation- OT     Row Name 04/16/22 0858             Time Calculation- OT    OT Start Time 0858  -MT      OT Stop Time 0921  -MT      OT Time Calculation (min) 23 min  -MT      Total Timed Code Minutes- OT 23 minute(s)  -MT      OT Received On 04/16/22  -MT              Timed Charges    08240 - OT Self Care/Mgmt Minutes 23  -MT              Total Minutes    Timed Charges Total Minutes 23  -MT       Total Minutes 23  -MT            User Key  (r) = Recorded By, (t) = Taken By, (c) = Cosigned By    Initials Name Provider Type    MT Ginger Felix COTA Occupational Therapist Assistant              Therapy Charges for Today     Code Description Service Date Service Provider Modifiers Qty    [id number]  OT SELF CARE/MGMT/TRAIN EA 15 MIN 4/16/2022 Isidro  ALLEY Miles GO 2               ALLEY Moya  4/16/2022

## 2022-04-16 NOTE — PLAN OF CARE
Goal Outcome Evaluation:  Plan of Care Reviewed With: patient        Progress: improving  Outcome Evaluation: Pt a&ox4. Can be forgetful. C/o pain- relief noted from PRN [place]. Up x1 with walker and LSO to bathroom. Voiding. Shower today. Daughter visiting at bedside. Both IVs infiltrated this shift and pt does not want to be re-stuck at this time and understands that IV pain meds will not be able to be administered. R flank and abd incisions CDI. Lumbar incision re dressed this shift r/t moderate amount of drainage present. Call light within reach.

## 2022-04-16 NOTE — PLAN OF CARE
Problem: Adult Inpatient Plan of Care  Goal: Plan of Care Review  Flowsheets (Taken 4/16/2022 [id number])  Progress: no change  Plan of Care Reviewed With: patient  Outcome Evaluation: Pt in BR completing sponge bath on commode. [place] UB bathing. MaxA don/doff LSO. Fxl mobility via RW in room BR>Bed continued cues not to twist/bend as pt attempted to gather clothing items and bag from different surfaces. Pt forgetful of precautions and need for LSO use. Recommend inpatient rehab vs SNF at d/c d/t decreased awareness of precautions/safety and decreased ADL fxn. Also highly recommend exit alarm d/t pt decreased awareness and fall risk

## 2022-04-16 NOTE — PROGRESS NOTES
[facility]  Spine Surgery  MARRY Troncoso   Progress Note        Subjective/Overnight Events:  Patient stable this AM, she reports minimal sleep and minimal appetite, she has ambulated within room with assist, interested in rehab placement, no BM for three days, minimal PO intake    Vitals  Vitals:    04/15/22 2309 04/16/22 0311 04/16/22 0603 04/16/22 0609   BP: 123/55 127/51     BP Location: Left arm Left arm     Patient Position: Lying Lying     Pulse: 58 50 56 60   Resp: 16 16 16 16   Temp: 98.2 °F (36.8 °C) 98.2 °F (36.8 °C)     TempSrc: Oral Oral     SpO2: 94% 95% 96%    Weight:       Height:           Current Facility-Administered Medications   Medication Dose Route Frequency Provider Last Rate Last Admin   • acetaminophen (TYLENOL) tablet 650 mg  650 mg Oral Q4H PRN JESSIKA Reese MD       • ALPRAZolam (XANAX) tablet 0.5 mg  0.5 mg Oral TID JESSIKA Reese MD       • escitalopram (LEXAPRO) tablet 10 mg  10 mg Oral Daily JESSIKA Reese MD   10 mg at 04/15/22 1044   • ferrous sulfate tablet 325 mg  325 mg Oral Daily With Breakfast JESSIKA Reese MD   325 mg at 04/14/22 0923   • HYDROcodone-acetaminophen (NORCO) 7.5-325 MG per tablet 1 tablet  1 tablet Oral Q4H PRN JESSIKA Reese MD   1 tablet at 04/14/22 1028   • HYDROcodone-acetaminophen (NORCO) 7.5-325 MG per tablet 2 tablet  2 tablet Oral Q4H PRN JESSIKA Reese MD   2 tablet at 04/16/22 0242   • HYDROmorphone (DILAUDID) injection 1 mg  1 mg Intravenous Q3H PRN JESSIKA Reese MD        And   • naloxone (NARCAN) injection 0.4 mg  0.4 mg Intravenous Q5 Min PRN JESSIKA Reese MD       • ipratropium-albuterol (DUO-NEB) nebulizer solution 3 mL  3 mL Nebulization 4x Daily - RT JESSIKA Reese MD   3 mL at 04/16/22 0603   • lactated ringers infusion  100 mL/hr Intravenous Continuous PRN JESISKA Reese MD 50 mL/hr at 04/15/22 0732 New Bag at 04/15/22 0732   • lactated ringers  infusion  100 mL/hr Intravenous Continuous JESSIKA Reese MD   Stopped at 04/15/22 1047   • levothyroxine (SYNTHROID, LEVOTHROID) tablet 100 mcg  100 mcg Oral Q AM JESSIKA Reese MD   100 mcg at 04/16/22 0544   • ondansetron (ZOFRAN) tablet 4 mg  4 mg Oral Q6H PRN JESSIKA Reese MD   4 mg at 04/12/22 1204    Or   • ondansetron (ZOFRAN) injection 4 mg  4 mg Intravenous Q6H PRN JESSIKA Reese MD   4 mg at 04/12/22 2107   • pantoprazole (PROTONIX) EC tablet 40 mg  40 mg Oral QAM JESSIKA Reese MD   40 mg at 04/15/22 1045   • polyethylene glycol (MIRALAX) packet 17 g  17 g Oral BID JESSIKA Reese MD   17 g at 04/15/22 2015   • promethazine (PHENERGAN) tablet 25 mg  25 mg Oral Q8H PRN JESSIKA Reese MD       • sennosides-docusate (PERICOLACE) 8.6-50 MG per tablet 1 tablet  1 tablet Oral BID JESSIKA Reese MD   1 tablet at 04/15/22 2015   • sodium chloride 0.9 % flush 10 mL  10 mL Intravenous PRN JESSIKA Reese MD   10 mL at 04/15/22 1046   • sodium chloride 0.9 % flush 3 mL  3 mL Intravenous Q12H JESSIKA Reese MD   3 mL at 04/15/22 2015   • sodium chloride 0.9 % infusion  100 mL/hr Intravenous Continuous JESSIKA Reese  mL/hr at 04/15/22 2159 100 mL/hr at 04/15/22 2159   • tiZANidine (ZANAFLEX) tablet 4 mg  4 mg Oral Q8H PRN JESSIKA Reese MD   4 mg at 04/16/22 0242       PHYSICAL EXAM:    Orientation:  alert and oriented to person, place, and time    Incision:  no significant drainage, no dehiscence, no significant erythema    Upper Extremity Motor :  Diffuse weakness    Upper Motor Neuron Signs:  None     Lower Extremity Motor :  equal bilaterally and diffuse weakness    Lower Extremity Sensory:  Tibial nerve: Intact, Superficial peroneal nerve: Intact and Deep peroneal nerve: Intact    Flatus:  flatus    ABNORMAL EXAM FINDINGS:  Chronic tremor is present     LABS:    Lab Results (last 24 hours)     Procedure Component Value Units Date/Time    Basic  Metabolic Panel [[id number]]  (Abnormal) Collected: 04/16/22 0455    Specimen: Blood Updated: 04/16/22 0535     Glucose 126 mg/dL      BUN 10 mg/dL      Creatinine 0.54 mg/dL      Sodium 141 mmol/L      Potassium 3.5 mmol/L      Chloride 106 mmol/L      CO2 26.0 mmol/L      Calcium 8.1 mg/dL      BUN/Creatinine Ratio 18.5     Anion Gap 9.0 mmol/L      eGFR 97.4 mL/min/1.73      Comment: National Kidney Foundation and American Society of Nephrology (ASN) Task Force recommended calculation based on the Chronic Kidney Disease Epidemiology Collaboration (CKD-EPI) equation refit without adjustment for race.       Narrative:      GFR Normal >60  Chronic Kidney Disease <60  Kidney Failure <15      CBC (No Diff) [[id number]]  (Abnormal) Collected: 04/16/22 0455    Specimen: Blood Updated: 04/16/22 0517     WBC 6.38 10*3/mm3      RBC 3.04 10*6/mm3      Hemoglobin 8.9 g/dL      Hematocrit 27.6 %      MCV 90.8 fL      MCH 29.3 pg      MCHC 32.2 g/dL      RDW 13.5 %      RDW-SD 44.8 fl      MPV 11.8 fL      Platelets 160 10*3/mm3     Basic Metabolic Panel [[id number]]  (Abnormal) Collected: 04/15/22 1126    Specimen: Blood Updated: 04/15/22 1223     Glucose 113 mg/dL      BUN 11 mg/dL      Creatinine 0.64 mg/dL      Sodium 141 mmol/L      Potassium 3.3 mmol/L      Chloride 105 mmol/L      CO2 28.0 mmol/L      Calcium 8.4 mg/dL      BUN/Creatinine Ratio 17.2     Anion Gap 8.0 mmol/L      eGFR 93.4 mL/min/1.73      Comment: National Kidney Foundation and American Society of Nephrology (ASN) Task Force recommended calculation based on the Chronic Kidney Disease Epidemiology Collaboration (CKD-EPI) equation refit without adjustment for race.       Narrative:      GFR Normal >60  Chronic Kidney Disease <60  Kidney Failure <15      CBC (No Diff) [[id number]]  (Abnormal) Collected: 04/15/22 1126    Specimen: Blood Updated: 04/15/22 1203     WBC 5.98 10*3/mm3      RBC 3.33 10*6/mm3      Hemoglobin 9.8 g/dL      Hematocrit 31.0 %      MCV  93.1 fL      MCH 29.4 pg      MCHC 31.6 g/dL      RDW 13.8 %      RDW-SD 47.0 fl      MPV 12.8 fL      Platelets 151 10*3/mm3           ASSESSMENT AND PLAN:    Post operative day 5, 3, 1 status post ALIF L5/S1, LLIF L4/L5, PSF L4-S1    1:  Activity Level:  Up with PT/OT, encourage up to chair   2:  Pain Control:  Oral analgesics   3:  Discharge Planning:  SW working on SNF placement  4:  Other:  Add protein supplement, schedule home Xanax      Electronically signed by MARRY Troncoso 4/16/2022 06:10 CDT

## 2022-04-17 PROBLEM — K59.09 CHRONIC CONSTIPATION: Status: ACTIVE | Noted: 2022-04-17

## 2022-04-17 LAB
ANION GAP SERPL CALCULATED.3IONS-SCNC: 11 MMOL/L (ref 5–15)
BUN SERPL-MCNC: 10 MG/DL (ref 8–23)
BUN/CREAT SERPL: 18.2 (ref 7–25)
CALCIUM SPEC-SCNC: 8.3 MG/DL (ref 8.6–10.5)
CHLORIDE SERPL-SCNC: 104 MMOL/L (ref 98–107)
CO2 SERPL-SCNC: 26 MMOL/L (ref 22–29)
CREAT SERPL-MCNC: 0.55 MG/DL (ref 0.57–1)
DEPRECATED RDW RBC AUTO: 45.9 FL (ref 37–54)
EGFRCR SERPLBLD CKD-EPI 2021: 96.9 ML/MIN/1.73
ERYTHROCYTE [DISTWIDTH] IN BLOOD BY AUTOMATED COUNT: 13.8 % (ref 12.3–15.4)
GLUCOSE SERPL-MCNC: 86 MG/DL (ref 65–99)
HCT VFR BLD AUTO: 27.3 % (ref 34–46.6)
HGB BLD-MCNC: 8.9 G/DL (ref 12–15.9)
MCH RBC QN AUTO: 29.6 PG (ref 26.6–33)
MCHC RBC AUTO-ENTMCNC: 32.6 G/DL (ref 31.5–35.7)
MCV RBC AUTO: 90.7 FL (ref 79–97)
PLATELET # BLD AUTO: 200 10*3/MM3 (ref 140–450)
PMV BLD AUTO: 12.4 FL (ref 6–12)
POTASSIUM SERPL-SCNC: 3 MMOL/L (ref 3.5–5.2)
RBC # BLD AUTO: 3.01 10*6/MM3 (ref 3.77–5.28)
SODIUM SERPL-SCNC: 141 MMOL/L (ref 136–145)
WBC NRBC COR # BLD: 7.44 10*3/MM3 (ref 3.4–10.8)

## 2022-04-17 PROCEDURE — 94664 DEMO&/EVAL PT USE INHALER: CPT

## 2022-04-17 PROCEDURE — 0 HYDROMORPHONE 1 MG/ML SOLUTION: Performed by: ORTHOPAEDIC SURGERY

## 2022-04-17 PROCEDURE — 80048 BASIC METABOLIC PNL TOTAL CA: CPT | Performed by: ORTHOPAEDIC SURGERY

## 2022-04-17 PROCEDURE — 94799 UNLISTED PULMONARY SVC/PX: CPT

## 2022-04-17 PROCEDURE — 85027 COMPLETE CBC AUTOMATED: CPT | Performed by: ORTHOPAEDIC SURGERY

## 2022-04-17 PROCEDURE — 25010000002 METHYLNALTREXONE 12 MG/0.6ML SOLUTION: Performed by: FAMILY MEDICINE

## 2022-04-17 RX ADMIN — TIZANIDINE 4 MG: 4 TABLET ORAL at 15:49

## 2022-04-17 RX ADMIN — PANTOPRAZOLE SODIUM 40 MG: 40 TABLET, DELAYED RELEASE ORAL at 05:42

## 2022-04-17 RX ADMIN — IPRATROPIUM BROMIDE AND ALBUTEROL SULFATE 3 ML: .5; 3 SOLUTION RESPIRATORY (INHALATION) at 10:17

## 2022-04-17 RX ADMIN — Medication 3 ML: at 21:27

## 2022-04-17 RX ADMIN — HYDROCODONE BITARTRATE AND ACETAMINOPHEN 2 TABLET: 7.5; 325 TABLET ORAL at 14:40

## 2022-04-17 RX ADMIN — LEVOTHYROXINE SODIUM 100 MCG: 100 TABLET ORAL at 05:42

## 2022-04-17 RX ADMIN — POLYETHYLENE GLYCOL 3350 17 G: 17 POWDER, FOR SOLUTION ORAL at 08:37

## 2022-04-17 RX ADMIN — POLYETHYLENE GLYCOL 3350 17 G: 17 POWDER, FOR SOLUTION ORAL at 21:27

## 2022-04-17 RX ADMIN — HYDROMORPHONE HYDROCHLORIDE 1 MG: 1 INJECTION, SOLUTION INTRAMUSCULAR; INTRAVENOUS; SUBCUTANEOUS at 12:54

## 2022-04-17 RX ADMIN — IPRATROPIUM BROMIDE AND ALBUTEROL SULFATE 3 ML: .5; 3 SOLUTION RESPIRATORY (INHALATION) at 18:12

## 2022-04-17 RX ADMIN — Medication 3 ML: at 08:37

## 2022-04-17 RX ADMIN — ALPRAZOLAM 0.5 MG: 0.5 TABLET ORAL at 08:37

## 2022-04-17 RX ADMIN — FERROUS SULFATE TAB 325 MG (65 MG ELEMENTAL FE) 325 MG: 325 (65 FE) TAB at 08:37

## 2022-04-17 RX ADMIN — ESCITALOPRAM 10 MG: 10 TABLET, FILM COATED ORAL at 08:37

## 2022-04-17 RX ADMIN — ALPRAZOLAM 0.5 MG: 0.5 TABLET ORAL at 21:27

## 2022-04-17 RX ADMIN — IPRATROPIUM BROMIDE AND ALBUTEROL SULFATE 3 ML: .5; 3 SOLUTION RESPIRATORY (INHALATION) at 06:10

## 2022-04-17 RX ADMIN — DOCUSATE SODIUM 50 MG AND SENNOSIDES 8.6 MG 1 TABLET: 8.6; 5 TABLET, FILM COATED ORAL at 08:37

## 2022-04-17 RX ADMIN — TIZANIDINE 4 MG: 4 TABLET ORAL at 07:40

## 2022-04-17 RX ADMIN — IPRATROPIUM BROMIDE AND ALBUTEROL SULFATE 3 ML: .5; 3 SOLUTION RESPIRATORY (INHALATION) at 13:38

## 2022-04-17 RX ADMIN — HYDROCODONE BITARTRATE AND ACETAMINOPHEN 2 TABLET: 7.5; 325 TABLET ORAL at 05:41

## 2022-04-17 RX ADMIN — HYDROCODONE BITARTRATE AND ACETAMINOPHEN 2 TABLET: 7.5; 325 TABLET ORAL at 10:12

## 2022-04-17 RX ADMIN — HYDROCODONE BITARTRATE AND ACETAMINOPHEN 2 TABLET: 7.5; 325 TABLET ORAL at 01:38

## 2022-04-17 RX ADMIN — DOCUSATE SODIUM 50 MG AND SENNOSIDES 8.6 MG 1 TABLET: 8.6; 5 TABLET, FILM COATED ORAL at 21:27

## 2022-04-17 RX ADMIN — ALPRAZOLAM 0.5 MG: 0.5 TABLET ORAL at 15:49

## 2022-04-17 RX ADMIN — HYDROCODONE BITARTRATE AND ACETAMINOPHEN 2 TABLET: 7.5; 325 TABLET ORAL at 20:32

## 2022-04-17 RX ADMIN — METHYLNALTREXONE BROMIDE 12 MG: 12 INJECTION, SOLUTION SUBCUTANEOUS at 11:13

## 2022-04-17 NOTE — PLAN OF CARE
Goal Outcome Evaluation:  Plan of Care Reviewed With: patient        Progress: no change  Outcome Evaluation: pt alert and oriented x4.  Answers orientation questions correctly, but did show confusion after IV pain medication given.  per pt pain has been poorly controlled.  Dressings are clean, dry, intact.  pt has several occurances of trying to get out of bed without calling out or using her LSO brace.  education on fall risk and wearing brace reinforced. Call light in reach, safety maintained.

## 2022-04-17 NOTE — PROGRESS NOTES
Family Medicine Progress Note    Patient:  Sammi Sorenson  YOB: 1950    MRN: [id number]     Acct: [id number]     Admit date: 4/11/2022    Patient Seen, Chart, Consults notes, Labs, Radiology studies reviewed.    Subjective: Day 6 of stay with spinal stenosis requiring decompression surgery and most recent (in last 24 hours) has had more pain overnight.  Did not sleep well.  Has not had a bowel movement since surgery.  Reports chronic constipation for which she takes medication at home.    Past, Family, Social History unchanged from admission.    Diet: Diet Regular    Medications:  Scheduled Meds:ALPRAZolam, 0.5 mg, Oral, TID  escitalopram, 10 mg, Oral, Daily  ferrous sulfate, 325 mg, Oral, Daily With Breakfast  ipratropium-albuterol, 3 mL, Nebulization, 4x Daily - RT  levothyroxine, 100 mcg, Oral, Q AM  pantoprazole, 40 mg, Oral, QAM  polyethylene glycol, 17 g, Oral, BID  senna-docusate sodium, 1 tablet, Oral, BID  sodium chloride, 3 mL, Intravenous, Q12H      Continuous Infusions:lactated ringers, 100 mL/hr, Last Rate: 50 mL/hr (04/15/22 0732)  lactated ringers, 100 mL/hr, Last Rate: Stopped (04/15/22 1047)  sodium chloride, 100 mL/hr, Last Rate: 100 mL/hr (04/16/22 0820)      PRN Meds:•  acetaminophen  •  HYDROcodone-acetaminophen  •  HYDROcodone-acetaminophen  •  HYDROmorphone **AND** naloxone  •  lactated ringers  •  ondansetron **OR** ondansetron  •  promethazine  •  sodium chloride  •  tiZANidine    Objective:    Lab Results (last 24 hours)     ** No results found for the last 24 hours. **           Imaging Results (Last 72 Hours)     Procedure Component Value Units Date/Time    XR Spine Lumbar AP & Lateral [[id number]] Collected: 04/15/22 1116     Updated: 04/15/22 1225    Narrative:      XR SPINE LUMBAR AP AND LATERAL- 4/15/2022 7:10 AM CDT     HISTORY: fusion; Z78.9-Other specified health status; Z74.09-Other  reduced mobility     COMPARISON: None     FLUOROSCOPY TIME: 29 seconds    "  FLUOROSCOPY DOSE: 33 mGy     NUMBER OF IMAGES: 2       Impression:         Intraoperative fluoroscopic images during posterior spinal fusion.     Please refer to the operative note for more details.  This report was finalized on 04/15/2022 11:16 by Dr Ramírez Garg, .    FL C Arm During Surgery [[id number]] Collected: 04/15/22 1116     Updated: 04/15/22 1225    Narrative:      XR SPINE LUMBAR AP AND LATERAL- 4/15/2022 7:10 AM CDT     HISTORY: fusion; Z78.9-Other specified health status; Z74.09-Other  reduced mobility     COMPARISON: None     FLUOROSCOPY TIME: 29 seconds     FLUOROSCOPY DOSE: 33 mGy     NUMBER OF IMAGES: 2       Impression:         Intraoperative fluoroscopic images during posterior spinal fusion.     Please refer to the operative note for more details.  This report was finalized on 04/15/2022 11:16 by Dr Ramírez Garg, .           Physical Exam:    Vitals: /51 (BP Location: Right arm, Patient Position: Sitting)   Pulse 76   Temp 98 °F (36.7 °C) (Oral)   Resp 18   Ht 166.4 cm (65.5\")   Wt 68.5 kg (151 lb)   SpO2 100%   BMI 24.75 kg/m²   24 hour intake/output:    Intake/Output Summary (Last 24 hours) at 4/17/2022 0743  Last data filed at 4/16/2022 1300  Gross per 24 hour   Intake 1260 ml   Output --   Net 1260 ml     Last 3 weights:  Wt Readings from Last 3 Encounters:   04/11/22 68.5 kg (151 lb)   04/08/22 64.5 kg (142 lb 3.2 oz)   03/29/22 59.9 kg (132 lb)       General Appearance alert, appears stated age, cooperative and distress mild  Head normocephalic, without obvious abnormality  Eyes conjunctivae and sclerae normal and no icterus  Neck supple and trachea midline  Lungs clear to auscultation, respirations regular, respirations even and respirations unlabored  Heart regular rhythm & normal rate, normal S1, S2 and no murmur, no gallop, no rub  Abdomen normal bowel sounds, soft non-tender, no guarding and no rebound tenderness, protuberant  Extremities no edema, no cyanosis and no " redness  Skin turgor normal and color normal  Neurologic Mental Status orientated to person, place, time and situation        Assessment:      Chronic bilateral low back pain with bilateral sciatica    Lumbosacral disc disease    Lumbago of multiple sites in spine with sciatica    Anemia due to acute blood loss    Chronic constipation          Plan:  Pain control as per surgical service.  Has chronic issues with constipation.  Likely worsened from analgesics and from decreased activity.  We will give additional medication to see if can induce bowel movement and reverse the effect of the opioids on her gut.      Electronically signed by Emre Franco MD on 4/17/2022 at 07:43 CDT

## 2022-04-17 NOTE — PROGRESS NOTES
Family Medicine Progress Note    Patient:  Sammi Sorenson  YOB: 1950    MRN: [id number]     Acct: [id number]     Admit date: 4/11/2022    Patient Seen, Chart, Consults notes, Labs, Radiology studies reviewed.    Subjective: Day 5 of stay with spinal stenosis requiring decompression surgery and most recent (in last 24 hours) has had ongoing pain in the back.    Past, Family, Social History unchanged from admission.    Diet: Diet Regular    Medications:  Scheduled Meds:ALPRAZolam, 0.5 mg, Oral, TID  escitalopram, 10 mg, Oral, Daily  ferrous sulfate, 325 mg, Oral, Daily With Breakfast  ipratropium-albuterol, 3 mL, Nebulization, 4x Daily - RT  levothyroxine, 100 mcg, Oral, Q AM  pantoprazole, 40 mg, Oral, QAM  polyethylene glycol, 17 g, Oral, BID  senna-docusate sodium, 1 tablet, Oral, BID  sodium chloride, 3 mL, Intravenous, Q12H      Continuous Infusions:lactated ringers, 100 mL/hr, Last Rate: 50 mL/hr (04/15/22 0732)  lactated ringers, 100 mL/hr, Last Rate: Stopped (04/15/22 1047)  sodium chloride, 100 mL/hr, Last Rate: 100 mL/hr (04/16/22 0820)      PRN Meds:•  acetaminophen  •  HYDROcodone-acetaminophen  •  HYDROcodone-acetaminophen  •  HYDROmorphone **AND** naloxone  •  lactated ringers  •  ondansetron **OR** ondansetron  •  promethazine  •  sodium chloride  •  tiZANidine    Objective:    Lab Results (last 24 hours)     ** No results found for the last 24 hours. **           Imaging Results (Last 72 Hours)     Procedure Component Value Units Date/Time    XR Spine Lumbar AP & Lateral [[id number]] Collected: 04/15/22 1116     Updated: 04/15/22 1225    Narrative:      XR SPINE LUMBAR AP AND LATERAL- 4/15/2022 7:10 AM CDT     HISTORY: fusion; Z78.9-Other specified health status; Z74.09-Other  reduced mobility     COMPARISON: None     FLUOROSCOPY TIME: 29 seconds     FLUOROSCOPY DOSE: 33 mGy     NUMBER OF IMAGES: 2       Impression:         Intraoperative fluoroscopic images during posterior spinal  "fusion.     Please refer to the operative note for more details.  This report was finalized on 04/15/2022 11:16 by Dr Ramírez Garg, .    FL C Arm During Surgery [[id number]] Collected: 04/15/22 1116     Updated: 04/15/22 1225    Narrative:      XR SPINE LUMBAR AP AND LATERAL- 4/15/2022 7:10 AM CDT     HISTORY: fusion; Z78.9-Other specified health status; Z74.09-Other  reduced mobility     COMPARISON: None     FLUOROSCOPY TIME: 29 seconds     FLUOROSCOPY DOSE: 33 mGy     NUMBER OF IMAGES: 2       Impression:         Intraoperative fluoroscopic images during posterior spinal fusion.     Please refer to the operative note for more details.  This report was finalized on 04/15/2022 11:16 by Dr Ramírez Garg, .           Physical Exam:    Vitals: /51 (BP Location: Right arm, Patient Position: Sitting)   Pulse 76   Temp 98 °F (36.7 °C) (Oral)   Resp 18   Ht 166.4 cm (65.5\")   Wt 68.5 kg (151 lb)   SpO2 100%   BMI 24.75 kg/m²   24 hour intake/output:    Intake/Output Summary (Last 24 hours) at 4/17/2022 0740  Last data filed at 4/16/2022 1300  Gross per 24 hour   Intake 1260 ml   Output --   Net 1260 ml     Last 3 weights:  Wt Readings from Last 3 Encounters:   04/11/22 68.5 kg (151 lb)   04/08/22 64.5 kg (142 lb 3.2 oz)   03/29/22 59.9 kg (132 lb)       General Appearance alert, appears stated age and cooperative  Head normocephalic, without obvious abnormality and atraumatic  Eyes lids and lashes normal, conjunctivae and sclerae normal and no icterus  Neck supple and trachea midline  Lungs clear to auscultation, respirations regular, respirations even and respirations unlabored  Heart regular rhythm & normal rate and normal S1, S2  Abdomen normal bowel sounds, no masses and soft non-tender  Extremities no edema, no cyanosis and no redness  Skin turgor normal and color normal  Neurologic Mental Status orientated to person, place, time and situation        Assessment:      Chronic bilateral low back pain with " bilateral sciatica    Lumbosacral disc disease    Lumbago of multiple sites in spine with sciatica    Anemia due to acute blood loss    Chronic constipation          Plan:  Continue as per primary service.  Monitor for bowel function.  Pain control per spine surgery.  Monitor for any worsening of chronic medical conditions.      Electronically signed by Emre Franco MD on 4/17/2022

## 2022-04-17 NOTE — PLAN OF CARE
Goal Outcome Evaluation:  Plan of Care Reviewed With: patient        Progress: declining  Outcome Evaluation: Pt. has declined to work with PT all weekend due to pain. Pt. declined this afternoon even after receiving pain shot. Pt. has gotten up with nsg and did sit up in chair for 30 minutes earlier this morning. Will attempt to work with pt. again tomorrow.

## 2022-04-17 NOTE — PLAN OF CARE
Goal Outcome Evaluation:  Plan of Care Reviewed With: patient        Progress: no change  Outcome Evaluation: Pt reports little relief from PRN po and IV pain meds this shift. Education provided on use and timing of PRN pain meds- pain medication available times written out on board. Ambulating to bathroom with assist of 1 and walker. LSO brace in use when OOB. Pt did request to sit in chair today but has refused to work with therapy. PRN Relistor given for constipation. Dressings CDI. Daughter visiting at bedside. Call light within reach.

## 2022-04-18 DIAGNOSIS — G89.29 CHRONIC BILATERAL LOW BACK PAIN WITHOUT SCIATICA: ICD-10-CM

## 2022-04-18 DIAGNOSIS — M54.50 CHRONIC BILATERAL LOW BACK PAIN WITHOUT SCIATICA: ICD-10-CM

## 2022-04-18 LAB
ANION GAP SERPL CALCULATED.3IONS-SCNC: 9 MMOL/L (ref 5–15)
BUN SERPL-MCNC: 9 MG/DL (ref 8–23)
BUN/CREAT SERPL: 15.8 (ref 7–25)
CALCIUM SPEC-SCNC: 8.3 MG/DL (ref 8.6–10.5)
CHLORIDE SERPL-SCNC: 103 MMOL/L (ref 98–107)
CO2 SERPL-SCNC: 28 MMOL/L (ref 22–29)
CREAT SERPL-MCNC: 0.57 MG/DL (ref 0.57–1)
DEPRECATED RDW RBC AUTO: 47.3 FL (ref 37–54)
EGFRCR SERPLBLD CKD-EPI 2021: 96.1 ML/MIN/1.73
ERYTHROCYTE [DISTWIDTH] IN BLOOD BY AUTOMATED COUNT: 14 % (ref 12.3–15.4)
GLUCOSE SERPL-MCNC: 94 MG/DL (ref 65–99)
HCT VFR BLD AUTO: 27.4 % (ref 34–46.6)
HGB BLD-MCNC: 8.8 G/DL (ref 12–15.9)
MCH RBC QN AUTO: 29.4 PG (ref 26.6–33)
MCHC RBC AUTO-ENTMCNC: 32.1 G/DL (ref 31.5–35.7)
MCV RBC AUTO: 91.6 FL (ref 79–97)
PLATELET # BLD AUTO: 191 10*3/MM3 (ref 140–450)
PMV BLD AUTO: 12.2 FL (ref 6–12)
POTASSIUM SERPL-SCNC: 3.3 MMOL/L (ref 3.5–5.2)
RBC # BLD AUTO: 2.99 10*6/MM3 (ref 3.77–5.28)
SODIUM SERPL-SCNC: 140 MMOL/L (ref 136–145)
WBC NRBC COR # BLD: 6.26 10*3/MM3 (ref 3.4–10.8)

## 2022-04-18 PROCEDURE — 94760 N-INVAS EAR/PLS OXIMETRY 1: CPT

## 2022-04-18 PROCEDURE — 97116 GAIT TRAINING THERAPY: CPT

## 2022-04-18 PROCEDURE — 85027 COMPLETE CBC AUTOMATED: CPT | Performed by: ORTHOPAEDIC SURGERY

## 2022-04-18 PROCEDURE — 80048 BASIC METABOLIC PNL TOTAL CA: CPT | Performed by: ORTHOPAEDIC SURGERY

## 2022-04-18 PROCEDURE — 94799 UNLISTED PULMONARY SVC/PX: CPT

## 2022-04-18 RX ORDER — POLYETHYLENE GLYCOL 3350 17 G/17G
17 POWDER, FOR SOLUTION ORAL 3 TIMES DAILY
Status: DISCONTINUED | OUTPATIENT
Start: 2022-04-18 | End: 2022-04-19 | Stop reason: HOSPADM

## 2022-04-18 RX ORDER — OXYCODONE AND ACETAMINOPHEN 10; 325 MG/1; MG/1
1 TABLET ORAL EVERY 4 HOURS PRN
Status: DISCONTINUED | OUTPATIENT
Start: 2022-04-18 | End: 2022-04-19 | Stop reason: HOSPADM

## 2022-04-18 RX ORDER — DEXAMETHASONE SODIUM PHOSPHATE 10 MG/ML
10 INJECTION, SOLUTION INTRAMUSCULAR; INTRAVENOUS EVERY 8 HOURS
Status: DISPENSED | OUTPATIENT
Start: 2022-04-18 | End: 2022-04-18

## 2022-04-18 RX ORDER — PREGABALIN 75 MG/1
75 CAPSULE ORAL EVERY 12 HOURS SCHEDULED
Status: DISCONTINUED | OUTPATIENT
Start: 2022-04-18 | End: 2022-04-19 | Stop reason: HOSPADM

## 2022-04-18 RX ORDER — BISACODYL 10 MG
10 SUPPOSITORY, RECTAL RECTAL DAILY PRN
Status: DISCONTINUED | OUTPATIENT
Start: 2022-04-18 | End: 2022-04-18

## 2022-04-18 RX ORDER — DOCUSATE SODIUM 100 MG/1
100 CAPSULE, LIQUID FILLED ORAL 2 TIMES DAILY PRN
Status: DISCONTINUED | OUTPATIENT
Start: 2022-04-18 | End: 2022-04-18

## 2022-04-18 RX ORDER — BISACODYL 5 MG/1
10 TABLET, DELAYED RELEASE ORAL DAILY PRN
Status: DISCONTINUED | OUTPATIENT
Start: 2022-04-18 | End: 2022-04-19

## 2022-04-18 RX ORDER — AMOXICILLIN 250 MG
2 CAPSULE ORAL 2 TIMES DAILY
Status: DISCONTINUED | OUTPATIENT
Start: 2022-04-18 | End: 2022-04-19 | Stop reason: HOSPADM

## 2022-04-18 RX ADMIN — PANTOPRAZOLE SODIUM 40 MG: 40 TABLET, DELAYED RELEASE ORAL at 08:53

## 2022-04-18 RX ADMIN — LEVOTHYROXINE SODIUM 100 MCG: 100 TABLET ORAL at 05:52

## 2022-04-18 RX ADMIN — OXYCODONE AND ACETAMINOPHEN 1 TABLET: 325; 10 TABLET ORAL at 22:52

## 2022-04-18 RX ADMIN — TIZANIDINE 4 MG: 4 TABLET ORAL at 12:08

## 2022-04-18 RX ADMIN — DOCUSATE SODIUM 50 MG AND SENNOSIDES 8.6 MG 1 TABLET: 8.6; 5 TABLET, FILM COATED ORAL at 08:53

## 2022-04-18 RX ADMIN — PREGABALIN 75 MG: 75 CAPSULE ORAL at 22:52

## 2022-04-18 RX ADMIN — POLYETHYLENE GLYCOL 3350 17 G: 17 POWDER, FOR SOLUTION ORAL at 08:53

## 2022-04-18 RX ADMIN — OXYCODONE AND ACETAMINOPHEN 1 TABLET: 325; 10 TABLET ORAL at 19:11

## 2022-04-18 RX ADMIN — HYDROCODONE BITARTRATE AND ACETAMINOPHEN 2 TABLET: 7.5; 325 TABLET ORAL at 00:31

## 2022-04-18 RX ADMIN — IPRATROPIUM BROMIDE AND ALBUTEROL SULFATE 3 ML: .5; 3 SOLUTION RESPIRATORY (INHALATION) at 06:26

## 2022-04-18 RX ADMIN — Medication 3 ML: at 08:55

## 2022-04-18 RX ADMIN — ALPRAZOLAM 0.5 MG: 0.5 TABLET ORAL at 19:15

## 2022-04-18 RX ADMIN — FERROUS SULFATE TAB 325 MG (65 MG ELEMENTAL FE) 325 MG: 325 (65 FE) TAB at 08:53

## 2022-04-18 RX ADMIN — HYDROCODONE BITARTRATE AND ACETAMINOPHEN 2 TABLET: 7.5; 325 TABLET ORAL at 04:47

## 2022-04-18 RX ADMIN — PANTOPRAZOLE SODIUM 40 MG: 40 TABLET, DELAYED RELEASE ORAL at 05:52

## 2022-04-18 RX ADMIN — TIZANIDINE 4 MG: 4 TABLET ORAL at 00:31

## 2022-04-18 RX ADMIN — HYDROCODONE BITARTRATE AND ACETAMINOPHEN 2 TABLET: 7.5; 325 TABLET ORAL at 08:53

## 2022-04-18 RX ADMIN — DOCUSATE SODIUM 50 MG AND SENNOSIDES 8.6 MG 2 TABLET: 8.6; 5 TABLET, FILM COATED ORAL at 22:52

## 2022-04-18 RX ADMIN — ALPRAZOLAM 0.5 MG: 0.5 TABLET ORAL at 08:53

## 2022-04-18 RX ADMIN — ESCITALOPRAM 10 MG: 10 TABLET, FILM COATED ORAL at 08:53

## 2022-04-18 RX ADMIN — TIZANIDINE 4 MG: 4 TABLET ORAL at 22:52

## 2022-04-18 RX ADMIN — IPRATROPIUM BROMIDE AND ALBUTEROL SULFATE 3 ML: .5; 3 SOLUTION RESPIRATORY (INHALATION) at 21:29

## 2022-04-18 RX ADMIN — POLYETHYLENE GLYCOL 3350 17 G: 17 POWDER, FOR SOLUTION ORAL at 22:53

## 2022-04-18 RX ADMIN — OXYCODONE AND ACETAMINOPHEN 1 TABLET: 325; 10 TABLET ORAL at 13:36

## 2022-04-18 NOTE — CASE MANAGEMENT/SOCIAL WORK
Continued Stay Note  [facility]     Patient Name: Sammi Sorenson  MRN: [id number]  Today's Date: 4/18/2022    Admit Date: 4/11/2022     Discharge Plan     Row Name 04/18/22 1066       Plan    Plan Comments Bed offered at [facility] and they can accept pt tomorrow (4-19). Pt is agreeable to [facility] rehab. Will cancel referral to [place] [facility]. Per Soila at [facility] pt does not need a new Covid test prior to dc.    Final Discharge Disposition Code 62 - inpatient rehab facility    Row Name 04/18/22 4909       Plan    Plan Comments Spoke to pt about dc planning and rehab recommendations. Discussed options in [place] and [place]. Pt refuses to go to any SNF but pt did agree to [facility] acute rehab or [place] [facility]. Referrals sent, await answers.               Discharge Codes    No documentation.                     RUTHIE Plunkett

## 2022-04-18 NOTE — PLAN OF CARE
Goal Outcome Evaluation:  Plan of Care Reviewed With: patient        Progress: improving  Outcome Evaluation: Pt. agreeable to therapy this morning. Pt. c/o intense back pain. Pt. was CGA-MIN assist for bed mobility. She was able to ambulate in hallway with [id number]' x 2. Pt. required assistance with wiping when toileting. Pt. was assisted back to bed with no relief in pain from activity. Pt. will need skilled care following discharge for therapy.

## 2022-04-18 NOTE — THERAPY TREATMENT NOTE
Acute Care - Physical Therapy Treatment Note  [facility]     Patient Name: Sammi Sorenson  : 1950  MRN: [id number]  Today's Date: 2022      Visit Dx:     ICD-10-CM ICD-9-CM   1. Decreased activities of daily living (ADL)  Z78.9 V49.89   2. Impaired mobility  Z74.09 799.89     Patient Active Problem List   Diagnosis   • Tobacco abuse   • Cellulitis of face   • Thrush   • Chronic bilateral low back pain with bilateral sciatica   • Deep groin pain   • Hip pain, acute, left   • Ischial bursitis of left side   • Lumbar disc disease with radiculopathy   • Pain in both knees   • Pain management contract agreement   • Piriformis syndrome of left side   • SI (sacroiliac) joint dysfunction   • Lumbosacral disc disease   • Lumbago of multiple sites in spine with sciatica   • Anemia due to acute blood loss   • Chronic constipation     Past Medical History:   Diagnosis Date   • Anxiety    • Cellulitis of face 2019   • Chronic abdominal pain    • Chronic back pain    • Chronic gastritis    • Chronic pain     sees pain vilma. since    • CKD (chronic kidney disease)    • Colon polyp    • COPD (chronic obstructive pulmonary disease) (HCC)    • Disease of thyroid gland    • Diverticulosis    • Dyslipidemia    • GERD (gastroesophageal reflux disease)    • Hemorrhoid    • Hiatal hernia    • History of adenomatous polyp of colon    • Leaky heart valve    • Lumbosacral disc disease 2022   • Migraine    • Neuropathy    • Osteoarthritis    • Ovarian cancer (HCC)    • Submandibular gland mass      Past Surgical History:   Procedure Laterality Date   • ANTERIOR LUMBAR EXPOSURE N/A 2022    Procedure: ANTERIOR LUMBAR EXPOSURE;  Surgeon: Juan Francisco Nelson DO;  Location: [facility];  Service: Vascular;  Laterality: N/A;   • CARDIAC CATHETERIZATION      no stent   • CHOLECYSTECTOMY     • COLONOSCOPY  04/10/2015    5 polyps, tubular adenomas, melanosis, diverticulosis, internal hemorrhoids,    • CYST REMOVAL     •  ENDOSCOPY  04/10/2015    hiatus hernia   • HYSTERECTOMY     • LUMBAR FUSION N/A 4/11/2022    Procedure: ANTERIOR DECOMPRESSION, ANTERIOR LUMBAR INTERBODY FUSION WITH INSTRUMENTATION L5-S1;  Surgeon: JESSIKA Reese MD;  Location:  [facility];  Service: Orthopedic Spine;  Laterality: N/A;   • LUMBAR FUSION Right 4/13/2022    Procedure: RIGHT LATERAL LUMBAR INTERBODY FUSION WITH INSTRUMENTATION L4-5;  Surgeon: EJSSIKA Reese MD;  Location:  [facility];  Service: Orthopedic Spine;  Laterality: Right;   • LUMBAR LAMINECTOMY WITH FUSION N/A 4/15/2022    Procedure: POSTERIOR SPINAL FUSION WITH INSTRUMENTATION L4-S1;  Surgeon: JESSIKA Reese MD;  Location:  [facility];  Service: Orthopedic Spine;  Laterality: N/A;   • LUNG LOBECTOMY Right     2002,[facility]   • REPLACEMENT TOTAL KNEE BILATERAL     • SUBMANDIBULAR GLAND EXCISION Left 11/02/2018    Procedure: Incision and drainage of left submandibular region abscess with excision of left submandibular region mass mass consistent with probable lymph node;  Surgeon: Duncan Ewing MD;  Location:  [facility];  Service: ENT     PT Assessment (last 12 hours)     PT Evaluation and Treatment     Row Name 04/18/22 1115          Physical Therapy Time and Intention    Subjective Information complains of;pain  -     Document Type therapy note (daily note)  -     Mode of Treatment physical therapy  -     Row Name 04/18/22 1115          General Information    Existing Precautions/Restrictions fall;brace worn when out of bed  -     Row Name 04/18/22 1115          Pain    Pretreatment Pain Rating 9/10  -     Posttreatment Pain Rating 9/10  -     Pain Location - Side/Orientation Bilateral  -     Pain Location lower  -     Pain Location - back  -     Pre/Posttreatment Pain Comment pain also going down Left leg  -     Pain Intervention(s) Repositioned;Ambulation/increased activity  -     Row Name 04/18/22 1115          Bed Mobility    Rolling Left  Holt (Bed Mobility) contact guard  -     Sidelying-Sit Holt (Bed Mobility) contact guard  -MF     Sit-Sidelying Holt (Bed Mobility) minimum assist (75% patient effort);verbal cues  -     Assistive Device (Bed Mobility) head of bed elevated;bed rails  -     Row Name 04/18/22 1115          Transfers    Sit-Stand Holt (Transfers) contact guard  -     Stand-Sit Holt (Transfers) contact guard  -     Holt Level (Toilet Transfer) contact guard  -     Assistive Device (Toilet Transfer) commode;walker, front-wheeled  -MF     Row Name 04/18/22 1115          Toilet Transfer    Type (Toilet Transfer) sit-stand;stand-sit  -     Row Name 04/18/22 1115          Gait/Stairs (Locomotion)    Holt Level (Gait) verbal cues;contact guard;minimum assist (75% patient effort)  -     Assistive Device (Gait) walker, front-wheeled  -     Distance in Feet (Gait) 25' x 2  -MF     Deviations/Abnormal Patterns (Gait) antalgic;shalini decreased;stride length decreased  -     Row Name             Wound 04/11/22 0825 Left lower abdomen Incision    Wound - Properties Group Placement Date: 04/11/22  -DT Placement Time: 0825  -DT Present on Hospital Admission: N  -DT Side: Left  -DT Orientation: lower  -DT Location: abdomen  -DT Primary Wound Type: Incision  -DT     Retired Wound - Properties Group Placement Date: 04/11/22  -DT Placement Time: 0825  -DT Present on Hospital Admission: N  -DT Side: Left  -DT Orientation: lower  -DT Location: abdomen  -DT Primary Wound Type: Incision  -DT     Retired Wound - Properties Group Date first assessed: 04/11/22  -DT Time first assessed: 0825  -DT Present on Hospital Admission: N  -DT Side: Left  -DT Location: abdomen  -DT Primary Wound Type: Incision  -DT     Row Name             Wound 04/13/22 0739 Right lower flank Incision    Wound - Properties Group Placement Date: 04/13/22  -JR Placement Time: 0739  -JR Present on Hospital  Admission: N  -JR Side: Right  -JR Orientation: lower  -JR Location: flank  -JR Primary Wound Type: Incision  -JR Additional Comments: mastisol, steri-strips, 4x4 gauze, mepilex  -JR     Retired Wound - Properties Group Placement Date: 04/13/22  -JR Placement Time: 0739  -JR Present on Hospital Admission: N  -JR Side: Right  -JR Orientation: lower  -JR Location: flank  -JR Primary Wound Type: Incision  -JR Additional Comments: mastisol, steri-strips, 4x4 gauze, mepilex  -JR     Retired Wound - Properties Group Date first assessed: 04/13/22  -JR Time first assessed: 0739  -JR Present on Hospital Admission: N  -JR Side: Right  -JR Location: flank  -JR Primary Wound Type: Incision  -JR Additional Comments: mastisol, steri-strips, 4x4 gauze, mepilex  -JR     Row Name             Wound 04/15/22 0729 lumbar spine Incision    Wound - Properties Group Placement Date: 04/15/22  -DT Placement Time: 0729  -DT Present on Hospital Admission: N  -DT Location: lumbar spine  -DT Primary Wound Type: Incision  -DT, X2      Retired Wound - Properties Group Placement Date: 04/15/22  -DT Placement Time: 0729  -DT Present on Hospital Admission: N  -DT Location: lumbar spine  -DT Primary Wound Type: Incision  -DT, X2      Retired Wound - Properties Group Date first assessed: 04/15/22  -DT Time first assessed: 0729  -DT Present on Hospital Admission: N  -DT Location: lumbar spine  -DT Primary Wound Type: Incision  -DT, X2      Row Name 04/18/22 1115          Plan of Care Review    Plan of Care Reviewed With patient  -MF     Progress improving  -     Outcome Evaluation Pt. agreeable to therapy this morning. Pt. c/o intense back pain. Pt. was CGA-MIN assist for bed mobility. She was able to ambulate in hallway with RWX-25' x 2. Pt. required assistance with wiping when toileting. Pt. was assisted back to bed with no relief in pain from activity. Pt. will need skilled care following discharge for therapy.  -     Row Name 04/18/22 1115           Positioning and Restraints    Pre-Treatment Position in bed  -MF     Post Treatment Position bed  -MF     In Bed fowlers;call light within reach;encouraged to call for assist;side rails up x2;exit alarm on  -MF           User Key  (r) = Recorded By, (t) = Taken By, (c) = Cosigned By    Initials Name Provider Type    Floresita Joseph, PTA Physical Therapist Assistant    Adilson Razo, RN Registered Nurse    Alice Foy RN Registered Nurse                Physical Therapy Education                 Title: PT OT SLP Therapies (In Progress)     Topic: Physical Therapy (In Progress)     Point: Mobility training (Done)     Learning Progress Summary           Patient Acceptance, E, VU by MS at 4/15/2022 1428    Comment: role of PT in her care, spinal restrictions    Acceptance, D,TB,E, NR,VU by JJ at 4/13/2022 1347    Comment: Pt educated on POC, LSO wear schedule, log roll technique and spinal precautions    Acceptance, E, NR by MS at 4/11/2022 1534    Comment: role of PT in her care, spinal restrictions, use of LSO                   Point: Home exercise program (Not Started)     Learner Progress:  Not documented in this visit.          Point: Body mechanics (Done)     Learning Progress Summary           Patient Acceptance, D,TB,E, NR,VU by JJ at 4/13/2022 1347    Comment: Pt educated on POC, LSO wear schedule, log roll technique and spinal precautions                   Point: Precautions (Done)     Learning Progress Summary           Patient Acceptance, E, VU by MS at 4/15/2022 1428    Comment: role of PT in her care, spinal restrictions    Acceptance, D,TB,E, NR,VU by JJ at 4/13/2022 1347    Comment: Pt educated on POC, LSO wear schedule, log roll technique and spinal precautions    Acceptance, E,TB, VU by  at 4/12/2022 1152    Comment: shivani/doff LSO    Acceptance, E, NR by MS at 4/11/2022 1534    Comment: role of PT in her care, spinal restrictions, use of LSO                               User  Key     Initials Effective Dates Name Provider Type Discipline     06/16/21 -  Shoshana Duvall PTA Physical Therapist Assistant PT    MS 06/19/18 -  Mercy Mccall, PT, DPT, NCS Physical Therapist PT    JJ 03/07/22 -  Ashish Kay, MARTA Student PT Student PT              PT Recommendation and Plan     Plan of Care Reviewed With: patient  Progress: improving  Outcome Evaluation: Pt. agreeable to therapy this morning. Pt. c/o intense back pain. Pt. was CGA-MIN assist for bed mobility. She was able to ambulate in hallway with RWX-25' x 2. Pt. required assistance with wiping when toileting. Pt. was assisted back to bed with no relief in pain from activity. Pt. will need skilled care following discharge for therapy.       Time Calculation:    PT Charges     Row Name 04/18/22 1149             Time Calculation    Start Time 1115  -MF      Stop Time 1140  -MF      Time Calculation (min) 25 min  -MF      PT Received On 04/18/22  -MF              Time Calculation- PT    Total Timed Code Minutes- PT 25 minute(s)  -MF              Timed Charges    19684 - Gait Training Minutes  25  -MF              Total Minutes    Timed Charges Total Minutes 25  -MF       Total Minutes 25  -MF            User Key  (r) = Recorded By, (t) = Taken By, (c) = Cosigned By    Initials Name Provider Type     Floresita Sotomayor PTA Physical Therapist Assistant              Therapy Charges for Today     Code Description Service Date Service Provider Modifiers Qty    [id number] HC GAIT TRAINING EA 15 MIN 4/18/2022 Floresita Sotomayor PTA GP 2          PT G-Codes  Outcome Measure Options: AM-PAC 6 Clicks Daily Activity (OT)  AM-PAC 6 Clicks Score (PT): 12  AM-PAC 6 Clicks Score (OT): 18    Floresita Sotomayor PTA  4/18/2022

## 2022-04-18 NOTE — PROGRESS NOTES
Sammi Sorenson  71 y.o.  female  Subjective     Post-Operative Day # 3    Systemic or Specific Complaints:     C/o pain not well controlled on medication. Discussed changing them and pt agreed. Pt able to walk to bathroom and back without significant issue. Pt hasn't walked with PT as she states they come at inopportune times for her. Pt without BM since 4/11. No other complaints. No abdominal pain.    Objective     Vital signs in last 24 hours:  Temp:  [97.8 °F (36.6 °C)-98.1 °F (36.7 °C)] 98.1 °F (36.7 °C)  Heart Rate:  [57-69] 69  Resp:  [15-22] 22  BP: (117-125)/(40-49) 125/40    Physical Exam:    Alert and oriented ×3, no acute distress, grossly neurovascularly intact, vital signs stable, dressing clean dry and intact, moving all extremities without focal deficit    Diagnostic Data:  CBC:  Results from last 7 days   Lab Units 04/18/22  0509   WBC 10*3/mm3 6.26   RBC 10*6/mm3 2.99*   HEMOGLOBIN g/dL 8.8*   HEMATOCRIT % 27.4*   PLATELETS 10*3/mm3 191          Assessment/Plan     1. Increasing chronic back pain.  2. Bilateral buttock, thigh and leg radiculopathy, left worse than right.  3. Neurogenic claudication.  4. Multilevel thoracolumbar degenerative disk disease, severe, L4 to S1.  5. Facet arthropathy, L4 to S1.  6. Left central disc herniation, L4-5.  7. Foraminal disc herniation, left L5-S1.  8. Central and bilateral foraminal stenosis, L4 to S1, left worse than right.  9. Osteopenia, T-score -1.4, 02/17/2022.  10.  Status post anterior decompression, ALIF with instrumentation of L5-S1, 4/11/2022  11.  Status post right LLIF with instrumentation L4-5, 4/13/2022  12. S/p PSF with instrumentation L4-S1    Plan:  1. PT/OT/Brace  2. Periops  3. Bowel regimen placed  4. Steroids placed for inflammation and pain medication changed from hydrocodone to oxycodone. May add Lyrica tomorrow if no adverse effect to medications and pain not improved.  5. Awaiting placement to [facility]        Da Dale PA-C    Date:  4/18/2022  Time: 09:21 CDT

## 2022-04-18 NOTE — PLAN OF CARE
Goal Outcome Evaluation:  Plan of Care Reviewed With: patient        Progress: no change  Pt is alert and oriented x4.  Pt still reported pain is poorly controlled.  Pt only given oral medication this shift due to severity of increased confusion following IV pain medication administration.  Pt continues to tries to get out of bed without calling out for assistance.  Education on fall risk and call light use reinforced.  Call light in reach, safety maintained.

## 2022-04-18 NOTE — CASE MANAGEMENT/SOCIAL WORK
Continued Stay Note  [facility]     Patient Name: Sammi Sorenson  MRN: [id number]  Today's Date: 4/18/2022    Admit Date: 4/11/2022     Discharge Plan     Row Name 04/18/22 1429       Plan    Plan Comments Spoke to pt about dc planning and rehab recommendations. Discussed options in [place] and [place]. Pt refuses to go to any SNF but pt did agree to [facility] or [facility]. Referrals sent, await answers.               Discharge Codes    No documentation.                     RUTHIE Plunkett

## 2022-04-18 NOTE — THERAPY TREATMENT NOTE
Acute Care - Physical Therapy Treatment Note  [facility]     Patient Name: Sammi Sorenson  : 1950  MRN: [id number]  Today's Date: 2022      Visit Dx:     ICD-10-CM ICD-9-CM   1. Decreased activities of daily living (ADL)  Z78.9 V49.89   2. Impaired mobility  Z74.09 799.89     Patient Active Problem List   Diagnosis   • Tobacco abuse   • Cellulitis of face   • Thrush   • Chronic bilateral low back pain with bilateral sciatica   • Deep groin pain   • Hip pain, acute, left   • Ischial bursitis of left side   • Lumbar disc disease with radiculopathy   • Pain in both knees   • Pain management contract agreement   • Piriformis syndrome of left side   • SI (sacroiliac) joint dysfunction   • Lumbosacral disc disease   • Lumbago of multiple sites in spine with sciatica   • Anemia due to acute blood loss   • Chronic constipation     Past Medical History:   Diagnosis Date   • Anxiety    • Cellulitis of face 2019   • Chronic abdominal pain    • Chronic back pain    • Chronic gastritis    • Chronic pain     sees pain vilma. since    • CKD (chronic kidney disease)    • Colon polyp    • COPD (chronic obstructive pulmonary disease) (HCC)    • Disease of thyroid gland    • Diverticulosis    • Dyslipidemia    • GERD (gastroesophageal reflux disease)    • Hemorrhoid    • Hiatal hernia    • History of adenomatous polyp of colon    • Leaky heart valve    • Lumbosacral disc disease 2022   • Migraine    • Neuropathy    • Osteoarthritis    • Ovarian cancer (HCC)    • Submandibular gland mass      Past Surgical History:   Procedure Laterality Date   • ANTERIOR LUMBAR EXPOSURE N/A 2022    Procedure: ANTERIOR LUMBAR EXPOSURE;  Surgeon: Juan Francisco Nelson DO;  Location: [facility];  Service: Vascular;  Laterality: N/A;   • CARDIAC CATHETERIZATION      no stent   • CHOLECYSTECTOMY     • COLONOSCOPY  04/10/2015    5 polyps, tubular adenomas, melanosis, diverticulosis, internal hemorrhoids,    • CYST REMOVAL     •  ENDOSCOPY  04/10/2015    hiatus hernia   • HYSTERECTOMY     • LUMBAR FUSION N/A 4/11/2022    Procedure: ANTERIOR DECOMPRESSION, ANTERIOR LUMBAR INTERBODY FUSION WITH INSTRUMENTATION L5-S1;  Surgeon: JESSIKA Reese MD;  Location:  [facility];  Service: Orthopedic Spine;  Laterality: N/A;   • LUMBAR FUSION Right 4/13/2022    Procedure: RIGHT LATERAL LUMBAR INTERBODY FUSION WITH INSTRUMENTATION L4-5;  Surgeon: JESSIKA Reese MD;  Location:  [facility];  Service: Orthopedic Spine;  Laterality: Right;   • LUMBAR LAMINECTOMY WITH FUSION N/A 4/15/2022    Procedure: POSTERIOR SPINAL FUSION WITH INSTRUMENTATION L4-S1;  Surgeon: JESSIKA Reese MD;  Location:  [facility];  Service: Orthopedic Spine;  Laterality: N/A;   • LUNG LOBECTOMY Right     2002,[facility]   • REPLACEMENT TOTAL KNEE BILATERAL     • SUBMANDIBULAR GLAND EXCISION Left 11/02/2018    Procedure: Incision and drainage of left submandibular region abscess with excision of left submandibular region mass mass consistent with probable lymph node;  Surgeon: Duncan Ewing MD;  Location:  [facility];  Service: ENT     PT Assessment (last 12 hours)     PT Evaluation and Treatment     Row Name 04/18/22 1452 04/18/22 1115       Physical Therapy Time and Intention    Subjective Information complains of;pain  - complains of;pain  -MF    Document Type therapy note (daily note)  - therapy note (daily note)  -    Mode of Treatment physical therapy  - physical therapy  -    Row Name 04/18/22 1452 04/18/22 1115       General Information    Existing Precautions/Restrictions fall;brace worn when out of bed  - fall;brace worn when out of bed  -    Row Name 04/18/22 1115          Pain    Pretreatment Pain Rating 9/10  -     Posttreatment Pain Rating 9/10  -     Pain Location - Side/Orientation Bilateral  -     Pain Location lower  -     Pain Location - back  -MF     Pre/Posttreatment Pain Comment pain also going down Left leg  -     Pain  Intervention(s) Repositioned;Ambulation/increased activity  -     Row Name 04/18/22 1452          Pain Scale: FACES Pre/Post-Treatment    Pain: FACES Scale, Pretreatment 8-->hurts whole lot  -MF     Posttreatment Pain Rating 6-->hurts even more  -     Pain Location incisional  -     Pain Location - abdomen;back  -     Row Name 04/18/22 1452 04/18/22 1115       Bed Mobility    Rolling Left [place] (Bed Mobility) verbal cues;contact guard  -MF contact guard  -MF    Sidelying-Sit [place] (Bed Mobility) verbal cues;minimum assist (75% patient effort)  -MF contact guard  -MF    Sit-Sidelying [place] (Bed Mobility) -- minimum assist (75% patient effort);verbal cues  -    Assistive Device (Bed Mobility) head of bed elevated;bed rails  - head of bed elevated;bed rails  -    Row Name 04/18/22 1452 04/18/22 1115       Transfers    Sit-Stand [place] (Transfers) contact guard  -MF contact guard  -MF    Stand-Sit [place] (Transfers) contact guard  -MF contact guard  -MF    Cooke Level (Toilet Transfer) contact guard  -MF contact guard  -MF    Assistive Device (Toilet Transfer) commode;walker, front-wheeled  - commode;walker, front-wheeled  -MF    Row Name 04/18/22 1452 04/18/22 1115       Toilet Transfer    Type (Toilet Transfer) sit-stand;stand-sit  - sit-stand;stand-sit  -    Row Name 04/18/22 1452 04/18/22 1115       Gait/Stairs (Locomotion)    Cooke Level (Gait) contact guard  -MF verbal cues;contact guard;minimum assist (75% patient effort)  -    Assistive Device (Gait) walker, front-wheeled  -MF walker, front-wheeled  -    Distance in Feet (Gait) 15' x 2  -MF 25' x 2  -MF    Deviations/Abnormal Patterns (Gait) antalgic;shalini decreased;stride length decreased  -MF antalgic;shalini decreased;stride length decreased  -MF    Row Name             Wound 04/11/22 0825 Left lower abdomen Incision    Wound - Properties Group Placement Date: 04/11/22  -DT Placement  Time: 0825  -DT Present on Hospital Admission: N  -DT Side: Left  -DT Orientation: lower  -DT Location: abdomen  -DT Primary Wound Type: Incision  -DT     Retired Wound - Properties Group Placement Date: 04/11/22  -DT Placement Time: 0825  -DT Present on Hospital Admission: N  -DT Side: Left  -DT Orientation: lower  -DT Location: abdomen  -DT Primary Wound Type: Incision  -DT     Retired Wound - Properties Group Date first assessed: 04/11/22  -DT Time first assessed: 0825  -DT Present on Hospital Admission: N  -DT Side: Left  -DT Location: abdomen  -DT Primary Wound Type: Incision  -DT     Row Name             Wound 04/13/22 0739 Right lower flank Incision    Wound - Properties Group Placement Date: 04/13/22  -JR Placement Time: 0739  -JR Present on Hospital Admission: N  -JR Side: Right  -JR Orientation: lower  -JR Location: flank  -JR Primary Wound Type: Incision  -JR Additional Comments: mastisol, steri-strips, 4x4 gauze, mepilex  -JR     Retired Wound - Properties Group Placement Date: 04/13/22  -JR Placement Time: 0739  -JR Present on Hospital Admission: N  -JR Side: Right  -JR Orientation: lower  -JR Location: flank  -JR Primary Wound Type: Incision  -JR Additional Comments: mastisol, steri-strips, 4x4 gauze, mepilex  -JR     Retired Wound - Properties Group Date first assessed: 04/13/22  -JR Time first assessed: 0739  -JR Present on Hospital Admission: N  -JR Side: Right  -JR Location: flank  -JR Primary Wound Type: Incision  -JR Additional Comments: mastisol, steri-strips, 4x4 gauze, mepilex  -JR     Row Name             Wound 04/15/22 0729 lumbar spine Incision    Wound - Properties Group Placement Date: 04/15/22  -DT Placement Time: 0729  -DT Present on Hospital Admission: N  -DT Location: lumbar spine  -DT Primary Wound Type: Incision  -DT, X2      Retired Wound - Properties Group Placement Date: 04/15/22  -DT Placement Time: 0729  -DT Present on Hospital Admission: N  -DT Location: lumbar spine  -DT  Primary Wound Type: Incision  -DT, X2      Retired Wound - Properties Group Date first assessed: 04/15/22  -DT Time first assessed: 0729  -DT Present on Hospital Admission: N  -DT Location: lumbar spine  -DT Primary Wound Type: Incision  -DT, X2      Row Name 04/18/22 1115          Plan of Care Review    Plan of Care Reviewed With patient  -MF     Progress improving  -     Outcome Evaluation Pt. agreeable to therapy this morning. Pt. c/o intense back pain. Pt. was CGA-MIN assist for bed mobility. She was able to ambulate in hallway with RWX-25' x 2. Pt. required assistance with wiping when toileting. Pt. was assisted back to bed with no relief in pain from activity. Pt. will need skilled care following discharge for therapy.  -     Row Name 04/18/22 1452 04/18/22 1115       Positioning and Restraints    Pre-Treatment Position in bed  - in bed  -    Post Treatment Position chair  - bed  -    In Bed -- fowlers;call light within reach;encouraged to call for assist;side rails up x2;exit alarm on  -    In Chair sitting;call light within reach;encouraged to call for assist;exit alarm on  straight back chair  - --          User Key  (r) = Recorded By, (t) = Taken By, (c) = Cosigned By    Initials Name Provider Type     Floresita Sotomayor, ABIMAEL Physical Therapist Assistant    Adilson Razo RN Registered Nurse    Alice Foy RN Registered Nurse                Physical Therapy Education                 Title: PT OT SLP Therapies (In Progress)     Topic: Physical Therapy (In Progress)     Point: Mobility training (Done)     Learning Progress Summary           Patient Acceptance, E, VU by MS at 4/15/2022 1428    Comment: role of PT in her care, spinal restrictions    Acceptance, D,TB,E, NR,VU by JEVITA at 4/13/2022 1347    Comment: Pt educated on POC, LSO wear schedule, log roll technique and spinal precautions    Acceptance, E, NR by MS at 4/11/2022 1534    Comment: role of PT in her care, spinal  restrictions, use of LSO                   Point: Home exercise program (Not Started)     Learner Progress:  Not documented in this visit.          Point: Body mechanics (Done)     Learning Progress Summary           Patient Acceptance, D,TB,E, NR,VU by JJ at 4/13/2022 1347    Comment: Pt educated on POC, LSO wear schedule, log roll technique and spinal precautions                   Point: Precautions (Done)     Learning Progress Summary           Patient Acceptance, E, VU by MS at 4/15/2022 1428    Comment: role of PT in her care, spinal restrictions    Acceptance, D,TB,E, NR,VU by J at 4/13/2022 1347    Comment: Pt educated on POC, LSO wear schedule, log roll technique and spinal precautions    Acceptance, E,TB, VU by  at 4/12/2022 1152    Comment: shivani/doff LSO    Acceptance, E, NR by MS at 4/11/2022 1534    Comment: role of PT in her care, spinal restrictions, use of LSO                               User Key     Initials Effective Dates Name Provider Type Discipline     06/16/21 -  Shoshana Duvall, PTA Physical Therapist Assistant PT    MS 06/19/18 -  Mercy Mccall, PT, DPT, NCS Physical Therapist PT     03/07/22 -  Ashish Kay, PT Student PT Student PT              PT Recommendation and Plan     Plan of Care Reviewed With: patient  Progress: improving  Outcome Evaluation: Pt. agreeable to therapy this morning. Pt. c/o intense back pain. Pt. was CGA-MIN assist for bed mobility. She was able to ambulate in hallway with RWX-25' x 2. Pt. required assistance with wiping when toileting. Pt. was assisted back to bed with no relief in pain from activity. Pt. will need skilled care following discharge for therapy.       Time Calculation:    PT Charges     Row Name 04/18/22 1512 04/18/22 1149          Time Calculation    Start Time 1452  -MF 1115  -MF     Stop Time 1508  -MF 1140  -MF     Time Calculation (min) 16 min  -MF 25 min  -MF     PT Received On -- 04/18/22  -            Time Calculation- PT     Total Timed Code Minutes- PT 16 minute(s)  -MF 25 minute(s)  -MF            Timed Charges    89419 - Gait Training Minutes  16  -MF 25  -MF            Total Minutes    Timed Charges Total Minutes 16  -MF 25  -MF      Total Minutes 16  -MF 25  -MF           User Key  (r) = Recorded By, (t) = Taken By, (c) = Cosigned By    Initials Name Provider Type    Floresita Joseph PTA Physical Therapist Assistant              Therapy Charges for Today     Code Description Service Date Service Provider Modifiers Qty    11461786258 HC GAIT TRAINING EA 15 MIN 4/18/2022 Floresita Sotomayor PTA GP 2    14850153859 HC GAIT TRAINING EA 15 MIN 4/18/2022 Floresita Sotomayor, ABIMAEL GP 1          PT G-Codes  Outcome Measure Options: AM-PAC 6 Clicks Daily Activity (OT)  AM-PAC 6 Clicks Score (PT): 12  AM-PAC 6 Clicks Score (OT): 18    Floresita Sotomayor PTA  4/18/2022

## 2022-04-18 NOTE — PROGRESS NOTES
Daily Progress Note  Sammi Sorenson  MRN: [id number] LOS: 7    Admit Date: 4/11/2022 4/18/2022 07:27 CDT          Chief Complaint:  Low back pain with radiculopathy secondary to spinal stenosis    Interval History:    Reviewed overnight events and nursing notes.   Status:  about the same as before  Pain:  no relief  Pt is alert and oriented x4.    Pt still reported pain is poorly controlled  Day 6 of stay with spinal stenosis requiring decompression surgery  Family at bedside    DIET:  Diet Regular    Medications:   lactated ringers, 100 mL/hr, Last Rate: 50 mL/hr (04/15/22 0732)  lactated ringers, 100 mL/hr, Last Rate: Stopped (04/15/22 1047)  sodium chloride, 100 mL/hr, Last Rate: 100 mL/hr (04/16/22 0820)      ALPRAZolam, 0.5 mg, Oral, TID  escitalopram, 10 mg, Oral, Daily  ferrous sulfate, 325 mg, Oral, Daily With Breakfast  ipratropium-albuterol, 3 mL, Nebulization, 4x Daily - RT  levothyroxine, 100 mcg, Oral, Q AM  pantoprazole, 40 mg, Oral, QAM  polyethylene glycol, 17 g, Oral, BID  senna-docusate sodium, 1 tablet, Oral, BID  sodium chloride, 3 mL, Intravenous, Q12H        Data:     Code Status:   Code Status and Medical Interventions:   Ordered at: 04/11/22 1152     Code Status (Patient has no pulse and is not breathing):    CPR (Attempt to Resuscitate)     Medical Interventions (Patient has pulse or is breathing):    Full Support       Family History   Problem Relation Age of Onset   • No Known Problems Mother    • No Known Problems Father      Social History     Socioeconomic History   • Marital status:    Tobacco Use   • Smoking status: Current Every Day Smoker     Packs/day: 0.25     Years: 20.00     Pack years: 5.00     Types: Cigarettes   • Smokeless tobacco: Never Used   • Tobacco comment: has tried chantix; made her sick   Substance and Sexual Activity   • Alcohol use: No   • Drug use: No   • Sexual activity: Defer       Labs:  Lab Results (last 72 hours)     Procedure Component Value  Units Date/Time    Basic Metabolic Panel [[id number]]  (Abnormal) Collected: 04/18/22 0509    Specimen: Blood Updated: 04/18/22 0611     Glucose 94 mg/dL      BUN 9 mg/dL      Creatinine 0.57 mg/dL      Sodium 140 mmol/L      Potassium 3.3 mmol/L      Chloride 103 mmol/L      CO2 28.0 mmol/L      Calcium 8.3 mg/dL      BUN/Creatinine Ratio 15.8     Anion Gap 9.0 mmol/L      eGFR 96.1 mL/min/1.73      Comment: National Kidney Foundation and American Society of Nephrology (ASN) Task Force recommended calculation based on the Chronic Kidney Disease Epidemiology Collaboration (CKD-EPI) equation refit without adjustment for race.       Narrative:      GFR Normal >60  Chronic Kidney Disease <60  Kidney Failure <15      CBC (No Diff) [[id number]]  (Abnormal) Collected: 04/18/22 0509    Specimen: Blood Updated: 04/18/22 0549     WBC 6.26 10*3/mm3      RBC 2.99 10*6/mm3      Hemoglobin 8.8 g/dL      Hematocrit 27.4 %      MCV 91.6 fL      MCH 29.4 pg      MCHC 32.1 g/dL      RDW 14.0 %      RDW-SD 47.3 fl      MPV 12.2 fL      Platelets 191 10*3/mm3     Basic Metabolic Panel [[id number]]  (Abnormal) Collected: 04/17/22 0811    Specimen: Blood Updated: 04/17/22 0903     Glucose 86 mg/dL      BUN 10 mg/dL      Creatinine 0.55 mg/dL      Sodium 141 mmol/L      Potassium 3.0 mmol/L      Chloride 104 mmol/L      CO2 26.0 mmol/L      Calcium 8.3 mg/dL      BUN/Creatinine Ratio 18.2     Anion Gap 11.0 mmol/L      eGFR 96.9 mL/min/1.73      Comment: National Kidney Foundation and American Society of Nephrology (ASN) Task Force recommended calculation based on the Chronic Kidney Disease Epidemiology Collaboration (CKD-EPI) equation refit without adjustment for race.       Narrative:      GFR Normal >60  Chronic Kidney Disease <60  Kidney Failure <15      CBC (No Diff) [[id number]]  (Abnormal) Collected: 04/17/22 0811    Specimen: Blood Updated: 04/17/22 0840     WBC 7.44 10*3/mm3      RBC 3.01 10*6/mm3      Hemoglobin 8.9 g/dL       Hematocrit 27.3 %      MCV 90.7 fL      MCH 29.6 pg      MCHC 32.6 g/dL      RDW 13.8 %      RDW-SD 45.9 fl      MPV 12.4 fL      Platelets 200 10*3/mm3     Basic Metabolic Panel [[id number]]  (Abnormal) Collected: 04/16/22 0455    Specimen: Blood Updated: 04/16/22 0535     Glucose 126 mg/dL      BUN 10 mg/dL      Creatinine 0.54 mg/dL      Sodium 141 mmol/L      Potassium 3.5 mmol/L      Chloride 106 mmol/L      CO2 26.0 mmol/L      Calcium 8.1 mg/dL      BUN/Creatinine Ratio 18.5     Anion Gap 9.0 mmol/L      eGFR 97.4 mL/min/1.73      Comment: National Kidney Foundation and American Society of Nephrology (ASN) Task Force recommended calculation based on the Chronic Kidney Disease Epidemiology Collaboration (CKD-EPI) equation refit without adjustment for race.       Narrative:      GFR Normal >60  Chronic Kidney Disease <60  Kidney Failure <15      CBC (No Diff) [[id number]]  (Abnormal) Collected: 04/16/22 0455    Specimen: Blood Updated: 04/16/22 0517     WBC 6.38 10*3/mm3      RBC 3.04 10*6/mm3      Hemoglobin 8.9 g/dL      Hematocrit 27.6 %      MCV 90.8 fL      MCH 29.3 pg      MCHC 32.2 g/dL      RDW 13.5 %      RDW-SD 44.8 fl      MPV 11.8 fL      Platelets 160 10*3/mm3     Basic Metabolic Panel [[id number]]  (Abnormal) Collected: 04/15/22 1126    Specimen: Blood Updated: 04/15/22 1223     Glucose 113 mg/dL      BUN 11 mg/dL      Creatinine 0.64 mg/dL      Sodium 141 mmol/L      Potassium 3.3 mmol/L      Chloride 105 mmol/L      CO2 28.0 mmol/L      Calcium 8.4 mg/dL      BUN/Creatinine Ratio 17.2     Anion Gap 8.0 mmol/L      eGFR 93.4 mL/min/1.73      Comment: National Kidney Foundation and American Society of Nephrology (ASN) Task Force recommended calculation based on the Chronic Kidney Disease Epidemiology Collaboration (CKD-EPI) equation refit without adjustment for race.       Narrative:      GFR Normal >60  Chronic Kidney Disease <60  Kidney Failure <15      CBC (No Diff) [[id number]]  (Abnormal)  "Collected: 04/15/22 1126    Specimen: Blood Updated: 04/15/22 1203     WBC 5.98 10*3/mm3      RBC 3.33 10*6/mm3      Hemoglobin 9.8 g/dL      Hematocrit 31.0 %      MCV 93.1 fL      MCH 29.4 pg      MCHC 31.6 g/dL      RDW 13.8 %      RDW-SD 47.0 fl      MPV 12.8 fL      Platelets 151 10*3/mm3           Objective:     Vitals: /49 (BP Location: Left arm, Patient Position: Lying)   Pulse 65   Temp 97.8 °F (36.6 °C) (Oral)   Resp 20   Ht 166.4 cm (65.5\")   Wt 68.5 kg (151 lb)   SpO2 94%   BMI 24.75 kg/m²      Intake/Output Summary (Last 24 hours) at 2022 0727  Last data filed at 2022 0450  Gross per 24 hour   Intake --   Output 700 ml   Net -700 ml    Temp (24hrs), Av.9 °F (36.6 °C), Min:97.8 °F (36.6 °C), Max:98 °F (36.7 °C)    Glucose:  No results found for: POCGLU  Physical Examination:   General appearance - alert, well appearing, and in no distress and oriented to person, place, and time  Mouth - mucous membranes moist, pharynx normal without lesions  Neck - supple, no significant adenopathy  Lymphatics - no palpable lymphadenopathy, no hepatosplenomegaly  Chest - clear to auscultation, no wheezes, rales or rhonchi, symmetric air entry, no tachypnea, retractions or cyanosis  Heart - normal rate, regular rhythm, normal S1, S2, no murmurs, rubs, clicks or gallops  Abdomen - soft, nontender, nondistended, no masses or organomegaly bowel sounds normal  Neurological - alert, oriented, normal speech, no focal findings or movement disorder noted  Musculoskeletal - no joint tenderness, deformity or swelling  Extremities - peripheral pulses normal, no pedal edema, no clubbing or cyanosis  Skin - normal coloration and turgor, no rashes, no suspicious skin lesions noted      Assessment and Plan:     Primary Problem:  Chronic bilateral low back pain with bilateral sciatica    Hospital Problem list/Treatment Plan:    Chronic bilateral low back pain with bilateral sciatica    Lumbosacral disc " disease    Lumbago of multiple sites in spine with sciatica    Anemia due to acute blood loss--stable, expected.    Chronic constipation--postop bowel regimen    Medically stable.  Cleared for discharged.   Patient requesting discharge home, family requested discharge to SNF for further rehab.   to see  Discharge planning: Pending    Reviewed treatment plans with the patient, nursing staff and family  20 minutes spent in face to face interaction and coordination of care.     Electronically signed by MARRY Romo on 4/18/2022 at 07:27 CDT

## 2022-04-18 NOTE — DISCHARGE PLACEMENT REQUEST
"Sammi Sorenson (71 y.o. Female)             Date of Birth   1950    Social Security Number       Address   [address] [facility] [address]    Home Phone   [phone]    MRN   [id number]       Sabianism   Lutheran    Marital Status                               Admission Date   4/11/22    Admission Type   Elective    Admitting Provider   JESSIKA Reese MD    Attending Provider   JESSIKA Reese MD    Department, Room/Bed   [facility] 3A, 333/1       Discharge Date       Discharge Disposition       Discharge Destination                               Attending Provider: JESSIKA Reese MD    Allergies: Bactrim [Sulfamethoxazole-trimethoprim], Penicillins, Azithromycin, Talwin [Pentazocine], Codeine, Sulfa Antibiotics, Tegaderm Chg Dressing [Chlorhexidine]    Isolation: None   Infection: None   Code Status: CPR   Advance Care Planning Activity    Ht: 166.4 cm (65.5\")   Wt: 68.5 kg (151 lb)    Admission Cmt: None   Principal Problem: Chronic bilateral low back pain with bilateral sciatica [M54.42,M54.41,G89.29]                 Active Insurance as of 4/11/2022     Primary Coverage     Payor Plan Insurance Group Employer/Plan Group    MEDICARE MEDICARE A & B      Payor Plan Address Payor Plan Phone Number Payor Plan Fax Number Effective Dates    [address] [phone]  9/1/2013 - None Entered    [facility] [id number]       Subscriber Name Subscriber Birth Date Member ID       SAMMI SORENSON 1950 [id number]           Secondary Coverage     Payor Plan Insurance Group Employer/Plan Group    [place]      Payor Plan Address Payor Plan Phone Number Payor Plan Fax Number Effective Dates    [address] [address] [phone]  11/2/2018 - None Entered    [facility] [address]       Subscriber Name Subscriber Birth Date Member ID       SAMMI SORENSON 1950 [id number]                 Emergency Contacts      (Rel.) Home Phone " Work Phone Mobile Phone    Ximena Ewing (Relative) [phone] -- --    rola everett (Daughter) -- -- [phone]            Insurance Information                MEDICARE/MEDICARE A & B Phone: [phone]    Subscriber: Sammi Everett Subscriber#: [id number]    Group#: -- Precert#: --        [place] Phone: [phone]    Subscriber: Sammi Everett Subscriber#: [id number]    Group#: -- Precert#: --

## 2022-04-18 NOTE — DISCHARGE PLACEMENT REQUEST
"Referral for short term rehab. Please call Montse at [phone] after referral reviewed.             Samantha Sorenson (71 y.o. Female)             Date of Birth   1950    Social Security Number       Address   [address] [facility] [address]    Home Phone   [phone]    MRN   [id number]       Confucianist   Advent    Marital Status                               Admission Date   4/11/22    Admission Type   Elective    Admitting Provider   JESSIKA Reese MD    Attending Provider   JESSIKA Reese MD    Department, Room/Bed   [facility] 3A, 333/1       Discharge Date       Discharge Disposition       Discharge Destination                               Attending Provider: JESSIKA Reese MD    Allergies: Bactrim [Sulfamethoxazole-trimethoprim], Penicillins, Azithromycin, Talwin [Pentazocine], Codeine, Sulfa Antibiotics, Tegaderm Chg Dressing [Chlorhexidine]    Isolation: None   Infection: None   Code Status: CPR   Advance Care Planning Activity    Ht: 166.4 cm (65.5\")   Wt: 68.5 kg (151 lb)    Admission Cmt: None   Principal Problem: Chronic bilateral low back pain with bilateral sciatica [M54.42,M54.41,G89.29]                 Active Insurance as of 4/11/2022     Primary Coverage     Payor Plan Insurance Group Employer/Plan Group    MEDICARE MEDICARE A & B      Payor Plan Address Payor Plan Phone Number Payor Plan Fax Number Effective Dates    [address] [phone]  9/1/2013 - None Entered    [facility] [id number]       Subscriber Name Subscriber Birth Date Member ID       SAMANTHA SORENSON 1950 [id number]           Secondary Coverage     Payor Plan Insurance Group Employer/Plan Group    [facility] MEDICAID      Payor Plan Address Payor Plan Phone Number Payor Plan Fax Number Effective Dates    [address] [address] [phone]  11/2/2018 - None Entered    [facility] [address]       Subscriber Name Subscriber Birth Date Member ID       " SAMANTHA SORENSON 1950 [id number]                 Emergency Contacts      (Rel.) Home Phone Work Phone Mobile Phone    Ximena Ewing (Relative) [phone] -- --    rola sorenson (Daughter) -- -- [phone]            Insurance Information                MEDICARE/MEDICARE A & B Phone: [phone]    Subscriber: Samantha Sorenson Subscriber#: [id number]    Group#: -- Precert#: --        [place] Phone: [phone]    Subscriber: Samantha Sorenson Subscriber#: [id number]    Group#: -- Precert#: --                    Vital Signs (last day)     Date/Time Temp Temp src Pulse Resp BP Patient Position SpO2    04/18/22 0809 98.1 (36.7) Oral 69 22 125/40 Lying 96    04/18/22 0632 -- -- 65 20 -- -- --    04/18/22 0626 -- -- 68 18 -- -- 94    04/17/22 2025 97.8 (36.6) Oral 61 16 117/49 Lying 94    04/17/22 1812 -- -- 64 15 -- -- 98    04/17/22 1344 -- -- 65 16 -- -- --    04/17/22 1338 -- -- 62 16 -- -- --    04/17/22 1023 -- -- 64 16 -- -- --    04/17/22 1017 -- -- 57 16 -- -- --    04/17/22 0735 98 (36.7) Oral 76 18 135/51 Sitting 100    04/17/22 0616 -- -- 70 16 -- -- --    04/17/22 0610 -- -- 68 16 -- -- 92             Operative/Procedure Notes (last 7 days)      JESSIKA Reese MD at 04/13/22 0640        LUMBAR LATERAL INTERBODY FUSION  Procedure Note    Samantha Sorenson  4/13/2022    Pre-op Diagnosis:     1. Increasing chronic back pain.  2. Bilateral buttock, thigh and leg radiculopathy, left worse than right.  3. Neurogenic claudication.  4. Multilevel thoracolumbar degenerative disk disease, severe, L4 to S1.  5. Facet arthropathy, L4 to S1.  6. Left central disc herniation, L4-5.  7. Foraminal disc herniation, left L5-S1.  8. Central and bilateral foraminal stenosis, L4 to S1, left worse than right.  9. Osteopenia, T-score -1.4, 02/17/2022.  10.  Status post anterior decompression, ALIF with instrumentation of L5-S1, 4/11/2022    Post-op  Diagnosis:    same    Procedure/CPT® Codes:    1.  Right lateral lumbar interbody fusion L4-5  2.  Anterior spinal instrumentation L4-5 (NuVasive lateral plate and screws)  3.  Use of PEEK interbody biomechanical device for fusion L4-5 (NuVasive porous PEEK spacer)  4.  Use of allograft bone matrix for fusion (MagnetOs)  5.  Use of fluoroscopy for confirmation of surgical level, placement of PEEK spacer and instrumentation  6.  Intraoperative neural monitoring    Anesthesia: General    Surgeon: ADIN Reese MD    Assistant: Sven Canales PA-C    Estimated Blood Loss: Minimal    Complications: None    Condition: Stable to PACU.    Indications:    The patient is a 71-year-old who sees Dr. Zak Alan for medical issues.  She presented to the office with complaints of increasing chronic back pain along with bilateral buttock, thigh, and leg radiculopathy, with the left side worse than the right.  Her symptoms were very consistent with neurogenic claudication.  Imaging studies revealed multilevel thoracolumbar degenerative disc disease and facet arthropathy that was severe from L4-S1.  She was noted to have a left central disc herniation at L4-5 and a foraminal disc herniation on the left side of L5-S1.  The disc herniations together with the degenerative changes combined to form central and bilateral foraminal stenosis from L4-S1 that was worse on the left than the right and matched her symptoms.    After failing conservative measures, it was mutually decided that surgery would be the best option. Risks, benefits, and complications of surgery were discussed with the patient. The patient appeared well informed and wished to proceed. We specifically discussed the risk of infection, blood loss, nerve root injury, CSF leak, and the possibility of incomplete resolution of symptoms. We also discussed the possible risk of a nonunion and the potential need for additional surgery in the event of a pseudoarthrosis or  hardware failure.     We elected proceed with a staged operation.  The first stage of the procedure was performed on 4/11/2022 and involved an anterior decompression and ALIF with instrumentation of L5-S1.  This level was addressed anteriorly and a separate stage as it cannot be accessed through a lateral approach.  Today we are addressing L4-5 with a lateral fusion.  It is planned we will be proceeding with a final posterior procedure at a later date involving a posterior spinal fusion with instrumentation spanning L4-S1.    Operative Procedure:    After obtaining informed consent and verifying the correct operative site, the patient was brought to the operating room and placed supine on operating table.  A general anesthetic was provided by the anesthesia service with the assistance of an endotracheal tube.  Once this was appropriately positioned and secured, the patient was carefully rotated into the lateral decubitus position with the right side up. All bony prominences were well-padded.  3 inch wide cloth tape was used to maintain the patient's position.  It was also used to tip open the pelvis slightly allowing better access to the lumbar spine through a lateral approach.  Fluoroscopy was then used to identify the L4-5 level, and the skin was marked for our planned incision.  The right flank was then prepped and draped in the usual sterile fashion.  A surgical timeout was taken to confirm this was the correct patient, we were working at the correct level, and that preoperative antibiotics were given in a timely fashion.    An oblique incision was created of the right flank using a 10 blade scalpel directly centered over the L4-5 segment. Dissection was carried bluntly through subcutaneous tissues. Blunt dissection was also carried between the external oblique and internal oblique musculature. The transversalis fascia was pierced allowing access to the retroperitoneal space. At this point, a series of  neuromonitoring probes were used to safely access the L4-5 level. We used stimulated and free run EMG for this purpose. Once nerve roots were confirmed to be out of harm's way, self retaining retractors were placed for continuous exposure.     An annulotomy was then created at the L4-5 area using a 15 blade scalpel on a long handle. A Hoang elevator was used to remove disc material off of the endplates. Disc material was removed using Kerrisons, pituitaries, and forward angled curettes. Once all disc material had been retrieved, I used the Hoang elevator to divide the contralateral annulus. This assisted with mobilization of the vertebral body. We then used a series of endplate scrapers to prepare the endplates for interbody fusion. Trial spacers were malleted into position and for the disc space it was felt that a 10 mm x 55 mm implant would be the best fit to restore disc height. The disc space was then thoroughly irrigated with saline solution.     A porous PEEK spacer from the NuVasive instrumentation set measuring 10 mm x 55 mm x 22 mm was then packed with allograft bone matrix as tightly as possible. This PEEK spacer was then malleted into the L4-5 disc space under fluoroscopic guidance. It was placed as a PEEK interbody biomechanical device to assist with interbody fusion. Once this spacer was confirmed to be properly positioned, I chose a 2-hole plate to help augment the fusion of L4-5. This plate was held into position using screws. I placed a 50 mm screw into L4 and I placed a shorter 35 mm screw into L5. The shorter screw was used to hopefully accommodate the placement of a pedicle screw on the contralateral L5 pedicle as part of the next stage of the procedure.     The wound was then irrigated thoroughly. A thorough inspection was then undertaken to ensure that we had adequate hemostasis. Bleeding at this point was controlled using thrombin with Gelfoam powder and bipolar cautery. Closure was then  accomplished with a #1 Vicryl reapproximating the internal and external oblique musculature. Immediate subcutaneous cutaneous tissues were closed with a 3-0 Vicryl. And skin closure was accomplished using Mastisol and Steri-Strips. The wound was then sterilely dressed. The patient was then carefully rotated supine onto a hospital gurney, extubated, and sent to the recovery room in good stable condition.     The patient tolerated the procedure well. There were no complications. We estimate blood loss to be minimal. The patient remained hemodynamically stable.     Intraoperative neuro monitoring was ordered and carried out throughout the procedure to add an increased level of safety for the patient.  The interpreting physician was available by means of real-time continuous, bidirectional, remote audio and visual communication as needed throughout the entire procedure.  Modalities used during the procedure included SSEP, EEG, MEP, EMG, and TOF.  There were no neuro monitoring signal changes during the procedure.    Sven Canales PA-C provided critical assistance during the procedure. His assistance was medically necessary in order to allow the procedure to occur in the most safe and efficient manner.    ADIN Reese MD     Date: 4/13/2022  Time: 08:22 CDT    Electronically signed by JESSIKA Reese MD at 04/13/22 0822     JESSIKA Reese MD at 04/15/22 0640        LUMBAR LAMINECTOMY POSTERIOR LUMBAR INTERBODY FUSION  Procedure Note    Sammi E Sorenson  4/15/2022    Pre-op Diagnosis:     1. Increasing chronic back pain.  2. Bilateral buttock, thigh and leg radiculopathy, left worse than right.  3. Neurogenic claudication.  4. Multilevel thoracolumbar degenerative disk disease, severe, L4 to S1.  5. Facet arthropathy, L4 to S1.  6. Left central disc herniation, L4-5.  7. Foraminal disc herniation, left L5-S1.  8. Central and bilateral foraminal stenosis, L4 to S1, left worse than right.  9. Osteopenia,  T-score -1.4, 02/17/2022.  10.  Status post anterior decompression, ALIF with instrumentation of L5-S1, 4/11/2022  11.  Status post right LLIF with instrumentation L4-5, 4/13/2022    Post-op Diagnosis:    same    Procedure/CPT® Codes:     1.  Posterior spinal fusion L4-S1  2.  Posterior spinal instrumentation L4-S1 (ATEC pedicle screws and rods)  3.  Use of locally obtained autograft bone for fusion  4.  Use of allograft bone matrix for fusion ([employer] MagnetOs)  5.  Use of fluoroscopy for confirmation of surgical level and placement of instrumentation  6.  Intraoperative neural monitoring with pedicle screw stimulation     Anesthesia: General     Surgeon: ADIN Reese MD     Assistant:   Sven Canales PA-C     Estimated Blood Loss: minimal     Complications: None     Condition: Stable to PACU.     Indications:     The patient is a 71-year-old who sees Dr. Zak Alan for medical issues.  She presented to the office with complaints of increasing chronic back pain along with bilateral buttock, thigh, and leg radiculopathy, with the left side worse than the right.  Her symptoms were very consistent with neurogenic claudication.  Imaging studies revealed multilevel thoracolumbar degenerative disc disease and facet arthropathy that was severe from L4-S1.  She was noted to have a left central disc herniation at L4-5 and a foraminal disc herniation on the left side of L5-S1.  The disc herniations together with the degenerative changes combined to form central and bilateral foraminal stenosis from L4-S1 that was worse on the left than the right and matched her symptoms.      After failing conservative measures, it was mutually decided that surgery would be his best option. Risks, benefits, and complications of surgery were discussed with the patient. The patient appeared well informed and wished to proceed. We specifically discussed the risk of infection, blood loss, nerve root injury, CSF leak, and the possibility of  incomplete resolution of symptoms. We also discussed the possible risk of a nonunion and the potential need for additional surgery in the event of a pseudoarthrosis or hardware failure.      We elected to proceed with a staged operation.    The first stage of the procedure was performed on 4/11/2022 and involved an anterior decompression and ALIF with instrumentation of L5-S1.  The second stage of the procedure was performed on 4/13/2022 and involved a right lateral fusion of L4-5.  Today we return to surgery for the final posterior stage the procedure involving a posterior spinal fusion with instrumentation from L4 to S1.     Operative Procedure:     After obtaining informed consent and verifying the correct operative site, the patient was brought to the operating room. A general anesthetic was provided by the anesthesia service with the assistance of an endotracheal tube. Once this was appropriately positioned and secured, the patient was carefully rotated prone onto a Imtiaz frame. All bony processes were well-padded. The lumbar region was prepped and draped in usual sterile fashion. A surgical timeout was taken to confirm this was the correct patient, we were working at the correct levels, and that preoperative antibiotics were given in a timely fashion.    Using fluoroscopy for guidance, bilateral Wiltsey incisions were created overlying the L4-5 and L5-S1 segments using a 10 blade scalpel.  Dissection was carried through subcutaneous tissues using Bovie cautery.  The lumbar fascia was divided in line with the incisions and blunt dissection was carried between the multifidus and the longissimus down to the facet joints of L4-5 and L5-S1.  Dissection was carried laterally exposing the transverse processes of L4 and L5 as well as the sacral ala.  We then further exposed the L4-5 and L5-S1 facet joints working medially and exposing some of the interlaminar space at each segment.  The facet joint capsules were  destroyed using Bovie cautery exposing as much bone as possible.      Next under fluoroscopic guidance, starting holes were created for the placement of pedicle screws at L4, L5, and S1.  I used a pedicle probe to gain access through the pedicle to the anterior vertebral bodies.  A ball-tipped feeler was used to ensure that there was no medial or lateral breech.  I then placed guidewires into the pedicle tracks to maintain position while I turned my attention to obtaining a posterior lateral fusion at L4-5 and L5-S1.     I thoroughly irrigated the wounds.  The high-speed bur was then used to decorticate the facet joints, destroying as much of the facet cartilage as possible bilaterally.  I also decorticated the lateral pars regions and the tranverse processes as well as the sacral ala and exposed interlaminar spaces at L4-5 and L5-S1.  The locally obtained autograft bone was left in situ in the posterolateral gutter.      On the right side from L4-S1, I added a synthetic bone graft matrix called MagnetOs to augment the fusion.  This bone graft was then packed as tightly as possible into the posterior lateral gutter at both L4-5 and L5-S1.  This constituted a posterior fusion of both L4-5 and L5-S1.     Next, over the existing guidewires, I placed pedicle screws from the [facility] instrumentation set.  We used 6.5 mm x 45 mm screws into each of the pedicles of S1 bilaterally and a 6.5 mm x 50 mm screw bilaterally at L4 and on the left at L5 for a total of 5 screws.  I then used a neuro monitoring probe to stimulate the screws themselves confirming appropriate position ensuring no breach of the pedicles.  After confirming the screws were properly positioned, an appropriate-sized tracy was chosen to span the pedicle screws.  The tracy was tightened into position using set screws.  The setscrews were tightened using the appropriate antitorque wrench and torque limiting screwdriver provided by [facility].    The wounds were then  thoroughly irrigated and an inspection was then undertaken to ensure that we had adequate hemostasis.  Bleeding at this point was controlled using thrombin with Gelfoam powder and bipolar cautery.  Final fluoroscopy imaging confirmed adequate position of the newly placed posterior instrumentation spanning L4 to S1.      Wound closure was then accomplished by reapproximating the fascia with a #1 Vicryl area immediate subcutaneous tissues were closed using a 2-0 Vicryl and skin closure was augmented using Mastisol and Steri-Strips.  The incisions were then washed and sterilely dressed with Bioclusive dressings.      The patient was then carefully rotated supine onto a hospital gurney, extubated, and sent to the recovery room in good stable condition.  The patient tolerated the procedure well.  There were no complications.  We estimated blood loss to be minimal.      Intraoperative neuro monitoring was ordered and carried out throughout the procedure to add an increased level of safety for the patient.  The interpreting physician was available by means of real-time continuous, bidirectional, remote audio and visual communication as needed throughout the entire procedure.  Modalities used during the procedure included SSEP, EEG, EMG, TOF, and pedicle screw stimulation.  There were no neuro monitoring signal changes during the procedure.    Sven Canales PA-C provided critical assistance during the procedure.  His assistance was medically necessary in order to allow the procedure to occur in the most safe and efficient manner.    ADIN Reese MD     Date: 4/15/2022  Time: 09:54 CDT    Electronically signed by JESSIKA Reese MD at 04/15/22 0931          Physician Progress Notes (last 24 hours)      Da Dale PA-C at 04/18/22 0920          Sammi Sorenson  71 y.o.  female  Subjective     Post-Operative Day # 3    Systemic or Specific Complaints:     C/o pain not well controlled on medication. Discussed  changing them and pt agreed. Pt able to walk to bathroom and back without significant issue. Pt hasn't walked with PT as she states they come at inopportune times for her. Pt without BM since 4/11. No other complaints. No abdominal pain.    Objective     Vital signs in last 24 hours:  Temp:  [97.8 °F (36.6 °C)-98.1 °F (36.7 °C)] 98.1 °F (36.7 °C)  Heart Rate:  [57-69] 69  Resp:  [15-22] 22  BP: (117-125)/(40-49) 125/40    Physical Exam:    Alert and oriented ×3, no acute distress, grossly neurovascularly intact, vital signs stable, dressing clean dry and intact, moving all extremities without focal deficit    Diagnostic Data:  CBC:  Results from last 7 days   Lab Units 04/18/22  0509   WBC 10*3/mm3 6.26   RBC 10*6/mm3 2.99*   HEMOGLOBIN g/dL 8.8*   HEMATOCRIT % 27.4*   PLATELETS 10*3/mm3 191          Assessment/Plan     1. Increasing chronic back pain.  2. Bilateral buttock, thigh and leg radiculopathy, left worse than right.  3. Neurogenic claudication.  4. Multilevel thoracolumbar degenerative disk disease, severe, L4 to S1.  5. Facet arthropathy, L4 to S1.  6. Left central disc herniation, L4-5.  7. Foraminal disc herniation, left L5-S1.  8. Central and bilateral foraminal stenosis, L4 to S1, left worse than right.  9. Osteopenia, T-score -1.4, 02/17/2022.  10.  Status post anterior decompression, ALIF with instrumentation of L5-S1, 4/11/2022  11.  Status post right LLIF with instrumentation L4-5, 4/13/2022  12. S/p PSF with instrumentation L4-S1    Plan:  1. PT/OT/Brace  2. Periops  3. Bowel regimen placed  4. Steroids placed for inflammation and pain medication changed from hydrocodone to oxycodone. May add Lyrica tomorrow if no adverse effect to medications and pain not improved.  5. Awaiting placement to [facility]        Da Dale PA-C    Date: 4/18/2022  Time: 09:21 CDT    Electronically signed by Da Dale PA-C at 04/18/22 0924     Ezequiel Hatfield PA at 04/18/22 0726     Attestation signed by  Julian Brunner MD at 04/18/22 0743    Assessment: 71-year-old female who presents for chronic low back pain.  She underwent operative fixation on 4/11, 4/13, and 4/15 which was uncomplicated.      Plan:     Perioperative care  Underwent operative fixation on 4/11, 4/13, and 4/15 which were uncomplicated.  Her pain still seems to be fairly significant.  Defer postoperative pain to orthopedic surgery.  Encouraged incentive spirometry and ambulating with physical/occupational therapy.  Would recommend rehabilitation facility on discharge based on physical and Occupational Therapy recommendations.    Intermittent confusion  Most likely related to delirium/medication induced from operative intervention, pain meds, steroids, and hospitalization.  No underlying etiology seems apparent at this time.     COPD  Not in exacerbation, nebs available.     Acute anemia, suspect blood loss from surgery  Mixed iron deficiency and postoperative blood loss anemia.  Continue iron supplementation.  Follow-up with primary care when hospitalization complete.    She is medically stable for discharge when okay with orthopedic surgery.    I have reviewed this documentation and agree.                      Daily Progress Note  Sammi Sorenson  MRN: [id number] LOS: 7    Admit Date: 4/11/2022 4/18/2022 07:27 CDT          Chief Complaint:  Low back pain with radiculopathy secondary to spinal stenosis    Interval History:    Reviewed overnight events and nursing notes.   Status:  about the same as before  Pain:  no relief  Pt is alert and oriented x4.    Pt still reported pain is poorly controlled  Day 6 of stay with spinal stenosis requiring decompression surgery  Family at bedside    DIET:  Diet Regular    Medications:   lactated ringers, 100 mL/hr, Last Rate: 50 mL/hr (04/15/22 0732)  lactated ringers, 100 mL/hr, Last Rate: Stopped (04/15/22 1047)  sodium chloride, 100 mL/hr, Last Rate: 100 mL/hr (04/16/22 0820)      ALPRAZolam, 0.5 mg,  Oral, TID  escitalopram, 10 mg, Oral, Daily  ferrous sulfate, 325 mg, Oral, Daily With Breakfast  ipratropium-albuterol, 3 mL, Nebulization, 4x Daily - RT  levothyroxine, 100 mcg, Oral, Q AM  pantoprazole, 40 mg, Oral, QAM  polyethylene glycol, 17 g, Oral, BID  senna-docusate sodium, 1 tablet, Oral, BID  sodium chloride, 3 mL, Intravenous, Q12H        Data:     Code Status:   Code Status and Medical Interventions:   Ordered at: 04/11/22 1152     Code Status (Patient has no pulse and is not breathing):    CPR (Attempt to Resuscitate)     Medical Interventions (Patient has pulse or is breathing):    Full Support       Family History   Problem Relation Age of Onset   • No Known Problems Mother    • No Known Problems Father      Social History     Socioeconomic History   • Marital status:    Tobacco Use   • Smoking status: Current Every Day Smoker     Packs/day: 0.25     Years: 20.00     Pack years: 5.00     Types: Cigarettes   • Smokeless tobacco: Never Used   • Tobacco comment: has tried chantix; made her sick   Substance and Sexual Activity   • Alcohol use: No   • Drug use: No   • Sexual activity: Defer       Labs:  Lab Results (last 72 hours)     Procedure Component Value Units Date/Time    Basic Metabolic Panel [[id number]]  (Abnormal) Collected: 04/18/22 0509    Specimen: Blood Updated: 04/18/22 0611     Glucose 94 mg/dL      BUN 9 mg/dL      Creatinine 0.57 mg/dL      Sodium 140 mmol/L      Potassium 3.3 mmol/L      Chloride 103 mmol/L      CO2 28.0 mmol/L      Calcium 8.3 mg/dL      BUN/Creatinine Ratio 15.8     Anion Gap 9.0 mmol/L      eGFR 96.1 mL/min/1.73      Comment: National Kidney Foundation and American Society of Nephrology (ASN) Task Force recommended calculation based on the Chronic Kidney Disease Epidemiology Collaboration (CKD-EPI) equation refit without adjustment for race.       Narrative:      GFR Normal >60  Chronic Kidney Disease <60  Kidney Failure <15      CBC (No Diff) [[id number]]   (Abnormal) Collected: 04/18/22 0509    Specimen: Blood Updated: 04/18/22 0549     WBC 6.26 10*3/mm3      RBC 2.99 10*6/mm3      Hemoglobin 8.8 g/dL      Hematocrit 27.4 %      MCV 91.6 fL      MCH 29.4 pg      MCHC 32.1 g/dL      RDW 14.0 %      RDW-SD 47.3 fl      MPV 12.2 fL      Platelets 191 10*3/mm3     Basic Metabolic Panel [[id number]]  (Abnormal) Collected: 04/17/22 0811    Specimen: Blood Updated: 04/17/22 0903     Glucose 86 mg/dL      BUN 10 mg/dL      Creatinine 0.55 mg/dL      Sodium 141 mmol/L      Potassium 3.0 mmol/L      Chloride 104 mmol/L      CO2 26.0 mmol/L      Calcium 8.3 mg/dL      BUN/Creatinine Ratio 18.2     Anion Gap 11.0 mmol/L      eGFR 96.9 mL/min/1.73      Comment: National Kidney Foundation and American Society of Nephrology (ASN) Task Force recommended calculation based on the Chronic Kidney Disease Epidemiology Collaboration (CKD-EPI) equation refit without adjustment for race.       Narrative:      GFR Normal >60  Chronic Kidney Disease <60  Kidney Failure <15      CBC (No Diff) [[id number]]  (Abnormal) Collected: 04/17/22 0811    Specimen: Blood Updated: 04/17/22 0840     WBC 7.44 10*3/mm3      RBC 3.01 10*6/mm3      Hemoglobin 8.9 g/dL      Hematocrit 27.3 %      MCV 90.7 fL      MCH 29.6 pg      MCHC 32.6 g/dL      RDW 13.8 %      RDW-SD 45.9 fl      MPV 12.4 fL      Platelets 200 10*3/mm3     Basic Metabolic Panel [[id number]]  (Abnormal) Collected: 04/16/22 0455    Specimen: Blood Updated: 04/16/22 0535     Glucose 126 mg/dL      BUN 10 mg/dL      Creatinine 0.54 mg/dL      Sodium 141 mmol/L      Potassium 3.5 mmol/L      Chloride 106 mmol/L      CO2 26.0 mmol/L      Calcium 8.1 mg/dL      BUN/Creatinine Ratio 18.5     Anion Gap 9.0 mmol/L      eGFR 97.4 mL/min/1.73      Comment: National Kidney Foundation and American Society of Nephrology (ASN) Task Force recommended calculation based on the Chronic Kidney Disease Epidemiology Collaboration (CKD-EPI) equation refit without  "adjustment for race.       Narrative:      GFR Normal >60  Chronic Kidney Disease <60  Kidney Failure <15      CBC (No Diff) [[id number]]  (Abnormal) Collected: 225    Specimen: Blood Updated: 22 0517     WBC 6.38 10*3/mm3      RBC 3.04 10*6/mm3      Hemoglobin 8.9 g/dL      Hematocrit 27.6 %      MCV 90.8 fL      MCH 29.3 pg      MCHC 32.2 g/dL      RDW 13.5 %      RDW-SD 44.8 fl      MPV 11.8 fL      Platelets 160 10*3/mm3     Basic Metabolic Panel [[id number]]  (Abnormal) Collected: 04/15/22 112    Specimen: Blood Updated: 04/15/22 1223     Glucose 113 mg/dL      BUN 11 mg/dL      Creatinine 0.64 mg/dL      Sodium 141 mmol/L      Potassium 3.3 mmol/L      Chloride 105 mmol/L      CO2 28.0 mmol/L      Calcium 8.4 mg/dL      BUN/Creatinine Ratio 17.2     Anion Gap 8.0 mmol/L      eGFR 93.4 mL/min/1.73      Comment: National Kidney Foundation and American Society of Nephrology (ASN) Task Force recommended calculation based on the Chronic Kidney Disease Epidemiology Collaboration (CKD-EPI) equation refit without adjustment for race.       Narrative:      GFR Normal >60  Chronic Kidney Disease <60  Kidney Failure <15      CBC (No Diff) [[id number]]  (Abnormal) Collected: 04/15/22 1126    Specimen: Blood Updated: 04/15/22 1203     WBC 5.98 10*3/mm3      RBC 3.33 10*6/mm3      Hemoglobin 9.8 g/dL      Hematocrit 31.0 %      MCV 93.1 fL      MCH 29.4 pg      MCHC 31.6 g/dL      RDW 13.8 %      RDW-SD 47.0 fl      MPV 12.8 fL      Platelets 151 10*3/mm3           Objective:     Vitals: /49 (BP Location: Left arm, Patient Position: Lying)   Pulse 65   Temp 97.8 °F (36.6 °C) (Oral)   Resp 20   Ht 166.4 cm (65.5\")   Wt 68.5 kg (151 lb)   SpO2 94%   BMI 24.75 kg/m²      Intake/Output Summary (Last 24 hours) at 2022 0727  Last data filed at 2022 0450  Gross per 24 hour   Intake --   Output 700 ml   Net -700 ml    Temp (24hrs), Av.9 °F (36.6 °C), Min:97.8 °F (36.6 °C), Max:98 °F (36.7 " °C)    Glucose:  No results found for: POCGLU  Physical Examination:   General appearance - alert, well appearing, and in no distress and oriented to person, place, and time  Mouth - mucous membranes moist, pharynx normal without lesions  Neck - supple, no significant adenopathy  Lymphatics - no palpable lymphadenopathy, no hepatosplenomegaly  Chest - clear to auscultation, no wheezes, rales or rhonchi, symmetric air entry, no tachypnea, retractions or cyanosis  Heart - normal rate, regular rhythm, normal S1, S2, no murmurs, rubs, clicks or gallops  Abdomen - soft, nontender, nondistended, no masses or organomegaly bowel sounds normal  Neurological - alert, oriented, normal speech, no focal findings or movement disorder noted  Musculoskeletal - no joint tenderness, deformity or swelling  Extremities - peripheral pulses normal, no pedal edema, no clubbing or cyanosis  Skin - normal coloration and turgor, no rashes, no suspicious skin lesions noted      Assessment and Plan:     Primary Problem:  Chronic bilateral low back pain with bilateral sciatica    Hospital Problem list/Treatment Plan:    Chronic bilateral low back pain with bilateral sciatica    Lumbosacral disc disease    Lumbago of multiple sites in spine with sciatica    Anemia due to acute blood loss--stable, expected.    Chronic constipation--postop bowel regimen    Medically stable.  Cleared for discharged.   Patient requesting discharge home, family requested discharge to SNF for further rehab.   to see  Discharge planning: Pending    Reviewed treatment plans with the patient, nursing staff and family  20 minutes spent in face to face interaction and coordination of care.     Electronically signed by MARRY Romo on 4/18/2022 at 07:27 CDT    Electronically signed by Julian Brunner MD at 04/18/22 0743       Consult Notes (last 24 hours)  Notes from 04/17/22 1422 through 04/18/22 1422   No notes of this type exist for this  encounter.            Physical Therapy Notes (last 24 hours)      Floresita Sotomayor PTA at 22 1436  Version 1 of 1       Goal Outcome Evaluation:  Plan of Care Reviewed With: patient        Progress: declining  Outcome Evaluation: Pt. has declined to work with PT all weekend due to pain. Pt. declined this afternoon even after receiving pain shot. Pt. has gotten up with nsg and did sit up in chair for 30 minutes earlier this morning. Will attempt to work with pt. again tomorrow.    Electronically signed by Floresita Sotomayor PTA at 22 1436     Floresita Sotomayor PTA at 22 1149  Version 1 of 1       Goal Outcome Evaluation:  Plan of Care Reviewed With: patient        Progress: improving  Outcome Evaluation: Pt. agreeable to therapy this morning. Pt. c/o intense back pain. Pt. was CGA-MIN assist for bed mobility. She was able to ambulate in hallway with RWX-25' x 2. Pt. required assistance with wiping when toileting. Pt. was assisted back to bed with no relief in pain from activity. Pt. will need skilled care following discharge for therapy.    Electronically signed by Floresita Sotomayor PTA at 22 1149     Floresita Sotomayor PTA at 22 1149  Version 1 of 1         Acute Care - Physical Therapy Treatment Note  [facility]     Patient Name: Sammi Sorenson  : 1950  MRN: [id number]  Today's Date: 2022      Visit Dx:     ICD-10-CM ICD-9-CM   1. Decreased activities of daily living (ADL)  Z78.9 V49.89   2. Impaired mobility  Z74.09 799.89     Patient Active Problem List   Diagnosis   • Tobacco abuse   • Cellulitis of face   • Thrush   • Chronic bilateral low back pain with bilateral sciatica   • Deep groin pain   • Hip pain, acute, left   • Ischial bursitis of left side   • Lumbar disc disease with radiculopathy   • Pain in both knees   • Pain management contract agreement   • Piriformis syndrome of left side   • SI (sacroiliac) joint dysfunction   • Lumbosacral disc  disease   • Lumbago of multiple sites in spine with sciatica   • Anemia due to acute blood loss   • Chronic constipation         PT Assessment (last 12 hours)     PT Evaluation and Treatment     [facility] Name 04/18/22 1115          Physical Therapy Time and Intention    Subjective Information complains of;pain  -     Document Type therapy note (daily note)  -     Mode of Treatment physical therapy  -     Row Name 04/18/22 1115          General Information    Existing Precautions/Restrictions fall;brace worn when out of bed  -[facility] Name 04/18/22 1115          Pain    Pretreatment Pain Rating 9/10  -     Posttreatment Pain Rating 9/10  -     Pain Location - Side/Orientation Bilateral  -     Pain Location lower  -     Pain Location - back  -     Pre/Posttreatment Pain Comment pain also going down Left leg  -     Pain Intervention(s) Repositioned;Ambulation/increased activity  -[facility] Name 04/18/22 1115          Bed Mobility    Rolling Left [place] (Bed Mobility) contact guard  -     Sidelying-Sit [place] (Bed Mobility) contact guard  -     Sit-Sidelying [place] (Bed Mobility) minimum assist (75% patient effort);verbal cues  -     Assistive Device (Bed Mobility) head of bed elevated;bed rails  -[facility] Name 04/18/22 1115          Transfers    Sit-Stand [place] (Transfers) contact guard  -     Stand-Sit [place] (Transfers) contact guard  -     [place] Level (Toilet Transfer) contact guard  -     Assistive Device (Toilet Transfer) commode;walker, front-wheeled  -[facility] Name 04/18/22 1115          Toilet Transfer    Type (Toilet Transfer) sit-stand;stand-sit  -MF     Row Name 04/18/22 1115          Gait/Stairs (Locomotion)    [place] Level (Gait) verbal cues;contact guard;minimum assist (75% patient effort)  -     Assistive Device (Gait) walker, front-wheeled  -     Distance in Feet (Gait) 25' x 2  -     Deviations/Abnormal Patterns (Gait)  antalgic;shalini decreased;stride length decreased  -MF     Row Name             Wound 04/11/22 0825 Left lower abdomen Incision    Wound - Properties Group Placement Date: 04/11/22  -DT Placement Time: 0825  -DT Present on Hospital Admission: N  -DT Side: Left  -DT Orientation: lower  -DT Location: abdomen  -DT Primary Wound Type: Incision  -DT     Retired Wound - Properties Group Placement Date: 04/11/22  -DT Placement Time: 0825  -DT Present on Hospital Admission: N  -DT Side: Left  -DT Orientation: lower  -DT Location: abdomen  -DT Primary Wound Type: Incision  -DT     Retired Wound - Properties Group Date first assessed: 04/11/22  -DT Time first assessed: 0825  -DT Present on Hospital Admission: N  -DT Side: Left  -DT Location: abdomen  -DT Primary Wound Type: Incision  -DT     Row Name             Wound 04/13/22 0739 Right lower flank Incision    Wound - Properties Group Placement Date: 04/13/22  -JR Placement Time: 0739  -JR Present on Hospital Admission: N  -JR Side: Right  -JR Orientation: lower  -JR Location: flank  -JR Primary Wound Type: Incision  -JR Additional Comments: mastisol, steri-strips, 4x4 gauze, mepilex  -JR     Retired Wound - Properties Group Placement Date: 04/13/22  -JR Placement Time: 0739  -JR Present on Hospital Admission: N  -JR Side: Right  -JR Orientation: lower  -JR Location: flank  -JR Primary Wound Type: Incision  -JR Additional Comments: mastisol, steri-strips, 4x4 gauze, mepilex  -JR     Retired Wound - Properties Group Date first assessed: 04/13/22  -JR Time first assessed: 0739  -JR Present on Hospital Admission: N  -JR Side: Right  -JR Location: flank  -JR Primary Wound Type: Incision  -JR Additional Comments: mastisol, steri-strips, 4x4 gauze, mepilex  -JR     Row Name             Wound 04/15/22 0729 lumbar spine Incision    Wound - Properties Group Placement Date: 04/15/22  -DT Placement Time: 0729  -DT Present on Hospital Admission: N  -DT Location: lumbar spine  -DT  Primary Wound Type: Incision  -DT, X2      Retired Wound - Properties Group Placement Date: 04/15/22  -DT Placement Time: 0729 -DT Present on Hospital Admission: N  -DT Location: lumbar spine  -DT Primary Wound Type: Incision  -DT, X2      Retired Wound - Properties Group Date first assessed: 04/15/22  -DT Time first assessed: 0729  -DT Present on Hospital Admission: N  -DT Location: lumbar spine  -DT Primary Wound Type: Incision  -DT, X2      Row Name 04/18/22 1115          Plan of Care Review    Plan of Care Reviewed With patient  -MF     Progress improving  -     Outcome Evaluation Pt. agreeable to therapy this morning. Pt. c/o intense back pain. Pt. was CGA-MIN assist for bed mobility. She was able to ambulate in hallway with RWX-25' x 2. Pt. required assistance with wiping when toileting. Pt. was assisted back to bed with no relief in pain from activity. Pt. will need skilled care following discharge for therapy.  -     Row Name 04/18/22 1115          Positioning and Restraints    Pre-Treatment Position in bed  -     Post Treatment Position bed  -     In Bed fowlers;call light within reach;encouraged to call for assist;side rails up x2;exit alarm on  -           User Key  (r) = Recorded By, (t) = Taken By, (c) = Cosigned By    Initials Name Provider Type    Floresita Joseph, PTA Physical Therapist Assistant    Adilson Razo RN Registered Nurse    Alice Foy RN Registered Nurse                Physical Therapy Education                 Title: PT OT SLP Therapies (In Progress)     Topic: Physical Therapy (In Progress)     Point: Mobility training (Done)     Learning Progress Summary           Patient Acceptance, E, VU by MS at 4/15/2022 1428    Comment: role of PT in her care, spinal restrictions    Acceptance, D,TB,E, NR,VU by NANDO at 4/13/2022 1347    Comment: Pt educated on POC, LSO wear schedule, log roll technique and spinal precautions    Acceptance, E, NR by MS at 4/11/2022  1534    Comment: role of PT in her care, spinal restrictions, use of LSO                   Point: Home exercise program (Not Started)     Learner Progress:  Not documented in this visit.          Point: Body mechanics (Done)     Learning Progress Summary           Patient Acceptance, D,TB,E, NR,VU by JJ at 4/13/2022 1347    Comment: Pt educated on POC, LSO wear schedule, log roll technique and spinal precautions                   Point: Precautions (Done)     Learning Progress Summary           Patient Acceptance, E, VU by MS at 4/15/2022 1428    Comment: role of PT in her care, spinal restrictions    Acceptance, D,TB,E, NR,VU by JJ at 4/13/2022 1347    Comment: Pt educated on POC, LSO wear schedule, log roll technique and spinal precautions    Acceptance, E,TB, VU by  at 4/12/2022 1152    Comment: shivani/doff LSO    Acceptance, E, NR by MS at 4/11/2022 1534    Comment: role of PT in her care, spinal restrictions, use of LSO                               User Key     Initials Effective Dates Name Provider Type Discipline     06/16/21 -  Shoshana Duvall, PTA Physical Therapist Assistant PT    MS 06/19/18 -  Mercy Mccall, PT, DPT, NCS Physical Therapist PT     03/07/22 -  Ashish Kay, PT Student PT Student PT              PT Recommendation and Plan     Plan of Care Reviewed With: patient  Progress: improving  Outcome Evaluation: Pt. agreeable to therapy this morning. Pt. c/o intense back pain. Pt. was CGA-MIN assist for bed mobility. She was able to ambulate in hallway with RWX-25' x 2. Pt. required assistance with wiping when toileting. Pt. was assisted back to bed with no relief in pain from activity. Pt. will need skilled care following discharge for therapy.         Floresita Sotomayor PTA  4/18/2022      Electronically signed by Floresita Sotomayor PTA at 04/18/22 1149       Occupational Therapy Notes (last 48 hours)  Notes from 04/16/22 1422 through 04/18/22 1422   No notes exist for this  "encounter.       Respiratory Therapy Notes (last 48 hours)  Notes from 04/16/22 1422 through 04/18/22 1422   No notes exist for this encounter.                               Ginger Felix COTA    Occupational Therapist Assistant   Occupational Therapy   Plan of Care       Signed   Date of Service:  04/16/22 0858   Creation Time:  04/16/22 0926              Signed              Show:Clear all  []Manual[]Template[]Copied    Added by:  [x]Ginger Felix COTA      []Ingrid for details       Problem: Adult Inpatient Plan of Care  Goal: Plan of Care Review  Flowsheets (Taken 4/16/2022 0858)  Progress: no change  Plan of Care Reviewed With: patient  Outcome Evaluation: Pt in BR completing sponge bath on commode. [place] UB bathing. MaxA don/doff LSO. Fxl mobility via RW in room BR>Bed continued cues not to twist/bend as pt attempted to gather clothing items and bag from different surfaces. Pt forgetful of precautions and need for LSO use. Recommend inpatient rehab vs SNF at d/c d/t decreased awareness of precautions/safety and decreased ADL fxn. Also highly recommend exit alarm d/t pt decreased awareness and fall risk                    Additional Documentation    Vitals:  /40 (BP Location: Left arm, Patient Position: Lying)   Pulse 69   Temp 98.1 °F (36.7 °C) (Oral)   Resp 22   Ht 166.4 cm (65.5\")   Wt 68.5 kg (151 lb)   SpO2 96%   BMI 24.75 kg/m²   BSA 1.77 m²      More Vitals                     Patient Summary Extracts    Lab Result Report 4/18/2022  2:22 PM   Continued Care and Services Coordination 4/18/2022  3:18 PM       Encounter Information    Encounter Information    Department Encounter #   4/11/2022  5:33 AM  [facility] [id number]                       Clinical Impressions       Decreased activities of daily living (ADL)     Impaired mobility        Orders Placed    There are too many orders placed during this visit to show here. Check the appropriate Chart Review tabs to learn about these orders. "         Medications Administered    All Medications Administered (45)                                     Signed        Expand All Collapse All          Show:Clear all  []Manual[]Template[]Copied    Added by:  [x]Ginger Felix COTA      []Ingrid for details    Patient Name: Sammi Sorenson                 : 1950                        MRN: [id number]                              Today's Date: 2022                                   Admit Date: 2022                        Visit Dx: Therapist utilized gait belt, applied non-slipped socks, provided fall risk education/prevention, & facilitated muscle strengthening PRN to reduce patient falls risk during this session.     Visit Diagnosis[]Expand by Default       ICD-10-CM ICD-9-CM   1. Decreased activities of daily living (ADL)  Z78.9 V49.89   2. Impaired mobility  Z74.09 799.89         Problem List       Patient Active Problem List   Diagnosis   • Tobacco abuse   • Cellulitis of face   • Thrush   • Chronic bilateral low back pain with bilateral sciatica   • Deep groin pain   • Hip pain, acute, left   • Ischial bursitis of left side   • Lumbar disc disease with radiculopathy   • Pain in both knees   • Pain management contract agreement   • Piriformis syndrome of left side   • SI (sacroiliac) joint dysfunction   • Lumbosacral disc disease   • Lumbago of multiple sites in spine with sciatica   • Anemia due to acute blood loss         Medical History        Past Medical History:   Diagnosis Date   • Anxiety     • Cellulitis of face 2019   • Chronic abdominal pain     • Chronic back pain     • Chronic gastritis     • Chronic pain       sees pain vilma. since    • CKD (chronic kidney disease)     • Colon polyp     • COPD (chronic obstructive pulmonary disease) (HCC)     • Disease of thyroid gland     • Diverticulosis     • Dyslipidemia     • GERD (gastroesophageal reflux disease)     • Hemorrhoid     • Hiatal hernia     • History of adenomatous  polyp of colon     • Leaky heart valve     • Lumbosacral disc disease 4/11/2022   • Migraine     • Neuropathy     • Osteoarthritis     • Ovarian cancer (HCC)     • Submandibular gland mass           Surgical History[]Expand by Default   Past Surgical History:   Procedure Laterality Date   • ANTERIOR LUMBAR EXPOSURE N/A 4/11/2022     Procedure: ANTERIOR LUMBAR EXPOSURE;  Surgeon: Juan Francisco Nelson DO;  Location:  [facility] OR;  Service: Vascular;  Laterality: N/A;   • CARDIAC CATHETERIZATION         no stent   • CHOLECYSTECTOMY       • COLONOSCOPY   04/10/2015     5 polyps, tubular adenomas, melanosis, diverticulosis, internal hemorrhoids,    • CYST REMOVAL       • ENDOSCOPY   04/10/2015     hiatus hernia   • HYSTERECTOMY       • LUMBAR FUSION N/A 4/11/2022     Procedure: ANTERIOR DECOMPRESSION, ANTERIOR LUMBAR INTERBODY FUSION WITH INSTRUMENTATION L5-S1;  Surgeon: JESSIKA Reese MD;  Location:  [facility] OR;  Service: Orthopedic Spine;  Laterality: N/A;   • LUMBAR FUSION Right 4/13/2022     Procedure: RIGHT LATERAL LUMBAR INTERBODY FUSION WITH INSTRUMENTATION L4-5;  Surgeon: JESSIKA Reese MD;  Location:  [facility] OR;  Service: Orthopedic Spine;  Laterality: Right;   • LUNG LOBECTOMY Right       2002,[facility]   • REPLACEMENT TOTAL KNEE BILATERAL       • SUBMANDIBULAR GLAND EXCISION Left 11/02/2018     Procedure: Incision and drainage of left submandibular region abscess with excision of left submandibular region mass mass consistent with probable lymph node;  Surgeon: Duncan Ewing MD;  Location:  [facility] OR;  Service: ENT                  General Information             Row Name 04/16/22 0858                   OT Time and Intention      Document Type therapy note (daily note)  -MT        Mode of Treatment occupational therapy  -MT               Row Name 04/16/22 0858                   General Information      Patient Profile Reviewed yes  -MT        Existing Precautions/Restrictions brace worn when  out of bed;fall;spinal  -MT               Row Name 04/16/22 0858                   Cognition      Orientation Status (Cognition) oriented to;person;place;situation  -[facility] Name 04/16/22 0858                   Safety Issues, Functional Mobility      Impairments Affecting Function (Mobility) balance;pain;strength;endurance/activity tolerance;cognition  -MT        Cognitive Impairments, Mobility Safety/Performance insight into deficits/self-awareness;awareness, need for assistance  -MT             []Expand by Default              User Key  (r) = Recorded By, (t) = Taken By, (c) = Cosigned By             Initials Name Provider Type      MT Ginger Felix COTA Occupational Therapist Assistant                                       Mobility/ADL's             Row Name 04/16/22 0858                   Bed Mobility      Comment, (Bed Mobility) seated EOB with exit alarm on post tx  -[facility] Name 04/16/22 0858                   Transfers      Sit-Stand [place] (Transfers) contact guard;verbal cues;nonverbal cues (demo/gesture)  -MT        Stand-Sit [place] (Transfers) contact guard;verbal cues  -MT        [place] Level (Toilet Transfer) contact guard;verbal cues  -MT        Assistive Device (Toilet Transfer) commode;walker, front-wheeled;grab bars/safety frame  -MT               Row Name 04/16/22 0858                   Sit-Stand Transfer      Assistive Device (Sit-Stand Transfers) walker, front-wheeled  -[facility] Name 04/16/22 0858                   Stand-Sit Transfer      Assistive Device (Stand-Sit Transfers) walker, front-wheeled  -[facility] Name 04/16/22 0858                   Toilet Transfer      Type (Toilet Transfer) stand pivot/stand step  -[facility] Name 04/16/22 0858                   Functional Mobility      Functional Mobility- Ind. Level contact guard assist;nonverbal cues required (demo/gesture);verbal cues required  -MT         Functional Mobility- Device walker, front-wheeled  -MT        Functional Mobility- Comment in room BR>Bed continued cues not to twist/bend as pt attempted to gather clothing items and bag from different surfaces. Pt forgetful of precautions and need for LSO use  -MT               Row Name 04/16/22 0858                   Activities of Daily Living      BADL Assessment/Intervention bathing;upper body dressing;toileting  -MT               Row Name 04/16/22 0858                   Upper Body Dressing Assessment/Training      [place] Level (Upper Body Dressing) maximum assist (25% patient effort);don;doff  -MT        Position (Upper Body Dressing) unsupported sitting  -MT        Comment, (Upper Body Dressing) LSO MaxA  -MT               Row Name 04/16/22 0858                   Toileting Assessment/Training      [place] Level (Toileting) adjust/manage clothing;change pad/brief;contact guard assist  -MT        Assistive Devices (Toileting) commode  -MT        Position (Toileting) supported standing;unsupported sitting  -MT               Row Name 04/16/22 0858                   Bathing Assessment/Intervention      Comment, (Bathing) sponge bathing with wipes: pt had began task prior to HAGER/L coming into BR. Pt reports done with all but back, depA for back hygiene  -MT                           User Key  (r) = Recorded By, (t) = Taken By, (c) = Cosigned By             Initials Name Provider Type      MT Ginger Felix COTA Occupational Therapist Assistant                       Obj/Interventions    No documentation.                           Goals/Plan    No documentation.                            Clinical Impression             Row Name 04/16/22 0858                   Pain Scale: FACES Pre/Post-Treatment      Pain: FACES Scale, Pretreatment 2-->hurts little bit  -MT        Posttreatment Pain Rating 4-->hurts little more  -MT        Pain Location incisional  -MT        Pain Location - back  -MT               Row  Name 04/16/22 0858                   Therapy Plan Review/Discharge Plan (OT)      Anticipated Discharge Disposition (OT) inpatient rehabilitation facility;skilled nursing facility  -MT               Row Name 04/16/22 0858                   Vital Signs      O2 Delivery Pre Treatment room air  -MT               Row Name 04/16/22 0858                   Positioning and Restraints      Pre-Treatment Position bathroom  -MT        Post Treatment Position bed  -MT        In Bed sitting EOB;call light within reach;encouraged to call for assist;exit alarm on;side rails up x3;with other staff  food services with pt  -MT                           User Key  (r) = Recorded By, (t) = Taken By, (c) = Cosigned By             Initials Name Provider Type      Ginger Brown COTA Occupational Therapist Assistant                              Outcome Measures             Row Name 04/16/22 0858                   How much help from another is currently needed...      Putting on and taking off regular lower body clothing? 2  -MT        Bathing (including washing, rinsing, and drying) 2  -MT        Toileting (which includes using toilet bed pan or urinal) 3  -MT        Putting on and taking off regular upper body clothing 3  -MT        Taking care of personal grooming (such as brushing teeth) 4  -MT        Eating meals 4  -MT        AM-PAC 6 Clicks Score (OT) 18  -MT                           User Key  (r) = Recorded By, (t) = Taken By, (c) = Cosigned By             Initials Name Provider Type      Ginger Brown COTA Occupational Therapist Assistant                             Occupational Therapy Education                              Title: PT OT SLP Therapies (In Progress)                Topic: Occupational Therapy (In Progress)                Point: ADL training (In Progress)           Description:   Instruct learner(s) on proper safety adaptation and remediation techniques during self care or transfers.   Instruct in proper  use of assistive devices.                                  Learning Progress Summary                 Patient Acceptance, E, NR by MT at 4/16/2022 0923     Comment: spinal precautions and need for LSO/wearing schedule     Acceptance, E,D, VU,NR by  at 4/15/2022 1523     Acceptance, E,D, VU,NR by  at 4/13/2022 1535     Acceptance, E,D, NR by  at 4/11/2022 1452                                   Point: Home exercise program (Done)           Description:   Instruct learner(s) on appropriate technique for monitoring, assisting and/or progressing therapeutic exercises/activities.                                  Learning Progress Summary                 Patient Acceptance, E,D, VU,NR by  at 4/13/2022 1535     Acceptance, E,D, NR by  at 4/11/2022 1452                                   Point: Precautions (In Progress)           Description:   Instruct learner(s) on prescribed precautions during self-care and functional transfers.                                  Learning Progress Summary                 Patient Acceptance, E, NR by MT at 4/16/2022 0923     Comment: spinal precautions and need for LSO/wearing schedule     Acceptance, E,D, VU,NR by  at 4/15/2022 1523     Acceptance, E,D, VU,NR by  at 4/13/2022 1535     Acceptance, E,D, NR by  at 4/11/2022 1452                                   Point: Body mechanics (Done)           Description:   Instruct learner(s) on proper positioning and spine alignment during self-care, functional mobility activities and/or exercises.                                  Learning Progress Summary                 Patient Acceptance, E,D, VU,NR by  at 4/15/2022 1523     Acceptance, E,D, VU,NR by  at 4/13/2022 1535     Acceptance, E,D, NR by  at 4/11/2022 1452                                                 User Key               Initials Effective Dates Name Provider Type Discipline       06/16/21 -  Jacqui Hobbs, OTR/L Occupational Therapist OT       08/28/18 -   Felicia Perez, OTR/L Occupational Therapist OT      MT 06/16/21 -  Ginger Felix COTA Occupational Therapist Assistant OT                     OT Recommendation and Plan  Plan of Care Review  Plan of Care Reviewed With: patient  Progress: no change  Outcome Evaluation: Pt in BR completing sponge bath on commode. [place] UB bathing. MaxA don/doff LSO. Fxl mobility via RW in room BR>Bed continued cues not to twist/bend as pt attempted to gather clothing items and bag from different surfaces. Pt forgetful of precautions and need for LSO use. Recommend inpatient rehab vs SNF at d/c d/t decreased awareness of precautions/safety and decreased ADL fxn.      Time Calculation:             Time Calculation- OT              Row Name 04/16/22 0858                         Time Calculation- OT      OT Start Time 0858  -MT          OT Stop Time 0921  -MT          OT Time Calculation (min) 23 min  -MT          Total Timed Code Minutes- OT 23 minute(s)  -MT          OT Received On 04/16/22  -MT                              Timed Charges      74590 - OT Self Care/Mgmt Minutes 23  -MT                              Total Minutes      Timed Charges Total Minutes 23  -MT           Total Minutes 23  -MT                          User Key  (r) = Recorded By, (t) = Taken By, (c) = Cosigned By             Initials Name Provider Type      MT Ginger Felix COTA Occupational Therapist Assistant                            Therapy Charges for Today      Code Description Service Date Service Provider Modifiers Qty     77750674963 HC OT SELF CARE/MGMT/TRAIN EA 15 MIN 4/16/2022 Ginger Felix COTA GO 2                ALLEY Moya             4/16/2022                 Admission (Current) on 4/11/2022     Admission (Current) on 4/11/2022        Detailed Report        ROS Info      Note shared with patient        Additional Documentation    Vitals:  /40 (BP Location: Left arm, Patient Position: Lying)   Pulse 69   Temp 98.1 °F (36.7  "°C) (Oral)   Resp 22   Ht 166.4 cm (65.5\")   Wt 68.5 kg (151 lb)   SpO2 96%   BMI 24.75 kg/m²   BSA 1.77 m²      More Vitals   Flowsheets:  Outbreak Screen,   ICU Vital Signs,   Vitals Reassessment,   Travel, Exposure, and Communicable Disease Screening,   Pain Assessment,   NPO Status,   Assessment (Adult),   Document Patient Belongings,   Pre-Op OR Checklist,   Education,   All LDAs,   Arash Score,   Care Plan (Perioperative/Perianesthesia) (Adult),   LOC Assess,   Interventions,   Falls PA Model,   Intake/Output,   Sepsis Predictive Model,   Adult PCS Perioperative,   Data,   Care Plan (Perioperative/Perianesthesia) (Adult),   Transfer of Care,   Adult PCS Body System,   Vital Signs,   Psychosocial Review,   Healthcare Directives,   Audit-C,   Functional Level Screening,   D/C Planning,   Readmission Risk Score,   Adult Patient Profile,   Vaccination Screening,   Nutrition Screen,   Pressure Ulcer Screening,   [place] Suicide Severity Rating Scale Past Month (C-SSRS Screen Version),   MEWS score,   Workload Acuity,   PF RATIO,   Daily care/ Safety,   [facility] [place],   CM Responsibilities,   General Information,   PT Priority,   General Information,   Mobility/ADL's,   Obj/Interventions,   Mobility,   Clinical Impression,   Goals/Plan,   Outcome Measures,   Time Calculation- OT,   OT Priority,   Respiratory Therapy Flowsheet,   Obj/Interventions,   Vitals, Intake and Output,   Clinical Impression,   Goals/Plan,   Outcome Measures,   Time Calculation- PT,   CARE PLAN MINI-FLOWSHEET DATA,   Discharge Readiness,   OT Evaluation and Treatment,   Outcome Measures,   PT Evaluation and Treatment,   Discharge Needs Assessment,   Pre-Op Checklist,   Reason for Assessment,   Nutrition/Diet History,   Labs/Tests/Procedures/Meds,   Physical Findings,   Estimated/Assessed Needs - Anthropometrics,   Nutrition Prescription Ordered,   Evaluation of Received Nutrient/Fluid Intake,   Problem 1,   Intervention " Goal,   Nutrition Intervention,   Education/Evaluation,   Candida auris Outbreak,   Discharge Plan   ...(77 more)   Encounter Info:  Billing Info,   History,   Allergies,   Detailed Report              Patient Summary Extracts    Lab Result Report 4/18/2022  2:23 PM   Continued Care and Services Coordination 4/18/2022  3:18 PM       Encounter Information    Encounter Information    Department Encounter #   4/11/2022  5:33 AM [facility] 3A [id number]                 Care Timeline    04/11   Admitted 0913 04/13   RIGHT LATERAL LUMBAR INTERBODY FUSION WITH INSTRUMENTATION L4-5   04/15   POSTERIOR SPINAL FUSION WITH INSTRUMENTATION L4-S1         Clinical Impressions       Decreased activities of daily living (ADL)     Impaired mobility        Orders Placed    There are too many orders placed during this visit to show here. Check the appropriate Chart Review tabs to learn about these orders.         Care Timeline    0614   oxyCODONE HCl 20 mg   0629   Type & Screen   0641   Lactated Ringers 1000 mL   0707   Lactated Ringers 100 mL/hr   0710   Acetaminophen 1000 mg     Midazolam HCl 1 mg     Dexamethasone Sodium Phosphate 4 mg   0713   Clindamycin Phosphate in D5W 900 mg   0725   ANTERIOR DECOMPRESSION, ANTERIOR LUMBAR INTERBODY FUSION WITH INSTRUMENTATION L5-S1   0913   Admitted (ED Boarder)   0925   FL C Arm During Surgery     XR Spine Lumbar AP & Lateral   0926   XR Abdomen 1 View   1015   HYDROmorphone HCl 0.5 mg   1022   Ondansetron HCl 4 mg   1052   oxyCODONE-Acetaminophen 1 tablet   1053   fentaNYL Citrate 25 mcg   1058   fentaNYL Citrate 25 mcg   1136   Lactated Ringers 1000 mL   1140   Transferred to [facility] 3A       Medications Administered     All Medications Administered (45)      Visit Diagnoses       Decreased activities of daily living (ADL)     Impaired mobility     Problem List             Media  From this encounter  Electronic signature on 4/11/2022 0532 - E-signed   Scan on 4/11/2022 by  [employer], [place]: PRE-OP SURGERY ORDERS, OIWK, 04/11/2022   Scan on 4/11/2022 by [employer], [place]: SURGERY SCHEDULING CASE ORDER, PAD, 04/11/2022   Scan on 4/11/2022 by [employer], [place]: H/P, OIWK, 04/06/2022   Scan on 4/11/2022 by [employer], [place]: POSTERIOR SPINAL FUSION W/INSTRUMENTATION L-4-S-1 CONSENT, PAD, 04/11/2022   Scan on 4/11/2022 by [employer], [place]: (R) LATERAL LUMBAR INTERBODY FUSION W/INSTM L4-5 RIGHT CONSENT, PAD, 04/11/2022   Scan on 4/11/2022 by [employer], [place]: H&P, OIWK, 04/06/2022   Scan on 4/11/2022 by [employer], [place]: PRE-OP SURGERY ORDERS, OIWK, 02/22/2022   Scan on 4/11/2022 by [employer], [place]: SURGERY SCHEDULING, PAD, 02/22/2022       Committee Details    There is no Committee Review information to display for this encounter.       Committee Members    Occupational Therapy: Valarie Soares Kaitlyn, OT Student   Jacqui Hobbs K, OTR/L   Latasha Zuniga, Ginger Castillo COTA Wheeler, Felicia K, OTR/L Orthopedic Surgery: [place], PA-C   Physical Therapy: Nitza Spears, PT DPT   Olga Son, PTA   Floresita Sotomayor, PTA   Shoshana Duvall, PTA   Ashish Kay Miranda R, PT, DPT, NCS Physician Assistant: Vidal Canales PA

## 2022-04-19 ENCOUNTER — HOSPITAL ENCOUNTER (INPATIENT)
Age: 72
LOS: 10 days | Discharge: HOME HEALTH | DRG: 561 | End: 2022-04-29
Attending: PSYCHIATRY & NEUROLOGY | Admitting: PSYCHIATRY & NEUROLOGY
Payer: MEDICARE

## 2022-04-19 VITALS
RESPIRATION RATE: 17 BRPM | OXYGEN SATURATION: 98 % | BODY MASS INDEX: 24.27 KG/M2 | WEIGHT: 151 LBS | TEMPERATURE: 98.1 F | DIASTOLIC BLOOD PRESSURE: 66 MMHG | HEIGHT: 66 IN | SYSTOLIC BLOOD PRESSURE: 168 MMHG | HEART RATE: 68 BPM

## 2022-04-19 DIAGNOSIS — E03.9 ACQUIRED HYPOTHYROIDISM: ICD-10-CM

## 2022-04-19 DIAGNOSIS — Z87.891 HISTORY OF SMOKING: Primary | ICD-10-CM

## 2022-04-19 DIAGNOSIS — Z02.89 PAIN MANAGEMENT CONTRACT AGREEMENT: ICD-10-CM

## 2022-04-19 DIAGNOSIS — M70.72 ISCHIAL BURSITIS OF LEFT SIDE: ICD-10-CM

## 2022-04-19 DIAGNOSIS — F39 MOOD DISORDER (HCC): ICD-10-CM

## 2022-04-19 DIAGNOSIS — R53.1 WEAKNESS: ICD-10-CM

## 2022-04-19 DIAGNOSIS — R26.9 GAIT ABNORMALITY: ICD-10-CM

## 2022-04-19 DIAGNOSIS — Z98.1 S/P LUMBAR FUSION: ICD-10-CM

## 2022-04-19 LAB
ANION GAP SERPL CALCULATED.3IONS-SCNC: 10 MMOL/L (ref 5–15)
BUN SERPL-MCNC: 9 MG/DL (ref 8–23)
BUN/CREAT SERPL: 14.3 (ref 7–25)
CALCIUM SPEC-SCNC: 8.7 MG/DL (ref 8.6–10.5)
CHLORIDE SERPL-SCNC: 100 MMOL/L (ref 98–107)
CO2 SERPL-SCNC: 29 MMOL/L (ref 22–29)
CREAT SERPL-MCNC: 0.63 MG/DL (ref 0.57–1)
DEPRECATED RDW RBC AUTO: 46.4 FL (ref 37–54)
EGFRCR SERPLBLD CKD-EPI 2021: 93.8 ML/MIN/1.73
ERYTHROCYTE [DISTWIDTH] IN BLOOD BY AUTOMATED COUNT: 13.7 % (ref 12.3–15.4)
GLUCOSE SERPL-MCNC: 109 MG/DL (ref 65–99)
HCT VFR BLD AUTO: 31.6 % (ref 34–46.6)
HGB BLD-MCNC: 9.9 G/DL (ref 12–15.9)
INR BLD: 1.09 (ref 0.88–1.18)
MCH RBC QN AUTO: 29.1 PG (ref 26.6–33)
MCHC RBC AUTO-ENTMCNC: 31.3 G/DL (ref 31.5–35.7)
MCV RBC AUTO: 92.9 FL (ref 79–97)
PLATELET # BLD AUTO: 268 10*3/MM3 (ref 140–450)
PMV BLD AUTO: 11.9 FL (ref 6–12)
POTASSIUM SERPL-SCNC: 2.9 MMOL/L (ref 3.5–5.2)
PREALBUMIN: 6 MG/DL (ref 20–40)
PROTHROMBIN TIME: 14 SEC (ref 12–14.6)
RBC # BLD AUTO: 3.4 10*6/MM3 (ref 3.77–5.28)
SODIUM SERPL-SCNC: 139 MMOL/L (ref 136–145)
TSH REFLEX FT4: 1.78 UIU/ML (ref 0.35–5.5)
VITAMIN B-12: 284 PG/ML (ref 211–946)
WBC NRBC COR # BLD: 6.42 10*3/MM3 (ref 3.4–10.8)

## 2022-04-19 PROCEDURE — 85610 PROTHROMBIN TIME: CPT

## 2022-04-19 PROCEDURE — 6370000000 HC RX 637 (ALT 250 FOR IP): Performed by: PSYCHIATRY & NEUROLOGY

## 2022-04-19 PROCEDURE — 94799 UNLISTED PULMONARY SVC/PX: CPT

## 2022-04-19 PROCEDURE — 84134 ASSAY OF PREALBUMIN: CPT

## 2022-04-19 PROCEDURE — 82607 VITAMIN B-12: CPT

## 2022-04-19 PROCEDURE — 85027 COMPLETE CBC AUTOMATED: CPT | Performed by: ORTHOPAEDIC SURGERY

## 2022-04-19 PROCEDURE — 1180000000 HC REHAB R&B

## 2022-04-19 PROCEDURE — 36415 COLL VENOUS BLD VENIPUNCTURE: CPT

## 2022-04-19 PROCEDURE — 84443 ASSAY THYROID STIM HORMONE: CPT

## 2022-04-19 PROCEDURE — 80048 BASIC METABOLIC PNL TOTAL CA: CPT | Performed by: ORTHOPAEDIC SURGERY

## 2022-04-19 PROCEDURE — 97116 GAIT TRAINING THERAPY: CPT

## 2022-04-19 RX ORDER — ESCITALOPRAM OXALATE 10 MG/1
10 TABLET ORAL DAILY
Status: DISCONTINUED | OUTPATIENT
Start: 2022-04-19 | End: 2022-04-29 | Stop reason: HOSPADM

## 2022-04-19 RX ORDER — PROMETHAZINE HYDROCHLORIDE 25 MG/1
25 TABLET ORAL 2 TIMES DAILY PRN
Status: DISCONTINUED | OUTPATIENT
Start: 2022-04-19 | End: 2022-04-29 | Stop reason: HOSPADM

## 2022-04-19 RX ORDER — BISACODYL 5 MG/1
5 TABLET, DELAYED RELEASE ORAL DAILY
Status: DISCONTINUED | OUTPATIENT
Start: 2022-04-19 | End: 2022-04-19 | Stop reason: HOSPADM

## 2022-04-19 RX ORDER — ALPRAZOLAM 0.5 MG/1
0.5 TABLET ORAL 3 TIMES DAILY PRN
Status: DISCONTINUED | OUTPATIENT
Start: 2022-04-19 | End: 2022-04-29 | Stop reason: HOSPADM

## 2022-04-19 RX ORDER — PREGABALIN 75 MG/1
75 CAPSULE ORAL EVERY 12 HOURS
Status: ON HOLD | COMMUNITY
End: 2022-04-28 | Stop reason: HOSPADM

## 2022-04-19 RX ORDER — HYDROCODONE BITARTRATE AND ACETAMINOPHEN 10; 325 MG/1; MG/1
1 TABLET ORAL EVERY 4 HOURS PRN
Qty: 150 TABLET | Refills: 0 | Status: ON HOLD | OUTPATIENT
Start: 2022-04-23 | End: 2022-04-19

## 2022-04-19 RX ORDER — TIZANIDINE 4 MG/1
6 TABLET ORAL 3 TIMES DAILY
Status: DISCONTINUED | OUTPATIENT
Start: 2022-04-19 | End: 2022-04-20

## 2022-04-19 RX ORDER — POTASSIUM CHLORIDE 750 MG/1
40 CAPSULE, EXTENDED RELEASE ORAL ONCE
Status: COMPLETED | OUTPATIENT
Start: 2022-04-19 | End: 2022-04-19

## 2022-04-19 RX ORDER — DICYCLOMINE HYDROCHLORIDE 10 MG/1
10 CAPSULE ORAL 3 TIMES DAILY PRN
Status: ON HOLD | COMMUNITY
End: 2022-04-28 | Stop reason: SDUPTHER

## 2022-04-19 RX ORDER — PANTOPRAZOLE SODIUM 40 MG/1
40 TABLET, DELAYED RELEASE ORAL DAILY
Status: DISCONTINUED | OUTPATIENT
Start: 2022-04-19 | End: 2022-04-29 | Stop reason: HOSPADM

## 2022-04-19 RX ORDER — OXYCODONE AND ACETAMINOPHEN 10; 325 MG/1; MG/1
1 TABLET ORAL EVERY 4 HOURS PRN
Status: ON HOLD | COMMUNITY
End: 2022-04-28 | Stop reason: HOSPADM

## 2022-04-19 RX ORDER — LEVOTHYROXINE SODIUM 0.1 MG/1
100 TABLET ORAL DAILY
Status: DISCONTINUED | OUTPATIENT
Start: 2022-04-19 | End: 2022-04-29 | Stop reason: HOSPADM

## 2022-04-19 RX ORDER — ACETAMINOPHEN 325 MG/1
650 TABLET ORAL EVERY 4 HOURS PRN
Status: DISCONTINUED | OUTPATIENT
Start: 2022-04-19 | End: 2022-04-29 | Stop reason: HOSPADM

## 2022-04-19 RX ORDER — OXYCODONE AND ACETAMINOPHEN 10; 325 MG/1; MG/1
1 TABLET ORAL EVERY 4 HOURS PRN
Status: DISCONTINUED | OUTPATIENT
Start: 2022-04-19 | End: 2022-04-27

## 2022-04-19 RX ORDER — OXYCODONE AND ACETAMINOPHEN 10; 325 MG/1; MG/1
1 TABLET ORAL EVERY 4 HOURS PRN
Qty: 42 TABLET | Refills: 0
Start: 2022-04-19 | End: 2022-04-26

## 2022-04-19 RX ORDER — PREGABALIN 75 MG/1
75 CAPSULE ORAL EVERY 12 HOURS SCHEDULED
Qty: 60 CAPSULE | Refills: 0
Start: 2022-04-19 | End: 2022-05-09

## 2022-04-19 RX ORDER — POLYETHYLENE GLYCOL 3350 17 G/17G
17 POWDER, FOR SOLUTION ORAL DAILY PRN
Status: DISCONTINUED | OUTPATIENT
Start: 2022-04-19 | End: 2022-04-29 | Stop reason: HOSPADM

## 2022-04-19 RX ORDER — PREGABALIN 75 MG/1
75 CAPSULE ORAL EVERY 12 HOURS
Status: DISCONTINUED | OUTPATIENT
Start: 2022-04-19 | End: 2022-04-25

## 2022-04-19 RX ORDER — DICYCLOMINE HYDROCHLORIDE 10 MG/1
10 CAPSULE ORAL 3 TIMES DAILY PRN
Status: DISCONTINUED | OUTPATIENT
Start: 2022-04-19 | End: 2022-04-29 | Stop reason: HOSPADM

## 2022-04-19 RX ORDER — SUCRALFATE 1 G/1
1 TABLET ORAL 3 TIMES DAILY PRN
Status: DISCONTINUED | OUTPATIENT
Start: 2022-04-19 | End: 2022-04-29 | Stop reason: HOSPADM

## 2022-04-19 RX ADMIN — POTASSIUM CHLORIDE 40 MEQ: 10 CAPSULE, COATED, EXTENDED RELEASE ORAL at 08:49

## 2022-04-19 RX ADMIN — LEVOTHYROXINE SODIUM 100 MCG: 100 TABLET ORAL at 06:04

## 2022-04-19 RX ADMIN — ALPRAZOLAM 0.5 MG: 0.5 TABLET ORAL at 08:49

## 2022-04-19 RX ADMIN — DOCUSATE SODIUM 50 MG AND SENNOSIDES 8.6 MG 2 TABLET: 8.6; 5 TABLET, FILM COATED ORAL at 08:50

## 2022-04-19 RX ADMIN — PREGABALIN 75 MG: 75 CAPSULE ORAL at 08:49

## 2022-04-19 RX ADMIN — OXYCODONE AND ACETAMINOPHEN 1 TABLET: 325; 10 TABLET ORAL at 08:49

## 2022-04-19 RX ADMIN — OXYCODONE AND ACETAMINOPHEN 1 TABLET: 10; 325 TABLET ORAL at 16:53

## 2022-04-19 RX ADMIN — PREGABALIN 75 MG: 75 CAPSULE ORAL at 16:53

## 2022-04-19 RX ADMIN — ESCITALOPRAM 10 MG: 10 TABLET, FILM COATED ORAL at 08:49

## 2022-04-19 RX ADMIN — TIZANIDINE 6 MG: 4 TABLET ORAL at 16:53

## 2022-04-19 RX ADMIN — FERROUS SULFATE TAB 325 MG (65 MG ELEMENTAL FE) 325 MG: 325 (65 FE) TAB at 08:50

## 2022-04-19 RX ADMIN — TIZANIDINE 6 MG: 4 TABLET ORAL at 20:55

## 2022-04-19 RX ADMIN — PANTOPRAZOLE SODIUM 40 MG: 40 TABLET, DELAYED RELEASE ORAL at 06:04

## 2022-04-19 RX ADMIN — IPRATROPIUM BROMIDE AND ALBUTEROL SULFATE 3 ML: .5; 3 SOLUTION RESPIRATORY (INHALATION) at 10:32

## 2022-04-19 RX ADMIN — OXYCODONE AND ACETAMINOPHEN 1 TABLET: 325; 10 TABLET ORAL at 03:45

## 2022-04-19 RX ADMIN — BISACODYL 10 MG: 5 TABLET, COATED ORAL at 06:04

## 2022-04-19 RX ADMIN — IPRATROPIUM BROMIDE AND ALBUTEROL SULFATE 3 ML: .5; 3 SOLUTION RESPIRATORY (INHALATION) at 06:37

## 2022-04-19 ASSESSMENT — PAIN DESCRIPTION - PAIN TYPE
TYPE: SURGICAL PAIN
TYPE: SURGICAL PAIN

## 2022-04-19 ASSESSMENT — PAIN DESCRIPTION - DESCRIPTORS
DESCRIPTORS: ACHING
DESCRIPTORS: ACHING

## 2022-04-19 ASSESSMENT — PAIN DESCRIPTION - ONSET
ONSET: GRADUAL
ONSET: GRADUAL

## 2022-04-19 ASSESSMENT — PAIN DESCRIPTION - ORIENTATION
ORIENTATION: MID
ORIENTATION: MID

## 2022-04-19 ASSESSMENT — PAIN SCALES - GENERAL
PAINLEVEL_OUTOF10: 5
PAINLEVEL_OUTOF10: 4
PAINLEVEL_OUTOF10: 7

## 2022-04-19 ASSESSMENT — PAIN DESCRIPTION - PROGRESSION
CLINICAL_PROGRESSION: NOT CHANGED
CLINICAL_PROGRESSION: NOT CHANGED

## 2022-04-19 ASSESSMENT — PAIN DESCRIPTION - LOCATION
LOCATION: BACK
LOCATION: BACK

## 2022-04-19 ASSESSMENT — PAIN - FUNCTIONAL ASSESSMENT
PAIN_FUNCTIONAL_ASSESSMENT: ACTIVITIES ARE NOT PREVENTED
PAIN_FUNCTIONAL_ASSESSMENT: ACTIVITIES ARE NOT PREVENTED

## 2022-04-19 ASSESSMENT — PAIN DESCRIPTION - FREQUENCY
FREQUENCY: CONTINUOUS
FREQUENCY: CONTINUOUS

## 2022-04-19 NOTE — PLAN OF CARE
Goal Outcome Evaluation: Patient is being discharged to [facility]. Discharge instructions have been provided. No voiced questions or concerns at time of discharge. Patient safety maintained this shift.

## 2022-04-19 NOTE — THERAPY TREATMENT NOTE
Acute Care - Physical Therapy Treatment Note   [place]     Patient Name: Sammi Sorenson  : 1950  MRN: [id number]  Today's Date: 2022      Visit Dx:     ICD-10-CM ICD-9-CM   1. Lumbar disc disease with radiculopathy  M51.16 722.10     724.4   2. Decreased activities of daily living (ADL)  Z78.9 V49.89   3. Impaired mobility  Z74.09 799.89     Patient Active Problem List   Diagnosis   • Tobacco abuse   • Cellulitis of face   • Thrush   • Chronic bilateral low back pain with bilateral sciatica   • Deep groin pain   • Hip pain, acute, left   • Ischial bursitis of left side   • Lumbar disc disease with radiculopathy   • Pain in both knees   • Pain management contract agreement   • Piriformis syndrome of left side   • SI (sacroiliac) joint dysfunction   • Lumbosacral disc disease   • Lumbago of multiple sites in spine with sciatica   • Anemia due to acute blood loss   • Chronic constipation     Past Medical History:   Diagnosis Date   • Anxiety    • Cellulitis of face 2019   • Chronic abdominal pain    • Chronic back pain    • Chronic gastritis    • Chronic pain     sees pain vilma. since    • CKD (chronic kidney disease)    • Colon polyp    • COPD (chronic obstructive pulmonary disease) (HCC)    • Disease of thyroid gland    • Diverticulosis    • Dyslipidemia    • GERD (gastroesophageal reflux disease)    • Hemorrhoid    • Hiatal hernia    • History of adenomatous polyp of colon    • Leaky heart valve    • Lumbosacral disc disease 2022   • Migraine    • Neuropathy    • Osteoarthritis    • Ovarian cancer (HCC)    • Submandibular gland mass      Past Surgical History:   Procedure Laterality Date   • ANTERIOR LUMBAR EXPOSURE N/A 2022    Procedure: ANTERIOR LUMBAR EXPOSURE;  Surgeon: Juan Francisco Nelson DO;  Location: [facility];  Service: Vascular;  Laterality: N/A;   • CARDIAC CATHETERIZATION      no stent   • CHOLECYSTECTOMY     • COLONOSCOPY  04/10/2015    5 polyps, tubular adenomas,  melanosis, diverticulosis, internal hemorrhoids,    • CYST REMOVAL     • ENDOSCOPY  04/10/2015    hiatus hernia   • HYSTERECTOMY     • LUMBAR FUSION N/A 4/11/2022    Procedure: ANTERIOR DECOMPRESSION, ANTERIOR LUMBAR INTERBODY FUSION WITH INSTRUMENTATION L5-S1;  Surgeon: JESSIKA Reese MD;  Location: [facility] OR;  Service: Orthopedic Spine;  Laterality: N/A;   • LUMBAR FUSION Right 4/13/2022    Procedure: RIGHT LATERAL LUMBAR INTERBODY FUSION WITH INSTRUMENTATION L4-5;  Surgeon: JESSIKA Reese MD;  Location: [facility] OR;  Service: Orthopedic Spine;  Laterality: Right;   • LUMBAR LAMINECTOMY WITH FUSION N/A 4/15/2022    Procedure: POSTERIOR SPINAL FUSION WITH INSTRUMENTATION L4-S1;  Surgeon: JESSIKA Reese MD;  Location: [facility] OR;  Service: Orthopedic Spine;  Laterality: N/A;   • LUNG LOBECTOMY Right     2002,[facility]   • REPLACEMENT TOTAL KNEE BILATERAL     • SUBMANDIBULAR GLAND EXCISION Left 11/02/2018    Procedure: Incision and drainage of left submandibular region abscess with excision of left submandibular region mass mass consistent with probable lymph node;  Surgeon: Duncan Ewing MD;  Location: [facility] OR;  Service: ENT     PT Assessment (last 12 hours)     PT Evaluation and Treatment     Row Name 04/19/22 0747          Physical Therapy Time and Intention    Subjective Information complains of;pain  -     Document Type therapy note (daily note)  -     Mode of Treatment physical therapy  -     Row Name 04/19/22 0747          General Information    Existing Precautions/Restrictions fall;brace worn when out of bed  LSO  -     Row Name 04/19/22 0747          Pain    Pretreatment Pain Rating 8/10  -     Posttreatment Pain Rating 8/10  -     Pain Location - abdomen;back  -     Pain Intervention(s) Repositioned;Ambulation/increased activity  -     Row Name 04/19/22 0747          Bed Mobility    Rolling Left [place] (Bed Mobility) modified independence  -      Sidelying-Sit [place] (Bed Mobility) modified independence  -     Sit-Sidelying [place] (Bed Mobility) --  chair  -     Assistive Device (Bed Mobility) head of bed elevated;bed rails  -     Row Name 04/19/22 0747          Transfers    Sit-Stand [place] (Transfers) contact guard;standby assist  -     Stand-Sit [place] (Transfers) contact guard;supervision  -     [place] Level (Toilet Transfer) contact guard  -     Assistive Device (Toilet Transfer) commode;walker, front-wheeled  -     Row Name 04/19/22 0747          Toilet Transfer    Type (Toilet Transfer) sit-stand;stand-sit  -     Row Name 04/19/22 0747          Gait/Stairs (Locomotion)    [place] Level (Gait) contact guard;standby assist  -     Assistive Device (Gait) walker, front-wheeled  -     Distance in Feet (Gait) 50 x 2  -AH     Deviations/Abnormal Patterns (Gait) antalgic;shalini decreased;stride length decreased  -     Row Name             Wound 04/11/22 0825 Left lower abdomen Incision    Wound - Properties Group Placement Date: 04/11/22  -DT Placement Time: 0825  -DT Present on Hospital Admission: N  -DT Side: Left  -DT Orientation: lower  -DT Location: abdomen  -DT Primary Wound Type: Incision  -DT     Retired Wound - Properties Group Placement Date: 04/11/22  -DT Placement Time: 0825  -DT Present on Hospital Admission: N  -DT Side: Left  -DT Orientation: lower  -DT Location: abdomen  -DT Primary Wound Type: Incision  -DT     Retired Wound - Properties Group Date first assessed: 04/11/22  -DT Time first assessed: 0825  -DT Present on Hospital Admission: N  -DT Side: Left  -DT Location: abdomen  -DT Primary Wound Type: Incision  -DT     Row Name             Wound 04/13/22 0739 Right lower flank Incision    Wound - Properties Group Placement Date: 04/13/22  -JR Placement Time: 0739  -JR Present on Hospital Admission: N  -JR Side: Right  -JR Orientation: lower  -JR Location: flank  -JR Primary Wound  Type: Incision  -JR Additional Comments: mastisol, steri-strips, 4x4 gauze, mepilex  -JR     Retired Wound - Properties Group Placement Date: 04/13/22  -JR Placement Time: 0739  -JR Present on Hospital Admission: N  -JR Side: Right  -JR Orientation: lower  -JR Location: flank  -JR Primary Wound Type: Incision  -JR Additional Comments: mastisol, steri-strips, 4x4 gauze, mepilex  -JR     Retired Wound - Properties Group Date first assessed: 04/13/22  -JR Time first assessed: 0739  -JR Present on Hospital Admission: N  -JR Side: Right  -JR Location: flank  -JR Primary Wound Type: Incision  -JR Additional Comments: mastisol, steri-strips, 4x4 gauze, mepilex  -JR     Row Name             Wound 04/15/22 0729 lumbar spine Incision    Wound - Properties Group Placement Date: 04/15/22  -DT Placement Time: 0729  -DT Present on Hospital Admission: N  -DT Location: lumbar spine  -DT Primary Wound Type: Incision  -DT, X2      Retired Wound - Properties Group Placement Date: 04/15/22  -DT Placement Time: 0729  -DT Present on Hospital Admission: N  -DT Location: lumbar spine  -DT Primary Wound Type: Incision  -DT, X2      Retired Wound - Properties Group Date first assessed: 04/15/22  -DT Time first assessed: 0729  -DT Present on Hospital Admission: N  -DT Location: lumbar spine  -DT Primary Wound Type: Incision  -DT, X2      Row Name 04/19/22 0747          Positioning and Restraints    Pre-Treatment Position in bed  -     Post Treatment Position chair  -     In Chair sitting;call light within reach;encouraged to call for assist;exit alarm on;with family/caregiver  -           User Key  (r) = Recorded By, (t) = Taken By, (c) = Cosigned By    Initials Name Provider Type    Shoshana Sow PTA Physical Therapist Assistant    Adilson Razo RN Registered Nurse    Alice Foy RN Registered Nurse                Physical Therapy Education                 Title: PT OT SLP Therapies (In Progress)     Topic: Physical  Therapy (In Progress)     Point: Mobility training (Done)     Learning Progress Summary           Patient Acceptance, E, VU by MS at 4/15/2022 1428    Comment: role of PT in her care, spinal restrictions    Acceptance, D,TB,E, NR,VU by  at 4/13/2022 1347    Comment: Pt educated on POC, LSO wear schedule, log roll technique and spinal precautions    Acceptance, E, NR by MS at 4/11/2022 1534    Comment: role of PT in her care, spinal restrictions, use of LSO                   Point: Home exercise program (Not Started)     Learner Progress:  Not documented in this visit.          Point: Body mechanics (Done)     Learning Progress Summary           Patient Acceptance, D,TB,E, NR,VU by J at 4/13/2022 1347    Comment: Pt educated on POC, LSO wear schedule, log roll technique and spinal precautions                   Point: Precautions (Done)     Learning Progress Summary           Patient Acceptance, E, VU by MS at 4/15/2022 1428    Comment: role of PT in her care, spinal restrictions    Acceptance, D,TB,E, NR,VU by  at 4/13/2022 1347    Comment: Pt educated on POC, LSO wear schedule, log roll technique and spinal precautions    Acceptance, E,TB, VU by  at 4/12/2022 1152    Comment: shivani/doff LSO    Acceptance, E, NR by MS at 4/11/2022 1534    Comment: role of PT in her care, spinal restrictions, use of LSO                               User Key     Initials Effective Dates Name Provider Type Discipline     06/16/21 -  Shoshana Duvall, PTA Physical Therapist Assistant PT    MS 06/19/18 -  Mercy Mccall, PT, DPT, NCS Physical Therapist PT     03/07/22 -  Ashish Kay, PT Student PT Student PT              PT Recommendation and Plan     Plan of Care Reviewed With: patient  Progress: improving  Outcome Evaluation: pt trans to EOB cga-min, min assist to shivani LSO, sit-stand cga, pt amb 50 feet rwx cga, bathroom trans cga, trans back to bed cga. pt would benefit from cont therapy       Time Calculation:    PT  Charges     Row Name 04/19/22 0815             Time Calculation    Start Time 0747  -      Stop Time 0810  -      Time Calculation (min) 23 min  -AH      PT Received On 04/19/22  -              Time Calculation- PT    Total Timed Code Minutes- PT 23 minute(s)  -              Timed Charges    70419 - Gait Training Minutes  23  -AH              Total Minutes    Timed Charges Total Minutes 23  -AH       Total Minutes 23  -AH            User Key  (r) = Recorded By, (t) = Taken By, (c) = Cosigned By    Initials Name Provider Type     Shoshana Duvall PTA Physical Therapist Assistant              Therapy Charges for Today     Code Description Service Date Service Provider Modifiers Qty    [id number] HC GAIT TRAINING EA 15 MIN 4/19/2022 Shoshana Duvall PTA GP 2          PT G-Codes  Outcome Measure Options: AM-PAC 6 Clicks Daily Activity (OT)  AM-PAC 6 Clicks Score (PT): 12  AM-PAC 6 Clicks Score (OT): 18    Shoshana Duvall PTA  4/19/2022

## 2022-04-19 NOTE — PROGRESS NOTES
August Smalls arrived to room # 816. Presented with: Lumbar Stenosis. Mental Status: Patient is oriented and alert. There were no vitals filed for this visit. Patient safety contract and falls prevention contract reviewed with patient Yes. Oriented Patient and Family to room. Call light within reach. Yes.   Needs, issues or concerns expressed at this time: no.      Electronically signed by Fatimah Lemus RN on 4/19/2022 at 1:39 PM

## 2022-04-19 NOTE — PROGRESS NOTES
Daily Progress Note  Sammi Sorenson  MRN: [id number] LOS: 8    Admit Date: 4/11/2022 4/19/2022 07:35 CDT          Chief Complaint:  Low back pain with radiculopathy secondary to spinal stenosis    Interval History:    Reviewed overnight events and nursing notes.   Status:  about the same as before  Pain:  no relief  Pt is alert and oriented x4.    Pt still reported pain is poorly controlled  Day 6 of stay with spinal stenosis requiring decompression surgery  Family at bedside    DIET:  Diet Regular    Medications:   lactated ringers, 100 mL/hr, Last Rate: 50 mL/hr (04/15/22 0732)      ALPRAZolam, 0.5 mg, Oral, TID  escitalopram, 10 mg, Oral, Daily  ferrous sulfate, 325 mg, Oral, Daily With Breakfast  ipratropium-albuterol, 3 mL, Nebulization, 4x Daily - RT  levothyroxine, 100 mcg, Oral, Q AM  pantoprazole, 40 mg, Oral, QAM  PATIENT SUPPLIED MEDICATION, 290 mcg, Oral, QAM AC  polyethylene glycol, 17 g, Oral, TID  pregabalin, 75 mg, Oral, Q12H  senna-docusate sodium, 2 tablet, Oral, BID  sodium chloride, 3 mL, Intravenous, Q12H        Data:     Code Status:   Code Status and Medical Interventions:   Ordered at: 04/11/22 1152     Code Status (Patient has no pulse and is not breathing):    CPR (Attempt to Resuscitate)     Medical Interventions (Patient has pulse or is breathing):    Full Support       Family History   Problem Relation Age of Onset   • No Known Problems Mother    • No Known Problems Father      Social History     Socioeconomic History   • Marital status:    Tobacco Use   • Smoking status: Current Every Day Smoker     Packs/day: 0.25     Years: 20.00     Pack years: 5.00     Types: Cigarettes   • Smokeless tobacco: Never Used   • Tobacco comment: has tried chantix; made her sick   Substance and Sexual Activity   • Alcohol use: No   • Drug use: No   • Sexual activity: Defer       Labs:  Lab Results (last 72 hours)     Procedure Component Value Units Date/Time    Basic Metabolic Panel  [[id number]]  (Abnormal) Collected: 04/18/22 0509    Specimen: Blood Updated: 04/18/22 0611     Glucose 94 mg/dL      BUN 9 mg/dL      Creatinine 0.57 mg/dL      Sodium 140 mmol/L      Potassium 3.3 mmol/L      Chloride 103 mmol/L      CO2 28.0 mmol/L      Calcium 8.3 mg/dL      BUN/Creatinine Ratio 15.8     Anion Gap 9.0 mmol/L      eGFR 96.1 mL/min/1.73      Comment: National Kidney Foundation and American Society of Nephrology (ASN) Task Force recommended calculation based on the Chronic Kidney Disease Epidemiology Collaboration (CKD-EPI) equation refit without adjustment for race.       Narrative:      GFR Normal >60  Chronic Kidney Disease <60  Kidney Failure <15      CBC (No Diff) [[id number]]  (Abnormal) Collected: 04/18/22 0509    Specimen: Blood Updated: 04/18/22 0549     WBC 6.26 10*3/mm3      RBC 2.99 10*6/mm3      Hemoglobin 8.8 g/dL      Hematocrit 27.4 %      MCV 91.6 fL      MCH 29.4 pg      MCHC 32.1 g/dL      RDW 14.0 %      RDW-SD 47.3 fl      MPV 12.2 fL      Platelets 191 10*3/mm3     Basic Metabolic Panel [[id number]]  (Abnormal) Collected: 04/17/22 0811    Specimen: Blood Updated: 04/17/22 0903     Glucose 86 mg/dL      BUN 10 mg/dL      Creatinine 0.55 mg/dL      Sodium 141 mmol/L      Potassium 3.0 mmol/L      Chloride 104 mmol/L      CO2 26.0 mmol/L      Calcium 8.3 mg/dL      BUN/Creatinine Ratio 18.2     Anion Gap 11.0 mmol/L      eGFR 96.9 mL/min/1.73      Comment: National Kidney Foundation and American Society of Nephrology (ASN) Task Force recommended calculation based on the Chronic Kidney Disease Epidemiology Collaboration (CKD-EPI) equation refit without adjustment for race.       Narrative:      GFR Normal >60  Chronic Kidney Disease <60  Kidney Failure <15      CBC (No Diff) [[id number]]  (Abnormal) Collected: 04/17/22 0811    Specimen: Blood Updated: 04/17/22 0840     WBC 7.44 10*3/mm3      RBC 3.01 10*6/mm3      Hemoglobin 8.9 g/dL      Hematocrit 27.3 %      MCV 90.7 fL      MCH  29.6 pg      MCHC 32.6 g/dL      RDW 13.8 %      RDW-SD 45.9 fl      MPV 12.4 fL      Platelets 200 10*3/mm3     Basic Metabolic Panel [[id number]]  (Abnormal) Collected: 04/16/22 0455    Specimen: Blood Updated: 04/16/22 0535     Glucose 126 mg/dL      BUN 10 mg/dL      Creatinine 0.54 mg/dL      Sodium 141 mmol/L      Potassium 3.5 mmol/L      Chloride 106 mmol/L      CO2 26.0 mmol/L      Calcium 8.1 mg/dL      BUN/Creatinine Ratio 18.5     Anion Gap 9.0 mmol/L      eGFR 97.4 mL/min/1.73      Comment: National Kidney Foundation and American Society of Nephrology (ASN) Task Force recommended calculation based on the Chronic Kidney Disease Epidemiology Collaboration (CKD-EPI) equation refit without adjustment for race.       Narrative:      GFR Normal >60  Chronic Kidney Disease <60  Kidney Failure <15      CBC (No Diff) [[id number]]  (Abnormal) Collected: 04/16/22 0455    Specimen: Blood Updated: 04/16/22 0517     WBC 6.38 10*3/mm3      RBC 3.04 10*6/mm3      Hemoglobin 8.9 g/dL      Hematocrit 27.6 %      MCV 90.8 fL      MCH 29.3 pg      MCHC 32.2 g/dL      RDW 13.5 %      RDW-SD 44.8 fl      MPV 11.8 fL      Platelets 160 10*3/mm3     Basic Metabolic Panel [[id number]]  (Abnormal) Collected: 04/15/22 1126    Specimen: Blood Updated: 04/15/22 1223     Glucose 113 mg/dL      BUN 11 mg/dL      Creatinine 0.64 mg/dL      Sodium 141 mmol/L      Potassium 3.3 mmol/L      Chloride 105 mmol/L      CO2 28.0 mmol/L      Calcium 8.4 mg/dL      BUN/Creatinine Ratio 17.2     Anion Gap 8.0 mmol/L      eGFR 93.4 mL/min/1.73      Comment: National Kidney Foundation and American Society of Nephrology (ASN) Task Force recommended calculation based on the Chronic Kidney Disease Epidemiology Collaboration (CKD-EPI) equation refit without adjustment for race.       Narrative:      GFR Normal >60  Chronic Kidney Disease <60  Kidney Failure <15      CBC (No Diff) [[id number]]  (Abnormal) Collected: 04/15/22 1126    Specimen: Blood  "Updated: 04/15/22 1203     WBC 5.98 10*3/mm3      RBC 3.33 10*6/mm3      Hemoglobin 9.8 g/dL      Hematocrit 31.0 %      MCV 93.1 fL      MCH 29.4 pg      MCHC 31.6 g/dL      RDW 13.8 %      RDW-SD 47.0 fl      MPV 12.8 fL      Platelets 151 10*3/mm3           Objective:     Vitals: /49 (BP Location: Left arm, Patient Position: Lying)   Pulse 66   Temp 98.1 °F (36.7 °C) (Oral)   Resp 17   Ht 166.4 cm (65.5\")   Wt 68.5 kg (151 lb)   SpO2 100%   BMI 24.75 kg/m²      Intake/Output Summary (Last 24 hours) at 2022 0735  Last data filed at 2022 0809  Gross per 24 hour   Intake --   Output 500 ml   Net -500 ml    Temp (24hrs), Av.1 °F (36.7 °C), Min:98.1 °F (36.7 °C), Max:98.1 °F (36.7 °C)    Glucose:  No results found for: POCGLU  Physical Examination:   General appearance - alert, well appearing, and in no distress and oriented to person, place, and time  Mouth - mucous membranes moist, pharynx normal without lesions  Neck - supple, no significant adenopathy  Lymphatics - no palpable lymphadenopathy, no hepatosplenomegaly  Chest - clear to auscultation, no wheezes, rales or rhonchi, symmetric air entry, no tachypnea, retractions or cyanosis  Heart - normal rate, regular rhythm, normal S1, S2, no murmurs, rubs, clicks or gallops  Abdomen - soft, nontender, nondistended, no masses or organomegaly bowel sounds normal  Neurological - alert, oriented, normal speech, no focal findings or movement disorder noted  Musculoskeletal - no joint tenderness, deformity or swelling  Extremities - peripheral pulses normal, no pedal edema, no clubbing or cyanosis  Skin - normal coloration and turgor, no rashes, no suspicious skin lesions noted      Assessment and Plan:     Primary Problem:  Chronic bilateral low back pain with bilateral sciatica    Hospital Problem list/Treatment Plan:    Chronic bilateral low back pain with bilateral sciatica    Lumbosacral disc disease    Lumbago of multiple sites in spine " with sciatica    Anemia due to acute blood loss--stable, expected.    Chronic constipation--postop bowel regimen    COPD-- not in exasperation, nebs available    Intermittent confusion-- most likely related to delirium/medication induced from operative intervention, pain meds, steroids and hospitalization.  Patient currently stable and at baseline    71-year-old female presented with chronic low back pain underwent operative fixation on 4/11, 4/13 and 4/15 without complications.  Pain currently controlled.  Medically stable for transfer to [facility] rehab for further conditioning.    Reviewed treatment plans with the patient, nursing staff and family  20 minutes spent in face to face interaction and coordination of care.   Electronically signed by MARRY Romo on 4/19/2022 at 07:35 CDT

## 2022-04-19 NOTE — DISCHARGE SUMMARY
Date of Discharge:  4/19/2022    Admission Diagnosis: M54.16    Discharge Diagnosis:   1. Increasing chronic back pain.  2. Bilateral buttock, thigh and leg radiculopathy, left worse than right.  3. Neurogenic claudication.  4. Multilevel thoracolumbar degenerative disk disease, severe, L4 to S1.  5. Facet arthropathy, L4 to S1.  6. Left central disc herniation, L4-5.  7. Foraminal disc herniation, left L5-S1.  8. Central and bilateral foraminal stenosis, L4 to S1, left worse than right.  9. Osteopenia, T-score -1.4, 02/17/2022.  10.  Status post anterior decompression, ALIF with instrumentation of L5-S1, 4/11/2022  11.  Status post right LLIF with instrumentation L4-5, 4/13/2022  12. S/p PSF with instrumentation L4-S1    Consults During Admission: Dr. Fajardo/[facility] Course  Patient is a 71 y.o. female Known to our practice. Admitted for the above stage fusion.  This has been well tolerated and the patient will be discharged to [facility] rehab today in good stable condition with instructions for brace when out of bed.  No driving until directed.  Patient will follow-up with Dr. Reese's clinic in two weeks. They will call if problems arise.         Condition on Discharge:  STABLE    Vital Signs  Temp:  [98.1 °F (36.7 °C)] 98.1 °F (36.7 °C)  Heart Rate:  [62-69] 66  Resp:  [17-22] 17  BP: (125-135)/(40-49) 135/49    Physical Exam:   Alert and oriented ×3, no acute distress, grossly neurovascularly intact, vital signs stable, dressing clean dry and intact, moving all extremities without focal deficit      Discharge Disposition  Rehab Facility or Unit (DC - External)    Discharge Medications     Discharge Medications      New Medications      Instructions Start Date   oxyCODONE-acetaminophen  MG per tablet  Commonly known as: PERCOCET   1 tablet, Oral, Every 4 Hours PRN      pregabalin 75 MG capsule  Commonly known as: LYRICA   75 mg, Oral, Every 12 Hours Scheduled         Changes to  Medications      Instructions Start Date   nystatin 100,000 unit/mL suspension  Commonly known as: MYCOSTATIN  What changed:   · when to take this  · reasons to take this   500,000 Units, Oral, 4 Times Daily         Continue These Medications      Instructions Start Date   ALPRAZolam 0.5 MG tablet  Commonly known as: XANAX   0.5 mg, Oral, 3 Times Daily PRN      dicyclomine 10 MG capsule  Commonly known as: BENTYL   10 mg, Oral, 3 Times Daily PRN      escitalopram 10 MG tablet  Commonly known as: LEXAPRO   10 mg, Oral, Daily      levothyroxine 100 MCG tablet  Commonly known as: SYNTHROID, LEVOTHROID   100 mcg, Oral, Daily      linaclotide 290 MCG capsule capsule  Commonly known as: Linzess   290 mcg, Oral, Every Morning Before Breakfast      pantoprazole 40 MG EC tablet  Commonly known as: PROTONIX   40 mg, Daily      polyethylene glycol 17 GM/SCOOP powder  Commonly known as: MIRALAX   17 g, Oral, Daily      promethazine 25 MG tablet  Commonly known as: PHENERGAN   25 mg, Oral, 2 Times Daily PRN      sucralfate 1 GM/10ML suspension  Commonly known as: CARAFATE   1 g, Oral, 3 Times Daily PRN      tiZANidine 4 MG tablet  Commonly known as: ZANAFLEX   4 mg, Oral, Every 8 Hours         Stop These Medications    HYDROcodone-acetaminophen  MG per tablet  Commonly known as: NORCO            Discharge Diet: Resume regular diet, advance as tolerated    Activity at Discharge: Resume regular activity, advance as tolerated, no lifting, no twisting, no bending, brace as directed, no driving until directed.     Follow-up Appointments  Followup with PCP within one week  Followup [facility] at 2 weeks post-op         Da Dale PA-C  04/19/22  07:52 CDT

## 2022-04-19 NOTE — PLAN OF CARE
[address] [facility] TREATMENT PLAN      Markel Edwards    : 1950  Acct #: [de-identified]  MRN: [id number]   PHYSICIAN:  Trey Mcgill MD  Primary Problem    Patient Active Problem List   Diagnosis    Bilateral low back pain without sciatica    Pain in both knees    Lumbar disc disease with radiculopathy    Deep groin pain    Hip pain, acute, left    SI (sacroiliac) joint dysfunction    Pain management contract agreement    Ischial bursitis of left side    Piriformis syndrome of left side    S/P lumbar fusion    Weakness    Gait abnormality    Mood disorder ([facility])    Acquired hypothyroidism    COPD (chronic obstructive pulmonary disease) ([facility])    Gastroesophageal reflux disease without esophagitis    History of smoking       Rehabilitation Diagnosis:     S/P lumbar fusion [Z98.1]       ADMIT DATE:2022   CARE PLAN     NURSING:  Markel Edwards while on this unit will:     [x] Be continent of bowel and bladder      [x] Have an adequate number of bowel movements   [] Urinate with no urinary retention >300ml in bladder   [] Complete bladder protocol with carvajal removal   [] Maintain O2 SATs at ___%   [x] Have pain managed while on ARU        [x] Be pain free by discharge    [x] Have no skin breakdown while on ARU   [] Have improved skin integrity via wound measurements   [x] Have no signs/symptoms of infection at the wound site  [x] Be free from injury during hospitalization   [] Complete education with patient/family with understanding demonstrated for:  [] Adjustment   [] Other:   Nursing interventions may include bowel/bladder training, education for medical assistive devices, medication education, O2 saturation management, energy conservation, stress management techniques, fall prevention, alarms protocol, seating and positioning, skin/wound care, pressure relief instruction,dressing changes,  infection protection, DVT prophylaxis, and/or assistance with in room safety with transfers to bed, toilet, wheelchair, shower as well as bathroom activities and hygiene. Patient/caregiver education for:   [] Disease/sustained injury/management      [] Medication Use   [] Surgical intervention   [] Safety   [] Body mechanics and or joint protection   [] Health maintenance       IRF-AYDEE  Bladder and Bowel Continence  Bladder Continence: Always continent  Bowel Continence: Always continent       PHYSICAL THERAPY:  Goals:                  Short Term Goals  Time Frame for Short term goals: 1 Week  Short term goal 1: Supervision Bed Mobility   Short term goal 2: Supervision With Transfers from Surface to Surface   Short term goal 3: Supervision With Amb 100FT With AD   Short term goal 4: Supervision WC Propulsion 250FT  Short term goal 5: CGA Climbing 5 Stairs With Handrail            Long Term Goals  Time Frame for Long term goals : 2 Weeks  Long term goal 1: Independent Bed Mobility   Long term goal 2: Independent With Transfers From Surface to Surface   Long term goal 3: Independent With Amb 250FT With AD   Long term goal 4: Independent With WC Propulsion 500FT  Long term goal 5: Independent With Climbing 5 Stairs With Handrail  These goals were reviewed with this patient at the time of assessment and Carlo Barrios is in agreement.      Plan of Care: Frequency:  [x] 5 days per week, 90 minutes per day                             []  5 days per week, 60 minutes per day               Current Treatment Recommendations: Strengthening,ROM,Balance training,Functional mobility training,Transfer training,Endurance training,Wheelchair mobility training,Gait training,Stair training,Pain management,Home exercise program,Safety education & training,Patient/Caregiver education & training,Equipment evaluation, education, & procurement,Therapeutic activities,Return to work related activity    IRF-AYDEE  Roll Left and Right  Assistance Needed: Independent  Discharge Goal: Independent  Sit to Lying  Assistance Needed: Supervision or touching assistance  Discharge Goal: Independent  Lying to Sitting on Side of Bed  Assistance Needed: Supervision or touching assistance  Discharge Goal: Independent  Sit to Stand  Assistance Needed: Supervision or touching assistance  Discharge Goal: Independent  Chair/Bed-to-Chair Transfer  Assistance Needed: Supervision or touching assistance  Discharge Goal: Independent  Car Transfer  Reason if not Attempted: Not attempted due to medical condition or safety concerns  CARE Score: 88  Discharge Goal: Independent  Walk 10 Feet  Assistance Needed: Supervision or touching assistance  Discharge Goal: Independent  Walk 50 Feet with Two Turns  Reason if not Attempted: Not attempted due to medical condition or safety concerns  CARE Score: 88  Discharge Goal: Independent  Walk 150 Feet  Reason if not Attempted: Not attempted due to medical condition or safety concerns  CARE Score: 88  Discharge Goal: Independent  Walking 10 Feet on Uneven Surfaces  Reason if not Attempted: Not attempted due to medical condition or safety concerns  CARE Score: 88  Discharge Goal: Independent  1 Step (Curb)  Reason if not Attempted: Not attempted due to medical condition or safety concerns  CARE Score: 88  Discharge Goal: Independent  4 Steps  Reason if not Attempted: Not attempted due to medical condition or safety concerns  CARE Score: 88  Discharge Goal: Independent  12 Steps  Reason if not Attempted: Not attempted due to medical condition or safety concerns  CARE Score: 88  Discharge Goal: Independent  Wheel 50 Feet with Two Turns  Assistance Needed: Supervision or touching assistance  Discharge Goal: Independent  Wheel 150 Feet  Assistance Needed: Supervision or touching assistance  Discharge Goal: Independent    OCCUPATIONAL THERAPY:  Goals:             Short Term Goals  Time Frame for Short term goals: 1 week  Short Term Goal 1: Supervision with bathing hygiene.   Short Term Goal 2: Supervision with clothing management/hygiene for toileting. Short Term Goal 3: Supervision with toilet transfers. Short Term Goal 4: Supervision LB dressing, AE PRN. Short Term Goal 5: Supervision with donning/doffing socks and shoes, AE PRN. Additional Goals?: Yes  Short Term Goal 6: Patient will complete 1-2 handed static standing task for 5 minutes, requiring Supervision. :  Long Term Goals  Time Frame for Long term goals : 2 weeks  Long Term Goal 1: Modified independent with bathing hygiene. Long Term Goal 2: Modified independent with toileting and toilet transfers. Long Term Goal 3: Modified independent with overall dressing. Long Term Goal 4: Modified independent with ambulatory homemaking tasks.   Long Term Goal 5: Patient verbalize DME needs. :    These goals were reviewed with this patient at the time of assessment and Cecilio See is in agreement    Plan of Care: Frequency:   [x] 5 days per week, 90 minutes per day     [] 5 days per week, 60 minutes per day                Plan  Current Treatment Recommendations: Strengthening,Balance training,Functional mobility training,Endurance training,Equipment evaluation, education, & procurement,Patient/Caregiver education & training,Safety education & training,Self-Care / ADL,Home management training            IRF-AYDEE  Eating  Assistance Needed: Setup or clean-up assistance  Discharge Goal: Independent  Oral Hygiene  Assistance Needed: Setup or clean-up assistance  Discharge Goal: Independent  Toileting Hygiene  Assistance Needed: Supervision or touching assistance  CARE Score: 4  Discharge Goal: Independent  Shower/Bathe Self  Assistance Needed: Partial/moderate assistance  Discharge Goal: Independent  Upper Body Dressing  Assistance Needed: Setup or clean-up assistance  Discharge Goal: Independent  Lower Body Dressing  Assistance Needed: Supervision or touching assistance  Discharge Goal: Independent  Putting On/Taking Off Footwear  Assistance Needed: Substantial/maximal assistance  Discharge Goal: Independent  Toilet Transfer  Assistance Needed: Supervision or touching assistance  CARE Score: 4  Discharge Goal: Independent     Treatments may include IADL retraining, strengthening, safety education and training, patient/caregiver education and training, equipment evaluation/ training/procurement, neuromuscular reeducation, wheelchair mobility training. SPEECH THERAPY:   Plan of Care and Goals:   LTG    FIM Goals: Comprehension:    Expression:    Social:    Problem Solving:    Memory:                                                  IRF-AYDEE  Hearing, Speech, and Vision  Expression of Ideas and Wants: Without difficulty  Understanding Verbal and Non-Verbal Content: Understands  Cognitive Patterns  Cognitive Pattern Assessment Used: BIMS  Repetition of Three Words (First Attempt): 3  Temporal Orientation: Year: Correct  Temporal Orientation: Month: Accurate within 5 days  Temporal Orientation: Day: Correct  Able to recall \"sock: Yes, no cue required  Able to recall \"blue\": Yes, no cue required  Able to recall \"bed\": Yes, no cue required  BIMS Summary Score: 15          Plan of Care:  Frequency:   [] 5 days per week, 60 minutes per day                            [x] Not appropriate for Speech Therapy  Treatments may include speech/language/communication therapy, cognitive training, group therapy, education, and/or dysphagia therapy based on the above goals. These goals were reviewed with this patient at the time of assessment and Maryann Quiroz is in agreement. CASE MANAGEMENT:  Goals:   Assist patient/family with discharge planning, patient/family counseling,  and coordination with insurance during ARU stay.   Patient Goals: rellief of pain, be mobile and able to take care of basic needs, to be able to go home with family               Activities Prior to Admit:   Homemaking Responsibilities: Yes  Active : Yes     Occupation: Retired  Leisure & Hobbies: Reading bible, puzzles, being outdoors         Esme Mendez will be seen a minimum of 3 hours of therapy per day/a minimum of 5 out of 7 days per week. [] In this rare instance due to the nature of this patient's medical involvement, this patient will be seen 15 hours per week (900 minutes within a 7 day period). Treatments may include therapeutic exercises, gait training, neuromuscular re-ed, transfer training, community reintegration, bed mobility, w/c mobility and training, self care, home mgmt, cognitive training, energy conservation,dysphagia tx, speech/language/communication therapy, group therapy, and patient/family education. In addition, dietician/nutritionist may monitor calorie count as well as intake and collaboratively work with SLP on dietary upgrades. Neuropsychology/Psychology may evaluate and provide necessary support. Medical issues being managed closely and that require 24 hour availability of a physician:   [] Swallowing Precautions  [x] Bowel/Bladder Fx  [x] Weight bearing precautions   [x] Wound Care    [x] Pain Mgmt   [x] Infection Protection   [x] DVT Prophylaxis   [x] Fall Precautions  [] Fluid/Electrolyte/Nutrition Balance   [] Voice Protection   [] Respiratory  [] Other:    Medical Prognosis: [x] Good  [] Fair    [] Guarded   Total expected IRF days:  14  Anticipated discharge destination:    [] Home Independently   [x] Home with supervision    []SNF     [] Other                                           Physician anticipated functional outcomes:  Ambulate household distances independently with assistive device. IPOC brief synthesis: Acute inpatient rehabilitation with occupational   physical therapy 180 minutes, 5 every 7   days will address basic and advancing mobility with self-care   instruction and adaptive equipment training. Caregiver education will   be offered. Expected length of stay prior to the supervised level of   functional discharge to home with home care is 14 days.     Assessment and Plan:     1. Status post 3 stage lumbar fusion-pain control/mobilization  2. Mood disorder-Lexapro/Xanax as needed  3. Hypothyroidism-Synthroid  4. GI/GERD- bowel regimen/PPI  5. Back pain-Lyrica/Percocet as needed/Zanaflex as needed  6. COPD/history of smoking-monitor  7.   PT/OT            Case Mgmt: Electronically signed by ISIDRA Porras on 4/19/2022 at 2:59 PM      OT: Electronically signed by Pj Dhaliwal OT on 4/20/22 at 2:16 PM CDT    PT:Electronically signed by Teresa Gimenez PT on 4/20/2022 at 3:38 PM      RN: Electronically signed by Isaac Orourke RN on 4/19/2022 at 1:42 PM    ST: Electronically Signed By:  Matthias Steele  4/20/2022,8:45 AM.

## 2022-04-19 NOTE — PROGRESS NOTES
4 Eyes Skin Assessment    Venus Brennan is being assessed upon: Admission    I agree that I, Sherri Bryson RN, along with Cary Taylor LPN (either 2 RN's or 1 LPN and 1 RN) have performed a thorough Head to Toe Skin Assessment on the patient. ALL assessment sites listed below have been assessed. Areas assessed by both nurses:     [x]   Head, Face, and Ears   [x]   Shoulders, Back, and Chest  [x]   Arms, Elbows, and Hands   [x]   Coccyx, Sacrum, and Ischium  [x]   Legs, Feet, and Heels    Does the Patient have Skin Breakdown? No    If yes, description of skin breakdown:    Does the Patient have Surgical Incision(s)? Yes, incision(s) noted upon assessment. It is the responsibility of the Primary Nurse to enure that the incision(s) are documented in the [address] Prevention initiated: No  Wound Care Orders initiated: No    WOC nurse consulted for Pressure Injury (Stage 3,4, Unstageable, DTI, NWPT, and Complex wounds) and New or Established Ostomies: No  Scattered bruising. Surgical incisions x 3 to lower left abdomen, mid lower back and right lower back.       Primary Nurse eSignature: Sherri Bryson RN on 4/19/2022 at 3:27 PM      Co-Signer eSignature: Electronically signed by Charles Lance LPN on 3/96/0371 at 9:82 PM

## 2022-04-19 NOTE — THERAPY DISCHARGE NOTE
Acute Care - Physical Therapy Discharge Summary  [facility]       Patient Name: Sammi Sorenson  : 1950  MRN: [id number]    Today's Date: 2022                 Admit Date: 2022      PT Recommendation and Plan    Visit Dx:    ICD-10-CM ICD-9-CM   1. Lumbar disc disease with radiculopathy  M51.16 722.10     724.4   2. Decreased activities of daily living (ADL)  Z78.9 V49.89   3. Impaired mobility  Z74.09 799.89            PT Charges     Row Name 22 0815             Time Calculation    Start Time 0747  -      Stop Time 0810  -      Time Calculation (min) 23 min  -      PT Received On 22  -              Time Calculation- PT    Total Timed Code Minutes- PT 23 minute(s)  -              Timed Charges    68060 - Gait Training Minutes  23  -AH              Total Minutes    Timed Charges Total Minutes 23  -AH       Total Minutes 23  -AH            User Key  (r) = Recorded By, (t) = Taken By, (c) = Cosigned By    Initials Name Provider Type    Shoshana Sow, PTA Physical Therapist Assistant                 PT Rehab Goals     Row Name 22 1528             Bed Mobility Goal 1 (PT)    Activity/Assistive Device (Bed Mobility Goal 1, PT) rolling to right;rolling to left;sidelying to sit/sit to sidelying  -      [place] Level/Cues Needed (Bed Mobility Goal 1, PT) independent  -      Time Frame (Bed Mobility Goal 1, PT) long term goal (LTG);by discharge  -      Progress/Outcomes (Bed Mobility Goal 1, PT) goal not met  -              Transfer Goal 1 (PT)    Activity/Assistive Device (Transfer Goal 1, PT) sit-to-stand/stand-to-sit;bed-to-chair/chair-to-bed  -      [place] Level/Cues Needed (Transfer Goal 1, PT) independent  -      Time Frame (Transfer Goal 1, PT) long term goal (LTG);by discharge  -      Progress/Outcome (Transfer Goal 1, PT) goal not met  -              Gait Training Goal 1 (PT)    Activity/Assistive Device (Gait Training Goal 1, PT) gait  (walking locomotion);assistive device use;improve balance and speed;increase endurance/gait distance  -      [place] Level (Gait Training Goal 1, PT) independent  -      Distance (Gait Training Goal 1, PT) 100ft  -      Time Frame (Gait Training Goal 1, PT) long term goal (LTG);by discharge  -      Progress/Outcome (Gait Training Goal 1, PT) goal not met  -              Stairs Goal 1 (PT)    Activity/Assistive Device (Stairs Goal 1, PT) ascending stairs;descending stairs;step-to-step;using handrail, right  -      [place] Level/Cues Needed (Stairs Goal 1, PT) contact guard required  -      Number of Stairs (Stairs Goal 1, PT) 2  -      Time Frame (Stairs Goal 1, PT) long term goal (LTG);by discharge  -      Progress/Outcome (Stairs Goal 1, PT) goal not met  -              Patient Education Goal (PT)    Activity (Patient Education Goal, PT) Pt will adhere to spinal precautions 100% of the time when performing functional mobility.  -      [place]/Cues/Accuracy (Memory Goal 2, PT) independent;demonstrates adequately  -      Time Frame (Patient Education Goal, PT) long term goal (LTG);by discharge  -      Progress/Outcome (Patient Education Goal, PT) goal not met  -            User Key  (r) = Recorded By, (t) = Taken By, (c) = Cosigned By    Initials Name Provider Type Discipline    Shoshana Sow PTA Physical Therapist Assistant PT                Therapy Charges for Today     Code Description Service Date Service Provider Modifiers Qty    [id number] HC GAIT TRAINING EA 15 MIN 4/19/2022 Shoshana Duvall PTA GP 2          PT Discharge Summary  Reason for Discharge: Discharge from facility  Outcomes Achieved: Refer to plan of care for updates on goals achieved  Discharge Destination: Inpatient rehabilitation facility      Shoshana Duvall PTA   4/19/2022

## 2022-04-19 NOTE — PLAN OF CARE
Problem: Adult Inpatient Plan of Care  Goal: Plan of Care Review  Outcome: Ongoing, Progressing  Flowsheets (Taken 4/19/2022 0613)  Progress: no change  Plan of Care Reviewed With: patient  Outcome Evaluation: Pt is A&Ox4. PRn pain meds given with little relief. Upx1 with walker and LSO brace. Dressings are CDI. Voiding. PRn meds given to help with a bm. VSS. Safety maintained.

## 2022-04-19 NOTE — PLAN OF CARE
Goal Outcome Evaluation:  Plan of Care Reviewed With: patient        Progress: no change  Outcome Evaluation: Pt c/o pain consistently throughout shift at a rating of 10/10. Medicated with PO pain meds and muscle relaxer throughout shift. MD notified and pain meds changed per order. Pt IV infiltrated, attempted to obtain new IV access unsuccessful and pt refused to be stuck again. PA notified regarding refusal and new orders placed since IV steroids were unsuccessful. Dressing to lower abdomen, right flank, and lumbar CDI. N/v check WNL. PPP. LSO brace at bedside for when pt OOB. Voiding per BRP/BSC. Cont to monitor.

## 2022-04-20 PROBLEM — J44.9 COPD (CHRONIC OBSTRUCTIVE PULMONARY DISEASE) (HCC): Status: ACTIVE | Noted: 2022-04-20

## 2022-04-20 PROBLEM — Z87.891 HISTORY OF SMOKING: Status: ACTIVE | Noted: 2022-04-20

## 2022-04-20 PROBLEM — K21.9 GASTROESOPHAGEAL REFLUX DISEASE WITHOUT ESOPHAGITIS: Status: ACTIVE | Noted: 2022-04-20

## 2022-04-20 LAB
ALBUMIN SERPL-MCNC: 3 G/DL (ref 3.5–5.2)
ALP BLD-CCNC: 62 U/L (ref 35–104)
ALT SERPL-CCNC: 6 U/L (ref 5–33)
ANION GAP SERPL CALCULATED.3IONS-SCNC: 8 MMOL/L (ref 7–19)
AST SERPL-CCNC: 13 U/L (ref 5–32)
BILIRUB SERPL-MCNC: 0.4 MG/DL (ref 0.2–1.2)
BUN BLDV-MCNC: 9 MG/DL (ref 8–23)
CALCIUM SERPL-MCNC: 8.3 MG/DL (ref 8.8–10.2)
CHLORIDE BLD-SCNC: 102 MMOL/L (ref 98–111)
CO2: 28 MMOL/L (ref 22–29)
CREAT SERPL-MCNC: 0.6 MG/DL (ref 0.5–0.9)
GFR AFRICAN AMERICAN: >59
GFR NON-AFRICAN AMERICAN: >60
GLUCOSE BLD-MCNC: 94 MG/DL (ref 74–109)
HCT VFR BLD CALC: 31.4 % (ref 37–47)
HEMOGLOBIN: 9.9 G/DL (ref 12–16)
MCH RBC QN AUTO: 29.6 PG (ref 27–31)
MCHC RBC AUTO-ENTMCNC: 31.5 G/DL (ref 33–37)
MCV RBC AUTO: 94 FL (ref 81–99)
PDW BLD-RTO: 13.8 % (ref 11.5–14.5)
PLATELET # BLD: 293 K/UL (ref 130–400)
PMV BLD AUTO: 11.7 FL (ref 9.4–12.3)
POTASSIUM REFLEX MAGNESIUM: 4 MMOL/L (ref 3.5–5)
PREALBUMIN: 7 MG/DL (ref 20–40)
RBC # BLD: 3.34 M/UL (ref 4.2–5.4)
SODIUM BLD-SCNC: 138 MMOL/L (ref 136–145)
TOTAL PROTEIN: 6 G/DL (ref 6.6–8.7)
WBC # BLD: 6.6 K/UL (ref 4.8–10.8)

## 2022-04-20 PROCEDURE — 85027 COMPLETE CBC AUTOMATED: CPT

## 2022-04-20 PROCEDURE — 99223 1ST HOSP IP/OBS HIGH 75: CPT | Performed by: PSYCHIATRY & NEUROLOGY

## 2022-04-20 PROCEDURE — 84134 ASSAY OF PREALBUMIN: CPT

## 2022-04-20 PROCEDURE — 97165 OT EVAL LOW COMPLEX 30 MIN: CPT

## 2022-04-20 PROCEDURE — 80053 COMPREHEN METABOLIC PANEL: CPT

## 2022-04-20 PROCEDURE — 6370000000 HC RX 637 (ALT 250 FOR IP): Performed by: PSYCHIATRY & NEUROLOGY

## 2022-04-20 PROCEDURE — 97116 GAIT TRAINING THERAPY: CPT

## 2022-04-20 PROCEDURE — 36415 COLL VENOUS BLD VENIPUNCTURE: CPT

## 2022-04-20 PROCEDURE — 97535 SELF CARE MNGMENT TRAINING: CPT

## 2022-04-20 PROCEDURE — 97110 THERAPEUTIC EXERCISES: CPT

## 2022-04-20 PROCEDURE — 97530 THERAPEUTIC ACTIVITIES: CPT

## 2022-04-20 PROCEDURE — 97161 PT EVAL LOW COMPLEX 20 MIN: CPT

## 2022-04-20 PROCEDURE — 1180000000 HC REHAB R&B

## 2022-04-20 RX ORDER — TIZANIDINE 4 MG/1
6 TABLET ORAL EVERY 4 HOURS PRN
Status: DISCONTINUED | OUTPATIENT
Start: 2022-04-20 | End: 2022-04-25

## 2022-04-20 RX ADMIN — LEVOTHYROXINE SODIUM 100 MCG: 100 TABLET ORAL at 08:10

## 2022-04-20 RX ADMIN — TIZANIDINE 6 MG: 4 TABLET ORAL at 17:17

## 2022-04-20 RX ADMIN — LINACLOTIDE 290 MCG: 145 CAPSULE, GELATIN COATED ORAL at 08:16

## 2022-04-20 RX ADMIN — OXYCODONE AND ACETAMINOPHEN 1 TABLET: 10; 325 TABLET ORAL at 03:11

## 2022-04-20 RX ADMIN — OXYCODONE AND ACETAMINOPHEN 1 TABLET: 10; 325 TABLET ORAL at 17:13

## 2022-04-20 RX ADMIN — OXYCODONE AND ACETAMINOPHEN 1 TABLET: 10; 325 TABLET ORAL at 13:10

## 2022-04-20 RX ADMIN — TIZANIDINE 6 MG: 4 TABLET ORAL at 21:05

## 2022-04-20 RX ADMIN — NYSTATIN 500000 UNITS: 100000 SUSPENSION ORAL at 21:06

## 2022-04-20 RX ADMIN — OXYCODONE AND ACETAMINOPHEN 1 TABLET: 10; 325 TABLET ORAL at 21:04

## 2022-04-20 RX ADMIN — TIZANIDINE 6 MG: 4 TABLET ORAL at 11:12

## 2022-04-20 RX ADMIN — PREGABALIN 75 MG: 75 CAPSULE ORAL at 16:05

## 2022-04-20 RX ADMIN — PREGABALIN 75 MG: 75 CAPSULE ORAL at 04:21

## 2022-04-20 RX ADMIN — ACETAMINOPHEN 650 MG: 325 TABLET ORAL at 11:11

## 2022-04-20 RX ADMIN — OXYCODONE AND ACETAMINOPHEN 1 TABLET: 10; 325 TABLET ORAL at 08:10

## 2022-04-20 RX ADMIN — NYSTATIN 500000 UNITS: 100000 SUSPENSION ORAL at 08:13

## 2022-04-20 RX ADMIN — ESCITALOPRAM OXALATE 10 MG: 10 TABLET ORAL at 08:12

## 2022-04-20 RX ADMIN — PANTOPRAZOLE SODIUM 40 MG: 40 TABLET, DELAYED RELEASE ORAL at 08:10

## 2022-04-20 ASSESSMENT — PAIN SCALES - GENERAL
PAINLEVEL_OUTOF10: 4
PAINLEVEL_OUTOF10: 9
PAINLEVEL_OUTOF10: 6
PAINLEVEL_OUTOF10: 10
PAINLEVEL_OUTOF10: 0
PAINLEVEL_OUTOF10: 10
PAINLEVEL_OUTOF10: 4
PAINLEVEL_OUTOF10: 10
PAINLEVEL_OUTOF10: 9
PAINLEVEL_OUTOF10: 10
PAINLEVEL_OUTOF10: 9
PAINLEVEL_OUTOF10: 10
PAINLEVEL_OUTOF10: 8
PAINLEVEL_OUTOF10: 8
PAINLEVEL_OUTOF10: 10

## 2022-04-20 ASSESSMENT — PAIN DESCRIPTION - LOCATION
LOCATION: BACK;LEG
LOCATION: BACK
LOCATION: BACK
LOCATION: BACK;LEG
LOCATION: BACK

## 2022-04-20 ASSESSMENT — PAIN DESCRIPTION - ORIENTATION
ORIENTATION: LOWER;LEFT;RIGHT
ORIENTATION: LOWER;LEFT;RIGHT
ORIENTATION: RIGHT;LEFT;MID
ORIENTATION: RIGHT;LEFT
ORIENTATION: LOWER;LEFT
ORIENTATION: LOWER
ORIENTATION: LOWER;RIGHT;LEFT
ORIENTATION: LOWER
ORIENTATION: LOWER

## 2022-04-20 ASSESSMENT — PAIN DESCRIPTION - DESCRIPTORS
DESCRIPTORS: ACHING;STABBING
DESCRIPTORS: ACHING;SHOOTING
DESCRIPTORS: ACHING;SHOOTING
DESCRIPTORS: ACHING;STABBING
DESCRIPTORS: SQUEEZING;DISCOMFORT
DESCRIPTORS: SQUEEZING;DISCOMFORT
DESCRIPTORS: ACHING;SHOOTING
DESCRIPTORS: ACHING;SHOOTING
DESCRIPTORS: ACHING;STABBING

## 2022-04-20 ASSESSMENT — PAIN - FUNCTIONAL ASSESSMENT

## 2022-04-20 ASSESSMENT — PAIN DESCRIPTION - PAIN TYPE
TYPE: SURGICAL PAIN
TYPE: ACUTE PAIN
TYPE: SURGICAL PAIN

## 2022-04-20 ASSESSMENT — PAIN DESCRIPTION - ONSET
ONSET: ON-GOING
ONSET: GRADUAL

## 2022-04-20 ASSESSMENT — PAIN DESCRIPTION - FREQUENCY
FREQUENCY: CONTINUOUS
FREQUENCY: CONTINUOUS

## 2022-04-20 NOTE — PLAN OF CARE
Nutrition Problem #1: Inadequate oral intake  Intervention: Food and/or Nutrient Delivery: Continue Current Diet    Problem: Nutrition Deficit:  Goal: Optimize nutritional status  Outcome: Progressing

## 2022-04-20 NOTE — PROGRESS NOTES
Occupational Therapy  Facility/Department: [facility] 8 REHAB UNIT  Occupational Therapy Treatment Note    Name: Misael Coy  : 1950  MRN: [id number]  Date of Service: 2022    Discharge Recommendations:  Home with Home health OT          Patient Diagnosis(es): There were no encounter diagnoses. Past Medical History:  has a past medical history of Arthritis, Bilateral low back pain without sciatica, Cancer (HCC), COPD (chronic obstructive pulmonary disease) ([facility] [place] [address] Hx of blood clots, Hyperlipidemia, Pain in both knees, Thyroid disease, and Urinary incontinence. Past Surgical History:  has a past surgical history that includes Hysterectomy; joint replacement; lobectomy; Cholecystectomy, laparoscopic; Skin cancer excision; and cyst incision and drainage. Treatment Diagnosis: Status post 3 stage lumbar fusion      Assessment   Performance deficits / Impairments: Decreased functional mobility ; Decreased ADL status; Decreased strength;Decreased endurance;Decreased balance;Decreased high-level IADLs  Assessment: Pt requires increased assistance with ADLs due to pain in back. She requires skilled OT to address the above deficits and return the pt home independently.   Treatment Diagnosis: Status post 3 stage lumbar fusion  Prognosis: Good  Decision Making: Low Complexity  Activity Tolerance  Activity Tolerance: Patient limited by pain        Plan   Plan  Current Treatment Recommendations: Strengthening,Balance training,Functional mobility training,Endurance training,Equipment evaluation, education, & procurement,Patient/Caregiver education & training,Safety education & training,Self-Care / ADL,Home management training     Restrictions  Restrictions/Precautions  Restrictions/Precautions: Fall Risk,Surgical Protocols  Required Braces or Orthoses?: Yes  Required Braces or Orthoses  Spinal: Lumbar Corset  Position Activity Restriction  Spinal Precautions: No Bending,No Lifting,No Twisting    Subjective   General  Chart Reviewed: Yes  Patient assessed for rehabilitation services?: Yes  Pain Assessment  Pain Level: 9  Patient's Stated Pain Goal: 0 - No pain  Pain Location: Back  Pain Orientation: Lower  Pain Descriptors: Aching;Stabbing  Functional Pain Assessment: Prevents or interferes some active activities and ADLs  Pain Type: Acute pain  Pain Frequency: Continuous  Pain Onset: On-going  Non-Pharmaceutical Pain Intervention(s): Ambulation/Increased Activity;Repositioned;Rest;Exercise  Vital Signs  Resp: 18  Height and Weight  Height: 5' 5\" (165.1 cm)  Social/Functional History  Social/Functional History  Lives With: Family (Daughter and sister in law)  Type of Home: House  Home Layout: Two level,Able to Live on Main level with bedroom/bathroom  Home Access: Stairs to enter with rails  Entrance Stairs - Number of Steps: 2  Entrance Stairs - Rails: None  Bathroom Shower/Tub: Tub/Shower unit  Bathroom Equipment: Tub transfer bench  ADL Assistance: Independent  Homemaking Assistance: Independent  Homemaking Responsibilities: Yes  Ambulation Assistance: Independent  Transfer Assistance: Independent  Active : Yes  Occupation: Retired  Type of Occupation: Nursing assistant  Leisure & Hobbies: Reading bible, puzzles, being outdoors  IADL Comments: splits homemaking tasks with sister in law and daughter      Goals  Short Term Goals  Time Frame for Short term goals: 1 week  Short Term Goal 1: Supervision with bathing hygiene. Short Term Goal 2: Supervision with clothing management/hygiene for toileting. Short Term Goal 3: Supervision with toilet transfers. Short Term Goal 4: Supervision LB dressing, AE PRN. Short Term Goal 5: Supervision with donning/doffing socks and shoes, AE PRN. Additional Goals?: Yes  Short Term Goal 6: Patient will complete 1-2 handed static standing task for 5 minutes, requiring Supervision.   Long Term Goals  Time Frame for Long term goals : 2 weeks  Long Term Goal 1: Modified independent with bathing hygiene. Long Term Goal 2: Modified independent with toileting and toilet transfers. Long Term Goal 3: Modified independent with overall dressing. Long Term Goal 4: Modified independent with ambulatory homemaking tasks. Long Term Goal 5: Patient verbalize DME needs. Patient Goals   Patient goals : Return home independently.        Therapy Time   Individual Concurrent Group Co-treatment   Time In 1330         Time Out 1400         Minutes 30         Timed Code Treatment Minutes: Richard Hartley 95, OT

## 2022-04-20 NOTE — PROGRESS NOTES
Comprehensive Nutrition Assessment    Type and Reason for Visit:  Initial    Nutrition Recommendations/Plan:   1. Continue current diet at this time and monitor for additional nutrition intervention needs. Malnutrition Assessment:  Malnutrition Status: At risk for malnutrition (Comment) (04/20/22 1313)    Context:  Acute Illness     Findings of the 6 clinical characteristics of malnutrition:  Energy Intake:  Mild decrease in energy intake (Comment)  Weight Loss:  No significant weight loss     Body Fat Loss:  No significant body fat loss     Muscle Mass Loss:  No significant muscle mass loss    Fluid Accumulation:  No significant fluid accumulation     Strength:  Not Performed    Nutrition Assessment:    Following for new admission to inpatient rehab. Pt at risk for nutrition compromise AEB fair po intake (25-50% meals) and reported chronic poor appetite. Wt hx stable X 7 months. Pt provided few preferences, will update. Continue current diet. Nutrition Related Findings:      Wound Type: Surgical Incision       Current Nutrition Intake & Therapies:    Average Meal Intake: 26-50%  Average Supplements Intake: None Ordered  ADULT DIET; Regular    Anthropometric Measures:  Height: 5' 5\" (165.1 cm)  Ideal Body Weight (IBW): 125 lbs (57 kg)    Admission Body Weight: 150 lb (68 kg) (Bed Scale)  Current Body Weight: 150 lb (68 kg), 120 % IBW. Weight Source: Bed Scale  Current BMI (kg/m2): 25  Usual Body Weight: 148 lb (67.1 kg) (9/2021)  % Weight Change (Calculated): 1.4  BMI Categories: Normal Weight (BMI 18.5-24. 9)    Estimated Daily Nutrient Needs:  Energy Requirements Based On: Kcal/kg  Weight Used for Energy Requirements: Current  Energy (kcal/day): 6207-9137 kcals/day (25-30 kcals/kg)  Weight Used for Protein Requirements: Current (1.0-1.4 g/kg)  Protein (g/day): 68-95 g/pro/day (1.0-1.4 g/kg)  Method Used for Fluid Requirements: 1 ml/kcal  Fluid (ml/day): 1798-9948 mL/fluid/day    Nutrition Diagnosis: · Inadequate oral intake related to increase demand for energy/nutrients as evidenced by wounds,poor intake prior to admission,intake 26-50%    Nutrition Interventions:   Food and/or Nutrient Delivery: Continue Current Diet  Nutrition Education/Counseling: Education not indicated  Coordination of Nutrition Care: Continue to monitor while inpatient    Goals:  Goals: PO intake 50% or greater,by next RD assessment    Nutrition Monitoring and Evaluation:   Behavioral-Environmental Outcomes: None Identified  Food/Nutrient Intake Outcomes: Food and Nutrient Intake  Physical Signs/Symptoms Outcomes: Biochemical Data,Nutrition Focused Physical Findings,Skin,Weight    Discharge Planning:    No discharge needs at this time     Zachery Esqueda MS, RD, LD  Contact: [phone]

## 2022-04-20 NOTE — PROGRESS NOTES
Physical Therapy      Name:  Mingo Carroll  MRN: [id number]  Date of service: 4/20/2022 04/20/22 1300   Restrictions/Precautions   Restrictions/Precautions Fall Risk;Surgical Protocols   Required Braces or Orthoses   Spinal Lumbar Corset   Position Activity Restriction   Spinal Precautions No Bending; No Lifting; No Twisting   General   Additional Pertinent Hx Anxiety, Depression, CKD, Tobacco Abuse, COPD, Constipation, GERD, DDD, Chronic Back Pain, Osteoporosis, Dyslipidemia, Neuropathy   Diagnosis Post 3 Stage Lumbar Fusion L4-S1   Subjective   Subjective pt supine in the bed and asks for therapy to be in room d/t increased pain. Pain Assessment   Pain Level 8   Pain Location Back;Leg   Pain Orientation Lower;Left;Right   Pain Descriptors Aching; Shooting   Functional Pain Assessment Prevents or interferes some active activities and ADLs   Pain Type Surgical pain   Pain Frequency Continuous   Pain Onset On-going   Non-Pharmaceutical Pain Intervention(s) Ambulation/Increased Activity;Repositioned;Rest;Exercise   Exercises   Straight Leg Raise 10x BLE   Quad Sets 2x12 BLE    Heelslides 2x12 BLE   Hip Abduction Hooklying 2x15   Comments Supine; AROM D/T Pain   Conditions Requiring Skilled Therapeutic Intervention   Body Structures, Functions, Activity Limitations Requiring Skilled Therapeutic Intervention Decreased functional mobility ; Decreased tolerance to work activity; Decreased strength;Decreased endurance;Decreased balance; Increased pain;Decreased posture   Assessment pt requested for therapy to be in room d/t increased back pain. pt only able to tolerate 25min of supine exercises before asking PT to leave. Activity Tolerance   Activity Tolerance Patient limited by pain   Safety Devices   Type of Devices All fall risk precautions in place; Bed alarm in place;Call light within reach; Patient at risk for falls; Left in bed       Electronically signed by NAYELY Duarte on 4/20/2022 at 1:34 PM

## 2022-04-20 NOTE — PLAN OF CARE
Problem: Pain:  Description: Pain management should include both nonpharmacologic and pharmacologic interventions. Goal: Pain level will decrease  Description: Pain level will decrease  4/20/2022 1042 by Domo Vega RN  Outcome: Progressing  4/20/2022 0155 by Francisco Ramos LPN  Outcome: Ongoing     Problem: Pain:  Description: Pain management should include both nonpharmacologic and pharmacologic interventions.   Goal: Control of acute pain  Description: Control of acute pain  4/20/2022 1042 by Domo Vega RN  Outcome: Progressing  4/20/2022 0155 by Francisco Ramos LPN  Outcome: Ongoing   Medicate as needed

## 2022-04-20 NOTE — PROGRESS NOTES
Occupational Therapy  Facility/Department: [address] Initial Assessment    Name: Itzel Medley  : 1950  MRN: [id number]  Date of Service: 2022    Discharge Recommendations:  Home with Home health OT          Patient Diagnosis(es): There were no encounter diagnoses. Past Medical History:  has a past medical history of Arthritis, Bilateral low back pain without sciatica, Cancer (HCC), COPD (chronic obstructive pulmonary disease) ([facility] [address] Hx of blood clots, Hyperlipidemia, Pain in both knees, Thyroid disease, and Urinary incontinence. Past Surgical History:  has a past surgical history that includes Hysterectomy; joint replacement; lobectomy; Cholecystectomy, laparoscopic; Skin cancer excision; and cyst incision and drainage. Treatment Diagnosis: Status post 3 stage lumbar fusion      Assessment   Performance deficits / Impairments: Decreased functional mobility ; Decreased ADL status; Decreased strength;Decreased endurance;Decreased balance;Decreased high-level IADLs  Assessment: Pt requires increased assistance with ADLs due to pain in back. She requires skilled OT to address the above deficits and return the pt home independently.   Treatment Diagnosis: Status post 3 stage lumbar fusion  Prognosis: Good  Decision Making: Low Complexity  Activity Tolerance  Activity Tolerance: Patient limited by pain        Plan   Plan  Current Treatment Recommendations: Strengthening,Balance training,Functional mobility training,Endurance training,Equipment evaluation, education, & procurement,Patient/Caregiver education & training,Safety education & training,Self-Care / ADL,Home management training     Restrictions  Restrictions/Precautions  Restrictions/Precautions: Fall Risk,Surgical Protocols  Required Braces or Orthoses?: Yes  Required Braces or Orthoses  Spinal: Lumbar Corset  Position Activity Restriction  Spinal Precautions: No Bending,No Lifting,No Twisting    Subjective General  Chart Reviewed: Yes  Patient assessed for rehabilitation services?: Yes  Pain Assessment  Pain Level: 9  Patient's Stated Pain Goal: 0 - No pain  Pain Location: Back  Pain Orientation: Lower  Pain Descriptors: Aching;Stabbing  Functional Pain Assessment: Prevents or interferes some active activities and ADLs  Pain Type: Acute pain  Pain Frequency: Continuous  Pain Onset: On-going  Non-Pharmaceutical Pain Intervention(s): Ambulation/Increased Activity;Repositioned;Rest;Exercise  Vital Signs  Resp: 18  Height and Weight  Height: 5' 5\" (165.1 cm)  Social/Functional History  Social/Functional History  Lives With: Family (Daughter and sister in law)  Type of Home: House  Home Layout: Two level,Able to Live on Main level with bedroom/bathroom  Home Access: Stairs to enter with rails  Entrance Stairs - Number of Steps: 2  Entrance Stairs - Rails: None  Bathroom Shower/Tub: Tub/Shower unit  Bathroom Equipment: Tub transfer bench  ADL Assistance: Independent  Homemaking Assistance: Independent  Homemaking Responsibilities: Yes  Ambulation Assistance: Independent  Transfer Assistance: Independent  Active : Yes  Occupation: Retired  Type of Occupation: Nursing assistant  Leisure & Hobbies: Reading bible, puzzles, being outdoors  IADL Comments: splits homemaking tasks with sister in law and daughter       Objective              Balance  Sitting Balance: Supervision  Standing Balance: Contact guard assistance  Functional Mobility  Functional - Mobility Device: Rolling Walker  Activity: To/from bathroom  Assist Level: Contact guard assistance        Activity Tolerance  Activity Tolerance: Patient limited by pain     Transfers  Sit to stand: Contact guard assistance  Stand to sit: Contact guard assistance     Cognition  Overall Cognitive Status: WFL       LUE PROM (degrees)  LUE PROM: WFL  LUE AROM (degrees)  LUE AROM : WFL         OutComes Score    Eating  Assistance Needed: Setup or clean-up assistance  Discharge Goal: Independent    Oral Hygiene  Assistance Needed: Setup or clean-up assistance  Discharge Goal: Independent     Toileting Hygiene  Assistance Needed: Supervision or touching assistance  CARE Score: 4  Discharge Goal: Independent     Shower/Bathe Self  Assistance Needed: Partial/moderate assistance  Discharge Goal: Independent     Upper Body Dressing  Assistance Needed: Setup or clean-up assistance  Discharge Goal: Independent     Lower Body Dressing  Assistance Needed: Supervision or touching assistance  Discharge Goal: Independent     Putting On/Taking Off Footwear  Assistance Needed: Substantial/maximal assistance  Discharge Goal: Independent     Toilet Transfer  Assistance Needed: Supervision or touching assistance  CARE Score: 4  Discharge Goal: Independent          Goals  Short Term Goals  Time Frame for Short term goals: 1 week  Short Term Goal 1: Supervision with bathing hygiene. Short Term Goal 2: Supervision with clothing management/hygiene for toileting. Short Term Goal 3: Supervision with toilet transfers. Short Term Goal 4: Supervision LB dressing, AE PRN. Short Term Goal 5: Supervision with donning/doffing socks and shoes, AE PRN. Additional Goals?: Yes  Short Term Goal 6: Patient will complete 1-2 handed static standing task for 5 minutes, requiring Supervision. Long Term Goals  Time Frame for Long term goals : 2 weeks  Long Term Goal 1: Modified independent with bathing hygiene. Long Term Goal 2: Modified independent with toileting and toilet transfers. Long Term Goal 3: Modified independent with overall dressing. Long Term Goal 4: Modified independent with ambulatory homemaking tasks. Long Term Goal 5: Patient verbalize DME needs. Patient Goals   Patient goals : Return home independently.        Therapy Time   Individual Concurrent Group Co-treatment   Time In 1100         Time Out 1200         Minutes 60         Timed Code Treatment Minutes: [address] [facility] Drake Piper, OT

## 2022-04-20 NOTE — PROGRESS NOTES
Physical Therapy Evaluation Note  DATE:  2022  NAME:  Roshni Marshall  :  1950  (71 y.o.,female)  MRN:  [id number]    HEIGHT:  Height: 5' 5\" (165.1 cm)  WEIGHT:  Weight: 150 lb 3.2 oz (68.1 kg)    PATIENT DIAGNOSIS(ES):    Diagnosis: Post 3 Stage Lumbar Fusion L4-S1    Additional Pertinent Hx: Anxiety, Depression, CKD, Tobacco Abuse, COPD, Constipation, GERD, DDD, Chronic Back Pain, Osteoporosis, Dyslipidemia, Neuropathy  RESTRICTIONS/PRECAUTIONS:    Restrictions/Precautions  Restrictions/Precautions: Fall Risk,Surgical Protocols  Required Braces or Orthoses?: Yes  Position Activity Restriction  Spinal Precautions: No Bending,No Lifting,No Twisting  OVERALL  ORIENTATION STATUS:  Overall Orientation Status: Within Normal Limits  PAIN:  Pain Level: 9  Pain Type: Surgical pain    Pain Location: Back,Leg     Pain Orientation: Lower,Left,Right      NEUROLOGICAL    STRENGTH  Strength RLE  Strength RLE: Exception  Comment: Ankle 4/5, Knee 3+/5, Hip 3/5, Limited d/t pain  Strength LLE  Strength LLE: Exception  Comment: Ankle 4/5, Knee 3+/5, Hip 3/5, Limited d/t pain  ROM  AROM RLE (degrees)  RLE AROM: WFL  AROM LLE (degrees)  LLE AROM : WFL  POSTURE/BALANCE  Balance  Sitting - Static: Good  Sitting - Dynamic: Good  Standing - Static: Good  Standing - Dynamic: Fair       ACTIVITY TOLERANCE  Activity Tolerance  Activity Tolerance: Patient limited by pain      BED MOBILITY  Bed Mobility  Rolling: Modified independent  Supine to Sit: Contact guard assistance  Sit to Supine: Contact guard assistance  Scooting: Stand by assistance (Use of Bed Rails)  Comment: Use of Bed Rails; Head of Bed Flat; Log Roll Technique;  More Painful to Lay on L Side  TRANSFERS  Sit to Stand: Contact guard assistance      Bed to Chair: Contact guard assistance  Car Transfer: Unable to assess     WHEELCHAIR PROPULSION 1  Propulsion 1  Propulsion: Manual  Level: Level Tile  Method: CARMENCITA GLORIA  Level of Assistance: Contact guard assistance  Description/ Details: Good [place], Educated on Propelling Technique  Distance: 200FT [address]  Ambulation  Surface: level tile  Device: Rolling Walker  Other Apparatus: Wheelchair follow  Assistance: Contact guard assistance  Gait Deviations: Slow [place],Decreased step length,Decreased step height  Distance: 25FT   Comments: reciprocal gait pattern, increased pronation, pt unable to tolerate more ambulation due to pain  More Ambulation?: Yes    GOALS:  Short Term Goals  Time Frame for Short term goals: 1 Week  Short term goal 1: Supervision Bed Mobility   Short term goal 2: Supervision With Transfers from Surface to Surface   Short term goal 3: Supervision With Amb 100FT With AD   Short term goal 4: Supervision WC Propulsion 250FT  Short term goal 5: CGA Climbing 5 Stairs With Handrail    Long Term Goals  Time Frame for Long term goals : 2 Weeks  Long term goal 1: Independent Bed Mobility   Long term goal 2: Independent With Transfers From Surface to Surface   Long term goal 3: Independent With Amb 250FT With AD   Long term goal 4: Independent With WC Propulsion 500FT  Long term goal 5: Independent With Climbing 5 Stairs With Handrail  HOME LIVING:     Type of Home: House  Home Layout: Two level,Able to Live on Main level with bedroom/bathroom  Home Access: Stairs to enter with rails  Entrance Stairs - Number of Steps: 2  Entrance Stairs - Rails: None  ASSESSMENT (IMPAIRMENTS/BARRIERS): Body Structures, Functions, Activity Limitations Requiring Skilled Therapeutic Intervention: Decreased functional mobility ,Decreased tolerance to work activity,Decreased strength,Decreased endurance,Decreased balance,Increased pain,Decreased posture  Assessment: pt limited by pain during evaluation as she could only tolerate small amounts of activity with frequent rest breaks. pt limited in strength in BLE with L worse than R. pt required CGA for all activities.  pt tolerated AROM LE Exercises well with only an increase in pain during LAQ on the L side. Activity Tolerance: Patient limited by pain     PLAN:  Plan  Plan: 5-7 times per week  Plan weeks: 2 Weeks  Current Treatment Recommendations: Strengthening,ROM,Balance training,Functional mobility training,Transfer training,Endurance training,Wheelchair mobility training,Gait training,Stair training,Pain management,Home exercise program,Safety education & training,Patient/Caregiver education & training,Equipment evaluation, education, & procurement,Therapeutic activities,Return to work related activity  Discharge Recommendations: Home with Werner Peraza [address] REASONABLY EXPECTED TO ACTIVELY PARTICIPATE IN AT LEAST 3 HOURS OF INTENSIVE THERAPY PER DAY AT LEAST 5 DAYS PER WEEK, AND BE EXPECTED TO MAKE MEASURABLE IMPROVEMENT THAT WILL BE OF PRACTICAL VALUE TO IMPROVE THE PATIENT'S FUNCTIONAL CAPACITY OR ADAPTATION TO IMPAIRMENTS.    PATIENT GOAL FOR REHAB:  RETURN TO PRIOR LEVEL OF FUNCTION       IRF/AYDEE  Roll Left and Right  Assistance Needed: Independent  Discharge Goal: Independent    Sit to Lying  Assistance Needed: Supervision or touching assistance  Discharge Goal: Independent    Lying to Sitting on Side of Bed  Assistance Needed: Supervision or touching assistance  Discharge Goal: Independent    Sit to Stand  Assistance Needed: Supervision or touching assistance  Discharge Goal: Independent    Chair/Bed-to-Chair Transfer  Assistance Needed: Supervision or touching assistance  Discharge Goal: Independent    Car Transfer  Reason if not Attempted: Not attempted due to medical condition or safety concerns  CARE Score: 88  Discharge Goal: Independent    Walk 10 Feet  Assistance Needed: Supervision or touching assistance  Discharge Goal: Independent    Walk 50 Feet with Two Turns  Reason if not Attempted: Not attempted due to medical condition or safety concerns  CARE Score: 88  Discharge Goal: Independent    Walk 150 Feet  Reason if not Attempted: Not attempted due to medical condition or safety concerns  CARE Score: 88  Discharge Goal: Independent    Walking 10 Feet on Uneven Surfaces  Reason if not Attempted: Not attempted due to medical condition or safety concerns  CARE Score: 88  Discharge Goal: Independent    1 Step (Curb)  Reason if not Attempted: Not attempted due to medical condition or safety concerns  CARE Score: 88  Discharge Goal: Independent    4 Steps  Reason if not Attempted: Not attempted due to medical condition or safety concerns  CARE Score: 88  Discharge Goal: Independent    12 Steps  Reason if not Attempted: Not attempted due to medical condition or safety concerns  CARE Score: 88  Discharge Goal: Independent    Wheel 50 Feet with Two Turns  Assistance Needed: Supervision or touching assistance  Discharge Goal: Independent    Wheel 150 Feet  Assistance Needed: Supervision or touching assistance  Discharge Goal: Independent      LAST TREATMENT TIME  PT Individual Minutes  Time In: 0900  Time Out: 1000  Minutes: 60          Electronically signed by NAYELY Robertson on 4/20/2022 at 12:27 PM

## 2022-04-20 NOTE — THERAPY DISCHARGE NOTE
Acute Care - Occupational Therapy Discharge Summary  [facility]     Patient Name: Sammi Sorenson  : 1950  MRN: [id number]    Today's Date: 2022                 Admit Date: 2022        OT Recommendation and Plan    Visit Dx:    ICD-10-CM ICD-9-CM   1. Lumbar disc disease with radiculopathy  M51.16 722.10     724.4   2. Decreased activities of daily living (ADL)  Z78.9 V49.89   3. Impaired mobility  Z74.09 799.89                OT Rehab Goals     Row Name 22 0800             Transfer Goal 1 (OT)    Activity/Assistive Device (Transfer Goal 1, OT) sit-to-stand/stand-to-sit;bed-to-chair/chair-to-bed;toilet  -TS      [place] Level/Cues Needed (Transfer Goal 1, OT) supervision required  -TS      Time Frame (Transfer Goal 1, OT) long term goal (LTG);10 days  -TS      Progress/Outcome (Transfer Goal 1, OT) goal not met  -TS              Dressing Goal 1 (OT)    Activity/Device (Dressing Goal 1, OT) upper body dressing;lower body dressing  -TS      [place]/Cues Needed (Dressing Goal 1, OT) minimum assist (75% or more patient effort)  -TS      Time Frame (Dressing Goal 1, OT) long term goal (LTG);10 days  -TS      Strategies/Barriers (Dressing Goal 1, OT) to include LSO  -TS      Progress/Outcome (Dressing Goal 1, OT) goal not met  -TS              Toileting Goal 1 (OT)    Activity/Device (Toileting Goal 1, OT) toileting skills, all  -TS      [place] Level/Cues Needed (Toileting Goal 1, OT) supervision required  -TS      Time Frame (Toileting Goal 1, OT) long term goal (LTG);10 days  -TS      Progress/Outcome (Toileting Goal 1, OT) goal not met  -TS            User Key  (r) = Recorded By, (t) = Taken By, (c) = Cosigned By    Initials Name Provider Type Discipline    TS Latasha Zuniga COTA Occupational Therapist Assistant OT                    Timed Therapy Charges  Total Units: 2    Charges  Total Units: 2    Procedure Name Documented Minutes Units Code    HC OT SELF  CARE/MGMT/TRAIN EA 15 MIN 23  2    56368 (CPT®)               Documented Minutes  Total Minutes: 23    Therapy Provided Minutes    27445 - OT Self Care/Mgmt Minutes 23                    OT Discharge Summary  Anticipated Discharge Disposition (OT): inpatient rehabilitation facility  Reason for Discharge: Discharge from facility  Outcomes Achieved: Refer to plan of care for updates on goals achieved  Discharge Destination: Inpatient rehabilitation facility      ALLEY Villela  4/20/2022

## 2022-04-20 NOTE — H&P
[facility]   History and Physical        Patient:   August Smalls  MR#:    [id number]  Account Number:                   [id number]      Room:    [address] [place] [address]   YOB: 1950  Date of Progress Note: 4/20/2022  Time of Note                           7:44 AM  Attending Physician:  Dawson Maria MD        Admitting diagnosis:Spinal stenosis, lumbar region without neurogenic claudication,Other intervertebral disc degeneration, thoracic region,Other intervertebral disc displacement, lumbar region    Secondary diagnoses:Unspecified inflammatory spondylopathy, lumbar region,Disorder of bone density and structure, unspecified,Other constipation,Acute posthemorrhagic anemia,Unspecified thoracic, thoracolumbar and lumbosacral intervertebral disc disorder,Lumbago with sciatica, left side,Lumbago with sciatica, right side,Nicotine dependence, cigarettes, uncomplicated,Gastro-esophageal reflux disease without esophagitis,Chronic kidney disease, unspecified,Hereditary and idiopathic neuropathy, unspecified,Hyperlipidemia, unspecified,Chronic obstructive pulmonary disease, unspecified    CHIEF COMPLAINT: Status post 3 stage lumbar fusion      HISTORY OF PRESENT ILLNESS:   This 66 y.o. female  with history of anxiety, depression, CKD, tobacco abuse, COPD, constipation, GERD, DDD, chronic back pain, osteoporosis, dyslipidemia and neuropathy. She was seen as outpatient by orthopedic spine surgeon Dr. Loida Mahan d/t increasing back pain, bilateral buttock, thigh, and leg radiculopathy left worse than right. MRI done showed multilevel thoracolumbar degenerative disk disease severe L4-S1 and disc herniation at L5-S1 with stenosis with facet arthropathy, and neurogenic claudication. Dr. Loida Mahan recommended a 3 stage repair.  Patient was in agreement and was admitted to [facility] on 4/11/22 and underwent stage 1 anterior discectomy decompression with bilateral neural foraminotomy L5-S1, and anterior lumbar interbody fusion of L5-S1. She tolerated the procedure well. She returned to surgery on 4/13/22 for stage 2 right lateral lumbar interbody fusion L4-5 on 4/13/22. She tolerated this procedure well. She returned to surgery for the stage 3 Posterior spinal fusion L4-S1 on 4/15/22. She tolerated this procedure well. She continues to c/o pain at 8/10 being controlled with PO medications. She is participating in both PT/OT. She is felt to need a stay on Rehab to work towards her goal of returning home with assist of her family. She is now felt ready to start the Rehab program.  GERD    REVIEW OF SYSTEMS:  Constitutional - No fever or chills. No diaphoresis or significant fatigue. HENT -  No tinnitus or significant hearing loss. Eyes - no sudden vision change or eye pain  Respiratory - no significant shortness of breath or cough  Cardiovascular - no chest pain No palpitations or significant leg swelling  Gastrointestinal - no abdominal swelling or pain. Genitourinary - No difficulty urinating, dysuria  Musculoskeletal  + back pain no myalgia. Skin - no color change or rash  Neurologic - No seizures. No lateralizing weakness. Hematologic - no easy bruising or excessive bleeding. Psychiatric - no severe anxiety or nervousness. All other review of systems are negative.       Past Medical History:      Diagnosis Date    Arthritis     Bilateral low back pain without sciatica 12/22/2015    Cancer ([facility] [address]     Hx of blood clots     Hyperlipidemia     Pain in both knees 12/22/2015    Thyroid disease     Urinary incontinence        Past Surgical History:      Procedure Laterality Date    CHOLECYSTECTOMY, LAPAROSCOPIC      CYST INCISION AND DRAINAGE      left Cheek    HYSTERECTOMY      JOINT REPLACEMENT      LOBECTOMY      RIGHT PARTIAL    SKIN CANCER EXCISION         Medications in Hospital:      Current Facility-Administered Medications:     tiZANidine (ZANAFLEX) tablet 6 mg, 6 mg, Oral, Q4H PRN, Linda Reyes MD  [facility] ALPRAZolam (XANAX) tablet 0.5 mg, 0.5 mg, Oral, TID PRN, Trey Mcgill MD    dicyclomine (BENTYL) capsule 10 mg, 10 mg, Oral, TID PRN, Trey Mcgill MD    escitalopram (LEXAPRO) tablet 10 mg, 10 mg, Oral, Daily, Trey Mcgill MD    levothyroxine (SYNTHROID) tablet 100 mcg, 100 mcg, Oral, Daily, Trey Mcgill MD    linaclotide (LINZESS) capsule 290 mcg, 290 mcg, Oral, Daily, Trey Mcgill MD    nystatin (MYCOSTATIN) 931028 UNIT/ML suspension 500,000 Units, 500,000 Units, Oral, 4x Daily, Trey Mcgill MD    oxyCODONE-acetaminophen (PERCOCET)  MG per tablet 1 tablet, 1 tablet, Oral, Q4H PRN, Trey Mcgill MD, 1 tablet at 04/20/22 0311    pantoprazole (PROTONIX) tablet 40 mg, 40 mg, Oral, Daily, Trey Mcgill MD    pregabalin (LYRICA) capsule 75 mg, 75 mg, Oral, Q12H, Trey Mcgill MD, 75 mg at 04/20/22 0421    promethazine (PHENERGAN) tablet 25 mg, 25 mg, Oral, BID PRN, Trey Mcgill MD    sucralfate (CARAFATE) tablet 1 g, 1 g, Oral, TID PRN, Trey Mcgill MD    acetaminophen (TYLENOL) tablet 650 mg, 650 mg, Oral, Q4H PRN, Trey Mcgill MD    polyethylene glycol (GLYCOLAX) packet 17 g, 17 g, Oral, Daily PRN, Trey Mcgill MD    Allergies:  Sulfamethoxazole-trimethoprim, Codeine, Erythromycin, Pcn [penicillins], Talwin [pentazocine], and Sulfa antibiotics    Social History:   TOBACCO:   reports that she has been smoking cigarettes. She has a 15.00 pack-year smoking history. She has never used smokeless tobacco.  ETOH:   reports no history of alcohol use. Family History:       Problem Relation Age of Onset   Maryann Her Cancer Mother     Hypertension Mother     Heart Attack Father            Physical Exam:    Vitals: BP (!) 153/76   Pulse 67   Temp 97.2 °F (36.2 °C) (Temporal)   Resp 16   Ht 5' 5\" (1.651 m)   Wt 150 lb 3.2 oz (68.1 kg)   SpO2 97%   BMI 24.99 kg/m²     Constitutional - well developed, well nourished.     Eyes - conjunctiva normal.  Pupils not tested  Ear, nose, throat -hearing intact to finger rub No scars, masses, or lesions over external nose or ears, no atrophy of tongue  Neck-symmetric, no masses noted, no jugular vein distension  Respiration- chest wall appears symmetric, good expansion,   normal effort without use of accessory muscles  Musculoskeletal - no significant wasting of muscles noted, no bony deformities  Extremities-no clubbing, cyanosis or edema  Skin - warm, dry, and intact. No rash, erythema, or pallor. Psychiatric - mood, affect, and behavior appear normal.      Neurological exam  Awake, alert, fluent oriented x 3 appropriate affect  Attention and concentration appear appropriate  Recent and remote memory appears unremarkable  Speech normal without dysarthria  No clear issues with language of fund of knowledge    Cranial Nerve Exam   CN II- Visual fields grossly unremarkable  CN III, IV,VI-EOMI, No nystagmus, conjugate eye movements, no ptosis  CN V-sensation intact to LT over face  CN VII-no facial assymetry  CN VIII-Hearing intact to finger rub  CN IX and X- Palate not tested  CN XI-not test shoulder shrug  CN XII-Tongue midline with no fasciculations or fibrillations    Motor Exam  Antigravity throughout upper and lower extremities bilaterally, no cogwheeling, normal tone    Sensory Exam  Sensation intact to light touch and temperature upper and lower extremities bilaterally    Reflexes   Not tested    Tremors- no tremors in hands or head noted    Gait  Not tested    Coordination  Finger to nose-unremarkable        CBC:   Recent Labs     04/20/22  0553   WBC 6.6   HGB 9.9*          BMP:    Recent Labs     04/20/22  0553      K 4.0      CO2 28   BUN 9   CREATININE 0.6   GLUCOSE 94       Hepatic:   Recent Labs     04/20/22  0553   AST 13   ALT 6   BILITOT 0.4   ALKPHOS 62       Lipids: No results for input(s): CHOL, HDL in the last 72 hours.     Invalid input(s): LDLCALCU    INR:   Recent Labs     04/19/22  1643   INR 1.09 Assessment and Plan     1. Status post 3 stage lumbar fusion-pain control/mobilization  2. Mood disorder-Lexapro/Xanax as needed  3. Hypothyroidism-Synthroid  4. GI/GERD- bowel regimen/PPI  5. Back pain-Lyrica/Percocet as needed/Zanaflex as needed  6. COPD/history of smoking-monitor  7. PT/OT          Patient's functional assessment  Prior to hospitalization the patient was continent of bowel and continent of bladder    Current therapy  Requires PT, OT and/or speech therapy    Anticipated discharge approximately 14 days    Functional assessment  All notes from reehab data were reviewed regarding the patient's functional status. Anticipated discharge setting  Home with home care    No clear barriers to discharge    The patient appears to be an appropriate candidate for inpatient rehabilitation    Sufficiently stable: Patient's condition is sufficiently stable at the time of admission to allow the patient to actively participate in an intensive rehabilitation program    Close medical supervision: A rehabilitation physician, or other licensed treating physician with specialized training and experience in inpatient rehabilitation, will conduct face-to-face visits with the patient a minimum of at least 3 days per week throughout the patient's stay    This patient requires close medical supervision for the active management of the ongoing conditions and potential complications stated in the admission note    Intensive rehabilitation nursing: The patient demonstrates the need for 24-hour rehabilitation nursing care for active management of the multiple medical issues documented in the admission note    Appropriate therapy needed: The patient requires the active and ongoing therapeutic intervention of at least 2 therapeutic disciplines, one of which must be physical or occupational therapy and/or speech therapy    Intensive therapy:  The patient requires and is reasonably expected to actively participate in at least 3 hours of therapy per day at least 5 days per week, and expected to make measurable improvements that will be of practical value to improve the patient's functional capacity or adaptation to impairments. In addition, therapy treatments will begin within 36 hours from midnight of the day of the patient's admission to the inpatient rehabilitation facility    Expected duration and frequency therapy: 180 minutes per day, 5 days per week    Interdisciplinary team: The patient demonstrates the need for an interdisciplinary team for active management of the following medical issues including ataxia, motor planning, balance, disease management, elimination, endurance, family training, education, independent ADLs, pain management, precautions, range of motion, safety, strength, and transfers    I have reviewed the preadmission screening documents and concur with the findings. I believe the patient meets criteria and is sufficiently stable to allow participation in the program. This requires an intensive level of therapy, close medical supervision, and an interdisciplinary team approach provided through an individualized plan of care. I approve admitting this patient for an intensive inpatient rehabilitation program.    Please feel free to call with any questions   [phone] (cell phone)    Dr Louisa Wallis certified in Neurology  Board Certified in Clinical Neurophysiology  Fellowship Trained in [place] [address] Neurophysiology      EMR Dragon/transcription disclaimer:Significant part of this  encounter note is electronic transcription/translation of spoken language to printed text. The electronic translation of spoken language may be erroneous, or at times, nonsensical words or phrases may be inadvertently transcribed.  Although I have reviewed the note for such errors, some may still exist.

## 2022-04-21 ENCOUNTER — APPOINTMENT (OUTPATIENT)
Dept: GENERAL RADIOLOGY | Age: 72
DRG: 561 | End: 2022-04-21
Attending: PSYCHIATRY & NEUROLOGY
Payer: MEDICARE

## 2022-04-21 LAB
ALBUMIN SERPL-MCNC: 2.9 G/DL (ref 3.5–5.2)
ALP BLD-CCNC: 52 U/L (ref 35–104)
ALT SERPL-CCNC: 7 U/L (ref 5–33)
ANION GAP SERPL CALCULATED.3IONS-SCNC: 10 MMOL/L (ref 7–19)
AST SERPL-CCNC: 10 U/L (ref 5–32)
BASOPHILS ABSOLUTE: 0 K/UL (ref 0–0.2)
BASOPHILS RELATIVE PERCENT: 0.3 % (ref 0–1)
BILIRUB SERPL-MCNC: 0.3 MG/DL (ref 0.2–1.2)
BILIRUBIN URINE: NEGATIVE
BLOOD, URINE: NEGATIVE
BUN BLDV-MCNC: 8 MG/DL (ref 8–23)
CALCIUM SERPL-MCNC: 8.3 MG/DL (ref 8.8–10.2)
CHLORIDE BLD-SCNC: 102 MMOL/L (ref 98–111)
CLARITY: CLEAR
CO2: 27 MMOL/L (ref 22–29)
COLOR: YELLOW
CREAT SERPL-MCNC: 0.7 MG/DL (ref 0.5–0.9)
EOSINOPHILS ABSOLUTE: 0.4 K/UL (ref 0–0.6)
EOSINOPHILS RELATIVE PERCENT: 6.3 % (ref 0–5)
GFR AFRICAN AMERICAN: >59
GFR NON-AFRICAN AMERICAN: >60
GLUCOSE BLD-MCNC: 95 MG/DL (ref 74–109)
GLUCOSE URINE: NEGATIVE MG/DL
HCT VFR BLD CALC: 29.2 % (ref 37–47)
HEMOGLOBIN: 9.2 G/DL (ref 12–16)
IMMATURE GRANULOCYTES #: 0 K/UL
KETONES, URINE: NEGATIVE MG/DL
LEUKOCYTE ESTERASE, URINE: NEGATIVE
LYMPHOCYTES ABSOLUTE: 2 K/UL (ref 1.1–4.5)
LYMPHOCYTES RELATIVE PERCENT: 34.3 % (ref 20–40)
MCH RBC QN AUTO: 29 PG (ref 27–31)
MCHC RBC AUTO-ENTMCNC: 31.5 G/DL (ref 33–37)
MCV RBC AUTO: 92.1 FL (ref 81–99)
MONOCYTES ABSOLUTE: 0.7 K/UL (ref 0–0.9)
MONOCYTES RELATIVE PERCENT: 11.9 % (ref 0–10)
NEUTROPHILS ABSOLUTE: 2.7 K/UL (ref 1.5–7.5)
NEUTROPHILS RELATIVE PERCENT: 46.5 % (ref 50–65)
NITRITE, URINE: NEGATIVE
PDW BLD-RTO: 13.6 % (ref 11.5–14.5)
PH UA: 7.5 (ref 5–8)
PLATELET # BLD: 279 K/UL (ref 130–400)
PMV BLD AUTO: 11.5 FL (ref 9.4–12.3)
POTASSIUM REFLEX MAGNESIUM: 4 MMOL/L (ref 3.5–5)
PROTEIN UA: NEGATIVE MG/DL
RBC # BLD: 3.17 M/UL (ref 4.2–5.4)
SODIUM BLD-SCNC: 139 MMOL/L (ref 136–145)
SPECIFIC GRAVITY UA: 1 (ref 1–1.03)
TOTAL PROTEIN: 5.5 G/DL (ref 6.6–8.7)
UROBILINOGEN, URINE: 0.2 E.U./DL
WBC # BLD: 5.9 K/UL (ref 4.8–10.8)

## 2022-04-21 PROCEDURE — 72100 X-RAY EXAM L-S SPINE 2/3 VWS: CPT

## 2022-04-21 PROCEDURE — 80053 COMPREHEN METABOLIC PANEL: CPT

## 2022-04-21 PROCEDURE — 36415 COLL VENOUS BLD VENIPUNCTURE: CPT

## 2022-04-21 PROCEDURE — 85025 COMPLETE CBC W/AUTO DIFF WBC: CPT

## 2022-04-21 PROCEDURE — 6370000000 HC RX 637 (ALT 250 FOR IP): Performed by: PSYCHIATRY & NEUROLOGY

## 2022-04-21 PROCEDURE — 81003 URINALYSIS AUTO W/O SCOPE: CPT

## 2022-04-21 PROCEDURE — 99233 SBSQ HOSP IP/OBS HIGH 50: CPT | Performed by: PSYCHIATRY & NEUROLOGY

## 2022-04-21 PROCEDURE — 1180000000 HC REHAB R&B

## 2022-04-21 RX ADMIN — OXYCODONE AND ACETAMINOPHEN 1 TABLET: 10; 325 TABLET ORAL at 13:31

## 2022-04-21 RX ADMIN — ALPRAZOLAM 0.5 MG: 0.5 TABLET ORAL at 13:34

## 2022-04-21 RX ADMIN — NYSTATIN 500000 UNITS: 100000 SUSPENSION ORAL at 08:02

## 2022-04-21 RX ADMIN — OXYCODONE AND ACETAMINOPHEN 1 TABLET: 10; 325 TABLET ORAL at 09:24

## 2022-04-21 RX ADMIN — ESCITALOPRAM OXALATE 10 MG: 10 TABLET ORAL at 08:02

## 2022-04-21 RX ADMIN — TIZANIDINE 6 MG: 4 TABLET ORAL at 20:07

## 2022-04-21 RX ADMIN — OXYCODONE AND ACETAMINOPHEN 1 TABLET: 10; 325 TABLET ORAL at 02:43

## 2022-04-21 RX ADMIN — TIZANIDINE 6 MG: 4 TABLET ORAL at 02:44

## 2022-04-21 RX ADMIN — TIZANIDINE 6 MG: 4 TABLET ORAL at 09:26

## 2022-04-21 RX ADMIN — OXYCODONE AND ACETAMINOPHEN 1 TABLET: 10; 325 TABLET ORAL at 20:07

## 2022-04-21 RX ADMIN — PANTOPRAZOLE SODIUM 40 MG: 40 TABLET, DELAYED RELEASE ORAL at 08:02

## 2022-04-21 RX ADMIN — LINACLOTIDE 290 MCG: 145 CAPSULE, GELATIN COATED ORAL at 08:02

## 2022-04-21 RX ADMIN — TIZANIDINE 6 MG: 4 TABLET ORAL at 15:09

## 2022-04-21 RX ADMIN — PREGABALIN 75 MG: 75 CAPSULE ORAL at 16:29

## 2022-04-21 RX ADMIN — NYSTATIN 500000 UNITS: 100000 SUSPENSION ORAL at 13:31

## 2022-04-21 RX ADMIN — PREGABALIN 75 MG: 75 CAPSULE ORAL at 04:22

## 2022-04-21 RX ADMIN — LEVOTHYROXINE SODIUM 100 MCG: 100 TABLET ORAL at 08:02

## 2022-04-21 RX ADMIN — ALPRAZOLAM 0.5 MG: 0.5 TABLET ORAL at 08:06

## 2022-04-21 ASSESSMENT — PAIN DESCRIPTION - DESCRIPTORS
DESCRIPTORS: ACHING;DISCOMFORT;TENDER
DESCRIPTORS: ACHING;DISCOMFORT;THROBBING
DESCRIPTORS: ACHING;THROBBING
DESCRIPTORS: ACHING;SHARP
DESCRIPTORS: ACHING;THROBBING
DESCRIPTORS: ACHING;SHARP

## 2022-04-21 ASSESSMENT — PAIN - FUNCTIONAL ASSESSMENT
PAIN_FUNCTIONAL_ASSESSMENT: PREVENTS OR INTERFERES SOME ACTIVE ACTIVITIES AND ADLS

## 2022-04-21 ASSESSMENT — PAIN DESCRIPTION - ORIENTATION
ORIENTATION: LOWER
ORIENTATION: LEFT;RIGHT
ORIENTATION: RIGHT;LEFT

## 2022-04-21 ASSESSMENT — PAIN SCALES - GENERAL
PAINLEVEL_OUTOF10: 8
PAINLEVEL_OUTOF10: 10
PAINLEVEL_OUTOF10: 10
PAINLEVEL_OUTOF10: 9
PAINLEVEL_OUTOF10: 9
PAINLEVEL_OUTOF10: 10

## 2022-04-21 ASSESSMENT — PAIN DESCRIPTION - LOCATION
LOCATION: BACK;ABDOMEN;LEG
LOCATION: ABDOMEN;BACK;LEG
LOCATION: BACK

## 2022-04-21 NOTE — PLAN OF CARE
Problem: Pain:  Goal: Pain level will decrease  Description: Pain level will decrease  4/21/2022 0041 by Joss Abreu LPN  Outcome: Progressing  4/20/2022 1042 by Phu Saunders RN  Outcome: Progressing  Goal: Control of acute pain  Description: Control of acute pain  4/21/2022 0041 by Joss Abreu LPN  Outcome: Progressing  4/20/2022 1042 by Phu Saunders RN  Outcome: Progressing  Goal: Control of chronic pain  Description: Control of chronic pain  4/21/2022 0041 by Joss Abreu LPN  Outcome: Progressing  4/20/2022 1042 by Phu Saunders RN  Outcome: Progressing  Goal: Patient's pain/discomfort is manageable  Description: Patient's pain/discomfort is manageable  4/21/2022 0041 by Joss Abreu LPN  Outcome: Progressing  4/20/2022 1042 by Phu Saunders RN  Outcome: Progressing

## 2022-04-21 NOTE — PROGRESS NOTES
Occupational Treatment Note      04/21/22 1100   OT Individual Minutes   Time In 1100   Time Out 1100   Minutes 0   Minute Variance   Reason Med hold  (Medical Hold per MD Order due to pain and pending X-ray.)   Time Code Minutes    Timed Code Treatment Minutes 0 Minutes

## 2022-04-21 NOTE — PROGRESS NOTES
Patient:   August Smalls  MR#:    [id number]   Room:    2090/660-99   YOB: 1950  Date of Progress Note: 4/21/2022  Time of Note                           7:51 AM  Consulting Physician:   Dawson Maria M.D. Attending Physician:  Dawson Maria MD     Chief complaint Status post 3 stage lumbar fusion    S:This 66 y.o. female  with history of anxiety, depression, CKD, tobacco abuse, COPD, constipation, GERD, DDD, chronic back pain, osteoporosis, dyslipidemia and neuropathy. She was seen as outpatient by orthopedic spine surgeon Dr. Loida Mahan d/t increasing back pain, bilateral buttock, thigh, and leg radiculopathy left worse than right. MRI done showed multilevel thoracolumbar degenerative disk disease severe L4-S1 and disc herniation at L5-S1 with stenosis with facet arthropathy, and neurogenic claudication. Dr. Loida Mahan recommended a 3 stage repair. Patient was in agreement and was admitted to [facility] on 4/11/22 and underwent stage 1 anterior discectomy decompression with bilateral neural foraminotomy L5-S1, and anterior lumbar interbody fusion of L5-S1. She tolerated the procedure well. She returned to surgery on 4/13/22 for stage 2 right lateral lumbar interbody fusion L4-5 on 4/13/22. She tolerated this procedure well. She returned to surgery for the stage 3 Posterior spinal fusion L4-S1 on 4/15/22. She tolerated this procedure well. She continues to c/o pain at 8/10 being controlled with PO medications. She is participating in both PT/OT. She is felt to need a stay on Rehab to work towards her goal of returning home with assist of her family. She is now felt ready to start the Rehab program.   Complained of pain in the legs, left worse than right when standing. Felt as if her knees are going to buckle.     REVIEW OF SYSTEMS:  Constitutional: No fevers No chills  Neck:No stiffness  Respiratory: No shortness of breath  Cardiovascular: No chest pain No palpitations  Gastrointestinal: No abdominal pain    Genitourinary: No Dysuria  Neurological: No headache, no confusion    Past Medical History:      Diagnosis Date    Arthritis     Bilateral low back pain without sciatica 12/22/2015    [facility] ([facility] [address]     COPD [facility]chronic obstructive pulmonary disease) ([facility] 4/20/2022    Hx of blood clots     Hyperlipidemia     Pain in both knees 12/22/2015    Thyroid disease     Urinary incontinence        Past Surgical History:      Procedure Laterality Date    CHOLECYSTECTOMY, LAPAROSCOPIC      CYST INCISION AND DRAINAGE      left Cheek    HYSTERECTOMY      JOINT REPLACEMENT      LOBECTOMY      RIGHT PARTIAL    SKIN CANCER EXCISION         Medications in Hospital:      Current Facility-Administered Medications:     tiZANidine (ZANAFLEX) tablet 6 mg, 6 mg, Oral, Q4H PRN, Maikol Stevens MD, 6 mg at 04/21/22 0244    ALPRAZolam (XANAX) tablet 0.5 mg, 0.5 mg, Oral, TID PRN, aMikol Stevens MD    dicyclomine (BENTYL) capsule 10 mg, 10 mg, Oral, TID PRN, Maikol Stevens MD    escitalopram (LEXAPRO) tablet 10 mg, 10 mg, Oral, Daily, Maikol Stevens MD, 10 mg at 04/20/22 8234    levothyroxine (SYNTHROID) tablet 100 mcg, 100 mcg, Oral, Daily, Maikol Stevens MD, 100 mcg at 04/20/22 0810    linaclotide (LINZESS) capsule 290 mcg, 290 mcg, Oral, Daily, Maikol Stevens MD, 290 mcg at 04/20/22 0816    nystatin (MYCOSTATIN) 115333 UNIT/ML suspension 500,000 Units, 500,000 Units, Oral, 4x Daily, Maikol Stevens MD, 500,000 Units at 04/20/22 2106    oxyCODONE-acetaminophen (PERCOCET)  MG per tablet 1 tablet, 1 tablet, Oral, Q4H PRN, Maikol Stevens MD, 1 tablet at 04/21/22 0243    pantoprazole (PROTONIX) tablet 40 mg, 40 mg, Oral, Daily, Maikol Stevens MD, 40 mg at 04/20/22 0810    pregabalin (LYRICA) capsule 75 mg, 75 mg, Oral, Q12H, Maikol Stevens MD, 75 mg at 04/21/22 0422    promethazine (PHENERGAN) tablet 25 mg, 25 mg, Oral, BID PRN, Maikol Stevens MD    sucralfate (CARAFATE) tablet 1 g, 1 g, Oral, TID PRN, Linda Shrestha MD    acetaminophen (TYLENOL) tablet 650 mg, 650 mg, Oral, Q4H PRN, Linda Shrestha MD, 650 mg at 04/20/22 1111    polyethylene glycol (GLYCOLAX) packet 17 g, 17 g, Oral, Daily PRN, Linda Shrestha MD    Allergies:  Sulfamethoxazole-trimethoprim, Codeine, Erythromycin, Pcn [penicillins], Talwin [pentazocine], and Sulfa antibiotics    Social History:   TOBACCO:   reports that she has been smoking cigarettes. She has a 15.00 pack-year smoking history. She has never used smokeless tobacco.  ETOH:   reports no history of alcohol use. Family History:       Problem Relation Age of Onset   Gloriajean Seeds Cancer Mother     Hypertension Mother     Heart Attack Father          PHYSICAL EXAM:  BP (!) 140/78   Pulse 66   Temp 97 °F (36.1 °C) (Temporal)   Resp 18   Ht 5' 5\" (1.651 m)   Wt 150 lb 3.2 oz (68.1 kg)   SpO2 98%   BMI 24.99 kg/m²     Constitutional - well developed, well nourished. Eyes - conjunctiva normal.   Ear, nose, throat - No scars, masses, or lesions over external nose or ears, no atrophy of tongue  Neck-symmetric, no masses noted, no jugular vein distension  Respiration- chest wall appears symmetric, good expansion,   normal effort without use of accessory muscles  Musculoskeletal - no significant wasting of muscles noted, no bony deformities  Extremities-no clubbing, cyanosis or edema  Skin - warm, dry, and intact. No rash, erythema, or pallor.   Psychiatric - mood, affect, and behavior appear normal.      Neurological exam  Awake, alert, fluent oriented appropriate affect  Attention and concentration appear appropriate  Recent and remote memory appears unremarkable  Speech normal without dysarthria  No clear issues with language of fund of knowledge     Cranial Nerve Exam     CN III, IV,VI-EOMI, No nystagmus, conjugate eye movements, no ptosis    CN VII-no facial assymetry       Motor Exam  antigravity throughout upper and lower extremities bilaterally      Tremors- no tremors in hands or head noted     Gait  Not tested     Nursing/pcp notes, imaging,labs and vitals reviewed. PT,OT and/or speech notes reviewed    Lab Results   Component Value Date    WBC 5.9 04/21/2022    HGB 9.2 (L) 04/21/2022    HCT 29.2 (L) 04/21/2022    MCV 92.1 04/21/2022     04/21/2022     Lab Results   Component Value Date     04/21/2022    K 4.0 04/21/2022     04/21/2022    CO2 27 04/21/2022    BUN 8 04/21/2022    CREATININE 0.7 04/21/2022    GLUCOSE 95 04/21/2022    CALCIUM 8.3 (L) 04/21/2022    PROT 5.5 (L) 04/21/2022    LABALBU 2.9 (L) 04/21/2022    BILITOT 0.3 04/21/2022    ALKPHOS 52 04/21/2022    AST 10 04/21/2022    ALT 7 04/21/2022    LABGLOM >60 04/21/2022    GFRAA >59 04/21/2022     Lab Results   Component Value Date    INR 1.09 04/19/2022    PROTIME 14.0 04/19/2022       Damien Cedeño   Student Physical Therapist   Physical Therapy   Progress Notes       Cosign Needed   Date of Service:  4/20/2022  1:34 PM                 Cosign Needed                Show:Clear all  []Manual[x]Template[]Copied    Added by:  [x]Sylvia Lee      []Mayco for details    Physical Therapy        Name:  Kala Fung  MRN: [id number]  Date of service: 4/20/2022 04/20/22 1300   Restrictions/Precautions   Restrictions/Precautions Fall Risk;Surgical Protocols   Required Braces or Orthoses   Spinal Lumbar Corset   Position Activity Restriction   Spinal Precautions No Bending; No Lifting; No Twisting   General   Additional Pertinent Hx Anxiety, Depression, CKD, Tobacco Abuse, COPD, Constipation, GERD, DDD, Chronic Back Pain, Osteoporosis, Dyslipidemia, Neuropathy   Diagnosis Post 3 Stage Lumbar Fusion L4-S1   Subjective   Subjective pt supine in the bed and asks for therapy to be in room d/t increased pain. Pain Assessment   Pain Level 8   Pain Location Back;Leg   Pain Orientation Lower;Left;Right   Pain Descriptors Aching; Shooting   Functional Pain Assessment Prevents or interferes some active activities and ADLs   Pain Type Surgical pain   Pain Frequency Continuous   Pain Onset On-going   Non-Pharmaceutical Pain Intervention(s) Ambulation/Increased Activity;Repositioned;Rest;Exercise   Exercises   Straight Leg Raise 10x BLE   Quad Sets 2x12 BLE    [place] 2x12 BLE   Hip Abduction Hooklying 2x15   Comments Supine; AROM D/T Pain   Conditions Requiring Skilled Therapeutic Intervention   Body Structures, Functions, Activity Limitations Requiring Skilled Therapeutic Intervention Decreased functional mobility ; Decreased tolerance to work activity; Decreased strength;Decreased endurance;Decreased balance; Increased pain;Decreased posture   Assessment pt requested for therapy to be in room d/t increased back pain. pt only able to tolerate 25min of supine exercises before asking PT to leave. Activity Tolerance   Activity Tolerance Patient limited by pain   Safety Devices   Type of Devices All fall risk precautions in place; Bed alarm in place;Call light within reach; Patient at risk for falls; Left in bed         Electronically signed by NAYELY Johnson on 4/20/2022 at 1:34 PM                              RECORD REVIEW: Previous medical records, medications were reviewed at today's visit    IMPRESSION:   1. Status post 3 stage lumbar fusion-pain control/mobilization  2. Mood disorder-Lexapro/Xanax as needed  3. Hypothyroidism-Synthroid  4. GI/GERD- bowel regimen/PPI  5. Back pain-Lyrica/Percocet as needed/Zanaflex as needed  6. COPD/history of smoking-monitor  7.   PT/OT    Check x-ray of the lumbar spine    Supportive care      Expected duration and frequency therapy: 180 minutes per day, 5 days per week    [address] [facility]  [phone] CELL  Dr Jonnie Martin

## 2022-04-21 NOTE — PLAN OF CARE
Problem: Pain:  Goal: Pain level will decrease  Description: Pain level will decrease  4/21/2022 1050 by Cesar Resendez RN  Outcome: Progressing  4/21/2022 0041 by Pantera Monet LPN  Outcome: Progressing  Goal: Control of acute pain  Description: Control of acute pain  4/21/2022 1050 by Cesar Resendez RN  Outcome: Progressing  4/21/2022 0041 by Pantera Monet LPN  Outcome: Progressing  Goal: Control of chronic pain  Description: Control of chronic pain  4/21/2022 1050 by eCsar Resendez RN  Outcome: Progressing  4/21/2022 0041 by Pantera Monet LPN  Outcome: Progressing  Goal: Patient's pain/discomfort is manageable  Description: Patient's pain/discomfort is manageable  4/21/2022 1050 by Cesar Resendez RN  Outcome: Progressing  4/21/2022 0041 by Pantera Monet LPN  Outcome: Progressing

## 2022-04-21 NOTE — PROGRESS NOTES
Occupational Treatment Note      04/21/22 1430   OT Individual Minutes   Time In 1430   Time Out 1500   Minutes 30   Minute Variance   Reason Med hold  (Medical Hold per MD Order due to pain and pending X-ray.)   Time Code Minutes    Timed Code Treatment Minutes 30 Minutes

## 2022-04-21 NOTE — PROGRESS NOTES
[facility] back in bed with me just standing behined her I had no contact with her. She asked for pain medicine for her back pain nurse went in and patient stated that the pain was in her stomach. Do to me grabbing her stomach to prevent her from falling. Which was not the case.

## 2022-04-21 NOTE — PROGRESS NOTES
04/21/22 0900   Assessment   Assessment PATIENT AMBULATING BACK TO BED WITH PCA UPON ENTERING. GRIMACING, AUDIBLY DISPLAYING PAIN EACH STEP. REPORTS 10/10 PAIN. DUE TO UNDEGO LUMBAR SPINE XRAY. MEDICAL HOLD PER MD ORDER DUE TO PAIN, PENDING XRAYS. NURSING NOTIFIED OF PATIENT'S DESIRE FOR PAIN PILL.

## 2022-04-22 PROCEDURE — 99232 SBSQ HOSP IP/OBS MODERATE 35: CPT | Performed by: PSYCHIATRY & NEUROLOGY

## 2022-04-22 PROCEDURE — 97530 THERAPEUTIC ACTIVITIES: CPT

## 2022-04-22 PROCEDURE — 6370000000 HC RX 637 (ALT 250 FOR IP): Performed by: PSYCHIATRY & NEUROLOGY

## 2022-04-22 PROCEDURE — 97535 SELF CARE MNGMENT TRAINING: CPT

## 2022-04-22 PROCEDURE — 97116 GAIT TRAINING THERAPY: CPT

## 2022-04-22 PROCEDURE — 97110 THERAPEUTIC EXERCISES: CPT

## 2022-04-22 PROCEDURE — 1180000000 HC REHAB R&B

## 2022-04-22 RX ORDER — DIAPER,BRIEF,INFANT-TODD,DISP
EACH MISCELLANEOUS 2 TIMES DAILY
Status: DISCONTINUED | OUTPATIENT
Start: 2022-04-22 | End: 2022-04-29 | Stop reason: HOSPADM

## 2022-04-22 RX ADMIN — LEVOTHYROXINE SODIUM 100 MCG: 100 TABLET ORAL at 07:51

## 2022-04-22 RX ADMIN — OXYCODONE AND ACETAMINOPHEN 1 TABLET: 10; 325 TABLET ORAL at 07:50

## 2022-04-22 RX ADMIN — TIZANIDINE 6 MG: 4 TABLET ORAL at 07:49

## 2022-04-22 RX ADMIN — TIZANIDINE 6 MG: 4 TABLET ORAL at 02:10

## 2022-04-22 RX ADMIN — TIZANIDINE 6 MG: 4 TABLET ORAL at 14:40

## 2022-04-22 RX ADMIN — OXYCODONE AND ACETAMINOPHEN 1 TABLET: 10; 325 TABLET ORAL at 14:40

## 2022-04-22 RX ADMIN — ESCITALOPRAM OXALATE 10 MG: 10 TABLET ORAL at 07:51

## 2022-04-22 RX ADMIN — OXYCODONE AND ACETAMINOPHEN 1 TABLET: 10; 325 TABLET ORAL at 22:12

## 2022-04-22 RX ADMIN — TIZANIDINE 6 MG: 4 TABLET ORAL at 22:11

## 2022-04-22 RX ADMIN — POLYETHYLENE GLYCOL 3350 17 G: 17 POWDER, FOR SOLUTION ORAL at 07:59

## 2022-04-22 RX ADMIN — ALPRAZOLAM 0.5 MG: 0.5 TABLET ORAL at 22:12

## 2022-04-22 RX ADMIN — PREGABALIN 75 MG: 75 CAPSULE ORAL at 06:09

## 2022-04-22 RX ADMIN — ALPRAZOLAM 0.5 MG: 0.5 TABLET ORAL at 02:15

## 2022-04-22 RX ADMIN — PANTOPRAZOLE SODIUM 40 MG: 40 TABLET, DELAYED RELEASE ORAL at 07:49

## 2022-04-22 RX ADMIN — OXYCODONE AND ACETAMINOPHEN 1 TABLET: 10; 325 TABLET ORAL at 02:10

## 2022-04-22 RX ADMIN — PREGABALIN 75 MG: 75 CAPSULE ORAL at 16:29

## 2022-04-22 RX ADMIN — LINACLOTIDE 290 MCG: 145 CAPSULE, GELATIN COATED ORAL at 07:51

## 2022-04-22 RX ADMIN — HYDROCORTISONE: 1 CREAM TOPICAL at 22:14

## 2022-04-22 RX ADMIN — ALPRAZOLAM 0.5 MG: 0.5 TABLET ORAL at 14:40

## 2022-04-22 ASSESSMENT — PAIN DESCRIPTION - FREQUENCY
FREQUENCY: CONTINUOUS
FREQUENCY: CONTINUOUS

## 2022-04-22 ASSESSMENT — PAIN - FUNCTIONAL ASSESSMENT
PAIN_FUNCTIONAL_ASSESSMENT: PREVENTS OR INTERFERES SOME ACTIVE ACTIVITIES AND ADLS
PAIN_FUNCTIONAL_ASSESSMENT: ACTIVITIES ARE NOT PREVENTED
PAIN_FUNCTIONAL_ASSESSMENT: ACTIVITIES ARE NOT PREVENTED

## 2022-04-22 ASSESSMENT — PAIN DESCRIPTION - ORIENTATION
ORIENTATION: LOWER
ORIENTATION: LEFT;LOWER
ORIENTATION: LEFT
ORIENTATION: LOWER
ORIENTATION: LOWER

## 2022-04-22 ASSESSMENT — PAIN DESCRIPTION - LOCATION
LOCATION: BACK
LOCATION: BACK;LEG
LOCATION: BACK;LEG
LOCATION: BACK

## 2022-04-22 ASSESSMENT — PAIN DESCRIPTION - DESCRIPTORS
DESCRIPTORS: ACHING;THROBBING
DESCRIPTORS: ACHING;SQUEEZING;THROBBING
DESCRIPTORS: ACHING;THROBBING
DESCRIPTORS: ACHING;THROBBING
DESCRIPTORS: ACHING;DISCOMFORT;THROBBING
DESCRIPTORS: ACHING;SHOOTING
DESCRIPTORS: ACHING;THROBBING

## 2022-04-22 ASSESSMENT — PAIN SCALES - GENERAL
PAINLEVEL_OUTOF10: 3
PAINLEVEL_OUTOF10: 10
PAINLEVEL_OUTOF10: 10
PAINLEVEL_OUTOF10: 4
PAINLEVEL_OUTOF10: 9
PAINLEVEL_OUTOF10: 9
PAINLEVEL_OUTOF10: 8
PAINLEVEL_OUTOF10: 5
PAINLEVEL_OUTOF10: 8
PAINLEVEL_OUTOF10: 10

## 2022-04-22 ASSESSMENT — PAIN DESCRIPTION - PAIN TYPE
TYPE: SURGICAL PAIN
TYPE: SURGICAL PAIN

## 2022-04-22 ASSESSMENT — PAIN DESCRIPTION - ONSET
ONSET: ON-GOING
ONSET: ON-GOING

## 2022-04-22 NOTE — PROGRESS NOTES
Had been assisted up to bedside commode and back to bed with 2 staff members in room; stated doctor had told her to say if she had any trouble breathing and she had been having some trouble off and on for a little while, few days. Initially O2 sat 95% on room air, and up to 98%. No s/s distress noted.

## 2022-04-22 NOTE — PROGRESS NOTES
A speech language cognitive screening was completed due to staff concerns for cognitive deficits. Expressive language tasks (confrontation naming, open ended questions, automatic speech tasks) were all [facility]. Receptive language tasks (following one step commands, answering yes/no questions) were [facility]. Tasks for cognition (immediate recall and orientation were [facility]). The patient exhibited increased confusion this morning, however, staff did question if this was due to pain medication. Her daughter states she did have memory deficits prior to her hospital admission. Her daughters take care of her at home and manage all of her bills/finances, medications and doctor's appointments. Her daughter also provides transportation to all appointments as the patient no longer drives. Speech therapy services are not recommended at this time.             Electronically Signed By:  Zeeshan Cooley  5/58/3852,5:25 PM.

## 2022-04-22 NOTE — PLAN OF CARE
Problem: Pain:  Goal: Pain level will decrease  Description: Pain level will decrease  Outcome: Progressing  Goal: Control of acute pain  Description: Control of acute pain  Outcome: Progressing  Goal: Control of chronic pain  Description: Control of chronic pain  Outcome: Progressing  Goal: Patient's pain/discomfort is manageable  Description: Patient's pain/discomfort is manageable  Outcome: Progressing     Problem: Falls - Risk of:  Goal: Will remain free from falls  Description: Will remain free from falls  Outcome: Progressing  Goal: Absence of physical injury  Description: Absence of physical injury  Outcome: Progressing     Problem: Mobility - Impaired:  Goal: Mobility will improve  Description: Mobility will improve  Outcome: Progressing     Problem: Infection:  Goal: Will remain free from infection  Description: Will remain free from infection  Outcome: Progressing     Problem: Safety:  Goal: Free from accidental physical injury  Description: Free from accidental physical injury  Outcome: Progressing  Goal: Free from intentional harm  Description: Free from intentional harm  Outcome: Progressing     Problem: Daily Care:  Goal: Daily care needs are met  Description: Daily care needs are met  Outcome: Progressing     Problem: Skin Integrity:  Goal: Skin integrity will stabilize  Description: Skin integrity will stabilize  Outcome: Progressing     Problem: Discharge Planning:  Goal: Patients continuum of care needs are met  Description: Patients continuum of care needs are met  Outcome: Progressing     Problem: IP BLADDER/VOIDING  Goal: LTG - patient will complete bladder elimination  Outcome: Progressing  Goal: LTG - Patient will utilize adaptive techniques/equipment to complete bladder elimination  Outcome: Progressing  Goal: LTG - patient will achieve acceptable level of continence  Outcome: Progressing  Goal: STG - Patient demonstrates ability to take care of indwelling catheter  Outcome: Progressing  Goal: STG - patient demonstrates self-cath technique using clean technique and care of the catheter  Outcome: Progressing  Goal: STG - Patient demonstrates no accidents  Outcome: Progressing  Goal: STG - Patient will state signs and symptoms of UTI  Outcome: Progressing  Goal: STG - patient will be able to empty bladder  Outcome: Progressing  Goal: STG - Patient demonstrates understanding of self catheterization schedule by completing task on time  Outcome: Progressing  Goal: STG - patient participates in bladder program by expressing need to void  Outcome: Progressing  Goal: STG - Patient verbalizes understanding of catheter care indwelling/intermittent  Outcome: Progressing  Goal: STG - patient participates in bladder program by adhering to implemented toileting schedule  Outcome: Progressing     Problem: IP BOWEL ELIMINATION  Goal: LTG - patient will utilize adaptive techniques/equipment to complete bowel elimination  Outcome: Progressing  Goal: LTG - patient will have regular and routine bowel evacuation  Outcome: Progressing  Goal: STG - patient will be accident free  Outcome: Progressing  Goal: STG - Patient demonstrates care and management of ostomy bag  Outcome: Progressing  Goal: STG - Patient participates in bowel management program  Outcome: Progressing  Goal: STG - patient maintains skin integrity  Outcome: Progressing  Goal: STG - Patient will verbalize signs and symptoms of constipation and how to prevent/alleviate  Outcome: Progressing  Goal: STG - patient will be continent of stool  Outcome: Progressing  Goal: STG - Patient completes digital stimulation technique  Outcome: Progressing  Goal: STG - Patient verbalizes knowledge about relationship between diet, fluid intake, activity and medication on constipation  Outcome: Progressing     Problem: IP BREATHING  Goal: LTG - patient will mobilize secretions and maintain airway  Outcome: Progressing  Goal: LTG - Patient/caregiver will demonstrate/perform proper techniques to maintain patent airway  Outcome: Progressing  Goal: LTG - patient/caregiver will demonstrate/perform improved or stable self care abilities within physical limitations  Outcome: Progressing  Goal: STG - Patient/caregiver will maintain patent airway  Outcome: Progressing  Goal: STG - respiratory rate and effort will be within normal limits for the patient  Outcome: Progressing  Goal: STG - patient/caregiver will be able to verbalize oxygen safety precautions  Outcome: Progressing  Goal: STG - Patient/caregiver demonstrates correct suctioning technique  Outcome: Progressing  Goal: STG - patient will utilize incentive spirometer  Outcome: Progressing  Goal: STG - Patient performs or directs assisted coughing  Outcome: Progressing  Goal: STG - patient can administer MDI's  Outcome: Progressing  Goal: STG - Patient can utilize incentive spirometer  Outcome: Progressing  Goal: STG - family can complete suctioning  Outcome: Progressing     Problem: NUTRITION  Goal: Patient maintains adequate hydration  Outcome: Progressing  Goal: Patient maintains weight  Outcome: Progressing  Goal: Patient/Family demonstrates understanding of diet  Outcome: Progressing  Goal: Patient/Family independently completes tube feeding  Outcome: Progressing  Goal: Patient will have no more than 5 lb weight change during LOS  Outcome: Progressing  Goal: Patient will utilize adaptive techniques to administer nutrition  Outcome: Progressing  Goal: Patient will verbalize dietary restrictions  Outcome: Progressing     Problem: SAFETY  Goal: LTG - patient will adhere to hip precautions during ADL's and transfers  Outcome: Progressing  Goal: LTG - Patient will demonstrate safety requirements appropriate to situation/environment  Outcome: Progressing  Goal: LTG - patient will utilize safety techniques  Outcome: Progressing  Goal: STG - patient locks brakes on wheelchair  Outcome: Progressing  Goal: STG - Patient uses call light consistently to request assistance with transfers  Outcome: Progressing  Goal: STG - patient uses gait belt during all transfers  Outcome: Progressing     Problem: SKIN INTEGRITY  Goal: LTG - Patient will be free from infection  Outcome: Progressing  Goal: LTG - patient will maintain/improve skin integrity through proper skin care techniques  Outcome: Progressing  Goal: LTG - Patient will demonstrate appropriate pressure relief techniques  Outcome: Progressing  Goal: LTG - patient will demonstrate appropriate skin care techniques  Outcome: Progressing  Goal: LTG - Patient will be free from infection  Outcome: Progressing  Goal: STG - Patient demonstrates skin care/treatment/dressing change  Outcome: Progressing  Goal: STG - patient will maintain good skin integrity  Outcome: Progressing  Goal: STG - Patient exhibits signs of wound healing.   Outcome: Progressing  Goal: STG - patient demonstrates pressure reduction techniques  Outcome: Progressing  Goal: STG - Patient demonstrates preventative skin care measures  Outcome: Progressing     Problem: PAIN  Goal: LTG - Patient will manage pain with the appropriate technique/Intervention  Outcome: Progressing  Goal: LTG - Patient will demonstrate intervention for managing pain  Outcome: Progressing  Goal: STG - Patient will reduce or eliminate use of analgesics  Outcome: Progressing  Goal: STG - pain is manageable through therapies  Outcome: Progressing  Goal: STG - Patient will verbalize an acceptable level of pain  Outcome: Progressing  Goal: STG - patients pain is managed to allow active participation in daily activities  Outcome: Progressing  Goal: STG - Patient will increase activity level  Outcome: Progressing  Goal: STG - patient verbalizes a reduction in pain level  Outcome: Progressing

## 2022-04-22 NOTE — PLAN OF CARE
Problem: Pain:  Goal: Pain level will decrease  Description: Pain level will decrease  4/22/2022 0959 by Nevaeh Rodriguez RN  Outcome: Progressing  4/22/2022 0205 by Huong Blank LPN  Outcome: Progressing  Goal: Control of acute pain  Description: Control of acute pain  4/22/2022 0959 by Nevaeh Rodriguez RN  Outcome: Progressing  4/22/2022 0205 by Huong Blank LPN  Outcome: Progressing  Goal: Control of chronic pain  Description: Control of chronic pain  4/22/2022 0959 by Nevaeh Rodriguez RN  Outcome: Progressing  4/22/2022 0205 by Huong Blank LPN  Outcome: Progressing  Goal: Patient's pain/discomfort is manageable  Description: Patient's pain/discomfort is manageable  4/22/2022 0959 by Nevaeh Rodriguez RN  Outcome: Progressing  4/22/2022 0205 by Huong Blank LPN  Outcome: Progressing

## 2022-04-22 NOTE — PROGRESS NOTES
Occupational Therapy  Facility/Department: [facility] 8 REHAB UNIT  Occupational Therapy Treatment Note     Name: Latricia Wiggins  : 1950  MRN: [id number]  Date of Service: 2022    Discharge Recommendations:  Home with Home health OT          Patient Diagnosis(es): There were no encounter diagnoses. Past Medical History:  has a past medical history of Arthritis, Bilateral low back pain without sciatica, Cancer (HCC), COPD (chronic obstructive pulmonary disease) ([facility] [address] Hx of blood clots, Hyperlipidemia, Pain in both knees, Thyroid disease, and Urinary incontinence. Past Surgical History:  has a past surgical history that includes Hysterectomy; joint replacement; lobectomy; Cholecystectomy, laparoscopic; Skin cancer excision; and cyst incision and drainage. Treatment Diagnosis: Status post 3 stage lumbar fusion      Assessment   Performance deficits / Impairments: Decreased functional mobility ; Decreased ADL status; Decreased strength;Decreased endurance;Decreased balance;Decreased high-level IADLs  Assessment: Pt experiencing bleeding from hemorrhoids, assisted with changing pants. Notified RN of the large amount of blood. Treatment Diagnosis: Status post 3 stage lumbar fusion  Activity Tolerance  Activity Tolerance: Patient Tolerated treatment well        Plan   Plan  Current Treatment Recommendations: Strengthening,Balance training,Functional mobility training,Endurance training,Equipment evaluation, education, & procurement,Patient/Caregiver education & training,Safety education & training,Self-Care / ADL,Home management training     Restrictions  Restrictions/Precautions  Restrictions/Precautions: Fall Risk,Surgical Protocols  Required Braces or Orthoses?: Yes  Required Braces or Orthoses  Spinal: Lumbar Corset  Position Activity Restriction  Spinal Precautions: No Bending,No Lifting,No Twisting      Objective              Safety Devices  Type of Devices:  All fall risk precautions in place;Call light within reach; Bed alarm in place  Balance  Sitting Balance: Supervision  Standing Balance: Contact guard assistance  Functional Mobility  Functional - Mobility Device: Rolling Walker  Activity: To/from bathroom; To/From therapy gym  Assist Level: Contact guard assistance           Activity Tolerance  Activity Tolerance: Patient tolerated treatment well;Patient limited by pain     Transfers  Sit to stand: Contact guard assistance  Stand to sit: Contact guard assistance      OutComes Score      [address] needed: Supervision or touching assistance  CARE Score: 4  Discharge Goal: Independent         Toilet Transfer  Assistance needed: Supervision or touching assistance  CARE Score: 4  Discharge Goal: Independent     Goals  Short Term Goals  Time Frame for Short term goals: 1 week  Short Term Goal 1: Supervision with bathing hygiene. Short Term Goal 2: Supervision with clothing management/hygiene for toileting. Short Term Goal 3: Supervision with toilet transfers. Short Term Goal 4: Supervision LB dressing, AE PRN. Short Term Goal 5: Supervision with donning/doffing socks and shoes, AE PRN. Additional Goals?: Yes  Short Term Goal 6: Patient will complete 1-2 handed static standing task for 5 minutes, requiring Supervision. Long Term Goals  Time Frame for Long term goals : 2 weeks  Long Term Goal 1: Modified independent with bathing hygiene. Long Term Goal 2: Modified independent with toileting and toilet transfers. Long Term Goal 3: Modified independent with overall dressing. Long Term Goal 4: Modified independent with ambulatory homemaking tasks. Long Term Goal 5: Patient verbalize DME needs. Patient Goals   Patient goals : Return home independently.        Therapy Time   Individual Concurrent Group Co-treatment   Time In    1400      Time Out    1445      Minutes    45      Timed Code Treatment Minutes: [address], OT

## 2022-04-22 NOTE — PROGRESS NOTES
Occupational Therapy  Facility/Department: [facility] 8 REHAB UNIT  Occupational Therapy Treatment Note    Name: Marcus Joy  : 1950  MRN: [id number]  Date of Service: 2022    Discharge Recommendations:  Home with Home health OT          Patient Diagnosis(es): There were no encounter diagnoses. Past Medical History:  has a past medical history of Arthritis, Bilateral low back pain without sciatica, Cancer (HCC), COPD (chronic obstructive pulmonary disease) ([facility] [address] Hx of blood clots, Hyperlipidemia, Pain in both knees, Thyroid disease, and Urinary incontinence. Past Surgical History:  has a past surgical history that includes Hysterectomy; joint replacement; lobectomy; Cholecystectomy, laparoscopic; Skin cancer excision; and cyst incision and drainage. Treatment Diagnosis: Status post 3 stage lumbar fusion      Assessment     Decreased functional mobility ; Decreased ADL status; Decreased strength; Decreased endurance; Decreased balance; Decreased high-level IADLs           Plan   Plan  Current Treatment Recommendations: Strengthening,Balance training,Functional mobility training,Endurance training,Equipment evaluation, education, & procurement,Patient/Caregiver education & training,Safety education & training,Self-Care / ADL,Home management training     Restrictions  Restrictions/Precautions  Restrictions/Precautions: Fall Risk,Surgical Protocols  Required Braces or Orthoses?: Yes  Required Braces or Orthoses  Spinal: Lumbar Corset  Position Activity Restriction  Spinal Precautions: No Bending,No Lifting,No Twisting      Objective   Safety Devices  Type of Devices: All fall risk precautions in place; Bed alarm in place;Call light within reach;Gait belt;Left in bed  Activity Tolerance  Activity Tolerance: Patient tolerated treatment well;Patient limited by pain     22 0900   OT Exercises   Exercise Treatment 2# FW: 2 sets of 10 in all planes          22 0900   Balance   Standing Balance Contact guard assistance   Standing Balance   Time 5 minutes   Activity 2 handed static standing task   Functional Mobility   Functional - Mobility Device Rolling Walker   Activity To/from bathroom; To/From therapy gym   Assist Level Contact guard assistance      04/22/22 0900   Transfers   Sit to stand Contact guard assistance   Stand to sit Contact guard assistance   Transfer Comments x sit<>stand     OutComes Score    Eating  Assistance Needed: Independent  CARE Score: 6  Discharge Goal: Independent    Oral Hygiene  Assistance Needed: Independent  CARE Score: 6  Discharge Goal: [address] needed: Supervision or touching assistance  CARE Score: 4  Discharge Goal: Independent          Putting On/Taking Off Footwear  Assistance Needed: Partial/moderate assistance  Comment: Min A with sock aide  CARE Score: 3  Discharge Goal: Independent     Toilet Transfer  Assistance needed: Supervision or touching assistance  CARE Score: 4  Discharge Goal: Independent       Goals  Short Term Goals  Time Frame for Short term goals: 1 week  Short Term Goal 1: Supervision with bathing hygiene. Short Term Goal 2: Supervision with clothing management/hygiene for toileting. Short Term Goal 3: Supervision with toilet transfers. Short Term Goal 4: Supervision LB dressing, AE PRN. Short Term Goal 5: Supervision with donning/doffing socks and shoes, AE PRN. Additional Goals?: Yes  Short Term Goal 6: Patient will complete 1-2 handed static standing task for 5 minutes, requiring Supervision. Long Term Goals  Time Frame for Long term goals : 2 weeks  Long Term Goal 1: Modified independent with bathing hygiene. Long Term Goal 2: Modified independent with toileting and toilet transfers. Long Term Goal 3: Modified independent with overall dressing. Long Term Goal 4: Modified independent with ambulatory homemaking tasks. Long Term Goal 5: Patient verbalize DME needs.   Patient Goals   Patient goals : Return home independently.        Therapy Time   Individual Concurrent Group Co-treatment   Time In       0900   Time Out       1000   Minutes       60   Timed Code Treatment Minutes: [address] OT

## 2022-04-22 NOTE — PROGRESS NOTES
Patient:   Dale Garcia  MR#:    [id number]   Room:    [facility]-   YOB: 1950  Date of Progress Note: 4/22/2022  Time of Note                           8:34 AM  Consulting Physician:   Vasu Worley M.D. Attending Physician:  Vasu Worley MD     Chief complaint Status post 3 stage lumbar fusion    S:This 66 y.o. female  with history of anxiety, depression, CKD, tobacco abuse, COPD, constipation, GERD, DDD, chronic back pain, osteoporosis, dyslipidemia and neuropathy. She was seen as outpatient by orthopedic spine surgeon Dr. Virgen Han d/t increasing back pain, bilateral buttock, thigh, and leg radiculopathy left worse than right. MRI done showed multilevel thoracolumbar degenerative disk disease severe L4-S1 and disc herniation at L5-S1 with stenosis with facet arthropathy, and neurogenic claudication. Dr. Virgen Han recommended a 3 stage repair. Patient was in agreement and was admitted to [facility] on 4/11/22 and underwent stage 1 anterior discectomy decompression with bilateral neural foraminotomy L5-S1, and anterior lumbar interbody fusion of L5-S1. She tolerated the procedure well. She returned to surgery on 4/13/22 for stage 2 right lateral lumbar interbody fusion L4-5 on 4/13/22. She tolerated this procedure well. She returned to surgery for the stage 3 Posterior spinal fusion L4-S1 on 4/15/22. She tolerated this procedure well. She continues to c/o pain at 8/10 being controlled with PO medications. She is participating in both PT/OT. She is felt to need a stay on Rehab to work towards her goal of returning home with assist of her family. She is now felt ready to start the Rehab program.   Doing a bit better today. Back pain is relatively stable.       REVIEW OF SYSTEMS:  Constitutional: No fevers No chills  Neck:No stiffness  Respiratory: No shortness of breath  Cardiovascular: No chest pain No palpitations  Gastrointestinal: No abdominal pain    Genitourinary: No Dysuria  Neurological: No headache, no confusion    Past Medical History:      Diagnosis Date    Arthritis     Bilateral low back pain without sciatica 12/22/2015    [facility]     COPD (chronic obstructive pulmonary disease) ([facility] 4/20/2022    Hx of blood clots     Hyperlipidemia     Pain in both knees 12/22/2015    Thyroid disease     Urinary incontinence        Past Surgical History:      Procedure Laterality Date    CHOLECYSTECTOMY, LAPAROSCOPIC      CYST INCISION AND DRAINAGE      left Cheek    HYSTERECTOMY      JOINT REPLACEMENT      LOBECTOMY      RIGHT PARTIAL    SKIN CANCER EXCISION         Medications in Hospital:      Current Facility-Administered Medications:     tiZANidine (ZANAFLEX) tablet 6 mg, 6 mg, Oral, Q4H PRN, Rosanne Capellan MD, 6 mg at 04/22/22 0749    ALPRAZolam (XANAX) tablet 0.5 mg, 0.5 mg, Oral, TID PRN, Rosanne Capellan MD, 0.5 mg at 04/22/22 0215    dicyclomine (BENTYL) capsule 10 mg, 10 mg, Oral, TID PRN, Rosanne Capellan MD    escitalopram (LEXAPRO) tablet 10 mg, 10 mg, Oral, Daily, Rosanne Capellan MD, 10 mg at 04/22/22 0751    levothyroxine (SYNTHROID) tablet 100 mcg, 100 mcg, Oral, Daily, Rosanne Capellan MD, 100 mcg at 04/22/22 0751    linaclotide (LINZESS) capsule 290 mcg, 290 mcg, Oral, Daily, Rosanne Capellan MD, 290 mcg at 04/22/22 0751    nystatin (MYCOSTATIN) 368139 UNIT/ML suspension 500,000 Units, 500,000 Units, Oral, 4x Daily, Rosanne Capellan MD, 500,000 Units at 04/21/22 1331    oxyCODONE-acetaminophen (PERCOCET)  MG per tablet 1 tablet, 1 tablet, Oral, Q4H PRN, Rosanne Capellan MD, 1 tablet at 04/22/22 0750    pantoprazole (PROTONIX) tablet 40 mg, 40 mg, Oral, Daily, Rosanne Capellan MD, 40 mg at 04/22/22 0749    pregabalin (LYRICA) capsule 75 mg, 75 mg, Oral, Q12H, Rosanne Capellan MD, 75 mg at 04/22/22 0609    promethazine (PHENERGAN) tablet 25 mg, 25 mg, Oral, BID PRN, Rosanne Capellan MD    sucralfate (CARAFATE) tablet 1 g, 1 g, Oral, TID PRN, Brigitte Campo Dakota Montes MD    acetaminophen (TYLENOL) tablet 650 mg, 650 mg, Oral, Q4H PRN, Maikol Stevens MD, 650 mg at 04/20/22 1111    polyethylene glycol (GLYCOLAX) packet 17 g, 17 g, Oral, Daily PRN, Maikol Stevens MD, 17 g at 04/22/22 0759    Allergies:  Sulfamethoxazole-trimethoprim, Codeine, Erythromycin, Pcn [penicillins], Talwin [pentazocine], and Sulfa antibiotics    Social History:   TOBACCO:   reports that she has been smoking cigarettes. She has a 15.00 pack-year smoking history. She has never used smokeless tobacco.  ETOH:   reports no history of alcohol use. Family History:       Problem Relation Age of Onset   [facility] Cancer Mother     Hypertension Mother     Heart Attack Father          PHYSICAL EXAM:  BP (!) 131/54   Pulse 51   Temp 96.4 °F (35.8 °C) (Temporal)   Resp 18   Ht 5' 5\" (1.651 m)   Wt 150 lb 3.2 oz (68.1 kg)   SpO2 93%   BMI 24.99 kg/m²     Constitutional - well developed, well nourished. Eyes - conjunctiva normal.   Ear, nose, throat - No scars, masses, or lesions over external nose or ears, no atrophy of tongue  Neck-symmetric, no masses noted, no jugular vein distension  Respiration- chest wall appears symmetric, good expansion,   normal effort without use of accessory muscles  Musculoskeletal - no significant wasting of muscles noted, no bony deformities  Extremities-no clubbing, cyanosis or edema  Skin - warm, dry, and intact. No rash, erythema, or pallor.   Psychiatric - mood, affect, and behavior appear normal.      Neurological exam  Awake, alert, fluent oriented appropriate affect  Attention and concentration appear appropriate  Recent and remote memory appears unremarkable  Speech normal without dysarthria  No clear issues with language of fund of knowledge     Cranial Nerve Exam     CN III, IV,VI-EOMI, No nystagmus, conjugate eye movements, no ptosis    CN VII-no facial assymetry       Motor Exam  antigravity throughout upper and lower extremities bilaterally      Tremors- no tremors in hands or head noted     Gait  Not tested     Nursing/pcp notes, imaging,labs and vitals reviewed. PT,OT and/or speech notes reviewed    Lab Results   Component Value Date    WBC 5.9 04/21/2022    HGB 9.2 (L) 04/21/2022    HCT 29.2 (L) 04/21/2022    MCV 92.1 04/21/2022     04/21/2022     Lab Results   Component Value Date     04/21/2022    K 4.0 04/21/2022     04/21/2022    CO2 27 04/21/2022    BUN 8 04/21/2022    CREATININE 0.7 04/21/2022    GLUCOSE 95 04/21/2022    CALCIUM 8.3 (L) 04/21/2022    PROT 5.5 (L) 04/21/2022    LABALBU 2.9 (L) 04/21/2022    BILITOT 0.3 04/21/2022    ALKPHOS 52 04/21/2022    AST 10 04/21/2022    ALT 7 04/21/2022    LABGLOM >60 04/21/2022    GFRAA >59 04/21/2022     Lab Results   Component Value Date    INR 1.09 04/19/2022    PROTIME 14.0 04/19/2022       Tarik Woods PT   Physical Therapist   Physical Therapy   Progress Notes       Signed   Date of Service:  4/21/2022  9:58 AM                 Signed              Show:Clear all  []Manual[x]Template[]Copied    Added by:  [x]Ryley Munoz, PT      []Mayco for details         04/21/22 0900   Assessment   Assessment PATIENT AMBULATING BACK TO BED WITH PCA UPON ENTERING. GRIMACING, AUDIBLY DISPLAYING PAIN EACH STEP. REPORTS 10/10 PAIN. DUE TO UNDEGO LUMBAR SPINE XRAY. MEDICAL HOLD PER MD ORDER DUE TO PAIN, PENDING XRAYS. NURSING NOTIFIED OF PATIENT'S DESIRE FOR PAIN PILL.                    RECORD REVIEW: Previous medical records, medications were reviewed at today's visit    IMPRESSION:   1. Status post 3 stage lumbar fusion-pain control/mobilization  2. Mood disorder-Lexapro/Xanax as needed  3. Hypothyroidism-Synthroid  4. GI/GERD- bowel regimen/PPI  5. Back pain-Lyrica/Percocet as needed/Zanaflex as needed  6. COPD/history of smoking-monitor  7.   PT/OT    x-ray of the lumbar spine-no acute changes    Continue supportive care      Expected duration and frequency therapy: 180 minutes per day, 5 days per week    [address] [facility]  Dr Wilkins Poag

## 2022-04-22 NOTE — PROGRESS NOTES
Physical Therapy      Name:  Pablito Landaverde  MRN: [id number]  Date of service: 4/22/2022 04/22/22 0900   Restrictions/Precautions   Restrictions/Precautions Fall Risk;Surgical Protocols   Position Activity Restriction   Spinal Precautions No Bending; No Lifting; No Twisting   General   Additional Pertinent Hx Anxiety, Depression, CKD, Tobacco Abuse, COPD, Constipation, GERD, DDD, Chronic Back Pain, Osteoporosis, Dyslipidemia, Neuropathy   Diagnosis Post 3 Stage Lumbar Fusion L4-S1   Subjective   Subjective pt sitting in WC upon entry, pt states she is ready to participate in therapy but is still having a lot of pain. Pain Assessment   Pain Level 9   Pain Location Back;Leg   Pain Orientation Left; Lower   Pain Descriptors Aching;Squeezing; Throbbing   Functional Pain Assessment Activities are not prevented   Pain Type Surgical pain   Pain Frequency Continuous   Pain Onset On-going   Non-Pharmaceutical Pain Intervention(s) Ambulation/Increased Activity; Exercise;Repositioned; Rest   Vital Signs   Level of Consciousness Alert (0)   Bed Mobility   Sit to Supine Contact guard assistance;Stand by assistance   Comment use of bed rails, from L side of bed. Transfers   Sit to Stand Contact guard assistance;Stand by assistance   Stand to sit Contact guard assistance;Stand by assistance   Comment With RW, Cued to Push Off From Bed   Ambulation   Surface level tile   Device Rolling Walker   Other Apparatus Wheelchair follow   Assistance Contact guard assistance;Stand by assistance   Gait Deviations Slow Brielle;Decreased step length;Decreased step height   Comments reciprocal gait pattern, occasional shuffling of BLE   Propulsion 1   Distance 150   Exercises   Hip Flexion 10x    Comments Seated in WC   Other exercises   Other exercises 1 15x Seated Heel/Toe Raises AROM   Other exercises 2 12x Seated Hip ABD/ADD;  Less Pain with ADD   Other exercises 3 10x STS; VCS on Hand Placement    Other exercises 4 4:30 Static Standing Conditions Requiring Skilled Therapeutic Intervention   Body Structures, Functions, Activity Limitations Requiring Skilled Therapeutic Intervention Decreased functional mobility ; Decreased tolerance to work activity; Decreased strength;Decreased endurance;Decreased balance; Increased pain;Decreased posture   Assessment at the begining of session pt was wearing brace under clothing. pt stated she wanted it there because it was more comfortable for her. pt tolerated LE exercises well with little to increase in pain. pt continually needs to be cued on proper hand placement. Activity Tolerance   Activity Tolerance Patient tolerated treatment well;Patient limited by pain   Safety Devices   Type of Devices All fall risk precautions in place; Bed alarm in place;Call light within reach;Gait belt;Left in bed       Electronically signed by NAYELY Cullen on 4/22/2022 at 12:32 PM

## 2022-04-22 NOTE — PROGRESS NOTES
Physical Therapy      Name:  Suzie Castro  MRN: [id number]  Date of service: 4/22/2022 04/22/22 1400   Restrictions/Precautions   Restrictions/Precautions Fall Risk;Surgical Protocols   Position Activity Restriction   Spinal Precautions No Bending; No Lifting; No Twisting   General   Additional Pertinent Hx Anxiety, Depression, CKD, Tobacco Abuse, COPD, Constipation, GERD, DDD, Chronic Back Pain, Osteoporosis, Dyslipidemia, Neuropathy   Diagnosis Post 3 Stage Lumbar Fusion L4-S1   Subjective   Subjective pt supine in bed finishing up with SLP, pt willing to participate in therapy   Pain Assessment   Pain Level 10   Pain Location Back   Pain Orientation Lower   Pain Descriptors Aching; Throbbing   Functional Pain Assessment Activities are not prevented   Pain Type Surgical pain   Pain Frequency Continuous   Pain Onset On-going   Vital Signs   Level of Consciousness Alert (0)   Bed Mobility   Supine to Sit Stand by assistance  (Towards the R)   Sit to Supine Stand by assistance  (From L Side of Bed )   Comment use of bed rails    Transfers   Sit to Stand Stand by assistance   Stand to sit Stand by assistance   Comment Cues on Hand Placement   Ambulation   Surface level tile   Device Rolling Walker   Other Apparatus Wheelchair follow   Assistance Contact guard assistance;Stand by assistance   Gait Deviations Slow Brielle;Decreased step length;Decreased step height   Comments pt required a rest break d/t reports of SOA. SpO2 taken 99%   Propulsion 1   Distance 150', 50'   Other exercises   Other exercises 1 10x STS Throughout    Other exercises 2 Toileting   Conditions Requiring Skilled Therapeutic Intervention   Body Structures, Functions, Activity Limitations Requiring Skilled Therapeutic Intervention Decreased functional mobility ; Decreased tolerance to work activity; Decreased strength;Decreased endurance;Decreased balance; Increased pain;Decreased posture   Assessment pt able to increase amb this session but had complaints of SOA. pt was dealing with an episode of hemorrhoid bleeding while amb. nsg made aware. Activity Tolerance   Activity Tolerance Patient tolerated treatment well;Patient limited by pain   Safety Devices   Type of Devices All fall risk precautions in place; Bed alarm in place;Call light within reach;Gait belt;Left in bed       Electronically signed by NAYELY Amaya on 4/22/2022 at 3:28 PM

## 2022-04-23 PROCEDURE — 97110 THERAPEUTIC EXERCISES: CPT

## 2022-04-23 PROCEDURE — 97535 SELF CARE MNGMENT TRAINING: CPT

## 2022-04-23 PROCEDURE — 97530 THERAPEUTIC ACTIVITIES: CPT

## 2022-04-23 PROCEDURE — 6370000000 HC RX 637 (ALT 250 FOR IP): Performed by: PSYCHIATRY & NEUROLOGY

## 2022-04-23 PROCEDURE — 97116 GAIT TRAINING THERAPY: CPT

## 2022-04-23 PROCEDURE — 1180000000 HC REHAB R&B

## 2022-04-23 PROCEDURE — 99232 SBSQ HOSP IP/OBS MODERATE 35: CPT | Performed by: PSYCHIATRY & NEUROLOGY

## 2022-04-23 RX ADMIN — PREGABALIN 75 MG: 75 CAPSULE ORAL at 04:15

## 2022-04-23 RX ADMIN — LINACLOTIDE 290 MCG: 145 CAPSULE, GELATIN COATED ORAL at 08:21

## 2022-04-23 RX ADMIN — TIZANIDINE 6 MG: 4 TABLET ORAL at 12:46

## 2022-04-23 RX ADMIN — LEVOTHYROXINE SODIUM 100 MCG: 100 TABLET ORAL at 08:21

## 2022-04-23 RX ADMIN — ESCITALOPRAM OXALATE 10 MG: 10 TABLET ORAL at 08:21

## 2022-04-23 RX ADMIN — PREGABALIN 75 MG: 75 CAPSULE ORAL at 16:21

## 2022-04-23 RX ADMIN — TIZANIDINE 6 MG: 4 TABLET ORAL at 19:28

## 2022-04-23 RX ADMIN — TIZANIDINE 6 MG: 4 TABLET ORAL at 08:29

## 2022-04-23 RX ADMIN — NYSTATIN 500000 UNITS: 100000 SUSPENSION ORAL at 08:21

## 2022-04-23 RX ADMIN — OXYCODONE AND ACETAMINOPHEN 1 TABLET: 10; 325 TABLET ORAL at 08:27

## 2022-04-23 RX ADMIN — OXYCODONE AND ACETAMINOPHEN 1 TABLET: 10; 325 TABLET ORAL at 19:28

## 2022-04-23 RX ADMIN — ALPRAZOLAM 0.5 MG: 0.5 TABLET ORAL at 19:28

## 2022-04-23 RX ADMIN — PANTOPRAZOLE SODIUM 40 MG: 40 TABLET, DELAYED RELEASE ORAL at 08:21

## 2022-04-23 RX ADMIN — NYSTATIN 500000 UNITS: 100000 SUSPENSION ORAL at 12:47

## 2022-04-23 RX ADMIN — TIZANIDINE 6 MG: 4 TABLET ORAL at 04:15

## 2022-04-23 RX ADMIN — OXYCODONE AND ACETAMINOPHEN 1 TABLET: 10; 325 TABLET ORAL at 04:15

## 2022-04-23 RX ADMIN — NYSTATIN 500000 UNITS: 100000 SUSPENSION ORAL at 16:24

## 2022-04-23 RX ADMIN — HYDROCORTISONE: 1 CREAM TOPICAL at 08:24

## 2022-04-23 RX ADMIN — OXYCODONE AND ACETAMINOPHEN 1 TABLET: 10; 325 TABLET ORAL at 12:47

## 2022-04-23 ASSESSMENT — PAIN DESCRIPTION - DESCRIPTORS
DESCRIPTORS: ACHING;SHOOTING
DESCRIPTORS: ACHING;DISCOMFORT;SHOOTING
DESCRIPTORS: ACHING;SHOOTING
DESCRIPTORS: ACHING;SHOOTING

## 2022-04-23 ASSESSMENT — PAIN SCALES - GENERAL
PAINLEVEL_OUTOF10: 8
PAINLEVEL_OUTOF10: 9
PAINLEVEL_OUTOF10: 4
PAINLEVEL_OUTOF10: 4
PAINLEVEL_OUTOF10: 0
PAINLEVEL_OUTOF10: 10
PAINLEVEL_OUTOF10: 9
PAINLEVEL_OUTOF10: 3

## 2022-04-23 ASSESSMENT — PAIN DESCRIPTION - LOCATION
LOCATION: BACK

## 2022-04-23 ASSESSMENT — PAIN DESCRIPTION - ORIENTATION
ORIENTATION: LOWER

## 2022-04-23 ASSESSMENT — PAIN - FUNCTIONAL ASSESSMENT
PAIN_FUNCTIONAL_ASSESSMENT: ACTIVITIES ARE NOT PREVENTED
PAIN_FUNCTIONAL_ASSESSMENT: PREVENTS OR INTERFERES SOME ACTIVE ACTIVITIES AND ADLS
PAIN_FUNCTIONAL_ASSESSMENT: PREVENTS OR INTERFERES SOME ACTIVE ACTIVITIES AND ADLS

## 2022-04-23 NOTE — PROGRESS NOTES
Physical Therapy  Daily Note  Name: Alicia Jimenez  MRN:  [id number]  Date of service:  4/23/2022 04/23/22 0910   Restrictions/Precautions   Restrictions/Precautions Fall Risk;Surgical Protocols   Required Braces or Orthoses? Yes   Required Braces or Orthoses   Spinal Lumbar Corset   Position Activity Restriction   Spinal Precautions No Bending; No Lifting; No Twisting   General   Additional Pertinent Hx Anxiety, Depression, CKD, Tobacco Abuse, COPD, Constipation, GERD, DDD, Chronic Back Pain, Osteoporosis, Dyslipidemia, Neuropathy   Subjective   Subjective willing to work with therapy   Oxygen Therapy   O2 Device None (Room air)   Bed Mobility   Sit to Supine Stand by assistance   Transfers   Sit to Stand Stand by assistance;Supervision   Stand to sit Stand by assistance;Supervision   Ambulation   WB Status WBAT   Ambulation   Surface level tile   Device Rolling Walker   Assistance Minimal assistance;Contact guard assistance   Gait Deviations Decreased step length;Decreased arm swing;Decreased head and trunk rotation;Deviated path   Comments Patient walked about 50 feet without walker. she tends to have lost of balance to the right.    Propulsion 1   Distance 200 feet, 150 feet   Balance   Sitting - Static Good   Sitting - Dynamic Good   Standing - Static Fair   Standing - Dynamic Fair   Exercises   Hip Flexion x 10   Knee Long Arc Quad x 10   Other Activities   Comment worked on balance in bathroom    Short Term Goals   Time Frame for Short term goals 1 Week   Short term goal 1 Supervision Bed Mobility    Short term goal 2 Supervision With Transfers from Surface to Surface    Short term goal 3 Supervision With Amb 100FT With AD    Short term goal 4 Supervision WC Propulsion 250FT   Short term goal 5 CGA Climbing 5 Stairs With Handrail   Long Term Goals   Time Frame for Long term goals  2 Weeks   Long term goal 1 Independent Bed Mobility    Long term goal 2 Independent With Transfers From Surface to Surface Long term goal 3 Independent With Amb 250FT With AD    Long term goal 4 Independent With [facility] Propulsion 500FT   Long term goal 5 Independent With Climbing 5 Stairs With Handrail   Conditions Requiring Skilled Therapeutic Intervention   Body Structures, Functions, Activity Limitations Requiring Skilled Therapeutic Intervention Decreased functional mobility ; Decreased tolerance to work activity; Decreased strength;Decreased endurance;Decreased balance; Increased pain;Decreased posture   Assessment patient was reluctant to increase gait distance. She did not report any pain with increased ambulation and stated that pain level was lower post session. Treatment Diagnosis interference with activity   Therapy Prognosis Good   History Anxiety, Depression, CKD, Tobacco Abuse, COPD, Constipation, GERD, DDD, Chronic Back Pain, Osteoporosis, Dyslipidemia, Neuropathy   Discharge Recommendations Home with Home health PT   Activity Tolerance   Activity Tolerance Patient tolerated treatment well;Patient limited by pain   Plan   Plan 5-7 times per week   Plan weeks 2 Weeks   Current Treatment Recommendations Strengthening;ROM;Balance training;Functional mobility training;Transfer training; Endurance training; Wheelchair mobility training;Gait training;Stair training;Pain management;Home exercise program;Safety education & training;Patient/Caregiver education & training;Equipment evaluation, education, & procurement; Therapeutic activities; Return to work related activity   Safety Devices   Type of Devices Bed alarm in place;Call light within reach; Left in bed     Electronically signed by Hemalatha Wagner PTA on 4/23/2022 at 4:37 PM

## 2022-04-23 NOTE — PROGRESS NOTES
Occupational Therapy  Facility/Department: [facility] 8 REHAB UNIT  Daily Treatment Note  NAME: Jl Tamayo  : 1950  MRN: [id number]    Date of Service: 2022    Discharge Recommendations:  Home with Home health OT       Assessment   Performance deficits / Impairments: Decreased functional mobility ; Decreased ADL status; Decreased strength;Decreased endurance;Decreased balance;Decreased high-level IADLs  Assessment: Pt performed shoulder pulleys x 10 mins. Pt was able to walk from room to OT gym and back to room. Pt does exhibit LOB during ambulation occasionally with use of RW. Pt continues to benefit from skilled OT services. Treatment Diagnosis: Status post 3 stage lumbar fusion  Prognosis: Good  Activity Tolerance  Activity Tolerance: Patient Tolerated treatment well         Patient Diagnosis(es): There were no encounter diagnoses. has a past medical history of Arthritis, Bilateral low back pain without sciatica, Cancer (HCC), COPD (chronic obstructive pulmonary disease) ([facility] Hx of blood clots, Hyperlipidemia, Pain in both knees, Thyroid disease, and Urinary incontinence. has a past surgical history that includes Hysterectomy; joint replacement; lobectomy; Cholecystectomy, laparoscopic; Skin cancer excision; and cyst incision and drainage.     Restrictions  Restrictions/Precautions  Restrictions/Precautions: Fall Risk,Surgical Protocols  Required Braces or Orthoses?: Yes  Required Braces or Orthoses  Spinal: Lumbar Corset  Position Activity Restriction  Spinal Precautions: No Bending,No Lifting,No Twisting  Subjective   General  Chart Reviewed: Yes  Patient assessed for rehabilitation services?: Yes  Response to previous treatment: Patient with no complaints from previous session  Family / Caregiver Present: No  Pre Treatment Pain Screening  Pain at present: 8  Scale Used: Numeric Score  Intervention List: Patient able to continue with treatment  Vital Signs  BP: 112/64  BP Location: Right upper arm  MAP (Calculated): 80   Orientation     Objective    ADL  Grooming: Independent  LE Dressing: Contact guard assistance; Adaptive equipment  Toileting: Stand by assistance        Balance  Sitting Balance: Supervision  Standing Balance: Contact guard assistance  Toilet Transfers  Toilet - Technique: Ambulating  Equipment Used: Grab bars  Toilet Transfer: Contact guard assistance     Transfers  Stand Step Transfers: Contact guard assistance  Sit to stand: Contact guard assistance;Stand by assistance  Stand to sit: Contact guard assistance;Stand by assistance  Transfer Comments: . Plan   Plan  Times per Day: Twice a day  Current Treatment Recommendations: Strengthening,Balance training,Functional mobility training,Endurance training,Equipment evaluation, education, & procurement,Patient/Caregiver education & training,Safety education & training,Self-Care / ADL,Home management training  Goals  Short Term Goals  Time Frame for Short term goals: 1 week  Short Term Goal 1: Supervision with bathing hygiene. Short Term Goal 2: Supervision with clothing management/hygiene for toileting. Short Term Goal 3: Supervision with toilet transfers. Short Term Goal 4: Supervision LB dressing, AE PRN. Short Term Goal 5: Supervision with donning/doffing socks and shoes, AE PRN. Additional Goals?: Yes  Short Term Goal 6: Patient will complete 1-2 handed static standing task for 5 minutes, requiring Supervision. Long Term Goals  Time Frame for Long term goals : 2 weeks  Long Term Goal 1: Modified independent with bathing hygiene. Long Term Goal 2: Modified independent with toileting and toilet transfers. Long Term Goal 3: Modified independent with overall dressing. Long Term Goal 4: Modified independent with ambulatory homemaking tasks. Long Term Goal 5: Patient verbalize DME needs. Patient Goals   Patient goals : Return home independently.        Therapy Time Individual Concurrent Group Co-treatment   Time In       1300   Time Out       1330   Minutes       30   Timed Code Treatment Minutes: 3330 Lovely Alberto  Electronically signed by ADA Denton on 4/23/2022 at 3:44 PM

## 2022-04-23 NOTE — PLAN OF CARE
Problem: Pain:  Description: Pain management should include both nonpharmacologic and pharmacologic interventions.   Goal: Pain level will decrease  Description: Pain level will decrease  4/23/2022 1128 by Leonor Green RN  Outcome: Progressing  4/23/2022 0219 by Antonio Saldaña LPN  Outcome: Progressing   Medicate as ordered

## 2022-04-23 NOTE — PROGRESS NOTES
Occupational Therapy  Facility/Department: [facility] 8 REHAB UNIT  Daily Treatment Note  NAME: Pablito Landaverde  : 1950  MRN: [id number]    Date of Service: 2022    Discharge Recommendations:  Home with Home health OT       Assessment   Performance deficits / Impairments: Decreased functional mobility ; Decreased ADL status; Decreased strength;Decreased endurance;Decreased balance;Decreased high-level IADLs  Assessment: Pt completed toileting with SBA, walked to OT therapy gym and performed shoulder pulleys. Pt practiced donning/doffing socks with use of AE. Pt continues to benefit from skilled OT services. Treatment Diagnosis: Status post 3 stage lumbar fusion  Prognosis: Good  Activity Tolerance  Activity Tolerance: Patient Tolerated treatment well         Patient Diagnosis(es): There were no encounter diagnoses. has a past medical history of Arthritis, Bilateral low back pain without sciatica, Cancer (HCC), COPD (chronic obstructive pulmonary disease) ([facility] [address] Hx of blood clots, Hyperlipidemia, Pain in both knees, Thyroid disease, and Urinary incontinence. has a past surgical history that includes Hysterectomy; joint replacement; lobectomy; Cholecystectomy, laparoscopic; Skin cancer excision; and cyst incision and drainage.     Restrictions  Restrictions/Precautions  Restrictions/Precautions: Fall Risk,Surgical Protocols  Required Braces or Orthoses?: Yes  Required Braces or Orthoses  Spinal: Lumbar Corset  Position Activity Restriction  Spinal Precautions: No Bending,No Lifting,No Twisting  Subjective   General  Chart Reviewed: Yes  Patient assessed for rehabilitation services?: Yes  Response to previous treatment: Patient with no complaints from previous session  Family / Caregiver Present: No  Pre Treatment Pain Screening  Pain at present: 8  Scale Used: Numeric Score  Intervention List: Patient able to continue with treatment   Orientation     Objective    ADL  LE Dressing: Contact guard assistance; Adaptive equipment  Toileting: Stand by assistance              Transfers  Stand Step Transfers: Contact guard assistance  Sit to stand: Stand by assistance;Contact guard assistance  Stand to sit: Stand by assistance;Contact guard assistance                                                           Plan   Plan  Current Treatment Recommendations: Strengthening,Balance training,Functional mobility training,Endurance training,Equipment evaluation, education, & procurement,Patient/Caregiver education & training,Safety education & training,Self-Care / ADL,Home management training  Goals  Short Term Goals  Time Frame for Short term goals: 1 week  Short Term Goal 1: Supervision with bathing hygiene. Short Term Goal 2: Supervision with clothing management/hygiene for toileting. Short Term Goal 3: Supervision with toilet transfers. Short Term Goal 4: Supervision LB dressing, AE PRN. Short Term Goal 5: Supervision with donning/doffing socks and shoes, AE PRN. Additional Goals?: Yes  Short Term Goal 6: Patient will complete 1-2 handed static standing task for 5 minutes, requiring Supervision. Long Term Goals  Time Frame for Long term goals : 2 weeks  Long Term Goal 1: Modified independent with bathing hygiene. Long Term Goal 2: Modified independent with toileting and toilet transfers. Long Term Goal 3: Modified independent with overall dressing. Long Term Goal 4: Modified independent with ambulatory homemaking tasks. Long Term Goal 5: Patient verbalize DME needs. Patient Goals   Patient goals : Return home independently.        Therapy Time   Individual Concurrent Group Co-treatment   Time In       0900   Time Out       1000   Minutes       60   Timed Code Treatment Minutes: Brijesh Espitia  Electronically signed by ADA Fung on 4/23/2022 at 1:15 PM

## 2022-04-23 NOTE — PROGRESS NOTES
Physical Therapy  Daily Note  Name: Venus Brennan  MRN:  [id number]  Date of service:  4/23/2022 04/23/22 1321   Restrictions/Precautions   Restrictions/Precautions Fall Risk;Surgical Protocols   Required Braces or Orthoses? Yes   Required Braces or Orthoses   Spinal Lumbar Corset   Position Activity Restriction   Spinal Precautions No Bending; No Lifting; No Twisting   General   Additional Pertinent Hx Anxiety, Depression, CKD, Tobacco Abuse, COPD, Constipation, GERD, DDD, Chronic Back Pain, Osteoporosis, Dyslipidemia, Neuropathy   Subjective   Subjective Ok what do you want to do now?    Oxygen Therapy   O2 Device None (Room air)   Transfers   Sit to Stand Stand by assistance;Supervision   Stand to sit Stand by assistance;Supervision   Ambulation   WB Status WBAT   Ambulation   Surface level tile   Device Rolling Walker   Assistance Contact guard assistance   Gait Deviations Decreased step length;Decreased arm swing;Decreased head and trunk rotation;Deviated path   Propulsion 1   Distance 200 feet, 150 feet   Exercises   Hip Flexion x  10   Hip Abduction x 10 hip add x 10   Knee Long Arc Quad x 10   Ankle Pumps x 10   Other Activities   Comment worked on balance in bathroom    Short Term Goals   Time Frame for Short term goals 1 Week   Short term goal 1 Supervision Bed Mobility    Short term goal 2 Supervision With Transfers from Surface to Surface    Short term goal 3 Supervision With Amb 100FT With AD    Short term goal 4 Supervision WC Propulsion 250FT   Short term goal 5 CGA Climbing 5 Stairs With Handrail   Long Term Goals   Time Frame for Long term goals  2 Weeks   Long term goal 1 Independent Bed Mobility    Long term goal 2 Independent With Transfers From Surface to Surface    Long term goal 3 Independent With Amb 250FT With AD    Long term goal 4 Independent With WC Propulsion 500FT   Long term goal 5 Independent With Climbing 5 Stairs With Handrail   Conditions Requiring Skilled Therapeutic Intervention   Body Structures, Functions, Activity Limitations Requiring Skilled Therapeutic Intervention Decreased functional mobility ; Decreased tolerance to work activity; Decreased strength;Decreased endurance;Decreased balance; Increased pain;Decreased posture   Assessment Patient doing well with increased gait distance. She was able to perform more exeicses this pm showing increased endurance and decreased pain. Treatment Diagnosis interference with activity   Therapy Prognosis Good   History Anxiety, Depression, CKD, Tobacco Abuse, COPD, Constipation, GERD, DDD, Chronic Back Pain, Osteoporosis, Dyslipidemia, Neuropathy   Exam impaired strength, posture, endurance, functional mobility, balanc. increased pain. Discharge Recommendations Home with Home health PT   Activity Tolerance   Activity Tolerance Patient tolerated treatment well;Patient limited by pain   Plan   Plan 5-7 times per week   Plan weeks 2 Weeks   Current Treatment Recommendations Strengthening;ROM;Balance training;Functional mobility training;Transfer training; Endurance training; Wheelchair mobility training;Gait training;Stair training;Pain management;Home exercise program;Safety education & training;Patient/Caregiver education & training;Equipment evaluation, education, & procurement; Therapeutic activities; Return to work related activity   Safety Devices   Type of Devices Call light within reach; Chair alarm in place; Left in chair     Electronically signed by Maryellen Blanco PTA on 4/23/2022 at 4:33 PM

## 2022-04-23 NOTE — PROGRESS NOTES
Patient:   Venus Brennan  MR#:    [id number]   Room:    6435/016-38   YOB: 1950  Date of Progress Note: 4/23/2022  Time of Note                           9:54 AM  Consulting Physician:   Rowdy Muro M.D. Attending Physician:  Rowdy Muro MD     Chief complaint Status post 3 stage lumbar fusion    S:This 66 y.o. female  with history of anxiety, depression, CKD, tobacco abuse, COPD, constipation, GERD, DDD, chronic back pain, osteoporosis, dyslipidemia and neuropathy. She was seen as outpatient by orthopedic spine surgeon Dr. Teodoro Velázquez d/t increasing back pain, bilateral buttock, thigh, and leg radiculopathy left worse than right. MRI done showed multilevel thoracolumbar degenerative disk disease severe L4-S1 and disc herniation at L5-S1 with stenosis with facet arthropathy, and neurogenic claudication. Dr. Teodoro Velázquez recommended a 3 stage repair. Patient was in agreement and was admitted to [facility] on 4/11/22 and underwent stage 1 anterior discectomy decompression with bilateral neural foraminotomy L5-S1, and anterior lumbar interbody fusion of L5-S1. She tolerated the procedure well. She returned to surgery on 4/13/22 for stage 2 right lateral lumbar interbody fusion L4-5 on 4/13/22. She tolerated this procedure well. She returned to surgery for the stage 3 Posterior spinal fusion L4-S1 on 4/15/22. She tolerated this procedure well. She continues to c/o pain at 8/10 being controlled with PO medications. She is participating in both PT/OT. She is felt to need a stay on Rehab to work towards her goal of returning home with assist of her family. She is now felt ready to start the Rehab program.   Overall doing well.       REVIEW OF SYSTEMS:  Constitutional: No fevers No chills  Neck:No stiffness  Respiratory: No shortness of breath  Cardiovascular: No chest pain No palpitations  Gastrointestinal: No abdominal pain    Genitourinary: No Dysuria  Neurological: No headache, no confusion    Past Medical History:      Diagnosis Date    Arthritis     Bilateral low back pain without sciatica 12/22/2015    Cancer ([facility] [address]     COPD (chronic obstructive pulmonary disease) ([facility] [address] 4/20/2022    Hx of blood clots     Hyperlipidemia     Pain in both knees 12/22/2015    Thyroid disease     Urinary incontinence        Past Surgical History:      Procedure Laterality Date    CHOLECYSTECTOMY, LAPAROSCOPIC      CYST INCISION AND DRAINAGE      left Cheek    HYSTERECTOMY      JOINT REPLACEMENT      LOBECTOMY      RIGHT PARTIAL    SKIN CANCER EXCISION         Medications in Hospital:      Current Facility-Administered Medications:     hydrocortisone 1 % cream, , Topical, BID, Raina Chacon MD, Given at 04/23/22 0824    tiZANidine (ZANAFLEX) tablet 6 mg, 6 mg, Oral, Q4H PRN, Raina Chacon MD, 6 mg at 04/23/22 9413    ALPRAZolam (XANAX) tablet 0.5 mg, 0.5 mg, Oral, TID PRN, Raina Chacon MD, 0.5 mg at 04/22/22 2212    dicyclomine (BENTYL) capsule 10 mg, 10 mg, Oral, TID PRN, Raina Chacon MD    escitalopram (LEXAPRO) tablet 10 mg, 10 mg, Oral, Daily, Raina Chacon MD, 10 mg at 04/23/22 5487    levothyroxine (SYNTHROID) tablet 100 mcg, 100 mcg, Oral, Daily, Raina Chacon MD, 100 mcg at 04/23/22 0821    linaclotide (LINZESS) capsule 290 mcg, 290 mcg, Oral, Daily, Raina Chacon MD, 290 mcg at 04/23/22 0821    nystatin (MYCOSTATIN) 521813 UNIT/ML suspension 500,000 Units, 500,000 Units, Oral, 4x Daily, Raina Chacon MD, 500,000 Units at 04/23/22 0821    oxyCODONE-acetaminophen (PERCOCET)  MG per tablet 1 tablet, 1 tablet, Oral, Q4H PRN, Raina Chacon MD, 1 tablet at 04/23/22 0827    pantoprazole (PROTONIX) tablet 40 mg, 40 mg, Oral, Daily, Raina Chacon MD, 40 mg at 04/23/22 0821    pregabalin (LYRICA) capsule 75 mg, 75 mg, Oral, Q12H, Raina Chacon MD, 75 mg at 04/23/22 0415    promethazine (PHENERGAN) tablet 25 mg, 25 mg, Oral, BID PRN, Raina Chacon MD    sucralfate (CARAFATE) tablet 1 g, 1 g, Oral, TID PRN, Rashard Duenas MD    acetaminophen (TYLENOL) tablet 650 mg, 650 mg, Oral, Q4H PRN, Rashard Duenas MD, 650 mg at 04/20/22 1111    polyethylene glycol (GLYCOLAX) packet 17 g, 17 g, Oral, Daily PRN, Rashard Duenas MD, 17 g at 04/22/22 0759    Allergies:  Sulfamethoxazole-trimethoprim, Codeine, Erythromycin, Pcn [penicillins], Talwin [pentazocine], and Sulfa antibiotics    Social History:   TOBACCO:   reports that she has been smoking cigarettes. She has a 15.00 pack-year smoking history. She has never used smokeless tobacco.  ETOH:   reports no history of alcohol use. Family History:       Problem Relation Age of Onset   [place] Cancer Mother     Hypertension Mother     Heart Attack Father          PHYSICAL EXAM:  BP (!) 124/45   Pulse (!) 43   Temp 97.2 °F (36.2 °C) (Temporal)   Resp 16   Ht 5' 5\" (1.651 m)   Wt 150 lb 3.2 oz (68.1 kg)   SpO2 94%   BMI 24.99 kg/m²     Constitutional - well developed, well nourished. Eyes - conjunctiva normal.   Ear, nose, throat - No scars, masses, or lesions over external nose or ears, no atrophy of tongue  Neck-symmetric, no masses noted, no jugular vein distension  Respiration- chest wall appears symmetric, good expansion,   normal effort without use of accessory muscles  Musculoskeletal - no significant wasting of muscles noted, no bony deformities  Extremities-no clubbing, cyanosis or edema  Skin - warm, dry, and intact. No rash, erythema, or pallor.   Psychiatric - mood, affect, and behavior appear normal.      Neurological exam  Awake, alert, fluent oriented appropriate affect  Attention and concentration appear appropriate  Recent and remote memory appears unremarkable  Speech normal without dysarthria  No clear issues with language of fund of knowledge     Cranial Nerve Exam     CN III, IV,VI-EOMI, No nystagmus, conjugate eye movements, no ptosis    CN VII-no facial assymetry       Motor Exam  antigravity throughout upper and lower extremities bilaterally      Tremors- no tremors in hands or head noted     Gait  Not tested     Nursing/pcp notes, imaging,labs and vitals reviewed. PT,OT and/or speech notes reviewed    Lab Results   Component Value Date    WBC 5.9 04/21/2022    HGB 9.2 (L) 04/21/2022    HCT 29.2 (L) 04/21/2022    MCV 92.1 04/21/2022     04/21/2022     Lab Results   Component Value Date     04/21/2022    K 4.0 04/21/2022     04/21/2022    CO2 27 04/21/2022    BUN 8 04/21/2022    CREATININE 0.7 04/21/2022    GLUCOSE 95 04/21/2022    CALCIUM 8.3 (L) 04/21/2022    PROT 5.5 (L) 04/21/2022    LABALBU 2.9 (L) 04/21/2022    BILITOT 0.3 04/21/2022    ALKPHOS 52 04/21/2022    AST 10 04/21/2022    ALT 7 04/21/2022    LABGLOM >60 04/21/2022    GFRAA >59 04/21/2022     Lab Results   Component Value Date    INR 1.09 04/19/2022    PROTIME 14.0 04/19/2022       Selma Loyola, PT   Physical Therapist   Physical Therapy   Progress Notes       Signed   Date of Service:  4/21/2022  9:58 AM                 Signed              Show:Clear all  []Manual[x]Template[]Copied    Added by:  [x]Ryley Silva, PT      []Mayco for details         04/21/22 0900   Assessment   Assessment PATIENT AMBULATING BACK TO BED WITH PCA UPON ENTERING. GRIMACING, AUDIBLY DISPLAYING PAIN EACH STEP. REPORTS 10/10 PAIN. DUE TO UNDEGO LUMBAR SPINE XRAY. MEDICAL HOLD PER MD ORDER DUE TO PAIN, PENDING XRAYS. NURSING NOTIFIED OF PATIENT'S DESIRE FOR PAIN PILL.                    RECORD REVIEW: Previous medical records, medications were reviewed at today's visit    IMPRESSION:   1. Status post 3 stage lumbar fusion-pain control/mobilization  2. Mood disorder-Lexapro/Xanax as needed  3. Hypothyroidism-Synthroid  4. GI/GERD- bowel regimen/PPI  5. Back pain-Lyrica/Percocet as needed/Zanaflex as needed  6. COPD/history of smoking-monitor  7.   PT/OT    x-ray of the lumbar spine-no acute changes    Continue current care      Expected duration and frequency therapy: 180 minutes per day, 5 days per week    Clare Correa  [phone] CELL  Dr Benjie Johnston

## 2022-04-24 PROCEDURE — 6370000000 HC RX 637 (ALT 250 FOR IP): Performed by: PSYCHIATRY & NEUROLOGY

## 2022-04-24 PROCEDURE — 1180000000 HC REHAB R&B

## 2022-04-24 PROCEDURE — 99232 SBSQ HOSP IP/OBS MODERATE 35: CPT | Performed by: PSYCHIATRY & NEUROLOGY

## 2022-04-24 RX ADMIN — POLYETHYLENE GLYCOL 3350 17 G: 17 POWDER, FOR SOLUTION ORAL at 08:07

## 2022-04-24 RX ADMIN — PREGABALIN 75 MG: 75 CAPSULE ORAL at 04:52

## 2022-04-24 RX ADMIN — NYSTATIN 500000 UNITS: 100000 SUSPENSION ORAL at 16:05

## 2022-04-24 RX ADMIN — LEVOTHYROXINE SODIUM 100 MCG: 100 TABLET ORAL at 08:02

## 2022-04-24 RX ADMIN — PREGABALIN 75 MG: 75 CAPSULE ORAL at 16:05

## 2022-04-24 RX ADMIN — ALPRAZOLAM 0.5 MG: 0.5 TABLET ORAL at 23:39

## 2022-04-24 RX ADMIN — OXYCODONE AND ACETAMINOPHEN 1 TABLET: 10; 325 TABLET ORAL at 12:03

## 2022-04-24 RX ADMIN — PANTOPRAZOLE SODIUM 40 MG: 40 TABLET, DELAYED RELEASE ORAL at 08:02

## 2022-04-24 RX ADMIN — HYDROCORTISONE: 1 CREAM TOPICAL at 20:02

## 2022-04-24 RX ADMIN — NYSTATIN 500000 UNITS: 100000 SUSPENSION ORAL at 08:02

## 2022-04-24 RX ADMIN — TIZANIDINE 6 MG: 4 TABLET ORAL at 00:59

## 2022-04-24 RX ADMIN — TIZANIDINE 6 MG: 4 TABLET ORAL at 16:19

## 2022-04-24 RX ADMIN — TIZANIDINE 6 MG: 4 TABLET ORAL at 20:02

## 2022-04-24 RX ADMIN — NYSTATIN 500000 UNITS: 100000 SUSPENSION ORAL at 12:04

## 2022-04-24 RX ADMIN — TIZANIDINE 6 MG: 4 TABLET ORAL at 04:51

## 2022-04-24 RX ADMIN — HYDROCORTISONE: 1 CREAM TOPICAL at 08:01

## 2022-04-24 RX ADMIN — LINACLOTIDE 290 MCG: 145 CAPSULE, GELATIN COATED ORAL at 08:02

## 2022-04-24 RX ADMIN — OXYCODONE AND ACETAMINOPHEN 1 TABLET: 10; 325 TABLET ORAL at 00:57

## 2022-04-24 RX ADMIN — TIZANIDINE 6 MG: 4 TABLET ORAL at 12:03

## 2022-04-24 RX ADMIN — OXYCODONE AND ACETAMINOPHEN 1 TABLET: 10; 325 TABLET ORAL at 20:01

## 2022-04-24 RX ADMIN — OXYCODONE AND ACETAMINOPHEN 1 TABLET: 10; 325 TABLET ORAL at 16:17

## 2022-04-24 RX ADMIN — OXYCODONE AND ACETAMINOPHEN 1 TABLET: 10; 325 TABLET ORAL at 04:52

## 2022-04-24 RX ADMIN — ESCITALOPRAM OXALATE 10 MG: 10 TABLET ORAL at 08:02

## 2022-04-24 ASSESSMENT — PAIN SCALES - GENERAL
PAINLEVEL_OUTOF10: 8
PAINLEVEL_OUTOF10: 10
PAINLEVEL_OUTOF10: 9
PAINLEVEL_OUTOF10: 4
PAINLEVEL_OUTOF10: 3
PAINLEVEL_OUTOF10: 3
PAINLEVEL_OUTOF10: 9
PAINLEVEL_OUTOF10: 10

## 2022-04-24 ASSESSMENT — PAIN DESCRIPTION - LOCATION
LOCATION: BACK;LEG
LOCATION: BACK
LOCATION: BACK
LOCATION: BACK;LEG

## 2022-04-24 ASSESSMENT — PAIN DESCRIPTION - DESCRIPTORS
DESCRIPTORS: SHARP
DESCRIPTORS: DULL;SHARP;ACHING

## 2022-04-24 ASSESSMENT — PAIN - FUNCTIONAL ASSESSMENT
PAIN_FUNCTIONAL_ASSESSMENT: ACTIVITIES ARE NOT PREVENTED

## 2022-04-24 ASSESSMENT — PAIN DESCRIPTION - ORIENTATION
ORIENTATION: LEFT
ORIENTATION: LOWER;LEFT;RIGHT
ORIENTATION: LOWER
ORIENTATION: LEFT

## 2022-04-24 NOTE — PLAN OF CARE
Problem: Pain:  Goal: Pain level will decrease  Description: Pain level will decrease  4/23/2022 2357 by Chrystine Schlatter, LPN  Outcome: Progressing  4/23/2022 1128 by Pino Harris RN  Outcome: Progressing  Goal: Control of acute pain  Description: Control of acute pain  4/23/2022 2357 by Chrystine Schlatter, LPN  Outcome: Progressing  4/23/2022 1128 by Pino Harris RN  Outcome: Progressing  Goal: Control of chronic pain  Description: Control of chronic pain  4/23/2022 2357 by Chrystine Schlatter, LPN  Outcome: Progressing  4/23/2022 1128 by Pino Harris RN  Outcome: Progressing  Goal: Patient's pain/discomfort is manageable  Description: Patient's pain/discomfort is manageable  4/23/2022 2357 by Chrystine Schlatter, LPN  Outcome: Progressing  4/23/2022 1128 by Pino Harris RN  Outcome: Progressing     Problem: Falls - Risk of:  Goal: Will remain free from falls  Description: Will remain free from falls  4/23/2022 2357 by Chrystine Schlatter, LPN  Outcome: Progressing  4/23/2022 1128 by Pino Harris RN  Outcome: Progressing  Goal: Absence of physical injury  Description: Absence of physical injury  4/23/2022 2357 by Chrystine Schlatter, LPN  Outcome: Progressing  4/23/2022 1128 by Pino Harris RN  Outcome: Progressing     Problem: Mobility - Impaired:  Goal: Mobility will improve  Description: Mobility will improve  4/23/2022 2357 by Chrystine Schlatter, LPN  Outcome: Progressing  4/23/2022 1128 by Pino Harris RN  Outcome: Progressing     Problem: Infection:  Goal: Will remain free from infection  Description: Will remain free from infection  4/23/2022 2357 by Chrystine Schlatter, LPN  Outcome: Progressing  4/23/2022 1128 by Pino Harris RN  Outcome: Progressing     Problem: Safety:  Goal: Free from accidental physical injury  Description: Free from accidental physical injury  4/23/2022 2357 by Chrystine Schlatter, LPN  Outcome: Progressing  4/23/2022 1128 by Pino Harris RN  Outcome: Progressing  Goal: Free from intentional harm  Description: Free from intentional harm  4/23/2022 2357 by Moustapha Ponce LPN  Outcome: Progressing  4/23/2022 1128 by Rafat Glaser RN  Outcome: Progressing     Problem: Daily Care:  Goal: Daily care needs are met  Description: Daily care needs are met  4/23/2022 2357 by Moustapha Ponce LPN  Outcome: Progressing  4/23/2022 1128 by Rafat Glaser RN  Outcome: Progressing     Problem: Skin Integrity:  Goal: Skin integrity will stabilize  Description: Skin integrity will stabilize  4/23/2022 2357 by Moustapha Ponce LPN  Outcome: Progressing  4/23/2022 1128 by Rafat Glaser RN  Outcome: Progressing     Problem: Discharge Planning:  Goal: Patients continuum of care needs are met  Description: Patients continuum of care needs are met  4/23/2022 2357 by Moustapha Ponce LPN  Outcome: Progressing  4/23/2022 1128 by Rafat Glaser RN  Outcome: Progressing     Problem: IP BLADDER/VOIDING  Goal: LTG - patient will complete bladder elimination  4/23/2022 2357 by Moustapha Ponce LPN  Outcome: Progressing  4/23/2022 1128 by Rafat Glaser RN  Outcome: Progressing  Goal: LTG - Patient will utilize adaptive techniques/equipment to complete bladder elimination  4/23/2022 2357 by Moustahpa Ponce LPN  Outcome: Progressing  4/23/2022 1128 by Rafat Glaser RN  Outcome: Progressing  Goal: LTG - patient will achieve acceptable level of continence  4/23/2022 2357 by Moustapha Ponce LPN  Outcome: Progressing  4/23/2022 1128 by Rafat Glaser RN  Outcome: Progressing  Goal: STG - Patient demonstrates ability to take care of indwelling catheter  4/23/2022 2357 by Moustapha Ponce LPN  Outcome: Progressing  4/23/2022 1128 by Rafat Glaser RN  Outcome: Progressing  Goal: STG - patient demonstrates self-cath technique using clean technique and care of the catheter  4/23/2022 2357 by Moustapha Ponce LPN  Outcome: Progressing  4/23/2022 1128 by Safia Lockett Alisha Lee RN  Outcome: Progressing  Goal: STG - Patient demonstrates no accidents  4/23/2022 2357 by Nedra Farr LPN  Outcome: Progressing  4/23/2022 1128 by Meliza Floyd RN  Outcome: Progressing  Goal: STG - Patient will state signs and symptoms of UTI  4/23/2022 2357 by Nedra Farr LPN  Outcome: Progressing  4/23/2022 1128 by Meliza Floyd RN  Outcome: Progressing  Goal: STG - patient will be able to empty bladder  4/23/2022 2357 by Nedra Farr LPN  Outcome: Progressing  4/23/2022 1128 by Meliza Floyd RN  Outcome: Progressing  Goal: STG - Patient demonstrates understanding of self catheterization schedule by completing task on time  4/23/2022 2357 by Nedra Farr LPN  Outcome: Progressing  4/23/2022 1128 by Meliza Floyd RN  Outcome: Progressing  Goal: STG - patient participates in bladder program by expressing need to void  4/23/2022 2357 by Nedra Farr LPN  Outcome: Progressing  4/23/2022 1128 by Meliza Floyd RN  Outcome: Progressing  Goal: STG - Patient verbalizes understanding of catheter care indwelling/intermittent  4/23/2022 2357 by Nedra Farr LPN  Outcome: Progressing  4/23/2022 1128 by Meliza Floyd RN  Outcome: Progressing  Goal: STG - patient participates in bladder program by adhering to implemented toileting schedule  4/23/2022 2357 by Nedra Farr LPN  Outcome: Progressing  4/23/2022 1128 by Meliza Floyd RN  Outcome: Progressing     Problem: IP BOWEL ELIMINATION  Goal: LTG - patient will utilize adaptive techniques/equipment to complete bowel elimination  4/23/2022 2357 by Nedra Farr LPN  Outcome: Progressing  4/23/2022 1128 by Meliza Floyd RN  Outcome: Progressing  Goal: LTG - patient will have regular and routine bowel evacuation  4/23/2022 2357 by Nedra Farr LPN  Outcome: Progressing  4/23/2022 1128 by Meliza Floyd RN  Outcome: Progressing  Goal: STG - patient will be accident free  4/23/2022 2357 by Keshia Cuellar LPN  Outcome: Progressing  4/23/2022 1128 by Cole Jordan RN  Outcome: Progressing  Goal: STG - Patient demonstrates care and management of ostomy bag  4/23/2022 2357 by Keshia Cuellar LPN  Outcome: Progressing  4/23/2022 1128 by Cole Jordan RN  Outcome: Progressing  Goal: STG - Patient participates in bowel management program  4/23/2022 2357 by Keshia Cuellar LPN  Outcome: Progressing  4/23/2022 1128 by Cole Jordan RN  Outcome: Progressing  Goal: STG - patient maintains skin integrity  4/23/2022 2357 by Keshia Cuellar LPN  Outcome: Progressing  4/23/2022 1128 by Cole Jordan RN  Outcome: Progressing  Goal: STG - Patient will verbalize signs and symptoms of constipation and how to prevent/alleviate  4/23/2022 2357 by Keshia Cuellar LPN  Outcome: Progressing  4/23/2022 1128 by Cole Jordan RN  Outcome: Progressing  Goal: STG - patient will be continent of stool  4/23/2022 2357 by Keshia Cuellar LPN  Outcome: Progressing  4/23/2022 1128 by Cole Jordan RN  Outcome: Progressing  Goal: STG - Patient completes digital stimulation technique  4/23/2022 2357 by Keshia Cuellar LPN  Outcome: Progressing  4/23/2022 1128 by Cole Jordan RN  Outcome: Progressing  Goal: STG - Patient verbalizes knowledge about relationship between diet, fluid intake, activity and medication on constipation  4/23/2022 2357 by Keshia Cuellar LPN  Outcome: Progressing  4/23/2022 1128 by Cole Jordan RN  Outcome: Progressing     Problem: IP BREATHING  Goal: LTG - patient will mobilize secretions and maintain airway  4/23/2022 2357 by Keshia Cuellar LPN  Outcome: Progressing  4/23/2022 1128 by Cole Jordan RN  Outcome: Progressing  Goal: LTG - Patient/caregiver will demonstrate/perform proper techniques to maintain patent airway  4/23/2022 2357 by Keshia Cuellar LPN  Outcome: Progressing  4/23/2022 1128 by Cole Jordan RN  Outcome: Progressing  Goal: LTG - patient/caregiver will demonstrate/perform improved or stable self care abilities within physical limitations  4/23/2022 2357 by Antonio Saldaña LPN  Outcome: Progressing  4/23/2022 1128 by Leonor Green RN  Outcome: Progressing  Goal: STG - Patient/caregiver will maintain patent airway  4/23/2022 2357 by Antonio Saldaña LPN  Outcome: Progressing  4/23/2022 1128 by Leonor Green RN  Outcome: Progressing  Goal: STG - respiratory rate and effort will be within normal limits for the patient  4/23/2022 2357 by Antonio Saldaña LPN  Outcome: Progressing  4/23/2022 1128 by Leonor Green RN  Outcome: Progressing  Goal: STG - patient/caregiver will be able to verbalize oxygen safety precautions  4/23/2022 2357 by Antonio Saldaña LPN  Outcome: Progressing  4/23/2022 1128 by Leonor Green RN  Outcome: Progressing  Goal: STG - Patient/caregiver demonstrates correct suctioning technique  4/23/2022 2357 by Antonio Saldaña LPN  Outcome: Progressing  4/23/2022 1128 by Leonor Green RN  Outcome: Progressing  Goal: STG - patient will utilize incentive spirometer  4/23/2022 2357 by Antonio Saldaña LPN  Outcome: Progressing  4/23/2022 1128 by Leonor Green RN  Outcome: Progressing  Goal: STG - Patient performs or directs assisted coughing  4/23/2022 2357 by Antonio Saldaña LPN  Outcome: Progressing  4/23/2022 1128 by Leonor Green RN  Outcome: Progressing  Goal: STG - patient can administer MDI's  4/23/2022 4614 by Antonio Saldaña LPN  Outcome: Progressing  4/23/2022 1128 by Leonor Green RN  Outcome: Progressing  Goal: STG - Patient can utilize incentive spirometer  4/23/2022 2357 by Antonio Saldaña LPN  Outcome: Progressing  4/23/2022 1128 by Leonor Green RN  Outcome: Progressing  Goal: STG - family can complete suctioning  4/23/2022 2357 by Antonio Saldaña LPN  Outcome: Progressing  4/23/2022 1128 by Leonor Green RN  Outcome: Progressing     Problem: NUTRITION  Goal: Patient maintains adequate hydration  4/23/2022 2357 by Paula Solorzano LPN  Outcome: Progressing  4/23/2022 1128 by Matthew Israel RN  Outcome: Progressing  Goal: Patient maintains weight  4/23/2022 2357 by Paula Solorzano LPN  Outcome: Progressing  4/23/2022 1128 by Matthew Israel RN  Outcome: Progressing  Goal: Patient/Family demonstrates understanding of diet  4/23/2022 2357 by Paula Solorzano LPN  Outcome: Progressing  4/23/2022 1128 by Matthew Israel RN  Outcome: Progressing  Goal: Patient/Family independently completes tube feeding  4/23/2022 2357 by Paula Solorzano, LPN  Outcome: Progressing  4/23/2022 1128 by Matthew Israel RN  Outcome: Progressing  Goal: Patient will have no more than 5 lb weight change during LOS  4/23/2022 2357 by Paula Solorzano LPN  Outcome: Progressing  4/23/2022 1128 by Matthew Israel RN  Outcome: Progressing  Goal: Patient will utilize adaptive techniques to administer nutrition  4/23/2022 2357 by Paula Solorzano LPN  Outcome: Progressing  4/23/2022 1128 by Matthew Israel RN  Outcome: Progressing  Goal: Patient will verbalize dietary restrictions  4/23/2022 2357 by Paula Solorzano LPN  Outcome: Progressing  4/23/2022 1128 by Matthew Israel RN  Outcome: Progressing     Problem: SAFETY  Goal: LTG - patient will adhere to hip precautions during ADL's and transfers  4/23/2022 2357 by Paula Solorzano LPN  Outcome: Progressing  4/23/2022 1128 by Matthew Israel RN  Outcome: Progressing  Goal: LTG - Patient will demonstrate safety requirements appropriate to situation/environment  4/23/2022 2357 by Paula Solorzano LPN  Outcome: Progressing  4/23/2022 1128 by Matthew Israel RN  Outcome: Progressing  Goal: LTG - patient will utilize safety techniques  4/23/2022 2357 by Paula Solorzano LPN  Outcome: Progressing  4/23/2022 1128 by Matthew Israel RN  Outcome: Progressing  Goal: STG - patient locks brakes on wheelchair  4/23/2022 2357 by Alpesh Barone [place], LPN  Outcome: Progressing  4/23/2022 1128 by Davina Hartman RN  Outcome: Progressing  Goal: STG - Patient uses call light consistently to request assistance with transfers  4/23/2022 2357 by Sushil Callaway LPN  Outcome: Progressing  4/23/2022 1128 by Davina Hartman RN  Outcome: Progressing  Goal: STG - patient uses gait belt during all transfers  4/23/2022 2357 by Sushil Callaway LPN  Outcome: Progressing  4/23/2022 1128 by Davina Hartman RN  Outcome: Progressing     Problem: SKIN INTEGRITY  Goal: LTG - Patient will be free from infection  4/23/2022 2357 by Sushil Callaway LPN  Outcome: Progressing  4/23/2022 1128 by Davina Hartman RN  Outcome: Progressing  Goal: LTG - patient will maintain/improve skin integrity through proper skin care techniques  4/23/2022 2357 by Sushil Callaway LPN  Outcome: Progressing  4/23/2022 1128 by Davina Hartman RN  Outcome: Progressing  Goal: LTG - Patient will demonstrate appropriate pressure relief techniques  4/23/2022 2357 by Sushil Callaway LPN  Outcome: Progressing  4/23/2022 1128 by Davina Hartman RN  Outcome: Progressing  Goal: LTG - patient will demonstrate appropriate skin care techniques  4/23/2022 2357 by Sushil Callaway LPN  Outcome: Progressing  4/23/2022 1128 by Davina Hartman RN  Outcome: Progressing  Goal: LTG - Patient will be free from infection  4/23/2022 2357 by Sushil Callaway LPN  Outcome: Progressing  4/23/2022 1128 by Davina Hartman RN  Outcome: Progressing  Goal: STG - Patient demonstrates skin care/treatment/dressing change  4/23/2022 2357 by uSshil Callaway LPN  Outcome: Progressing  4/23/2022 1128 by Davina Hartman RN  Outcome: Progressing  Goal: STG - patient will maintain good skin integrity  4/23/2022 2357 by Sushil Callaway LPN  Outcome: Progressing  4/23/2022 1128 by Davina Hartman RN  Outcome: Progressing  Goal: STG - Patient exhibits signs of wound healing.  4/23/2022 2357 by Sushil Callaway LPN  Outcome: Progressing  4/23/2022 1128 by Josephine Lyman RN  Outcome: Progressing  Goal: STG - patient demonstrates pressure reduction techniques  4/23/2022 2357 by Shar Sepulveda LPN  Outcome: Progressing  4/23/2022 1128 by Josephine Lyman RN  Outcome: Progressing  Goal: STG - Patient demonstrates preventative skin care measures  4/23/2022 2357 by Shar Sepulveda LPN  Outcome: Progressing  4/23/2022 1128 by Josephine Lyman RN  Outcome: Progressing     Problem: PAIN  Goal: LTG - Patient will manage pain with the appropriate technique/Intervention  4/23/2022 2357 by Shar Sepulveda LPN  Outcome: Progressing  4/23/2022 1128 by Josephine Lyman RN  Outcome: Progressing  Goal: LTG - Patient will demonstrate intervention for managing pain  4/23/2022 2357 by Shar Sepulveda LPN  Outcome: Progressing  4/23/2022 1128 by Josephine Lyman RN  Outcome: Progressing  Goal: STG - Patient will reduce or eliminate use of analgesics  4/23/2022 2357 by Shar Sepulveda LPN  Outcome: Progressing  4/23/2022 1128 by Josephine Lyman RN  Outcome: Progressing  Goal: STG - pain is manageable through therapies  4/23/2022 2357 by Shar Sepulveda LPN  Outcome: Progressing  4/23/2022 1128 by Josephine Lyman RN  Outcome: Progressing  Goal: STG - Patient will verbalize an acceptable level of pain  4/23/2022 2357 by Shar Sepulveda LPN  Outcome: Progressing  4/23/2022 1128 by Josephine Lyman RN  Outcome: Progressing  Goal: STG - patients pain is managed to allow active participation in daily activities  4/23/2022 2357 by Shar Sepulveda LPN  Outcome: Progressing  4/23/2022 1128 by Josephine Lyman RN  Outcome: Progressing  Goal: STG - Patient will increase activity level  4/23/2022 2357 by Shar Sepulveda LPN  Outcome: Progressing  4/23/2022 1128 by Josephine Lyman RN  Outcome: Progressing  Goal: STG - patient verbalizes a reduction in pain level  4/23/2022 2357 by Shar Sepulveda LPN  Outcome: Progressing  4/23/2022 1128 by Rafat Glaser RN  Outcome: Progressing

## 2022-04-24 NOTE — PROGRESS NOTES
Patient:   Esme Mendez  MR#:    [id number]   Room:    [facility]/726-   YOB: 1950  Date of Progress Note: 4/24/2022  Time of Note                           10:16 AM  Consulting Physician:   Antonia Recinos M.D. Attending Physician:  Antonia Recinos MD     Chief complaint Status post 3 stage lumbar fusion    S:This 66 y.o. female  with history of anxiety, depression, CKD, tobacco abuse, COPD, constipation, GERD, DDD, chronic back pain, osteoporosis, dyslipidemia and neuropathy. She was seen as outpatient by orthopedic spine surgeon Dr. Dave Smith d/t increasing back pain, bilateral buttock, thigh, and leg radiculopathy left worse than right. MRI done showed multilevel thoracolumbar degenerative disk disease severe L4-S1 and disc herniation at L5-S1 with stenosis with facet arthropathy, and neurogenic claudication. Dr. Dave Smith recommended a 3 stage repair. Patient was in agreement and was admitted to [facility] on 4/11/22 and underwent stage 1 anterior discectomy decompression with bilateral neural foraminotomy L5-S1, and anterior lumbar interbody fusion of L5-S1. She tolerated the procedure well. She returned to surgery on 4/13/22 for stage 2 right lateral lumbar interbody fusion L4-5 on 4/13/22. She tolerated this procedure well. She returned to surgery for the stage 3 Posterior spinal fusion L4-S1 on 4/15/22. She tolerated this procedure well. She continues to c/o pain at 8/10 being controlled with PO medications. She is participating in both PT/OT. She is felt to need a stay on Rehab to work towards her goal of returning home with assist of her family. She is now felt ready to start the Rehab program.   No new complaints. Feels relatively comfortable in terms of her back.       REVIEW OF SYSTEMS:  Constitutional: No fevers No chills  Neck:No stiffness  Respiratory: No shortness of breath  Cardiovascular: No chest pain No palpitations  Gastrointestinal: No abdominal pain    Genitourinary: No Dysuria  Neurological: No headache, no confusion    Past Medical History:      Diagnosis Date    Arthritis     Bilateral low back pain without sciatica 12/22/2015    [facility] [address]     [facility]    Hx of blood clots     Hyperlipidemia     Pain in both knees 12/22/2015    Thyroid disease     Urinary incontinence        Past Surgical History:      Procedure Laterality Date    CHOLECYSTECTOMY, LAPAROSCOPIC      CYST INCISION AND DRAINAGE      left Cheek    HYSTERECTOMY      JOINT REPLACEMENT      LOBECTOMY      RIGHT PARTIAL    SKIN CANCER EXCISION         Medications in Hospital:      Current Facility-Administered Medications:     hydrocortisone 1 % cream, , Topical, BID, Joan Cordova MD, Given at 04/24/22 0801    tiZANidine (ZANAFLEX) tablet 6 mg, 6 mg, Oral, Q4H PRN, Joan Cordova MD, 6 mg at 04/24/22 0451    ALPRAZolam (XANAX) tablet 0.5 mg, 0.5 mg, Oral, TID PRN, Joan Cordova MD, 0.5 mg at 04/23/22 1928    dicyclomine (BENTYL) capsule 10 mg, 10 mg, Oral, TID PRN, Joan Cordova MD    escitalopram (LEXAPRO) tablet 10 mg, 10 mg, Oral, Daily, Joan Cordova MD, 10 mg at 04/24/22 0802    levothyroxine (SYNTHROID) tablet 100 mcg, 100 mcg, Oral, Daily, Joan Cordova MD, 100 mcg at 04/24/22 0802    linaclotide (LINZESS) capsule 290 mcg, 290 mcg, Oral, Daily, Joan Cordova MD, 290 mcg at 04/24/22 0802    nystatin (MYCOSTATIN) 838774 UNIT/ML suspension 500,000 Units, 500,000 Units, Oral, 4x Daily, Joan Cordova MD, 500,000 Units at 04/24/22 0802    oxyCODONE-acetaminophen (PERCOCET)  MG per tablet 1 tablet, 1 tablet, Oral, Q4H PRN, Joan Cordova MD, 1 tablet at 04/24/22 0452    pantoprazole (PROTONIX) tablet 40 mg, 40 mg, Oral, Daily, Joan Cordova MD, 40 mg at 04/24/22 0802    pregabalin (LYRICA) capsule 75 mg, 75 mg, Oral, Q12H, Joan Cordova MD, 75 mg at 04/24/22 0452    promethazine (PHENERGAN) tablet 25 mg, 25 mg, Oral, BID PRN, Antonia Recinos MD    sucralfate (CARAFATE) tablet 1 g, 1 g, Oral, TID PRN, Antonia Recinos MD    acetaminophen (TYLENOL) tablet 650 mg, 650 mg, Oral, Q4H PRN, Antonia Recinos MD, 650 mg at 04/20/22 1111    polyethylene glycol (GLYCOLAX) packet 17 g, 17 g, Oral, Daily PRN, Antonia Recinos MD, 17 g at 04/24/22 0807    Allergies:  Sulfamethoxazole-trimethoprim, Codeine, Erythromycin, Pcn [penicillins], Talwin [pentazocine], and Sulfa antibiotics    Social History:   TOBACCO:   reports that she has been smoking cigarettes. She has a 15.00 pack-year smoking history. She has never used smokeless tobacco.  ETOH:   reports no history of alcohol use. Family History:       Problem Relation Age of Onset   [facility] Sp[facility] Cancer Mother     Hypertension Mother     Heart Attack Father          PHYSICAL EXAM:  BP (!) 110/45   Pulse 53   Temp 95.9 °F (35.5 °C) (Temporal)   Resp 16   Ht 5' 5\" (1.651 m)   Wt 153 lb (69.4 kg)   SpO2 90%   BMI 25.46 kg/m²     Constitutional - well developed, well nourished. Eyes - conjunctiva normal.   Ear, nose, throat - No scars, masses, or lesions over external nose or ears, no atrophy of tongue  Neck-symmetric, no masses noted, no jugular vein distension  Respiration- chest wall appears symmetric, good expansion,   normal effort without use of accessory muscles  Musculoskeletal - no significant wasting of muscles noted, no bony deformities  Extremities-no clubbing, cyanosis or edema  Skin - warm, dry, and intact. No rash, erythema, or pallor.   Psychiatric - mood, affect, and behavior appear normal.      Neurological exam  Awake, alert, fluent oriented appropriate affect  Attention and concentration appear appropriate  Recent and remote memory appears unremarkable  Speech normal without dysarthria  No clear issues with language of fund of knowledge     Cranial Nerve Exam     CN III, IV,VI-EOMI, No nystagmus, conjugate eye movements, no ptosis    CN VII-no facial assymetry       Motor Exam  antigravity throughout upper and lower extremities bilaterally      Tremors-mild postural tremor in the hands noted     Gait  Not tested     Nursing/pcp notes, imaging,labs and vitals reviewed. PT,OT and/or speech notes reviewed    Lab Results   Component Value Date    WBC 5.9 04/21/2022    HGB 9.2 (L) 04/21/2022    HCT 29.2 (L) 04/21/2022    MCV 92.1 04/21/2022     04/21/2022     Lab Results   Component Value Date     04/21/2022    K 4.0 04/21/2022     04/21/2022    CO2 27 04/21/2022    BUN 8 04/21/2022    CREATININE 0.7 04/21/2022    GLUCOSE 95 04/21/2022    CALCIUM 8.3 (L) 04/21/2022    PROT 5.5 (L) 04/21/2022    LABALBU 2.9 (L) 04/21/2022    BILITOT 0.3 04/21/2022    ALKPHOS 52 04/21/2022    AST 10 04/21/2022    ALT 7 04/21/2022    LABGLOM >60 04/21/2022    GFRAA >59 04/21/2022     Lab Results   Component Value Date    INR 1.09 04/19/2022    PROTIME 14.0 04/19/2022       Brittany Danielson, SAVI   Physical Therapist   Physical Therapy   Progress Notes       Signed   Date of Service:  4/21/2022  9:58 AM                 Signed              Show:Clear all  []Manual[x]Template[]Copied    Added by:  [x]Ryley Rivas, PT      [][place]co for details         04/21/22 0900   Assessment   Assessment PATIENT AMBULATING BACK TO BED WITH PCA UPON ENTERING. GRIMACING, AUDIBLY DISPLAYING PAIN EACH STEP. REPORTS 10/10 PAIN. DUE TO UNDEGO LUMBAR SPINE XRAY. MEDICAL HOLD PER MD ORDER DUE TO PAIN, PENDING XRAYS. NURSING NOTIFIED OF PATIENT'S DESIRE FOR PAIN PILL.                    RECORD REVIEW: Previous medical records, medications were reviewed at today's visit    IMPRESSION:   1. Status post 3 stage lumbar fusion-pain control/mobilization  2. Mood disorder-Lexapro/Xanax as needed  3. Hypothyroidism-Synthroid  4. GI/GERD- bowel regimen/PPI  5. Back pain-Lyrica/Percocet as needed/Zanaflex as needed  6. COPD/history of smoking-monitor  7. PT/OT  8.   Essential tremor-stable monitor    x-ray of the lumbar spine-no acute changes    Continue supportive care      Expected duration and frequency therapy: 180 minutes per day, 5 days per week    [address] [facility]  [phone] CELL  Dr Brigette Newman

## 2022-04-24 NOTE — PLAN OF CARE
Problem: Falls - Risk of:  Goal: Will remain free from falls  Description: Will remain free from falls  4/24/2022 1015 by Ray Thornton RN  Outcome: Progressing  4/23/2022 2357 by Rubia Lopez LPN  Outcome: Progressing  Up with 1 assist, reminded to not get up by herself

## 2022-04-25 LAB
ALBUMIN SERPL-MCNC: 3.2 G/DL (ref 3.5–5.2)
ALP BLD-CCNC: 87 U/L (ref 35–104)
ALT SERPL-CCNC: 6 U/L (ref 5–33)
ANION GAP SERPL CALCULATED.3IONS-SCNC: 9 MMOL/L (ref 7–19)
AST SERPL-CCNC: 10 U/L (ref 5–32)
BASOPHILS ABSOLUTE: 0 K/UL (ref 0–0.2)
BASOPHILS RELATIVE PERCENT: 0.7 % (ref 0–1)
BILIRUB SERPL-MCNC: <0.2 MG/DL (ref 0.2–1.2)
BUN BLDV-MCNC: 8 MG/DL (ref 8–23)
CALCIUM SERPL-MCNC: 8.7 MG/DL (ref 8.8–10.2)
CHLORIDE BLD-SCNC: 103 MMOL/L (ref 98–111)
CO2: 28 MMOL/L (ref 22–29)
CREAT SERPL-MCNC: 0.8 MG/DL (ref 0.5–0.9)
EOSINOPHILS ABSOLUTE: 0.5 K/UL (ref 0–0.6)
EOSINOPHILS RELATIVE PERCENT: 8.6 % (ref 0–5)
GFR AFRICAN AMERICAN: >59
GFR NON-AFRICAN AMERICAN: >60
GLUCOSE BLD-MCNC: 87 MG/DL (ref 74–109)
HCT VFR BLD CALC: 32.6 % (ref 37–47)
HEMOGLOBIN: 9.8 G/DL (ref 12–16)
IMMATURE GRANULOCYTES #: 0.1 K/UL
LYMPHOCYTES ABSOLUTE: 1.9 K/UL (ref 1.1–4.5)
LYMPHOCYTES RELATIVE PERCENT: 33 % (ref 20–40)
MCH RBC QN AUTO: 28.8 PG (ref 27–31)
MCHC RBC AUTO-ENTMCNC: 30.1 G/DL (ref 33–37)
MCV RBC AUTO: 95.9 FL (ref 81–99)
MONOCYTES ABSOLUTE: 0.6 K/UL (ref 0–0.9)
MONOCYTES RELATIVE PERCENT: 10.5 % (ref 0–10)
NEUTROPHILS ABSOLUTE: 2.6 K/UL (ref 1.5–7.5)
NEUTROPHILS RELATIVE PERCENT: 46.1 % (ref 50–65)
PDW BLD-RTO: 14 % (ref 11.5–14.5)
PLATELET # BLD: 327 K/UL (ref 130–400)
PMV BLD AUTO: 11.5 FL (ref 9.4–12.3)
POTASSIUM REFLEX MAGNESIUM: 4.6 MMOL/L (ref 3.5–5)
RBC # BLD: 3.4 M/UL (ref 4.2–5.4)
SODIUM BLD-SCNC: 140 MMOL/L (ref 136–145)
TOTAL PROTEIN: 5.8 G/DL (ref 6.6–8.7)
WBC # BLD: 5.7 K/UL (ref 4.8–10.8)

## 2022-04-25 PROCEDURE — 97535 SELF CARE MNGMENT TRAINING: CPT

## 2022-04-25 PROCEDURE — 36415 COLL VENOUS BLD VENIPUNCTURE: CPT

## 2022-04-25 PROCEDURE — 85025 COMPLETE CBC W/AUTO DIFF WBC: CPT

## 2022-04-25 PROCEDURE — 1180000000 HC REHAB R&B

## 2022-04-25 PROCEDURE — 99233 SBSQ HOSP IP/OBS HIGH 50: CPT | Performed by: PSYCHIATRY & NEUROLOGY

## 2022-04-25 PROCEDURE — 80053 COMPREHEN METABOLIC PANEL: CPT

## 2022-04-25 PROCEDURE — 6370000000 HC RX 637 (ALT 250 FOR IP): Performed by: PSYCHIATRY & NEUROLOGY

## 2022-04-25 PROCEDURE — 97530 THERAPEUTIC ACTIVITIES: CPT

## 2022-04-25 PROCEDURE — 97110 THERAPEUTIC EXERCISES: CPT

## 2022-04-25 PROCEDURE — 97116 GAIT TRAINING THERAPY: CPT

## 2022-04-25 RX ORDER — LIDOCAINE 4 G/G
1 PATCH TOPICAL DAILY
Status: DISCONTINUED | OUTPATIENT
Start: 2022-04-25 | End: 2022-04-29 | Stop reason: HOSPADM

## 2022-04-25 RX ORDER — DOCUSATE SODIUM 100 MG/1
100 CAPSULE, LIQUID FILLED ORAL 2 TIMES DAILY
Status: DISCONTINUED | OUTPATIENT
Start: 2022-04-25 | End: 2022-04-29 | Stop reason: HOSPADM

## 2022-04-25 RX ORDER — PREGABALIN 50 MG/1
100 CAPSULE ORAL EVERY 12 HOURS
Status: DISCONTINUED | OUTPATIENT
Start: 2022-04-25 | End: 2022-04-29 | Stop reason: HOSPADM

## 2022-04-25 RX ORDER — TIZANIDINE 4 MG/1
8 TABLET ORAL EVERY 4 HOURS PRN
Status: DISCONTINUED | OUTPATIENT
Start: 2022-04-25 | End: 2022-04-29 | Stop reason: HOSPADM

## 2022-04-25 RX ADMIN — HYDROCORTISONE: 1 CREAM TOPICAL at 08:05

## 2022-04-25 RX ADMIN — ESCITALOPRAM OXALATE 10 MG: 10 TABLET ORAL at 08:02

## 2022-04-25 RX ADMIN — HYDROCORTISONE: 1 CREAM TOPICAL at 22:42

## 2022-04-25 RX ADMIN — OXYCODONE AND ACETAMINOPHEN 1 TABLET: 10; 325 TABLET ORAL at 02:18

## 2022-04-25 RX ADMIN — TIZANIDINE 8 MG: 4 TABLET ORAL at 10:05

## 2022-04-25 RX ADMIN — LEVOTHYROXINE SODIUM 100 MCG: 100 TABLET ORAL at 08:02

## 2022-04-25 RX ADMIN — OXYCODONE AND ACETAMINOPHEN 1 TABLET: 10; 325 TABLET ORAL at 21:16

## 2022-04-25 RX ADMIN — POLYETHYLENE GLYCOL 3350 17 G: 17 POWDER, FOR SOLUTION ORAL at 17:16

## 2022-04-25 RX ADMIN — TIZANIDINE 8 MG: 4 TABLET ORAL at 21:15

## 2022-04-25 RX ADMIN — OXYCODONE AND ACETAMINOPHEN 1 TABLET: 10; 325 TABLET ORAL at 14:10

## 2022-04-25 RX ADMIN — PREGABALIN 75 MG: 75 CAPSULE ORAL at 04:22

## 2022-04-25 RX ADMIN — PREGABALIN 100 MG: 50 CAPSULE ORAL at 16:13

## 2022-04-25 RX ADMIN — DOCUSATE SODIUM 100 MG: 100 CAPSULE, LIQUID FILLED ORAL at 08:05

## 2022-04-25 RX ADMIN — TIZANIDINE 8 MG: 4 TABLET ORAL at 14:13

## 2022-04-25 RX ADMIN — LINACLOTIDE 290 MCG: 145 CAPSULE, GELATIN COATED ORAL at 08:02

## 2022-04-25 RX ADMIN — TIZANIDINE 6 MG: 4 TABLET ORAL at 02:19

## 2022-04-25 RX ADMIN — OXYCODONE AND ACETAMINOPHEN 1 TABLET: 10; 325 TABLET ORAL at 10:02

## 2022-04-25 RX ADMIN — NYSTATIN 500000 UNITS: 100000 SUSPENSION ORAL at 12:28

## 2022-04-25 RX ADMIN — PANTOPRAZOLE SODIUM 40 MG: 40 TABLET, DELAYED RELEASE ORAL at 08:02

## 2022-04-25 RX ADMIN — DOCUSATE SODIUM 100 MG: 100 CAPSULE, LIQUID FILLED ORAL at 21:16

## 2022-04-25 RX ADMIN — NYSTATIN 500000 UNITS: 100000 SUSPENSION ORAL at 08:02

## 2022-04-25 ASSESSMENT — PAIN SCALES - GENERAL
PAINLEVEL_OUTOF10: 10
PAINLEVEL_OUTOF10: 3
PAINLEVEL_OUTOF10: 10
PAINLEVEL_OUTOF10: 3
PAINLEVEL_OUTOF10: 9
PAINLEVEL_OUTOF10: 8
PAINLEVEL_OUTOF10: 10

## 2022-04-25 ASSESSMENT — PAIN DESCRIPTION - DESCRIPTORS
DESCRIPTORS: ACHING
DESCRIPTORS: STABBING;SHOOTING
DESCRIPTORS: ACHING
DESCRIPTORS: ACHING
DESCRIPTORS: ACHING;SORE
DESCRIPTORS: ACHING

## 2022-04-25 ASSESSMENT — PAIN DESCRIPTION - LOCATION
LOCATION: BACK
LOCATION: BACK;LEG
LOCATION: BACK;LEG
LOCATION: BACK

## 2022-04-25 ASSESSMENT — PAIN DESCRIPTION - ORIENTATION
ORIENTATION: LOWER
ORIENTATION: LEFT
ORIENTATION: LOWER
ORIENTATION: LOWER;LEFT
ORIENTATION: LOWER;MID
ORIENTATION: LOWER

## 2022-04-25 ASSESSMENT — PAIN - FUNCTIONAL ASSESSMENT
PAIN_FUNCTIONAL_ASSESSMENT: ACTIVITIES ARE NOT PREVENTED

## 2022-04-25 NOTE — PATIENT CARE CONFERENCE
[facility] ACUTE INPATIENT REHABILITATION  TEAM CONFERENCE NOTE    Date: 2022  Patient Name: Roshni Marshall        MRN: [id number]    : 1950  (76 y.o.)  Gender: female      Diagnosis: Post 3 Stage Lumbar Fusion L4-S1      PHYSICAL THERAPY  GROSS ASSESSMENT       BED MOBILITY  Bed Mobility  Rolling: Modified independent  Supine to Sit: Stand by assistance (Towards the R)  Sit to Supine: Stand by assistance  Scooting: Stand by assistance  Comment: use of bed rails   TRANSFERS  Transfers  Sit to Stand: Stand by assistance  Stand to sit: Stand by assistance  Bed to Chair: Contact guard assistance  Stand Pivot Transfers: Contact guard assistance  Car Transfer: Unable to assess  Comment: From multiple surfaces, mulitpled completed throughout session  WHEELCHAIR PROPULSION  Propulsion 1  Propulsion: Manual  Level: Level Tile  Method: CARMENCITA GLORIA  Level of Assistance: Contact guard assistance  Description/ Details: Good Brielle, Educated on Propelling Technique  Distance: 200FT WIth Turns  AMBULATION  Ambulation  Surface: level tile  Device: Rolling Walker  Other Apparatus: Wheelchair follow  Assistance: Contact guard assistance  Gait Deviations: Slow Brielle,Decreased step length,Decreased step height,Deviated path  Distance: 175' room to PT gym  Comments: Pt reports RW \"pulling to the left\" when shes pushing it causing path deviations, RW heightened, pt reports less pulling.   More Ambulation?: Yes  STAIRS  Stairs  # Steps : 4  Stairs Height: 4\"  Rails: Bilateral  Device: No Device  Assistance: Contact guard assistance  GOALS:  Short Term Goals  Time Frame for Short term goals: 1 Week  Short term goal 1: Supervision Bed Mobility   Short term goal 2: Supervision With Transfers from Surface to Surface   Short term goal 3: Supervision With Amb 100FT With AD   Short term goal 4: Supervision WC Propulsion 250FT  Short term goal 5: CGA Climbing 5 Stairs With Handrail    Long Term Goals  Time Frame for Long term goals : 2 Weeks  Long term goal 1: Independent Bed Mobility   Long term goal 2: Independent With Transfers From Surface to Surface   Long term goal 3: Independent With Amb 250FT With AD   Long term goal 4: Independent With WC Propulsion 500FT  Long term goal 5: Independent With Climbing 5 Stairs With Handrail    ASSESSMENT:  Assessment: Patient doing well with increased gait distance. She was able to perform more exeicses this pm showing increased endurance and decreased pain. SPEECH THERAPY      OCCUPATIONAL THERAPY    CURRENT IRF-AYDEE SCORES  Eating: CARE Score: 6       Oral Hygiene: CARE Score: 6        Toileting: CARE Score: 4         Shower/Bathe: CARE Score: 3           Upper Body Dressing: CARE Score: 5         Lower Body Dressing: CARE Score: 4          Footwear: CARE Score: 3  Comment: Min A with sock aide       Toilet Transfers: CARE Score: 4          Picking Up Object:  CARE Score: 88          UE Functioning:  WFLs    Pain Assessment:          STGs:  Short Term Goals  Time Frame for Short term goals: 1 week  Short Term Goal 1: Supervision with bathing hygiene. Short Term Goal 2: Supervision with clothing management/hygiene for toileting. Short Term Goal 3: Supervision with toilet transfers. Short Term Goal 4: Supervision LB dressing, AE PRN. Short Term Goal 5: Supervision with donning/doffing socks and shoes, AE PRN. Additional Goals?: Yes  Short Term Goal 6: Patient will complete 1-2 handed static standing task for 5 minutes, requiring Supervision. LTGs:  Long Term Goals  Time Frame for Long term goals : 2 weeks  Long Term Goal 1: Modified independent with bathing hygiene. Long Term Goal 2: Modified independent with toileting and toilet transfers. Long Term Goal 3: Modified independent with overall dressing. Long Term Goal 4: Modified independent with ambulatory homemaking tasks. Long Term Goal 5: Patient verbalize DME needs.     Assessment:  Performance deficits / Impairments: Decreased functional mobility ,Decreased ADL status,Decreased strength,Decreased endurance,Decreased balance,Decreased high-level IADLs                  NUTRITION  Current Wt: Weight: 153 lb (69.4 kg) / Body mass index is 25.46 kg/m². Admission Wt: Admission Body Weight: 150 lb (68 kg) (Bed Scale)  Oral Diet Orders: Regular    Pt improving nutritionally AEB recent po intake of meals >75%. Pt continues to report fair appetite, however states she is trying to eat more. Continue current diet. Please see nutrition note for details. NURSING    Wounds/Incisions/Ulcers: Incision healing well to mid lower back, right lower back and right abdomen     Julius Scale Score: 19    Pain: Patient's pain is currently controlled with lidcaine patch, Percocet q 4 hrs PRN, Lyrica and Zanaflex    Consultations/Labs/X-rays: 4/21/22 X-Ray lumbar spine. Family Education: Need to make contact with family to initiate education    Fall Risk:  Pinon Total Score: 72    Fall in the last week? none      Other Nursing Issues: Alert and oriented x4, cont of bowel and bladder, meds consumed whole with water, assist x1 with transfers, LSO brace when OOB, BM 22, Regular. SOCIAL WORK/CASE MANAGEMENT  Assessment: Lives in home with daughter Claudia Antonio, who has mobility challenges, and with daughter in lay, Natali Valle, - currently with back pain. She normally occupies upstairs room and planned to switch with Natali Valle but now is expected to go upstairs- family instruction scheduled 4/27 to clarify abilities and appropriate plans. Discharge Plan   Estimated Length of Stay: 4/29/22 is CMG date with f/u apt scheduled early that morning  Destination: discharge home with supervision    Pass: No    Services at Discharge: [address], Occupational Therapy and Nursing Other per evaluations    Equipment at Discharge: to be determined    Progress made in the prior week:  1. CGA transfers  2. Supervision for LB dressing  3.  4.  5.      Goals for following week:  1. Indep transfers  2. Mod I for ADLs  3.   4.   5.     Factors facilitating achievement of predicted outcomes: Cooperative    Barriers to the achievement of predicted outcomes: Lower extremity weakness    Team Members Present at Conference:  : Curry Perez 23   Occupational Therapist: Tiffany Garcia OTR/L  Physical Therapist: Alisa Lesches PT,DPT  Speech Therapist: N/A  Nurse: Rosanne Albert LPN   Nurse Manager:  Chelsie Swan, RN, BSN  Dietitian:  Earl Charlton MS, RD, LD  Rehab Director:        I approve the established interdisciplinary plan of care as documented within the medical record of Louisa Javier.

## 2022-04-25 NOTE — PROGRESS NOTES
04/25/22 0900   Restrictions/Precautions   Restrictions/Precautions Fall Risk;Surgical Protocols   Required Braces or Orthoses? Yes   Required Braces or Orthoses   Spinal Lumbar Corset   Position Activity Restriction   Spinal Precautions No Bending; No Lifting; No Twisting   General   Chart Reviewed Yes   Additional Pertinent Hx Anxiety, Depression, CKD, Tobacco Abuse, COPD, Constipation, GERD, DDD, Chronic Back Pain, Osteoporosis, Dyslipidemia, Neuropathy   Subjective   Subjective Pt laying in bed upon arrival, agreeable to therapy. Reports LLE and back pain. Bed mobility   Sit to Supine Stand by assistance  (log roll towards pts L)   Scooting Stand by assistance   Transfers   Sit to Stand Stand by assistance   Stand to sit Stand by assistance   Comment From various surfaces, multiple STS completed throughout session   Ambulation   Surface level tile   Device Rolling Walker   Other Apparatus Wheelchair follow   Assistance Contact guard assistance   Quality of Gait Reciprocal gait pattern, tends to deviate path to L   Gait Deviations Slow Brielle;Decreased step length;Decreased step height;Deviated path   Distance 175' room to PT gym   Comments Pt reports RW \"pulling to the left\" when shes pushing it causing path deviations, RW heightened, pt reports less pulling. More Ambulation?  Yes   Ambulation 2   Surface - 2 level tile   Device 2 Rolling Walker   Assistance 2 Contact guard assistance   Quality of Gait 2 same as previous   Gait Deviations Slow Brielle;Decreased step length;Decreased step height   Distance 150' OT gym to room   Comments Pts reports having better control of RW after raising height one notch   Ambulation 3   Surface - 3 level tile   Device 3 Rolling Walker   Assistance 3 Contact guard assistance   Quality of Gait 3 same as previous   Gait Deviations Slow Brielle;Decreased step length;Decreased step height   Distance 15'-25'   Comments Walked various distances throughout ADL apt and PT/OT gym Stairs/Curb   Stairs? Yes   Stairs   # Steps  4   Stairs Height 4\"   Rails Bilateral   Device No Device   Assistance Contact guard assistance   Comment Pt cued for technique with leading with RLE going up due to pain in LLE   PT Exercises   Exercise Treatment Toileting   Activity Tolerance   Activity Tolerance Patient tolerated treatment well;Patient limited by endurance   Assessment   Assessment Pt able to ambulate well through halllway and tighter spaces in ADL apartment. Pt needs consistent cueing for mainitaining back precautions especially with bending and twisting. Safety Devices   Type of Devices Call light within reach; All fall risk precautions in place;Gait belt;Patient at risk for falls; Left in bed  (LPN in room with pt)   PT Co-Treatment Minutes   Time In 0900   Time Out 1000   Minutes 60     Electronically signed by Nelda Booth, Student PT on 4/25/2022 at 12:03 PM

## 2022-04-25 NOTE — PLAN OF CARE
Problem: Pain:  Goal: Pain level will decrease  Description: Pain level will decrease  Outcome: Progressing  Goal: Control of acute pain  Description: Control of acute pain  Outcome: Progressing  Goal: Control of chronic pain  Description: Control of chronic pain  Outcome: Progressing  Goal: Patient's pain/discomfort is manageable  Description: Patient's pain/discomfort is manageable  Outcome: Progressing

## 2022-04-25 NOTE — PROGRESS NOTES
Occupational Therapy  Facility/Department: [facility] 8 REHAB UNIT  Occupational Therapy Treatment Note     Name: Alicia Jimenez  : 1950  MRN: [id number]  Date of Service: 2022    Discharge Recommendations:  Home with Home health OT          Patient Diagnosis(es): There were no encounter diagnoses. Past Medical History:  has a past medical history of Arthritis, Bilateral low back pain without sciatica, Cancer (HCC), COPD (chronic obstructive pulmonary disease) ([facility] [address] Hx of blood clots, Hyperlipidemia, Pain in both knees, Thyroid disease, and Urinary incontinence. Past Surgical History:  has a past surgical history that includes Hysterectomy; joint replacement; lobectomy; Cholecystectomy, laparoscopic; Skin cancer excision; and cyst incision and drainage. Treatment Diagnosis: Status post 3 stage lumbar fusion      Assessment   Performance deficits / Impairments: Decreased functional mobility ; Decreased ADL status; Decreased strength;Decreased endurance;Decreased balance;Decreased high-level IADLs  Treatment Diagnosis: Status post 3 stage lumbar fusion  Activity Tolerance  Activity Tolerance: Patient Tolerated treatment well        Plan   Plan  Times per Day: Twice a day  Current Treatment Recommendations: Strengthening,Balance training,Functional mobility training,Endurance training,Equipment evaluation, education, & procurement,Patient/Caregiver education & training,Safety education & training,Self-Care / ADL,Home management training     Restrictions  Restrictions/Precautions  Restrictions/Precautions: Fall Risk,Surgical Protocols  Required Braces or Orthoses?: Yes  Required Braces or Orthoses  Spinal: Lumbar Corset  Position Activity Restriction  Spinal Precautions: No Bending,No Lifting,No Twisting    Objective   Safety Devices  Type of Devices: Call light within reach; Chair alarm in place  Balance  Sitting Balance: Supervision  Standing Balance: Stand by assistance  Standing Balance  Time: 2 minutes  Functional Mobility  Functional - Mobility Device: Rolling Walker  Activity: Other  Assist Level: Stand by assistance  Functional Mobility Comments: in hallway           Activity Tolerance  Activity Tolerance: Patient tolerated treatment well;Patient limited by endurance     Transfers  Sit to stand: Stand by assistance  Stand to sit: Stand by assistance  Transfer Comments: to/from couch, standard bed, rocking recliner  OutComes Score    Toileting Hygiene  Assistance needed: Supervision or touching assistance  Comment: SBA  CARE Score: 4  Discharge Goal: Independent     Toilet Transfer  Assistance needed: Supervision or touching assistance  Comment: SBA  CARE Score: 4  Discharge Goal: Independent     Picking Up Object  Assistance Needed: Supervision or touching assistance  Comment: with reacher  Reason if not Attempted: Not attempted due to medical condition or safety concerns  CARE Score: 4  Discharge Goal: Independent       Goals  Short Term Goals  Time Frame for Short term goals: 1 week  Short Term Goal 1: Supervision with bathing hygiene. Short Term Goal 2: Supervision with clothing management/hygiene for toileting. Short Term Goal 3: Supervision with toilet transfers. Short Term Goal 4: Supervision LB dressing, AE PRN. Short Term Goal 5: Supervision with donning/doffing socks and shoes, AE PRN. Additional Goals?: Yes  Short Term Goal 6: Patient will complete 1-2 handed static standing task for 5 minutes, requiring Supervision. Long Term Goals  Time Frame for Long term goals : 2 weeks  Long Term Goal 1: Modified independent with bathing hygiene. Long Term Goal 2: Modified independent with toileting and toilet transfers. Long Term Goal 3: Modified independent with overall dressing. Long Term Goal 4: Modified independent with ambulatory homemaking tasks. Long Term Goal 5: Patient verbalize DME needs. Patient Goals   Patient goals : Return home independently.        Therapy Time   Individual Concurrent Group Co-treatment   Time In 1400        Time Out 1430        Minutes 30        Timed Code Treatment Minutes: Richard Hartley 95, OT

## 2022-04-25 NOTE — PROGRESS NOTES
04/25/22 1430   Restrictions/Precautions   Restrictions/Precautions Fall Risk;Surgical Protocols   Required Braces or Orthoses? Yes   Required Braces or Orthoses   Spinal Lumbar Corset   Position Activity Restriction   Spinal Precautions No Bending; No Lifting; No Twisting   General   Chart Reviewed Yes   Patient assessed for rehabilitation services? Yes   Additional Pertinent Hx Anxiety, Depression, CKD, Tobacco Abuse, COPD, Constipation, GERD, DDD, Chronic Back Pain, Osteoporosis, Dyslipidemia, Neuropathy   Subjective   Subjective Pt finishing working with OT upon arrival. Agreeable to therapy.     Subjective   Pain 8   Bed mobility   Scooting Supervision   Transfers   Sit to Stand Stand by assistance   Stand to sit Stand by assistance   Bed to Chair Contact guard assistance;Stand by assistance  (w/c to recliner towards pts L)   Comment STS all from w/c, 3 completed   Ambulation   Surface level tile   Device Rolling Walker   Other Apparatus Wheelchair follow   Assistance Stand by assistance   Quality of Gait Reciprocal pattern, slow steps   Gait Deviations Slow Brielle;Decreased step length;Decreased step height;Deviated path   Distance 100'   Comments started with w/c propulsion, care home to room switched to walking   Ambulation 2   Surface - 2 level tile   Device 2 Rolling Walker   Assistance 2 Contact guard assistance   Quality of Gait 2 very short steps/shufflling, tendency to keep RLE outside bounds of RW   Gait Deviations Slow Brielle;Decreased step length;Decreased step height;Shuffles   Distance 50'   Comments weaving in and out of cones, pts LEs became increasingly weak towards the end    Wheelchair Activities   Propulsion Yes   Propulsion 1   Propulsion Manual   Level Level Tile   Method RUE;LUE   Level of Assistance Contact guard assistance   Description/ Details very slow to complete, makes very small movements with hands to propel w/c but occassionally makes bigger strides   Distance 100' half way to PT gym before switching to walking   PT Exercises   Exercise Treatment x10 BLE LAQ, seated heel/toe raises, marches, hip abduction, hip adduction w/ ball   Activity Tolerance   Activity Tolerance Patient tolerated treatment well;Patient limited by pain; Patient limited by endurance   Assessment   Assessment Pt was able to complete ambulation challenges with weaving/turning in between cones. Pt c/o increased dizziness while completing and needed standing rest break, pts LEs became increasingly weak towards end and further mobility was deferred. Pt fatigues quickly and needs frequent rest breaks. Safety Devices   Type of Devices Call light within reach; Chair alarm in place; All fall risk precautions in place;Gait belt;Left in chair  (Daughter in room)   PT Individual Minutes   Time In 1430   Time Out 1515   Minutes 45     Electronically signed by Dontrell Rocha, Student PT on 4/25/2022 at 3:50 PM

## 2022-04-25 NOTE — PROGRESS NOTES
Nutrition Assessment     Type and Reason for Visit: Reassess    Nutrition Recommendations/Plan:   1. Continue current diet at this time and monitor for additional nutrition intervention needs. Malnutrition Assessment:  Malnutrition Status: At risk for malnutrition (Comment)    Nutrition Assessment:  Pt improving nutritionally AEB recent po intake of meals >75% and wt stable. Pt continues to report fair appetite, however states she is trying to eat more. Continue current diet. Estimated Daily Nutrient Needs:  Energy (kcal):  5027-2543 kcals/day (25-30 kcals/kg) Weight Used for Energy Requirements: Current     Protein (g):  68-95 g/pro/day (1.0-1.4 g/kg) Weight Used for Protein Requirements: Current (1.0-1.4 g/kg)    Fluid (ml/day):  5463-8336 mL/fluid/day Method Used for Fluid Requirements: 1 ml/kcal    Nutrition Related Findings:     Wound Type: Surgical Incision    Current Nutrition Therapies:    ADULT DIET;  Regular    Anthropometric Measures:  · Height: 5' 5\" (165.1 cm)  · Current Body Wt: 153 lb (69.4 kg)   · BMI: 25.5    Nutrition Diagnosis:   · Inadequate oral intake related to increase demand for energy/nutrients as evidenced by wounds,poor intake prior to admission,intake 26-50%    Nutrition Interventions:   Food and/or Nutrient Delivery: Continue Current Diet  Nutrition Education/Counseling: Education not indicated  Coordination of Nutrition Care: Continue to monitor while inpatient  Plan of Care discussed with: patient    Goals:  Previous Goal Met: Goal(s) Achieved  Goals: PO intake 50% or greater,prior to discharge       Nutrition Monitoring and Evaluation:   Behavioral-Environmental Outcomes: None Identified  Food/Nutrient Intake Outcomes: Food and Nutrient Intake  Physical Signs/Symptoms Outcomes: Biochemical Data,Nutrition Focused Physical Findings,Skin,Weight    Discharge Planning:    No discharge needs at this time     Danielle Cardoza, MS, RD, LD  Contact: [phone]

## 2022-04-25 NOTE — PROGRESS NOTES
Patient:   Cadence Zhang  MR#:    [id number]   Room:    [facility]4/461-30   YOB: 1950  Date of Progress Note: 4/25/2022  Time of Note                           10:19 AM  Consulting Physician:   Dougie Borges M.D. Attending Physician:  Dougie Borges MD     Chief complaint Status post 3 stage lumbar fusion    S:This 66 y.o. female  with history of anxiety, depression, CKD, tobacco abuse, COPD, constipation, GERD, DDD, chronic back pain, osteoporosis, dyslipidemia and neuropathy. She was seen as outpatient by orthopedic spine surgeon Dr. Nik Soriano d/t increasing back pain, bilateral buttock, thigh, and leg radiculopathy left worse than right. MRI done showed multilevel thoracolumbar degenerative disk disease severe L4-S1 and disc herniation at L5-S1 with stenosis with facet arthropathy, and neurogenic claudication. Dr. Nik Soriano recommended a 3 stage repair. Patient was in agreement and was admitted to [facility] on 4/11/22 and underwent stage 1 anterior discectomy decompression with bilateral neural foraminotomy L5-S1, and anterior lumbar interbody fusion of L5-S1. She tolerated the procedure well. She returned to surgery on 4/13/22 for stage 2 right lateral lumbar interbody fusion L4-5 on 4/13/22. She tolerated this procedure well. She returned to surgery for the stage 3 Posterior spinal fusion L4-S1 on 4/15/22. She tolerated this procedure well. She continues to c/o pain at 8/10 being controlled with PO medications. She is participating in both PT/OT. She is felt to need a stay on Rehab to work towards her goal of returning home with assist of her family. She is now felt ready to start the Rehab program. Feels back is more stiff and painful.      REVIEW OF SYSTEMS:  Constitutional: No fevers No chills  Neck:No stiffness  Respiratory: No shortness of breath  Cardiovascular: No chest pain No palpitations  Gastrointestinal: No abdominal pain    Genitourinary: No Dysuria  Neurological: No headache, no confusion    Past Medical History:      Diagnosis Date    Arthritis     Bilateral low back pain without sciatica 12/22/2015    [facility] ([facility] [address]     COPD (chronic obstructive pulmonary disease) ([facility] [address] 4/20/2022    Hx of blood clots     Hyperlipidemia     Pain in both knees 12/22/2015    Thyroid disease     Urinary incontinence        Past Surgical History:      Procedure Laterality Date    CHOLECYSTECTOMY, LAPAROSCOPIC      CYST INCISION AND DRAINAGE      left Cheek    HYSTERECTOMY      JOINT REPLACEMENT      LOBECTOMY      RIGHT PARTIAL    SKIN CANCER EXCISION         Medications in Hospital:      Current Facility-Administered Medications:     lidocaine 4 % external patch 1 patch, 1 patch, TransDERmal, Daily, Maikol Stevens MD, 1 patch at 04/25/22 0805    pregabalin (LYRICA) capsule 100 mg, 100 mg, Oral, Q12H, Maikol Stevens MD    tiZANidine (ZANAFLEX) tablet 8 mg, 8 mg, Oral, Q4H PRN, Maikol Stevens MD, 8 mg at 04/25/22 1005    docusate sodium (COLACE) capsule 100 mg, 100 mg, Oral, BID, Maikol Stevens MD, 100 mg at 04/25/22 0805    hydrocortisone 1 % cream, , Topical, BID, Maikol Stevens MD, Given at 04/25/22 0805    ALPRAZolam Geoff Leyland) tablet 0.5 mg, 0.5 mg, Oral, TID PRN, Maikol Stevens MD, 0.5 mg at 04/24/22 2339    dicyclomine (BENTYL) capsule 10 mg, 10 mg, Oral, TID PRN, Maikol Stevens MD    escitalopram (LEXAPRO) tablet 10 mg, 10 mg, Oral, Daily, Maikol Stevens MD, 10 mg at 04/25/22 0802    levothyroxine (SYNTHROID) tablet 100 mcg, 100 mcg, Oral, Daily, Maikol Stevens MD, 100 mcg at 04/25/22 0802    linaclotide (LINZESS) capsule 290 mcg, 290 mcg, Oral, Daily, Maikol Stevens MD, 290 mcg at 04/25/22 0802    nystatin (MYCOSTATIN) 458813 UNIT/ML suspension 500,000 Units, 500,000 Units, Oral, 4x Daily, Maikol Stevens MD, 500,000 Units at 04/25/22 0802    oxyCODONE-acetaminophen (PERCOCET)  MG per tablet 1 tablet, 1 tablet, Oral, Q4H PRN, Maikol Stevens MD, 1 tablet at 04/25/22 1002   pantoprazole (PROTONIX) tablet 40 mg, 40 mg, Oral, Daily, Sita Malloy MD, 40 mg at 04/25/22 0802    promethazine (PHENERGAN) tablet 25 mg, 25 mg, Oral, BID PRN, Sita Malloy MD    sucralfate (CARAFATE) tablet 1 g, 1 g, Oral, TID PRN, Sita Malloy MD    acetaminophen (TYLENOL) tablet 650 mg, 650 mg, Oral, Q4H PRN, Sita Malloy MD, 650 mg at 04/20/22 1111    polyethylene glycol (GLYCOLAX) packet 17 g, 17 g, Oral, Daily PRN, Sita Malloy MD, 17 g at 04/24/22 0807    Allergies:  Sulfamethoxazole-trimethoprim, Codeine, Erythromycin, Pcn [penicillins], Talwin [pentazocine], and Sulfa antibiotics    Social History:   TOBACCO:   reports that she has been smoking cigarettes. She has a 15.00 pack-year smoking history. She has never used smokeless tobacco.  ETOH:   reports no history of alcohol use. Family History:       Problem Relation Age of Onset   [facility] Cancer Mother     Hypertension Mother     Heart Attack Father          PHYSICAL EXAM:  BP (!) 98/56   Pulse (!) 48   Temp 96.6 °F (35.9 °C)   Resp 18   Ht 5' 5\" (1.651 m)   Wt 153 lb (69.4 kg)   SpO2 93%   BMI 25.46 kg/m²     Constitutional - well developed, well nourished. Eyes - conjunctiva normal.   Ear, nose, throat - No scars, masses, or lesions over external nose or ears, no atrophy of tongue  Neck-symmetric, no masses noted, no jugular vein distension  Respiration- chest wall appears symmetric, good expansion,   normal effort without use of accessory muscles  Musculoskeletal - no significant wasting of muscles noted, no bony deformities  Extremities-no clubbing, cyanosis or edema  Skin - warm, dry, and intact. No rash, erythema, or pallor.   Psychiatric - mood, affect, and behavior appear normal.      Neurological exam  Awake, alert, fluent oriented appropriate affect  Attention and concentration appear appropriate  Recent and remote memory appears unremarkable  Speech normal without dysarthria  No clear issues with language of fund of knowledge     Cranial Nerve Exam     CN III, IV,VI-EOMI, No nystagmus, conjugate eye movements, no ptosis    CN VII-no facial assymetry       Motor Exam  antigravity throughout upper and lower extremities bilaterally      Tremors-mild postural tremor in the hands noted     Gait  Not tested     Nursing/pcp notes, imaging,labs and vitals reviewed. PT,OT and/or speech notes reviewed    Lab Results   Component Value Date    WBC 5.7 04/25/2022    HGB 9.8 (L) 04/25/2022    HCT 32.6 (L) 04/25/2022    MCV 95.9 04/25/2022     04/25/2022     Lab Results   Component Value Date     04/25/2022    K 4.6 04/25/2022     04/25/2022    CO2 28 04/25/2022    BUN 8 04/25/2022    CREATININE 0.8 04/25/2022    GLUCOSE 87 04/25/2022    CALCIUM 8.7 (L) 04/25/2022    PROT 5.8 (L) 04/25/2022    LABALBU 3.2 (L) 04/25/2022    BILITOT <0.2 04/25/2022    ALKPHOS 87 04/25/2022    AST 10 04/25/2022    ALT 6 04/25/2022    LABGLOM >60 04/25/2022    GFRAA >59 04/25/2022     Lab Results   Component Value Date    INR 1.09 04/19/2022    PROTIME 14.0 04/19/2022     Trena Huntley PTA   Physical Therapist Assistant   Physical Therapy   Progress Notes       Cosign Needed   Date of Service:  4/23/2022  4:33 PM                 Cosign Needed                Show:Clear all  [x]Manual[x]Template[]Copied    Added by:  [x]Dominga Reed PTA      []Mayco for details    Physical Therapy  Daily Note  Name: Jordyn Obando  MRN:  [id number]  Date of service:  4/23/2022 04/23/22 1321   Restrictions/Precautions   Restrictions/Precautions Fall Risk;Surgical Protocols   Required Braces or Orthoses? Yes   Required Braces or Orthoses   Spinal Lumbar Corset   Position Activity Restriction   Spinal Precautions No Bending; No Lifting; No Twisting   General   Additional Pertinent Hx Anxiety, Depression, CKD, Tobacco Abuse, COPD, Constipation, GERD, DDD, Chronic Back Pain, Osteoporosis, Dyslipidemia, Neuropathy   Subjective   Subjective Ok what do you want to do now? Oxygen Therapy   O2 Device None (Room air)   Transfers   Sit to Stand Stand by assistance;Supervision   Stand to sit Stand by assistance;Supervision   Ambulation   WB Status WBAT   Ambulation   Surface level tile   Device Rolling Walker   Assistance Contact guard assistance   Gait Deviations Decreased step length;Decreased arm swing;Decreased head and trunk rotation;Deviated path   Propulsion 1   Distance 200 feet, 150 feet   Exercises   Hip Flexion x  10   Hip Abduction x 10 hip add x 10   Knee Long Arc Quad x 10   Ankle Pumps x 10   Other Activities   Comment worked on balance in bathroom    Short Term Goals   Time Frame for Short term goals 1 Week   Short term goal 1 Supervision Bed Mobility    Short term goal 2 Supervision With Transfers from Surface to Surface    Short term goal 3 Supervision With Amb 100FT With AD    Short term goal 4 Supervision WC Propulsion 250FT   Short term goal 5 CGA Climbing 5 Stairs With Handrail   Long Term Goals   Time Frame for Long term goals  2 Weeks   Long term goal 1 Independent Bed Mobility    Long term goal 2 Independent With Transfers From Surface to Surface    Long term goal 3 Independent With Amb 250FT With AD    Long term goal 4 Independent With WC Propulsion 500FT   Long term goal 5 Independent With Climbing 5 Stairs With Handrail   Conditions Requiring Skilled Therapeutic Intervention   Body Structures, Functions, Activity Limitations Requiring Skilled Therapeutic Intervention Decreased functional mobility ; Decreased tolerance to work activity; Decreased strength;Decreased endurance;Decreased balance; Increased pain;Decreased posture   Assessment Patient doing well with increased gait distance. She was able to perform more exeicses this pm showing increased endurance and decreased pain.    Treatment Diagnosis interference with activity   Therapy Prognosis Good   History Anxiety, Depression, CKD, Tobacco Abuse, COPD, Constipation, GERD, DDD, Chronic Back Pain, Osteoporosis, Dyslipidemia, Neuropathy   Exam impaired strength, posture, endurance, functional mobility, balanc. increased pain. Discharge Recommendations Home with Home health PT   Activity Tolerance   Activity Tolerance Patient tolerated treatment well;Patient limited by pain   Plan   Plan 5-7 times per week   Plan weeks 2 Weeks   Current Treatment Recommendations Strengthening;ROM;Balance training;Functional mobility training;Transfer training; Endurance training; Wheelchair mobility training;Gait training;Stair training;Pain management;Home exercise program;Safety education & training;Patient/Caregiver education & training;Equipment evaluation, education, & procurement; Therapeutic activities; Return to work related activity   Safety Devices   Type of Devices Call light within reach; Chair alarm in place; Left in chair      Electronically signed by Elif Motta PTA on 4/23/2022 at 4:33 PM                         RECORD REVIEW: Previous medical records, medications were reviewed at today's visit    IMPRESSION:   1. Status post 3 stage lumbar fusion-pain control/mobilization  2. Mood disorder-Lexapro/Xanax as needed  3. Hypothyroidism-Synthroid  4. GI/GERD- bowel regimen/PPI  5. Back pain-Lyrica/Percocet as needed/Zanaflex as needed  6. COPD/history of smoking-monitor  7. PT/OT  8.   Essential tremor-stable monitor    x-ray of the lumbar spine-no acute changes    Add Lidoderm patch/heating pad  Increase Lyrica 4/25  Add colace    Continue supportive care      Expected duration and frequency therapy: 180 minutes per day, 5 days per week    Jay Webb  [phone] CELL  Dr Sita Malloy

## 2022-04-25 NOTE — PLAN OF CARE
Problem: Pain:  Goal: Pain level will decrease  Description: Pain level will decrease  4/25/2022 1107 by Catherine Cabrera LPN  Outcome: Progressing  4/25/2022 0059 by Maki Alfonso LPN  Outcome: Progressing  Goal: Control of acute pain  Description: Control of acute pain  4/25/2022 1107 by Catherine Cabrera LPN  Outcome: Progressing  4/25/2022 0059 by Maki Alfonso LPN  Outcome: Progressing  Goal: Control of chronic pain  Description: Control of chronic pain  4/25/2022 1107 by Catherine Cabrera LPN  Outcome: Progressing  4/25/2022 0059 by Maki Alfonso LPN  Outcome: Progressing  Goal: Patient's pain/discomfort is manageable  Description: Patient's pain/discomfort is manageable  4/25/2022 1107 by Catherine Cabrera LPN  Outcome: Progressing  4/25/2022 0059 by Maki Alfonso LPN  Outcome: Progressing     Problem: Falls - Risk of:  Goal: Will remain free from falls  Description: Will remain free from falls  4/25/2022 1107 by Catherine Cabrera LPN  Outcome: Progressing  4/25/2022 0059 by Maki Alfonso LPN  Outcome: Progressing  Goal: Absence of physical injury  Description: Absence of physical injury  4/25/2022 1107 by Catherine Cabrera LPN  Outcome: Progressing  4/25/2022 0059 by Maki Alfonso LPN  Outcome: Progressing     Problem: Mobility - Impaired:  Goal: Mobility will improve  Description: Mobility will improve  4/25/2022 1107 by Catherine Cabrera LPN  Outcome: Progressing  4/25/2022 0059 by Maki Alfonso LPN  Outcome: Progressing     Problem: Infection:  Goal: Will remain free from infection  Description: Will remain free from infection  4/25/2022 1107 by Catherine Cabrera LPN  Outcome: Progressing  4/25/2022 0059 by Maki Alfonso LPN  Outcome: Progressing     Problem: Safety:  Goal: Free from accidental physical injury  Description: Free from accidental physical injury  4/25/2022 1107 by Catherine Cabrera LPN  Outcome: Progressing  4/25/2022 0059 by Maki Alfonso LPN  Outcome: Progressing  Goal: Free from intentional harm  Description: Free from intentional harm  4/25/2022 1107 by Sameera Burton LPN  Outcome: Progressing  4/25/2022 0059 by Emily Eagle LPN  Outcome: Progressing     Problem: Daily Care:  Goal: Daily care needs are met  Description: Daily care needs are met  4/25/2022 1107 by Sameera Burton LPN  Outcome: Progressing  4/25/2022 0059 by Emily Eagle LPN  Outcome: Progressing     Problem: Skin Integrity:  Goal: Skin integrity will stabilize  Description: Skin integrity will stabilize  4/25/2022 1107 by Sameera Burton LPN  Outcome: Progressing  4/25/2022 0059 by Emily Eagle LPN  Outcome: Progressing     Problem: Discharge Planning:  Goal: Patients continuum of care needs are met  Description: Patients continuum of care needs are met  4/25/2022 1107 by Sameera Burton LPN  Outcome: Progressing  4/25/2022 0059 by Emily Eagle LPN  Outcome: Progressing     Problem: IP BLADDER/VOIDING  Goal: LTG - patient will complete bladder elimination  4/25/2022 1107 by Sameera Burton LPN  Outcome: Progressing  4/25/2022 0059 by Emily Eagle LPN  Outcome: Progressing  Goal: LTG - Patient will utilize adaptive techniques/equipment to complete bladder elimination  4/25/2022 1107 by Sameera Burton LPN  Outcome: Progressing  4/25/2022 0059 by Emily Eagle LPN  Outcome: Progressing  Goal: LTG - patient will achieve acceptable level of continence  4/25/2022 1107 by Sameera Burton LPN  Outcome: Progressing  4/25/2022 0059 by Emily Eagle LPN  Outcome: Progressing  Goal: STG - Patient demonstrates ability to take care of indwelling catheter  4/25/2022 1107 by Sameera Burton LPN  Outcome: Progressing  4/25/2022 0059 by Emily Eagle LPN  Outcome: Progressing  Goal: STG - patient demonstrates self-cath technique using clean technique and care of the catheter  4/25/2022 1107 by Sameera Burton LPN  Outcome: Progressing  4/25/2022 0059 by Emily Eagle LPN  Outcome: Progressing  Goal: STG - Patient demonstrates no accidents  4/25/2022 1107 by Mellisa Patricio LPN  Outcome: Progressing  4/25/2022 0059 by Todd Loomis LPN  Outcome: Progressing  Goal: STG - Patient will state signs and symptoms of UTI  4/25/2022 1107 by Mellisa Patricio LPN  Outcome: Progressing  4/25/2022 0059 by Todd Loomis LPN  Outcome: Progressing  Goal: STG - patient will be able to empty bladder  4/25/2022 1107 by Mellisa Patricio LPN  Outcome: Progressing  4/25/2022 0059 by Todd Loomis LPN  Outcome: Progressing  Goal: STG - Patient demonstrates understanding of self catheterization schedule by completing task on time  4/25/2022 1107 by Mellisa Patricio LPN  Outcome: Progressing  4/25/2022 0059 by Todd Loomis LPN  Outcome: Progressing  Goal: STG - patient participates in bladder program by expressing need to void  4/25/2022 1107 by Mellisa Patricio LPN  Outcome: Progressing  4/25/2022 0059 by Tdod Loomis LPN  Outcome: Progressing  Goal: STG - Patient verbalizes understanding of catheter care indwelling/intermittent  4/25/2022 1107 by Mellisa Patricio LPN  Outcome: Progressing  4/25/2022 0059 by Todd Loomis LPN  Outcome: Progressing  Goal: STG - patient participates in bladder program by adhering to implemented toileting schedule  4/25/2022 1107 by Mellisa Patricio LPN  Outcome: Progressing  4/25/2022 0059 by Todd Loomis LPN  Outcome: Progressing     Problem: IP BOWEL ELIMINATION  Goal: LTG - patient will utilize adaptive techniques/equipment to complete bowel elimination  4/25/2022 1107 by Mellisa Patricio LPN  Outcome: Progressing  4/25/2022 0059 by Todd Loomis LPN  Outcome: Progressing  Goal: LTG - patient will have regular and routine bowel evacuation  4/25/2022 1107 by Mellisa Patricio LPN  Outcome: Progressing  4/25/2022 0059 by Todd Loomis LPN  Outcome: Progressing  Goal: STG - patient will be accident free  4/25/2022 1107 by Mellisa Patricio LPN  Outcome: Progressing  4/25/2022 0059 by Pantera Monet LPN  Outcome: Progressing  Goal: STG - Patient demonstrates care and management of ostomy bag  4/25/2022 1107 by Kulwinder Tay LPN  Outcome: Progressing  4/25/2022 0059 by Pantera Monet LPN  Outcome: Progressing  Goal: STG - Patient participates in bowel management program  4/25/2022 1107 by Kulwinder Tay LPN  Outcome: Progressing  4/25/2022 0059 by Pantera Monet LPN  Outcome: Progressing  Goal: STG - patient maintains skin integrity  4/25/2022 1107 by Kulwinder Tay LPN  Outcome: Progressing  4/25/2022 0059 by Pantera Monet LPN  Outcome: Progressing  Goal: STG - Patient will verbalize signs and symptoms of constipation and how to prevent/alleviate  4/25/2022 1107 by Kulwinder Tay LPN  Outcome: Progressing  4/25/2022 0059 by Pantera Monet LPN  Outcome: Progressing  Goal: STG - patient will be continent of stool  4/25/2022 1107 by Kulwinder Tay LPN  Outcome: Progressing  4/25/2022 0059 by Pantera Monet LPN  Outcome: Progressing  Goal: STG - Patient completes digital stimulation technique  4/25/2022 1107 by Kulwinder Tay LPN  Outcome: Progressing  4/25/2022 0059 by Pantera Monet LPN  Outcome: Progressing  Goal: STG - Patient verbalizes knowledge about relationship between diet, fluid intake, activity and medication on constipation  4/25/2022 1107 by Kulwinder Tay LPN  Outcome: Progressing  4/25/2022 0059 by Pantera Monet LPN  Outcome: Progressing     Problem: IP BREATHING  Goal: LTG - patient will mobilize secretions and maintain airway  4/25/2022 1107 by Kulwinder Tay LPN  Outcome: Progressing  4/25/2022 0059 by Pantera Monet LPN  Outcome: Progressing  Goal: LTG - Patient/caregiver will demonstrate/perform proper techniques to maintain patent airway  4/25/2022 1107 by Kulwinder Tay LPN  Outcome: Progressing  4/25/2022 0059 by Pantera Monet LPN  Outcome: Progressing  Goal: LTG - patient/caregiver will demonstrate/perform improved or stable self care abilities within physical limitations  4/25/2022 1107 by Jose Francisco Roldan, LPN  Outcome: Progressing  4/25/2022 0059 by Belle Shruti, LPN  Outcome: Progressing  Goal: STG - Patient/caregiver will maintain patent airway  4/25/2022 1107 by Jose Francisco Roldan, LPN  Outcome: Progressing  4/25/2022 0059 by Belle Shruti, LPN  Outcome: Progressing  Goal: STG - respiratory rate and effort will be within normal limits for the patient  4/25/2022 1107 by Jose Francisco Roldan, LPN  Outcome: Progressing  4/25/2022 0059 by Belle Bahena, LPN  Outcome: Progressing  Goal: STG - patient/caregiver will be able to verbalize oxygen safety precautions  4/25/2022 1107 by Jose Francisco Roldan, LPN  Outcome: Progressing  4/25/2022 0059 by Belle Bahena, LPN  Outcome: Progressing  Goal: STG - Patient/caregiver demonstrates correct suctioning technique  4/25/2022 1107 by Jose Francisco Roldan, LPN  Outcome: Progressing  4/25/2022 0059 by Belle Bahena, LPN  Outcome: Progressing  Goal: STG - patient will utilize incentive spirometer  4/25/2022 1107 by Jose Francisco Roldan, LPN  Outcome: Progressing  4/25/2022 0059 by Belle Bahena, LPN  Outcome: Progressing  Goal: STG - Patient performs or directs assisted coughing  4/25/2022 1107 by Jose Francisco Roldan, LPN  Outcome: Progressing  4/25/2022 0059 by Belle Bahena, LPN  Outcome: Progressing  Goal: STG - patient can administer MDI's  4/25/2022 1107 by Jose Francisco Roldan, LPN  Outcome: Progressing  4/25/2022 0059 by Belle Bahena, LPN  Outcome: Progressing  Goal: STG - Patient can utilize incentive spirometer  4/25/2022 1107 by Jose Francisco Roldan, LPN  Outcome: Progressing  4/25/2022 0059 by Belle Bahena, LPN  Outcome: Progressing  Goal: STG - family can complete suctioning  4/25/2022 1107 by Jose Francisco Roldan, LPN  Outcome: Progressing  4/25/2022 0059 by Belle Bahena, LPN  Outcome: Progressing     Problem: NUTRITION  Goal: Patient maintains adequate hydration  4/25/2022 1107 by Jose Francisco Roldan LPN  Outcome: Progressing  4/25/2022 0059 by Todd Loomis LPN  Outcome: Progressing  Goal: Patient maintains weight  4/25/2022 1107 by Mellisa Patricio LPN  Outcome: Progressing  4/25/2022 0059 by Todd Loomis LPN  Outcome: Progressing  Goal: Patient/Family demonstrates understanding of diet  4/25/2022 1107 by Mellisa Patricio LPN  Outcome: Progressing  4/25/2022 0059 by Todd Loomis LPN  Outcome: Progressing  Goal: Patient/Family independently completes tube feeding  4/25/2022 1107 by Mellisa Patricio LPN  Outcome: Progressing  4/25/2022 0059 by Todd Loomis LPN  Outcome: Progressing  Goal: Patient will have no more than 5 lb weight change during LOS  4/25/2022 1107 by Mellisa Patricio LPN  Outcome: Progressing  4/25/2022 0059 by Todd Loomis LPN  Outcome: Progressing  Goal: Patient will utilize adaptive techniques to administer nutrition  4/25/2022 1107 by Mellisa Patricio LPN  Outcome: Progressing  4/25/2022 0059 by Todd Loomis LPN  Outcome: Progressing  Goal: Patient will verbalize dietary restrictions  4/25/2022 1107 by Mellisa Patricio LPN  Outcome: Progressing  4/25/2022 0059 by Todd Loomis LPN  Outcome: Progressing     Problem: SAFETY  Goal: LTG - patient will adhere to hip precautions during ADL's and transfers  4/25/2022 1107 by Mellisa Patricio LPN  Outcome: Progressing  4/25/2022 0059 by Todd Loomis LPN  Outcome: Progressing  Goal: LTG - Patient will demonstrate safety requirements appropriate to situation/environment  4/25/2022 1107 by Mellisa Patricio LPN  Outcome: Progressing  4/25/2022 0059 by Todd Loomis LPN  Outcome: Progressing  Goal: LTG - patient will utilize safety techniques  4/25/2022 1107 by Mellisa Patricio LPN  Outcome: Progressing  4/25/2022 0059 by Todd Loomis LPN  Outcome: Progressing  Goal: STG - patient locks brakes on wheelchair  4/25/2022 1107 by Mellisa Patricio LPN  Outcome: Progressing  4/25/2022 0059 by Todd Loomis LPN  Outcome: Progressing  Goal: STG - Patient uses call light consistently to request assistance with transfers  4/25/2022 1107 by Chad Adler, LPN  Outcome: Progressing  4/25/2022 0059 by Livan Charles, LPN  Outcome: Progressing  Goal: STG - patient uses gait belt during all transfers  4/25/2022 1107 by Chad Adler, LPN  Outcome: Progressing  4/25/2022 0059 by Livan Tillmanes, LPN  Outcome: Progressing     Problem: SKIN INTEGRITY  Goal: LTG - Patient will be free from infection  4/25/2022 1107 by Chad Adler, LPN  Outcome: Progressing  4/25/2022 0059 by Livan Charles, LPN  Outcome: Progressing  Goal: LTG - patient will maintain/improve skin integrity through proper skin care techniques  4/25/2022 1107 by Chad Adler, LPN  Outcome: Progressing  4/25/2022 0059 by Livan Charles, LPN  Outcome: Progressing  Goal: LTG - Patient will demonstrate appropriate pressure relief techniques  4/25/2022 1107 by Chad Adler, LPN  Outcome: Progressing  4/25/2022 0059 by Livan Charles, LPN  Outcome: Progressing  Goal: LTG - patient will demonstrate appropriate skin care techniques  4/25/2022 1107 by Chad Adler, LPN  Outcome: Progressing  4/25/2022 0059 by Livan Charles, LPN  Outcome: Progressing  Goal: LTG - Patient will be free from infection  4/25/2022 1107 by Chad Adler, LPN  Outcome: Progressing  4/25/2022 0059 by Livan Charles, LPN  Outcome: Progressing  Goal: STG - Patient demonstrates skin care/treatment/dressing change  4/25/2022 1107 by Chad Adler, LPN  Outcome: Progressing  4/25/2022 0059 by Livan Charles, LPN  Outcome: Progressing  Goal: STG - patient will maintain good skin integrity  4/25/2022 1107 by Chad Adler, LPN  Outcome: Progressing  4/25/2022 0059 by Livan Charles, LPN  Outcome: Progressing  Goal: STG - Patient exhibits signs of wound healing.  4/25/2022 1107 by Chad Adler, LPN  Outcome: Progressing  4/25/2022 0059 by Livan Charles, LPN  Outcome: Progressing  Goal: STG - patient demonstrates pressure reduction techniques  4/25/2022 1107 by Seng Sabillon LPN  Outcome: Progressing  4/25/2022 0059 by Cj Lagos LPN  Outcome: Progressing  Goal: STG - Patient demonstrates preventative skin care measures  4/25/2022 1107 by Seng Sabillon LPN  Outcome: Progressing  4/25/2022 0059 by Cj Lagos LPN  Outcome: Progressing     Problem: PAIN  Goal: LTG - Patient will manage pain with the appropriate technique/Intervention  4/25/2022 1107 by Seng Sabillon LPN  Outcome: Progressing  4/25/2022 0059 by Cj Lagos LPN  Outcome: Progressing  Goal: LTG - Patient will demonstrate intervention for managing pain  4/25/2022 1107 by Seng Sabillon LPN  Outcome: Progressing  4/25/2022 0059 by Cj Lagos LPN  Outcome: Progressing  Goal: STG - Patient will reduce or eliminate use of analgesics  4/25/2022 1107 by Seng Sabillon LPN  Outcome: Progressing  4/25/2022 0059 by Cj Lagos LPN  Outcome: Progressing  Goal: STG - pain is manageable through therapies  4/25/2022 1107 by Seng Sabillon LPN  Outcome: Progressing  4/25/2022 0059 by Cj Lagos LPN  Outcome: Progressing  Goal: STG - Patient will verbalize an acceptable level of pain  4/25/2022 1107 by Seng Sabillon LPN  Outcome: Progressing  4/25/2022 0059 by Cj Lagos LPN  Outcome: Progressing  Goal: STG - patients pain is managed to allow active participation in daily activities  4/25/2022 1107 by Seng Sabillon LPN  Outcome: Progressing  4/25/2022 0059 by Cj Lagos LPN  Outcome: Progressing  Goal: STG - Patient will increase activity level  4/25/2022 1107 by Seng Sabillon LPN  Outcome: Progressing  4/25/2022 0059 by Cj Lagos LPN  Outcome: Progressing  Goal: STG - patient verbalizes a reduction in pain level  4/25/2022 1107 by Seng Sabillon LPN  Outcome: Progressing  4/25/2022 0059 by Cj Lagos LPN  Outcome: Progressing     Problem: Discharge Planning  Goal: Discharge to home or other facility with appropriate resources  Outcome: Progressing     Problem: Skin/Tissue Integrity - Adult  Goal: Incisions, wounds, or drain sites healing without S/S of infection  Outcome: Progressing     Problem: Gastrointestinal - Adult  Goal: Maintains or returns to baseline bowel function  Outcome: Progressing  Goal: Maintains adequate nutritional intake  Outcome: Progressing     Problem: Anxiety  Goal: Will report anxiety at manageable levels  Description: INTERVENTIONS:  1. Administer medication as ordered  2. Teach and rehearse alternative coping skills  3. Provide emotional support with 1:1 interaction with staff  Outcome: Progressing     Problem: Coping  Goal: Pt/Family able to verbalize concerns and demonstrate effective coping strategies  Description: INTERVENTIONS:  1. Assist patient/family to identify coping skills, available support systems and cultural and spiritual values  2. Provide emotional support, including active listening and acknowledgement of concerns of patient and caregivers  3. Reduce environmental stimuli, as able  4. Instruct patient/family in relaxation techniques, as appropriate  5. Assess for spiritual pain/suffering and initiate Spiritual Care, Psychosocial Clinical Specialist consults as needed  Outcome: Progressing     Problem: Nutrition Deficit:  Goal: Optimize nutritional status  Outcome: Progressing     Problem: Skin/Tissue Integrity  Goal: Absence of new skin breakdown  Description: 1. Monitor for areas of redness and/or skin breakdown  2. Assess vascular access sites hourly  3. Every 4-6 hours minimum:  Change oxygen saturation probe site  4. Every 4-6 hours:  If on nasal continuous positive airway pressure, respiratory therapy assess nares and determine need for appliance change or resting period.   Outcome: Progressing

## 2022-04-25 NOTE — PROGRESS NOTES
Occupational Therapy  Facility/Department: [facility] 8 REHAB UNIT  Occupational Therapy Treatment Note   Name: Maryann Quiroz  : 1950  MRN: [id number]  Date of Service: 2022    Discharge Recommendations:  Home with Home health OT          Patient Diagnosis(es): There were no encounter diagnoses. Past Medical History:  has a past medical history of Arthritis, Bilateral low back pain without sciatica, Cancer (HCC), COPD (chronic obstructive pulmonary disease) ([facility] [address] Hx of blood clots, Hyperlipidemia, Pain in both knees, Thyroid disease, and Urinary incontinence. Past Surgical History:  has a past surgical history that includes Hysterectomy; joint replacement; lobectomy; Cholecystectomy, laparoscopic; Skin cancer excision; and cyst incision and drainage. Treatment Diagnosis: Status post 3 stage lumbar fusion      Assessment   Performance deficits / Impairments: Decreased functional mobility ; Decreased ADL status; Decreased strength;Decreased endurance;Decreased balance;Decreased high-level IADLs  Treatment Diagnosis: Status post 3 stage lumbar fusion           Plan   Plan  Times per Day: Twice a day  Current Treatment Recommendations: Strengthening,Balance training,Functional mobility training,Endurance training,Equipment evaluation, education, & procurement,Patient/Caregiver education & training,Safety education & training,Self-Care / ADL,Home management training     Restrictions  Restrictions/Precautions  Restrictions/Precautions: Fall Risk,Surgical Protocols  Required Braces or Orthoses?: Yes  Required Braces or Orthoses  Spinal: Lumbar Corset  Position Activity Restriction  Spinal Precautions: No Bending,No Lifting,No Twisting    Objective    Safety Devices  Type of Devices: Bed alarm in place;Call light within reach  Balance  Sitting Balance: Supervision  Standing Balance: Stand by assistance  Functional Mobility  Functional - Mobility Device: Rolling Walker  Activity: To/From therapy gym; To/from bathroom  Assist Level: Contact guard assistance           Activity Tolerance  Activity Tolerance: Patient tolerated treatment well;Patient limited by endurance     Transfers  Sit to stand: Stand by assistance  Stand to sit: Stand by assistance  Transfer Comments: to/from couch, standard bed, rocking recliner     OutComes Score                Eating  Assistance Needed: Independent  CARE Score: 6  Discharge Goal: Independent    Oral Hygiene  Assistance Needed: Independent  CARE Score: 6  Discharge Goal: [address] needed: Supervision or touching assistance  Comment: [facility]  CARE Score: 4  Discharge Goal: Independent     Toilet Transfer  Assistance needed: Supervision or touching assistance  Comment: [facility]  CARE Score: 4  Discharge Goal: Independent       Goals  Short Term Goals  Time Frame for Short term goals: 1 week  Short Term Goal 1: Supervision with bathing hygiene. Short Term Goal 2: Supervision with clothing management/hygiene for toileting. Short Term Goal 3: Supervision with toilet transfers. Short Term Goal 4: Supervision LB dressing, AE PRN. Short Term Goal 5: Supervision with donning/doffing socks and shoes, AE PRN. Additional Goals?: Yes  Short Term Goal 6: Patient will complete 1-2 handed static standing task for 5 minutes, requiring Supervision. Long Term Goals  Time Frame for Long term goals : 2 weeks  Long Term Goal 1: Modified independent with bathing hygiene. Long Term Goal 2: Modified independent with toileting and toilet transfers. Long Term Goal 3: Modified independent with overall dressing. Long Term Goal 4: Modified independent with ambulatory homemaking tasks. Long Term Goal 5: Patient verbalize DME needs. Patient Goals   Patient goals : Return home independently.        Therapy Time   Individual Concurrent Group Co-treatment   Time In       0900   Time Out       1000   Minutes       60   Timed Code Treatment Minutes: [facility],

## 2022-04-26 PROCEDURE — 97535 SELF CARE MNGMENT TRAINING: CPT

## 2022-04-26 PROCEDURE — 97530 THERAPEUTIC ACTIVITIES: CPT

## 2022-04-26 PROCEDURE — 99233 SBSQ HOSP IP/OBS HIGH 50: CPT | Performed by: PSYCHIATRY & NEUROLOGY

## 2022-04-26 PROCEDURE — 6370000000 HC RX 637 (ALT 250 FOR IP): Performed by: PSYCHIATRY & NEUROLOGY

## 2022-04-26 PROCEDURE — 97110 THERAPEUTIC EXERCISES: CPT

## 2022-04-26 PROCEDURE — 1180000000 HC REHAB R&B

## 2022-04-26 PROCEDURE — 2580000003 HC RX 258: Performed by: PSYCHIATRY & NEUROLOGY

## 2022-04-26 PROCEDURE — 97116 GAIT TRAINING THERAPY: CPT

## 2022-04-26 RX ORDER — SODIUM CHLORIDE 9 MG/ML
INJECTION, SOLUTION INTRAVENOUS ONCE
Status: COMPLETED | OUTPATIENT
Start: 2022-04-26 | End: 2022-04-26

## 2022-04-26 RX ADMIN — DOCUSATE SODIUM 100 MG: 100 CAPSULE, LIQUID FILLED ORAL at 21:29

## 2022-04-26 RX ADMIN — OXYCODONE AND ACETAMINOPHEN 1 TABLET: 10; 325 TABLET ORAL at 15:39

## 2022-04-26 RX ADMIN — ALPRAZOLAM 0.5 MG: 0.5 TABLET ORAL at 22:20

## 2022-04-26 RX ADMIN — ALPRAZOLAM 0.5 MG: 0.5 TABLET ORAL at 15:39

## 2022-04-26 RX ADMIN — TIZANIDINE 8 MG: 4 TABLET ORAL at 04:46

## 2022-04-26 RX ADMIN — OXYCODONE AND ACETAMINOPHEN 1 TABLET: 10; 325 TABLET ORAL at 07:58

## 2022-04-26 RX ADMIN — OXYCODONE AND ACETAMINOPHEN 1 TABLET: 10; 325 TABLET ORAL at 01:13

## 2022-04-26 RX ADMIN — HYDROCORTISONE: 1 CREAM TOPICAL at 22:24

## 2022-04-26 RX ADMIN — ESCITALOPRAM OXALATE 10 MG: 10 TABLET ORAL at 08:00

## 2022-04-26 RX ADMIN — TIZANIDINE 8 MG: 4 TABLET ORAL at 08:04

## 2022-04-26 RX ADMIN — DOCUSATE SODIUM 100 MG: 100 CAPSULE, LIQUID FILLED ORAL at 08:01

## 2022-04-26 RX ADMIN — SODIUM CHLORIDE: 9 INJECTION, SOLUTION INTRAVENOUS at 10:43

## 2022-04-26 RX ADMIN — TIZANIDINE 8 MG: 4 TABLET ORAL at 21:28

## 2022-04-26 RX ADMIN — PANTOPRAZOLE SODIUM 40 MG: 40 TABLET, DELAYED RELEASE ORAL at 08:00

## 2022-04-26 RX ADMIN — ALPRAZOLAM 0.5 MG: 0.5 TABLET ORAL at 01:13

## 2022-04-26 RX ADMIN — LINACLOTIDE 290 MCG: 145 CAPSULE, GELATIN COATED ORAL at 08:01

## 2022-04-26 RX ADMIN — OXYCODONE AND ACETAMINOPHEN 1 TABLET: 10; 325 TABLET ORAL at 19:29

## 2022-04-26 RX ADMIN — LEVOTHYROXINE SODIUM 100 MCG: 100 TABLET ORAL at 08:04

## 2022-04-26 RX ADMIN — PREGABALIN 100 MG: 50 CAPSULE ORAL at 04:10

## 2022-04-26 ASSESSMENT — PAIN DESCRIPTION - DESCRIPTORS
DESCRIPTORS: ACHING;DULL
DESCRIPTORS: ACHING
DESCRIPTORS: SHARP;DULL;ACHING
DESCRIPTORS: ACHING;DISCOMFORT
DESCRIPTORS: ACHING
DESCRIPTORS: ACHING

## 2022-04-26 ASSESSMENT — PAIN SCALES - GENERAL
PAINLEVEL_OUTOF10: 4
PAINLEVEL_OUTOF10: 10
PAINLEVEL_OUTOF10: 9
PAINLEVEL_OUTOF10: 4
PAINLEVEL_OUTOF10: 9
PAINLEVEL_OUTOF10: 10
PAINLEVEL_OUTOF10: 10

## 2022-04-26 ASSESSMENT — PAIN DESCRIPTION - ORIENTATION
ORIENTATION: LOWER;MID;LEFT
ORIENTATION: LOWER
ORIENTATION: LOWER
ORIENTATION: LEFT;LOWER
ORIENTATION: LEFT
ORIENTATION: LEFT

## 2022-04-26 ASSESSMENT — PAIN DESCRIPTION - LOCATION
LOCATION: BACK
LOCATION: BACK;LEG
LOCATION: BACK;KNEE
LOCATION: BACK
LOCATION: BACK;LEG
LOCATION: BACK

## 2022-04-26 ASSESSMENT — PAIN - FUNCTIONAL ASSESSMENT
PAIN_FUNCTIONAL_ASSESSMENT: ACTIVITIES ARE NOT PREVENTED

## 2022-04-26 NOTE — PROGRESS NOTES
Occupational Therapy  Facility/Department: [facility] 8 REHAB UNIT  Occupational Therapy Treatment Note     Name: Cynthia Alberto  : 1950  MRN: [id number]  Date of Service: 2022    Discharge Recommendations:  Home with Home health OT          Patient Diagnosis(es): There were no encounter diagnoses. Past Medical History:  has a past medical history of Arthritis, Bilateral low back pain without sciatica, Cancer (HCC), COPD (chronic obstructive pulmonary disease) ([address] Hx of blood clots, Hyperlipidemia, Pain in both knees, Thyroid disease, and Urinary incontinence. Past Surgical History:  has a past surgical history that includes Hysterectomy; joint replacement; lobectomy; Cholecystectomy, laparoscopic; Skin cancer excision; and cyst incision and drainage. Treatment Diagnosis: Status post 3 stage lumbar fusion      Assessment   Performance deficits / Impairments: Decreased functional mobility ; Decreased ADL status; Decreased strength;Decreased endurance;Decreased balance;Decreased high-level IADLs  Assessment: BP was 111/37 in semi-fowlers. Patient very lethargic and stated that she was still dizzy from earlier session. Patient did state that she needed to use the bathroom. Patient required CGA for stand step transfer to [facility]. Pt was immediately transferred back to bed and BP was 96/36 in semi-fowlers. Nursing notified of orthostatic BP.   Treatment Diagnosis: Status post 3 stage lumbar fusion  Activity Tolerance  Activity Tolerance: Patient limited by fatigue  Activity Tolerance: hypotension        Plan   Plan  Times per Day: Twice a day  Current Treatment Recommendations: Strengthening,Balance training,Functional mobility training,Endurance training,Equipment evaluation, education, & procurement,Patient/Caregiver education & training,Safety education & training,Self-Care / ADL,Home management training     Restrictions  Restrictions/Precautions  Restrictions/Precautions: Fall Risk,Surgical Protocols  Required Braces or Orthoses?: Yes  Required Braces or Orthoses  Spinal: Lumbar Corset  Position Activity Restriction  Spinal Precautions: No Bending,No Lifting,No Twisting    Subjective      04/26/22 1346   Vitals   Pulse (!) 42   BP (!) 111/37   Patient Position Semi fowlers   MAP (Calculated) 61.67      04/26/22 1410   Vitals   Pulse 53   BP (!) 96/36   BP Location Left upper arm   BP Method Automatic   Patient Position Semi fowlers   MAP (Calculated) 56   Comment pt hob lowered and manually elevated foot of bed with nsg notified     Objective        Safety Devices  Type of Devices: Bed alarm in place;Call light within reach; Left in bed;Nurse notified  Balance  Sitting Balance: Stand by assistance  Standing Balance: Contact guard assistance  Functional Mobility  Functional - Mobility Device: Rolling Walker  Activity: Other  Assist Level: Contact guard assistance  Functional Mobility Comments: in hallway, then became dizzy and taken back to the room           Activity Tolerance  Activity Tolerance: Patient limited by fatigue;Patient limited by pain; Patient limited by endurance; Other (comment) (dizziness, orthostatic hypotension)     Transfers  Stand Step Transfers: Contact guard assistance  Sit to stand: Contact guard assistance  Stand to sit: Contact guard assistance  Transfer Comments: due to orthostatic BP              OutComes Score      Toileting Hygiene  Assistance needed: Partial/moderate assistance  Comment: More assistance due to orthostatic BP  CARE Score: 3  Discharge Goal: Independent     Toilet Transfer  Assistance needed: Supervision or touching assistance  Comment: CGA more assistance due to orthostatic BP  CARE Score: 4  Discharge Goal: Independent              Goals  Short Term Goals  Time Frame for Short term goals: 1 week  Short Term Goal 1: Supervision with bathing hygiene. Short Term Goal 2: Supervision with clothing management/hygiene for toileting.   Short Term Goal 3: Supervision with toilet transfers. Short Term Goal 4: Supervision LB dressing, AE PRN. Short Term Goal 5: Supervision with donning/doffing socks and shoes, AE PRN. Additional Goals?: Yes  Short Term Goal 6: Patient will complete 1-2 handed static standing task for 5 minutes, requiring Supervision. Long Term Goals  Time Frame for Long term goals : 2 weeks  Long Term Goal 1: Modified independent with bathing hygiene. Long Term Goal 2: Modified independent with toileting and toilet transfers. Long Term Goal 3: Modified independent with overall dressing. Long Term Goal 4: Modified independent with ambulatory homemaking tasks. Long Term Goal 5: Patient verbalize DME needs. Patient Goals   Patient goals : Return home independently.        Therapy Time   Individual Concurrent Group Co-treatment   Time In       5028   Time Out       1415   Minutes       30   Timed Code Treatment Minutes: [address], OT

## 2022-04-26 NOTE — PROGRESS NOTES
Occupational Therapy  Facility/Department: [facility] 8 REHAB UNIT  Occupational Therapy Treatment Note     Name: Kaye Sever  : 1950  MRN: [id number]  Date of Service: 2022    Discharge Recommendations:  Home with Home health OT          Patient Diagnosis(es): There were no encounter diagnoses. Past Medical History:  has a past medical history of Arthritis, Bilateral low back pain without sciatica, Cancer (HCC), COPD (chronic obstructive pulmonary disease) ([facility] [address] Hx of blood clots, Hyperlipidemia, Pain in both knees, Thyroid disease, and Urinary incontinence. Past Surgical History:  has a past surgical history that includes Hysterectomy; joint replacement; lobectomy; Cholecystectomy, laparoscopic; Skin cancer excision; and cyst incision and drainage. Treatment Diagnosis: Status post 3 stage lumbar fusion      Assessment   Performance deficits / Impairments: Decreased functional mobility ; Decreased ADL status; Decreased strength;Decreased endurance;Decreased balance;Decreased high-level IADLs  Treatment Diagnosis: Status post 3 stage lumbar fusion    Patient became dizzy during ambulation back to room. Checked BP and was 84/26. Patient was transferred back into bed and nursing was notified. BP was taken manually and was BP was 80/42. Patient placed in trendelenburg and feet raised.   Activity Tolerance  Activity Tolerance: Patient Tolerated treatment well        Plan   Plan  Times per Day: Twice a day  Current Treatment Recommendations: Strengthening,Balance training,Functional mobility training,Endurance training,Equipment evaluation, education, & procurement,Patient/Caregiver education & training,Safety education & training,Self-Care / ADL,Home management training     Restrictions  Restrictions/Precautions  Restrictions/Precautions: Fall Risk,Surgical Protocols  Required Braces or Orthoses?: Yes  Required Braces or Orthoses  Spinal: Lumbar Corset  Position Activity Restriction  Spinal Precautions: No Bending,No Lifting,No Twisting    Subjective   General  Chart Reviewed: Yes  Patient assessed for rehabilitation services?: Yes  Response to previous treatment: Patient with no complaints from previous session  Family / Caregiver Present: No  Vital Signs  Temp: 97.2 °F (36.2 °C)  Temp Source: Temporal  Pulse: (!) 45  Heart Rate Source: Monitor  Resp: 18  BP: (!) 112/44  BP Location: Left upper arm  MAP (Calculated): 66.67  Patient Position: Supine  Oxygen Therapy  SpO2: 96 %  O2 Device: None (Room air)      04/26/22 0900   Vitals   Pulse (!) 45   BP (!) 84/26  (Patient became more dizzy with ambulation and then became lethargic and stated she needed to lie down.)   BP Location Left upper arm   Patient Position Standing   MAP (Calculated) 45.33   SpO2 95 %   O2 Device None (Room air)   Subjective   Pain 10       Social/Functional History  Social/Functional History  Lives With: Family (Daughter and sister in law)  Type of Home: House  Home Layout: Two level,Able to Live on Main level with bedroom/bathroom  Home Access: Stairs to enter with rails  Entrance Stairs - Number of Steps: 2  Entrance Stairs - Rails: None  Bathroom Shower/Tub: Tub/Shower unit  Bathroom Equipment: Tub transfer bench  ADL Assistance: Independent  Homemaking Assistance: Independent  Homemaking Responsibilities: Yes  Ambulation Assistance: Independent  Transfer Assistance: Independent  Active : Yes  Occupation: Retired  Type of Occupation: Nursing assistant  Leisure & Hobbies: Reading bible, puzzles, being outdoors  IADL Comments: splits homemaking tasks with sister in law and daughter       Objective              Safety Devices  Type of Devices: All fall risk precautions in place; Bed alarm in place;Call light within reach; Left in bed;Nurse notified (Left in trendelenburg)  Balance  Sitting Balance: Supervision  Standing Balance: Contact guard assistance  Functional Mobility  Functional - Mobility Device: Rolling Walker  Activity: Other  Assist Level: Contact guard assistance  Functional Mobility Comments: in hallway, then became dizzy and taken back to the room           Activity Tolerance  Activity Tolerance: Patient limited by fatigue;Patient limited by pain; Patient limited by endurance; Other (comment) (dizziness, orthostatic hypotension)     Transfers  Sit to stand: Stand by assistance  Stand to sit: Stand by assistance       Exercise Treatment: 2#: 2 sets of 10 in all planes    OutComes Score      Putting On/Taking Off Footwear  Assistance Needed: Supervision or touching assistance  Comment: VC for sock aide  CARE Score: 4  Discharge Goal: Independent         Goals  Short Term Goals  Time Frame for Short term goals: 1 week  Short Term Goal 1: Supervision with bathing hygiene. Short Term Goal 2: Supervision with clothing management/hygiene for toileting. Short Term Goal 3: Supervision with toilet transfers. Short Term Goal 4: Supervision LB dressing, AE PRN. Short Term Goal 5: Supervision with donning/doffing socks and shoes, AE PRN. Additional Goals?: Yes  Short Term Goal 6: Patient will complete 1-2 handed static standing task for 5 minutes, requiring Supervision. Long Term Goals  Time Frame for Long term goals : 2 weeks  Long Term Goal 1: Modified independent with bathing hygiene. Long Term Goal 2: Modified independent with toileting and toilet transfers. Long Term Goal 3: Modified independent with overall dressing. Long Term Goal 4: Modified independent with ambulatory homemaking tasks. Long Term Goal 5: Patient verbalize DME needs. Patient Goals   Patient goals : Return home independently.        Therapy Time   Individual Concurrent Group Co-treatment   Time In       0915   Time Out       1010   Minutes       55   Timed Code Treatment Minutes: [address], OT

## 2022-04-26 NOTE — PLAN OF CARE
Problem: Pain:  Goal: Pain level will decrease  Description: Pain level will decrease  4/26/2022 1148 by Jennifer Encarnacion LPN  Outcome: Progressing  4/26/2022 0210 by Abram Romero LPN  Outcome: Progressing  Goal: Control of acute pain  Description: Control of acute pain  4/26/2022 1148 by Jennifer Encarnacion LPN  Outcome: Progressing  4/26/2022 0210 by Abram Romero LPN  Outcome: Progressing  Goal: Control of chronic pain  Description: Control of chronic pain  4/26/2022 1148 by Jennifer Encarnacion LPN  Outcome: Progressing  4/26/2022 0210 by Abram Romero LPN  Outcome: Progressing  Goal: Patient's pain/discomfort is manageable  Description: Patient's pain/discomfort is manageable  4/26/2022 1148 by Jennifer Encarnacion LPN  Outcome: Progressing  4/26/2022 0210 by Abram Romero LPN  Outcome: Progressing     Problem: Falls - Risk of:  Goal: Will remain free from falls  Description: Will remain free from falls  4/26/2022 1148 by Jennifer Encarnacion LPN  Outcome: Progressing  4/26/2022 0210 by Abram Romero LPN  Outcome: Progressing  Goal: Absence of physical injury  Description: Absence of physical injury  4/26/2022 1148 by Jennifer Encarnacion LPN  Outcome: Progressing  4/26/2022 0210 by Abram Romero LPN  Outcome: Progressing     Problem: Mobility - Impaired:  Goal: Mobility will improve  Description: Mobility will improve  4/26/2022 1148 by Jennifer Encarnacion LPN  Outcome: Progressing  4/26/2022 0210 by Abram Romero LPN  Outcome: Progressing     Problem: Infection:  Goal: Will remain free from infection  Description: Will remain free from infection  4/26/2022 1148 by Jennifer Encarnacion LPN  Outcome: Progressing  4/26/2022 0210 by Abram Romero LPN  Outcome: Progressing     Problem: Safety:  Goal: Free from accidental physical injury  Description: Free from accidental physical injury  4/26/2022 1148 by Jennifer Encarnacion LPN  Outcome: Progressing  4/26/2022 0210 by Abram Romero LPN  Outcome: Progressing  Goal: Free from intentional harm  Description: Free from intentional harm  4/26/2022 1148 by Ashly Lezama LPN  Outcome: Progressing  4/26/2022 0210 by Jaleesa Estrella LPN  Outcome: Progressing     Problem: Daily Care:  Goal: Daily care needs are met  Description: Daily care needs are met  4/26/2022 1148 by Ashly Lezama LPN  Outcome: Progressing  4/26/2022 0210 by Jaleesa Estrella LPN  Outcome: Progressing     Problem: Skin Integrity:  Goal: Skin integrity will stabilize  Description: Skin integrity will stabilize  4/26/2022 1148 by Ashly Lezama LPN  Outcome: Progressing  4/26/2022 0210 by Jaleesa Estrella LPN  Outcome: Progressing     Problem: Discharge Planning:  Goal: Patients continuum of care needs are met  Description: Patients continuum of care needs are met  4/26/2022 1148 by Ashly Lezama LPN  Outcome: Progressing  4/26/2022 0210 by Jaleesa Estrella LPN  Outcome: Progressing     Problem: IP BLADDER/VOIDING  Goal: LTG - patient will complete bladder elimination  4/26/2022 1148 by Ashly Lezama LPN  Outcome: Progressing  4/26/2022 0210 by Jaleesa Estrella LPN  Outcome: Progressing  Goal: LTG - Patient will utilize adaptive techniques/equipment to complete bladder elimination  4/26/2022 1148 by Ashly Lezama LPN  Outcome: Progressing  4/26/2022 0210 by Jaleesa Estrella LPN  Outcome: Progressing  Goal: LTG - patient will achieve acceptable level of continence  4/26/2022 1148 by Ashly Lezama LPN  Outcome: Progressing  4/26/2022 0210 by Jaleesa Estrella LPN  Outcome: Progressing  Goal: STG - Patient demonstrates ability to take care of indwelling catheter  4/26/2022 1148 by Ashly Lezama LPN  Outcome: Progressing  4/26/2022 0210 by Jaleesa Estrella LPN  Outcome: Progressing  Goal: STG - patient demonstrates self-cath technique using clean technique and care of the catheter  4/26/2022 1148 by Ashly Lezama LPN  Outcome: Progressing  4/26/2022 0210 by Jaleesa Estrella LPN  Outcome: Progressing  Goal: STG - Patient demonstrates no accidents  4/26/2022 1148 by Mellisa Patricio LPN  Outcome: Progressing  4/26/2022 0210 by Todd Loomis LPN  Outcome: Progressing  Goal: STG - Patient will state signs and symptoms of UTI  4/26/2022 1148 by Mellisa Patricio LPN  Outcome: Progressing  4/26/2022 0210 by Todd Loomis LPN  Outcome: Progressing  Goal: STG - patient will be able to empty bladder  4/26/2022 1148 by Mellisa Patricio LPN  Outcome: Progressing  4/26/2022 0210 by Todd Loomis LPN  Outcome: Progressing  Goal: STG - Patient demonstrates understanding of self catheterization schedule by completing task on time  4/26/2022 1148 by Mellisa Patricio LPN  Outcome: Progressing  4/26/2022 0210 by Todd Loomis LPN  Outcome: Progressing  Goal: STG - patient participates in bladder program by expressing need to void  4/26/2022 1148 by Mellisa Patricio LPN  Outcome: Progressing  4/26/2022 0210 by Todd Loomis LPN  Outcome: Progressing  Goal: STG - Patient verbalizes understanding of catheter care indwelling/intermittent  4/26/2022 1148 by Mellisa Patricio LPN  Outcome: Progressing  4/26/2022 0210 by Todd Loomis LPN  Outcome: Progressing  Goal: STG - patient participates in bladder program by adhering to implemented toileting schedule  4/26/2022 1148 by Mellisa Patricio LPN  Outcome: Progressing  4/26/2022 0210 by Todd Loomis LPN  Outcome: Progressing     Problem: IP BOWEL ELIMINATION  Goal: LTG - patient will utilize adaptive techniques/equipment to complete bowel elimination  4/26/2022 1148 by Mellisa Patricio LPN  Outcome: Progressing  4/26/2022 0210 by Todd Loomis LPN  Outcome: Progressing  Goal: LTG - patient will have regular and routine bowel evacuation  4/26/2022 1148 by Mellisa Patricio LPN  Outcome: Progressing  4/26/2022 0210 by Todd Loomis LPN  Outcome: Progressing  Goal: STG - patient will be accident free  4/26/2022 1148 by Mellisa Patricio LPN  Outcome: Progressing  4/26/2022 0210 by Thedore Roers, LPN  Outcome: Progressing  Goal: STG - Patient demonstrates care and management of ostomy bag  4/26/2022 1148 by Fabienne Bolaños LPN  Outcome: Progressing  4/26/2022 0210 by Mustapha Diaz LPN  Outcome: Progressing  Goal: STG - Patient participates in bowel management program  4/26/2022 1148 by Fabienne Bolaños LPN  Outcome: Progressing  4/26/2022 0210 by Mustapha Diaz LPN  Outcome: Progressing  Goal: STG - patient maintains skin integrity  4/26/2022 1148 by Fabienne Bolaños LPN  Outcome: Progressing  4/26/2022 0210 by Mustapha Diaz LPN  Outcome: Progressing  Goal: STG - Patient will verbalize signs and symptoms of constipation and how to prevent/alleviate  4/26/2022 1148 by Fabienne Bolaños LPN  Outcome: Progressing  4/26/2022 0210 by Mustapha Diaz LPN  Outcome: Progressing  Goal: STG - patient will be continent of stool  4/26/2022 1148 by Fabienne Bolaños LPN  Outcome: Progressing  4/26/2022 0210 by Mustapha Diaz LPN  Outcome: Progressing  Goal: STG - Patient completes digital stimulation technique  4/26/2022 1148 by Fabienne Bolaños LPN  Outcome: Progressing  4/26/2022 0210 by Mustapha Diaz LPN  Outcome: Progressing  Goal: STG - Patient verbalizes knowledge about relationship between diet, fluid intake, activity and medication on constipation  4/26/2022 1148 by Fabienne Bolaños LPN  Outcome: Progressing  4/26/2022 0210 by Mustapha Diaz LPN  Outcome: Progressing     Problem: IP BREATHING  Goal: LTG - patient will mobilize secretions and maintain airway  4/26/2022 1148 by Fabienne Bolaños LPN  Outcome: Progressing  4/26/2022 0210 by Mustapha Diaz LPN  Outcome: Progressing  Goal: LTG - Patient/caregiver will demonstrate/perform proper techniques to maintain patent airway  4/26/2022 1148 by Fabienne Bolaños LPN  Outcome: Progressing  4/26/2022 0210 by Mustapha Diaz LPN  Outcome: Progressing  Goal: LTG - patient/caregiver will demonstrate/perform improved or stable self care abilities within physical limitations  4/26/2022 1148 by Paresh Villalobos, LPN  Outcome: Progressing  4/26/2022 0210 by Buddy Contreras, LPN  Outcome: Progressing  Goal: STG - Patient/caregiver will maintain patent airway  4/26/2022 1148 by Paresh Villalobos, LPN  Outcome: Progressing  4/26/2022 0210 by Buddy Contreras, LPN  Outcome: Progressing  Goal: STG - respiratory rate and effort will be within normal limits for the patient  4/26/2022 1148 by Paresh Villalobos, LPN  Outcome: Progressing  4/26/2022 0210 by Buddy Contreras, LPN  Outcome: Progressing  Goal: STG - patient/caregiver will be able to verbalize oxygen safety precautions  4/26/2022 1148 by Paresh Villalobos, LPN  Outcome: Progressing  4/26/2022 0210 by Buddy Contreras LPN  Outcome: Progressing  Goal: STG - Patient/caregiver demonstrates correct suctioning technique  4/26/2022 1148 by Paresh Villalobos, LPN  Outcome: Progressing  4/26/2022 0210 by Buddy Contreras, LPN  Outcome: Progressing  Goal: STG - patient will utilize incentive spirometer  4/26/2022 1148 by Paresh Villalobos, LPN  Outcome: Progressing  4/26/2022 0210 by Buddy Contreras, LPN  Outcome: Progressing  Goal: STG - Patient performs or directs assisted coughing  4/26/2022 1148 by Paresh Villalobos, LPN  Outcome: Progressing  4/26/2022 0210 by Buddy Contreras, LPN  Outcome: Progressing  Goal: STG - patient can administer MDI's  4/26/2022 1148 by Paresh Villalobos, LPN  Outcome: Progressing  4/26/2022 0210 by Buddy Contreras, LPN  Outcome: Progressing  Goal: STG - Patient can utilize incentive spirometer  4/26/2022 1148 by Paresh Villalobos, LPN  Outcome: Progressing  4/26/2022 0210 by Buddy Contreras, LPN  Outcome: Progressing  Goal: STG - family can complete suctioning  4/26/2022 1148 by Paresh Villalobos, LPN  Outcome: Progressing  4/26/2022 0210 by Buddy Contreras, LPN  Outcome: Progressing     Problem: NUTRITION  Goal: Patient maintains adequate hydration  4/26/2022 1148 by Paresh Villalobos LPN  Outcome: Progressing  4/26/2022 0210 by Belle Bahena, LPN  Outcome: Progressing  Goal: Patient maintains weight  4/26/2022 1148 by Jose Francisco Roldan, LPN  Outcome: Progressing  4/26/2022 0210 by Belle Bahena, LPN  Outcome: Progressing  Goal: Patient/Family demonstrates understanding of diet  4/26/2022 1148 by Jose Francisco Roldan, LPN  Outcome: Progressing  4/26/2022 0210 by Belle Bahena, LPN  Outcome: Progressing  Goal: Patient/Family independently completes tube feeding  4/26/2022 1148 by Jose Francisco Roldan, LPN  Outcome: Progressing  4/26/2022 0210 by Belle Bahena, LPN  Outcome: Progressing  Goal: Patient will have no more than 5 lb weight change during LOS  4/26/2022 1148 by Jose Francisco Roldan, LPN  Outcome: Progressing  4/26/2022 0210 by Belle Bahena, LPN  Outcome: Progressing  Goal: Patient will utilize adaptive techniques to administer nutrition  4/26/2022 1148 by Jose Francisco Roldan, LPN  Outcome: Progressing  4/26/2022 0210 by Belle Bahena, LPN  Outcome: Progressing  Goal: Patient will verbalize dietary restrictions  4/26/2022 1148 by Jose Francisco Roldan, LPN  Outcome: Progressing  4/26/2022 0210 by Belle Bahena, LPN  Outcome: Progressing     Problem: SAFETY  Goal: LTG - patient will adhere to hip precautions during ADL's and transfers  4/26/2022 1148 by Jose Francisco Roldan, LPN  Outcome: Progressing  4/26/2022 0210 by Belle Bahena, LPN  Outcome: Progressing  Goal: LTG - Patient will demonstrate safety requirements appropriate to situation/environment  4/26/2022 1148 by Jose Francisco Roldan, LPN  Outcome: Progressing  4/26/2022 0210 by Belle Bahena, LPN  Outcome: Progressing  Goal: LTG - patient will utilize safety techniques  4/26/2022 1148 by Jose Francisco Roldan, LPN  Outcome: Progressing  4/26/2022 0210 by Belle Bahena, LPN  Outcome: Progressing  Goal: STG - patient locks brakes on wheelchair  4/26/2022 1148 by Jose Francisco Roldan, LPN  Outcome: Progressing  4/26/2022 0210 by Belle Bahena LPN  Outcome: Progressing  Goal: STG - Patient uses call light consistently to request assistance with transfers  4/26/2022 1148 by Hans Mcknight LPN  Outcome: Progressing  4/26/2022 0210 by Jerelene Severin, LPN  Outcome: Progressing  Goal: STG - patient uses gait belt during all transfers  4/26/2022 1148 by Hans Mcknight LPN  Outcome: Progressing  4/26/2022 0210 by Jerelene Severin, LPN  Outcome: Progressing     Problem: SKIN INTEGRITY  Goal: LTG - Patient will be free from infection  4/26/2022 1148 by Hans Mcknight LPN  Outcome: Progressing  4/26/2022 0210 by Jerelene Severin, LPN  Outcome: Progressing  Goal: LTG - patient will maintain/improve skin integrity through proper skin care techniques  4/26/2022 1148 by Hans Mcknight LPN  Outcome: Progressing  4/26/2022 0210 by Jerelene Severin, LPN  Outcome: Progressing  Goal: LTG - Patient will demonstrate appropriate pressure relief techniques  4/26/2022 1148 by Hans Mcknight LPN  Outcome: Progressing  4/26/2022 0210 by Jerelene Severin, LPN  Outcome: Progressing  Goal: LTG - patient will demonstrate appropriate skin care techniques  4/26/2022 1148 by Hans Mcknight LPN  Outcome: Progressing  4/26/2022 0210 by Jerelene Severin, LPN  Outcome: Progressing  Goal: LTG - Patient will be free from infection  4/26/2022 1148 by Hans Mcknight LPN  Outcome: Progressing  4/26/2022 0210 by Jerelene Severin, LPN  Outcome: Progressing  Goal: STG - Patient demonstrates skin care/treatment/dressing change  4/26/2022 1148 by Hans Mcknight LPN  Outcome: Progressing  4/26/2022 0210 by Jerelene Severin, LPN  Outcome: Progressing  Goal: STG - patient will maintain good skin integrity  4/26/2022 1148 by Hans Mcknight LPN  Outcome: Progressing  4/26/2022 0210 by Jerelene Severin, LPN  Outcome: Progressing  Goal: STG - Patient exhibits signs of wound healing.   4/26/2022 1148 by Hans Mcknight LPN  Outcome: Progressing  4/26/2022 0210 by Jerelene Severin, LPN  Outcome: Progressing  Goal: STG - patient demonstrates pressure reduction techniques  4/26/2022 1148 by Jennifer Encarnacion LPN  Outcome: Progressing  4/26/2022 0210 by Abram Romero LPN  Outcome: Progressing  Goal: STG - Patient demonstrates preventative skin care measures  4/26/2022 1148 by Jennifer Encarnacion LPN  Outcome: Progressing  4/26/2022 0210 by Abram Romero LPN  Outcome: Progressing     Problem: PAIN  Goal: LTG - Patient will manage pain with the appropriate technique/Intervention  4/26/2022 1148 by Jennifer Encarnacion LPN  Outcome: Progressing  4/26/2022 0210 by Abram Romero LPN  Outcome: Progressing  Goal: LTG - Patient will demonstrate intervention for managing pain  4/26/2022 1148 by Jennifer Encarnacion LPN  Outcome: Progressing  4/26/2022 0210 by Abram Romero LPN  Outcome: Progressing  Goal: STG - Patient will reduce or eliminate use of analgesics  4/26/2022 1148 by Jennifer Encarnacion LPN  Outcome: Progressing  4/26/2022 0210 by Abram Romero LPN  Outcome: Progressing  Goal: STG - pain is manageable through therapies  4/26/2022 1148 by Jennifer Encarnacion LPN  Outcome: Progressing  4/26/2022 0210 by Abram Romero LPN  Outcome: Progressing  Goal: STG - Patient will verbalize an acceptable level of pain  4/26/2022 1148 by Jennifer Encarnacion LPN  Outcome: Progressing  4/26/2022 0210 by Abram Romero LPN  Outcome: Progressing  Goal: STG - patients pain is managed to allow active participation in daily activities  4/26/2022 1148 by Jennifer Encarnacion LPN  Outcome: Progressing  4/26/2022 0210 by Abram Romero LPN  Outcome: Progressing  Goal: STG - Patient will increase activity level  4/26/2022 1148 by Jennifer Encarnacion LPN  Outcome: Progressing  4/26/2022 0210 by Abram Romero LPN  Outcome: Progressing  Goal: STG - patient verbalizes a reduction in pain level  4/26/2022 1148 by Jennifer Encarnacion LPN  Outcome: Progressing  4/26/2022 0210 by Abram Romero LPN  Outcome: Progressing     Problem: Discharge Planning  Goal: Discharge to home or other facility with appropriate resources  Outcome: Progressing     Problem: Skin/Tissue Integrity - Adult  Goal: Incisions, wounds, or drain sites healing without S/S of infection  Outcome: Progressing     Problem: Gastrointestinal - Adult  Goal: Maintains or returns to baseline bowel function  Outcome: Progressing  Goal: Maintains adequate nutritional intake  Outcome: Progressing     Problem: Anxiety  Goal: Will report anxiety at manageable levels  Description: INTERVENTIONS:  1. Administer medication as ordered  2. Teach and rehearse alternative coping skills  3. Provide emotional support with 1:1 interaction with staff  Outcome: Progressing     Problem: Coping  Goal: Pt/Family able to verbalize concerns and demonstrate effective coping strategies  Description: INTERVENTIONS:  1. Assist patient/family to identify coping skills, available support systems and cultural and spiritual values  2. Provide emotional support, including active listening and acknowledgement of concerns of patient and caregivers  3. Reduce environmental stimuli, as able  4. Instruct patient/family in relaxation techniques, as appropriate  5. Assess for spiritual pain/suffering and initiate Spiritual Care, Psychosocial Clinical Specialist consults as needed  Outcome: Progressing     Problem: Nutrition Deficit:  Goal: Optimize nutritional status  Outcome: Progressing     Problem: Skin/Tissue Integrity  Goal: Absence of new skin breakdown  Description: 1. Monitor for areas of redness and/or skin breakdown  2. Assess vascular access sites hourly  3. Every 4-6 hours minimum:  Change oxygen saturation probe site  4. Every 4-6 hours:  If on nasal continuous positive airway pressure, respiratory therapy assess nares and determine need for appliance change or resting period.   Outcome: Progressing

## 2022-04-26 NOTE — PROGRESS NOTES
Physical Therapy  Name: Alicia Jimenez  MRN:  [id number]  Date of service:  4/26/2022 04/26/22 1345   Restrictions/Precautions   Restrictions/Precautions Fall Risk;Surgical Protocols   Required Braces or Orthoses   Spinal Lumbar Corset   Position Activity Restriction   Spinal Precautions No Bending; No Lifting; No Twisting   General Comment   Comments pt getting IV fluids and cont to report not feeling well due to BP issues   Subjective   Subjective Pt awake, but not feeling her best.  Would like to get up to [facility] as she states she has been putting off toileting   Vitals   Pulse (!) 42   BP (!) 111/37   BP Location Left upper arm   BP Method Automatic   Patient Position High fowlers   MAP (Calculated) 61.67   Bed mobility   Rolling to Right Stand by assistance  (rails; hob elevated)   Supine to Sit Stand by assistance  (to R side with rail)   Sit to Supine Stand by assistance   Scooting Supervision   Transfers   Sit to Stand Contact guard assistance;Stand by assistance   Stand to sit Contact guard assistance;Stand by assistance   Bed to Chair Contact guard assistance  (1+1 assist due to BP stand pivot)   Ambulation   WB Status WBAT   Assessment   Assessment pt doing well with bed mobility; able to move herself up towards hob with UE/LE's; needed assist to rikki/doff back brace as she has difficulty with velcro straps (especially with doffing); pt denies worsened symptoms with transfer though BP monitor showed decrease   Safety Devices   Type of Devices Bed alarm in place;Call light within reach; Left in bed       Electronically signed by Jen Newman PTA on 4/26/2022 at 2:24 PM

## 2022-04-26 NOTE — PROGRESS NOTES
04/26/22 0900   Restrictions/Precautions   Restrictions/Precautions Fall Risk;Surgical Protocols   Required Braces or Orthoses? Yes   Required Braces or Orthoses   Spinal Lumbar Corset   Position Activity Restriction   Spinal Precautions No Bending; No Lifting; No Twisting   General   Chart Reviewed Yes   Patient assessed for rehabilitation services? Yes   Additional Pertinent Hx Anxiety, Depression, CKD, Tobacco Abuse, COPD, Constipation, GERD, DDD, Chronic Back Pain, Osteoporosis, Dyslipidemia, Neuropathy   Subjective   Subjective Pt sleeping in bed upon arrival. Agreeable to therapy. C/o increased pain in LLE and back   Vitals   Pulse (!) 45   BP (!) 84/26  (Patient became more dizzy with ambulation and then became lethargic and stated she needed to lie down.)   BP Location Left upper arm   Patient Position Standing   MAP (Calculated) 45.33   SpO2 95 %   O2 Device None (Room air)   Subjective   Pain 10   Bed mobility   Supine to Sit Stand by assistance  (towards pts L)   Sit to Supine Stand by assistance   Transfers   Sit to Stand Stand by assistance   Stand to sit Stand by assistance   Ambulation   Surface level tile   Device Rolling Walker   Other Apparatus Wheelchair follow   Assistance Stand by assistance   Quality of Gait Reciprocal pattern, slow steps, slight shuffling   Gait Deviations Slow Brielle;Decreased step length;Decreased step height;Shuffles   Distance 225'   Comments room to PT gym, standing rest break between   More Ambulation?  Yes   Ambulation 2   Surface - 2 level tile   Device 2 Rolling Walker   Other Apparatus 2 Wheelchair follow   Assistance 2 Contact guard assistance;Minimal assistance  (increased assist w/ pt c/o inc dizziness)   Quality of Gait 2 same as previous   Gait Deviations Slow Brielle;Decreased step length;Decreased step height;Shuffles   Distance 76'   Comments Pt started with longer strides but quickly switched back to shiffling, pt became more unsteady and c/o in dizziness, further ambulation deferred   PT Exercises   Exercise Treatment UE exercises with OT   Activity Tolerance   Activity Tolerance Patient limited by fatigue;Patient limited by pain; Patient limited by endurance; Other (comment)  (dizziness, orthostatic hypotension)   Assessment   Assessment Pt was not able to tolerate most treatment today due to c/o inc LLE pain. Upon walking and turning corner in hallway pt became more unsteady with RW and c/o inc dizziness but didn't want to sit down to rest. Pt walked a few more steps and then leaned against wall, ambulation was stopped and pt was instructed to sit down in w/c. Pts BP was 84/26 w/ machine and 80/42 when taken manually with HR in the 40s. Pt layed down and nursing was notified to check in on her. Safety Devices   Type of Devices All fall risk precautions in place; Bed alarm in place;Gait belt;Call light within reach; Left in bed;Nurse notified   PT Individual Minutes   Time In 0900   Time Out 0915   Minutes 15   PT Co-Treatment Minutes   Time In 0915   Time Out 1000   Minutes 45     Electronically signed by Nelda Booth, Student PT on 4/26/2022 at 12:17 PM

## 2022-04-26 NOTE — PROGRESS NOTES
Patient:   Esme Mendez  MR#:    [id number]   Room:    [address] [place], [place] [address]   YOB: 1950  Date of Progress Note: 4/26/2022  Time of Note                           8:10 AM  Consulting Physician:   Antonia Recinos M.D. Attending Physician:  Antonia Recinos MD     Chief complaint Status post 3 stage lumbar fusion    S:This 66 y.o. female  with history of anxiety, depression, CKD, tobacco abuse, COPD, constipation, GERD, DDD, chronic back pain, osteoporosis, dyslipidemia and neuropathy. She was seen as outpatient by orthopedic spine surgeon Dr. Dave Smith d/t increasing back pain, bilateral buttock, thigh, and leg radiculopathy left worse than right. MRI done showed multilevel thoracolumbar degenerative disk disease severe L4-S1 and disc herniation at L5-S1 with stenosis with facet arthropathy, and neurogenic claudication. Dr. Dave Smith recommended a 3 stage repair. Patient was in agreement and was admitted to [facility] on 4/11/22 and underwent stage 1 anterior discectomy decompression with bilateral neural foraminotomy L5-S1, and anterior lumbar interbody fusion of L5-S1. She tolerated the procedure well. She returned to surgery on 4/13/22 for stage 2 right lateral lumbar interbody fusion L4-5 on 4/13/22. She tolerated this procedure well. She returned to surgery for the stage 3 Posterior spinal fusion L4-S1 on 4/15/22. She tolerated this procedure well. She continues to c/o pain at 8/10 being controlled with PO medications. She is participating in both PT/OT. She is felt to need a stay on Rehab to work towards her goal of returning home with assist of her family.   She is now felt ready to start the Rehab program. Feels ok this am.     REVIEW OF SYSTEMS:  Constitutional: No fevers No chills  Neck:No stiffness  Respiratory: No shortness of breath  Cardiovascular: No chest pain No palpitations  Gastrointestinal: No abdominal pain    Genitourinary: No Dysuria  Neurological: No headache, no confusion    Past Medical History:      Diagnosis Date    Arthritis     Bilateral low back pain without sciatica 12/22/2015    [facility] ([facility]     COPD (chronic obstructive pulmonary disease) ([facility] 4/20/2022    Hx of blood clots     Hyperlipidemia     Pain in both knees 12/22/2015    Thyroid disease     Urinary incontinence        Past Surgical History:      Procedure Laterality Date    CHOLECYSTECTOMY, LAPAROSCOPIC      CYST INCISION AND DRAINAGE      left Cheek    HYSTERECTOMY      JOINT REPLACEMENT      LOBECTOMY      RIGHT PARTIAL    SKIN CANCER EXCISION         Medications in Hospital:      Current Facility-Administered Medications:     lidocaine 4 % external patch 1 patch, 1 patch, TransDERmal, Daily, Krys Gardner MD, 1 patch at 04/26/22 0801    pregabalin (LYRICA) capsule 100 mg, 100 mg, Oral, Q12H, Krys Gardner MD, 100 mg at 04/26/22 0410    tiZANidine (ZANAFLEX) tablet 8 mg, 8 mg, Oral, Q4H PRN, Krys Gardner MD, 8 mg at 04/26/22 0804    docusate sodium (COLACE) capsule 100 mg, 100 mg, Oral, BID, Krys Gardner MD, 100 mg at 04/26/22 0801    hydrocortisone 1 % cream, , Topical, BID, Krys Gardner MD, Given at 04/25/22 2242    ALPRAZolam (XANAX) tablet 0.5 mg, 0.5 mg, Oral, TID PRN, Krys Gardner MD, 0.5 mg at 04/26/22 0113    dicyclomine (BENTYL) capsule 10 mg, 10 mg, Oral, TID PRN, Krys Gardner MD    escitalopram (LEXAPRO) tablet 10 mg, 10 mg, Oral, Daily, Krys Gardner MD, 10 mg at 04/26/22 0800    levothyroxine (SYNTHROID) tablet 100 mcg, 100 mcg, Oral, Daily, Krys Gardner MD, 100 mcg at 04/26/22 0804    linaclotide (LINZESS) capsule 290 mcg, 290 mcg, Oral, Daily, Krys Gardner MD, 290 mcg at 04/26/22 0801    nystatin (MYCOSTATIN) 180497 UNIT/ML suspension 500,000 Units, 500,000 Units, Oral, 4x Daily, Krys Gardner MD, 500,000 Units at 04/25/22 1228    oxyCODONE-acetaminophen (PERCOCET)  MG per tablet 1 tablet, 1 tablet, Oral, Q4H PRN, Krys Gardner MD, 1 tablet at 04/26/22 9119   pantoprazole (PROTONIX) tablet 40 mg, 40 mg, Oral, Daily, Hansel Eagle MD, 40 mg at 04/26/22 0800    promethazine (PHENERGAN) tablet 25 mg, 25 mg, Oral, BID PRN, Hansel Eagle MD    sucralfate (CARAFATE) tablet 1 g, 1 g, Oral, TID PRN, Hansel Eagle MD    acetaminophen (TYLENOL) tablet 650 mg, 650 mg, Oral, Q4H PRN, Hansel Eagle MD, 650 mg at 04/20/22 1111    polyethylene glycol (GLYCOLAX) packet 17 g, 17 g, Oral, Daily PRN, Hansel Eagle MD, 17 g at 04/25/22 1716    Allergies:  Sulfamethoxazole-trimethoprim, Codeine, Erythromycin, Pcn [penicillins], Talwin [pentazocine], and Sulfa antibiotics    Social History:   TOBACCO:   reports that she has been smoking cigarettes. She has a 15.00 pack-year smoking history. She has never used smokeless tobacco.  ETOH:   reports no history of alcohol use. Family History:       Problem Relation Age of Onset   [place] Self Cancer Mother     Hypertension Mother     Heart Attack Father          PHYSICAL EXAM:  /61   Pulse 71   Temp 96.8 °F (36 °C) (Temporal)   Resp 16   Ht 5' 5\" (1.651 m)   Wt 153 lb (69.4 kg)   SpO2 95%   BMI 25.46 kg/m²     Constitutional - well developed, well nourished. Eyes - conjunctiva normal.   Ear, nose, throat - No scars, masses, or lesions over external nose or ears, no atrophy of tongue  Neck-symmetric, no masses noted, no jugular vein distension  Respiration- chest wall appears symmetric, good expansion,   normal effort without use of accessory muscles  Musculoskeletal - no significant wasting of muscles noted, no bony deformities  Extremities-no clubbing, cyanosis or edema  Skin - warm, dry, and intact. No rash, erythema, or pallor.   Psychiatric - mood, affect, and behavior appear normal.      Neurological exam  Awake, alert, fluent oriented appropriate affect  Attention and concentration appear appropriate  Recent and remote memory appears unremarkable  Speech normal without dysarthria  No clear issues with language of fund of knowledge     Cranial Nerve Exam     CN III, IV,VI-EOMI, No nystagmus, conjugate eye movements, no ptosis    CN VII-no facial assymetry       Motor Exam  antigravity throughout upper and lower extremities bilaterally      Tremors-mild postural tremor in the hands noted     Gait  Not tested     Nursing/pcp notes, imaging,labs and vitals reviewed. PT,OT and/or speech notes reviewed    Lab Results   Component Value Date    WBC 5.7 04/25/2022    HGB 9.8 (L) 04/25/2022    HCT 32.6 (L) 04/25/2022    MCV 95.9 04/25/2022     04/25/2022     Lab Results   Component Value Date     04/25/2022    K 4.6 04/25/2022     04/25/2022    CO2 28 04/25/2022    BUN 8 04/25/2022    CREATININE 0.8 04/25/2022    GLUCOSE 87 04/25/2022    CALCIUM 8.7 (L) 04/25/2022    PROT 5.8 (L) 04/25/2022    LABALBU 3.2 (L) 04/25/2022    BILITOT <0.2 04/25/2022    ALKPHOS 87 04/25/2022    AST 10 04/25/2022    ALT 6 04/25/2022    LABGLOM >60 04/25/2022    GFRAA >59 04/25/2022     Lab Results   Component Value Date    INR 1.09 04/19/2022    PROTIME 14.0 04/19/2022     London Mackey PTA   Physical Therapist Assistant   Physical Therapy   Progress Notes       Cosign Needed   Date of Service:  4/23/2022  4:33 PM                 Cosign Needed                Show:Clear all  [x]Manual[x]Template[]Copied    Added by:  [x]Dominga Agarwal PTA      []Mayco for details    Physical Therapy  Daily Note  Name: Kaye Sever  MRN:  [id number]  Date of service:  4/23/2022 04/23/22 1321   Restrictions/Precautions   Restrictions/Precautions Fall Risk;Surgical Protocols   Required Braces or Orthoses? Yes   Required Braces or Orthoses   Spinal Lumbar Corset   Position Activity Restriction   Spinal Precautions No Bending; No Lifting; No Twisting   General   Additional Pertinent Hx Anxiety, Depression, CKD, Tobacco Abuse, COPD, Constipation, GERD, DDD, Chronic Back Pain, Osteoporosis, Dyslipidemia, Neuropathy   Subjective   Subjective Ok what do you want to do now? Oxygen Therapy   O2 Device None (Room air)   Transfers   Sit to Stand Stand by assistance;Supervision   Stand to sit Stand by assistance;Supervision   Ambulation   WB Status WBAT   Ambulation   Surface level tile   Device Rolling Walker   Assistance Contact guard assistance   Gait Deviations Decreased step length;Decreased arm swing;Decreased head and trunk rotation;Deviated path   Propulsion 1   Distance 200 feet, 150 feet   Exercises   Hip Flexion x  10   Hip Abduction x 10 hip add x 10   Knee Long Arc Quad x 10   Ankle Pumps x 10   Other Activities   Comment worked on balance in bathroom    Short Term Goals   Time Frame for Short term goals 1 Week   Short term goal 1 Supervision Bed Mobility    Short term goal 2 Supervision With Transfers from Surface to Surface    Short term goal 3 Supervision With Amb 100FT With AD    Short term goal 4 Supervision WC Propulsion 250FT   Short term goal 5 CGA Climbing 5 Stairs With Handrail   Long Term Goals   Time Frame for Long term goals  2 Weeks   Long term goal 1 Independent Bed Mobility    Long term goal 2 Independent With Transfers From Surface to Surface    Long term goal 3 Independent With Amb 250FT With AD    Long term goal 4 Independent With WC Propulsion 500FT   Long term goal 5 Independent With Climbing 5 Stairs With Handrail   Conditions Requiring Skilled Therapeutic Intervention   Body Structures, Functions, Activity Limitations Requiring Skilled Therapeutic Intervention Decreased functional mobility ; Decreased tolerance to work activity; Decreased strength;Decreased endurance;Decreased balance; Increased pain;Decreased posture   Assessment Patient doing well with increased gait distance. She was able to perform more exeicses this pm showing increased endurance and decreased pain.    Treatment Diagnosis interference with activity   Therapy Prognosis Good   History Anxiety, Depression, CKD, Tobacco Abuse, COPD, Constipation, GERD, DDD, Chronic Back Pain, Osteoporosis, Dyslipidemia, Neuropathy   Exam impaired strength, posture, endurance, functional mobility, balanc. increased pain. Discharge Recommendations Home with Home health PT   Activity Tolerance   Activity Tolerance Patient tolerated treatment well;Patient limited by pain   Plan   Plan 5-7 times per week   Plan weeks 2 Weeks   Current Treatment Recommendations Strengthening;ROM;Balance training;Functional mobility training;Transfer training; Endurance training; Wheelchair mobility training;Gait training;Stair training;Pain management;Home exercise program;Safety education & training;Patient/Caregiver education & training;Equipment evaluation, education, & procurement; Therapeutic activities; Return to work related activity   Safety Devices   Type of Devices Call light within reach; Chair alarm in place; Left in chair      Electronically signed by Sue Alberto PTA on 4/23/2022 at 4:33 PM                         RECORD REVIEW: Previous medical records, medications were reviewed at today's visit    IMPRESSION:   1. Status post 3 stage lumbar fusion-pain control/mobilization  2. Mood disorder-Lexapro/Xanax as needed  3. Hypothyroidism-Synthroid  4. GI/GERD- bowel regimen/PPI  5. Back pain-Lyrica/Percocet as needed/Zanaflex as needed  6. COPD/history of smoking-monitor  7. PT/OT  8. Essential tremor-stable monitor    x-ray of the lumbar spine-no acute changes    Add Lidoderm patch/heating pad  Increase Lyrica 4/25  Add colace    Continue supportive care    Team conference with PT/OT/speech/nursing/Care coordinator to review in depth patients care and discharge planning. At least 35 minutes spent coordinating care for patient >50% of time spent in coordination of care.       ft CGA  Ambulation 175 ft RW   4 steps    ELOS Friday     Expected duration and frequency therapy: 180 minutes per day, 5 days per week    Vika Lockett  [phone] CELL  Dr Dawson Maria Double O-Z Flap Text: The defect edges were debeveled with a #15 scalpel blade.  Given the location of the defect, shape of the defect and the proximity to free margins a Double O-Z flap was deemed most appropriate.  Using a sterile surgical marker, an appropriate transposition flap was drawn incorporating the defect and placing the expected incisions within the relaxed skin tension lines where possible. The area thus outlined was incised deep to adipose tissue with a #15 scalpel blade.  The skin margins were undermined to an appropriate distance in all directions utilizing iris scissors.

## 2022-04-27 PROCEDURE — 99222 1ST HOSP IP/OBS MODERATE 55: CPT | Performed by: INTERNAL MEDICINE

## 2022-04-27 PROCEDURE — 97530 THERAPEUTIC ACTIVITIES: CPT

## 2022-04-27 PROCEDURE — 99233 SBSQ HOSP IP/OBS HIGH 50: CPT | Performed by: PSYCHIATRY & NEUROLOGY

## 2022-04-27 PROCEDURE — 6370000000 HC RX 637 (ALT 250 FOR IP): Performed by: PSYCHIATRY & NEUROLOGY

## 2022-04-27 PROCEDURE — 97116 GAIT TRAINING THERAPY: CPT

## 2022-04-27 PROCEDURE — 1180000000 HC REHAB R&B

## 2022-04-27 PROCEDURE — 93005 ELECTROCARDIOGRAM TRACING: CPT | Performed by: PSYCHIATRY & NEUROLOGY

## 2022-04-27 PROCEDURE — 97110 THERAPEUTIC EXERCISES: CPT

## 2022-04-27 RX ORDER — HYDROCODONE BITARTRATE AND ACETAMINOPHEN 10; 325 MG/1; MG/1
1 TABLET ORAL EVERY 4 HOURS PRN
Status: DISCONTINUED | OUTPATIENT
Start: 2022-04-27 | End: 2022-04-29 | Stop reason: HOSPADM

## 2022-04-27 RX ADMIN — HYDROCODONE BITARTRATE AND ACETAMINOPHEN 1 TABLET: 10; 325 TABLET ORAL at 08:39

## 2022-04-27 RX ADMIN — TIZANIDINE 8 MG: 4 TABLET ORAL at 17:39

## 2022-04-27 RX ADMIN — LEVOTHYROXINE SODIUM 100 MCG: 100 TABLET ORAL at 08:35

## 2022-04-27 RX ADMIN — ESCITALOPRAM OXALATE 10 MG: 10 TABLET ORAL at 08:35

## 2022-04-27 RX ADMIN — ALPRAZOLAM 0.5 MG: 0.5 TABLET ORAL at 19:30

## 2022-04-27 RX ADMIN — TIZANIDINE 8 MG: 4 TABLET ORAL at 02:47

## 2022-04-27 RX ADMIN — LINACLOTIDE 290 MCG: 145 CAPSULE, GELATIN COATED ORAL at 08:35

## 2022-04-27 RX ADMIN — TIZANIDINE 8 MG: 4 TABLET ORAL at 12:51

## 2022-04-27 RX ADMIN — HYDROCODONE BITARTRATE AND ACETAMINOPHEN 1 TABLET: 10; 325 TABLET ORAL at 17:38

## 2022-04-27 RX ADMIN — HYDROCODONE BITARTRATE AND ACETAMINOPHEN 1 TABLET: 10; 325 TABLET ORAL at 21:15

## 2022-04-27 RX ADMIN — DOCUSATE SODIUM 100 MG: 100 CAPSULE, LIQUID FILLED ORAL at 08:34

## 2022-04-27 RX ADMIN — OXYCODONE AND ACETAMINOPHEN 1 TABLET: 10; 325 TABLET ORAL at 02:48

## 2022-04-27 RX ADMIN — TIZANIDINE 8 MG: 4 TABLET ORAL at 21:15

## 2022-04-27 RX ADMIN — PANTOPRAZOLE SODIUM 40 MG: 40 TABLET, DELAYED RELEASE ORAL at 08:39

## 2022-04-27 RX ADMIN — HYDROCORTISONE: 1 CREAM TOPICAL at 21:16

## 2022-04-27 RX ADMIN — DOCUSATE SODIUM 100 MG: 100 CAPSULE, LIQUID FILLED ORAL at 21:15

## 2022-04-27 RX ADMIN — HYDROCODONE BITARTRATE AND ACETAMINOPHEN 1 TABLET: 10; 325 TABLET ORAL at 12:48

## 2022-04-27 ASSESSMENT — PAIN DESCRIPTION - LOCATION
LOCATION: BACK;LEG
LOCATION: BACK
LOCATION: BACK
LOCATION: LEG
LOCATION: BACK

## 2022-04-27 ASSESSMENT — PAIN - FUNCTIONAL ASSESSMENT
PAIN_FUNCTIONAL_ASSESSMENT: ACTIVITIES ARE NOT PREVENTED

## 2022-04-27 ASSESSMENT — ENCOUNTER SYMPTOMS
RESPIRATORY NEGATIVE: 1
BACK PAIN: 1
GASTROINTESTINAL NEGATIVE: 1

## 2022-04-27 ASSESSMENT — PAIN SCALES - GENERAL
PAINLEVEL_OUTOF10: 9
PAINLEVEL_OUTOF10: 8
PAINLEVEL_OUTOF10: 9
PAINLEVEL_OUTOF10: 5
PAINLEVEL_OUTOF10: 5
PAINLEVEL_OUTOF10: 8
PAINLEVEL_OUTOF10: 5
PAINLEVEL_OUTOF10: 9

## 2022-04-27 ASSESSMENT — PAIN DESCRIPTION - ORIENTATION
ORIENTATION: LOWER
ORIENTATION: LEFT
ORIENTATION: LOWER
ORIENTATION: LOWER
ORIENTATION: LEFT

## 2022-04-27 ASSESSMENT — PAIN DESCRIPTION - DESCRIPTORS
DESCRIPTORS: SHARP
DESCRIPTORS: DULL;ACHING
DESCRIPTORS: ACHING;SHOOTING
DESCRIPTORS: ACHING;BURNING

## 2022-04-27 NOTE — PROGRESS NOTES
Patient:   Kaye Sever  MR#:    [id number]   Room:    5755/092-54   YOB: 1950  Date of Progress Note: 4/27/2022  Time of Note                           8:37 AM  Consulting Physician:   Samantha Nair M.D. Attending Physician:  Samantha Nair MD     Chief complaint Status post 3 stage lumbar fusion    S:This 66 y.o. female  with history of anxiety, depression, CKD, tobacco abuse, COPD, constipation, GERD, DDD, chronic back pain, osteoporosis, dyslipidemia and neuropathy. She was seen as outpatient by orthopedic spine surgeon Dr. Lizette Vilchis d/t increasing back pain, bilateral buttock, thigh, and leg radiculopathy left worse than right. MRI done showed multilevel thoracolumbar degenerative disk disease severe L4-S1 and disc herniation at L5-S1 with stenosis with facet arthropathy, and neurogenic claudication. Dr. Lizette Vilchis recommended a 3 stage repair. Patient was in agreement and was admitted to [facility] on 4/11/22 and underwent stage 1 anterior discectomy decompression with bilateral neural foraminotomy L5-S1, and anterior lumbar interbody fusion of L5-S1. She tolerated the procedure well. She returned to surgery on 4/13/22 for stage 2 right lateral lumbar interbody fusion L4-5 on 4/13/22. She tolerated this procedure well. She returned to surgery for the stage 3 Posterior spinal fusion L4-S1 on 4/15/22. She tolerated this procedure well. She continues to c/o pain at 8/10 being controlled with PO medications. She is participating in both PT/OT. She is felt to need a stay on Rehab to work towards her goal of returning home with assist of her family. She is now felt ready to start the Rehab program.   Was confused yesterday. Hallucinating. Was walking around last night.     REVIEW OF SYSTEMS:  Constitutional: No fevers No chills  Neck:No stiffness  Respiratory: No shortness of breath  Cardiovascular: No chest pain No palpitations  Gastrointestinal: No abdominal pain    Genitourinary: No Dysuria  Neurological: No headache, no confusion    Past Medical History:      Diagnosis Date    Arthritis     Bilateral low back pain without sciatica 12/22/2015    [facility] ([facility]     [facility] 4/20/20[facility]    Hx of blood clots     Hyperlipidemia     Pain in both knees 12/22/2015    Thyroid disease     Urinary incontinence        Past Surgical History:      Procedure Laterality Date    CHOLECYSTECTOMY, LAPAROSCOPIC      CYST INCISION AND DRAINAGE      left Cheek    HYSTERECTOMY      JOINT REPLACEMENT      LOBECTOMY      RIGHT PARTIAL    SKIN CANCER EXCISION         Medications in Hospital:      Current Facility-Administered Medications:     HYDROcodone-acetaminophen (NORCO)  MG per tablet 1 tablet, 1 tablet, Oral, Q4H PRN, Linda Shrestha MD    lidocaine 4 % external patch 1 patch, 1 patch, TransDERmal, Daily, Linda Shrestha MD, 1 patch at 04/27/22 0834    [Held by provider] pregabalin (LYRICA) capsule 100 mg, 100 mg, Oral, Q12H, Linda Shrestha MD, 100 mg at 04/26/22 0410    tiZANidine (ZANAFLEX) tablet 8 mg, 8 mg, Oral, Q4H PRN, Linda Shrestha MD, 8 mg at 04/27/22 0247    docusate sodium (COLACE) capsule 100 mg, 100 mg, Oral, BID, Linda Shrestha MD, 100 mg at 04/27/22 0834    hydrocortisone 1 % cream, , Topical, BID, Linda Shrestha MD, Given at 04/26/22 2224    ALPRAZolam (XANAX) tablet 0.5 mg, 0.5 mg, Oral, TID PRN, Linda Shrestha MD, 0.5 mg at 04/26/22 2220    dicyclomine (BENTYL) capsule 10 mg, 10 mg, Oral, TID PRN, Linda Shrestha MD    escitalopram (LEXAPRO) tablet 10 mg, 10 mg, Oral, Daily, Linda Shrestha MD, 10 mg at 04/27/22 0835    levothyroxine (SYNTHROID) tablet 100 mcg, 100 mcg, Oral, Daily, Linda Shrestha MD, 100 mcg at 04/27/22 0835    linaclotide (LINZESS) capsule 290 mcg, 290 mcg, Oral, Daily, Linda Shrestha MD, 290 mcg at 04/27/22 0835    nystatin (MYCOSTATIN) 843957 UNIT/ML suspension 500,000 Units, 500,000 Units, Oral, 4x Daily, Dougie Borges MD, 500,000 Units at 04/25/22 1228    pantoprazole (PROTONIX) tablet 40 mg, 40 mg, Oral, Daily, Dougie Borges MD, 40 mg at 04/26/22 0800    promethazine (PHENERGAN) tablet 25 mg, 25 mg, Oral, BID PRN, Dougie Borges MD    sucralfate (CARAFATE) tablet 1 g, 1 g, Oral, TID PRN, Dougie Borges MD    acetaminophen (TYLENOL) tablet 650 mg, 650 mg, Oral, Q4H PRN, Dougie Borges MD, 650 mg at 04/20/22 1111    polyethylene glycol (GLYCOLAX) packet 17 g, 17 g, Oral, Daily PRN, Dougie Borges MD, 17 g at 04/25/22 1716    Allergies:  Sulfamethoxazole-trimethoprim, Codeine, Erythromycin, Pcn [penicillins], Talwin [pentazocine], and Sulfa antibiotics    Social History:   TOBACCO:   reports that she has been smoking cigarettes. She has a 15.00 pack-year smoking history. She has never used smokeless tobacco.  ETOH:   reports no history of alcohol use. Family History:       Problem Relation Age of Onset   [place] Cancer Mother     Hypertension Mother     Heart Attack Father          PHYSICAL EXAM:  BP (!) 105/37   Pulse (!) 40   Temp 97.2 °F (36.2 °C) (Temporal)   Resp 16   Ht 5' 5\" (1.651 m)   Wt 153 lb (69.4 kg)   SpO2 94%   BMI 25.46 kg/m²     Constitutional - well developed, well nourished. Eyes - conjunctiva normal.   Ear, nose, throat - No scars, masses, or lesions over external nose or ears, no atrophy of tongue  Neck-symmetric, no masses noted, no jugular vein distension  Respiration- chest wall appears symmetric, good expansion,   normal effort without use of accessory muscles  Musculoskeletal - no significant wasting of muscles noted, no bony deformities  Extremities-no clubbing, cyanosis or edema  Skin - warm, dry, and intact. No rash, erythema, or pallor.   Psychiatric - mood, affect, and behavior appear normal.      Neurological exam  Awake, alert, fluent oriented appropriate affect  Attention and concentration appear appropriate  Recent and remote memory appears unremarkable  Speech normal without dysarthria  No clear issues with language of fund of knowledge     Cranial Nerve Exam     CN III, IV,VI-EOMI, No nystagmus, conjugate eye movements, no ptosis    CN VII-no facial assymetry       Motor Exam  antigravity throughout upper and lower extremities bilaterally      Tremors-mild postural tremor in the hands noted     Gait  Not tested     Nursing/pcp notes, imaging,labs and vitals reviewed. PT,OT and/or speech notes reviewed    Lab Results   Component Value Date    WBC 5.7 04/25/2022    HGB 9.8 (L) 04/25/2022    HCT 32.6 (L) 04/25/2022    MCV 95.9 04/25/2022     04/25/2022     Lab Results   Component Value Date     04/25/2022    K 4.6 04/25/2022     04/25/2022    CO2 28 04/25/2022    BUN 8 04/25/2022    CREATININE 0.8 04/25/2022    GLUCOSE 87 04/25/2022    CALCIUM 8.7 (L) 04/25/2022    PROT 5.8 (L) 04/25/2022    LABALBU 3.2 (L) 04/25/2022    BILITOT <0.2 04/25/2022    ALKPHOS 87 04/25/2022    AST 10 04/25/2022    ALT 6 04/25/2022    LABGLOM >60 04/25/2022    GFRAA >59 04/25/2022     Lab Results   Component Value Date    INR 1.09 04/19/2022    PROTIME 14.0 04/19/2022     Moris Bhatt PTA   Physical Therapist Assistant   Physical Therapy   Progress Notes       Signed   Date of Service:  4/26/2022  2:23 PM                 Signed                Show:Clear all  [x]Manual[x]Template[]Copied    Added by:  [x]Sylvia Askew PTA      []Mayco for details    Physical Therapy  Name: Suzie Castro  MRN:  [id number]  Date of service:  4/26/2022 04/26/22 1345   Restrictions/Precautions   Restrictions/Precautions Fall Risk;Surgical Protocols   Required Braces or Orthoses   Spinal Lumbar Corset   Position Activity Restriction   Spinal Precautions No Bending; No Lifting; No Twisting   General Comment   Comments pt getting IV fluids and cont to report not feeling well due to BP issues   Subjective   Subjective Pt awake, but not feeling her best.  Would like to get up to [facility] as she states she has been putting off toileting   Vitals   Pulse (!) 42   BP (!) 111/37   BP Location Left upper arm   BP Method Automatic   [facility]   MAP (Calculated) 61.67   Bed mobility   Rolling to Right Stand by assistance  (rails; hob elevated)   Supine to Sit Stand by assistance  (to R side with rail)   Sit to Supine Stand by assistance   Scooting Supervision   Transfers   Sit to Stand Contact guard assistance;Stand by assistance   Stand to sit Contact guard assistance;Stand by assistance   Bed to Chair Contact guard assistance  (1+1 assist due to BP stand pivot)   Ambulation   WB Status WBAT   Assessment   Assessment pt doing well with bed mobility; able to move herself up towards hob with UE/LE's; needed assist to rikki/doff back brace as she has difficulty with velcro straps (especially with doffing); pt denies worsened symptoms with transfer though BP monitor showed decrease   Safety Devices   Type of Devices Bed alarm in place;Call light within reach; Left in bed         Electronically signed by Zi Ventura PTA on 4/26/2022 at 2:24 PM               Cosigned by: Damir Stapleton PT at 4/26/2022  3:59 PM       Revision History                                RECORD REVIEW: Previous medical records, medications were reviewed at today's visit    IMPRESSION:   1. Status post 3 stage lumbar fusion-pain control/mobilization  2. Mood disorder-Lexapro/Xanax as needed  3. Hypothyroidism-Synthroid  4. GI/GERD- bowel regimen/PPI  5. Back pain-Lyrica//Zanaflex as needed-change Percocet to Norco for issues of confusion  6. COPD/history of smoking-monitor  7. PT/OT  8.   Essential tremor-stable monitor    x-ray of the lumbar spine-no acute changes    Add Lidoderm patch/heating pad  Lyrica held due to confusion    Continue supportive care        ELOS Friday     Expected duration and frequency therapy: 180 minutes per day, 5 days per week    Luis Morrow Dr Linda Reyes

## 2022-04-27 NOTE — CONSULTS
[facility]) Cardiology   Consult Note   Wang Brewster       Requesting MD:  Elmer Lorenz MD   Admit Status:         History obtained from:   [x] Patient  [] Other (specify):     Patient:  Jocy Vicente  [id number]     Chief Complaint: No chief complaint on file. HPI: Ms. Manolo Desir is a 66 y.o. female with a history of COPD, hyperlipidemia, chronic pain, osteoarthritis, GERD, anxiety and depression. Patient underwent multi stage orthopedic procedure for severe osteoarthritis and arthropathy with spinal fusion  Is currently in rehab and participating in PT/OT    Cardiology consulted because concern for bradycardia  Patient denies any active chest pain or shortness breath or palpitation dizziness or syncope    Review of Systems:  Review of Systems   Constitutional: Negative. Respiratory: Negative. Cardiovascular: Negative. Gastrointestinal: Negative. Endocrine: Negative. Genitourinary: Negative. Musculoskeletal: Positive for back pain. Skin: Negative. Neurological: Negative. Hematological: Negative. All other systems reviewed and are negative.       Cardiac Specific Data:  Specialty Problems     None          Past Medical History:  Past Medical History:   Diagnosis Date    Arthritis     Bilateral low back pain without sciatica 12/22/2015    Cancer ([address]     COPD (chronic obstructive pulmonary disease) ([facility] 4/20/2022    Hx of blood clots     Hyperlipidemia     Pain in both knees 12/22/2015    Thyroid disease     Urinary incontinence         Past Surgical History:  Past Surgical History:   Procedure Laterality Date    CHOLECYSTECTOMY, LAPAROSCOPIC      CYST INCISION AND DRAINAGE      left Cheek    HYSTERECTOMY      JOINT REPLACEMENT      LOBECTOMY      RIGHT PARTIAL    SKIN CANCER EXCISION         Past Family History:  Family History   Problem Relation Age of Onset    Cancer Mother     Hypertension Mother     Heart Attack Father        Past Social History:  Social History     Socioeconomic History    Marital status:      Spouse name: Not on file    Number of children: 1    Years of education: Not on file    Highest education level: Not on file   Occupational History    Not on file   Tobacco Use    Smoking status: Current Every Day Smoker     Packs/day: 0.50     Years: 30.00     Pack years: 15.00     Types: Cigarettes    Smokeless tobacco: Never Used   Vaping Use    Vaping Use: Never used   Substance and Sexual Activity    Alcohol use: No    Drug use: No    Sexual activity: Not on file   Other Topics Concern    Not on file   Social History Narrative    Not on file     Social Determinants of Health     Financial Resource Strain:     Difficulty of Paying Living Expenses: Not on file   Food Insecurity:     Worried About Running Out of Food in the Last Year: Not on file    Antonette of Food in the Last Year: Not on file   Transportation Needs:     Lack of Transportation (Medical): Not on file    Lack of Transportation (Non-Medical): Not on file   Physical Activity:     Days of Exercise per Week: Not on file    Minutes of Exercise per Session: Not on file   Stress:     Feeling of Stress : Not on file   Social Connections:     Frequency of Communication with Friends and Family: Not on file    Frequency of Social Gatherings with Friends and Family: Not on file    Attends Temple Services: Not on file    Active Member of [address] [place] [address] or Organizations: Not on file    Attends Club or Organization Meetings: Not on file    Marital Status: Not on file   Intimate Partner Violence:     Fear of Current or Ex-Partner: Not on file    Emotionally Abused: Not on file    Physically Abused: Not on file    Sexually Abused: Not on file   Housing Stability:     Unable to Pay for Housing in the Last Year: Not on file    Number of [place] in the Last Year: Not on file    Unstable Housing in the Last Year: Not on file       Allergies:   Allergies   Allergen Reactions    Sulfamethoxazole-Trimethoprim      Muscle pain,joint stiffness    Codeine     Erythromycin     Pcn [Penicillins]     Talwin [Pentazocine]     Sulfa Antibiotics Nausea And Vomiting     Muscle pain       Home Meds:  Prior to Admission medications    Medication Sig Start Date End Date Taking? Authorizing Provider   dicyclomine (BENTYL) 10 MG capsule Take 10 mg by mouth 3 times daily as needed   Yes Historical Provider, MD   pregabalin (LYRICA) 75 MG capsule Take 75 mg by mouth every 12 hours. Yes Historical Provider, MD   oxyCODONE-acetaminophen (PERCOCET)  MG per tablet Take 1 tablet by mouth every 4 hours as needed for Pain. Yes Historical Provider, MD   tiZANidine (ZANAFLEX) 4 MG tablet Take 1.5 tablets by mouth 3 times daily  Patient taking differently: Take 4 mg by mouth every 8 hours  2/22/22   LIVE Crabtree - CNP   LINZESS 290 MCG CAPS capsule Take 290 mcg by mouth daily Before breakfast. 11/24/21   Historical Provider, MD   polyethylene glycol (GLYCOLAX) 17 GM/SCOOP powder Take 17 g by mouth daily    Historical Provider, MD   escitalopram (LEXAPRO) 10 MG tablet Take 10 mg by mouth daily 10/8/19   Historical Provider, MD   pantoprazole (PROTONIX) 40 MG tablet Take 40 mg by mouth daily  6/15/18   Historical Provider, MD   nystatin (MYCOSTATIN) 827779 UNIT/ML suspension Take 500,000 Units by mouth 4 times daily  10/3/16   Historical Provider, MD   promethazine (PHENERGAN) 25 MG tablet Take 25 mg by mouth 2 times daily as needed  10/3/16   Historical Provider, MD   sucralfate (CARAFATE) 1 GM/10ML suspension Take 1 g by mouth 3 times daily as needed     Historical Provider, MD   ALPRAZolam (XANAX) 0.5 MG tablet Take 0.5 mg by mouth 3 times daily as needed.   12/14/15   Historical Provider, MD   levothyroxine (SYNTHROID) 100 MCG tablet Take 100 mcg by mouth daily 10/20/15   Historical Provider, MD       Current Meds:   lidocaine  1 patch TransDERmal Daily    [Held by provider] pregabalin  100 mg Oral Q12H    docusate sodium  100 mg Oral BID    hydrocortisone   Topical BID    escitalopram  10 mg Oral Daily    levothyroxine  100 mcg Oral Daily    linaclotide  290 mcg Oral Daily    nystatin  500,000 Units Oral 4x Daily    pantoprazole  40 mg Oral Daily       Current Infused Meds:      Physical Exam:  Vitals:    04/27/22 1808   BP:    Pulse:    Resp: 16   Temp:    SpO2:        Intake/Output Summary (Last 24 hours) at 4/27/2022 1825  Last data filed at 4/27/2022 1800  Gross per 24 hour   Intake 1557 ml   Output --   Net 1557 ml     Estimated body mass index is 25.46 kg/m² as calculated from the following:    Height as of this encounter: 5' 5\" (1.651 m). Weight as of this encounter: 153 lb (69.4 kg). Physical Exam  Vitals and nursing note reviewed. Constitutional:       Appearance: Normal appearance. HENT:      Head: Normocephalic and atraumatic. Mouth/Throat:      Mouth: Mucous membranes are moist.   Cardiovascular:      Rate and Rhythm: Regular rhythm. Bradycardia present. Pulses: Normal pulses. Heart sounds: Normal heart sounds. No murmur heard. Pulmonary:      Effort: Pulmonary effort is normal.      Breath sounds: Normal breath sounds. Abdominal:      General: Bowel sounds are normal.      Palpations: Abdomen is soft. Tenderness: There is no abdominal tenderness. Musculoskeletal:      Right lower leg: No edema. Left lower leg: No edema. Skin:     General: Skin is warm. Neurological:      General: No focal deficit present. Mental Status: She is alert and oriented to person, place, and time.    Psychiatric:         Mood and Affect: Mood normal.         Behavior: Behavior normal.         Labs:  Recent Labs     04/25/22  0437   WBC 5.7   HGB 9.8*          Recent Labs     04/25/22  0455      K 4.6      CO2 28   BUN 8   CREATININE 0.8   LABGLOM >60   CALCIUM 8.7*       CK, CKMB, Troponin: No results for input(s): CKTOTAL, CKMB, TROPONINI in the last 72 hours. Last 3 BNP:  No results for input(s): PROBNP in the last 72 hours. IMAGING:    EKG -sinus bradycardia with heart rate of 55      XR LUMBAR SPINE (2-3 VIEWS)    Result Date: 4/21/2022  Status post instrumented fusion L4-S1 Signed by Dr Burdick Number and Plan:    Bradycardia  Patient is having sinus tachycardia, no symptoms  Is not on beta-blocker or any other medication causing bradycardia  EKG is reviewed    No intervention is needed at this point  Continued to monitor    Recommend Zio patch at the time of discharge and follow-up with cardiology after 4 weeks    Thank you for the consult, we appreciate the opportunity to provide care to your patients. Feel free to contact me if I can be of any further assistance.       Electronically signed by Jesse Khna MD on 4/27/2022 at 6:25 PM    Jesse Khan MD, [facility]  Interventional Cardiologist, Endovascular Specialist   Medical Director, Structural Heart Program   [facility]       (Please note that portions of this note were completed with a voice recognition program.  Effortswere made to edit the dictations but occasionally words are mis-transcribed.)

## 2022-04-27 NOTE — PROGRESS NOTES
At [facility], Chris Rebolledo was walking back from the break room and saw the pt out of her room in the hallway in her wheel chair. When I last saw her at 00:00 she was in bed and bed alarm was on. She was assisted back to bed and bed alarm was turned back on. The pt has figured out how to turn bed alarm off and will get up.

## 2022-04-27 NOTE — PROGRESS NOTES
Occupational Therapy  Facility/Department: [facility] REHAB UNIT  Occupational Therapy Treatment Note     Name: Cecilio See  : 1950  MRN: [id number]  Date of Service: 2022    Discharge Recommendations:  Home with Home health OT  OT Equipment Recommendations  Equipment Needed: Yes  Mobility Devices: ADL Assistive Devices  ADL Assistive Devices: Toileting - 3-in-1 Commode  Other: Started script for [facility]. Already has rollator, RW and patient's daughter plans to get TTB. Patient Diagnosis(es): There were no encounter diagnoses. Past Medical History:  has a past medical history of Arthritis, Bilateral low back pain without sciatica, Cancer (HCC), COPD (chronic obstructive pulmonary disease) ([facility] [address] Hx of blood clots, Hyperlipidemia, Pain in both knees, Thyroid disease, and Urinary incontinence. Past Surgical History:  has a past surgical history that includes Hysterectomy; joint replacement; lobectomy; Cholecystectomy, laparoscopic; Skin cancer excision; and cyst incision and drainage. Treatment Diagnosis: Status post 3 stage lumbar fusion      Assessment   Performance deficits / Impairments: Decreased functional mobility ; Decreased ADL status; Decreased strength;Decreased endurance;Decreased balance;Decreased high-level IADLs  Treatment Diagnosis: Status post 3 stage lumbar fusion  Activity Tolerance  Activity Tolerance: Patient Tolerated treatment well        Plan   Plan  Times per Day: Twice a day  Current Treatment Recommendations: Strengthening,Balance training,Functional mobility training,Endurance training,Home management training     Restrictions  Restrictions/Precautions  Restrictions/Precautions: Fall Risk,Surgical Protocols  Required Braces or Orthoses?: Yes  Required Braces or Orthoses  Spinal: Lumbar Corset  Position Activity Restriction  Spinal Precautions: No Bending,No Lifting,No Twisting    Subjective   General  Chart Reviewed: Yes  Patient assessed for rehabilitation services?: Yes  Response to previous treatment: Patient with no complaints from previous session  Family / Caregiver Present: No     Social/Functional History  Social/Functional History  Lives With: Family (Daughter and sister in law)  Type of Home: House  Home Layout: Two level,Able to Live on Main level with bedroom/bathroom  Home Access: Stairs to enter with rails  Entrance Stairs - Number of Steps: 2  Entrance Stairs - Rails: None  Bathroom Shower/Tub: Tub/Shower unit  Home Equipment: Rollator,Walker, rolling  ADL Assistance: Independent  Homemaking Assistance: Independent  Homemaking Responsibilities: Yes  Ambulation Assistance: Independent  Transfer Assistance: Independent  Active : Yes  Occupation: Retired  Type of Occupation: Nursing assistant  Leisure & Hobbies: Reading bible, puzzles, being outdoors  IADL Comments: splits homemaking tasks with sister in law and daughter       Objective      Safety Devices  Type of Devices: Bed alarm in place;Call light within reach            Exercise Treatment: BUE 2#: 2 sets of 10 in all planes  Education Given To: Patient; Family  Education Provided: Role of Therapy;Plan of Care;Transfer Training;Precautions; Equipment  Education Method: Demonstration;Verbal  Education Outcome: Verbalized understanding;Demonstrated understanding    Goals  Short Term Goals  Time Frame for Short term goals: 1 week  Short Term Goal 1: Supervision with bathing hygiene. Short Term Goal 2: Supervision with clothing management/hygiene for toileting. Short Term Goal 3: Supervision with toilet transfers. Short Term Goal 4: Supervision LB dressing, AE PRN. Short Term Goal 5: Supervision with donning/doffing socks and shoes, AE PRN. Additional Goals?: Yes  Short Term Goal 6: Patient will complete 1-2 handed static standing task for 5 minutes, requiring Supervision. Long Term Goals  Time Frame for Long term goals : 2 weeks  Long Term Goal 1: Modified independent with bathing hygiene.   Long Term Goal 2: Modified independent with toileting and toilet transfers. Long Term Goal 3: Modified independent with overall dressing. Long Term Goal 4: Modified independent with ambulatory homemaking tasks. Long Term Goal 5: Patient verbalize DME needs. Patient Goals   Patient goals : Return home independently.        Therapy Time   Individual Concurrent Group Co-treatment   Time In 1400         Time Out 1430         Minutes 30         Timed Code Treatment Minutes: Richard Hartley 95, OT

## 2022-04-27 NOTE — PLAN OF CARE
Problem: Pain:  Goal: Pain level will decrease  Description: Pain level will decrease  4/27/2022 1146 by Lum Miners, LPN  Outcome: Progressing  4/27/2022 0151 by Carlos Stasagrarioer LPN  Outcome: Progressing  Goal: Control of acute pain  Description: Control of acute pain  4/27/2022 1146 by Lum Miners, LPN  Outcome: Progressing  4/27/2022 0151 by Carlos Stasagrarioer LPN  Outcome: Progressing  Goal: Control of chronic pain  Description: Control of chronic pain  4/27/2022 1146 by Lum Miners, LPN  Outcome: Progressing  4/27/2022 0151 by Carlos Ovalleer LPN  Outcome: Progressing  Goal: Patient's pain/discomfort is manageable  Description: Patient's pain/discomfort is manageable  4/27/2022 1146 by Lum Miners, LPN  Outcome: Progressing  4/27/2022 0151 by Carlos Ovalleer LPN  Outcome: Progressing     Problem: Falls - Risk of:  Goal: Will remain free from falls  Description: Will remain free from falls  4/27/2022 1146 by Lum Miners, LPN  Outcome: Progressing  4/27/2022 0151 by Carlos Baez LPN  Outcome: Progressing  Goal: Absence of physical injury  Description: Absence of physical injury  4/27/2022 1146 by Lum Miners, LPN  Outcome: Progressing  4/27/2022 0151 by Carlos Baez LPN  Outcome: Progressing     Problem: Mobility - Impaired:  Goal: Mobility will improve  Description: Mobility will improve  4/27/2022 1146 by Lum Miners, LPN  Outcome: Progressing  4/27/2022 0151 by Carlos Baez LPN  Outcome: Progressing     Problem: Infection:  Goal: Will remain free from infection  Description: Will remain free from infection  4/27/2022 1146 by Lum Miners, LPN  Outcome: Progressing  4/27/2022 0151 by Carlos Baez LPN  Outcome: Progressing     Problem: Safety:  Goal: Free from accidental physical injury  Description: Free from accidental physical injury  4/27/2022 1146 by Lum Miners, LPN  Outcome: Progressing  4/27/2022 0151 by Carlos Baez LPN  Outcome: Progressing  Goal: Free from intentional harm  Description: Free from intentional harm  4/27/2022 1146 by Seng Sabillon LPN  Outcome: Progressing  4/27/2022 0151 by jC Lagos LPN  Outcome: Progressing     Problem: Daily Care:  Goal: Daily care needs are met  Description: Daily care needs are met  4/27/2022 1146 by Seng Sabillon LPN  Outcome: Progressing  4/27/2022 0151 by Cj Lagos LPN  Outcome: Progressing     Problem: Skin Integrity:  Goal: Skin integrity will stabilize  Description: Skin integrity will stabilize  4/27/2022 1146 by Seng Sabillon LPN  Outcome: Progressing  4/27/2022 0151 by Cj Lagos LPN  Outcome: Progressing     Problem: Discharge Planning:  Goal: Patients continuum of care needs are met  Description: Patients continuum of care needs are met  4/27/2022 1146 by Seng Sabillon LPN  Outcome: Progressing  4/27/2022 0151 by Cj Lagos LPN  Outcome: Progressing     Problem: IP BLADDER/VOIDING  Goal: LTG - patient will complete bladder elimination  4/27/2022 1146 by Seng Sabillon LPN  Outcome: Progressing  4/27/2022 0151 by Cj Lagos LPN  Outcome: Progressing  Goal: LTG - Patient will utilize adaptive techniques/equipment to complete bladder elimination  4/27/2022 1146 by Seng Sabillon LPN  Outcome: Progressing  4/27/2022 0151 by Cj Lagos LPN  Outcome: Progressing  Goal: LTG - patient will achieve acceptable level of continence  4/27/2022 1146 by Seng Sabillon LPN  Outcome: Progressing  4/27/2022 0151 by Cj Lagos LPN  Outcome: Progressing  Goal: STG - Patient demonstrates ability to take care of indwelling catheter  4/27/2022 1146 by Seng Sabillon LPN  Outcome: Progressing  4/27/2022 0151 by Cj Lagos LPN  Outcome: Progressing  Goal: STG - patient demonstrates self-cath technique using clean technique and care of the catheter  4/27/2022 1146 by Seng Sabillon LPN  Outcome: Progressing  4/27/2022 0151 by Cj Lagos LPN  Outcome: Progressing  Goal: STG - Patient demonstrates no accidents  4/27/2022 1146 by Gaston Correa LPN  Outcome: Progressing  4/27/2022 0151 by Brit Johnson LPN  Outcome: Progressing  Goal: STG - Patient will state signs and symptoms of UTI  4/27/2022 1146 by Gaston Correa LPN  Outcome: Progressing  4/27/2022 0151 by Brit Johnson LPN  Outcome: Progressing  Goal: STG - patient will be able to empty bladder  4/27/2022 1146 by Gaston Correa LPN  Outcome: Progressing  4/27/2022 0151 by Brit Johnson LPN  Outcome: Progressing  Goal: STG - Patient demonstrates understanding of self catheterization schedule by completing task on time  4/27/2022 1146 by Gaston Correa LPN  Outcome: Progressing  4/27/2022 0151 by Brit Johnson LPN  Outcome: Progressing  Goal: STG - patient participates in bladder program by expressing need to void  4/27/2022 1146 by Gaston Correa LPN  Outcome: Progressing  4/27/2022 0151 by Brit Johnson LPN  Outcome: Progressing  Goal: STG - Patient verbalizes understanding of catheter care indwelling/intermittent  4/27/2022 1146 by Gaston Correa LPN  Outcome: Progressing  4/27/2022 0151 by Brit Johnson LPN  Outcome: Progressing  Goal: STG - patient participates in bladder program by adhering to implemented toileting schedule  4/27/2022 1146 by Gaston Correa LPN  Outcome: Progressing  4/27/2022 0151 by Brit Johnson LPN  Outcome: Progressing     Problem: IP BOWEL ELIMINATION  Goal: LTG - patient will utilize adaptive techniques/equipment to complete bowel elimination  4/27/2022 1146 by Gaston Correa LPN  Outcome: Progressing  4/27/2022 0151 by Brit Johnson LPN  Outcome: Progressing  Goal: LTG - patient will have regular and routine bowel evacuation  4/27/2022 1146 by Gaston Correa LPN  Outcome: Progressing  4/27/2022 0151 by Brit Johnson LPN  Outcome: Progressing  Goal: STG - patient will be accident free  4/27/2022 1146 by Gaston Correa LPN  Outcome: Progressing  4/27/2022 0151 by Kadi Dominique LPN  Outcome: Progressing  Goal: STG - Patient demonstrates care and management of ostomy bag  4/27/2022 1146 by Debbie Harden LPN  Outcome: Progressing  4/27/2022 0151 by Kadi Dominique LPN  Outcome: Progressing  Goal: STG - Patient participates in bowel management program  4/27/2022 1146 by Debbie Harden LPN  Outcome: Progressing  4/27/2022 0151 by Kadi Dominique LPN  Outcome: Progressing  Goal: STG - patient maintains skin integrity  4/27/2022 1146 by Debbie Harden LPN  Outcome: Progressing  4/27/2022 0151 by Kadi Dominique LPN  Outcome: Progressing  Goal: STG - Patient will verbalize signs and symptoms of constipation and how to prevent/alleviate  4/27/2022 1146 by Debbie Harden LPN  Outcome: Progressing  4/27/2022 0151 by Kadi Dominique LPN  Outcome: Progressing  Goal: STG - patient will be continent of stool  4/27/2022 1146 by Debbie Harden LPN  Outcome: Progressing  4/27/2022 0151 by Kadi Dominique LPN  Outcome: Progressing  Goal: STG - Patient completes digital stimulation technique  4/27/2022 1146 by Debbie Harden LPN  Outcome: Progressing  4/27/2022 0151 by Kadi Dominique LPN  Outcome: Progressing  Goal: STG - Patient verbalizes knowledge about relationship between diet, fluid intake, activity and medication on constipation  4/27/2022 1146 by Debbie Harden LPN  Outcome: Progressing  4/27/2022 0151 by Kadi Dominique LPN  Outcome: Progressing     Problem: IP BREATHING  Goal: LTG - patient will mobilize secretions and maintain airway  4/27/2022 1146 by Debbie Harden LPN  Outcome: Progressing  4/27/2022 0151 by Kadi Dominique LPN  Outcome: Progressing  Goal: LTG - Patient/caregiver will demonstrate/perform proper techniques to maintain patent airway  4/27/2022 1146 by Debbie Harden LPN  Outcome: Progressing  4/27/2022 0151 by Wannetta Southerly, LPN  Outcome: Progressing  Goal: LTG - patient/caregiver will demonstrate/perform improved or stable self care abilities within physical limitations  4/27/2022 1146 by Errol Weiss LPN  Outcome: Progressing  4/27/2022 0151 by Etta Dodd LPN  Outcome: Progressing  Goal: STG - Patient/caregiver will maintain patent airway  4/27/2022 1146 by Errol Weiss LPN  Outcome: Progressing  4/27/2022 0151 by Etta Dodd LPN  Outcome: Progressing  Goal: STG - respiratory rate and effort will be within normal limits for the patient  4/27/2022 1146 by Errol Weiss LPN  Outcome: Progressing  4/27/2022 0151 by Etta Dodd LPN  Outcome: Progressing  Goal: STG - patient/caregiver will be able to verbalize oxygen safety precautions  4/27/2022 1146 by Errol Weiss LPN  Outcome: Progressing  4/27/2022 0151 by Etta Dodd LPN  Outcome: Progressing  Goal: STG - Patient/caregiver demonstrates correct suctioning technique  4/27/2022 1146 by Errol Weiss LPN  Outcome: Progressing  4/27/2022 0151 by Etta Dodd LPN  Outcome: Progressing  Goal: STG - patient will utilize incentive spirometer  4/27/2022 1146 by Errol Weiss LPN  Outcome: Progressing  4/27/2022 0151 by Etta Dodd LPN  Outcome: Progressing  Goal: STG - Patient performs or directs assisted coughing  4/27/2022 1146 by Errol Weiss LPN  Outcome: Progressing  4/27/2022 0151 by Etta Dodd LPN  Outcome: Progressing  Goal: STG - patient can administer MDI's  4/27/2022 1146 by Errol Weiss LPN  Outcome: Progressing  4/27/2022 0151 by Etta Dodd LPN  Outcome: Progressing  Goal: STG - Patient can utilize incentive spirometer  4/27/2022 1146 by Errol Weiss LPN  Outcome: Progressing  4/27/2022 0151 by Etta Dodd LPN  Outcome: Progressing  Goal: STG - family can complete suctioning  4/27/2022 1146 by Errol Weiss LPN  Outcome: Progressing  4/27/2022 0151 by Etta Dodd LPN  Outcome: Progressing     Problem: NUTRITION  Goal: Patient maintains adequate hydration  4/27/2022 1146 by Errol Weiss LPN  Outcome: Progressing  4/27/2022 0151 by Addie Mckenzie LPN  Outcome: Progressing  Goal: Patient maintains weight  4/27/2022 1146 by Douglas Castillo LPN  Outcome: Progressing  4/27/2022 0151 by Addie Mckenzie LPN  Outcome: Progressing  Goal: Patient/Family demonstrates understanding of diet  4/27/2022 1146 by Douglas Castillo LPN  Outcome: Progressing  4/27/2022 0151 by Addie Mckenzie LPN  Outcome: Progressing  Goal: Patient/Family independently completes tube feeding  4/27/2022 1146 by Douglas Castillo LPN  Outcome: Progressing  4/27/2022 0151 by Addie Mckenzie LPN  Outcome: Progressing  Goal: Patient will have no more than 5 lb weight change during LOS  4/27/2022 1146 by Douglas Castillo LPN  Outcome: Progressing  4/27/2022 0151 by Addie Mckenzie LPN  Outcome: Progressing  Goal: Patient will utilize adaptive techniques to administer nutrition  4/27/2022 1146 by Douglas Castillo LPN  Outcome: Progressing  4/27/2022 0151 by Addie Mckenzie LPN  Outcome: Progressing  Goal: Patient will verbalize dietary restrictions  4/27/2022 1146 by Douglas Castillo LPN  Outcome: Progressing  4/27/2022 0151 by Addie Mckenzie LPN  Outcome: Progressing     Problem: SAFETY  Goal: LTG - patient will adhere to hip precautions during ADL's and transfers  4/27/2022 1146 by Douglas Castillo LPN  Outcome: Progressing  4/27/2022 0151 by Addie Mckenzie LPN  Outcome: Progressing  Goal: LTG - Patient will demonstrate safety requirements appropriate to situation/environment  4/27/2022 1146 by Douglas Castillo LPN  Outcome: Progressing  4/27/2022 0151 by Addie Mckenzie LPN  Outcome: Progressing  Goal: LTG - patient will utilize safety techniques  4/27/2022 1146 by Douglas Castillo LPN  Outcome: Progressing  4/27/2022 0151 by Addie Mckenzie LPN  Outcome: Progressing  Goal: STG - patient locks brakes on wheelchair  4/27/2022 1146 by Douglas Castillo LPN  Outcome: Progressing  4/27/2022 0151 by Addie Mckenzie LPN  Outcome: Progressing  Goal: STG - Patient uses call light consistently to request assistance with transfers  4/27/2022 1146 by Keny Alcaraz, LPN  Outcome: Progressing  4/27/2022 0151 by Isadora Matt LPN  Outcome: Progressing  Goal: STG - patient uses gait belt during all transfers  4/27/2022 1146 by Keny Alcaraz, LPN  Outcome: Progressing  4/27/2022 0151 by Isadora Matt LPN  Outcome: Progressing     Problem: SKIN INTEGRITY  Goal: LTG - Patient will be free from infection  4/27/2022 1146 by Keny Alcaraz, LPN  Outcome: Progressing  4/27/2022 0151 by Isadora Matt LPN  Outcome: Progressing  Goal: LTG - patient will maintain/improve skin integrity through proper skin care techniques  4/27/2022 1146 by Keny Alcaraz, LPN  Outcome: Progressing  4/27/2022 0151 by Isadora Matt LPN  Outcome: Progressing  Goal: LTG - Patient will demonstrate appropriate pressure relief techniques  4/27/2022 1146 by Keny Alcaraz LPN  Outcome: Progressing  4/27/2022 0151 by Isadora Matt LPN  Outcome: Progressing  Goal: LTG - patient will demonstrate appropriate skin care techniques  4/27/2022 1146 by Keny Alcaraz, LPN  Outcome: Progressing  4/27/2022 0151 by Isadora Matt LPN  Outcome: Progressing  Goal: LTG - Patient will be free from infection  4/27/2022 1146 by Keny Alcaraz, LPN  Outcome: Progressing  4/27/2022 0151 by Isadora Matt LPN  Outcome: Progressing  Goal: STG - Patient demonstrates skin care/treatment/dressing change  4/27/2022 1146 by Keny Alcaraz, LPN  Outcome: Progressing  4/27/2022 0151 by Isadora Matt LPN  Outcome: Progressing  Goal: STG - patient will maintain good skin integrity  4/27/2022 1146 by Keny Alcaraz, LPN  Outcome: Progressing  4/27/2022 0151 by Isadora Matt LPN  Outcome: Progressing  Goal: STG - Patient exhibits signs of wound healing.   4/27/2022 1146 by Keny , LPN  Outcome: Progressing  4/27/2022 0151 by Isadora Matt LPN  Outcome: Progressing  Goal: STG - patient demonstrates pressure reduction techniques  4/27/2022 1146 by Eva Walker LPN  Outcome: Progressing  4/27/2022 0151 by Alyssa Console, LPN  Outcome: Progressing  Goal: STG - Patient demonstrates preventative skin care measures  4/27/2022 1146 by Eva Walker LPN  Outcome: Progressing  4/27/2022 0151 by Alyssa Console, LPN  Outcome: Progressing     Problem: PAIN  Goal: LTG - Patient will manage pain with the appropriate technique/Intervention  4/27/2022 1146 by Eva Walker LPN  Outcome: Progressing  4/27/2022 0151 by Alyssa Mccarty, LPN  Outcome: Progressing  Goal: LTG - Patient will demonstrate intervention for managing pain  4/27/2022 1146 by Eva Walker LPN  Outcome: Progressing  4/27/2022 0151 by Alyssa Mccarty, LPN  Outcome: Progressing  Goal: STG - Patient will reduce or eliminate use of analgesics  4/27/2022 1146 by Eva Walker LPN  Outcome: Progressing  4/27/2022 0151 by Alyssa Consmegan, LPN  Outcome: Progressing  Goal: STG - pain is manageable through therapies  4/27/2022 1146 by Eva Walker LPN  Outcome: Progressing  4/27/2022 0151 by Alyssa Mcacrty, LPN  Outcome: Progressing  Goal: STG - Patient will verbalize an acceptable level of pain  4/27/2022 1146 by Eva Walker LPN  Outcome: Progressing  4/27/2022 0151 by Alyssa Consmegan, LPN  Outcome: Progressing  Goal: STG - patients pain is managed to allow active participation in daily activities  4/27/2022 1146 by Eva Walker LPN  Outcome: Progressing  4/27/2022 0151 by Alyssa Console, LPN  Outcome: Progressing  Goal: STG - Patient will increase activity level  4/27/2022 1146 by Eva Walker LPN  Outcome: Progressing  4/27/2022 0151 by Alyssa Console, LPN  Outcome: Progressing  Goal: STG - patient verbalizes a reduction in pain level  4/27/2022 1146 by Eva Walker LPN  Outcome: Progressing  4/27/2022 0151 by Alyssa Console, LPN  Outcome: Progressing     Problem: Discharge Planning  Goal: Discharge to home or other facility with appropriate resources  Outcome: Progressing     Problem: Skin/Tissue Integrity - Adult  Goal: Incisions, wounds, or drain sites healing without S/S of infection  Outcome: Progressing     Problem: Gastrointestinal - Adult  Goal: Maintains or returns to baseline bowel function  Outcome: Progressing  Goal: Maintains adequate nutritional intake  Outcome: Progressing     Problem: Anxiety  Goal: Will report anxiety at manageable levels  Description: INTERVENTIONS:  1. Administer medication as ordered  2. Teach and rehearse alternative coping skills  3. Provide emotional support with 1:1 interaction with staff  Outcome: Progressing     Problem: Coping  Goal: Pt/Family able to verbalize concerns and demonstrate effective coping strategies  Description: INTERVENTIONS:  1. Assist patient/family to identify coping skills, available support systems and cultural and spiritual values  2. Provide emotional support, including active listening and acknowledgement of concerns of patient and caregivers  3. Reduce environmental stimuli, as able  4. Instruct patient/family in relaxation techniques, as appropriate  5. Assess for spiritual pain/suffering and initiate Spiritual Care, Psychosocial Clinical Specialist consults as needed  Outcome: Progressing     Problem: Nutrition Deficit:  Goal: Optimize nutritional status  Outcome: Progressing     Problem: Skin/Tissue Integrity  Goal: Absence of new skin breakdown  Description: 1. Monitor for areas of redness and/or skin breakdown  2. Assess vascular access sites hourly  3. Every 4-6 hours minimum:  Change oxygen saturation probe site  4. Every 4-6 hours:  If on nasal continuous positive airway pressure, respiratory therapy assess nares and determine need for appliance change or resting period.   Outcome: Progressing

## 2022-04-27 NOTE — PROGRESS NOTES
Occupational Therapy  Facility/Department: [facility] 8 REHAB UNIT  Occupational Therapy Treatment Note    Name: Azalia Edward  : 1950  MRN: [id number]  Date of Service: 2022    Discharge Recommendations:  Home with Home health OT  OT Equipment Recommendations  Equipment Needed: Yes  Mobility Devices: ADL Assistive Devices  ADL Assistive Devices: Toileting - 3-in-1 Commode  Other: Started script for [facility]. Already has rollator, RW and patient's daughter plans to get TTB. Patient Diagnosis(es): There were no encounter diagnoses. Past Medical History:  has a past medical history of Arthritis, Bilateral low back pain without sciatica, Cancer (HCC), COPD (chronic obstructive pulmonary disease) ([facility] [address] Hx of blood clots, Hyperlipidemia, Pain in both knees, Thyroid disease, and Urinary incontinence. Past Surgical History:  has a past surgical history that includes Hysterectomy; joint replacement; lobectomy; Cholecystectomy, laparoscopic; Skin cancer excision; and cyst incision and drainage. Treatment Diagnosis: Status post 3 stage lumbar fusion      Assessment   Performance deficits / Impairments: Decreased functional mobility ; Decreased ADL status; Decreased strength;Decreased endurance;Decreased balance;Decreased high-level IADLs  Treatment Diagnosis: Status post 3 stage lumbar fusion  Activity Tolerance  Activity Tolerance: Patient Tolerated treatment well        Plan   Plan  Times per Day: Twice a day  Current Treatment Recommendations: Strengthening,Balance training,Functional mobility training,Endurance training,Home management training     Restrictions  Restrictions/Precautions  Restrictions/Precautions: Fall Risk,Surgical Protocols  Required Braces or Orthoses?: Yes  Required Braces or Orthoses  Spinal: Lumbar Corset  Position Activity Restriction  Spinal Precautions: No Bending,No Lifting,No Twisting    Subjective   General  Chart Reviewed: Yes  Patient assessed for rehabilitation services?: Yes  Response to previous treatment: Patient with no complaints from previous session  Family / Caregiver Present: No       Objective   Pulse: 54  Heart Rate Source: Monitor  BP: (!) 126/94  BP Location: Right upper arm  Patient Position: Standing  MAP (Calculated): 104.67  Resp: 16  SpO2: 98 %  O2 Device: None (Room air)        04/27/22 1100   Vitals   Pulse (!) 48   BP (!) 146/49   BP Location Right upper arm   Patient Position Sitting   MAP (Calculated) 81.33           Safety Devices  Type of Devices: Bed alarm in place;Call light within reach     Tub Transfers  Tub - Transfer From: Rolling walker  Tub - Transfer Type: To and From  Tub - Transfer To: Transfer tub bench  Tub - Technique: Ambulating  Tub Transfers: Stand by assistance           Activity Tolerance  Activity Tolerance: Patient tolerated treatment well     Transfers  Sit to stand: Supervision  Stand to sit: Supervision  Transfer Comments: to/from couch, to/from rocking recliner and standard bed                        Education Given To: Patient; Family  Education Provided: Role of Therapy;Plan of Care;Transfer Training;Precautions; Equipment  Education Method: Demonstration;Verbal  Education Outcome: Verbalized understanding;Demonstrated understanding                             OutComes Score      Toileting Hygiene  Assistance needed: Supervision or touching assistance  Comment: SBA  CARE Score: 4  Discharge Goal: Independent       Toilet Transfer  Assistance needed: Supervision or touching assistance  Comment: SBA  CARE Score: 4  Discharge Goal: Independent        Goals  Short Term Goals  Time Frame for Short term goals: 1 week  Short Term Goal 1: Supervision with bathing hygiene. Short Term Goal 2: Supervision with clothing management/hygiene for toileting. Short Term Goal 3: Supervision with toilet transfers. Short Term Goal 4: Supervision LB dressing, AE PRN.   Short Term Goal 5: Supervision with donning/doffing socks and shoes, AE PRN.  Additional Goals?: Yes  Short Term Goal 6: Patient will complete 1-2 handed static standing task for 5 minutes, requiring Supervision. Long Term Goals  Time Frame for Long term goals : 2 weeks  Long Term Goal 1: Modified independent with bathing hygiene. Long Term Goal 2: Modified independent with toileting and toilet transfers. Long Term Goal 3: Modified independent with overall dressing. Long Term Goal 4: Modified independent with ambulatory homemaking tasks. Long Term Goal 5: Patient verbalize DME needs. Patient Goals   Patient goals : Return home independently.        Therapy Time   Individual Concurrent Group Co-treatment   Time In 1100         Time Out 1200         Minutes 60         Timed Code Treatment Minutes: [address], OT

## 2022-04-27 NOTE — PROGRESS NOTES
Maryann Quiroz  [id number]     04/27/22 1400   Position Activity Restriction   Spinal Precautions No Bending; No Lifting; No Twisting   Subjective   Subjective pt in bed following OT. C/o pain in low back and RLE   Vitals   SpO2 96 %   Subjective   Pain 0/10: 7 (RLE), 4 (low back)    Bed mobility   Rolling to Left Contact guard assistance  (v/c's to maintain back precautions )   Rolling to Right Contact guard assistance  (v/c's to maintain back precautions )   Supine to Sit Stand by assistance   Sit to Supine Stand by assistance   Scooting Supervision   Transfers   Sit to Stand Stand by assistance  (bed > RW; w/c > rollator )   Stand to sit Stand by assistance  (RW > w/c; rollator > bed)   Ambulation   Surface level tile   Device Rolling Walker;Rollator   Other Apparatus Wheelchair follow   Assistance Stand by assistance   Quality of Gait Reciprocal pattern, slow steps, slight shuffling   Gait Deviations Slow [place];Decreased step length;Decreased step height;Shuffles   Distance 150', 350'   Comments pt room loop entire floor. single seated rest taken 2 min    PT Exercises   Exercise Treatment Supine in bed BLE: quad sets x15 with 5s hold, ankle pumps x20    Activity Tolerance   Activity Tolerance Patient tolerated treatment well   Assessment   Assessment pt able to tolerate amb up to 500' with one seated break required d/t fatigue. pt c/o inability to breathe well with mask though O2 sat reads at 96%. Pt limited during supine ther-ex d/t pain in lower back. pt performed bed mobility including sit to supine with v/c's to maintain spinal precautions. toileting performed at conclusion of therapy with supervision. RW utilized to amb to and from bathroom.     Safety Devices   Type of Devices Bed alarm in place;Call light within reach;Gait belt;Left in bed;Patient at risk for falls   PT Individual Minutes   Time In 1430   Time Out 1515   Minutes 45   Electronically signed by Deidre Cohen PTA Student on 4/27/2022 at 4:52 PM

## 2022-04-27 NOTE — PROGRESS NOTES
Esme Mendez  [id number]     04/27/22 1000   Subjective   Subjective pt agrees to FTD with daughter involved. pt reports decreased pain in the lower back and LLE   Vitals   BP (!) 126/48   BP Location Left upper arm   MAP (Calculated) 74   SpO2 98 %   Subjective   Pain 0/10: 5. Continuous. achiness. tightness in LLE    Bed mobility   Supine to Sit Stand by assistance   Transfers   Sit to Stand Contact guard assistance;Stand by assistance   Stand to sit Contact guard assistance;Stand by assistance  (multiple v/c's to reach back for w/c. )   Car Transfer Contact guard assistance;Stand by assistance  (performed x2)   Comment toileting performed with supervision.   (pt amb 5' to and from bathroom door and toilet CGA)   Ambulation   Surface level tile   Device Rolling Walker   Other Apparatus Wheelchair follow   Assistance Stand by assistance   Quality of Gait Reciprocal pattern, slow steps, slight shuffling   Gait Deviations Slow Brielle;Decreased step length;Decreased step height;Shuffles   Distance 225'   Comments pt room to PT gym   More Ambulation? Yes   Stairs   # Steps  12  (stairs climbed x3. CGA assist x2. family assist x1)   Stairs Height 4\"   Rails Left ascending   Curbs 4\"  (x3)   Device Rolling walker   Assistance Stand by assistance;Contact guard assistance   Comment Cues for technique leading with RLE ascending curb/stairs, leading with LLE decsending curb/stairs. cues to keep RW closer during ascension/descension of curb. pt can become distracted leading to loss of correct technique   Activity Tolerance   Activity Tolerance Patient tolerated treatment well   Assessment   Assessment pt tolerated FTD with daughter present. pt able to traverse curbs and stairs with CGA and v/c's to correct technique with RW position and correct steppage when ascending/descending stairs. Pt able to perform car TF x2 with CGA/SBA and single v/c to reach back for and push from stable surface.  pt required intermittent breaks d/t increasing pain in back and cont to have pain at conclusion of therapy. Safety Devices   Type of Devices Gait belt;Left in chair;Sitter present; Patient at risk for falls  (Left with OT )   Electronically signed by Deidre Cohen PTA Student on 4/27/2022 at 11:14 AM

## 2022-04-27 NOTE — PROGRESS NOTES
Durable Medical Equipment   Physician Order     Patient Name Jocy Vicente  Patient Phone:[phone] (Home) *Preferred*     Patient Address: [address] [facility] [address] [facility] [address] [place] [address]    Patient Height Height: 5' 5\" (165.1 cm)  Patient Weight 153 lb (69.4 kg)   1950     DME NEEDED:  · BEDSIDE COMMODE    1. [facility] PART A AND B    F/O Payor/Plan Precert #   MEDICARE/MEDICARE PART A AND B    Subscriber Subscriber #   Jesus Lane [id number]   Address Phone   [address] [facility] [address] [phone]     [place]    [place]    Subscriber Subscriber #   Jesus Lane [id number]   Address Phone   [address]  [facility]   [place] [address] [phone]       DIAGNOSIS:  Hospital Problem List  Date Reviewed: 2022     ICD-10-CM Priority Class Noted POA   S/P lumbar fusion Z98.1   2022 Yes   COPD (chronic obstructive pulmonary disease) ([facility] [address] J44.9 Medium  2022 Yes   Gastroesophageal reflux disease without esophagitis K21.9 Medium  2022 Yes   History of smoking Z87.891 Medium  2022 Yes   Weakness R53.1   2022 Yes   Gait abnormality R26.9   2022 Yes   Mood disorder ([facility] [address] F39   2022 Yes   Acquired hypothyroidism E03.9   2022 Yes     Admitting diagnosis:Spinal stenosis, lumbar region without neurogenic claudication,Other intervertebral disc degeneration, thoracic region,Other intervertebral disc displacement, lumbar region     Secondary diagnoses:Unspecified inflammatory spondylopathy, lumbar region,Disorder of bone density and structure, unspecified,Other constipation,Acute posthemorrhagic anemia,Unspecified thoracic, thoracolumbar and lumbosacral intervertebral disc disorder,Lumbago with sciatica, left side,Lumbago with sciatica, right side,Nicotine dependence, cigarettes, uncomplicated,Gastro-esophageal reflux disease without esophagitis,Chronic kidney disease, unspecified,Hereditary and idiopathic neuropathy, unspecified,Hyperlipidemia, unspecified,Chronic obstructive pulmonary disease, unspecified     CHIEF COMPLAINT: Status post 3 stage lumbar fusion        HISTORY OF PRESENT ILLNESS:   This 66 y.o. female  with history of anxiety, depression, CKD, tobacco abuse, COPD, constipation, GERD, DDD, chronic back pain, osteoporosis, dyslipidemia and neuropathy. She was seen as outpatient by orthopedic spine surgeon Dr. Aleksandar Mays d/t increasing back pain, bilateral buttock, thigh, and leg radiculopathy left worse than right. MRI done showed multilevel thoracolumbar degenerative disk disease severe L4-S1 and disc herniation at L5-S1 with stenosis with facet arthropathy, and neurogenic claudication. Dr. Aleksandar Mays recommended a 3 stage repair. Patient was in agreement and was admitted to [facility] on 4/11/22 and underwent stage 1 anterior discectomy decompression with bilateral neural foraminotomy L5-S1, and anterior lumbar interbody fusion of L5-S1. She tolerated the procedure well. She returned to surgery on 4/13/22 for stage 2 right lateral lumbar interbody fusion L4-5 on 4/13/22. She tolerated this procedure well. She returned to surgery for the stage 3 Posterior spinal fusion L4-S1 on 4/15/22. She tolerated this procedure well. She continues to c/o pain at 8/10 being controlled with PO medications. She is participating in both PT/OT. She is felt to need a stay on Rehab to work towards her goal of returning home with assist of her family.   She is now felt ready to start the Rehab program.  GERD       ____________________ _____________________ ________________   Physician Signature      Physician Name (print)   Physician NPI          Date

## 2022-04-28 LAB
ALBUMIN SERPL-MCNC: 3.5 G/DL (ref 3.5–5.2)
ALP BLD-CCNC: 98 U/L (ref 35–104)
ALT SERPL-CCNC: <5 U/L (ref 5–33)
ANION GAP SERPL CALCULATED.3IONS-SCNC: 12 MMOL/L (ref 7–19)
AST SERPL-CCNC: 10 U/L (ref 5–32)
BASOPHILS ABSOLUTE: 0.1 K/UL (ref 0–0.2)
BASOPHILS RELATIVE PERCENT: 1.3 % (ref 0–1)
BILIRUB SERPL-MCNC: 0.3 MG/DL (ref 0.2–1.2)
BUN BLDV-MCNC: 8 MG/DL (ref 8–23)
CALCIUM SERPL-MCNC: 8.9 MG/DL (ref 8.8–10.2)
CHLORIDE BLD-SCNC: 102 MMOL/L (ref 98–111)
CO2: 24 MMOL/L (ref 22–29)
CREAT SERPL-MCNC: 0.8 MG/DL (ref 0.5–0.9)
EKG P AXIS: 75 DEGREES
EKG P-R INTERVAL: 134 MS
EKG Q-T INTERVAL: 478 MS
EKG QRS DURATION: 92 MS
EKG QTC CALCULATION (BAZETT): 470 MS
EKG T AXIS: 60 DEGREES
EOSINOPHILS ABSOLUTE: 0.5 K/UL (ref 0–0.6)
EOSINOPHILS RELATIVE PERCENT: 11.5 % (ref 0–5)
GFR AFRICAN AMERICAN: >59
GFR NON-AFRICAN AMERICAN: >60
GLUCOSE BLD-MCNC: 110 MG/DL (ref 74–109)
HCT VFR BLD CALC: 32.5 % (ref 37–47)
HEMOGLOBIN: 10.1 G/DL (ref 12–16)
IMMATURE GRANULOCYTES #: 0 K/UL
LYMPHOCYTES ABSOLUTE: 1.9 K/UL (ref 1.1–4.5)
LYMPHOCYTES RELATIVE PERCENT: 40 % (ref 20–40)
MCH RBC QN AUTO: 29.2 PG (ref 27–31)
MCHC RBC AUTO-ENTMCNC: 31.1 G/DL (ref 33–37)
MCV RBC AUTO: 93.9 FL (ref 81–99)
MONOCYTES ABSOLUTE: 0.5 K/UL (ref 0–0.9)
MONOCYTES RELATIVE PERCENT: 10.2 % (ref 0–10)
NEUTROPHILS ABSOLUTE: 1.7 K/UL (ref 1.5–7.5)
NEUTROPHILS RELATIVE PERCENT: 36.6 % (ref 50–65)
PDW BLD-RTO: 13.7 % (ref 11.5–14.5)
PLATELET # BLD: 337 K/UL (ref 130–400)
PMV BLD AUTO: 12.2 FL (ref 9.4–12.3)
POTASSIUM REFLEX MAGNESIUM: 4.5 MMOL/L (ref 3.5–5)
RBC # BLD: 3.46 M/UL (ref 4.2–5.4)
SODIUM BLD-SCNC: 138 MMOL/L (ref 136–145)
TOTAL PROTEIN: 5.8 G/DL (ref 6.6–8.7)
WBC # BLD: 4.6 K/UL (ref 4.8–10.8)

## 2022-04-28 PROCEDURE — 6370000000 HC RX 637 (ALT 250 FOR IP): Performed by: PSYCHIATRY & NEUROLOGY

## 2022-04-28 PROCEDURE — 1180000000 HC REHAB R&B

## 2022-04-28 PROCEDURE — 80053 COMPREHEN METABOLIC PANEL: CPT

## 2022-04-28 PROCEDURE — 99233 SBSQ HOSP IP/OBS HIGH 50: CPT | Performed by: PSYCHIATRY & NEUROLOGY

## 2022-04-28 PROCEDURE — 97535 SELF CARE MNGMENT TRAINING: CPT

## 2022-04-28 PROCEDURE — 85025 COMPLETE CBC W/AUTO DIFF WBC: CPT

## 2022-04-28 PROCEDURE — 36415 COLL VENOUS BLD VENIPUNCTURE: CPT

## 2022-04-28 PROCEDURE — 97530 THERAPEUTIC ACTIVITIES: CPT

## 2022-04-28 PROCEDURE — 97116 GAIT TRAINING THERAPY: CPT

## 2022-04-28 PROCEDURE — 97110 THERAPEUTIC EXERCISES: CPT

## 2022-04-28 RX ORDER — HYDROCODONE BITARTRATE AND ACETAMINOPHEN 10; 325 MG/1; MG/1
1 TABLET ORAL EVERY 6 HOURS PRN
Qty: 120 TABLET | Refills: 0 | Status: SHIPPED | OUTPATIENT
Start: 2022-04-28 | End: 2022-05-28

## 2022-04-28 RX ORDER — ESCITALOPRAM OXALATE 10 MG/1
10 TABLET ORAL DAILY
Qty: 30 TABLET | Refills: 0 | Status: SHIPPED | OUTPATIENT
Start: 2022-04-28

## 2022-04-28 RX ORDER — PROMETHAZINE HYDROCHLORIDE 25 MG/1
25 TABLET ORAL 2 TIMES DAILY PRN
Qty: 30 TABLET | Refills: 0 | Status: SHIPPED | OUTPATIENT
Start: 2022-04-28

## 2022-04-28 RX ORDER — DOCUSATE SODIUM 100 MG/1
100 CAPSULE, LIQUID FILLED ORAL 2 TIMES DAILY
Qty: 60 CAPSULE | Refills: 0 | Status: SHIPPED | OUTPATIENT
Start: 2022-04-28

## 2022-04-28 RX ORDER — ALPRAZOLAM 0.5 MG/1
0.5 TABLET ORAL 3 TIMES DAILY PRN
Qty: 90 TABLET | Refills: 0 | Status: SHIPPED | OUTPATIENT
Start: 2022-04-28 | End: 2022-05-28

## 2022-04-28 RX ORDER — DICYCLOMINE HYDROCHLORIDE 10 MG/1
10 CAPSULE ORAL 3 TIMES DAILY PRN
Qty: 90 CAPSULE | Refills: 0 | Status: SHIPPED | OUTPATIENT
Start: 2022-04-28

## 2022-04-28 RX ORDER — PANTOPRAZOLE SODIUM 40 MG/1
40 TABLET, DELAYED RELEASE ORAL DAILY
Qty: 30 TABLET | Refills: 0 | Status: SHIPPED | OUTPATIENT
Start: 2022-04-29

## 2022-04-28 RX ORDER — LIDOCAINE 4 G/G
1 PATCH TOPICAL DAILY
Qty: 30 PATCH | Refills: 0 | Status: SHIPPED | OUTPATIENT
Start: 2022-04-29 | End: 2022-08-18

## 2022-04-28 RX ORDER — DIAPER,BRIEF,INFANT-TODD,DISP
EACH MISCELLANEOUS
Qty: 30 G | Refills: 0 | Status: SHIPPED | OUTPATIENT
Start: 2022-04-28 | End: 2022-05-05

## 2022-04-28 RX ORDER — TIZANIDINE 4 MG/1
8 TABLET ORAL EVERY 8 HOURS PRN
Qty: 90 TABLET | Refills: 0 | Status: SHIPPED | OUTPATIENT
Start: 2022-04-28 | End: 2022-08-09 | Stop reason: SDUPTHER

## 2022-04-28 RX ORDER — SUCRALFATE 1 G/1
1 TABLET ORAL 3 TIMES DAILY PRN
Qty: 90 TABLET | Refills: 0 | Status: SHIPPED | OUTPATIENT
Start: 2022-04-28

## 2022-04-28 RX ORDER — LEVOTHYROXINE SODIUM 0.1 MG/1
100 TABLET ORAL DAILY
Qty: 30 TABLET | Refills: 0 | Status: SHIPPED | OUTPATIENT
Start: 2022-04-29

## 2022-04-28 RX ADMIN — ESCITALOPRAM OXALATE 10 MG: 10 TABLET ORAL at 08:51

## 2022-04-28 RX ADMIN — HYDROCODONE BITARTRATE AND ACETAMINOPHEN 1 TABLET: 10; 325 TABLET ORAL at 23:51

## 2022-04-28 RX ADMIN — DOCUSATE SODIUM 100 MG: 100 CAPSULE, LIQUID FILLED ORAL at 19:18

## 2022-04-28 RX ADMIN — HYDROCODONE BITARTRATE AND ACETAMINOPHEN 1 TABLET: 10; 325 TABLET ORAL at 04:42

## 2022-04-28 RX ADMIN — TIZANIDINE 8 MG: 4 TABLET ORAL at 13:33

## 2022-04-28 RX ADMIN — HYDROCODONE BITARTRATE AND ACETAMINOPHEN 1 TABLET: 10; 325 TABLET ORAL at 13:32

## 2022-04-28 RX ADMIN — DOCUSATE SODIUM 100 MG: 100 CAPSULE, LIQUID FILLED ORAL at 08:51

## 2022-04-28 RX ADMIN — ALPRAZOLAM 0.5 MG: 0.5 TABLET ORAL at 21:21

## 2022-04-28 RX ADMIN — HYDROCODONE BITARTRATE AND ACETAMINOPHEN 1 TABLET: 10; 325 TABLET ORAL at 08:56

## 2022-04-28 RX ADMIN — TIZANIDINE 8 MG: 4 TABLET ORAL at 19:18

## 2022-04-28 RX ADMIN — HYDROCODONE BITARTRATE AND ACETAMINOPHEN 1 TABLET: 10; 325 TABLET ORAL at 19:18

## 2022-04-28 RX ADMIN — TIZANIDINE 8 MG: 4 TABLET ORAL at 00:29

## 2022-04-28 RX ADMIN — TIZANIDINE 8 MG: 4 TABLET ORAL at 04:42

## 2022-04-28 RX ADMIN — TIZANIDINE 8 MG: 4 TABLET ORAL at 08:57

## 2022-04-28 RX ADMIN — PANTOPRAZOLE SODIUM 40 MG: 40 TABLET, DELAYED RELEASE ORAL at 08:50

## 2022-04-28 RX ADMIN — LEVOTHYROXINE SODIUM 100 MCG: 100 TABLET ORAL at 08:51

## 2022-04-28 RX ADMIN — HYDROCODONE BITARTRATE AND ACETAMINOPHEN 1 TABLET: 10; 325 TABLET ORAL at 00:28

## 2022-04-28 RX ADMIN — LINACLOTIDE 290 MCG: 145 CAPSULE, GELATIN COATED ORAL at 08:50

## 2022-04-28 RX ADMIN — TIZANIDINE 8 MG: 4 TABLET ORAL at 23:53

## 2022-04-28 ASSESSMENT — PAIN DESCRIPTION - ORIENTATION
ORIENTATION: LOWER
ORIENTATION: LEFT
ORIENTATION: LOWER
ORIENTATION: LEFT
ORIENTATION: LOWER
ORIENTATION: LOWER
ORIENTATION: LEFT
ORIENTATION: LOWER
ORIENTATION: MID

## 2022-04-28 ASSESSMENT — PAIN DESCRIPTION - LOCATION
LOCATION: BACK
LOCATION: LEG
LOCATION: BACK
LOCATION: LEG
LOCATION: BACK
LOCATION: BACK
LOCATION: BACK;LEG

## 2022-04-28 ASSESSMENT — PAIN - FUNCTIONAL ASSESSMENT
PAIN_FUNCTIONAL_ASSESSMENT: PREVENTS OR INTERFERES SOME ACTIVE ACTIVITIES AND ADLS
PAIN_FUNCTIONAL_ASSESSMENT: ACTIVITIES ARE NOT PREVENTED

## 2022-04-28 ASSESSMENT — PAIN SCALES - GENERAL
PAINLEVEL_OUTOF10: 10
PAINLEVEL_OUTOF10: 9
PAINLEVEL_OUTOF10: 9
PAINLEVEL_OUTOF10: 5
PAINLEVEL_OUTOF10: 10
PAINLEVEL_OUTOF10: 9
PAINLEVEL_OUTOF10: 10
PAINLEVEL_OUTOF10: 10
PAINLEVEL_OUTOF10: 4
PAINLEVEL_OUTOF10: 6
PAINLEVEL_OUTOF10: 10
PAINLEVEL_OUTOF10: 7

## 2022-04-28 ASSESSMENT — PAIN DESCRIPTION - DESCRIPTORS
DESCRIPTORS: DULL;ACHING
DESCRIPTORS: ACHING;DISCOMFORT
DESCRIPTORS: ACHING;DULL
DESCRIPTORS: ACHING;THROBBING

## 2022-04-28 ASSESSMENT — PAIN DESCRIPTION - ONSET: ONSET: ON-GOING

## 2022-04-28 ASSESSMENT — PAIN DESCRIPTION - FREQUENCY: FREQUENCY: CONTINUOUS

## 2022-04-28 ASSESSMENT — PAIN DESCRIPTION - PAIN TYPE: TYPE: CHRONIC PAIN;SURGICAL PAIN

## 2022-04-28 NOTE — PROGRESS NOTES
04/28/22 0915 04/28/22 0926 04/28/22 0927   Pain Assessment   Pain Assessment 0-10  --   --    Pain Level 9  --   --    Pain Location Back;Leg  --   --    Pain Orientation Left  --   --    Non-Pharmaceutical Pain Intervention(s) Distraction  --   --    Bed Mobility   Rolling  --  Independent  --    Supine to Sit  --  Independent  --    Sit to Supine  --  Independent  --    Scooting  --  Independent  --    Comment  --  All performed on flat mat table. --    Transfers   Sit to Stand  --   --  Independent   Stand to sit  --   --  Independent   Ambulation   WB Status  --   --   --    More Ambulation?  --   --   --    Ambulation   Surface  --   --   --    Device  --   --   --    Assistance  --   --   --    Comments  --   --   --    Quality of Gait  --   --   --    Ambulation 2   Surface - 2  --   --   --    Device 2  --   --   --    Assistance 2  --   --   --    Distance  --   --   --    Comments  --   --   --    Propulsion 1   Distance  --   --   --    Conditions Requiring Skilled Therapeutic Intervention   Assessment  --   --   --    Activity Tolerance   Activity Tolerance  --   --   --    Safety Devices   Type of Devices  --   --   --       04/28/22 0928 04/28/22 0951 04/28/22 0952   Pain Assessment   Pain Assessment  --   --   --    Pain Level  --   --   --    Pain Location  --   --   --    Pain Orientation  --   --   --    Non-Pharmaceutical Pain Intervention(s)  --   --   --    Bed Mobility   Rolling  --   --   --    Supine to Sit  --   --   --    Sit to Supine  --   --   --    Scooting  --   --   --    Comment  --   --   --    Transfers   Sit to Stand  --   --   --    Stand to sit  --   --   --    Ambulation   WB Status WBAT  --   --    More Ambulation? Yes  --   --    Ambulation   Surface level tile  --   --    Device [employer]  --   --    Assistance Stand by assistance; Independent  --   --    Comments Tends to pick walker up when making turns. --   --    Quality of Gait Reciprocal pattern. Incorporated turns. --   --    Ambulation 2   Surface - 2 level tile  --   --    Device 2 Rollator  --   --    Assistance 2 Modified Independent  --   --    Distance 250'  --   --    Comments Much smoother gait pattern using Rollator, sheba during turns. --   --    Propulsion 1   Distance 250'  --   --    Conditions Requiring Skilled Therapeutic Intervention   Assessment  --  Pt. states she prefers to use RW over Rollator in her room. Back to bed at end of session.   --    Activity Tolerance   Activity Tolerance  --  Patient tolerated treatment well  --    Safety Devices   Type of Devices  --   --  Bed alarm in place;Call light within reach   Electronically signed by Sujit Story PTA on 4/28/2022 at 9:53 AM

## 2022-04-28 NOTE — PROGRESS NOTES
Patient:   August Smalls  MR#:    [id number]   Room:    0178/747-89   YOB: 1950  Date of Progress Note: 4/28/2022  Time of Note                           11:23 AM  Consulting Physician:   Dawson Maria M.D. Attending Physician:  Dawson Maria MD     Chief complaint Status post 3 stage lumbar fusion    S:This 66 y.o. female  with history of anxiety, depression, CKD, tobacco abuse, COPD, constipation, GERD, DDD, chronic back pain, osteoporosis, dyslipidemia and neuropathy. She was seen as outpatient by orthopedic spine surgeon Dr. Loida Mahan d/t increasing back pain, bilateral buttock, thigh, and leg radiculopathy left worse than right. MRI done showed multilevel thoracolumbar degenerative disk disease severe L4-S1 and disc herniation at L5-S1 with stenosis with facet arthropathy, and neurogenic claudication. Dr. Loida Mahan recommended a 3 stage repair. Patient was in agreement and was admitted to [address] [place] [address] on 4/11/22 and underwent stage 1 anterior discectomy decompression with bilateral neural foraminotomy L5-S1, and anterior lumbar interbody fusion of L5-S1. She tolerated the procedure well. She returned to surgery on 4/13/22 for stage 2 right lateral lumbar interbody fusion L4-5 on 4/13/22. She tolerated this procedure well. She returned to surgery for the stage 3 Posterior spinal fusion L4-S1 on 4/15/22. She tolerated this procedure well. She continues to c/o pain at 8/10 being controlled with PO medications. She is participating in both PT/OT. She is felt to need a stay on Rehab to work towards her goal of returning home with assist of her family. She is now felt ready to start the Rehab program.   Feels well today. Still back pain but otherwise doing okay.     REVIEW OF SYSTEMS:  Constitutional: No fevers No chills  Neck:No stiffness  Respiratory: No shortness of breath  Cardiovascular: No chest pain No palpitations  Gastrointestinal: No abdominal pain    Genitourinary: No Dysuria  Neurological: No headache, no confusion    Past Medical History:      Diagnosis Date    Arthritis     Bilateral low back pain without sciatica 12/22/2015    Cancer ([facility] [address]     [facility] 4/20/2022    Hx of blood clots     Hyperlipidemia     Pain in both knees 12/22/2015    Thyroid disease     Urinary incontinence        Past Surgical History:      Procedure Laterality Date    CHOLECYSTECTOMY, LAPAROSCOPIC      CYST INCISION AND DRAINAGE      left Cheek    HYSTERECTOMY      JOINT REPLACEMENT      LOBECTOMY      RIGHT PARTIAL    SKIN CANCER EXCISION         Medications in Hospital:      Current Facility-Administered Medications:     HYDROcodone-acetaminophen (NORCO)  MG per tablet 1 tablet, 1 tablet, Oral, Q4H PRN, Elmer Lorenz MD, 1 tablet at 04/28/22 0856    lidocaine 4 % external patch 1 patch, 1 patch, TransDERmal, Daily, Elmer Lorenz MD, 1 patch at 04/28/22 0849    [Held by provider] pregabalin (LYRICA) capsule 100 mg, 100 mg, Oral, Q12H, Elmer Lorenz MD, 100 mg at 04/26/22 0410    tiZANidine (ZANAFLEX) tablet 8 mg, 8 mg, Oral, Q4H PRN, Elmer Lorenz MD, 8 mg at 04/28/22 0857    docusate sodium (COLACE) capsule 100 mg, 100 mg, Oral, BID, Elmer Lorenz MD, 100 mg at 04/28/22 0851    hydrocortisone 1 % cream, , Topical, BID, Elmer Lorenz MD, Given at 04/27/22 2116    ALPRAZolam (XANAX) tablet 0.5 mg, 0.5 mg, Oral, TID PRN, Elmer Lorenz MD, 0.5 mg at 04/27/22 1930    dicyclomine (BENTYL) capsule 10 mg, 10 mg, Oral, TID PRN, Elmer Lorenz MD    escitalopram (LEXAPRO) tablet 10 mg, 10 mg, Oral, Daily, Elmer Lorenz MD, 10 mg at 04/28/22 0851    levothyroxine (SYNTHROID) tablet 100 mcg, 100 mcg, Oral, Daily, Elmer Lorenz MD, 100 mcg at 04/28/22 0851    linaclotide (LINZESS) capsule 290 mcg, 290 mcg, Oral, Daily, Elmer Lorenz MD, 290 mcg at 04/28/22 0850    nystatin (MYCOSTATIN) 213282 UNIT/ML suspension 500,000 Units, 500,000 Units, Oral, 4x Daily, Rashard Duenas MD, 500,000 Units at 04/25/22 1228    pantoprazole (PROTONIX) tablet 40 mg, 40 mg, Oral, Daily, Rashard Duenas MD, 40 mg at 04/28/22 0850    promethazine (PHENERGAN) tablet 25 mg, 25 mg, Oral, BID PRN, Rashard Duenas MD    sucralfate (CARAFATE) tablet 1 g, 1 g, Oral, TID PRN, Rashard Duenas MD    acetaminophen (TYLENOL) tablet 650 mg, 650 mg, Oral, Q4H PRN, Rashard Duenas MD, 650 mg at 04/20/22 1111    polyethylene glycol (GLYCOLAX) packet 17 g, 17 g, Oral, Daily PRN, Rashard Duenas MD, 17 g at 04/25/22 1716    Allergies:  Sulfamethoxazole-trimethoprim, Codeine, Erythromycin, Pcn [penicillins], Talwin [pentazocine], and Sulfa antibiotics    Social History:   TOBACCO:   reports that she has been smoking cigarettes. She has a 15.00 pack-year smoking history. She has never used smokeless tobacco.  ETOH:   reports no history of alcohol use. Family History:       Problem Relation Age of Onset   [place] Cancer Mother     Hypertension Mother     Heart Attack Father          PHYSICAL EXAM:  BP (!) 153/70   Pulse (!) 45   Temp 96.4 °F (35.8 °C) (Temporal)   Resp 20   Ht 5' 5\" (1.651 m)   Wt 153 lb (69.4 kg)   SpO2 93%   BMI 25.46 kg/m²     Constitutional - well developed, well nourished. Eyes - conjunctiva normal.   Ear, nose, throat - No scars, masses, or lesions over external nose or ears, no atrophy of tongue  Neck-symmetric, no masses noted, no jugular vein distension  Respiration- chest wall appears symmetric, good expansion,   normal effort without use of accessory muscles  Musculoskeletal - no significant wasting of muscles noted, no bony deformities  Extremities-no clubbing, cyanosis or edema  Skin - warm, dry, and intact. No rash, erythema, or pallor.   Psychiatric - mood, affect, and behavior appear normal.      Neurological exam  Awake, alert, fluent oriented appropriate affect  Attention and concentration appear appropriate  Recent and remote memory appears unremarkable  Speech normal without dysarthria  No clear issues with language of fund of knowledge     Cranial Nerve Exam     CN III, IV,VI-EOMI, No nystagmus, conjugate eye movements, no ptosis    CN VII-no facial assymetry       Motor Exam  antigravity throughout upper and lower extremities bilaterally      Tremors-mild postural tremor in the hands noted     Gait  Not tested     Nursing/pcp notes, imaging,labs and vitals reviewed. PT,OT and/or speech notes reviewed    Lab Results   Component Value Date    WBC 4.6 (L) 04/28/2022    HGB 10.1 (L) 04/28/2022    HCT 32.5 (L) 04/28/2022    MCV 93.9 04/28/2022     04/28/2022     Lab Results   Component Value Date     04/28/2022    K 4.5 04/28/2022     04/28/2022    CO2 24 04/28/2022    BUN 8 04/28/2022    CREATININE 0.8 04/28/2022    GLUCOSE 110 (H) 04/28/2022    CALCIUM 8.9 04/28/2022    PROT 5.8 (L) 04/28/2022    LABALBU 3.5 04/28/2022    BILITOT 0.3 04/28/2022    ALKPHOS 98 04/28/2022    AST 10 04/28/2022    ALT <5 (A) 04/28/2022    LABGLOM >60 04/28/2022    GFRAA >59 04/28/2022     Lab Results   Component Value Date    INR 1.09 04/19/2022    PROTIME 14.0 04/19/2022     Earnie Rings, PTA   Physical Therapist Assistant   Physical Therapy   Progress Notes       Signed   Date of Service:  4/26/2022  2:23 PM                 Signed                Show:Clear all  [x]Manual[x]Template[]Copied    Added by:  [x]Sylvia Hills, PTA      []Mayco for details    Physical Therapy  Name: Jacquelyn Anthony  MRN:  [id number]  Date of service:  4/26/2022 04/26/22 1345   Restrictions/Precautions   Restrictions/Precautions Fall Risk;Surgical Protocols   Required Braces or Orthoses   Spinal Lumbar Corset   Position Activity Restriction   Spinal Precautions No Bending; No Lifting; No Twisting   General Comment   Comments pt getting IV fluids and cont to report not feeling well due to BP issues   Subjective   Subjective Pt awake, but not feeling her best.  Would like to get up to [facility] as she states she has been putting off toileting   Vitals   Pulse (!) 42   BP (!) 111/37   BP Location Left upper arm   BP Method Automatic   Patient Position High wlers   MAP (Calculated) 61.67   Bed mobility   Rolling to Right Stand by assistance  (rails; hob elevated)   Supine to Sit Stand by assistance  (to R side with rail)   Sit to Supine Stand by assistance   Scooting Supervision   Transfers   Sit to Stand Contact guard assistance;Stand by assistance   Stand to sit Contact guard assistance;Stand by assistance   Bed to Chair Contact guard assistance  (1+1 assist due to BP stand pivot)   Ambulation   WB Status WBAT   Assessment   Assessment pt doing well with bed mobility; able to move herself up towards hob with UE/LE's; needed assist to rikki/doff back brace as she has difficulty with velcro straps (especially with doffing); pt denies worsened symptoms with transfer though BP monitor showed decrease   Safety Devices   Type of Devices Bed alarm in place;Call light within reach; Left in bed         Electronically signed by Shailesh Hernandez PTA on 4/26/2022 at 2:24 PM               Cosigned by: Leslie Soria, PT at 4/26/2022  3:59 PM       Revision History                          EKG 12 Lead [[id number]]    Collected: 04/27/22 0911    Updated: 04/28/22 0831     P-R Interval 134 ms    QRS Duration 92 ms    Q-T Interval 478 ms    QTc Calculation (Bazett) 470 ms    P Axis 75 degrees    T Axis 60 degrees   Narrative:     55 BPM   Sinus rhythm   Prolonged QT interval   Lateral ST-T abnormality is nonspecific   Comparison Summary: No serial comparison made   Summary: Borderline ECG   [email or web address]   [email or web address]         RECORD REVIEW: Previous medical records, medications were reviewed at today's visit    IMPRESSION:   1. Status post 3 stage lumbar fusion-pain control/mobilization  2.   Mood disorder-Lexapro/Xanax as needed  3. Hypothyroidism-Synthroid  4. GI/GERD- bowel regimen/PPI  5. Back pain-Lyrica//Zanaflex as needed-change Percocet to Norco for issues of confusion  6. COPD/history of smoking-monitor  7. PT/OT  8.   Essential tremor-stable monitor    Sinus bradycardia- on medication related-outpatient cardiology follow-up    x-ray of the lumbar spine-no acute changes    Add Lidoderm patch/heating pad  Lyrica held due to confusion  Appreciate cardiology input regarding bradycardia- outpatient follow-up with Zio Holter monitor on discharge    Continue supportive care      ELOS tomorrow     Expected duration and frequency therapy: 180 minutes per day, 5 days per week    CALL WITH ANY QUESTIONS  [phone] CELL  Dr Ximena Sotelo

## 2022-04-28 NOTE — PROGRESS NOTES
Occupational Therapy  Facility/Department: [facility] 8 REHAB UNIT  Occupational Therapy Treatment     Name: Louisa Javier  : 1950  MRN: [id number]  Date of Service: 2022    Discharge Recommendations:  Home with Home health OT          Patient Diagnosis(es): The primary encounter diagnosis was History of smoking. Diagnoses of Acquired hypothyroidism, Mood disorder ([facility] [address] Gait abnormality, Weakness, S/P lumbar fusion, Pain management contract agreement, and Ischial bursitis of left side were also pertinent to this visit. Past Medical History:  has a past medical history of Arthritis, Bilateral low back pain without sciatica, Cancer (HCC), COPD (chronic obstructive pulmonary disease) ([facility] [address] Hx of blood clots, Hyperlipidemia, Pain in both knees, Thyroid disease, and Urinary incontinence. Past Surgical History:  has a past surgical history that includes Hysterectomy; joint replacement; lobectomy; Cholecystectomy, laparoscopic; Skin cancer excision; and cyst incision and drainage. Treatment Diagnosis: Status post 3 stage lumbar fusion      Assessment   Performance deficits / Impairments: Decreased functional mobility ; Decreased ADL status; Decreased strength;Decreased endurance;Decreased balance;Decreased high-level IADLs  Treatment Diagnosis: Status post 3 stage lumbar fusion  Activity Tolerance  Activity Tolerance: Patient Tolerated treatment well        Plan   Plan  Times per Day: Twice a day  Current Treatment Recommendations: Strengthening,Balance training,Functional mobility training,Endurance training,Home management training     Restrictions  Restrictions/Precautions  Restrictions/Precautions: Fall Risk,Surgical Protocols  Required Braces or Orthoses?: Yes  Required Braces or Orthoses  Spinal: Lumbar Corset  Position Activity Restriction  Spinal Precautions: No Bending,No Lifting,No Twisting    Subjective   General  Chart Reviewed: Yes  Patient assessed for rehabilitation services?: Yes  Response to previous treatment: Patient with no complaints from previous session  Family / Caregiver Present: No       Objective   Pulse: (!) 46  Heart Rate Source: Monitor  BP: 92/77  BP Location: Left upper arm  Patient Position: Supine  MAP (Calculated): 82  Resp: 20  SpO2: 93 %  O2 Device: None (Room air)  Safety Devices  Type of Devices: Bed alarm in place;Call light within reach        Activity Tolerance  Activity Tolerance: Patient tolerated treatment well     Transfers  Stand Step Transfers: Supervision  Sit to stand: Supervision  Stand to sit: Supervision     04/28/22 1345   Instrumental ADL's   Instrumental ADLs Yes   Light Housekeeping   Light Housekeeping Level Walker  (RW)   Light Housekeeping Level of Assistance Supervision   Light Housekeeping Stove top task, Pt demonstrated understanding of back precautions during reaching tasks. Goals  Short Term Goals  Time Frame for Short term goals: 1 week  Short Term Goal 1: MET  Short Term Goal 2: MET  Short Term Goal 3: MET  Short Term Goal 4: MET  Short Term Goal 5: MET  Additional Goals?: Yes  Short Term Goal 6: MET  Long Term Goals  Time Frame for Long term goals : 2 weeks  Long Term Goal 1: Modified independent with bathing hygiene. Long Term Goal 2: Modified independent with toileting and toilet transfers. Long Term Goal 3: Modified independent with overall dressing. Long Term Goal 4: Modified independent with ambulatory homemaking tasks. Long Term Goal 5: MET  Patient Goals   Patient goals : Return home independently.        Therapy Time   Individual Concurrent Group Co-treatment   Time In 2016         Time Out 1430         Minutes 45         Timed Code Treatment Minutes: [address] [facility], OT

## 2022-04-28 NOTE — DISCHARGE SUMMARY
Neurology Discharge Summary     Patient Identification:  Yusef Skaggs is a 66 y.o. female. :  1950  Admit Date:  2022  Discharge date : 2022   Attending Provider: Jesus Boyer MD     Account Number: [de-identified]                                   Admission Diagnoses:   S/P lumbar fusion [Z98.1]    Discharge Diagnoses:  Principal Problem:    S/P lumbar fusion  Active Problems:    COPD (chronic obstructive pulmonary disease) ([facility]    Gastroesophageal reflux disease without esophagitis    History of smoking    Weakness    Gait abnormality    Mood disorder ([facility]    Acquired hypothyroidism  Resolved Problems:    * No resolved hospital problems. *      Discharge Medications:    Current Discharge Medication List           Details   hydrocortisone 1 % cream Apply topically 2 times daily. Qty: 30 g, Refills: 0      lidocaine 4 % external patch Place 1 patch onto the skin daily  Qty: 30 patch, Refills: 0      docusate sodium (COLACE) 100 MG capsule Take 1 capsule by mouth 2 times daily  Qty: 60 capsule, Refills: 0      sucralfate (CARAFATE) 1 GM tablet Take 1 tablet by mouth 3 times daily as needed (heartburn)  Qty: 90 tablet, Refills: 0              Details   HYDROcodone-acetaminophen (NORCO)  MG per tablet Take 1 tablet by mouth every 6 hours as needed for Pain for up to 30 days. Qty: 120 tablet, Refills: 0    Comments: Reduce doses taken as pain becomes manageable  Associated Diagnoses: History of smoking; Acquired hypothyroidism; Mood disorder ([facility] Gait abnormality; Weakness; S/P lumbar fusion; Ischial bursitis of left side      ALPRAZolam (XANAX) 0.5 MG tablet Take 1 tablet by mouth 3 times daily as needed for Anxiety for up to 30 days. Qty: 90 tablet, Refills: 0    Associated Diagnoses: History of smoking; Acquired hypothyroidism; Mood disorder ([facility]  Gait abnormality; Weakness; S/P lumbar fusion; Ischial bursitis of left side      escitalopram (LEXAPRO) 10 MG tablet Take 1 tablet by mouth daily  Qty: 30 tablet, Refills: 0      promethazine (PHENERGAN) 25 MG tablet Take 1 tablet by mouth 2 times daily as needed for Nausea  Qty: 30 tablet, Refills: 0      linaclotide (LINZESS) 290 MCG CAPS capsule Take 1 capsule by mouth daily  Qty: 30 capsule, Refills: 0      nystatin (MYCOSTATIN) 667823 UNIT/ML suspension Take 5 mLs by mouth 4 times daily  Qty: 1 each, Refills: 0      tiZANidine (ZANAFLEX) 4 MG tablet Take 2 tablets by mouth every 8 hours as needed (spasms)  Qty: 90 tablet, Refills: 0      levothyroxine (SYNTHROID) 100 MCG tablet Take 1 tablet by mouth daily  Qty: 30 tablet, Refills: 0      dicyclomine (BENTYL) 10 MG capsule Take 1 capsule by mouth 3 times daily as needed (spasms)  Qty: 90 capsule, Refills: 0      pantoprazole (PROTONIX) 40 MG tablet Take 1 tablet by mouth daily  Qty: 30 tablet, Refills: 0           Current Discharge Medication List      STOP taking these medications       pregabalin (LYRICA) 75 MG capsule Comments:   Reason for Stopping:         oxyCODONE-acetaminophen (PERCOCET)  MG per tablet Comments:   Reason for Stopping:         ondansetron (ZOFRAN) 8 MG tablet Comments:   Reason for Stopping:         triamterene-hydroCHLOROthiazide (DYAZIDE) 37.5-25 MG per capsule Comments:   Reason for Stopping:         polyethylene glycol (GLYCOLAX) 17 GM/SCOOP powder Comments:   Reason for Stopping:         sucralfate (CARAFATE) 1 GM/10ML suspension Comments:   Reason for Stopping:                 Consults:   cardiology    Hospital Course: This 66 y.o. female  with history of anxiety, depression, CKD, tobacco abuse, COPD, constipation, GERD, DDD, chronic back pain, osteoporosis, dyslipidemia and neuropathy. She was seen as outpatient by orthopedic spine surgeon Dr. Jacinta Bernstein d/t increasing back pain, bilateral buttock, thigh, and leg radiculopathy left worse than right.  MRI done showed multilevel thoracolumbar degenerative disk disease severe L4-S1 and disc herniation at L5-S1 with stenosis with facet arthropathy, and neurogenic claudication. Dr. Anamaria Paul recommended a 3 stage repair. Patient was in agreement and was admitted to [facility] on 4/11/22 and underwent stage 1 anterior discectomy decompression with bilateral neural foraminotomy L5-S1, and anterior lumbar interbody fusion of L5-S1. She tolerated the procedure well. She returned to surgery on 4/13/22 for stage 2 right lateral lumbar interbody fusion L4-5 on 4/13/22. She tolerated this procedure well. She returned to surgery for the stage 3 Posterior spinal fusion L4-S1 on 4/15/22. She tolerated this procedure well. She continues to c/o pain at 8/10 being controlled with PO medications. She was participating in both PT/OT. She was felt to need a stay on Rehab to work towards her goal of returning home with assist of her family. Myra Daniels patient was admitted to inpatient rehab with improvement. Her pain medications were switched back from Percocet to Norco due to some confusion and hallucinations. Her Lyrica was held. Her confusion improved. She was seen by cardiology to some bradycardia with a heart rate down to 38. Her EKG was sinus rhythm and cardiology did not feel her medications were causing this. She will need outpatient ZIO Holter monitor and follow-up with cardiology as an outpatient. Plan DC home with home health. Discharge Instructions     Patient Instructions:   Home  Therapy orders: PT, OT and Nursing   Discharge lab work: none  Code status: Full Code   Activity: activity as tolerated  Diet: ADULT DIET;  Regular    Wound Care: as directed  Equipment: as per therapy      Rosanna Santos MD, MD    At least 35 minutes were spent in discharging the patient

## 2022-04-28 NOTE — PROGRESS NOTES
Mingo Carroll  [id number]     04/28/22 1500   Position Activity Restriction   Spinal Precautions No Bending; No Lifting; No Twisting   Subjective   Subjective pt asleep in bed. agrees to therapy    Subjective   Pain 0/10: 8. Catching feeling in back with shooting senstion down posterior LLE    Bed mobility   Supine to Sit Independent   Sit to Supine Independent   Scooting Independent   Transfers   Sit to Stand Independent   Stand to sit Independent   Ambulation   Surface level tile   Device Rolling Walker   Other Apparatus Wheelchair follow   Assistance Stand by assistance; Independent   Quality of Gait Reciprocal pattern. Incorporated turns. Gait Deviations Slow Brielle;Decreased step length;Decreased step height;Shuffles   Distance 225'   More Ambulation? Yes   Ambulation 2   Surface - 2 level tile   Device 2 Rollator   Other Apparatus 2 Wheelchair follow   Assistance 2 Modified Independent   Quality of Gait 2 same as previous   Gait Deviations Slow Brielle;Decreased step length;Decreased step height;Shuffles   Distance 225'   PT Exercises   Exercise Treatment Sitting BLE ex 2x10 with 1.5#. hip abd/add with manual resistance. LAQ's x5 d/t pain in LLE. Ankle DF/PF x20 with yellow theraband   Activity Tolerance   Activity Tolerance Patient tolerated treatment well   Assessment   Assessment pt tolerated amb with RW and rollator, performed with better gait quilty using rollator.  Pt noted slight increase in pain and reported mild fatigue upon return to room    PT Individual Minutes   Time In [facility] [address]   Time Out 1600   Minutes 45   Electronically signed by Ruby Neves PTA Student on 4/28/2022 at 4:12 PM

## 2022-04-28 NOTE — PROGRESS NOTES
Occupational Therapy  Facility/Department: [facility] 8 REHAB UNIT  Occupational Therapy Treatment Note     Name: Cecilio See  : 1950  MRN: [id number]  Date of Service: 2022    Discharge Recommendations:  Home with Home health OT          Patient Diagnosis(es): The primary encounter diagnosis was History of smoking. Diagnoses of Acquired hypothyroidism, Mood disorder ([facility] Gait abnormality, Weakness, S/P lumbar fusion, Pain management contract agreement, and Ischial bursitis of left side were also pertinent to this visit. Past Medical History:  has a past medical history of Arthritis, Bilateral low back pain without sciatica, Cancer (HCC), COPD (chronic obstructive pulmonary disease) ([facility] Hx of blood clots, Hyperlipidemia, Pain in both knees, Thyroid disease, and Urinary incontinence. Past Surgical History:  has a past surgical history that includes Hysterectomy; joint replacement; lobectomy; Cholecystectomy, laparoscopic; Skin cancer excision; and cyst incision and drainage. Treatment Diagnosis: Status post 3 stage lumbar fusion      Assessment   Performance deficits / Impairments: Decreased functional mobility ; Decreased ADL status; Decreased strength;Decreased endurance;Decreased balance;Decreased high-level IADLs  Treatment Diagnosis: Status post 3 stage lumbar fusion  Activity Tolerance  Activity Tolerance: Patient Tolerated treatment well        Plan   Plan  Times per Day: Twice a day  Current Treatment Recommendations: Strengthening,Balance training,Functional mobility training,Endurance training,Home management training     Restrictions  Restrictions/Precautions  Restrictions/Precautions: Fall Risk,Surgical Protocols  Required Braces or Orthoses?: Yes  Required Braces or Orthoses  Spinal: Lumbar Corset  Position Activity Restriction  Spinal Precautions: No Bending,No Lifting,No Twisting    Subjective   General  Chart Reviewed: Yes  Patient assessed for rehabilitation services?: Yes  Response to previous treatment: Patient with no complaints from previous session  Family / Caregiver Present: No     Social/Functional History  Social/Functional History  Lives With: Family (Daughter and sister in law)  Type of Home: House  Home Layout: Two level,Able to Live on Main level with bedroom/bathroom  Home Access: Stairs to enter with rails  Entrance Stairs - Number of Steps: 2  Entrance Stairs - Rails: None  Bathroom Shower/Tub: Tub/Shower unit  Home Equipment: Rollator,Walker, rolling  ADL Assistance: Independent  Homemaking Assistance: Independent  Homemaking Responsibilities: Yes  Ambulation Assistance: Independent  Transfer Assistance: Independent  Active : Yes  Occupation: Retired  Type of Occupation: Nursing assistant  Leisure & Hobbies: Reading bible, puzzles, being outdoors  IADL Comments: splits homemaking tasks with sister in law and daughter       Objective   Pulse: (!) 55  Heart Rate Source: Monitor  BP: 92/77  BP Location: Left upper arm  Patient Position: Supine  MAP (Calculated): 82  Resp: 20  SpO2: 93 %  O2 Device: None (Room air)             Safety Devices  Type of Devices: Left in chair;Chair alarm in place;Call light within reach                 Activity Tolerance  Activity Tolerance: Patient tolerated treatment well     Transfers  Stand Step Transfers: Supervision  Sit to stand: Supervision  Stand to sit: Supervision          OutComes Score    Eating  Assistance Needed: Independent  CARE Score: 6  Discharge Goal: Independent    Oral Hygiene  Assistance Needed: Independent  CARE Score: 6  Discharge Goal: [facility] needed: Supervision or touching assistance  Comment: Supervision  CARE Score: 4  Discharge Goal: Independent     Shower/Bathe Self  Assistance Needed: Supervision or touching assistance  Comment: Shower, LH sponge  CARE Score: 4  Discharge Goal: Independent     Upper Body Dressing  Assistance Needed: Setup or clean-up assistance  CARE Score: 5  Discharge Goal: Independent     Lower Body Dressing  Assistance Needed: Supervision or touching assistance  Comment: Supervision with AE PRN  CARE Score: 4  Discharge Goal: Independent     Putting On/Taking Off Footwear  Assistance Needed: Supervision or touching assistance  Comment: with sock aide  CARE Score: 4  Discharge Goal: Independent     Toilet Transfer  Assistance needed: Supervision or touching assistance  Comment: Supervision  CARE Score: 4  Discharge Goal: Independent     Picking Up Object  Assistance Needed: Supervision or touching assistance  Comment: with reacher  Reason if not Attempted: Not attempted due to medical condition or safety concerns  CARE Score: 4  Discharge Goal: Independent                                                                 Goals  Short Term Goals  Time Frame for Short term goals: 1 week  Short Term Goal 1: MET  Short Term Goal 2: MET  Short Term Goal 3: MET  Short Term Goal 4: MET  Short Term Goal 5: MET  Additional Goals?: Yes  Short Term Goal 6: Patient will complete 1-2 handed static standing task for 5 minutes, requiring Supervision. Long Term Goals  Time Frame for Long term goals : 2 weeks  Long Term Goal 1: Modified independent with bathing hygiene. Long Term Goal 2: Modified independent with toileting and toilet transfers. Long Term Goal 3: Modified independent with overall dressing. Long Term Goal 4: Modified independent with ambulatory homemaking tasks. Long Term Goal 5: MET  Patient Goals   Patient goals : Return home independently.        Therapy Time   Individual Concurrent Group Co-treatment   Time In 1100         Time Out 1200         Minutes 60         Timed Code Treatment Minutes: 75 Miller Mayen OT

## 2022-04-29 PROCEDURE — 6370000000 HC RX 637 (ALT 250 FOR IP): Performed by: PSYCHIATRY & NEUROLOGY

## 2022-04-29 PROCEDURE — 93246 EXT ECG>7D<15D RECORDING: CPT

## 2022-04-29 PROCEDURE — 99239 HOSP IP/OBS DSCHRG MGMT >30: CPT | Performed by: PSYCHIATRY & NEUROLOGY

## 2022-04-29 RX ADMIN — HYDROCODONE BITARTRATE AND ACETAMINOPHEN 1 TABLET: 10; 325 TABLET ORAL at 08:04

## 2022-04-29 RX ADMIN — DOCUSATE SODIUM 100 MG: 100 CAPSULE, LIQUID FILLED ORAL at 08:05

## 2022-04-29 RX ADMIN — LEVOTHYROXINE SODIUM 100 MCG: 100 TABLET ORAL at 08:05

## 2022-04-29 RX ADMIN — TIZANIDINE 8 MG: 4 TABLET ORAL at 08:03

## 2022-04-29 RX ADMIN — NYSTATIN 500000 UNITS: 100000 SUSPENSION ORAL at 08:05

## 2022-04-29 RX ADMIN — TIZANIDINE 8 MG: 4 TABLET ORAL at 04:00

## 2022-04-29 RX ADMIN — HYDROCODONE BITARTRATE AND ACETAMINOPHEN 1 TABLET: 10; 325 TABLET ORAL at 04:00

## 2022-04-29 RX ADMIN — ESCITALOPRAM OXALATE 10 MG: 10 TABLET ORAL at 08:05

## 2022-04-29 RX ADMIN — LINACLOTIDE 290 MCG: 145 CAPSULE, GELATIN COATED ORAL at 08:05

## 2022-04-29 RX ADMIN — PANTOPRAZOLE SODIUM 40 MG: 40 TABLET, DELAYED RELEASE ORAL at 08:05

## 2022-04-29 ASSESSMENT — PAIN DESCRIPTION - DESCRIPTORS
DESCRIPTORS: ACHING
DESCRIPTORS: ACHING

## 2022-04-29 ASSESSMENT — PAIN SCALES - GENERAL
PAINLEVEL_OUTOF10: 9
PAINLEVEL_OUTOF10: 10

## 2022-04-29 ASSESSMENT — PAIN DESCRIPTION - ORIENTATION
ORIENTATION: LEFT
ORIENTATION: LEFT

## 2022-04-29 ASSESSMENT — PAIN - FUNCTIONAL ASSESSMENT: PAIN_FUNCTIONAL_ASSESSMENT: ACTIVITIES ARE NOT PREVENTED

## 2022-04-29 ASSESSMENT — PAIN DESCRIPTION - LOCATION
LOCATION: LEG
LOCATION: LEG

## 2022-04-29 NOTE — PROGRESS NOTES
Patient:   Louisa Javier  MR#:    [id number]   Room:    [facility]/842-   YOB: 1950  Date of Progress Note: 4/29/2022  Time of Note                           7:27 AM  Consulting Physician:   Nayeli Hernandez M.D. Attending Physician:  Nayeli Hernandez MD     Chief complaint Status post 3 stage lumbar fusion    S:This 66 y.o. female  with history of anxiety, depression, CKD, tobacco abuse, COPD, constipation, GERD, DDD, chronic back pain, osteoporosis, dyslipidemia and neuropathy. She was seen as outpatient by orthopedic spine surgeon Dr. Jamir Ventura d/t increasing back pain, bilateral buttock, thigh, and leg radiculopathy left worse than right. MRI done showed multilevel thoracolumbar degenerative disk disease severe L4-S1 and disc herniation at L5-S1 with stenosis with facet arthropathy, and neurogenic claudication. Dr. Jamir Ventura recommended a 3 stage repair. Patient was in agreement and was admitted to [facility] on 4/11/22 and underwent stage 1 anterior discectomy decompression with bilateral neural foraminotomy L5-S1, and anterior lumbar interbody fusion of L5-S1. She tolerated the procedure well. She returned to surgery on 4/13/22 for stage 2 right lateral lumbar interbody fusion L4-5 on 4/13/22. She tolerated this procedure well. She returned to surgery for the stage 3 Posterior spinal fusion L4-S1 on 4/15/22. She tolerated this procedure well. She continues to c/o pain at 8/10 being controlled with PO medications. She is participating in both PT/OT. She is felt to need a stay on Rehab to work towards her goal of returning home with assist of her family. She is now felt ready to start the Rehab program.   Feels well today. Feels well today.     REVIEW OF SYSTEMS:  Constitutional: No fevers No chills  Neck:No stiffness  Respiratory: No shortness of breath  Cardiovascular: No chest pain No palpitations  Gastrointestinal: No abdominal pain    Genitourinary: No Dysuria  Neurological: No headache, no confusion    Past Medical History:      Diagnosis Date    Arthritis     Bilateral low back pain without sciatica 12/22/2015    [facility] ([facility]     [facility] ([facility] 4/20/2022    Hx of blood clots     Hyperlipidemia     Pain in both knees 12/22/2015    Thyroid disease     Urinary incontinence        Past Surgical History:      Procedure Laterality Date    CHOLECYSTECTOMY, LAPAROSCOPIC      CYST INCISION AND DRAINAGE      left Cheek    HYSTERECTOMY      JOINT REPLACEMENT      LOBECTOMY      RIGHT PARTIAL    SKIN CANCER EXCISION         Medications in Hospital:      Current Facility-Administered Medications:     HYDROcodone-acetaminophen (NORCO)  MG per tablet 1 tablet, 1 tablet, Oral, Q4H PRN, Ximena Sotelo MD, 1 tablet at 04/29/22 0400    lidocaine 4 % external patch 1 patch, 1 patch, TransDERmal, Daily, Ximena Sotelo MD, 1 patch at 04/28/22 0849    [Held by provider] pregabalin (LYRICA) capsule 100 mg, 100 mg, Oral, Q12H, Ximena Sotelo MD, 100 mg at 04/26/22 0410    tiZANidine (ZANAFLEX) tablet 8 mg, 8 mg, Oral, Q4H PRN, Ximena Sotelo MD, 8 mg at 04/29/22 0400    docusate sodium (COLACE) capsule 100 mg, 100 mg, Oral, BID, Ximena Sotelo MD, 100 mg at 04/28/22 1918    hydrocortisone 1 % cream, , Topical, BID, Ximena Sotelo MD, Given at 04/27/22 2116    ALPRAZolam (XANAX) tablet 0.5 mg, 0.5 mg, Oral, TID PRN, Ximena Sotelo MD, 0.5 mg at 04/28/22 2121    dicyclomine (BENTYL) capsule 10 mg, 10 mg, Oral, TID PRN, Ximena Sotelo MD    escitalopram (LEXAPRO) tablet 10 mg, 10 mg, Oral, Daily, Ximena Sotelo MD, 10 mg at 04/28/22 0851    levothyroxine (SYNTHROID) tablet 100 mcg, 100 mcg, Oral, Daily, Ximena Sotelo MD, 100 mcg at 04/28/22 0851    linaclotide (LINZESS) capsule 290 mcg, 290 mcg, Oral, Daily, Ximena Sotelo MD, 290 mcg at 04/28/22 0850    nystatin (MYCOSTATIN) 512827 UNIT/ML suspension 500,000 Units, 500,000 Units, Oral, 4x Daily, César MD Charles, 500,000 Units at 04/25/22 1228    pantoprazole (PROTONIX) tablet 40 mg, 40 mg, Oral, Daily, Rashard Duenas MD, 40 mg at 04/28/22 0850    promethazine (PHENERGAN) tablet 25 mg, 25 mg, Oral, BID PRN, Rashard Duenas MD    sucralfate (CARAFATE) tablet 1 g, 1 g, Oral, TID PRN, Rashard Duenas MD    acetaminophen (TYLENOL) tablet 650 mg, 650 mg, Oral, Q4H PRN, Rashard Duenas MD, 650 mg at 04/20/22 1111    polyethylene glycol (GLYCOLAX) packet 17 g, 17 g, Oral, Daily PRN, Rashard Duenas MD, 17 g at 04/25/22 1716    Allergies:  Sulfamethoxazole-trimethoprim, Lyrica [pregabalin], Codeine, Erythromycin, Pcn [penicillins], Talwin [pentazocine], and Sulfa antibiotics    Social History:   TOBACCO:   reports that she has been smoking cigarettes. She has a 15.00 pack-year smoking history. She has never used smokeless tobacco.  ETOH:   reports no history of alcohol use. Family History:       Problem Relation Age of Onset   [place] Cancer Mother     Hypertension Mother     Heart Attack Father          PHYSICAL EXAM:  BP (!) 163/74   Pulse (!) 48   Temp 98.5 °F (36.9 °C) (Temporal)   Resp 20   Ht 5' 5\" (1.651 m)   Wt 153 lb (69.4 kg)   SpO2 96%   BMI 25.46 kg/m²     Constitutional - well developed, well nourished. Eyes - conjunctiva normal.   Ear, nose, throat - No scars, masses, or lesions over external nose or ears, no atrophy of tongue  Neck-symmetric, no masses noted, no jugular vein distension  Respiration- chest wall appears symmetric, good expansion,   normal effort without use of accessory muscles  Musculoskeletal - no significant wasting of muscles noted, no bony deformities  Extremities-no clubbing, cyanosis or edema  Skin - warm, dry, and intact. No rash, erythema, or pallor.   Psychiatric - mood, affect, and behavior appear normal.      Neurological exam  Awake, alert, fluent oriented appropriate affect  Attention and concentration appear appropriate  Recent and remote memory appears unremarkable  Speech normal without dysarthria  No clear issues with language of fund of knowledge     Cranial Nerve Exam     CN III, IV,VI-EOMI, No nystagmus, conjugate eye movements, no ptosis    CN VII-no facial assymetry       Motor Exam  antigravity throughout upper and lower extremities bilaterally      Tremors-mild postural tremor in the hands noted     Gait  Not tested     Nursing/pcp notes, imaging,labs and vitals reviewed. PT,OT and/or speech notes reviewed    Lab Results   Component Value Date    WBC 4.6 (L) 04/28/2022    HGB 10.1 (L) 04/28/2022    HCT 32.5 (L) 04/28/2022    MCV 93.9 04/28/2022     04/28/2022     Lab Results   Component Value Date     04/28/2022    K 4.5 04/28/2022     04/28/2022    CO2 24 04/28/2022    BUN 8 04/28/2022    CREATININE 0.8 04/28/2022    GLUCOSE 110 (H) 04/28/2022    CALCIUM 8.9 04/28/2022    PROT 5.8 (L) 04/28/2022    LABALBU 3.5 04/28/2022    BILITOT 0.3 04/28/2022    ALKPHOS 98 04/28/2022    AST 10 04/28/2022    ALT <5 (A) 04/28/2022    LABGLOM >60 04/28/2022    GFRAA >59 04/28/2022     Lab Results   Component Value Date    INR 1.09 04/19/2022    PROTIME 14.0 04/19/2022     Ehlam Acevedo PTA   Physical Therapist Assistant   Physical Therapy   Progress Notes       Signed   Date of Service:  4/26/2022  2:23 PM                 Signed                Show:Clear all  [x]Manual[x]Template[]Copied    Added by:  [x]Sylvia Saleem PTA      []Mayco for details    Physical Therapy  Name: Cecilio See  MRN:  [id number]  Date of service:  4/26/2022 04/26/22 1345   Restrictions/Precautions   Restrictions/Precautions Fall Risk;Surgical Protocols   Required Braces or Orthoses   Spinal Lumbar Corset   Position Activity Restriction   Spinal Precautions No Bending; No Lifting; No Twisting   General Comment   Comments pt getting IV fluids and cont to report not feeling well due to BP issues   Subjective   Subjective Pt awake, but not feeling her best.  Would like to get up to [facility] as she states she has been putting off toileting   Vitals   Pulse (!) 42   BP (!) 111/37   BP Location Left upper arm   BP Method Automatic   Patient Position High wlers   MAP (Calculated) 61.67   Bed mobility   Rolling to Right Stand by assistance  (rails; hob elevated)   Supine to Sit Stand by assistance  (to R side with rail)   Sit to Supine Stand by assistance   Scooting Supervision   Transfers   Sit to Stand Contact guard assistance;Stand by assistance   Stand to sit Contact guard assistance;Stand by assistance   Bed to Chair Contact guard assistance  (1+1 assist due to BP stand pivot)   Ambulation   WB Status WBAT   Assessment   Assessment pt doing well with bed mobility; able to move herself up towards hob with UE/LE's; needed assist to rikki/doff back brace as she has difficulty with velcro straps (especially with doffing); pt denies worsened symptoms with transfer though BP monitor showed decrease   Safety Devices   Type of Devices Bed alarm in place;Call light within reach; Left in bed         Electronically signed by Jeison Deng PTA on 4/26/2022 at 2:24 PM               Cosigned by: Mo Aparicio, PT at 4/26/2022  3:59 PM       Revision History                          EKG 12 Lead [[id number]]    Collected: 04/27/22 0911    Updated: 04/28/22 0831     P-R Interval 134 ms    QRS Duration 92 ms    Q-T Interval 478 ms    QTc Calculation (Bazett) 470 ms    P Axis 75 degrees    T Axis 60 degrees   Narrative:     55 BPM   Sinus rhythm   Prolonged QT interval   Lateral ST-T abnormality is nonspecific   Comparison Summary: No serial comparison made   Summary: Borderline ECG   [email or web address]   [email or web address]         RECORD REVIEW: Previous medical records, medications were reviewed at today's visit    IMPRESSION:   1. Status post 3 stage lumbar fusion-pain control/mobilization  2.   Mood disorder-Lexapro/Xanax as needed  3. Hypothyroidism-Synthroid  4. GI/GERD- bowel regimen/PPI  5. Back pain-Lyrica//Zanaflex as needed-change Percocet to Norco for issues of confusion  6. COPD/history of smoking-monitor  7. PT/OT  8.   Essential tremor-stable monitor    Sinus bradycardia- on medication related-outpatient cardiology follow-up    x-ray of the lumbar spine-no acute changes    Add Lidoderm patch/heating pad  Lyrica held due to confusion  Appreciate cardiology input regarding bradycardia- outpatient follow-up with Zio Holter monitor on discharge    Continue supportive care    DC home     Expected duration and frequency therapy: 180 minutes per day, 5 days per week    CALL WITH ANY QUESTIONS  [phone] CELL  Dr Zandra Mata

## 2022-04-29 NOTE — DISCHARGE SUMMARY
Occupational Therapy Discharge Summary         Date: 2022  Patient Name: Digna Conrad        MRN: [id number]    : 1950  (66 y.o.)  Gender: female      Diagnosis: Post 3 Stage Lumbar Fusion L4-S1  Restrictions/Precautions  Restrictions/Precautions: Fall Risk,Surgical Protocols  Required Braces or Orthoses?: Yes      Discharge Date: 2022    UE Functioning:  BUE AROM WNL     Home Management:  Functional Mobility  Functional - Mobility Device: 4-Wheeled Walker  Activity: To/From therapy gym,Retrieve items,Transport items  Assist Level: Supervision  Functional Mobility Comments: Used RW and rollator, because she has both at home. Adaptive Equipment/DME Status:  Home Equipment: Rollator,Walker, rolling  Bedside commode     Pain Assessment:   5-8/10 pain in back        Remaining Problems:  Decreased functional mobility ; Decreased strength; Decreased balance; Decreased high-level IADLs    STGs:  Short Term Goals  Time Frame for Short term goals: 1 week  Short Term Goal 1: Supervision with bathing hygiene. Short Term Goal 2: Supervision with clothing management/hygiene for toileting. Short Term Goal 3: Supervision with toilet transfers. Short Term Goal 4: Supervision LB dressing, AE PRN. Short Term Goal 5: Supervision with donning/doffing socks and shoes, AE PRN. Additional Goals?: Yes  Short Term Goal 6: Patient will complete 1-2 handed static standing task for 5 minutes, requiring Supervision. MET 6/6 Short Term Goals    LTGs:  Long Term Goals  Time Frame for Long term goals : 2 weeks  Long Term Goal 1: Modified independent with bathing hygiene. Long Term Goal 2: Modified independent with toileting and toilet transfers. Long Term Goal 3: Modified independent with overall dressing. Long Term Goal 4: Modified independent with ambulatory homemaking tasks. Long Term Goal 5: Patient verbalize DME needs.   MET 1/5 Long Term Goals    Discharge Setting and Recommendations:  Home with Home Health OT     Discharge Care Scores  Eating: CARE Score: 6  Oral Hygiene: CARE Score: 6  Toileting: CARE Score: 4  Shower/Bathe: CARE Score: 4  Upper Body Dressing: CARE Score: 5  Lower Body Dressing: CARE Score: 4  Footwear: CARE Score: 4  Toilet Transfers: CARE Score: 4  Picking Up Object:  CARE Score: 4    Electronically signed by Marco Erickson OT on 4/29/22 at 12:05 PM CDT

## 2022-04-29 NOTE — CARE COORDINATION
Ms. Cherelle Deluca is discharged home- into car with daughter to go to follow up appointment with Dr. Tessa CLOUD, then home. Referral made to [facility] for continued therapy.

## 2022-04-30 VITALS
HEIGHT: 65 IN | TEMPERATURE: 98.5 F | OXYGEN SATURATION: 96 % | RESPIRATION RATE: 20 BRPM | BODY MASS INDEX: 25.49 KG/M2 | DIASTOLIC BLOOD PRESSURE: 74 MMHG | SYSTOLIC BLOOD PRESSURE: 163 MMHG | WEIGHT: 153 LBS | HEART RATE: 48 BPM

## 2022-04-30 NOTE — DISCHARGE SUMMARY
Physical Therapy DISCHARGE Note  DATE:  2022  NAME:  Roshni Marshall  :  1950  (71 y.o.,female)  MRN:  [id number]    HEIGHT:  Height: 5' 5\" (165.1 cm)  WEIGHT:  Weight: 153 lb (69.4 kg)    PATIENT DIAGNOSIS(ES):    Diagnosis: Post 3 Stage Lumbar Fusion L4-S1    Additional Pertinent Hx: Anxiety, Depression, CKD, Tobacco Abuse, COPD, Constipation, GERD, DDD, Chronic Back Pain, Osteoporosis, Dyslipidemia, Neuropathy  RESTRICTIONS/PRECAUTIONS:    Restrictions/Precautions  Restrictions/Precautions: Fall Risk,Surgical Protocols  Required Braces or Orthoses?: Yes  Position Activity Restriction  Spinal Precautions: No Bending,No Lifting,No Twisting  OVERALL  ORIENTATION STATUS:  Overall Orientation Status: Within Normal Limits  PAIN:  Pain Level: 10  Pain Type: Chronic pain,Surgical pain    Pain Location: Leg     Pain Orientation: Left      GROSS ASSESSMENT        POSTURE/BALANCE  Balance  Posture: Good  Sitting - Static: Good  Sitting - Dynamic: Good  Standing - Static: Fair,+  Standing - Dynamic: Fair,+       ACTIVITY TOLERANCE  Activity Tolerance  Activity Tolerance: Patient tolerated treatment well      BED MOBILITY  Bed mobility  Rolling to Left: Independent  Rolling to Right: Independent  Supine to Sit: Independent  Sit to Supine: Independent  Scooting: Independent  Comment: All performed on flat mat table. TRANSFERS  Transfers  Sit to Stand: Independent  Stand to sit:  Independent  Bed to Chair: Contact guard assistance (1+1 assist due to BP stand pivot)  Stand Pivot Transfers: Contact guard assistance  Car Transfer: Contact guard assistance,Stand by assistance (performed x2)  Comment: toileting performed with supervision.  (pt amb 5' to and from bathroom door and toilet CGA)       WHEELCHAIR PROPULSION 1  Propulsion 1  Propulsion: Manual  Level: Level Tile  Method: CARMENCITA GLORIA  Level of Assistance: Independently  Description/ Details: very slow to complete, makes very small movements with hands to propel w/c but occassionally makes bigger strides  Distance: 150 FT  *independent wheelchair mobility on unit  WHEELCHAIR PROPULSION 2     AMBULATION 1  Ambulation  Surface: level tile  Device: Rolling Walker  Other Apparatus: Wheelchair follow  Assistance: Stand by assistance,Independent  Quality of Gait: Reciprocal pattern. Incorporated turns. Gait Deviations: Slow Brielle,Decreased step length,Decreased step height,Shuffles  Distance: 225'  Comments: Tends to pick walker up when making turns. More Ambulation?: Yes  AMBULATION 2  Ambulation 2  Surface - 2: level tile  Device 2: Rollator  Other Apparatus 2: Wheelchair follow  Assistance 2: Modified Independent  Quality of Gait 2: same as previous  Gait Deviations: Slow Brielle,Decreased step length,Decreased step height,Shuffles  Distance: 225'  Comments: Much smoother gait pattern using Rollator, sheba during turns. STAIRS  Stairs  # Steps : 12 (stairs climbed x3. CGA assist x2. family assist x1)  Stairs Height: 4\"  Rails: Left ascending  Curbs: 4\" (x3)  Device: Rolling walker  Assistance: Stand by assistance,Contact guard assistance  Comment: Cues for technique leading with RLE ascending curb/stairs, leading with LLE decsending curb/stairs. cues to keep RW closer during ascension/descension of curb. pt can become distracted leading to loss of correct technique    GOALS:  Short Term Goals  Time Frame for Short term goals: 1 Week  Short term goal 1: Supervision Bed Mobility   Short term goal 2: Supervision With Transfers from Surface to Surface   Short term goal 3: Supervision With Amb 100FT With AD   Short term goal 4: Supervision WC Propulsion 250FT  Short term goal 5: CGA Climbing 5 Stairs With Handrail    Long Term Goals  Time Frame for Long term goals : 2 Weeks  Long term goal 1: Independent Bed Mobility   Long term goal 2: Independent With Transfers From Surface to Surface   Long term goal 3:  Independent With Amb 250FT With AD   Long term goal 4: Independent With WC Propulsion 500FT  Long term goal 5: Independent With Climbing 5 Stairs With Handrail  HOME LIVING:     Type of Home: House  Home Layout: Two level,Able to Live on Main level with bedroom/bathroom  Home Access: Stairs to enter with rails  Entrance Stairs - Number of Steps: 2  Entrance Stairs - Rails: None  ASSESSMENT (IMPAIRMENTS/BARRIERS): Body Structures, Functions, Activity Limitations Requiring Skilled Therapeutic Intervention: Decreased functional mobility ,Decreased tolerance to work activity,Decreased strength,Decreased endurance,Decreased balance,Increased pain,Decreased posture  Assessment: pt tolerated amb with RW and rollator, performed with better gait quilty using rollator. Pt noted slight increase in pain and reported mild fatigue upon return to room   Activity Tolerance: Patient tolerated treatment well     PLAN:  Plan  Plan: 5-7 times per week  Plan weeks: 2 Weeks  Current Treatment Recommendations: Strengthening,ROM,Balance training,Functional mobility training,Transfer training,Endurance training,Wheelchair mobility training,Gait training,Stair training,Pain management,Home exercise program,Safety education & training,Patient/Caregiver education & training,Equipment evaluation, education, & procurement,Therapeutic activities,Return to work related activity  Discharge Recommendations: Home with Werner Peraza [address] REASONABLY EXPECTED TO ACTIVELY PARTICIPATE IN AT LEAST 3 HOURS OF INTENSIVE THERAPY PER DAY AT LEAST 5 DAYS PER WEEK, AND BE EXPECTED TO MAKE MEASURABLE IMPROVEMENT THAT WILL BE OF PRACTICAL VALUE TO IMPROVE THE PATIENT'S FUNCTIONAL CAPACITY OR ADAPTATION TO IMPAIRMENTS.    PATIENT GOAL FOR REHAB:  RETURN TO PRIOR LEVEL OF FUNCTION       IRF/AYDEE  Roll Left and Right  Assistance Needed: Independent  CARE Score: 6  Discharge Goal: Independent    Sit to Lying  Assistance Needed: Independent  CARE Score: 6  Discharge Goal: Independent    Lying to Sitting on Side of Bed  Assistance Needed: Independent  CARE Score: 6  Discharge Goal: Independent    Sit to Stand  Assistance Needed: Independent  CARE Score: 6  Discharge Goal: Independent    Chair/Bed-to-Chair Transfer  Assistance Needed: Supervision or touching assistance  CARE Score: 4  Discharge Goal: Independent    Car Transfer  Assistance Needed: Supervision or touching assistance  Reason if not Attempted: Not attempted due to medical condition or safety concerns  CARE Score: 4  Discharge Goal: Independent    Walk 10 Feet  Assistance Needed: Supervision or touching assistance  CARE Score: 4  Discharge Goal: Independent    Walk 50 Feet with Two Turns  Assistance Needed: Supervision or touching assistance  Reason if not Attempted: Not attempted due to medical condition or safety concerns  CARE Score: 4  Discharge Goal: Independent    Walk 150 Feet  Assistance Needed: Supervision or touching assistance  Reason if not Attempted: Not attempted due to medical condition or safety concerns  CARE Score: 4  Discharge Goal: Independent    Walking 10 Feet on Uneven Surfaces  Reason if not Attempted: Not attempted due to medical condition or safety concerns  CARE Score: 88  Discharge Goal: Independent    1 Step (Curb)  Assistance Needed: Supervision or touching assistance  Reason if not Attempted: Not attempted due to medical condition or safety concerns  CARE Score: 4  Discharge Goal: Independent    4 Steps  Assistance Needed: Supervision or touching assistance  Reason if not Attempted: Not attempted due to medical condition or safety concerns  CARE Score: 4  Discharge Goal: Independent    12 Steps  Assistance Needed: Supervision or touching assistance  Reason if not Attempted: Not attempted due to medical condition or safety concerns  CARE Score: 4  Discharge Goal: Independent    Wheel 50 Feet with Two Turns  Assistance Needed: Independent  CARE Score: 6  Discharge Goal: Independent    Wheel 150 Feet  Assistance Needed: Independent  CARE Score: 6  Discharge Goal: Independent      LAST TREATMENT TIME  PT Individual Minutes  Time In: 7794  Time Out: 1600  Minutes: 39

## 2022-05-03 ENCOUNTER — HOSPITAL ENCOUNTER (EMERGENCY)
Age: 72
Discharge: HOME | End: 2022-05-03
Payer: MEDICARE

## 2022-05-03 VITALS
TEMPERATURE: 98.9 F | OXYGEN SATURATION: 95 % | SYSTOLIC BLOOD PRESSURE: 121 MMHG | HEART RATE: 73 BPM | BODY MASS INDEX: 21.97 KG/M2 | DIASTOLIC BLOOD PRESSURE: 52 MMHG | WEIGHT: 132 LBS | RESPIRATION RATE: 18 BRPM

## 2022-05-03 DIAGNOSIS — I95.0 IDIOPATHIC HYPOTENSION: Primary | ICD-10-CM

## 2022-05-03 LAB
ALBUMIN SERPL-MCNC: 3.8 G/DL (ref 3.5–5.2)
ALP BLD-CCNC: 118 U/L (ref 35–104)
ALT SERPL-CCNC: <5 U/L (ref 5–33)
ANION GAP SERPL CALCULATED.3IONS-SCNC: 13 MMOL/L (ref 7–19)
AST SERPL-CCNC: 13 U/L (ref 5–32)
BACTERIA: NEGATIVE /HPF
BASOPHILS ABSOLUTE: 0 K/UL (ref 0–0.2)
BASOPHILS RELATIVE PERCENT: 0.5 % (ref 0–1)
BILIRUB SERPL-MCNC: 0.4 MG/DL (ref 0.2–1.2)
BILIRUBIN URINE: NEGATIVE
BLOOD, URINE: NEGATIVE
BUN BLDV-MCNC: 15 MG/DL (ref 8–23)
CALCIUM SERPL-MCNC: 9 MG/DL (ref 8.8–10.2)
CHLORIDE BLD-SCNC: 104 MMOL/L (ref 98–111)
CLARITY: CLEAR
CO2: 23 MMOL/L (ref 22–29)
COLOR: ABNORMAL
CREAT SERPL-MCNC: 1 MG/DL (ref 0.5–0.9)
CRYSTALS, UA: ABNORMAL /HPF
EOSINOPHILS ABSOLUTE: 0.3 K/UL (ref 0–0.6)
EOSINOPHILS RELATIVE PERCENT: 3.6 % (ref 0–5)
EPITHELIAL CELLS, UA: 1 /HPF (ref 0–5)
GFR AFRICAN AMERICAN: >59
GFR NON-AFRICAN AMERICAN: 54
GLUCOSE BLD-MCNC: 102 MG/DL (ref 74–109)
GLUCOSE URINE: NEGATIVE MG/DL
HCT VFR BLD CALC: 38.3 % (ref 37–47)
HEMOGLOBIN: 11.7 G/DL (ref 12–16)
HYALINE CASTS: 4 /HPF (ref 0–8)
IMMATURE GRANULOCYTES #: 0 K/UL
KETONES, URINE: ABNORMAL MG/DL
LEUKOCYTE ESTERASE, URINE: ABNORMAL
LYMPHOCYTES ABSOLUTE: 1.8 K/UL (ref 1.1–4.5)
LYMPHOCYTES RELATIVE PERCENT: 23.4 % (ref 20–40)
MCH RBC QN AUTO: 28.7 PG (ref 27–31)
MCHC RBC AUTO-ENTMCNC: 30.5 G/DL (ref 33–37)
MCV RBC AUTO: 93.9 FL (ref 81–99)
MONOCYTES ABSOLUTE: 0.6 K/UL (ref 0–0.9)
MONOCYTES RELATIVE PERCENT: 8.4 % (ref 0–10)
NEUTROPHILS ABSOLUTE: 4.8 K/UL (ref 1.5–7.5)
NEUTROPHILS RELATIVE PERCENT: 63.8 % (ref 50–65)
NITRITE, URINE: NEGATIVE
PDW BLD-RTO: 13.7 % (ref 11.5–14.5)
PH UA: 6 (ref 5–8)
PLATELET # BLD: 347 K/UL (ref 130–400)
PMV BLD AUTO: 11.4 FL (ref 9.4–12.3)
POTASSIUM REFLEX MAGNESIUM: 3.6 MMOL/L (ref 3.5–5)
PRO-BNP: 422 PG/ML (ref 0–900)
PROTEIN UA: 100 MG/DL
RBC # BLD: 4.08 M/UL (ref 4.2–5.4)
RBC UA: 3 /HPF (ref 0–4)
SODIUM BLD-SCNC: 140 MMOL/L (ref 136–145)
SPECIFIC GRAVITY UA: 1.03 (ref 1–1.03)
TOTAL PROTEIN: 6.8 G/DL (ref 6.6–8.7)
TROPONIN: <0.01 NG/ML (ref 0–0.03)
UROBILINOGEN, URINE: 1 E.U./DL
WBC # BLD: 7.5 K/UL (ref 4.8–10.8)
WBC UA: 2 /HPF (ref 0–5)

## 2022-05-03 PROCEDURE — 83880 ASSAY OF NATRIURETIC PEPTIDE: CPT

## 2022-05-03 PROCEDURE — 2580000003 HC RX 258: Performed by: PHYSICIAN ASSISTANT

## 2022-05-03 PROCEDURE — 84484 ASSAY OF TROPONIN QUANT: CPT

## 2022-05-03 PROCEDURE — 36415 COLL VENOUS BLD VENIPUNCTURE: CPT

## 2022-05-03 PROCEDURE — 93005 ELECTROCARDIOGRAM TRACING: CPT | Performed by: PHYSICIAN ASSISTANT

## 2022-05-03 PROCEDURE — 85025 COMPLETE CBC W/AUTO DIFF WBC: CPT

## 2022-05-03 PROCEDURE — 99284 EMERGENCY DEPT VISIT MOD MDM: CPT

## 2022-05-03 PROCEDURE — 81001 URINALYSIS AUTO W/SCOPE: CPT

## 2022-05-03 PROCEDURE — 80053 COMPREHEN METABOLIC PANEL: CPT

## 2022-05-03 RX ORDER — 0.9 % SODIUM CHLORIDE 0.9 %
500 INTRAVENOUS SOLUTION INTRAVENOUS ONCE
Status: COMPLETED | OUTPATIENT
Start: 2022-05-03 | End: 2022-05-03

## 2022-05-03 RX ADMIN — SODIUM CHLORIDE 500 ML: 9 INJECTION, SOLUTION INTRAVENOUS at 17:30

## 2022-05-03 NOTE — ED NOTES
Orthostatic vitals  Lying- HR- 78 B/P 163/5  Sitting- HR- 87 B/P-144/61  Standing- HR-84 B/P-165/86     Haley Lockwood RN  05/03/22 4359

## 2022-05-03 NOTE — ED PROVIDER NOTES
[facility] EMERGENCY DEPT  eMERGENCYdEPARTMENT eNCOUnter      Pt Name: Roshni Marshall  MRN: [id number]  [place] 1950  Date of evaluation: 5/3/2022  Provider:PEDRO LUIS Haynes    CHIEF COMPLAINT       Chief Complaint   Patient presents with    Hypotension         HISTORY OF PRESENT ILLNESS  (Location/Symptom, Timing/Onset, Context/Setting, Quality, Duration, Modifying Factors, Severity.)   Roshni Marshall is a 66 y.o. female who presents to the emergency department with complaints of recent back surgery fusion lumbar with Dr. Homero Marion 2 weeks ago she had an episode of hypotension bilateral arms taken by home health nurse today it was 60/40 according to the daughter she was told to come here as she had surgery here assumed to get fluids possible admission she denies any acute symptoms to me aside from the baseline pain she is very displeased with the outcome of the procedure but otherwise she is ambulatory denies any lightheadedness no chest pain no shortness of breath no fatigue. Afebrile normal vitals here. HPI    Nursing Notes were reviewed and I agree. REVIEW OF SYSTEMS    (2-9 systems for level 4, 10 or more for level 5)     Review of Systems   Constitutional: Negative for activity change, appetite change, chills and fever. HENT: Negative for congestion, postnasal drip, rhinorrhea and sore throat. Eyes: Negative for photophobia, pain, discharge and visual disturbance. Respiratory: Negative for apnea, cough and shortness of breath. Cardiovascular: Negative for chest pain and leg swelling. Gastrointestinal: Negative for abdominal distention, abdominal pain and nausea. Genitourinary: Negative for vaginal bleeding. Musculoskeletal: Negative for arthralgias, back pain, joint swelling, neck pain and neck stiffness. Skin: Negative for color change and rash. Neurological: Negative for dizziness, syncope, facial asymmetry and headaches. Hematological: Negative for adenopathy.  Does not bruise/bleed easily. Psychiatric/Behavioral: Negative for agitation, behavioral problems and confusion. Except as noted above the remainder of the review of systems was reviewed and negative. PAST MEDICAL HISTORY     Past Medical History:   Diagnosis Date    Arthritis     Bilateral low back pain without sciatica 12/22/2015    [facility] [address]     COPD (chronic obstructive pulmonary disease) ([facility] [address] 4/20/2022    Hx of blood clots     Hyperlipidemia     Pain in both knees 12/22/2015    Thyroid disease     Urinary incontinence          SURGICAL HISTORY       Past Surgical History:   Procedure Laterality Date    CHOLECYSTECTOMY, LAPAROSCOPIC      CYST INCISION AND DRAINAGE      left Cheek    HYSTERECTOMY      JOINT REPLACEMENT      LOBECTOMY      RIGHT PARTIAL    SKIN CANCER EXCISION           CURRENT MEDICATIONS       Discharge Medication List as of 5/3/2022  6:54 PM      CONTINUE these medications which have NOT CHANGED    Details   HYDROcodone-acetaminophen (NORCO)  MG per tablet Take 1 tablet by mouth every 6 hours as needed for Pain for up to 30 days. , Disp-120 tablet, R-0Normal      ALPRAZolam (XANAX) 0.5 MG tablet Take 1 tablet by mouth 3 times daily as needed for Anxiety for up to 30 days. , Disp-90 tablet, R-0Normal      escitalopram (LEXAPRO) 10 MG tablet Take 1 tablet by mouth daily, Disp-30 tablet, R-0Normal      promethazine (PHENERGAN) 25 MG tablet Take 1 tablet by mouth 2 times daily as needed for Nausea, Disp-30 tablet, R-0Normal      hydrocortisone 1 % cream Apply topically 2 times daily. , Disp-30 g, R-0, Normal      lidocaine 4 % external patch Place 1 patch onto the skin daily, TransDERmal, DAILY Starting Fri 4/29/2022, Disp-30 patch, R-0, Normal      docusate sodium (COLACE) 100 MG capsule Take 1 capsule by mouth 2 times daily, Disp-60 capsule, R-0Normal      linaclotide (LINZESS) 290 MCG CAPS capsule Take 1 capsule by mouth daily, Disp-30 capsule, R-0Normal      nystatin (MYCOSTATIN) 892081 UNIT/ML suspension Take 5 mLs by mouth 4 times daily, Oral, 4 TIMES DAILY Starting Thu 4/28/2022, Disp-1 each, R-0, Normal      tiZANidine (ZANAFLEX) 4 MG tablet Take 2 tablets by mouth every 8 hours as needed (spasms), Disp-90 tablet, R-0Normal      levothyroxine (SYNTHROID) 100 MCG tablet Take 1 tablet by mouth daily, Disp-30 tablet, R-0Normal      dicyclomine (BENTYL) 10 MG capsule Take 1 capsule by mouth 3 times daily as needed (spasms), Disp-90 capsule, R-0Normal      pantoprazole (PROTONIX) 40 MG tablet Take 1 tablet by mouth daily, Disp-30 tablet, R-0Normal      sucralfate (CARAFATE) 1 GM tablet Take 1 tablet by mouth 3 times daily as needed (heartburn), Disp-90 tablet, R-0Normal             ALLERGIES     Sulfamethoxazole-trimethoprim, Lyrica [pregabalin], Codeine, Erythromycin, Pcn [penicillins], Talwin [pentazocine], Chlorhexidine, and Sulfa antibiotics    FAMILY HISTORY       Family History   Problem Relation Age of Onset    Cancer Mother     Hypertension Mother     Heart Attack Father           SOCIAL HISTORY       Social History     Socioeconomic History    Marital status:      Spouse name: None    Number of children: 1    Years of education: None    Highest education level: None   Occupational History    None   Tobacco Use    Smoking status: Current Every Day Smoker     Packs/day: 0.50     Years: 30.00     Pack years: 15.00     Types: Cigarettes    Smokeless tobacco: Never Used   Vaping Use    Vaping Use: Never used   Substance and Sexual Activity    Alcohol use: No    Drug use: No    Sexual activity: None   Other Topics Concern    None   Social History Narrative    None     Social Determinants of Health     Financial Resource Strain:     Difficulty of Paying Living Expenses: Not on file   Food Insecurity:     Worried About Running Out of Food in the Last Year: Not on file    Antonette of Food in the Last Year: Not on file   Transportation Needs:     Lack of Transportation (Medical): Not on file    Lack of Transportation (Non-Medical): Not on file   Physical Activity:     Days of Exercise per Week: Not on file    Minutes of Exercise per Session: Not on file   Stress:     Feeling of Stress : Not on file   Social Connections:     Frequency of Communication with Friends and Family: Not on file    Frequency of Social Gatherings with Friends and Family: Not on file    Attends [employer] Services: Not on file    Active Member of [address] [place] [address] or Organizations: Not on file    Attends Club or Organization Meetings: Not on file    Marital Status: Not on file   Intimate Partner Violence:     Fear of Current or Ex-Partner: Not on file    Emotionally Abused: Not on file    Physically Abused: Not on file    Sexually Abused: Not on file   Housing Stability:     Unable to Pay for Housing in the Last Year: Not on file    Number of [place] in the Last Year: Not on file    Unstable Housing in the Last Year: Not on file       SCREENINGS    Koby Coma Scale  Eye Opening: Spontaneous  Best Verbal Response: Oriented  Best Motor Response: Obeys commands  Koby Coma Scale Score: 15      PHYSICAL EXAM    (up to 7 forlevel 4, 8 or more for level 5)     ED Triage Vitals   BP Temp Temp src Pulse Resp SpO2 Height Weight   05/03/22 1506 05/03/22 1506 -- 05/03/22 1506 05/03/22 1507 05/03/22 1506 -- 05/03/22 1506   (!) 124/49 98.9 °F (37.2 °C)  84 16 96 %  132 lb (59.9 kg)       Physical Exam  Vitals and nursing note reviewed. Constitutional:       General: She is not in acute distress. Appearance: Normal appearance. She is well-developed. She is not diaphoretic. HENT:      Head: Normocephalic and atraumatic. Right Ear: Tympanic membrane, ear canal and external ear normal.      Left Ear: Tympanic membrane, ear canal and external ear normal.      Nose: Nose normal.      Mouth/Throat:      Mouth: Mucous membranes are moist.      Pharynx: No oropharyngeal exudate.    Eyes: General:         Right eye: No discharge. Left eye: No discharge. Pupils: Pupils are equal, round, and reactive to light. Neck:      Thyroid: No thyromegaly. Cardiovascular:      Rate and Rhythm: Normal rate and regular rhythm. Heart sounds: Normal heart sounds. No murmur heard. No friction rub. Pulmonary:      Effort: Pulmonary effort is normal. No respiratory distress. Breath sounds: Normal breath sounds. No stridor. No wheezing. Abdominal:      General: Abdomen is flat. Bowel sounds are normal. There is no distension. Palpations: Abdomen is soft. Tenderness: There is no abdominal tenderness. Musculoskeletal:         General: Normal range of motion. Cervical back: Normal range of motion and neck supple. Skin:     General: Skin is warm and dry. Capillary Refill: Capillary refill takes less than 2 seconds. Findings: No rash. Neurological:      General: No focal deficit present. Mental Status: She is alert and oriented to person, place, and time. Mental status is at baseline. Cranial Nerves: No cranial nerve deficit. Sensory: No sensory deficit. Coordination: Coordination normal.   Psychiatric:         Mood and Affect: Mood normal.         Behavior: Behavior normal.         Thought Content:  Thought content normal.         Judgment: Judgment normal.           DIAGNOSTIC RESULTS     RADIOLOGY:   Non-plain film images such as CT, Ultrasound and MRI are read by the radiologist. Plain radiographic images are visualized and preliminarilyinterpreted by No att. providers found with the below findings:      Interpretation per the Radiologist below, if available at the time of this note:    No orders to display       LABS:  [address] - Abnormal; Notable for the following components:       Result Value    Color, UA DARK YELLOW (*)     Ketones, Urine TRACE (*)     Protein,  (*)     Leukocyte Esterase, Urine TRACE (*)     All other components within normal limits   CBC WITH AUTO DIFFERENTIAL - Abnormal; Notable for the following components:    RBC 4.08 (*)     Hemoglobin 11.7 (*)     MCHC 30.5 (*)     All other components within normal limits   COMPREHENSIVE METABOLIC PANEL W/ REFLEX TO MG FOR LOW K - Abnormal; Notable for the following components:    CREATININE 1.0 (*)     GFR Non-African American 54 (*)     Alkaline Phosphatase 118 (*)     ALT <5 (*)     All other components within normal limits   MICROSCOPIC URINALYSIS - Abnormal; Notable for the following components:    Bacteria, UA NEGATIVE (*)     Crystals, UA NEG (*)     All other components within normal limits   TROPONIN   BRAIN NATRIURETIC PEPTIDE       All other labs were within normal range or notreturned as of this dictation. RE-ASSESSMENT        EMERGENCY DEPARTMENT COURSE and DIFFERENTIAL DIAGNOSIS/MDM:   Vitals:    Vitals:    05/03/22 1506 05/03/22 1507 05/03/22 1853   BP: (!) 124/49  (!) 121/52   Pulse: 84  73   Resp:  16 18   Temp: 98.9 °F (37.2 °C)     SpO2: 96%  95%   Weight: 132 lb (59.9 kg)         MDM  Plan for DC we gave patient option for observation which she declines has no orthostatic findings. She continues to deny any symptoms. She is ambulating baseline back pain no new injury feels she can go home. PROCEDURES:    Procedures      FINAL IMPRESSION      1.  Idiopathic hypotension          DISPOSITION/PLAN   DISPOSITION Decision To Discharge 05/03/2022 06:47:03 PM      PATIENT REFERRED TO:  [facility] EMERGENC[facility]  [address] [address] [phone]    If symptoms worsen    Giancarlo Sharma MD   [address] [facility]  [phone]      As needed      DISCHARGE MEDICATIONS:  Discharge Medication List as of 5/3/2022  6:54 PM          (Please note that portions of this note were completed with a voice recognition program.  Efforts were made to edit the dictations but occasionallywords are mis-transcribed.)    Margarita Ley [address], [place]  05/04/22 9921

## 2022-05-04 LAB
EKG P AXIS: 65 DEGREES
EKG P-R INTERVAL: 126 MS
EKG Q-T INTERVAL: 384 MS
EKG QRS DURATION: 94 MS
EKG QTC CALCULATION (BAZETT): 419 MS
EKG T AXIS: 19 DEGREES

## 2022-05-04 PROCEDURE — 93010 ELECTROCARDIOGRAM REPORT: CPT | Performed by: INTERNAL MEDICINE

## 2022-05-04 ASSESSMENT — ENCOUNTER SYMPTOMS
APNEA: 0
RHINORRHEA: 0
SORE THROAT: 0
EYE DISCHARGE: 0
SHORTNESS OF BREATH: 0
ABDOMINAL PAIN: 0
NAUSEA: 0
BACK PAIN: 0
ABDOMINAL DISTENTION: 0
PHOTOPHOBIA: 0
COLOR CHANGE: 0
COUGH: 0
EYE PAIN: 0

## 2022-05-09 ENCOUNTER — HOSPITAL ENCOUNTER (EMERGENCY)
Facility: HOSPITAL | Age: 72
Discharge: HOME | End: 2022-05-09
Admitting: STUDENT IN AN ORGANIZED HEALTH CARE EDUCATION/TRAINING PROGRAM

## 2022-05-09 ENCOUNTER — APPOINTMENT (OUTPATIENT)
Dept: CT IMAGING | Facility: HOSPITAL | Age: 72
End: 2022-05-09

## 2022-05-09 VITALS
OXYGEN SATURATION: 95 % | HEIGHT: 65 IN | SYSTOLIC BLOOD PRESSURE: 168 MMHG | DIASTOLIC BLOOD PRESSURE: 60 MMHG | WEIGHT: 151 LBS | HEART RATE: 71 BPM | RESPIRATION RATE: 18 BRPM | BODY MASS INDEX: 25.16 KG/M2 | TEMPERATURE: 99.1 F

## 2022-05-09 DIAGNOSIS — K52.9 COLITIS: ICD-10-CM

## 2022-05-09 DIAGNOSIS — K57.30 SIGMOID DIVERTICULOSIS: ICD-10-CM

## 2022-05-09 DIAGNOSIS — S30.1XXA ABDOMINAL WALL SEROMA, INITIAL ENCOUNTER: Primary | ICD-10-CM

## 2022-05-09 LAB
ALBUMIN SERPL-MCNC: 3.5 G/DL (ref 3.5–5.2)
ALBUMIN/GLOB SERPL: 1 G/DL
ALP SERPL-CCNC: 100 U/L (ref 39–117)
ALT SERPL W P-5'-P-CCNC: <5 U/L (ref 1–33)
ANION GAP SERPL CALCULATED.3IONS-SCNC: 10 MMOL/L (ref 5–15)
AST SERPL-CCNC: 12 U/L (ref 1–32)
BASOPHILS # BLD AUTO: 0.03 10*3/MM3 (ref 0–0.2)
BASOPHILS NFR BLD AUTO: 0.6 % (ref 0–1.5)
BILIRUB SERPL-MCNC: 0.3 MG/DL (ref 0–1.2)
BILIRUB UR QL STRIP: NEGATIVE
BUN SERPL-MCNC: 11 MG/DL (ref 8–23)
BUN/CREAT SERPL: 15.1 (ref 7–25)
CALCIUM SPEC-SCNC: 8.9 MG/DL (ref 8.6–10.5)
CHLORIDE SERPL-SCNC: 103 MMOL/L (ref 98–107)
CLARITY UR: CLEAR
CO2 SERPL-SCNC: 29 MMOL/L (ref 22–29)
COLOR UR: YELLOW
CREAT SERPL-MCNC: 0.73 MG/DL (ref 0.57–1)
D-LACTATE SERPL-SCNC: 1.4 MMOL/L (ref 0.5–2)
DEPRECATED RDW RBC AUTO: 45.1 FL (ref 37–54)
EGFRCR SERPLBLD CKD-EPI 2021: 87 ML/MIN/1.73
EOSINOPHIL # BLD AUTO: 0.17 10*3/MM3 (ref 0–0.4)
EOSINOPHIL NFR BLD AUTO: 3.3 % (ref 0.3–6.2)
ERYTHROCYTE [DISTWIDTH] IN BLOOD BY AUTOMATED COUNT: 13.5 % (ref 12.3–15.4)
FLUAV RNA RESP QL NAA+PROBE: NOT DETECTED
FLUBV RNA RESP QL NAA+PROBE: NOT DETECTED
GLOBULIN UR ELPH-MCNC: 3.5 GM/DL
GLUCOSE SERPL-MCNC: 95 MG/DL (ref 65–99)
GLUCOSE UR STRIP-MCNC: NEGATIVE MG/DL
HCT VFR BLD AUTO: 37.4 % (ref 34–46.6)
HGB BLD-MCNC: 11.8 G/DL (ref 12–15.9)
HGB UR QL STRIP.AUTO: NEGATIVE
IMM GRANULOCYTES # BLD AUTO: 0.02 10*3/MM3 (ref 0–0.05)
IMM GRANULOCYTES NFR BLD AUTO: 0.4 % (ref 0–0.5)
KETONES UR QL STRIP: ABNORMAL
LEUKOCYTE ESTERASE UR QL STRIP.AUTO: NEGATIVE
LIPASE SERPL-CCNC: 17 U/L (ref 13–60)
LYMPHOCYTES # BLD AUTO: 1.3 10*3/MM3 (ref 0.7–3.1)
LYMPHOCYTES NFR BLD AUTO: 25.5 % (ref 19.6–45.3)
MCH RBC QN AUTO: 28.6 PG (ref 26.6–33)
MCHC RBC AUTO-ENTMCNC: 31.6 G/DL (ref 31.5–35.7)
MCV RBC AUTO: 90.8 FL (ref 79–97)
MONOCYTES # BLD AUTO: 0.32 10*3/MM3 (ref 0.1–0.9)
MONOCYTES NFR BLD AUTO: 6.3 % (ref 5–12)
NEUTROPHILS NFR BLD AUTO: 3.25 10*3/MM3 (ref 1.7–7)
NEUTROPHILS NFR BLD AUTO: 63.9 % (ref 42.7–76)
NITRITE UR QL STRIP: NEGATIVE
NRBC BLD AUTO-RTO: 0 /100 WBC (ref 0–0.2)
PH UR STRIP.AUTO: 6 [PH] (ref 5–8)
PLATELET # BLD AUTO: 296 10*3/MM3 (ref 140–450)
PMV BLD AUTO: 11.3 FL (ref 6–12)
POTASSIUM SERPL-SCNC: 3.2 MMOL/L (ref 3.5–5.2)
PROCALCITONIN SERPL-MCNC: 0.05 NG/ML (ref 0–0.25)
PROT SERPL-MCNC: 7 G/DL (ref 6–8.5)
PROT UR QL STRIP: NEGATIVE
RBC # BLD AUTO: 4.12 10*6/MM3 (ref 3.77–5.28)
SARS-COV-2 RNA RESP QL NAA+PROBE: NOT DETECTED
SODIUM SERPL-SCNC: 142 MMOL/L (ref 136–145)
SP GR UR STRIP: >=1.03 (ref 1–1.03)
UROBILINOGEN UR QL STRIP: ABNORMAL
WBC NRBC COR # BLD: 5.09 10*3/MM3 (ref 3.4–10.8)

## 2022-05-09 PROCEDURE — 84145 PROCALCITONIN (PCT): CPT | Performed by: PHYSICIAN ASSISTANT

## 2022-05-09 PROCEDURE — 99284 EMERGENCY DEPT VISIT MOD MDM: CPT

## 2022-05-09 PROCEDURE — 87636 SARSCOV2 & INF A&B AMP PRB: CPT | Performed by: PHYSICIAN ASSISTANT

## 2022-05-09 PROCEDURE — 25010000002 METOCLOPRAMIDE PER 10 MG: Performed by: PHYSICIAN ASSISTANT

## 2022-05-09 PROCEDURE — 0 HYDROMORPHONE 1 MG/ML SOLUTION: Performed by: STUDENT IN AN ORGANIZED HEALTH CARE EDUCATION/TRAINING PROGRAM

## 2022-05-09 PROCEDURE — 25010000002 DIPHENHYDRAMINE PER 50 MG: Performed by: PHYSICIAN ASSISTANT

## 2022-05-09 PROCEDURE — 80053 COMPREHEN METABOLIC PANEL: CPT | Performed by: PHYSICIAN ASSISTANT

## 2022-05-09 PROCEDURE — 0 POTASSIUM CHLORIDE 10 MEQ/100ML SOLUTION: Performed by: PHYSICIAN ASSISTANT

## 2022-05-09 PROCEDURE — 25010000002 ONDANSETRON PER 1 MG: Performed by: PHYSICIAN ASSISTANT

## 2022-05-09 PROCEDURE — 96375 TX/PRO/DX INJ NEW DRUG ADDON: CPT

## 2022-05-09 PROCEDURE — 25010000002 PROMETHAZINE PER 50 MG: Performed by: PHYSICIAN ASSISTANT

## 2022-05-09 PROCEDURE — P9612 CATHETERIZE FOR URINE SPEC: HCPCS

## 2022-05-09 PROCEDURE — 83690 ASSAY OF LIPASE: CPT | Performed by: PHYSICIAN ASSISTANT

## 2022-05-09 PROCEDURE — 25010000002 MORPHINE PER 10 MG: Performed by: EMERGENCY MEDICINE

## 2022-05-09 PROCEDURE — 83605 ASSAY OF LACTIC ACID: CPT | Performed by: PHYSICIAN ASSISTANT

## 2022-05-09 PROCEDURE — 74177 CT ABD & PELVIS W/CONTRAST: CPT

## 2022-05-09 PROCEDURE — 85025 COMPLETE CBC W/AUTO DIFF WBC: CPT | Performed by: PHYSICIAN ASSISTANT

## 2022-05-09 PROCEDURE — 96374 THER/PROPH/DIAG INJ IV PUSH: CPT

## 2022-05-09 PROCEDURE — 96376 TX/PRO/DX INJ SAME DRUG ADON: CPT

## 2022-05-09 PROCEDURE — 81003 URINALYSIS AUTO W/O SCOPE: CPT | Performed by: PHYSICIAN ASSISTANT

## 2022-05-09 PROCEDURE — 25010000002 IOPAMIDOL 61 % SOLUTION: Performed by: PHYSICIAN ASSISTANT

## 2022-05-09 PROCEDURE — 87040 BLOOD CULTURE FOR BACTERIA: CPT | Performed by: PHYSICIAN ASSISTANT

## 2022-05-09 PROCEDURE — 36415 COLL VENOUS BLD VENIPUNCTURE: CPT

## 2022-05-09 RX ORDER — ONDANSETRON 2 MG/ML
4 INJECTION INTRAMUSCULAR; INTRAVENOUS ONCE
Status: COMPLETED | OUTPATIENT
Start: 2022-05-09 | End: 2022-05-09

## 2022-05-09 RX ORDER — METRONIDAZOLE 500 MG/1
500 TABLET ORAL 2 TIMES DAILY
Qty: 20 TABLET | Refills: 0 | Status: SHIPPED | OUTPATIENT
Start: 2022-05-09 | End: 2022-05-19

## 2022-05-09 RX ORDER — LABETALOL HYDROCHLORIDE 5 MG/ML
10 INJECTION, SOLUTION INTRAVENOUS ONCE
Status: COMPLETED | OUTPATIENT
Start: 2022-05-09 | End: 2022-05-09

## 2022-05-09 RX ORDER — SODIUM CHLORIDE 0.9 % (FLUSH) 0.9 %
10 SYRINGE (ML) INJECTION AS NEEDED
Status: DISCONTINUED | OUTPATIENT
Start: 2022-05-09 | End: 2022-05-10 | Stop reason: HOSPADM

## 2022-05-09 RX ORDER — DIPHENHYDRAMINE HYDROCHLORIDE 50 MG/ML
25 INJECTION INTRAMUSCULAR; INTRAVENOUS ONCE
Status: COMPLETED | OUTPATIENT
Start: 2022-05-09 | End: 2022-05-09

## 2022-05-09 RX ORDER — PROMETHAZINE HYDROCHLORIDE 25 MG/1
25 SUPPOSITORY RECTAL EVERY 6 HOURS PRN
Qty: 12 SUPPOSITORY | Refills: 0 | Status: SHIPPED | OUTPATIENT
Start: 2022-05-09

## 2022-05-09 RX ORDER — POTASSIUM CHLORIDE 7.45 MG/ML
10 INJECTION INTRAVENOUS ONCE
Status: COMPLETED | OUTPATIENT
Start: 2022-05-09 | End: 2022-05-09

## 2022-05-09 RX ORDER — METOCLOPRAMIDE HYDROCHLORIDE 5 MG/ML
5 INJECTION INTRAMUSCULAR; INTRAVENOUS EVERY 6 HOURS
Status: DISCONTINUED | OUTPATIENT
Start: 2022-05-09 | End: 2022-05-10 | Stop reason: HOSPADM

## 2022-05-09 RX ORDER — HYDROCODONE BITARTRATE AND ACETAMINOPHEN 10; 325 MG/1; MG/1
1 TABLET ORAL EVERY 4 HOURS PRN
COMMUNITY
End: 2022-06-01 | Stop reason: SDUPTHER

## 2022-05-09 RX ADMIN — POTASSIUM CHLORIDE 10 MEQ: 7.46 INJECTION, SOLUTION INTRAVENOUS at 17:12

## 2022-05-09 RX ADMIN — MORPHINE SULFATE 4 MG: 4 INJECTION, SOLUTION INTRAMUSCULAR; INTRAVENOUS at 14:55

## 2022-05-09 RX ADMIN — METOCLOPRAMIDE 5 MG: 5 INJECTION, SOLUTION INTRAMUSCULAR; INTRAVENOUS at 21:05

## 2022-05-09 RX ADMIN — METOCLOPRAMIDE 5 MG: 5 INJECTION, SOLUTION INTRAMUSCULAR; INTRAVENOUS at 17:02

## 2022-05-09 RX ADMIN — DIPHENHYDRAMINE HYDROCHLORIDE 25 MG: 50 INJECTION, SOLUTION INTRAMUSCULAR; INTRAVENOUS at 19:05

## 2022-05-09 RX ADMIN — LABETALOL HYDROCHLORIDE 10 MG: 5 INJECTION INTRAVENOUS at 20:41

## 2022-05-09 RX ADMIN — SODIUM CHLORIDE, POTASSIUM CHLORIDE, SODIUM LACTATE AND CALCIUM CHLORIDE 1000 ML: 600; 310; 30; 20 INJECTION, SOLUTION INTRAVENOUS at 14:59

## 2022-05-09 RX ADMIN — PROMETHAZINE HYDROCHLORIDE 12.5 MG: 25 INJECTION INTRAMUSCULAR; INTRAVENOUS at 19:10

## 2022-05-09 RX ADMIN — ONDANSETRON 4 MG: 2 INJECTION INTRAMUSCULAR; INTRAVENOUS at 14:35

## 2022-05-09 RX ADMIN — IOPAMIDOL 100 ML: 612 INJECTION, SOLUTION INTRAVENOUS at 15:54

## 2022-05-09 RX ADMIN — HYDROMORPHONE HYDROCHLORIDE 1 MG: 1 INJECTION, SOLUTION INTRAMUSCULAR; INTRAVENOUS; SUBCUTANEOUS at 21:50

## 2022-05-09 NOTE — DISCHARGE INSTRUCTIONS
Please complete your antibiotics in their entirety.  Please continue to follow-up with Dr. Reese for monitoring of the seroma as well as for your postoperative course.  Please continue follow-up with your primary care provider for monitoring of your colitis as well as sigmoid diverticulitis.  Should you develop any new or worsening symptoms please return to the ER for further evaluation.

## 2022-05-09 NOTE — ED PROVIDER NOTES
Subjective   History of Present Illness    Patient is a 71-year-old female presenting to ED with vomiting.  PMH significant for history of chronic pain, history ovarian cancer, but thyroidism, COPD, chronic gastritis, chronic back pain, CKD, osteoarthritis, GERD,s/p cholecystectomy, s/p hysterectomy, status post most recent lumbar laminectomy 4/15/2022 performed by Dr. Reese.  Patient states 3 days ago she was discharged home from the hospital where she had been doing well with control of her pain from her lumbar fusion and no nausea or vomiting.  Patient states since being home she developed lower abdominal anterior pain, nausea, vomiting, and small bouts of diarrhea.  Patient describes subjective fevers with diaphoresis and chills and reports that despite use of Zofran Phenergan with her last dose 3 hours prior to arrival she cannot stop her nausea or vomiting and presents for further ration.  Patient did note that she is also been having hematuria which began after her procedure while she was still in the hospital but denies dysuria.  Patient states decreased urination over the past few days since she started vomiting.  Patient denies any flank pain, back pain.  Patient denies any other medication use prior to arrival.  Outside of being in the hospital patient denies any other known recent sick contact    Records reviewed show patient last seen in the ED on 5/3/2022 for idiopathic hypotension.    Patient was admitted prior to this on 4/19/2022 for history of smoking, hypothyroidism, mood disorder, gait abnormality, weakness.    Review of Systems   Constitutional: Positive for chills, diaphoresis and fever (subjective).   HENT: Negative.    Eyes: Negative.    Respiratory: Negative.    Cardiovascular: Negative.    Gastrointestinal: Positive for abdominal pain (lower), diarrhea, nausea and vomiting. Negative for blood in stool and constipation.        Denies hematemesis   Genitourinary: Positive for decreased  "urine volume and hematuria. Negative for dysuria and flank pain.   Musculoskeletal: Negative.  Negative for back pain.   Skin: Negative.    Neurological: Positive for tremors (\"I shake when I'm sick\") and weakness (generalized). Negative for dizziness and headaches.   Psychiatric/Behavioral: Negative.    All other systems reviewed and are negative.      Past Medical History:   Diagnosis Date   • Anxiety    • Cellulitis of face 7/23/2019   • Chronic abdominal pain    • Chronic back pain    • Chronic gastritis    • Chronic pain     sees pain vilma. since 2014   • CKD (chronic kidney disease)    • Colon polyp    • COPD (chronic obstructive pulmonary disease) (HCC)    • Disease of thyroid gland    • Diverticulosis    • Dyslipidemia    • GERD (gastroesophageal reflux disease)    • Hemorrhoid    • Hiatal hernia    • History of adenomatous polyp of colon    • Leaky heart valve    • Lumbosacral disc disease 4/11/2022   • Migraine    • Neuropathy    • Osteoarthritis    • Ovarian cancer (HCC)    • Submandibular gland mass        Allergies   Allergen Reactions   • Bactrim [Sulfamethoxazole-Trimethoprim] Other (See Comments)     Muscle pain,joint stiffness   • Penicillins Rash and Other (See Comments)     whelps   • Azithromycin Nausea And Vomiting     All mycin   • Talwin [Pentazocine] Nausea And Vomiting   • Codeine Nausea Only   • Sulfa Antibiotics Nausea And Vomiting     Muscle pain     • Tegaderm Chg Dressing [Chlorhexidine] Rash       Past Surgical History:   Procedure Laterality Date   • ANTERIOR LUMBAR EXPOSURE N/A 4/11/2022    Procedure: ANTERIOR LUMBAR EXPOSURE;  Surgeon: Juan Francisco Nelson DO;  Location: [facility] OR;  Service: Vascular;  Laterality: N/A;   • CARDIAC CATHETERIZATION      no stent   • CHOLECYSTECTOMY     • COLONOSCOPY  04/10/2015    5 polyps, tubular adenomas, melanosis, diverticulosis, internal hemorrhoids,    • CYST REMOVAL     • ENDOSCOPY  04/10/2015    hiatus hernia   • HYSTERECTOMY     • LUMBAR " FUSION Right 4/13/2022    Procedure: RIGHT LATERAL LUMBAR INTERBODY FUSION WITH INSTRUMENTATION L4-5;  Surgeon: JESSIKA Reese MD;  Location:  [facility] OR;  Service: Orthopedic Spine;  Laterality: Right;   • LUMBAR FUSION N/A 4/11/2022    Procedure: ANTERIOR DECOMPRESSION, ANTERIOR LUMBAR INTERBODY FUSION WITH INSTRUMENTATION L5-S1;  Surgeon: JESSIKA Reese MD;  Location:  [facility] OR;  Service: Orthopedic Spine;  Laterality: N/A;   • LUMBAR LAMINECTOMY WITH FUSION N/A 4/15/2022    Procedure: POSTERIOR SPINAL FUSION WITH INSTRUMENTATION L4-S1;  Surgeon: JESSIKA Reese MD;  Location:  [facility] OR;  Service: Orthopedic Spine;  Laterality: N/A;   • LUNG LOBECTOMY Right     2002,[facility]   • REPLACEMENT TOTAL KNEE BILATERAL     • SUBMANDIBULAR GLAND EXCISION Left 11/02/2018    Procedure: Incision and drainage of left submandibular region abscess with excision of left submandibular region mass mass consistent with probable lymph node;  Surgeon: Duncan Ewing MD;  Location:  [facility] OR;  Service: ENT       Family History   Problem Relation Age of Onset   • No Known Problems Mother    • No Known Problems Father        Social History     Socioeconomic History   • Marital status:    Tobacco Use   • Smoking status: Current Every Day Smoker     Packs/day: 0.25     Years: 20.00     Pack years: 5.00     Types: Cigarettes   • Smokeless tobacco: Never Used   • Tobacco comment: has tried chantix; made her sick   Substance and Sexual Activity   • Alcohol use: No   • Drug use: No   • Sexual activity: Defer           Objective   Physical Exam  Vitals and nursing note reviewed.   Constitutional:       General: She is in acute distress (appears uncomfortable due to nausea and pain).      Appearance: Normal appearance. She is well-developed and well-groomed. She is ill-appearing and diaphoretic.   HENT:      Head: Normocephalic.      Mouth/Throat:      Mouth: Mucous membranes are dry.      Pharynx: Oropharynx  is clear. No oropharyngeal exudate.   Eyes:      General: No scleral icterus.     Conjunctiva/sclera: Conjunctivae normal.      Pupils: Pupils are equal, round, and reactive to light.   Cardiovascular:      Rate and Rhythm: Normal rate.   Pulmonary:      Effort: Pulmonary effort is normal.      Breath sounds: Normal breath sounds.   Abdominal:      General: A surgical scar is present. Bowel sounds are normal. There is no distension.      Palpations: Abdomen is soft.      Tenderness: There is abdominal tenderness (lower abdomen). There is no right CVA tenderness or left CVA tenderness.      Comments: Very well healed surgical incision to lower left midline c/w recent procedure and well healing with no dehiscence, no surrounding or streaking erythema.    Musculoskeletal:         General: Normal range of motion.      Cervical back: Normal range of motion.   Skin:     General: Skin is warm.      Coloration: Skin is not jaundiced or pale.   Neurological:      Mental Status: She is alert and oriented to person, place, and time.   Psychiatric:         Attention and Perception: Attention normal.         Mood and Affect: Affect normal. Mood is anxious.         Speech: Speech normal.         Behavior: Behavior normal. Behavior is cooperative.         Procedures           ED Course  ED Course as of 05/10/22 0132   Mon May 09, 2022   1715 Phone discussion with Mr. Ramírez Dale PA-C for Dr. Reese. Reviewed labs and CT imaging. States patient can continue to follow up outpatient as previously scheduled. No further recommendations. [JS]      ED Course User Index  [JS] Ramy Calderón PA-C                                                 MDM  Number of Diagnoses or Management Options     Amount and/or Complexity of Data Reviewed  Clinical lab tests: reviewed and ordered  Tests in the radiology section of CPT®: reviewed and ordered  Tests in the medicine section of CPT®: ordered and reviewed  Decide to obtain previous medical  records or to obtain history from someone other than the patient: yes  Review and summarize past medical records: yes  Discuss the patient with other providers: yes (Dr. Maykel Mays (attending))    Patient Progress  Patient progress: improved    Patient is a 71-year-old female presenting to ED with vomiting.  PMH significant for history of chronic pain, history ovarian cancer, but thyroidism, COPD, chronic gastritis, chronic back pain, CKD, osteoarthritis, GERD,s/p cholecystectomy, s/p hysterectomy, status post most recent lumbar laminectomy 4/15/2022 performed by Dr. Reese.  CBC with normal WBC 5.09, and no further findings.  CMP with hypokalemia 3.2 no further electrolyte abnormalities.  Normal renal and hepatic function.  Lipase WNL.  Lactic acid procalcitonin WNL.  Urinalysis with no evidence of infection or hematuria.  COVID testing negative.  Influenza testing negative. CT imaging of abdomen and pelvis shows: Postoperative changes related to recent anterior lumbar interbody fusion L5-S1 including small probable hematoma or seroma anterior to upper sacrum measures 3.5 x 1.4 cm, no gas within this fluid and no discrete abscess, hardware appears satisfactory position, normal alignment of lumbar spine, lucency surrounding the vertical fusion bar the right at L4 may be chronic, no pedicle screw loosening, small amount of extraperitoneal fluid in the right pelvis.  Moderate sigmoid diverticulosis without diverticulitis, mild mucosal thickening involving junction of descending and sigmoid colon could reflect very mild colitis.  Patient initially given morphine and Zofran and had improvement of her pain but then reported that her nausea returned for which she was able to have control after a dose of Reglan and Phenergan.  Patient developed some further pain for which she was given a dose of Dilaudid.  Discussed with patient importance of continuing to follow-up with her surgeon as well as primary care provider for  continued outpatient follow-up.  Case was discussed with ELOINA on-call for Dr. Reese who reported patient could continue to follow-up as previously scheduled.  Patient advised of strict return precautions and need for immediate return to ED should she develop any new or worsening symptoms.  Patient very patient with no further questions concerns, needs at this time and is stable for discharge.    Final diagnoses:   Abdominal wall seroma, initial encounter   Sigmoid diverticulosis   Colitis       ED Disposition  ED Disposition     ED Disposition   Discharge    Condition   Stable    Comment   --             Zak Alan MD  [address] [place] [address] [address] [place] [address]  [phone]    Schedule an appointment as soon as possible for a visit in 2 day(s)      JESSIKA Reese MD  [address] [place] [id number] [phone]    Schedule an appointment as soon as possible for a visit in 2 day(s)      [facility]  [address] [place] [id number] [phone]  Follow up  As needed         Medication List      New Prescriptions    metroNIDAZOLE 500 MG tablet  Commonly known as: FLAGYL  Take 1 tablet by mouth 2 (Two) Times a Day for 10 days.        Changed    nystatin 100,000 unit/mL suspension  Commonly known as: MYCOSTATIN  Take 5 mL by mouth 4 (Four) Times a Day.  What changed:   · when to take this  · reasons to take this     * promethazine 25 MG tablet  Commonly known as: PHENERGAN  What changed: Another medication with the same name was added. Make sure you understand how and when to take each.     * promethazine 25 MG suppository  Commonly known as: PHENERGAN  Insert 1 suppository into the rectum Every 6 (Six) Hours As Needed for Nausea or Vomiting.  What changed: You were already taking a medication with the same name, and this prescription was added. Make sure you understand how and when to take each.         * This list has 2 medication(s) that are the same as  other medications prescribed for you. Read the directions carefully, and ask your doctor or other care provider to review them with you.            Stop    pregabalin 75 MG capsule  Commonly known as: LYRICA           Where to Get Your Medications      These medications were sent to [facility] - [place], [place] - [address] [place] [address] - [phone]  - [phone] FX  [address] [place] [address], [facility] [address]    Phone: [phone]   · metroNIDAZOLE 500 MG tablet  · promethazine 25 MG suppository          Ramy Calderón PA-C  05/10/22 0132

## 2022-05-14 LAB
BACTERIA SPEC AEROBE CULT: NORMAL
BACTERIA SPEC AEROBE CULT: NORMAL

## 2022-05-24 ENCOUNTER — TELEPHONE (OUTPATIENT)
Dept: PAIN MANAGEMENT | Age: 72
End: 2022-05-24

## 2022-05-24 NOTE — TELEPHONE ENCOUNTER
Prescription line forwarded patient call to nurse line. Patient was asking if Brianna Viera is supposed to be refilling her pain medication or Dr. Rudy Birmingham. Reviewed patient chart. She was discharged from inpatient rehab unit and was supposed to follow up with Dr. Rudy Birmingham. I placed a call to her to clarify if she has been released from his care. Patient did not answer. I requested that she call me back on the nurse line, and explained to her that if Dr. Rudy Birmingham has released her, then we can begin filling her scripts.

## 2022-05-25 ENCOUNTER — TELEPHONE (OUTPATIENT)
Dept: CARDIOLOGY CLINIC | Age: 72
End: 2022-05-25

## 2022-05-25 PROCEDURE — 93248 EXT ECG>7D<15D REV&INTERPJ: CPT | Performed by: INTERNAL MEDICINE

## 2022-05-25 NOTE — PROCEDURES
Cardiac event monitor report    Dates of recording 4/29/2022 through 5/13/2022    Summary impressions:    1. Sinus rhythm average heart rate 67 minimal 34 maximum 115    2. 1 pause 3.5 seconds recorded at 12:39 PM    3. 78 episode supraventricular tachycardia fastest interval 9 beats at a maximal rate of 231 longest 12.3 seconds average rate 150    4. No episodes of ventricular tachycardia were recorded    5. No episodes of complete or Mobitz 2 atrioventricular block were recorded    6. No triggered or diary events were recorded    7. Rare ectopy otherwise noted; also noted 8.2 seconds longest ventricular trigeminal episode present.

## 2022-05-25 NOTE — TELEPHONE ENCOUNTER
Patient needs to be seen by cardiologist asap for pause on zio. Can see CHARISSA tomorrow if patient is able to. She is not on anything that may have caused pause she also has noted SVT. Tried to call patient no success.

## 2022-05-26 NOTE — TELEPHONE ENCOUNTER
Quality 111:Pneumonia Vaccination Status For Older Adults: Pneumococcal Vaccination Previously Received Reached patient explained she needed to come in to the office patient states all day yesterday and last night she had a fever as well as this morning. No other symptoms. Since she has a fever will have to get approval for patient to come into the building. Quality 130: Documentation Of Current Medications In The Medical Record: Current Medications Documented Detail Level: Detailed Warm

## 2022-05-31 NOTE — TELEPHONE ENCOUNTER
Patient said fever is better but now she has body aches all over. Going to see Dr. Obdulia Lane today. Said her back just does not feel right. She recently had surgery with him.  Will call back at [facility].

## 2022-06-01 DIAGNOSIS — G89.29 CHRONIC BILATERAL LOW BACK PAIN WITH LEFT-SIDED SCIATICA: Primary | ICD-10-CM

## 2022-06-01 DIAGNOSIS — M54.42 CHRONIC BILATERAL LOW BACK PAIN WITH LEFT-SIDED SCIATICA: Primary | ICD-10-CM

## 2022-06-01 RX ORDER — HYDROCODONE BITARTRATE AND ACETAMINOPHEN 10; 325 MG/1; MG/1
1 TABLET ORAL EVERY 6 HOURS PRN
Qty: 90 TABLET | Refills: 0 | Status: SHIPPED | OUTPATIENT
Start: 2022-06-01

## 2022-06-02 ENCOUNTER — TELEPHONE (OUTPATIENT)
Dept: CARDIOLOGY CLINIC | Age: 72
End: 2022-06-02

## 2022-06-06 ENCOUNTER — OFFICE VISIT (OUTPATIENT)
Dept: CARDIOLOGY CLINIC | Age: 72
End: 2022-06-06
Payer: MEDICARE

## 2022-06-06 VITALS
WEIGHT: 138.6 LBS | HEIGHT: 65 IN | OXYGEN SATURATION: 96 % | BODY MASS INDEX: 23.09 KG/M2 | DIASTOLIC BLOOD PRESSURE: 80 MMHG | HEART RATE: 78 BPM | SYSTOLIC BLOOD PRESSURE: 120 MMHG

## 2022-06-06 DIAGNOSIS — R00.1 BRADYCARDIA: ICD-10-CM

## 2022-06-06 DIAGNOSIS — R06.02 SOB (SHORTNESS OF BREATH): Primary | ICD-10-CM

## 2022-06-06 DIAGNOSIS — I49.5 SINUS NODE DYSFUNCTION (HCC): ICD-10-CM

## 2022-06-06 DIAGNOSIS — R06.02 SOB (SHORTNESS OF BREATH): ICD-10-CM

## 2022-06-06 LAB — PRO-BNP: 380 PG/ML (ref 0–900)

## 2022-06-06 PROCEDURE — 3017F COLORECTAL CA SCREEN DOC REV: CPT | Performed by: INTERNAL MEDICINE

## 2022-06-06 PROCEDURE — G8400 PT W/DXA NO RESULTS DOC: HCPCS | Performed by: INTERNAL MEDICINE

## 2022-06-06 PROCEDURE — G8420 CALC BMI NORM PARAMETERS: HCPCS | Performed by: INTERNAL MEDICINE

## 2022-06-06 PROCEDURE — 4004F PT TOBACCO SCREEN RCVD TLK: CPT | Performed by: INTERNAL MEDICINE

## 2022-06-06 PROCEDURE — 93000 ELECTROCARDIOGRAM COMPLETE: CPT | Performed by: INTERNAL MEDICINE

## 2022-06-06 PROCEDURE — G8427 DOCREV CUR MEDS BY ELIG CLIN: HCPCS | Performed by: INTERNAL MEDICINE

## 2022-06-06 PROCEDURE — 99204 OFFICE O/P NEW MOD 45 MIN: CPT | Performed by: INTERNAL MEDICINE

## 2022-06-06 PROCEDURE — 1123F ACP DISCUSS/DSCN MKR DOCD: CPT | Performed by: INTERNAL MEDICINE

## 2022-06-06 PROCEDURE — 1090F PRES/ABSN URINE INCON ASSESS: CPT | Performed by: INTERNAL MEDICINE

## 2022-06-06 ASSESSMENT — ENCOUNTER SYMPTOMS
DIARRHEA: 0
SHORTNESS OF BREATH: 0
EYES NEGATIVE: 1
VOMITING: 0
RESPIRATORY NEGATIVE: 1
NAUSEA: 0
GASTROINTESTINAL NEGATIVE: 1

## 2022-06-06 NOTE — PROGRESS NOTES
[facility] Progress Note                            Date:  6/6/2022  Patient: Shashank Serra  Age:  66 y.o., 1950      Reason for evaluation:         SUBJECTIVE:    78-year-old female no prior cardiac history wore a recent event monitor 5/3/2022 showed sinus rhythm average heart rate 67 minimal 34 maximal 115 at one 3.5-second pause recorded at 12:39 PM 78 episodes of SVT no episodes of heart block noted. Denies dizziness or fainting episodes denies chest pain or dyspnea. At home she walks with a Rollator she is extremely slow and sedentary in her lifestyle. She does not presently drive an automobile. No recent antibiotic changes. I do not see any medications that could be withdrawn that might favorably affect this. EKG shows sinus rhythm within normal limits. No prior cardiac history or work-up. BNP level 422 on 5/3/2022. Review of Systems   Constitutional: Negative. Negative for chills, fever and unexpected weight change. HENT: Negative. Eyes: Negative. Respiratory: Negative. Negative for shortness of breath. Cardiovascular: Negative. Negative for chest pain. Gastrointestinal: Negative. Negative for diarrhea, nausea and vomiting. Endocrine: Negative. Genitourinary: Negative. Musculoskeletal: Negative. Skin: Negative. Neurological: Negative. All other systems reviewed and are negative. OBJECTIVE:     /80   Pulse 78   Ht 5' 5\" (1.651 m)   Wt 138 lb 9.6 oz (62.9 kg)   SpO2 96%   BMI 23.06 kg/m²     Labs:   CBC: No results for input(s): WBC, HGB, HCT, PLT in the last 72 hours. BMP:No results for input(s): NA, K, CO2, BUN, CREATININE, LABGLOM, GLUCOSE in the last 72 hours. BNP: No results for input(s): BNP in the last 72 hours. PT/INR: No results for input(s): PROTIME, INR in the last 72 hours. APTT:No results for input(s): APTT in the last 72 hours.   CARDIAC ENZYMES:No results for input(s): CKTOTAL, CKMB, CKMBINDEX, TROPONINI in the last 72 hours. FASTING LIPID PANEL:No results found for: HDL, LDLDIRECT, LDLCALC, TRIG  LIVER PROFILE:No results for input(s): AST, ALT, LABALBU in the last 72 hours.         Past Medical History:   Diagnosis Date    Arthritis     Bilateral low back pain without sciatica 12/22/2015    [facility] ([facility] [address]     [facility]    Hx of blood clots     Hyperlipidemia     Pain in both knees 12/22/2015    Thyroid disease     Urinary incontinence      Past Surgical History:   Procedure Laterality Date    CHOLECYSTECTOMY, LAPAROSCOPIC      CYST INCISION AND DRAINAGE      left Cheek    HYSTERECTOMY      JOINT REPLACEMENT      LOBECTOMY      RIGHT PARTIAL    SKIN CANCER EXCISION       Family History   Problem Relation Age of Onset    Cancer Mother     Hypertension Mother     Heart Attack Father      Allergies   Allergen Reactions    Sulfamethoxazole-Trimethoprim      Muscle pain,joint stiffness    Lyrica [Pregabalin] Hallucinations    Codeine     Erythromycin     Pcn [Penicillins]     Talwin [Pentazocine]     Chlorhexidine Rash    Sulfa Antibiotics Nausea And Vomiting     Muscle pain     Current Outpatient Medications   Medication Sig Dispense Refill    escitalopram (LEXAPRO) 10 MG tablet Take 1 tablet by mouth daily 30 tablet 0    promethazine (PHENERGAN) 25 MG tablet Take 1 tablet by mouth 2 times daily as needed for Nausea 30 tablet 0    lidocaine 4 % external patch Place 1 patch onto the skin daily 30 patch 0    docusate sodium (COLACE) 100 MG capsule Take 1 capsule by mouth 2 times daily 60 capsule 0    linaclotide (LINZESS) 290 MCG CAPS capsule Take 1 capsule by mouth daily 30 capsule 0    nystatin (MYCOSTATIN) 009643 UNIT/ML suspension Take 5 mLs by mouth 4 times daily 1 each 0    tiZANidine (ZANAFLEX) 4 MG tablet Take 2 tablets by mouth every 8 hours as needed (spasms) 90 tablet 0    levothyroxine (SYNTHROID) 100 MCG tablet Take 1 tablet by mouth daily 30 tablet 0    dicyclomine (BENTYL) 10 MG capsule Take 1 capsule by mouth 3 times daily as needed (spasms) 90 capsule 0    pantoprazole (PROTONIX) 40 MG tablet Take 1 tablet by mouth daily 30 tablet 0    sucralfate (CARAFATE) 1 GM tablet Take 1 tablet by mouth 3 times daily as needed (heartburn) 90 tablet 0     No current facility-administered medications for this visit. Social History     Socioeconomic History    Marital status:      Spouse name: Not on file    Number of children: 1    Years of education: Not on file    Highest education level: Not on file   Occupational History    Not on file   Tobacco Use    Smoking status: Current Every Day Smoker     Packs/day: 0.50     Years: 30.00     Pack years: 15.00     Types: Cigarettes    Smokeless tobacco: Never Used   Vaping Use    Vaping Use: Never used   Substance and Sexual Activity    Alcohol use: No    Drug use: No    Sexual activity: Not on file   Other Topics Concern    Not on file   Social History Narrative    Not on file     Social Determinants of Health     Financial Resource Strain:     Difficulty of Paying Living Expenses: Not on file   Food Insecurity:     Worried About Running Out of Food in the Last Year: Not on file    Antonette of Food in the Last Year: Not on file   Transportation Needs:     Lack of Transportation (Medical): Not on file    Lack of Transportation (Non-Medical):  Not on file   Physical Activity:     Days of Exercise per Week: Not on file    Minutes of Exercise per Session: Not on file   Stress:     Feeling of Stress : Not on file   Social Connections:     Frequency of Communication with Friends and Family: Not on file    Frequency of Social Gatherings with Friends and Family: Not on file    Attends Yazidi Services: Not on file    Active Member of Clubs or Organizations: Not on file    Attends Club or Organization Meetings: Not on file    Marital Status: Not on file   Intimate Partner Violence:     Fear of Current or Ex-Partner: Not on file    Emotionally Abused: Not on file    Physically Abused: Not on file    Sexually Abused: Not on file   Housing Stability:     Unable to Pay for Housing in the Last Year: Not on file    Number of Places Lived in the Last Year: Not on file    Unstable Housing in the Last Year: Not on file       Physical Examination:  /80   Pulse 78   Ht 5' 5\" (1.651 m)   Wt 138 lb 9.6 oz (62.9 kg)   SpO2 96%   BMI 23.06 kg/m²   Physical Exam  Vitals reviewed. Constitutional:       General: She is not in acute distress. Appearance: She is well-developed. She is ill-appearing. She is not toxic-appearing or diaphoretic. HENT:      Head: Normocephalic and atraumatic. Nose: Nose normal.      Mouth/Throat:      Mouth: Mucous membranes are dry. Eyes:      General: No scleral icterus. Extraocular Movements: Extraocular movements intact. Pupils: Pupils are equal, round, and reactive to light. Neck:      Vascular: No carotid bruit or JVD. Cardiovascular:      Rate and Rhythm: Normal rate and regular rhythm. Heart sounds: Normal heart sounds. No murmur heard. No friction rub. No gallop. Pulmonary:      Effort: Pulmonary effort is normal. No respiratory distress. Breath sounds: Normal breath sounds. No wheezing or rales. Abdominal:      General: Abdomen is flat. Bowel sounds are normal. There is no distension. Palpations: Abdomen is soft. There is no mass. Tenderness: There is no abdominal tenderness. There is no right CVA tenderness, left CVA tenderness, guarding or rebound. Hernia: No hernia is present. Musculoskeletal:         General: No swelling, tenderness, deformity or signs of injury. Cervical back: Normal range of motion and neck supple. No rigidity or tenderness. Right lower leg: No edema. Left lower leg: No edema. Lymphadenopathy:      Cervical: No cervical adenopathy.    Skin: General: Skin is warm and dry. Neurological:      General: No focal deficit present. Mental Status: She is alert and oriented to person, place, and time. Mental status is at baseline. Cranial Nerves: No cranial nerve deficit. Sensory: No sensory deficit. Motor: No weakness. Coordination: Coordination normal.   Psychiatric:         Mood and Affect: Mood normal.         Behavior: Behavior normal.         Thought Content: Thought content normal.         Judgment: Judgment normal.             ASSESSMENT:     Diagnosis Orders   1. SOB (shortness of breath)  Brain Natriuretic Peptide    ECHO Complete 2D W Doppler W Color   2. Bradycardia  EKG 12 lead    Brain Natriuretic Peptide    ECHO Complete 2D W Doppler W Color   3. Sinus node dysfunction (HCC)         PLAN:  Orders Placed This Encounter   Procedures    Brain Natriuretic Peptide    EKG 12 lead    ECHO Complete 2D W Doppler W Color     No orders of the defined types were placed in this encounter. 1. Continue present medications  2. After review recommend consideration for permanent pacemaker implantation primarily to prevent her from the adverse effects of a fall subsequent injury etc.  There does not appear to be any medications that can be withdrawn that would favorably affect this and this could happen unpredictably at any time leading to syncope and/or injury such as a broken hip etc.  3. Recommend echocardiogram and laboratory studies  4. Further comments to follow and the patient will reflect on this at home. 5. In follow-up assessment in 1 month    Return in about 4 weeks (around 7/4/2022) for return to Dr. Martha Hassan only. Surjit Guzman MD 6/6/2022 3:40 PM CDT    [id number] [facility]      Thisdictation was generated by voice recognition computer software. Although all attempts are made to edit the dictation for accuracy, there may be errors in the transcription that are not intended.

## 2022-06-13 ENCOUNTER — TELEPHONE (OUTPATIENT)
Dept: CARDIOLOGY CLINIC | Age: 72
End: 2022-06-13

## 2022-06-30 RX ORDER — ALPRAZOLAM 0.5 MG/1
TABLET ORAL 3 TIMES DAILY PRN
COMMUNITY
Start: 2022-06-21

## 2022-06-30 RX ORDER — POTASSIUM CHLORIDE 750 MG/1
CAPSULE, EXTENDED RELEASE ORAL
COMMUNITY
Start: 2022-05-24 | End: 2022-08-09 | Stop reason: ALTCHOICE

## 2022-06-30 RX ORDER — HYDROCODONE BITARTRATE AND ACETAMINOPHEN 10; 325 MG/1; MG/1
TABLET ORAL
COMMUNITY
Start: 2022-06-16 | End: 2022-08-09 | Stop reason: SDUPTHER

## 2022-06-30 RX ORDER — METRONIDAZOLE 500 MG/1
TABLET ORAL
COMMUNITY
Start: 2022-05-10 | End: 2022-08-09 | Stop reason: ALTCHOICE

## 2022-06-30 RX ORDER — AMLODIPINE BESYLATE 5 MG/1
TABLET ORAL DAILY
COMMUNITY
Start: 2022-05-16 | End: 2022-08-09 | Stop reason: ALTCHOICE

## 2022-06-30 RX ORDER — GABAPENTIN 100 MG/1
CAPSULE ORAL
COMMUNITY
Start: 2022-05-16 | End: 2022-08-09 | Stop reason: ALTCHOICE

## 2022-06-30 RX ORDER — PREGABALIN 50 MG/1
CAPSULE ORAL DAILY
COMMUNITY
Start: 2022-06-13

## 2022-06-30 RX ORDER — TRIAMTERENE AND HYDROCHLOROTHIAZIDE 37.5; 25 MG/1; MG/1
CAPSULE ORAL DAILY
COMMUNITY
Start: 2022-06-28

## 2022-06-30 RX ORDER — ONDANSETRON HYDROCHLORIDE 8 MG/1
TABLET, FILM COATED ORAL PRN
COMMUNITY
Start: 2022-05-31

## 2022-06-30 RX ORDER — PROMETHAZINE HYDROCHLORIDE 25 MG/1
SUPPOSITORY RECTAL
COMMUNITY
Start: 2022-05-16

## 2022-07-06 ENCOUNTER — TELEPHONE (OUTPATIENT)
Dept: CARDIOLOGY CLINIC | Age: 72
End: 2022-07-06

## 2022-07-06 NOTE — TELEPHONE ENCOUNTER
Patient called stating you wanted to know when she had an appt with gastro. She states it is scheduled for 8/2/22.

## 2022-07-19 ENCOUNTER — TELEPHONE (OUTPATIENT)
Dept: PAIN MANAGEMENT | Age: 72
End: 2022-07-19

## 2022-07-19 NOTE — TELEPHONE ENCOUNTER
Patient left voicemail on nurse line requesting call back regarding her upcoming office visit. She is no longer under the care of Dr. Lachelle Lam. She is scheduled to see Stephanie on 8/18. She thinks that she has enough medication to last until that appointment. She is needing [facility] to resume care at this time. I let her know that if she finds she does not have enough medication to make it to the appointment to let us know, as Dr. Lachelle Lam will not be writing anymore scripts. Patient verbalized understanding.

## 2022-08-02 DIAGNOSIS — G89.29 CHRONIC BILATERAL LOW BACK PAIN WITHOUT SCIATICA: ICD-10-CM

## 2022-08-02 DIAGNOSIS — M54.50 CHRONIC BILATERAL LOW BACK PAIN WITHOUT SCIATICA: ICD-10-CM

## 2022-08-02 DIAGNOSIS — G89.29 CHRONIC MYOFASCIAL PAIN: ICD-10-CM

## 2022-08-02 DIAGNOSIS — M79.18 CHRONIC MYOFASCIAL PAIN: ICD-10-CM

## 2022-08-03 RX ORDER — HYDROCODONE BITARTRATE AND ACETAMINOPHEN 10; 325 MG/1; MG/1
1 TABLET ORAL
Qty: 75 TABLET | Refills: 0 | Status: SHIPPED | OUTPATIENT
Start: 2022-08-03 | End: 2022-08-18 | Stop reason: SDUPTHER

## 2022-08-03 RX ORDER — TIZANIDINE 4 MG/1
6 TABLET ORAL 3 TIMES DAILY
Qty: 68 TABLET | Refills: 0 | Status: SHIPPED | OUTPATIENT
Start: 2022-08-03 | End: 2022-08-18 | Stop reason: SDUPTHER

## 2022-08-09 ENCOUNTER — OFFICE VISIT (OUTPATIENT)
Dept: GASTROENTEROLOGY | Age: 72
End: 2022-08-09
Payer: MEDICARE

## 2022-08-09 VITALS
WEIGHT: 131.8 LBS | HEART RATE: 68 BPM | SYSTOLIC BLOOD PRESSURE: 114 MMHG | DIASTOLIC BLOOD PRESSURE: 64 MMHG | BODY MASS INDEX: 21.96 KG/M2 | HEIGHT: 65 IN | OXYGEN SATURATION: 93 %

## 2022-08-09 DIAGNOSIS — R10.11 RUQ PAIN: ICD-10-CM

## 2022-08-09 DIAGNOSIS — R63.4 WEIGHT LOSS: ICD-10-CM

## 2022-08-09 DIAGNOSIS — K83.8 DILATION OF BILIARY TRACT: Primary | ICD-10-CM

## 2022-08-09 PROCEDURE — 1090F PRES/ABSN URINE INCON ASSESS: CPT | Performed by: NURSE PRACTITIONER

## 2022-08-09 PROCEDURE — G8427 DOCREV CUR MEDS BY ELIG CLIN: HCPCS | Performed by: NURSE PRACTITIONER

## 2022-08-09 PROCEDURE — 3017F COLORECTAL CA SCREEN DOC REV: CPT | Performed by: NURSE PRACTITIONER

## 2022-08-09 PROCEDURE — 4004F PT TOBACCO SCREEN RCVD TLK: CPT | Performed by: NURSE PRACTITIONER

## 2022-08-09 PROCEDURE — G8420 CALC BMI NORM PARAMETERS: HCPCS | Performed by: NURSE PRACTITIONER

## 2022-08-09 PROCEDURE — 99214 OFFICE O/P EST MOD 30 MIN: CPT | Performed by: NURSE PRACTITIONER

## 2022-08-09 PROCEDURE — G8400 PT W/DXA NO RESULTS DOC: HCPCS | Performed by: NURSE PRACTITIONER

## 2022-08-09 PROCEDURE — 1123F ACP DISCUSS/DSCN MKR DOCD: CPT | Performed by: NURSE PRACTITIONER

## 2022-08-09 ASSESSMENT — ENCOUNTER SYMPTOMS
VOMITING: 1
RECTAL PAIN: 0
ABDOMINAL PAIN: 1
ABDOMINAL DISTENTION: 0
BLOOD IN STOOL: 0
NAUSEA: 1
DIARRHEA: 0
SHORTNESS OF BREATH: 0
CHOKING: 0
TROUBLE SWALLOWING: 0
COUGH: 0
CONSTIPATION: 0
ANAL BLEEDING: 0

## 2022-08-09 NOTE — PROGRESS NOTES
Subjective:     Patient ID: Nasir Chamberlain is a 66 y.o. female  PCP: Nichole Knowles MD  Referring Provider: LIVE Solomon - NP    HPI  Patient presents to the office today with the following complaints: New Patient      Pt seen today for c/o abdominal pain and biliary dilation on CT. She reports nausea and vomiting. Worse in the am.  This began about 3 years ago. She reports weight loss of 20-25 lbs since December. She reports loss of appetite. She reports abdominal pain with constipation. She reports RUQ pain that comes and goes. Worse after physical exertion. Recent back surgery 3 months ago. Last EGD 2015 - HH   Last Colonoscopy 2015 - internal hemorrhoids, AP x 4  Denies any family hx colon cancer or colon polyps      Assessment:     1. Dilation of biliary tract    2. Weight loss    3. RUQ pain            Plan:   - Schedule MRI MRCP for biliary dilation  - Follow up pending results  - Call with any questions or concerns      Orders  Orders Placed This Encounter   Procedures    MRI ABDOMEN W WO CONTRAST MRCP     Standing Status:   Future     Standing Expiration Date:   8/9/2023     Order Specific Question:   STAT Creatinine as needed:     Answer:   No     Order Specific Question:   Reason for exam:     Answer:   compare to CT for intra and extrahepatic biliary dilation     Order Specific Question:   What is the sedation requirement? Answer:   None     Medications  No orders of the defined types were placed in this encounter.         Patient History:     Past Medical History:   Diagnosis Date    Abdominal pain     Arthritis     B12 deficiency     Bilateral low back pain without sciatica 12/22/2015    Chronic nausea     COPD (chronic obstructive pulmonary disease) ([facility] [address] 04/20/2022    Dyslipidemia     Edema     Facial cellulitis     Fall     Fibromyalgia     Fungal infection of skin     breast    GERD (gastroesophageal reflux disease)     History of adenomatous polyp of colon     Hx of blood clots     Hyperlipidemia     IBS (irritable bowel syndrome)     Lumbar radiculopathy     Migraine     Osteoarthritis of left knee     Ovarian cancer (HCC)     Pain in both knees 12/22/2015    Palpitations     Sciatica     Thyroid disease     Urinary incontinence        Past Surgical History:   Procedure Laterality Date    CHOLECYSTECTOMY, LAPAROSCOPIC      COLONOSCOPY Bilateral 04/10/2015    Dr Jodeane Fleischer, internal hemorrhoid, AP x 4, 3 yr recall    CYST INCISION AND DRAINAGE Left     Cheek    HYSTERECTOMY (CERVIX STATUS UNKNOWN)      LOBECTOMY Right     partial    LUMBAR FUSION  04/15/2022    w/hardware-Dr [id number]    LUMBAR FUSION Right 04/13/2022    Dr Titus Jerry  04/11/2022    Dr Short Nearing decompression, ALIF with instrumentation of L5-S1    SKIN CANCER EXCISION      TOTAL KNEE ARTHROPLASTY Bilateral     UPPER GASTROINTESTINAL ENDOSCOPY  04/2015    Dr Oni Morales, no h pylori       Family History   Problem Relation Age of Onset    Cancer Mother     Hypertension Mother     Heart Attack Father     Colon Cancer Neg Hx     Esophageal Cancer Neg Hx     Rectal Cancer Neg Hx     Stomach Cancer Neg Hx     Liver Cancer Neg Hx        Social History     Socioeconomic History    Marital status:     Number of children: 1   Tobacco Use    Smoking status: Every Day     Packs/day: 0.25     Years: 30.00     Pack years: 7.50     Types: Cigarettes    Smokeless tobacco: Never   Vaping Use    Vaping Use: Never used   Substance and Sexual Activity    Alcohol use: No    Drug use: No       Current Outpatient Medications   Medication Sig Dispense Refill    HYDROcodone-acetaminophen (NORCO)  MG per tablet Take 1 tablet by mouth every 4-6 hours as needed for Pain for up to 15 days. (may 5/day) enough til appt 75 tablet 0    tiZANidine (ZANAFLEX) 4 MG tablet Take 1.5 tablets by mouth in the morning and 1.5 tablets at noon and 1.5 tablets before bedtime.  Do all this for 15 days. Enough till appt. 68 tablet 0    triamterene-hydroCHLOROthiazide (DYAZIDE) 37.5-25 MG per capsule daily      promethazine (PHENERGAN) 25 MG suppository       pregabalin (LYRICA) 50 MG capsule daily. ondansetron (ZOFRAN) 8 MG tablet as needed      ALPRAZolam (XANAX) 0.5 MG tablet 3 times daily as needed. escitalopram (LEXAPRO) 10 MG tablet Take 1 tablet by mouth daily 30 tablet 0    promethazine (PHENERGAN) 25 MG tablet Take 1 tablet by mouth 2 times daily as needed for Nausea 30 tablet 0    lidocaine 4 % external patch Place 1 patch onto the skin daily 30 patch 0    docusate sodium (COLACE) 100 MG capsule Take 1 capsule by mouth 2 times daily 60 capsule 0    linaclotide (LINZESS) 290 MCG CAPS capsule Take 1 capsule by mouth daily 30 capsule 0    nystatin (MYCOSTATIN) 372032 UNIT/ML suspension Take 5 mLs by mouth 4 times daily (Patient taking differently: Take 500,000 Units by mouth as needed) 1 each 0    levothyroxine (SYNTHROID) 100 MCG tablet Take 1 tablet by mouth daily 30 tablet 0    dicyclomine (BENTYL) 10 MG capsule Take 1 capsule by mouth 3 times daily as needed (spasms) (Patient taking differently: Take 10 mg by mouth as needed (spasms)) 90 capsule 0    pantoprazole (PROTONIX) 40 MG tablet Take 1 tablet by mouth daily 30 tablet 0    sucralfate (CARAFATE) 1 GM tablet Take 1 tablet by mouth 3 times daily as needed (heartburn) 90 tablet 0     No current facility-administered medications for this visit. Allergies   Allergen Reactions    Sulfamethoxazole-Trimethoprim      Muscle pain,joint stiffness    Lyrica [Pregabalin] Hallucinations    Codeine     Erythromycin     Pcn [Penicillins] Hives     whelps    Chlorhexidine Rash    Sulfa Antibiotics Nausea And Vomiting     Muscle pain    Talwin [Pentazocine] Nausea And Vomiting       Review of Systems   Constitutional:  Positive for appetite change and unexpected weight change. Negative for activity change, fatigue and fever.    HENT:  Negative for trouble swallowing. Respiratory:  Negative for cough, choking and shortness of breath. Cardiovascular:  Negative for chest pain. Gastrointestinal:  Positive for abdominal pain, nausea and vomiting. Negative for abdominal distention, anal bleeding, blood in stool, constipation, diarrhea and rectal pain. Allergic/Immunologic: Negative for food allergies. All other systems reviewed and are negative. Objective:     /64   Pulse 68   Ht 5' 5\" (1.651 m)   Wt 131 lb 12.8 oz (59.8 kg)   SpO2 93%   BMI 21.93 kg/m²     Physical Exam  Vitals reviewed. Constitutional:       General: She is not in acute distress. Appearance: She is well-developed. HENT:      Head: Normocephalic and atraumatic. Right Ear: External ear normal.      Left Ear: External ear normal.      Nose: Nose normal.      Comments: Mask on     Mouth/Throat:      Comments: Mask on  Eyes:      Conjunctiva/sclera: Conjunctivae normal.      Pupils: Pupils are equal, round, and reactive to light. Cardiovascular:      Rate and Rhythm: Normal rate and regular rhythm. Heart sounds: Normal heart sounds. No murmur heard. No friction rub. No gallop. Pulmonary:      Effort: Pulmonary effort is normal. No respiratory distress. Breath sounds: Normal breath sounds. Abdominal:      General: Bowel sounds are normal. There is no distension. Palpations: Abdomen is soft. There is no mass. Tenderness: There is abdominal tenderness in the right upper quadrant. There is no guarding or rebound. Musculoskeletal:         General: Normal range of motion. Cervical back: Normal range of motion and neck supple. Skin:     General: Skin is warm and dry. Findings: No rash. Nails: There is no clubbing. Neurological:      Mental Status: She is alert and oriented to person, place, and time. Gait: Gait normal.   Psychiatric:         Behavior: Behavior normal.         Thought Content:  Thought content normal.

## 2022-08-17 ENCOUNTER — HOSPITAL ENCOUNTER (OUTPATIENT)
Dept: MRI IMAGING | Age: 72
Discharge: HOME | End: 2022-08-17
Payer: MEDICARE

## 2022-08-17 DIAGNOSIS — K83.8 DILATION OF BILIARY TRACT: ICD-10-CM

## 2022-08-17 LAB
GFR AFRICAN AMERICAN: >60
GFR NON-AFRICAN AMERICAN: >60
PERFORMED ON: NORMAL
POC CREATININE: 0.8 MG/DL (ref 0.3–1.3)
POC SAMPLE TYPE: NORMAL

## 2022-08-17 PROCEDURE — 74183 MRI ABD W/O CNTR FLWD CNTR: CPT | Performed by: RADIOLOGY

## 2022-08-17 PROCEDURE — 82565 ASSAY OF CREATININE: CPT

## 2022-08-17 PROCEDURE — A9577 INJ MULTIHANCE: HCPCS | Performed by: NURSE PRACTITIONER

## 2022-08-17 PROCEDURE — 6360000004 HC RX CONTRAST MEDICATION: Performed by: NURSE PRACTITIONER

## 2022-08-17 PROCEDURE — 74183 MRI ABD W/O CNTR FLWD CNTR: CPT

## 2022-08-17 RX ADMIN — GADOBENATE DIMEGLUMINE 12 ML: 529 INJECTION, SOLUTION INTRAVENOUS at 09:03

## 2022-08-18 ENCOUNTER — HOSPITAL ENCOUNTER (OUTPATIENT)
Dept: PAIN MANAGEMENT | Age: 72
Discharge: HOME | End: 2022-08-18
Payer: MEDICARE

## 2022-08-18 VITALS
WEIGHT: 131 LBS | HEART RATE: 66 BPM | HEIGHT: 65 IN | DIASTOLIC BLOOD PRESSURE: 62 MMHG | SYSTOLIC BLOOD PRESSURE: 124 MMHG | OXYGEN SATURATION: 97 % | BODY MASS INDEX: 21.83 KG/M2 | RESPIRATION RATE: 16 BRPM | TEMPERATURE: 96.8 F

## 2022-08-18 DIAGNOSIS — G89.29 CHRONIC MYOFASCIAL PAIN: ICD-10-CM

## 2022-08-18 DIAGNOSIS — M54.50 CHRONIC BILATERAL LOW BACK PAIN WITHOUT SCIATICA: ICD-10-CM

## 2022-08-18 DIAGNOSIS — M79.18 CHRONIC MYOFASCIAL PAIN: ICD-10-CM

## 2022-08-18 DIAGNOSIS — G89.29 CHRONIC BILATERAL LOW BACK PAIN WITHOUT SCIATICA: ICD-10-CM

## 2022-08-18 PROCEDURE — 99213 OFFICE O/P EST LOW 20 MIN: CPT

## 2022-08-18 PROCEDURE — 99213 OFFICE O/P EST LOW 20 MIN: CPT | Performed by: NURSE PRACTITIONER

## 2022-08-18 RX ORDER — HYDROCODONE BITARTRATE AND ACETAMINOPHEN 10; 325 MG/1; MG/1
1 TABLET ORAL
Qty: 150 TABLET | Refills: 0 | Status: SHIPPED | OUTPATIENT
Start: 2022-08-18 | End: 2022-09-07 | Stop reason: SDUPTHER

## 2022-08-18 RX ORDER — TIZANIDINE 4 MG/1
6 TABLET ORAL 3 TIMES DAILY
Qty: 135 TABLET | Refills: 5 | Status: SHIPPED | OUTPATIENT
Start: 2022-08-18 | End: 2023-02-14

## 2022-08-18 ASSESSMENT — PAIN DESCRIPTION - PAIN TYPE
TYPE_2: CHRONIC PAIN
TYPE: CHRONIC PAIN

## 2022-08-18 ASSESSMENT — PAIN SCALES - GENERAL: PAINLEVEL_OUTOF10: 8

## 2022-08-18 ASSESSMENT — ENCOUNTER SYMPTOMS
CONSTIPATION: 0
BACK PAIN: 1

## 2022-08-18 ASSESSMENT — PAIN DESCRIPTION - ORIENTATION
ORIENTATION: LOWER
ORIENTATION_2: RIGHT

## 2022-08-18 ASSESSMENT — PAIN DESCRIPTION - LOCATION
LOCATION: BACK
LOCATION_2: HIP

## 2022-08-18 ASSESSMENT — PAIN DESCRIPTION - INTENSITY: RATING_2: 8

## 2022-08-18 NOTE — PROGRESS NOTES
[facility] Physical & Pain Medicine  Office Note    Patient Name: Panda Ortega    MR #: [id number]    Account #: [de-identified]    : [id number]    Age: 66 y.o. Sex: female    Date: 2022    PCP: Bharat Bella MD    Chief Complaint:   Chief Complaint   Patient presents with    Back Pain    Hip Pain     right       History of Present Illness:     Panda Ortega is a 66 y.o. female who presents for office appointment. Since patient was here last, patient underwent low back surgery. Patient has had low back surgery. Patient's surgery was completed in the 3 stages. She had ALIF with instrumentation of L5/S1 2022, Right LLIF with instrumentation L4/L5 2022, and s/p PSF with instrumentation L4/S1. Patient is doing better since surgery. Pain is improving. Hip Pain  This is a chronic problem. The current episode started more than 1 year ago. The problem occurs constantly. The problem has been waxing and waning. Associated symptoms include arthralgias and myalgias. Pertinent negatives include no neck pain, numbness or weakness. Back Pain  This is a chronic problem. The current episode started more than 1 month ago. The problem occurs constantly. The problem has been waxing and waning since onset. The pain is present in the lumbar spine and sacro-iliac. The quality of the pain is described as aching, cramping and shooting. The symptoms are aggravated by bending, sitting, standing and twisting. Pertinent negatives include no numbness or weakness. Screening Tools:    PEG Score: 7     Last PEG Score: 8.7     Annual ORT Score: 5     Annual PHQ Score: 16    Current Pain Assessment  Pain Assessment  Pain Assessment: 0-10  Pain Level: 8  Pain Location: Back  Pain Orientation: Lower  Pain Type: Chronic pain    Past Visit HPI:  2022  Since last appointment, patient has been seen by orthopedic surgeon. Patient is going to have back surgery.   Patient states that back surgery is going to be a 3 part surgery. Dr. Lucas Baez will be doing the low back surgery    12/9/2020  presents for procedure follow-up. Patient had left sacroiliac joint injection and left ischial bursa injection on 8/26/2020 and Lumbar Epidural L3-4 on 9/11/2020. Patient had at least 85% relief of pain from procedure(s) for at least 6 weeks and was able to increase activity after procedure. Patient received enough pain relief from injections that the patient would like to repeat the injection(s). When patient was seen on 8/26/2020 for procedure, she was having left knee and right right-sided pain and chest due to a fall. Patient had assessment completed and ordered x-ray of chest and left knee. Chest x-ray ray revealed no acute process and left knee indicated well-seated normally aligned prosthetic components and no acute bony abnormalities. Patient presents today for sooner appointment due to increase in pain. Patient has fallen three times since last injection. Patient in unable to sit fully on left hip. Patient had been seen by PCP, no imaging was ordered. Reviewed past imaging and patient noted to have moderate to severe hip arthritis. 7/27/2020  presents after procedure follow up. Patient had LESI of L3/L4 and Left SI. Patient had good results from injections and she would like to repeat. However, patient has extreme tenderness in the buttocks especially while sitting down. This tenderness remained after injections. 5/5/2020  presents for follow-up of imaging and office visit. X-rays reviewed. Patient does have hip arthritis. Patient is still having increase in pain since fall. Patient states that pain medication is barely controlling pain. If patient does increase activity pain medication is not controlling pain. Back Pain is a recurrent problem. The current episode started 1 to 4 weeks ago. The problem occurs constantly. The problem has been waxing and waning since onset.  The pain is present in the lumbar spine and sacro-iliac. The quality of the pain is described as shooting, cramping and aching. The symptoms are aggravated by bending, sitting, standing and twisting. Pertinent negatives include no numbness or weakness. 2/4/2020  presents to the office for follow up of procedure. Patient is having worsening left hip pain s/p fall from the shower. Patient had been ill she thinks she lost LOC in the shower. She remembers falling and hitting the space heater with her head. Patient never lost LOC after fall. Patient had headache with low back pain and left hip pain. Patient followed up with PCP post fall. However pain is getting worse. Patient can not sit on left hip. Patient denies radiating pain. Patient states that pain is a constant ache. She cannot get comfortable. Patient is having problem ambulating due to pain. Patient is requesting xrays. Back Pain is a recurrent problem. The current episode started 1 to 4 weeks ago. The problem occurs constantly. The problem has been rapidly worsening since onset. The pain is present in the lumbar spine and sacro-iliac. The quality of the pain is described as aching and shooting. The symptoms are aggravated by bending, sitting and standing. Pertinent negatives include no numbness or weakness. 11/4/19  Annual Review  presents to the office for annual exam with primary complaints of chronic low back pain. Patient has been established with this practice for years. She has been receiving LESI of L3-L4 however injections had to be postponed due to cellulitis to her face. Patient had numerous hospitalizations and antibiotics and treatment. Patient takes Norco, gabapentin and Zanaflex. Patient states that current medication regimen helps to keep patient's pain at a tolerable level however she is ready to repeat injections. Patient denies any side effects to medications at this time.     Past Medical Histoy  Past Medical History:   Diagnosis Date Abdominal pain     Arthritis     B12 deficiency     Bilateral low back pain without sciatica 12/22/2015    Chronic nausea     COPD (chronic obstructive pulmonary disease) ([facility] [address] 04/20/2022    Dyslipidemia     Edema     Facial cellulitis     Fall     Fibromyalgia     Fungal infection of skin     breast    GERD (gastroesophageal reflux disease)     History of adenomatous polyp of colon     Hx of blood clots     Hyperlipidemia     IBS (irritable bowel syndrome)     Lumbar radiculopathy     Migraine     Osteoarthritis of left knee     Ovarian cancer (HCC)     Pain in both knees 12/22/2015    Palpitations     Sciatica     Thyroid disease     Urinary incontinence        Surgery History  Past Surgical History:   Procedure Laterality Date    CHOLECYSTECTOMY, LAPAROSCOPIC      COLONOSCOPY Bilateral 04/10/2015    Dr Terra Saleem, internal hemorrhoid, AP x 4, 3 yr recall    CYST INCISION AND DRAINAGE Left     Cheek    HYSTERECTOMY (CERVIX STATUS UNKNOWN)      LOBECTOMY Right     partial    LUMBAR FUSION  04/15/2022    w/hardware-Dr [id number]    LUMBAR FUSION Right 04/13/2022    Dr Sam Fisher  04/11/2022    Dr Bernadette Damon decompression, ALIF with instrumentation of L5-S1    SKIN CANCER EXCISION      TOTAL KNEE ARTHROPLASTY Bilateral     UPPER GASTROINTESTINAL ENDOSCOPY  04/2015    Dr Abiodun Romero, no h pylori        Family History  family history includes Cancer in her mother; Heart Attack in her father; Hypertension in her mother.     Social History    Social History     Tobacco Use    Smoking status: Every Day     Packs/day: 0.25     Years: 30.00     Pack years: 7.50     Types: Cigarettes    Smokeless tobacco: Never   Substance Use Topics    Alcohol use: No       Allergies  Sulfamethoxazole-trimethoprim, Lyrica [pregabalin], Codeine, Erythromycin, Pcn [penicillins], Chlorhexidine, Sulfa antibiotics, and Talwin [pentazocine]     Current Medications  Current Outpatient Medications Medication Sig Dispense Refill    HYDROcodone-acetaminophen (NORCO)  MG per tablet Take 1 tablet by mouth every 4-6 hours as needed for Pain for up to 15 days. (may 5/day) enough til appt 75 tablet 0    tiZANidine (ZANAFLEX) 4 MG tablet Take 1.5 tablets by mouth in the morning and 1.5 tablets at noon and 1.5 tablets before bedtime. Do all this for 15 days. Enough till appt. 68 tablet 0    triamterene-hydroCHLOROthiazide (DYAZIDE) 37.5-25 MG per capsule daily      promethazine (PHENERGAN) 25 MG suppository       pregabalin (LYRICA) 50 MG capsule daily. ondansetron (ZOFRAN) 8 MG tablet as needed      ALPRAZolam (XANAX) 0.5 MG tablet 3 times daily as needed. escitalopram (LEXAPRO) 10 MG tablet Take 1 tablet by mouth daily 30 tablet 0    promethazine (PHENERGAN) 25 MG tablet Take 1 tablet by mouth 2 times daily as needed for Nausea 30 tablet 0    docusate sodium (COLACE) 100 MG capsule Take 1 capsule by mouth 2 times daily 60 capsule 0    linaclotide (LINZESS) 290 MCG CAPS capsule Take 1 capsule by mouth daily 30 capsule 0    nystatin (MYCOSTATIN) 478954 UNIT/ML suspension Take 5 mLs by mouth 4 times daily (Patient taking differently: Take 500,000 Units by mouth as needed) 1 each 0    levothyroxine (SYNTHROID) 100 MCG tablet Take 1 tablet by mouth daily 30 tablet 0    dicyclomine (BENTYL) 10 MG capsule Take 1 capsule by mouth 3 times daily as needed (spasms) (Patient taking differently: Take 10 mg by mouth as needed (spasms)) 90 capsule 0    pantoprazole (PROTONIX) 40 MG tablet Take 1 tablet by mouth daily 30 tablet 0    sucralfate (CARAFATE) 1 GM tablet Take 1 tablet by mouth 3 times daily as needed (heartburn) 90 tablet 0     No current facility-administered medications for this encounter. Review of Systems:  Review of Systems   Constitutional:  Positive for activity change. Gastrointestinal:  Negative for constipation.    Musculoskeletal:  Positive for arthralgias, back pain, gait problem and myalgias. Negative for neck pain. Neurological:  Negative for weakness and numbness. Psychiatric/Behavioral:  Negative for agitation, self-injury and suicidal ideas. The patient is not nervous/anxious. 14 point ROS negative besides that noted in HPI    Physical Exam:    Vitals:    08/18/22 1349   BP: 124/62   Pulse: 66   Resp: 16   Temp: 96.8 °F (36 °C)   TempSrc: Temporal   SpO2: 97%   Weight: 131 lb (59.4 kg)   Height: 5' 5\" (1.651 m)       Body mass index is 21.8 kg/m². Physical Exam  Vitals and nursing note reviewed. Constitutional:       General: She is not in acute distress. Appearance: She is well-developed. HENT:      Head: Normocephalic. Right Ear: External ear normal.      Left Ear: External ear normal.      Nose: Nose normal.   Eyes:      Conjunctiva/sclera: Conjunctivae normal.      Pupils: Pupils are equal, round, and reactive to light. Neck:      Vascular: No JVD. Trachea: No tracheal deviation. Cardiovascular:      Rate and Rhythm: Normal rate. Pulmonary:      Effort: Pulmonary effort is normal.   Abdominal:      General: There is no distension. Tenderness: There is no abdominal tenderness. Musculoskeletal:      Lumbar back: Spasms, tenderness and bony tenderness present. Left hip: Tenderness and bony tenderness present. Comments: Piriformis TTP and ropelike   Patient points to on left SI joint as the source of low back pain  TTP of SI joint on left  positive Kalia's on left, positive Distraction on left, positive Thigh Thrust on left  TTP to left ischial bursa     Skin:     General: Skin is warm and dry. Neurological:      Mental Status: She is alert and oriented to person, place, and time. Cranial Nerves: No cranial nerve deficit. Psychiatric:         Behavior: Behavior normal.         Thought Content:  Thought content normal.         Judgment: Judgment normal.        Labs:  Lab Results   Component Value Date/Time     05/03/2022 05:29 PM     04/18/2011 01:11 PM    K 3.6 05/03/2022 05:29 PM    K 4.6 04/18/2011 01:11 PM     05/03/2022 05:29 PM     04/18/2011 01:11 PM    CO2 23 05/03/2022 05:29 PM    BUN 15 05/03/2022 05:29 PM    CREATININE 0.8 08/17/2022 08:29 AM    CREATININE 1.0 05/03/2022 05:29 PM    CREATININE 0.8 04/18/2011 01:11 PM    GLUCOSE 102 05/03/2022 05:29 PM    CALCIUM 9.0 05/03/2022 05:29 PM        Lab Results   Component Value Date    WBC 7.5 05/03/2022    HGB 11.7 (L) 05/03/2022    HCT 38.3 05/03/2022    MCV 93.9 05/03/2022     05/03/2022         Assessment:  Active Problems:    Bilateral low back pain without sciatica    Pain in both knees    Lumbar disc disease with radiculopathy    Hip pain, acute, left    SI (sacroiliac) joint dysfunction    Pain management contract agreement    Ischial bursitis of left side    Piriformis syndrome of left side    S/P lumbar fusion    Weakness  Resolved Problems:    * No resolved hospital problems. *      Plan:  Lumbar disc disease with radiculopathy  Chronic bilateral low back pain without sciatica  Continue medication with refill sent at appointment see refill encounter 8/18/2022     Plan to decrease to 7.5 mg at next appointment. However if patient wants to decrease sooner will decrease. HYDROcodone-acetaminophen (NORCO)  MG per tablet; Take 1 tablet by mouth every 4-6 hours as needed for Pain not to exceed 5 tablets in a 24 hour period for up to 30 days. Intended supply: 30 days  Dispense: 150 tablet; Refill: 0    Chronic myofascial pain  Continue medication with refill sent at appointment see refill encounter 8/18/2022     tiZANidine (ZANAFLEX) 4 MG tablet; Take 1.5 tablets by mouth 3 times daily  Dispense: 135 tablet;  Refill: 2    Will hold injections for another 3 months to allow patient to heal fully from the surgery     [x] Follow up    [] 4 weeks   [] 6-8 weeks   [] 10-12 weeks   [x] 3 months  [] Post procedure to evaluate effectiveness of treatment  [] To evaluate medications changes made at office visit. [] To review diagnostics ordered at last visit. [] To evaluate response to therapy    [x] For management of controlled substance  [x] Random UDS as indicated by ORT score or if indicated by abberent behaviors    Discussion: Discussed exam findings and plan of care with patient. Patient agreed with POC and questions were asked and answered. Activity: Discussed exercise as beneficial to pain reduction, encouraged daily stretching with a focus on torso strengthening. Goal:  Pain Management Goals of Therapy:   [] Resolution in pain  [x] Decrease in pain level  [x] Improvement in ADL's  [x] Increase in activities with less pain  [] Decrease in medication      Controlled substance monitoring:    [x] Discussed medication side effects, risk of tolerance and/or dependence, and/or alternative treatment  [] Discussed the detrimental effects of long term narcotic use in younger patients especially women of childbearing years.   [x] No signs and symptoms of potential drug abuse or diversion were identified  [] Signs of potential drug abuse or diversion were identified   [] ORT Score   [] UDS non-compliant   [] See Note  [] Random urine drug screen sent today  [x] Random urine drug screen not completed today   [] Deferred New Patient  [x] Compliant  12/6/2021  [] Not Compliant see note  [] Medication agreement with provider signed today  [x] Medication agreement with provider on file under media 9/21  [] Medication regimen effective and continued   [] New patient continuing current medication while developing POC   [x] On going assessment and evaluation of medication regimen  [] Medication regimen not effective see plan for changes  [x] Kishor Tobias reviewed & on file under media     CC:  MD Lora Pedro, LIVE - CNP, 8/18/2022 at 2:09 PM    EMR dragon/transcription disclaimer: Much of this encounter note is electronic transcription/translation of spoken language to printed tach. Electronic translation of spoken language may be erroneous, or at times, nonsensical words or phrases may be inadvertently transcribed.  Although, I have reviewed the note for such errors, some may still exist.

## 2022-08-18 NOTE — TELEPHONE ENCOUNTER
Requested Prescriptions     Signed Prescriptions Disp Refills    HYDROcodone-acetaminophen (NORCO)  MG per tablet 150 tablet 0     Sig: Take 1 tablet by mouth every 4-6 hours as needed for Pain for up to 30 days.  (may 5/day) enough til appt     Authorizing Provider: Kaycee DIALLO    tiZANidine (ZANAFLEX) 4 MG tablet 135 tablet 5     Sig: Take 1.5 tablets by mouth 3 times daily Enough till appt     Authorizing Provider: Brian Irvin

## 2022-09-06 DIAGNOSIS — G89.29 CHRONIC BILATERAL LOW BACK PAIN WITHOUT SCIATICA: ICD-10-CM

## 2022-09-06 DIAGNOSIS — M54.50 CHRONIC BILATERAL LOW BACK PAIN WITHOUT SCIATICA: ICD-10-CM

## 2022-09-07 RX ORDER — HYDROCODONE BITARTRATE AND ACETAMINOPHEN 10; 325 MG/1; MG/1
1 TABLET ORAL
Qty: 150 TABLET | Refills: 0 | Status: SHIPPED | OUTPATIENT
Start: 2022-09-17 | End: 2022-10-04 | Stop reason: SDUPTHER

## 2022-09-19 ENCOUNTER — TRANSCRIBE ORDERS (OUTPATIENT)
Dept: ADMINISTRATIVE | Facility: HOSPITAL | Age: 72
End: 2022-09-19

## 2022-09-19 DIAGNOSIS — Z98.1 ARTHRODESIS STATUS: ICD-10-CM

## 2022-09-19 DIAGNOSIS — M54.50 LOW BACK PAIN, UNSPECIFIED BACK PAIN LATERALITY, UNSPECIFIED CHRONICITY, UNSPECIFIED WHETHER SCIATICA PRESENT: Primary | ICD-10-CM

## 2022-09-19 RX ORDER — ESCITALOPRAM OXALATE 10 MG/1
10 TABLET ORAL DAILY
Qty: 30 TABLET | Refills: 2 | OUTPATIENT
Start: 2022-09-19

## 2022-10-03 DIAGNOSIS — M54.50 CHRONIC BILATERAL LOW BACK PAIN WITHOUT SCIATICA: ICD-10-CM

## 2022-10-03 DIAGNOSIS — G89.29 CHRONIC BILATERAL LOW BACK PAIN WITHOUT SCIATICA: ICD-10-CM

## 2022-10-04 RX ORDER — HYDROCODONE BITARTRATE AND ACETAMINOPHEN 10; 325 MG/1; MG/1
1 TABLET ORAL
Qty: 150 TABLET | Refills: 0 | Status: SHIPPED | OUTPATIENT
Start: 2022-10-17 | End: 2022-11-16

## 2022-10-11 ENCOUNTER — TELEPHONE (OUTPATIENT)
Dept: GASTROENTEROLOGY | Age: 72
End: 2022-10-11

## 2022-10-11 NOTE — TELEPHONE ENCOUNTER
10-11-22 Called and notified patient of results and recommendation as per Dr Amparo Felton     Routed to PCP Eugenio Sam MD     Routed to [facility]

## 2022-10-11 NOTE — TELEPHONE ENCOUNTER
----- Message from LIVE Restrepo NP sent at 10/11/2022 10:31 AM CDT -----  Not sure why I am just seeing this? MRI does show dilation of hepatic ducts. This maybe result of cholecystectomy, however, I recommend EGD with EUS for further evaluation due to abdominal pain and weight loss.

## 2022-10-14 ENCOUNTER — HOSPITAL ENCOUNTER (OUTPATIENT)
Dept: CT IMAGING | Facility: HOSPITAL | Age: 72
Discharge: HOME | End: 2022-10-14
Admitting: ORTHOPAEDIC SURGERY

## 2022-10-14 DIAGNOSIS — M54.50 LOW BACK PAIN, UNSPECIFIED BACK PAIN LATERALITY, UNSPECIFIED CHRONICITY, UNSPECIFIED WHETHER SCIATICA PRESENT: ICD-10-CM

## 2022-10-14 DIAGNOSIS — Z98.1 ARTHRODESIS STATUS: ICD-10-CM

## 2022-10-14 PROCEDURE — 72131 CT LUMBAR SPINE W/O DYE: CPT

## 2022-10-19 ENCOUNTER — ANESTHESIA EVENT (OUTPATIENT)
Dept: ENDOSCOPY | Age: 72
End: 2022-10-19
Payer: MEDICARE

## 2022-10-20 ENCOUNTER — HOSPITAL ENCOUNTER (OUTPATIENT)
Age: 72
Setting detail: OUTPATIENT SURGERY
Discharge: HOME | End: 2022-10-20
Attending: INTERNAL MEDICINE | Admitting: INTERNAL MEDICINE
Payer: MEDICARE

## 2022-10-20 ENCOUNTER — ANESTHESIA (OUTPATIENT)
Dept: ENDOSCOPY | Age: 72
End: 2022-10-20
Payer: MEDICARE

## 2022-10-20 VITALS
RESPIRATION RATE: 14 BRPM | HEIGHT: 65 IN | DIASTOLIC BLOOD PRESSURE: 66 MMHG | HEART RATE: 72 BPM | BODY MASS INDEX: 21.83 KG/M2 | TEMPERATURE: 97.8 F | SYSTOLIC BLOOD PRESSURE: 142 MMHG | OXYGEN SATURATION: 97 % | WEIGHT: 131 LBS

## 2022-10-20 DIAGNOSIS — R10.9 ABDOMINAL PAIN, UNSPECIFIED ABDOMINAL LOCATION: ICD-10-CM

## 2022-10-20 DIAGNOSIS — R63.4 WEIGHT LOSS: ICD-10-CM

## 2022-10-20 PROCEDURE — 6360000002 HC RX W HCPCS: Performed by: NURSE ANESTHETIST, CERTIFIED REGISTERED

## 2022-10-20 PROCEDURE — 3700000000 HC ANESTHESIA ATTENDED CARE: Performed by: INTERNAL MEDICINE

## 2022-10-20 PROCEDURE — 7100000011 HC PHASE II RECOVERY - ADDTL 15 MIN: Performed by: INTERNAL MEDICINE

## 2022-10-20 PROCEDURE — 2709999900 HC NON-CHARGEABLE SUPPLY: Performed by: INTERNAL MEDICINE

## 2022-10-20 PROCEDURE — 3609012400 HC EGD TRANSORAL BIOPSY SINGLE/MULTIPLE: Performed by: INTERNAL MEDICINE

## 2022-10-20 PROCEDURE — 43259 EGD US EXAM DUODENUM/JEJUNUM: CPT | Performed by: INTERNAL MEDICINE

## 2022-10-20 PROCEDURE — 7100000010 HC PHASE II RECOVERY - FIRST 15 MIN: Performed by: INTERNAL MEDICINE

## 2022-10-20 PROCEDURE — 2580000003 HC RX 258: Performed by: INTERNAL MEDICINE

## 2022-10-20 PROCEDURE — 3609018500 HC EGD US SCOPE W/ADJACENT STRUCTURES: Performed by: INTERNAL MEDICINE

## 2022-10-20 PROCEDURE — 43239 EGD BIOPSY SINGLE/MULTIPLE: CPT | Performed by: INTERNAL MEDICINE

## 2022-10-20 PROCEDURE — 2500000003 HC RX 250 WO HCPCS: Performed by: NURSE ANESTHETIST, CERTIFIED REGISTERED

## 2022-10-20 PROCEDURE — 3609017100 HC EGD: Performed by: INTERNAL MEDICINE

## 2022-10-20 PROCEDURE — 88305 TISSUE EXAM BY PATHOLOGIST: CPT

## 2022-10-20 PROCEDURE — 3700000001 HC ADD 15 MINUTES (ANESTHESIA): Performed by: INTERNAL MEDICINE

## 2022-10-20 RX ORDER — SODIUM CHLORIDE, SODIUM LACTATE, POTASSIUM CHLORIDE, CALCIUM CHLORIDE 600; 310; 30; 20 MG/100ML; MG/100ML; MG/100ML; MG/100ML
INJECTION, SOLUTION INTRAVENOUS CONTINUOUS
Status: DISCONTINUED | OUTPATIENT
Start: 2022-10-20 | End: 2022-10-20 | Stop reason: SDUPTHER

## 2022-10-20 RX ORDER — SODIUM CHLORIDE, SODIUM LACTATE, POTASSIUM CHLORIDE, CALCIUM CHLORIDE 600; 310; 30; 20 MG/100ML; MG/100ML; MG/100ML; MG/100ML
INJECTION, SOLUTION INTRAVENOUS CONTINUOUS
Status: DISCONTINUED | OUTPATIENT
Start: 2022-10-20 | End: 2022-10-20 | Stop reason: HOSPADM

## 2022-10-20 RX ORDER — PROPOFOL 10 MG/ML
INJECTION, EMULSION INTRAVENOUS CONTINUOUS PRN
Status: DISCONTINUED | OUTPATIENT
Start: 2022-10-20 | End: 2022-10-20 | Stop reason: SDUPTHER

## 2022-10-20 RX ORDER — ONDANSETRON 2 MG/ML
INJECTION INTRAMUSCULAR; INTRAVENOUS PRN
Status: DISCONTINUED | OUTPATIENT
Start: 2022-10-20 | End: 2022-10-20 | Stop reason: SDUPTHER

## 2022-10-20 RX ORDER — GLYCOPYRROLATE 0.2 MG/ML
INJECTION INTRAMUSCULAR; INTRAVENOUS PRN
Status: DISCONTINUED | OUTPATIENT
Start: 2022-10-20 | End: 2022-10-20 | Stop reason: SDUPTHER

## 2022-10-20 RX ORDER — LIDOCAINE HYDROCHLORIDE 10 MG/ML
INJECTION, SOLUTION INFILTRATION; PERINEURAL PRN
Status: DISCONTINUED | OUTPATIENT
Start: 2022-10-20 | End: 2022-10-20 | Stop reason: SDUPTHER

## 2022-10-20 RX ADMIN — GLYCOPYRROLATE 0.2 MG: 0.2 INJECTION, SOLUTION INTRAMUSCULAR; INTRAVENOUS at 12:24

## 2022-10-20 RX ADMIN — ONDANSETRON 4 MG: 2 INJECTION INTRAMUSCULAR; INTRAVENOUS at 12:28

## 2022-10-20 RX ADMIN — PROPOFOL 100 MCG/KG/MIN: 10 INJECTION, EMULSION INTRAVENOUS at 12:29

## 2022-10-20 RX ADMIN — SODIUM CHLORIDE, POTASSIUM CHLORIDE, SODIUM LACTATE AND CALCIUM CHLORIDE: 600; 310; 30; 20 INJECTION, SOLUTION INTRAVENOUS at 10:32

## 2022-10-20 RX ADMIN — LIDOCAINE HYDROCHLORIDE 40 MG: 10 INJECTION, SOLUTION INFILTRATION; PERINEURAL at 12:29

## 2022-10-20 ASSESSMENT — LIFESTYLE VARIABLES: SMOKING_STATUS: 1

## 2022-10-20 ASSESSMENT — PAIN DESCRIPTION - DESCRIPTORS: DESCRIPTORS: CRAMPING

## 2022-10-20 ASSESSMENT — PAIN SCALES - GENERAL
PAINLEVEL_OUTOF10: 0

## 2022-10-20 ASSESSMENT — PAIN - FUNCTIONAL ASSESSMENT: PAIN_FUNCTIONAL_ASSESSMENT: 0-10

## 2022-10-20 NOTE — ANESTHESIA PRE PROCEDURE
Department of Anesthesiology  Preprocedure Note       Name:  Danielle Han   Age:  74 y.o.  :  1950                                          MRN:  [id number]         Date:  10/20/2022      Surgeon: Nasir Delgadillo):  Micha Flower MD    Procedure: Procedure(s):  EGD BIOPSY  EGD ESOPHAGOGASTRODUODENOSCOPY ULTRASOUND    Medications prior to admission:   Prior to Admission medications    Medication Sig Start Date End Date Taking? Authorizing Provider   HYDROcodone-acetaminophen (NORCO)  MG per tablet Take 1 tablet by mouth every 4-6 hours as needed for Pain for up to 30 days. (may 5/day) 10/17/22 11/16/22  LIVE Givens CNP   tiZANidine (ZANAFLEX) 4 MG tablet Take 1.5 tablets by mouth 3 times daily Enough till appt 22  LIVE Givens CNP   triamterene-hydroCHLOROthiazide (DYAZIDE) 37.5-25 MG per capsule daily  Patient not taking: Reported on 10/20/2022 6/28/22   Historical Provider, MD   promethazine (PHENERGAN) 25 MG suppository  22   Historical Provider, MD   pregabalin (LYRICA) 50 MG capsule daily. 22   Historical Provider, MD   ondansetron (ZOFRAN) 8 MG tablet as needed 22   Historical Provider, MD   ALPRAZolam Edward Jonathon) 0.5 MG tablet 3 times daily as needed.  22   Historical Provider, MD   escitalopram (LEXAPRO) 10 MG tablet Take 1 tablet by mouth daily 22   Hayley Child MD   promethazine (PHENERGAN) 25 MG tablet Take 1 tablet by mouth 2 times daily as needed for Nausea 22   Hayley Child MD   docusate sodium (COLACE) 100 MG capsule Take 1 capsule by mouth 2 times daily 22   Hayley Child MD   linaclotide (LINZESS) 290 MCG CAPS capsule Take 1 capsule by mouth daily 22   Hayley Child MD   nystatin (MYCOSTATIN) 800205 UNIT/ML suspension Take 5 mLs by mouth 4 times daily  Patient taking differently: Take 500,000 Units by mouth as needed 22   Hayley Child MD   levothyroxine (SYNTHROID) 100 MCG tablet Take 1 tablet by mouth daily 4/29/22   Teresa Valderrama MD   dicyclomine (BENTYL) 10 MG capsule Take 1 capsule by mouth 3 times daily as needed (spasms)  Patient taking differently: Take 10 mg by mouth as needed (spasms) 4/28/22   Teresa Valderrama MD   pantoprazole (PROTONIX) 40 MG tablet Take 1 tablet by mouth daily 4/29/22   Teresa Valderrama MD   sucralfate (CARAFATE) 1 GM tablet Take 1 tablet by mouth 3 times daily as needed (heartburn) 4/28/22   Teresa Valderrama MD       Current medications:    Current Facility-Administered Medications   Medication Dose Route Frequency Provider Last Rate Last Admin    lactated ringers infusion   IntraVENous Continuous Claudia Cardenas  mL/hr at 10/20/22 1126 NoRateChange at 10/20/22 1126       Allergies:     Allergies   Allergen Reactions    Sulfamethoxazole-Trimethoprim      Muscle pain,joint stiffness    Lyrica [Pregabalin] Hallucinations    Codeine     Erythromycin     Pcn [Penicillins] Hives     whelps    Chlorhexidine Rash    Sulfa Antibiotics Nausea And Vomiting     Muscle pain    Talwin [Pentazocine] Nausea And Vomiting       Problem List:    Patient Active Problem List   Diagnosis Code    Bilateral low back pain without sciatica M54.50    Pain in both knees M25.561, M25.562    Lumbar disc disease with radiculopathy M51.16    Deep groin pain R10.30    Hip pain, acute, left M25.552    SI (sacroiliac) joint dysfunction M53.3    Pain management contract agreement Z02.89    Ischial bursitis of left side M70.72    Piriformis syndrome of left side G57.02    S/P lumbar fusion Z98.1    Weakness R53.1    Gait abnormality R26.9    Mood disorder ([facility]) F39    Acquired hypothyroidism E03.9    COPD (chronic obstructive pulmonary disease) ([facility]) J44.9    Gastroesophageal reflux disease without esophagitis K21.9    History of smoking Z87.891    Bradycardia R00.1       Past Medical History:        Diagnosis Date    Abdominal pain     Arthritis     B12 deficiency     Bilateral low back pain without sciatica 12/22/2015    Chronic nausea     COPD (chronic obstructive pulmonary disease) ([facility] [address] 04/20/2022    Dyslipidemia     Edema     Facial cellulitis     Fall     Fibromyalgia     Fungal infection of skin     breast    GERD (gastroesophageal reflux disease)     History of adenomatous polyp of colon     Hx of blood clots     Hyperlipidemia     IBS (irritable bowel syndrome)     Lumbar radiculopathy     Migraine     Osteoarthritis of left knee     Ovarian cancer ([facility] [address]     Pain in both knees 12/22/2015    Palpitations     Sciatica     Thyroid disease     Urinary incontinence        Past Surgical History:        Procedure Laterality Date    CHOLECYSTECTOMY, LAPAROSCOPIC      COLONOSCOPY Bilateral 04/10/2015    Dr Daniela Austin, internal hemorrhoid, AP x 4, 3 yr recall    CYST INCISION AND DRAINAGE Left     Cheek    HYSTERECTOMY (CERVIX STATUS UNKNOWN)      LOBECTOMY Right     partial    LUMBAR FUSION  04/15/2022    w/hardware-Dr River Cobarney    LUMBAR FUSION Right 04/13/2022    Dr Otto Pierce [place]  04/11/2022    Dr Hanna Pod decompression, ALIF with instrumentation of L5-S1    SKIN CANCER EXCISION      TOTAL KNEE ARTHROPLASTY Bilateral     UPPER GASTROINTESTINAL ENDOSCOPY  04/2015    Dr Elvira Doyle, no h pylori       Social History:    Social History     Tobacco Use    Smoking status: Every Day     Packs/day: 0.25     Years: 30.00     Pack years: 7.50     Types: Cigarettes    Smokeless tobacco: Never   Substance Use Topics    Alcohol use:  No                                Ready to quit: Not Answered  Counseling given: Not Answered      Vital Signs (Current):   Vitals:    10/20/22 1021   BP: 128/64   Pulse: 67   Resp: 16   Temp: 98 °F (36.7 °C)   SpO2: 97%   Weight: 131 lb (59.4 kg)   Height: 5' 5\" (1.651 m)                                              BP Readings from Last 3 Encounters:   10/20/22 128/64   08/18/22 124/62   08/09/22 114/64       NPO Status: Time of last liquid consumption: 2359                        Time of last solid consumption: 1800                        Date of last liquid consumption: 10/19/22                        Date of last solid food consumption: 10/19/22    BMI:   Wt Readings from Last 3 Encounters:   10/20/22 131 lb (59.4 kg)   08/18/22 131 lb (59.4 kg)   08/17/22 131 lb (59.4 kg)     Body mass index is 21.8 kg/m². CBC:   Lab Results   Component Value Date/Time    WBC 7.5 05/03/2022 05:29 PM    RBC 4.08 05/03/2022 05:29 PM    HGB 11.7 05/03/2022 05:29 PM    HCT 38.3 05/03/2022 05:29 PM    MCV 93.9 05/03/2022 05:29 PM    RDW 13.7 05/03/2022 05:29 PM     05/03/2022 05:29 PM       CMP:   Lab Results   Component Value Date/Time     05/03/2022 05:29 PM     04/18/2011 01:11 PM    K 3.6 05/03/2022 05:29 PM    K 4.6 04/18/2011 01:11 PM     05/03/2022 05:29 PM     04/18/2011 01:11 PM    CO2 23 05/03/2022 05:29 PM    BUN 15 05/03/2022 05:29 PM    CREATININE 0.8 08/17/2022 08:29 AM    CREATININE 1.0 05/03/2022 05:29 PM    CREATININE 0.8 04/18/2011 01:11 PM    GFRAA >60 08/17/2022 08:29 AM    LABGLOM >60 08/17/2022 08:29 AM    GLUCOSE 102 05/03/2022 05:29 PM    PROT 6.8 05/03/2022 05:29 PM    CALCIUM 9.0 05/03/2022 05:29 PM    BILITOT 0.4 05/03/2022 05:29 PM    ALKPHOS 118 05/03/2022 05:29 PM    AST 13 05/03/2022 05:29 PM    ALT <5 05/03/2022 05:29 PM       POC Tests: No results for input(s): POCGLU, POCNA, POCK, POCCL, POCBUN, POCHEMO, POCHCT in the last 72 hours.     Coags:   Lab Results   Component Value Date/Time    PROTIME 14.0 04/19/2022 04:43 PM    INR 1.09 04/19/2022 04:43 PM       HCG (If Applicable): No results found for: PREGTESTUR, PREGSERUM, HCG, HCGQUANT     ABGs: No results found for: PHART, PO2ART, FYE7JYL, MPS5FAL, BEART, B6BMRVFP     Type & Screen (If Applicable):  No results found for: LABABO, LABRH    Drug/Infectious Status (If Applicable):  No results found for: HIV, HEPCAB    COVID-19 Screening (If Applicable): No results found for: COVID19        Anesthesia Evaluation  Patient summary reviewed  Airway: Mallampati: II  TM distance: >3 FB   Neck ROM: full  Mouth opening: < 3 FB   Dental: normal exam         Pulmonary:normal exam    (+) COPD:  current smoker          Patient smoked on day of surgery. ROS comment: Right upper lung lobectomy   Cardiovascular:  Exercise tolerance: no interval change,            Beta Blocker:  Not on Beta Blocker         Neuro/Psych:   (+) headaches: migraine headaches, depression/anxiety              ROS comment: Chronic narc and benzo use  fibromyalgia GI/Hepatic/Renal:   (+) GERD:,          ROS comment: Abnormal imaging of ABD- \"mild inra and extra hepatic biliary dilation   Chronic nausea. Endo/Other:    (+) hypothyroidism::., malignancy/cancer (ovarian cancer 1975). Abdominal:             Vascular:   + DVT, . Other Findings:           Anesthesia Plan      general and TIVA     ASA 3       Induction: intravenous. Anesthetic plan and risks discussed with patient.                         Samina Found, APRN - CRNA   10/20/2022

## 2022-10-20 NOTE — H&P
Patient Name: Naveen Gifford  : 1950  MRN: [id number]  DATE: 10/20/22    Allergies: Allergies   Allergen Reactions    Sulfamethoxazole-Trimethoprim      Muscle pain,joint stiffness    Lyrica [Pregabalin] Hallucinations    Codeine     Erythromycin     Pcn [Penicillins] Hives     whelps    Chlorhexidine Rash    Sulfa Antibiotics Nausea And Vomiting     Muscle pain    Talwin [Pentazocine] Nausea And Vomiting        ENDOSCOPY  History and Physical    Procedure:    [] Diagnostic Colonoscopy       [] Screening Colonoscopy  [] EGD      [] ERCP      [] EUS       [] Other    [x] Previous office notes/History and Physical reviewed from the patients chart. Please see EMR for further details of HPI. I have examined the patient's status immediately prior to the procedure and:      Indications/HPI:      1. Dilation of biliary tract    2. Weight loss    3. RUQ pain    4. Abnormal ultrasound and MRCP findings to be dilated bile ducts  5. Status postcholecystectomy    Recent MRCP with findings as follows:   1. Common bile duct and intrahepatic biliary radicles appear dilated with likely distal common bile duct which could be a normal finding postcholecystectomy however, common bile duct stricture in the appropriate clinical setting needs to be considered. Common bile duct measures 9 mm. Right and left hepatic ducts measures 5 mm. No ductal calculus. 2. Post cholecystectomy status.      []Abdominal Pain   []Cancer- GI/Lung     []Fhx of colon CA/polyps  []History of Polyps  []Barretts            []Melena  []Abnormal Imaging              []Dysphagia              []Persistent Pneumonia   []Anemia                            []Food Impaction        []History of Polyps  [] GI Bleed             []Pulmonary nodule/Mass   []Change in bowel habits []Heartburn/Reflux  []Rectal Bleed (BRBPR)  []Chest Pain - Non Cardiac []Heme (+) Stool []Ulcers  []Constipation  []Hemoptysis  []Varices  []Diarrhea  []Hypoxemia    []Nausea/Vomiting   []Screening   []Crohns/Colitis  []Other:     Anesthesia:   [x] MAC [] Moderate Sedation   [] General   [] None     ROS: 12 pt Review of Symptoms was negative unless mentioned above    Medications:   Prior to Admission medications    Medication Sig Start Date End Date Taking? Authorizing Provider   HYDROcodone-acetaminophen (NORCO)  MG per tablet Take 1 tablet by mouth every 4-6 hours as needed for Pain for up to 30 days. (may 5/day) 10/17/22 11/16/22  LIVE Lawson CNP   tiZANidine (ZANAFLEX) 4 MG tablet Take 1.5 tablets by mouth 3 times daily Enough till appt 8/18/22 2/14/23  LIVE Lawson CNP   triamterene-hydroCHLOROthiazide (DYAZIDE) 37.5-25 MG per capsule daily  Patient not taking: Reported on 10/20/2022 6/28/22   Historical Provider, MD   promethazine (PHENERGAN) 25 MG suppository  5/16/22   Historical Provider, MD   pregabalin (LYRICA) 50 MG capsule daily. 6/13/22   Historical Provider, MD   ondansetron (ZOFRAN) 8 MG tablet as needed 5/31/22   Historical Provider, MD   ALPRAZolam Marcelo Chen) 0.5 MG tablet 3 times daily as needed.  6/21/22   Historical Provider, MD   escitalopram (LEXAPRO) 10 MG tablet Take 1 tablet by mouth daily 4/28/22   Mavis Irizarry MD   promethazine (PHENERGAN) 25 MG tablet Take 1 tablet by mouth 2 times daily as needed for Nausea 4/28/22   Mavis Irizarry MD   docusate sodium (COLACE) 100 MG capsule Take 1 capsule by mouth 2 times daily 4/28/22   Mavis Irizarry MD   linaclotide (LINZESS) 290 MCG CAPS capsule Take 1 capsule by mouth daily 4/29/22   Mavis Irizarry MD   nystatin (MYCOSTATIN) 828036 UNIT/ML suspension Take 5 mLs by mouth 4 times daily  Patient taking differently: Take 500,000 Units by mouth as needed 4/28/22   Mavis Irizarry MD   levothyroxine (SYNTHROID) 100 MCG tablet Take 1 tablet by mouth daily 4/29/22   Mavis Irizarry MD   dicyclomine (BENTYL) 10 MG capsule Take 1 capsule by mouth 3 times daily as needed (spasms)  Patient taking differently: Take 10 mg by mouth as needed (spasms) 4/28/22   Erika Torres MD   pantoprazole (PROTONIX) 40 MG tablet Take 1 tablet by mouth daily 4/29/22   Erika Torres MD   sucralfate ([place]) 1 GM tablet Take 1 tablet by mouth 3 times daily as needed (heartburn) 4/28/22   Erika Torres MD       Past Medical History:  Past Medical History:   Diagnosis Date    Abdominal pain     Arthritis     B12 deficiency     Bilateral low back pain without sciatica 12/22/2015    Chronic nausea     COPD (chronic obstructive pulmonary disease) ([facility] [address] 04/20/2022    Dyslipidemia     Edema     Facial cellulitis     Fall     Fibromyalgia     Fungal infection of skin     breast    GERD (gastroesophageal reflux disease)     History of adenomatous polyp of colon     Hx of blood clots     Hyperlipidemia     IBS (irritable bowel syndrome)     Lumbar radiculopathy     Migraine     Osteoarthritis of left knee     Ovarian cancer (HCC)     Pain in both knees 12/22/2015    Palpitations     Sciatica     Thyroid disease     Urinary incontinence        Past Surgical History:  Past Surgical History:   Procedure Laterality Date    CHOLECYSTECTOMY, LAPAROSCOPIC      COLONOSCOPY Bilateral 04/10/2015    Dr Kellie Mccormick, internal hemorrhoid, AP x 4, 3 yr recall    CYST INCISION AND DRAINAGE Left     Cheek    HYSTERECTOMY (CERVIX STATUS UNKNOWN)      LOBECTOMY Right     partial    LUMBAR FUSION  04/15/2022    w/hardware-Dr [id number]    LUMBAR FUSION Right 04/13/2022    Dr Marycruz Nguyen  04/11/2022    Dr Ignacio Pham decompression, ALIF with instrumentation of L5-S1    SKIN CANCER EXCISION      TOTAL KNEE ARTHROPLASTY Bilateral     UPPER GASTROINTESTINAL ENDOSCOPY  04/2015    Dr Tucker Allen, no h pylori       Social History:  Social History     Tobacco Use    Smoking status: Every Day     Packs/day: 0.25     Years: 30.00     Pack years: 7.50     Types: Cigarettes    Smokeless tobacco: Never   Vaping Use    Vaping Use: Never used   Substance Use Topics    Alcohol use: No    Drug use: No       Vital Signs:   Vitals:    10/20/22 1021   BP: 128/64   Pulse: 67   Resp: 16   Temp: 98 °F (36.7 °C)   SpO2: 97%        Physical Exam:  Cardiac:  [x]WNL  []Comments:  Pulmonary:  [x]WNL   []Comments:  Neuro/Mental Status:  [x]WNL  []Comments:  Abdominal:  [x]WNL    []Comments:  Other:   []WNL  []Comments:    Informed Consent:  The risks and benefits of the procedure have been discussed with either the patient or if they cannot consent, their representative. Assessment:  Patient examined and appropriate for planned sedation and procedure. Plan:  Proceed with planned sedation and procedure as above.          Grant Hernandez MD

## 2022-10-20 NOTE — ANESTHESIA POSTPROCEDURE EVALUATION
Department of Anesthesiology  Postprocedure Note    Patient: Tra Klein  MRN: [id number]  [place]: 1950  Date of evaluation: 10/20/2022      Procedure Summary     Date: 10/20/22 Room / Location: [address]    Anesthesia Start: 1222 Anesthesia Stop:     Procedures:       EGD BIOPSY (Abdomen)      EGD ESOPHAGOGASTRODUODENOSCOPY ULTRASOUND      EGD DIAGNOSTIC ONLY Diagnosis:       Abdominal pain, unspecified abdominal location      Weight loss      (Abdominal pain, unspecified abdominal location [R10.9])      (Weight loss [R63.4])    Surgeons: Keila Goncalves MD Responsible Provider: LIVE Washington CRNA    Anesthesia Type: general, TIVA ASA Status: 3          Anesthesia Type: No value filed.     Evelyn Phase I: Evelyn Score: 10    Evelyn Phase II:        Anesthesia Post Evaluation    Patient location during evaluation: bedside  Patient participation: complete - patient participated  Level of consciousness: sleepy but conscious  Pain score: 0  Airway patency: patent  Nausea & Vomiting: no nausea and no vomiting  Complications: no  Cardiovascular status: hemodynamically stable and blood pressure returned to baseline  Respiratory status: acceptable and nasal cannula  Hydration status: stable

## 2022-10-20 NOTE — OP NOTE
Referring/Primary Care Provider: Kalpesh Dominique MD,     Date of Procedure: 10/20/22    Endoscopist & Endosonographer: Nick Pablo MD, PhD    Procedure:   1. EGD with Endoscopic Ultrasound     Indications:   1. Dilation of biliary tract    2. Weight loss    3. RUQ pain    4. Abnormal ultrasound and MRCP findings to be dilated bile ducts  5. Status postcholecystectomy     Recent MRCP with findings as follows:   1. Common bile duct and intrahepatic biliary radicles appear dilated with likely distal common bile duct which could be a normal finding postcholecystectomy however, common bile duct stricture in the appropriate clinical setting needs to be considered. Common bile duct measures 9 mm. Right and left hepatic ducts measures 5 mm. No ductal calculus. 2. Post cholecystectomy status. Anesthesia:  Sedation was administered by anesthesia who monitored the patient during the procedure. Procedure:   After reviewing the patient's chart, H&P, medications, obtaining informed consent, and discussing risks benefits and alternatives to the procedure the patient was placed in the left lateral decubitus position. A oblique viewing Olympus radial EUS scope was lubricated and inserted through the mouth into the oropharynx. Under indirect visualization, the upper esophagus was intubated. The scope was advanced to the level of the third portion of duodenum with limited views of the esophageal mucosa. Findings and maneuvers are listed in impression below. The patient tolerated the procedure well. There were no immediate complications. Findings:   Endoscopic Finding:   Essentially normal except for a small hiatal hernia. Please refer to the detailed separate EGD report under this EMR for more information    Endosonographic Findings:  - The celiac axis and associated vascular structures was identified and examined.   No concerning or malignant lymphadenopathy was identified.     - Limited views of the left lobe of the liver revealed no obvious cystic or solid lesions or abnormal appearing intrahepatic ductal dilatation or filling defects within the hepatic ducts. - The EUS scope was advanced to the duodenal bulb and to the area of the duodenal ampulla which appeared normal.  The common bile duct measured 9 mm in the midportion and with a smooth tapering was approximately 6 mm in maximal diameter in the distal third within the head of the pancreas. No obvious filling defects were noted within the examined extrahepatic common bile duct. Patient's postcholecystectomy status was confirmed with absence of gallbladder as expected. - Pancreas: The examined pancreas appeared essentially normal with the main pancreatic duct measuring 2 mm within the head portion and 1.2 to 1.5 mm within the body and tail portion. Estimated Blood Loss: minimal    IMPRESSION:  Normal postcholecystectomy extrahepatic biliary tree without any obvious filling defects or mass lesions seen. Normal duodenal major papilla and ampulla  Normal main pancreatic duct and pancreatic parenchyma. RECOMMENDATIONS:   -Please refer to EGD report on this EMR for details    The results were discussed with the patient and family. A copy of the images obtained were given to the patient.      (Please note that portions of this note were completed with a voice recognition program. Efforts were made to edit the dictations but occasionally words may be mis-transcribed.)     Cristlea Rodriguez MD, MD  10/20/22  12:02 PM

## 2022-10-20 NOTE — DISCHARGE INSTRUCTIONS
1. Await path results, the patient will be contacted in 7-10 days with biopsy results. 2. - Resume previous meds and diet  - GI clinic f/u 4-6 weeks with    - Keep scheduled f/u appts with other MDs     - NO NSAIDs x 2 weeks      Upper GI Endoscopy: What to Expect at [address] had an upper GI endoscopy. Your doctor used a thin, lighted tube that bends to look at the inside of your esophagus, your stomach, and the first part of the small intestine, called the duodenum. After you have an endoscopy, you will stay at the hospital or clinic for 1 to 2 hours. This will allow the medicine to wear off. You will be able to go home after your doctor or nurse checks to make sure that you're not having any problems. You may have to stay overnight if you had treatment during the test. You may have a sore throat for a day or two after the test.  This care sheet gives you a general idea about what to expect after the test.  How can you care for yourself at home? Activity   Rest as much as you need to after you go home. You should be able to go back to your usual activities the day after the test.  Diet   Follow your doctor's directions for eating after the test.  Drink plenty of fluids (unless your doctor has told you not to). Medications   If you have a sore throat the day after the test, use an over-the-counter spray to numb your throat. Follow-up care is a key part of your treatment and safety. Be sure to make and go to all appointments, and call your doctor if you are having problems. It's also a good idea to know your test results and keep a list of the medicines you take. When should you call for help? Call 911 anytime you think you may need emergency care. For example, call if:    You passed out (lost consciousness). You have trouble breathing. You pass maroon or bloody stools.    Call your doctor now or seek immediate medical care if:    You have pain that does not get better after your take pain medicine. You have new or worse belly pain. You have blood in your stools. You are sick to your stomach and cannot keep fluids down. You have a fever. You cannot pass stools or gas. Watch closely for changes in your health, and be sure to contact your doctor if:    Your throat still hurts after a day or two. You do not get better as expected. Where can you learn more? Go to https://[employer].pepiceweb.[employer]. org and sign in to your [employer] account. Enter W793 in the [employer] box to learn more about \"Upper GI Endoscopy: What to Expect at Home. \"     If you do not have an account, please click on the \"Sign Up Now\" link. Current as of: June 6, 2022               Content Version: 13.4  © 2006-2022 Healthwise, [employer]. Care instructions adapted under license by [facility] ([facility]). If you have questions about a medical condition or this instruction, always ask your healthcare professional. [place] [address] any warranty or liability for your use of this information. Endoscopic Ultrasound (Oral): What to Expect At Home  Your Recovery  After you have an endoscopic ultrasound--a test to look for problems in the stomach, liver, gallbladder, and other organs--you will stay at the hospital or clinic for 1 to 2 hours. This will allow the medicine to wear off. You will be able to go home after your doctor or nurse checks to make sure you are not having any problems. You may have a sore throat for a day or two after the test.  This care sheet gives you a general idea about what to expect after the test.  How can you care for yourself at home? Activity    Rest as much as you need to after you go home. You should be able to go back to your usual activities the day after the test.   Diet    Follow your doctor's directions for eating after the test.     Drink plenty of fluids (unless your doctor has told you not to).    Medicines    If you have a sore throat the day after the test, use an over-the-counter spray to numb your throat. Other instructions    Ask your doctor when you can drive again. Do not sign legal documents or make major decisions until the medicine effects are gone and you can think clearly. The anesthesia medicine can make it hard for you to fully understand what you are agreeing to. Follow-up care is a key part of your treatment and safety. Be sure to make and go to all appointments, and call your doctor if you are having problems. It's also a good idea to know your test results and keep a list of the medicines you take. When should you call for help? Call 911 anytime you think you may need emergency care. For example, call if:    You passed out (lost consciousness). You have trouble breathing. Call your doctor now or seek immediate medical care if:    You are vomiting. You have new or worse belly pain. You have a fever. You cannot pass stools or gas. Watch closely for any changes in your health, and be sure to contact your doctor if:    You do not get better as expected. Current as of: June 6, 2022               Content Version: 13.4  © 2006-2022 Healthwise, [employer]. Care instructions adapted under license by [facility] ([facility]). If you have questions about a medical condition or this instruction, always ask your healthcare professional. [address] any warranty or liability for your use of this information.

## 2022-11-07 DIAGNOSIS — M54.50 CHRONIC BILATERAL LOW BACK PAIN WITHOUT SCIATICA: ICD-10-CM

## 2022-11-07 DIAGNOSIS — G89.29 CHRONIC BILATERAL LOW BACK PAIN WITHOUT SCIATICA: ICD-10-CM

## 2022-11-07 RX ORDER — HYDROCODONE BITARTRATE AND ACETAMINOPHEN 10; 325 MG/1; MG/1
1 TABLET ORAL
Qty: 150 TABLET | Refills: 0 | Status: SHIPPED | OUTPATIENT
Start: 2022-11-16 | End: 2022-11-29 | Stop reason: SDUPTHER

## 2022-11-29 ENCOUNTER — HOSPITAL ENCOUNTER (OUTPATIENT)
Dept: PAIN MANAGEMENT | Age: 72
Discharge: HOME | End: 2022-11-29
Payer: MEDICARE

## 2022-11-29 VITALS
RESPIRATION RATE: 16 BRPM | BODY MASS INDEX: 23.16 KG/M2 | SYSTOLIC BLOOD PRESSURE: 96 MMHG | OXYGEN SATURATION: 96 % | HEIGHT: 65 IN | WEIGHT: 139 LBS | HEART RATE: 64 BPM | DIASTOLIC BLOOD PRESSURE: 52 MMHG | TEMPERATURE: 96.8 F

## 2022-11-29 DIAGNOSIS — G89.29 CHRONIC BILATERAL LOW BACK PAIN WITHOUT SCIATICA: ICD-10-CM

## 2022-11-29 DIAGNOSIS — M25.551 PAIN OF BOTH HIP JOINTS: ICD-10-CM

## 2022-11-29 DIAGNOSIS — M51.16 LUMBAR DISC DISEASE WITH RADICULOPATHY: Primary | ICD-10-CM

## 2022-11-29 DIAGNOSIS — M54.50 CHRONIC BILATERAL LOW BACK PAIN WITHOUT SCIATICA: ICD-10-CM

## 2022-11-29 DIAGNOSIS — M25.552 PAIN OF BOTH HIP JOINTS: ICD-10-CM

## 2022-11-29 PROCEDURE — 99215 OFFICE O/P EST HI 40 MIN: CPT

## 2022-11-29 RX ORDER — HYDROCODONE BITARTRATE AND ACETAMINOPHEN 10; 325 MG/1; MG/1
1 TABLET ORAL
Qty: 150 TABLET | Refills: 0 | Status: SHIPPED | OUTPATIENT
Start: 2022-12-15 | End: 2023-01-14

## 2022-11-29 ASSESSMENT — PAIN DESCRIPTION - INTENSITY: RATING_2: 6

## 2022-11-29 ASSESSMENT — ENCOUNTER SYMPTOMS
BACK PAIN: 1
CONSTIPATION: 0

## 2022-11-29 ASSESSMENT — PAIN SCALES - GENERAL: PAINLEVEL_OUTOF10: 7

## 2022-11-29 ASSESSMENT — PAIN DESCRIPTION - LOCATION
LOCATION: BACK
LOCATION_2: HIP

## 2022-11-29 ASSESSMENT — PAIN DESCRIPTION - PAIN TYPE
TYPE_2: CHRONIC PAIN
TYPE: CHRONIC PAIN

## 2022-11-29 ASSESSMENT — PAIN DESCRIPTION - ORIENTATION
ORIENTATION_2: RIGHT
ORIENTATION: LOWER

## 2022-11-29 NOTE — PROGRESS NOTES
[facility] Physical & Pain Medicine  Office Note    Patient Name: Ernestina Garcia    MR #: [id number]    Account #: [de-identified]    : /[id number]    Age: 74 y.o. Sex: female    Date: 2022    PCP: James Hunter MD    Chief Complaint:   Chief Complaint   Patient presents with    Back Pain     7/10    Hip Pain     Right, 610       History of Present Illness:     Ernestina Garcia is a 74 y.o. female who presents for office appointment. Patient is doing better since having back surgery. Hip Pain  This is a chronic problem. The current episode started more than 1 year ago. The problem occurs constantly. Associated symptoms include arthralgias, myalgias and neck pain. Pertinent negatives include no numbness or weakness. Back Pain  This is a chronic problem. The current episode started more than 1 month ago. The problem occurs constantly. The problem has been waxing and waning since onset. The pain is present in the lumbar spine and sacro-iliac. The quality of the pain is described as cramping, aching and shooting. The symptoms are aggravated by standing, twisting, bending and sitting. Pertinent negatives include no numbness or weakness. Screening Tools:    PEG Score: 7.3     Last PEG Score: 7     Annual ORT Score: 5     Annual PHQ Score: 16    Current Pain Assessment  Pain Assessment  Pain Assessment: 0-10  Pain Level: 7  Pain Location: Back  Pain Orientation: Lower  Pain Type: Chronic pain    Past Visit HPI:  2022  Since patient was here last, patient underwent low back surgery. Patient has had low back surgery. Patient's surgery was completed in the 3 stages. She had ALIF with instrumentation of L5/S1 2022, Right LLIF with instrumentation L4/L5 2022, and s/p PSF with instrumentation L4/S1. Patient is doing better since surgery. Pain is improving.     2022  Since last appointment, patient has been seen by orthopedic surgeon. Patient is going to have back surgery. Patient states that back surgery is going to be a 3 part surgery. Dr. Jomar Arriola will be doing the low back surgery    12/9/2020  presents for procedure follow-up. Patient had left sacroiliac joint injection and left ischial bursa injection on 8/26/2020 and Lumbar Epidural L3-4 on 9/11/2020. Patient had at least 85% relief of pain from procedure(s) for at least 6 weeks and was able to increase activity after procedure. Patient received enough pain relief from injections that the patient would like to repeat the injection(s). When patient was seen on 8/26/2020 for procedure, she was having left knee and right right-sided pain and chest due to a fall. Patient had assessment completed and ordered x-ray of chest and left knee. Chest x-ray ray revealed no acute process and left knee indicated well-seated normally aligned prosthetic components and no acute bony abnormalities. Patient presents today for sooner appointment due to increase in pain. Patient has fallen three times since last injection. Patient in unable to sit fully on left hip. Patient had been seen by PCP, no imaging was ordered. Reviewed past imaging and patient noted to have moderate to severe hip arthritis. 7/27/2020  presents after procedure follow up. Patient had LESI of L3/L4 and Left SI. Patient had good results from injections and she would like to repeat. However, patient has extreme tenderness in the buttocks especially while sitting down. This tenderness remained after injections. 5/5/2020  presents for follow-up of imaging and office visit. X-rays reviewed. Patient does have hip arthritis. Patient is still having increase in pain since fall. Patient states that pain medication is barely controlling pain. If patient does increase activity pain medication is not controlling pain. Back Pain is a recurrent problem. The current episode started 1 to 4 weeks ago. The problem occurs constantly.  The problem has been waxing and waning since onset. The pain is present in the lumbar spine and sacro-iliac. The quality of the pain is described as shooting, cramping and aching. The symptoms are aggravated by bending, sitting, standing and twisting. Pertinent negatives include no numbness or weakness. 2/4/2020  presents to the office for follow up of procedure. Patient is having worsening left hip pain s/p fall from the shower. Patient had been ill she thinks she lost LOC in the shower. She remembers falling and hitting the space heater with her head. Patient never lost LOC after fall. Patient had headache with low back pain and left hip pain. Patient followed up with PCP post fall. However pain is getting worse. Patient can not sit on left hip. Patient denies radiating pain. Patient states that pain is a constant ache. She cannot get comfortable. Patient is having problem ambulating due to pain. Patient is requesting xrays. Back Pain is a recurrent problem. The current episode started 1 to 4 weeks ago. The problem occurs constantly. The problem has been rapidly worsening since onset. The pain is present in the lumbar spine and sacro-iliac. The quality of the pain is described as aching and shooting. The symptoms are aggravated by bending, sitting and standing. Pertinent negatives include no numbness or weakness. 11/4/19  Annual Review  presents to the office for annual exam with primary complaints of chronic low back pain. Patient has been established with this practice for years. She has been receiving LESI of L3-L4 however injections had to be postponed due to cellulitis to her face. Patient had numerous hospitalizations and antibiotics and treatment. Patient takes Norco, gabapentin and Zanaflex. Patient states that current medication regimen helps to keep patient's pain at a tolerable level however she is ready to repeat injections. Patient denies any side effects to medications at this time.     Past Medical Koby  Past Medical History:   Diagnosis Date    Abdominal pain     Arthritis     B12 deficiency     Bilateral low back pain without sciatica 12/22/2015    Chronic nausea     COPD (chronic obstructive pulmonary disease) ([address] 04/20/2022    Dyslipidemia     Edema     Facial cellulitis     Fall     Fibromyalgia     Fungal infection of skin     breast    GERD (gastroesophageal reflux disease)     History of adenomatous polyp of colon     Hx of blood clots     Hyperlipidemia     IBS (irritable bowel syndrome)     Lumbar radiculopathy     Migraine     Osteoarthritis of left knee     Ovarian cancer (HCC)     Pain in both knees 12/22/2015    Palpitations     Sciatica     Thyroid disease     Urinary incontinence        Surgery History  Past Surgical History:   Procedure Laterality Date    CHOLECYSTECTOMY, LAPAROSCOPIC      COLONOSCOPY Bilateral 04/10/2015    Dr Ewelina Garcia, internal hemorrhoid, AP x 4, 3 yr recall    CYST INCISION AND DRAINAGE Left     Cheek    HYSTERECTOMY (CERVIX STATUS UNKNOWN)      LOBECTOMY Right     partial    LUMBAR FUSION  04/15/2022    w/hardware-Dr [id number]    LUMBAR FUSION Right 04/13/2022    Dr Juana Griggs  04/11/2022    Dr Camp Stair decompression, ALIF with instrumentation of L5-S1    SKIN CANCER EXCISION      TOTAL KNEE ARTHROPLASTY Bilateral     UPPER GASTROINTESTINAL ENDOSCOPY  04/2015    Dr Anuel England, no h pylori    UPPER GASTROINTESTINAL ENDOSCOPY N/A 00/30/5293    Duplicate    UPPER GASTROINTESTINAL ENDOSCOPY N/A 10/20/2022    Dr Cami Ladd,  EUS, Normal post cholecystectomy extrahepatic biliary tree wo obv filling defects or mass lesions, Normal duodenal major papilla, ampulla, main pancreatic duct and pancreatic parenchyma, no sprue    UPPER GASTROINTESTINAL ENDOSCOPY  10/20/2022    Dr Cami Ladd, W EUS  2-3 cm sliding hh, no obvious lesions to explain symptoms, no sprue        Family History  family history includes Cancer in her mother; Heart Attack in her father; Hypertension in her mother. Social History    Social History     Tobacco Use    Smoking status: Every Day     Packs/day: 0.25     Years: 30.00     Pack years: 7.50     Types: Cigarettes    Smokeless tobacco: Never   Substance Use Topics    Alcohol use: No       Allergies  Sulfamethoxazole-trimethoprim, Lyrica [pregabalin], Codeine, Erythromycin, Pcn [penicillins], Chlorhexidine, Sulfa antibiotics, and Talwin [pentazocine]     Current Medications  Current Outpatient Medications   Medication Sig Dispense Refill    HYDROcodone-acetaminophen (NORCO)  MG per tablet Take 1 tablet by mouth every 4-6 hours as needed for Pain for up to 30 days. (may 5/day)(may fill 11/16/22) 150 tablet 0    VENTOLIN  (90 Base) MCG/ACT inhaler Inhale 2 puffs into the lungs 3 times daily as needed      tiZANidine (ZANAFLEX) 4 MG tablet Take 1.5 tablets by mouth 3 times daily Enough till appt 135 tablet 5    triamterene-hydroCHLOROthiazide (DYAZIDE) 37.5-25 MG per capsule daily      promethazine (PHENERGAN) 25 MG suppository       pregabalin (LYRICA) 50 MG capsule daily. ondansetron (ZOFRAN) 8 MG tablet as needed      ALPRAZolam (XANAX) 0.5 MG tablet 3 times daily as needed.       escitalopram (LEXAPRO) 10 MG tablet Take 1 tablet by mouth daily 30 tablet 0    promethazine (PHENERGAN) 25 MG tablet Take 1 tablet by mouth 2 times daily as needed for Nausea 30 tablet 0    docusate sodium (COLACE) 100 MG capsule Take 1 capsule by mouth 2 times daily 60 capsule 0    linaclotide (LINZESS) 290 MCG CAPS capsule Take 1 capsule by mouth daily 30 capsule 0    nystatin (MYCOSTATIN) 252647 UNIT/ML suspension Take 5 mLs by mouth 4 times daily (Patient taking differently: Take 500,000 Units by mouth as needed) 1 each 0    levothyroxine (SYNTHROID) 100 MCG tablet Take 1 tablet by mouth daily 30 tablet 0    dicyclomine (BENTYL) 10 MG capsule Take 1 capsule by mouth 3 times daily as needed (spasms) (Patient taking differently: Take 10 mg by mouth as needed (spasms)) 90 capsule 0    pantoprazole (PROTONIX) 40 MG tablet Take 1 tablet by mouth daily 30 tablet 0    sucralfate (CARAFATE) 1 GM tablet Take 1 tablet by mouth 3 times daily as needed (heartburn) 90 tablet 0     No current facility-administered medications for this encounter. Review of Systems:  Review of Systems   Constitutional:  Positive for activity change. Gastrointestinal:  Negative for constipation. Musculoskeletal:  Positive for arthralgias, back pain, gait problem, myalgias, neck pain and neck stiffness. Neurological:  Negative for weakness and numbness. Psychiatric/Behavioral:  Negative for agitation, self-injury and suicidal ideas. The patient is not nervous/anxious. 14 point ROS negative besides that noted in HPI    Physical Exam:    Vitals:    11/29/22 1305   BP: (!) 96/52   Pulse: 64   Resp: 16   Temp: 96.8 °F (36 °C)   TempSrc: Temporal   SpO2: 96%   Weight: 139 lb (63 kg)   Height: 5' 5\" (1.651 m)       Body mass index is 23.13 kg/m². Physical Exam  Vitals and nursing note reviewed. Constitutional:       General: She is not in acute distress. Appearance: She is well-developed. HENT:      Head: Normocephalic. Right Ear: External ear normal.      Left Ear: External ear normal.      Nose: Nose normal.   Eyes:      Conjunctiva/sclera: Conjunctivae normal.      Pupils: Pupils are equal, round, and reactive to light. Neck:      Vascular: No JVD. Trachea: No tracheal deviation. Cardiovascular:      Rate and Rhythm: Normal rate. Pulmonary:      Effort: Pulmonary effort is normal.   Abdominal:      General: There is no distension. Tenderness: There is no abdominal tenderness. Musculoskeletal:      Lumbar back: Spasms, tenderness and bony tenderness present. Right hip: Tenderness and bony tenderness present. Left hip: Tenderness and bony tenderness present.    Skin:     General: Skin is warm and dry.   Neurological:      Mental Status: She is alert and oriented to person, place, and time. Cranial Nerves: No cranial nerve deficit. Psychiatric:         Behavior: Behavior normal.         Thought Content: Thought content normal.         Judgment: Judgment normal.        Labs:  Lab Results   Component Value Date/Time     05/03/2022 05:29 PM     04/18/2011 01:11 PM    K 3.6 05/03/2022 05:29 PM    K 4.6 04/18/2011 01:11 PM     05/03/2022 05:29 PM     04/18/2011 01:11 PM    CO2 23 05/03/2022 05:29 PM    BUN 15 05/03/2022 05:29 PM    CREATININE 0.8 08/17/2022 08:29 AM    CREATININE 1.0 05/03/2022 05:29 PM    CREATININE 0.8 04/18/2011 01:11 PM    GLUCOSE 102 05/03/2022 05:29 PM    CALCIUM 9.0 05/03/2022 05:29 PM        Lab Results   Component Value Date    WBC 7.5 05/03/2022    HGB 11.7 (L) 05/03/2022    HCT 38.3 05/03/2022    MCV 93.9 05/03/2022     05/03/2022         Assessment:  Active Problems:    Pain of both hip joints    Bilateral low back pain without sciatica    Pain in both knees    Lumbar disc disease with radiculopathy    SI (sacroiliac) joint dysfunction    Pain management contract agreement    Piriformis syndrome of left side    S/P lumbar fusion  Resolved Problems:    * No resolved hospital problems. *      Plan:  Lumbar disc disease with radiculopathy  Chronic bilateral low back pain without sciatica  Continue medication with refill sent at appointment see refill encounter dated 11/29/2022    Plan to decrease to 7.5 mg at next appointment. However if patient wants to decrease sooner will decrease. HYDROcodone-acetaminophen (NORCO)  MG per tablet; Take 1 tablet by mouth every 4-6 hours as needed for Pain not to exceed 5 tablets in a 24 hour period for up to 30 days. Intended supply: 30 days  Dispense: 150 tablet;  Refill: 0    Chronic myofascial pain  Continue medication with refill sent at appointment see refill encounter 8/18/2022     tiZANidine ([employer]apo [employer]le) 4 MG tablet; Take 1.5 tablets by mouth 3 times daily  Dispense: 135 tablet; Refill: 2    Patient does not want to repeat injections at this time. She wants to get through the holidays. Patient may have to have hip surgery next year. [x] Follow up    [] 4 weeks   [] 6-8 weeks   [] 10-12 weeks   [x] 3 months  [] Post procedure to evaluate effectiveness of treatment  [] To evaluate medications changes made at office visit. [] To review diagnostics ordered at last visit. [] To evaluate response to therapy    [x] For management of controlled substance  [x] Random UDS as indicated by ORT score or if indicated by abberent behaviors    Discussion: Discussed exam findings and plan of care with patient. Patient agreed with POC and questions were asked and answered. Activity: Discussed exercise as beneficial to pain reduction, encouraged daily stretching with a focus on torso strengthening. Goal:  Pain Management Goals of Therapy:   [] Resolution in pain  [x] Decrease in pain level  [x] Improvement in ADL's  [x] Increase in activities with less pain  [] Decrease in medication      Controlled substance monitoring:    [x] Discussed medication side effects, risk of tolerance and/or dependence, and/or alternative treatment  [] Discussed the detrimental effects of long term narcotic use in younger patients especially women of childbearing years.   [x] No signs and symptoms of potential drug abuse or diversion were identified  [] Signs of potential drug abuse or diversion were identified   [] ORT Score   [] UDS non-compliant   [] See Note  [x] Random urine drug screen sent today  [] Random urine drug screen not completed today   [] Deferred New Patient  [] Compliant   [] Not Compliant see note  [x] Medication agreement with provider signed today  [] Medication agreement with provider on file under media 11/22  [] Medication regimen effective and continued   [] New patient continuing current medication while developing POC   [x] On going assessment and evaluation of medication regimen  [] Medication regimen not effective see plan for changes  [x] Shaniqua Escobar reviewed & on file under media     CC:  MD Hoang Srinivasan, APRN - CNP, 11/29/2022 at 1:36 PM    EMR dragon/transcription disclaimer: Much of this encounter note is electronic transcription/translation of spoken language to printed tach. Electronic translation of spoken language may be erroneous, or at times, nonsensical words or phrases may be inadvertently transcribed.  Although, I have reviewed the note for such errors, some may still exist.

## 2022-11-29 NOTE — PROGRESS NOTES
Clinic Documentation      Education Provided:  [x] Review of Jeannette Cobia  [x] Agreement Review  [x] PEG Score Calculated [x] PHQ Score Calculated [x] ORT Score Calculated    [] Compliance Issues Discussed [] Cognitive Behavior Needs [x] Exercise [] Review of Test [] Financial Issues  [x] Tobacco/Alcohol Use Reviewed [x] Teaching [] New Patient [] Picture Obtained    Physician Plan:  [] Outgoing Referral  [] Pharmacy Consult  [x] Test Ordered [x] Prescription Ordered/Changed   [] Obtained Test Results / Consult Notes        Complete if patient is withholding blood thinner for procedure     Blood Thinner Patient is currently taking:      [] Plavix (Hold for 7 days)  [] Aspirin (Hold for 5 days)     [] Pletal (Hold for 2 days)  [] Pradaxa (Hold for 3 days)    [] Effient (Hold for 7 days)  [] Xarelto (Hold for 2 days)    [] Eliquis (Hold for 2 days)  [] Brilinta (Hold for 7 days)    [] Coumadin (Hold for 5 days) - (INR needs to be drawn the day prior to procedure- INR < 2.0)    [] Aggrenox (Hold for 7 days)        [] Patient will stop medication on their own.    [] Blood Thinner Form Faxed for approval to hold.    Provider form faxed to:     Assessment Completed by:  Electronically signed by Alana Edwards RN on 11/29/2022 at 1:46 PM

## 2022-11-29 NOTE — TELEPHONE ENCOUNTER
1. Chronic bilateral low back pain without sciatica  - HYDROcodone-acetaminophen (NORCO)  MG per tablet; Take 1 tablet by mouth every 4-6 hours as needed for Pain for up to 30 days. Dispense: 150 tablet;  Refill: 0     Refill at office

## 2022-12-14 ENCOUNTER — OFFICE VISIT (OUTPATIENT)
Dept: GASTROENTEROLOGY | Age: 72
End: 2022-12-14
Payer: MEDICARE

## 2022-12-14 VITALS
WEIGHT: 145.8 LBS | OXYGEN SATURATION: 93 % | SYSTOLIC BLOOD PRESSURE: 118 MMHG | HEIGHT: 65 IN | BODY MASS INDEX: 24.29 KG/M2 | HEART RATE: 58 BPM | DIASTOLIC BLOOD PRESSURE: 62 MMHG

## 2022-12-14 DIAGNOSIS — T40.2X5A THERAPEUTIC OPIOID-INDUCED CONSTIPATION (OIC): Primary | ICD-10-CM

## 2022-12-14 DIAGNOSIS — K59.03 THERAPEUTIC OPIOID-INDUCED CONSTIPATION (OIC): Primary | ICD-10-CM

## 2022-12-14 DIAGNOSIS — R10.11 RUQ PAIN: ICD-10-CM

## 2022-12-14 PROCEDURE — 1090F PRES/ABSN URINE INCON ASSESS: CPT | Performed by: NURSE PRACTITIONER

## 2022-12-14 PROCEDURE — G8484 FLU IMMUNIZE NO ADMIN: HCPCS | Performed by: NURSE PRACTITIONER

## 2022-12-14 PROCEDURE — 99214 OFFICE O/P EST MOD 30 MIN: CPT | Performed by: NURSE PRACTITIONER

## 2022-12-14 PROCEDURE — G8427 DOCREV CUR MEDS BY ELIG CLIN: HCPCS | Performed by: NURSE PRACTITIONER

## 2022-12-14 PROCEDURE — G8400 PT W/DXA NO RESULTS DOC: HCPCS | Performed by: NURSE PRACTITIONER

## 2022-12-14 PROCEDURE — 4004F PT TOBACCO SCREEN RCVD TLK: CPT | Performed by: NURSE PRACTITIONER

## 2022-12-14 PROCEDURE — 3017F COLORECTAL CA SCREEN DOC REV: CPT | Performed by: NURSE PRACTITIONER

## 2022-12-14 PROCEDURE — G8420 CALC BMI NORM PARAMETERS: HCPCS | Performed by: NURSE PRACTITIONER

## 2022-12-14 PROCEDURE — 1123F ACP DISCUSS/DSCN MKR DOCD: CPT | Performed by: NURSE PRACTITIONER

## 2022-12-14 ASSESSMENT — ENCOUNTER SYMPTOMS
SHORTNESS OF BREATH: 0
TROUBLE SWALLOWING: 0
CHOKING: 0
COUGH: 0
BACK PAIN: 1
BLOOD IN STOOL: 0
RECTAL PAIN: 0
ABDOMINAL DISTENTION: 0
ABDOMINAL PAIN: 1
CONSTIPATION: 1
NAUSEA: 1
ANAL BLEEDING: 0
VOMITING: 0
DIARRHEA: 0

## 2022-12-14 NOTE — PROGRESS NOTES
Subjective:     Patient ID: Shantanu Rios is a 74 y.o. female  PCP: Rosanna Green MD  Referring Provider: No ref. provider found    HPI  Patient presents to the office today with the following complaints: Follow-up      Pt seen today for follow up after EGD with EUS on 10/20/22 for c/o abdominal pain and biliary dilation. Pt has c/o RUQ pain. This is worse with physical activity. \"Sitting in my chair is the only thing that makes it better. \"  She c/o constant nausea. Hx constipation, BM about once a week. She is currently taking Linzess 290 mcg daily. She has tried and failed Miralax as well. Pt is on chronic pain medication. All scope and pathology reports were reviewed and discussed with patient. All questions answered, pt verbalized understanding. EGD Findings/IMPRESSION 10/20/22:  Esophagus: normal and a normal EG junction at 37 cm. NO erosions or ulcers or nodules or strictures or webs or rings or mass lesions or extrinsic compression or diverticula noted. There is a small 2 to 3 cm in length sliding hiatal hernia present. Stomach:  normal.  NO ulcers or masses or gastric outlet obstruction or retained food or fluid. Rugae were normal and lumen distended well with insufflation. Retroflexed views otherwise revealed a normal GE junction, fundus and cardia as well. Duodenum: Normal including the major duodenal papilla without any obvious mass lesions. Random biopsies were taken to check for villous atrophy with her history of unexplained weight loss. No obvious lesions to explain the patient's unintentional weight loss were discovered on this exam.  Non-GI etiology is in the differential diagnosis. RECOMMENDATIONS:    1. Await path results, the patient will be contacted in 7-10 days with biopsy results.    2. - Resume previous meds and diet  - GI clinic f/u 4-6 weeks with    - Keep scheduled f/u appts with other MDs   - NO NSAIDs x 2 weeks    Endosonographic Findings:  - The celiac axis and associated vascular structures was identified and examined. No concerning or malignant lymphadenopathy was identified.   - Limited views of the left lobe of the liver revealed no obvious cystic or solid lesions or abnormal appearing intrahepatic ductal dilatation or filling defects within the hepatic ducts. - The EUS scope was advanced to the duodenal bulb and to the area of the duodenal ampulla which appeared normal.  The common bile duct measured 9 mm in the midportion and with a smooth tapering was approximately 6 mm in maximal diameter in the distal third within the head of the pancreas. No obvious filling defects were noted within the examined extrahepatic common bile duct. Patient's postcholecystectomy status was confirmed with absence of gallbladder as expected. - Pancreas: The examined pancreas appeared essentially normal with the main pancreatic duct measuring 2 mm within the head portion and 1.2 to 1.5 mm within the body and tail portion. Estimated Blood Loss: minimal  IMPRESSION:  Normal postcholecystectomy extrahepatic biliary tree without any obvious filling defects or mass lesions seen. Normal duodenal major papilla and ampulla  Normal main pancreatic duct and pancreatic parenchyma. RECOMMENDATIONS:   -Please refer to EGD report on this EMR for details    FINAL DIAGNOSIS:   Duodenum, random biopsies:   Histologically normal small bowel mucosa. Assessment:     1. Therapeutic opioid-induced constipation (OIC)    2. RUQ pain            Plan:   - Stop Linzess  - Trial of Movantik 25 mg daily  - Follow up in 8 weeks or sooner if needed  - Call with any questions or concerns      Orders  No orders of the defined types were placed in this encounter.     Medications  Orders Placed This Encounter   Medications    naloxegol (MOVANTIK) 25 MG TABS tablet     Sig: Take 1 tablet by mouth every morning (before breakfast)     Dispense:  30 tablet     Refill:  5 Patient History:     Past Medical History:   Diagnosis Date    Abdominal pain     Arthritis     B12 deficiency     Bilateral low back pain without sciatica 12/22/2015    Chronic nausea     COPD (chronic obstructive pulmonary disease) ([address] 04/20/2022    Dyslipidemia     Edema     Facial cellulitis     Fall     Fibromyalgia     Fungal infection of skin     breast    GERD (gastroesophageal reflux disease)     History of adenomatous polyp of colon     Hx of blood clots     Hyperlipidemia     IBS (irritable bowel syndrome)     Lumbar radiculopathy     Migraine     Osteoarthritis of left knee     Ovarian cancer (HCC)     Pain in both knees 12/22/2015    Palpitations     Sciatica     Thyroid disease     Urinary incontinence        Past Surgical History:   Procedure Laterality Date    CHOLECYSTECTOMY, LAPAROSCOPIC      COLONOSCOPY Bilateral 04/10/2015    Dr Santa P[place], internal hemorrhoid, AP x 4, 3 yr recall    CYST INCISION AND DRAINAGE Left     Cheek    HYSTERECTOMY (CERVIX STATUS UNKNOWN)      LOBECTOMY Right     partial    LUMBAR FUSION  04/15/2022    w/hardware-Dr [id number]    LUMBAR FUSION Right 04/13/2022    Dr Yoon   04/11/2022    Dr Kaitlin Cardoso decompression, ALIF with instrumentation of L5-S1    SKIN CANCER EXCISION      TOTAL KNEE ARTHROPLASTY Bilateral     UPPER GASTROINTESTINAL ENDOSCOPY  04/2015    Dr Chad Morgan, no h pylori    UPPER GASTROINTESTINAL ENDOSCOPY N/A 57/43/6082    Duplicate    UPPER GASTROINTESTINAL ENDOSCOPY N/A 10/20/2022    Dr Arpan Pulliam,  EUS, Normal post cholecystectomy extrahepatic biliary tree wo obv filling defects or mass lesions, Normal duodenal major papilla, ampulla, main pancreatic duct and pancreatic parenchyma, no sprue    UPPER GASTROINTESTINAL ENDOSCOPY  10/20/2022    Dr Arpan Pulliam, W EUS  2-3 cm sliding hh, no obvious lesions to explain symptoms, no sprue       Family History   Problem Relation Age of Onset Cancer Mother     Hypertension Mother     Heart Attack Father     Colon Cancer Neg Hx     Esophageal Cancer Neg Hx     Rectal Cancer Neg Hx     Stomach Cancer Neg Hx     Liver Cancer Neg Hx        Social History     Socioeconomic History    Marital status:     Number of children: 1   Tobacco Use    Smoking status: Every Day     Packs/day: 0.25     Years: 30.00     Pack years: 7.50     Types: Cigarettes    Smokeless tobacco: Never   Vaping Use    Vaping Use: Never used   Substance and Sexual Activity    Alcohol use: No    Drug use: No       Current Outpatient Medications   Medication Sig Dispense Refill    naloxegol (MOVANTIK) 25 MG TABS tablet Take 1 tablet by mouth every morning (before breakfast) 30 tablet 5    VENTOLIN  (90 Base) MCG/ACT inhaler Inhale 2 puffs into the lungs 3 times daily as needed      [START ON 12/15/2022] HYDROcodone-acetaminophen (NORCO)  MG per tablet Take 1 tablet by mouth every 4-6 hours as needed for Pain for up to 30 days. 150 tablet 0    tiZANidine (ZANAFLEX) 4 MG tablet Take 1.5 tablets by mouth 3 times daily Enough till appt 135 tablet 5    promethazine (PHENERGAN) 25 MG suppository as needed      pregabalin (LYRICA) 50 MG capsule in the morning and at bedtime. ondansetron (ZOFRAN) 8 MG tablet as needed      ALPRAZolam (XANAX) 0.5 MG tablet 3 times daily as needed.       escitalopram (LEXAPRO) 10 MG tablet Take 1 tablet by mouth daily 30 tablet 0    promethazine (PHENERGAN) 25 MG tablet Take 1 tablet by mouth 2 times daily as needed for Nausea 30 tablet 0    docusate sodium (COLACE) 100 MG capsule Take 1 capsule by mouth 2 times daily 60 capsule 0    linaclotide (LINZESS) 290 MCG CAPS capsule Take 1 capsule by mouth daily 30 capsule 0    nystatin (MYCOSTATIN) 267869 UNIT/ML suspension Take 5 mLs by mouth 4 times daily (Patient taking differently: Take 500,000 Units by mouth as needed) 1 each 0    levothyroxine (SYNTHROID) 100 MCG tablet Take 1 tablet by mouth daily 30 tablet 0    dicyclomine (BENTYL) 10 MG capsule Take 1 capsule by mouth 3 times daily as needed (spasms) (Patient taking differently: Take 10 mg by mouth as needed (spasms)) 90 capsule 0    pantoprazole (PROTONIX) 40 MG tablet Take 1 tablet by mouth daily 30 tablet 0    sucralfate (CARAFATE) 1 GM tablet Take 1 tablet by mouth 3 times daily as needed (heartburn) 90 tablet 0     No current facility-administered medications for this visit. Allergies   Allergen Reactions    Sulfamethoxazole-Trimethoprim      Muscle pain,joint stiffness    Lyrica [Pregabalin] Hallucinations    Codeine     Erythromycin     Pcn [Penicillins] Hives     whelps    Chlorhexidine Rash    Sulfa Antibiotics Nausea And Vomiting     Muscle pain    Talwin [Pentazocine] Nausea And Vomiting       Review of Systems   Constitutional:  Negative for activity change, appetite change, fatigue, fever and unexpected weight change. HENT:  Negative for trouble swallowing. Respiratory:  Negative for cough, choking and shortness of breath. Cardiovascular:  Negative for chest pain. Gastrointestinal:  Positive for abdominal pain, constipation and nausea. Negative for abdominal distention, anal bleeding, blood in stool, diarrhea, rectal pain and vomiting. Musculoskeletal:  Positive for back pain. Allergic/Immunologic: Negative for food allergies. All other systems reviewed and are negative. Objective:     /62   Pulse 58   Ht 5' 5\" (1.651 m)   Wt 145 lb 12.8 oz (66.1 kg)   SpO2 93%   BMI 24.26 kg/m²     Physical Exam  Vitals reviewed. Constitutional:       General: She is not in acute distress. Appearance: She is well-developed. Comments: drowsy   HENT:      Head: Normocephalic and atraumatic. Right Ear: External ear normal.      Left Ear: External ear normal.      Nose: Nose normal.   Eyes:      General: No scleral icterus. Right eye: No discharge. Left eye: No discharge. Conjunctiva/sclera: Conjunctivae normal.      Pupils: Pupils are equal, round, and reactive to light. Cardiovascular:      Rate and Rhythm: Normal rate. Heart sounds: No murmur heard. Pulmonary:      Effort: Pulmonary effort is normal. No respiratory distress. Breath sounds: No rales. Abdominal:      General: There is no distension. Palpations: Abdomen is soft. There is no mass. Musculoskeletal:         General: Normal range of motion. Cervical back: Normal range of motion and neck supple. Skin:     General: Skin is warm and dry. Coloration: Skin is not pale. Neurological:      Mental Status: She is alert and oriented to person, place, and time.    Psychiatric:         Behavior: Behavior normal.

## 2023-01-05 DIAGNOSIS — G89.29 CHRONIC BILATERAL LOW BACK PAIN WITHOUT SCIATICA: ICD-10-CM

## 2023-01-05 DIAGNOSIS — M54.50 CHRONIC BILATERAL LOW BACK PAIN WITHOUT SCIATICA: ICD-10-CM

## 2023-01-06 RX ORDER — HYDROCODONE BITARTRATE AND ACETAMINOPHEN 10; 325 MG/1; MG/1
1 TABLET ORAL
Qty: 150 TABLET | Refills: 0 | Status: SHIPPED | OUTPATIENT
Start: 2023-01-14 | End: 2023-02-13

## 2023-01-23 ENCOUNTER — TELEPHONE (OUTPATIENT)
Dept: PAIN MANAGEMENT | Age: 73
End: 2023-01-23

## 2023-02-06 DIAGNOSIS — M54.50 CHRONIC BILATERAL LOW BACK PAIN WITHOUT SCIATICA: ICD-10-CM

## 2023-02-06 DIAGNOSIS — G89.29 CHRONIC BILATERAL LOW BACK PAIN WITHOUT SCIATICA: ICD-10-CM

## 2023-02-06 RX ORDER — HYDROCODONE BITARTRATE AND ACETAMINOPHEN 10; 325 MG/1; MG/1
1 TABLET ORAL
Qty: 150 TABLET | Refills: 0 | Status: SHIPPED | OUTPATIENT
Start: 2023-02-13 | End: 2023-03-15

## 2023-02-13 DIAGNOSIS — G89.29 CHRONIC MYOFASCIAL PAIN: ICD-10-CM

## 2023-02-13 DIAGNOSIS — M79.18 CHRONIC MYOFASCIAL PAIN: ICD-10-CM

## 2023-02-15 ENCOUNTER — OFFICE VISIT (OUTPATIENT)
Dept: GASTROENTEROLOGY | Age: 73
End: 2023-02-15
Payer: COMMERCIAL

## 2023-02-15 VITALS
SYSTOLIC BLOOD PRESSURE: 117 MMHG | BODY MASS INDEX: 24.66 KG/M2 | DIASTOLIC BLOOD PRESSURE: 70 MMHG | OXYGEN SATURATION: 96 % | HEIGHT: 65 IN | WEIGHT: 148 LBS | HEART RATE: 95 BPM

## 2023-02-15 DIAGNOSIS — T40.2X5A THERAPEUTIC OPIOID-INDUCED CONSTIPATION (OIC): Primary | ICD-10-CM

## 2023-02-15 DIAGNOSIS — Z59.9 FINANCIAL DIFFICULTIES: ICD-10-CM

## 2023-02-15 DIAGNOSIS — K59.03 THERAPEUTIC OPIOID-INDUCED CONSTIPATION (OIC): Primary | ICD-10-CM

## 2023-02-15 PROCEDURE — 1123F ACP DISCUSS/DSCN MKR DOCD: CPT | Performed by: NURSE PRACTITIONER

## 2023-02-15 PROCEDURE — 99213 OFFICE O/P EST LOW 20 MIN: CPT | Performed by: NURSE PRACTITIONER

## 2023-02-15 RX ORDER — TIZANIDINE 4 MG/1
6 TABLET ORAL 3 TIMES DAILY
Qty: 135 TABLET | Refills: 2 | Status: SHIPPED | OUTPATIENT
Start: 2023-02-15 | End: 2023-05-16

## 2023-02-15 SDOH — ECONOMIC STABILITY - INCOME SECURITY: PROBLEM RELATED TO HOUSING AND ECONOMIC CIRCUMSTANCES, UNSPECIFIED: Z59.9

## 2023-02-15 ASSESSMENT — ENCOUNTER SYMPTOMS
VOMITING: 0
COUGH: 0
ABDOMINAL DISTENTION: 0
NAUSEA: 0
TROUBLE SWALLOWING: 0
ABDOMINAL PAIN: 0
DIARRHEA: 0
SHORTNESS OF BREATH: 0
CHOKING: 0
BLOOD IN STOOL: 0
ANAL BLEEDING: 0
RECTAL PAIN: 0
CONSTIPATION: 1

## 2023-02-15 NOTE — PROGRESS NOTES
Subjective:     Patient ID: Natalio Chu is a 74 y.o. female  PCP: Wesley Dumas MD  Referring Provider: No ref. provider found    HPI  Patient presents to the office today with the following complaints: Follow-up      Pt seen today for follow up. Hx chronic constipation. Hx chronic opioid use. Trial of Movantik was recommended at last OV. She has tried and failed Linzess prior. Today, pt states constipation has not imporved. She will go up to 5 days without a BM. Denies any blood in stool. This is a chronic issue for her. Last EGD 10/20/22 - W EUS  2-3 cm sliding hh, no obvious lesions to explain symptoms, no sprue, Normal post cholecystectomy extrahepatic biliary tree wo obv filling defects or mass lesions, Normal duodenal major papilla, ampulla, main pancreatic duct and pancreatic parenchyma, no sprue  Last Colonoscopy 2015 - Diverticulosis, internal hemorrhoid, AP x 4, 3 yr recall    Assessment:     1. Therapeutic opioid-induced constipation (OIC)    2. Financial difficulties            Plan:   - Continue Movantik 25 mg daily  - Add Miralax daily  - Pt instructed to call should symptoms not improve  - Pt has concerns with costs and co pays of office visits  - Follow up yearly or sooner if needed       Orders  No orders of the defined types were placed in this encounter. Medications  No orders of the defined types were placed in this encounter.         Patient History:     Past Medical History:   Diagnosis Date    Abdominal pain     Arthritis     B12 deficiency     Bilateral low back pain without sciatica 12/22/2015    Chronic nausea     COPD (chronic obstructive pulmonary disease) ([facility] [address] 04/20/2022    Dyslipidemia     Edema     Facial cellulitis     Fall     Fibromyalgia     Fungal infection of skin     breast    GERD (gastroesophageal reflux disease)     History of adenomatous polyp of colon     Hx of blood clots     Hyperlipidemia     IBS (irritable bowel syndrome)     Lumbar radiculopathy     Migraine     Osteoarthritis of left knee     Ovarian cancer ([address]     Pain in both knees 12/22/2015    Palpitations     Sciatica     Thyroid disease     Urinary incontinence        Past Surgical History:   Procedure Laterality Date    CHOLECYSTECTOMY, LAPAROSCOPIC      COLONOSCOPY Bilateral 04/10/2015    Dr Crissy Collins, internal hemorrhoid, AP x 4, 3 yr recall    CYST INCISION AND DRAINAGE Left     Cheek    HYSTERECTOMY (CERVIX STATUS UNKNOWN)      LOBECTOMY Right     partial    LUMBAR FUSION  04/15/2022    w/hardware-Dr [id number]    LUMBAR FUSION Right 04/13/2022    Dr Mayorga Dad  04/11/2022    Dr Madison Duane decompression, ALIF with instrumentation of L5-S1    SKIN CANCER EXCISION      TOTAL KNEE ARTHROPLASTY Bilateral     UPPER GASTROINTESTINAL ENDOSCOPY  04/2015    Dr Nelda Mcgarry, no h pylori    UPPER GASTROINTESTINAL ENDOSCOPY N/A 76/39/2884    Duplicate    UPPER GASTROINTESTINAL ENDOSCOPY N/A 10/20/2022    Dr Dominic Dukes,  EUS, Normal post cholecystectomy extrahepatic biliary tree wo obv filling defects or mass lesions, Normal duodenal major papilla, ampulla, main pancreatic duct and pancreatic parenchyma, no sprue    UPPER GASTROINTESTINAL ENDOSCOPY  10/20/2022    Dr Dominic Dukes, W EUS  2-3 cm sliding hh, no obvious lesions to explain symptoms, no sprue       Family History   Problem Relation Age of Onset    Cancer Mother     Hypertension Mother     Heart Attack Father     Colon Cancer Neg Hx     Esophageal Cancer Neg Hx     Rectal Cancer Neg Hx     Stomach Cancer Neg Hx     Liver Cancer Neg Hx        Social History     Socioeconomic History    Marital status:     Number of children: 1   Tobacco Use    Smoking status: Every Day     Packs/day: 0.25     Years: 30.00     Pack years: 7.50     Types: Cigarettes    Smokeless tobacco: Never   Vaping Use    Vaping Use: Never used   Substance and Sexual Activity    Alcohol use: No    Drug use:  No Current Outpatient Medications   Medication Sig Dispense Refill    tiZANidine (ZANAFLEX) 4 MG tablet Take 1.5 tablets by mouth 3 times daily 135 tablet 2    HYDROcodone-acetaminophen (NORCO)  MG per tablet Take 1 tablet by mouth every 4-6 hours as needed for Pain for up to 30 days. (May fill 2/13/23) 150 tablet 0    naloxegol (MOVANTIK) 25 MG TABS tablet Take 1 tablet by mouth every morning (before breakfast) 30 tablet 5    VENTOLIN  (90 Base) MCG/ACT inhaler Inhale 2 puffs into the lungs 3 times daily as needed      promethazine (PHENERGAN) 25 MG suppository as needed      pregabalin (LYRICA) 50 MG capsule in the morning and at bedtime. ondansetron (ZOFRAN) 8 MG tablet as needed      ALPRAZolam (XANAX) 0.5 MG tablet 3 times daily as needed. escitalopram (LEXAPRO) 10 MG tablet Take 1 tablet by mouth daily 30 tablet 0    promethazine (PHENERGAN) 25 MG tablet Take 1 tablet by mouth 2 times daily as needed for Nausea 30 tablet 0    docusate sodium (COLACE) 100 MG capsule Take 1 capsule by mouth 2 times daily 60 capsule 0    nystatin (MYCOSTATIN) 672540 UNIT/ML suspension Take 5 mLs by mouth 4 times daily (Patient taking differently: Take 500,000 Units by mouth as needed) 1 each 0    levothyroxine (SYNTHROID) 100 MCG tablet Take 1 tablet by mouth daily 30 tablet 0    dicyclomine (BENTYL) 10 MG capsule Take 1 capsule by mouth 3 times daily as needed (spasms) (Patient taking differently: Take 10 mg by mouth as needed (spasms)) 90 capsule 0    pantoprazole (PROTONIX) 40 MG tablet Take 1 tablet by mouth daily 30 tablet 0    sucralfate (CARAFATE) 1 GM tablet Take 1 tablet by mouth 3 times daily as needed (heartburn) 90 tablet 0     No current facility-administered medications for this visit.        Allergies   Allergen Reactions    Sulfamethoxazole-Trimethoprim      Muscle pain,joint stiffness    Lyrica [Pregabalin] Hallucinations    Codeine     Erythromycin     Pcn [Penicillins] Hives     whelps Chlorhexidine Rash    Sulfa Antibiotics Nausea And Vomiting     Muscle pain    Talwin [Pentazocine] Nausea And Vomiting       Review of Systems   Constitutional:  Negative for activity change, appetite change, fatigue, fever and unexpected weight change. HENT:  Negative for trouble swallowing. Respiratory:  Negative for cough, choking and shortness of breath. Cardiovascular:  Negative for chest pain. Gastrointestinal:  Positive for constipation. Negative for abdominal distention, abdominal pain, anal bleeding, blood in stool, diarrhea, nausea, rectal pain and vomiting. Allergic/Immunologic: Negative for food allergies. All other systems reviewed and are negative. Objective:     /70 (Site: Left Upper Arm)   Pulse 95   Ht 5' 5\" (1.651 m)   Wt 148 lb (67.1 kg)   SpO2 96%   BMI 24.63 kg/m²     Physical Exam  Vitals reviewed. Constitutional:       General: She is not in acute distress. Appearance: She is well-developed. HENT:      Head: Normocephalic and atraumatic. Right Ear: External ear normal.      Left Ear: External ear normal.      Nose: Nose normal.   Eyes:      General: No scleral icterus. Right eye: No discharge. Left eye: No discharge. Conjunctiva/sclera: Conjunctivae normal.      Pupils: Pupils are equal, round, and reactive to light. Cardiovascular:      Rate and Rhythm: Normal rate. Pulmonary:      Effort: Pulmonary effort is normal. No respiratory distress. Abdominal:      General: There is no distension. Palpations: Abdomen is soft. Musculoskeletal:         General: Normal range of motion. Cervical back: Normal range of motion and neck supple. Skin:     General: Skin is warm and dry. Coloration: Skin is not pale. Neurological:      Mental Status: She is alert and oriented to person, place, and time.    Psychiatric:         Behavior: Behavior normal.

## 2023-03-09 ENCOUNTER — HOSPITAL ENCOUNTER (OUTPATIENT)
Dept: PAIN MANAGEMENT | Age: 73
Discharge: HOME | End: 2023-03-09
Payer: COMMERCIAL

## 2023-03-09 VITALS
TEMPERATURE: 98.1 F | SYSTOLIC BLOOD PRESSURE: 128 MMHG | BODY MASS INDEX: 24.66 KG/M2 | RESPIRATION RATE: 18 BRPM | DIASTOLIC BLOOD PRESSURE: 67 MMHG | WEIGHT: 148 LBS | HEART RATE: 73 BPM | HEIGHT: 65 IN | OXYGEN SATURATION: 94 %

## 2023-03-09 DIAGNOSIS — G89.29 CHRONIC BILATERAL LOW BACK PAIN WITHOUT SCIATICA: ICD-10-CM

## 2023-03-09 DIAGNOSIS — M54.50 CHRONIC BILATERAL LOW BACK PAIN WITHOUT SCIATICA: ICD-10-CM

## 2023-03-09 PROCEDURE — 99213 OFFICE O/P EST LOW 20 MIN: CPT

## 2023-03-09 RX ORDER — FUROSEMIDE 20 MG/1
1 TABLET ORAL DAILY
COMMUNITY
Start: 2023-03-06

## 2023-03-09 RX ORDER — ROSUVASTATIN CALCIUM 20 MG/1
1 TABLET, COATED ORAL DAILY
COMMUNITY
Start: 2023-03-07

## 2023-03-09 RX ORDER — POTASSIUM CHLORIDE 750 MG/1
1 TABLET, EXTENDED RELEASE ORAL DAILY
COMMUNITY
Start: 2023-03-06

## 2023-03-09 RX ORDER — HYDROCODONE BITARTRATE AND ACETAMINOPHEN 10; 325 MG/1; MG/1
1 TABLET ORAL
Qty: 150 TABLET | Refills: 0 | Status: SHIPPED | OUTPATIENT
Start: 2023-03-15 | End: 2023-04-14

## 2023-03-09 ASSESSMENT — PAIN SCALES - GENERAL: PAINLEVEL_OUTOF10: 8

## 2023-03-09 ASSESSMENT — PAIN DESCRIPTION - ORIENTATION
ORIENTATION: LOWER
ORIENTATION_2: RIGHT

## 2023-03-09 ASSESSMENT — PAIN DESCRIPTION - PAIN TYPE
TYPE: CHRONIC PAIN
TYPE_2: CHRONIC PAIN

## 2023-03-09 ASSESSMENT — PAIN DESCRIPTION - LOCATION
LOCATION_2: HIP
LOCATION: BACK

## 2023-03-09 ASSESSMENT — ENCOUNTER SYMPTOMS
BACK PAIN: 1
CONSTIPATION: 0

## 2023-03-09 ASSESSMENT — PAIN DESCRIPTION - INTENSITY: RATING_2: 8

## 2023-03-09 NOTE — TELEPHONE ENCOUNTER
1. Chronic bilateral low back pain without sciatica  - HYDROcodone-acetaminophen (NORCO)  MG per tablet; Take 1 tablet by mouth every 4-6 hours as needed for Pain for up to 30 days. Dispense: 150 tablet;  Refill: 0     Refill at office appointment

## 2023-03-09 NOTE — PROGRESS NOTES
[facility] Physical & Pain Medicine  Office Note    Patient Name: Yosef Aguilar    MR #: [id number]    Account #: [de-identified]    : /[id number]    Age: 74 y.o. Sex: female    Date: 3/9/2023    PCP: Kwesi Kaplan MD    Chief Complaint:   Chief Complaint   Patient presents with    Back Pain     8/10    Hip Pain     Right, 8/10       History of Present Illness:     Yosef Aguilar is a 74 y.o. female who presents for office appointment. Patient states that she is having more left sided low back/hip pain. Patient points to left SI joint. Patient was getting SI injections prior to surgery. She states pain will go down her back into her left hip and down laterally to her left knee. Hip Pain  This is a chronic problem. The current episode started more than 1 year ago. The problem occurs constantly. The problem has been waxing and waning. Associated symptoms include arthralgias, myalgias and neck pain. Pertinent negatives include no numbness or weakness. Back Pain  This is a chronic problem. The current episode started more than 1 month ago. The problem occurs constantly. The problem has been waxing and waning since onset. The pain is present in the lumbar spine and sacro-iliac. The quality of the pain is described as shooting, aching and cramping. The symptoms are aggravated by bending, sitting, standing and twisting. Pertinent negatives include no numbness or weakness. Screening Tools:    PEG Score: 8.3     Last PEG Score: 7.3     Annual ORT Score: 5     Annual PHQ Score: 16    Current Pain Assessment  Pain Assessment  Pain Assessment: 0-10  Pain Level: 8  Pain Location: Back  Pain Orientation: Lower  Pain Type: Chronic pain    Past Visit HPI:  2022  Since patient was here last, patient underwent low back surgery. Patient has had low back surgery. Patient's surgery was completed in the 3 stages.  She had ALIF with instrumentation of L5/S1 2022, Right LLIF with instrumentation L4/L5 4/13/2022, and s/p PSF with instrumentation L4/S1. Patient is doing better since surgery. Pain is improving.     2/2022  Since last appointment, patient has been seen by orthopedic surgeon. Patient is going to have back surgery. Patient states that back surgery is going to be a 3 part surgery. Dr. Hali Lazaro will be doing the low back surgery    12/9/2020  presents for procedure follow-up. Patient had left sacroiliac joint injection and left ischial bursa injection on 8/26/2020 and Lumbar Epidural L3-4 on 9/11/2020. Patient had at least 85% relief of pain from procedure(s) for at least 6 weeks and was able to increase activity after procedure. Patient received enough pain relief from injections that the patient would like to repeat the injection(s). When patient was seen on 8/26/2020 for procedure, she was having left knee and right right-sided pain and chest due to a fall. Patient had assessment completed and ordered x-ray of chest and left knee. Chest x-ray ray revealed no acute process and left knee indicated well-seated normally aligned prosthetic components and no acute bony abnormalities. Patient presents today for sooner appointment due to increase in pain. Patient has fallen three times since last injection. Patient in unable to sit fully on left hip. Patient had been seen by PCP, no imaging was ordered. Reviewed past imaging and patient noted to have moderate to severe hip arthritis. 7/27/2020  presents after procedure follow up. Patient had LESI of L3/L4 and Left SI. Patient had good results from injections and she would like to repeat. However, patient has extreme tenderness in the buttocks especially while sitting down. This tenderness remained after injections. 5/5/2020  presents for follow-up of imaging and office visit. X-rays reviewed. Patient does have hip arthritis. Patient is still having increase in pain since fall.   Patient states that pain medication is barely controlling pain. If patient does increase activity pain medication is not controlling pain. Back Pain is a recurrent problem. The current episode started 1 to 4 weeks ago. The problem occurs constantly. The problem has been waxing and waning since onset. The pain is present in the lumbar spine and sacro-iliac. The quality of the pain is described as shooting, cramping and aching. The symptoms are aggravated by bending, sitting, standing and twisting. Pertinent negatives include no numbness or weakness. 2/4/2020  presents to the office for follow up of procedure. Patient is having worsening left hip pain s/p fall from the shower. Patient had been ill she thinks she lost LOC in the shower. She remembers falling and hitting the space heater with her head. Patient never lost LOC after fall. Patient had headache with low back pain and left hip pain. Patient followed up with PCP post fall. However pain is getting worse. Patient can not sit on left hip. Patient denies radiating pain. Patient states that pain is a constant ache. She cannot get comfortable. Patient is having problem ambulating due to pain. Patient is requesting xrays. Back Pain is a recurrent problem. The current episode started 1 to 4 weeks ago. The problem occurs constantly. The problem has been rapidly worsening since onset. The pain is present in the lumbar spine and sacro-iliac. The quality of the pain is described as aching and shooting. The symptoms are aggravated by bending, sitting and standing. Pertinent negatives include no numbness or weakness. 11/4/19  Annual Review  presents to the office for annual exam with primary complaints of chronic low back pain. Patient has been established with this practice for years. She has been receiving LESI of L3-L4 however injections had to be postponed due to cellulitis to her face. Patient had numerous hospitalizations and antibiotics and treatment.       Patient takes Norco, gabapentin and Zanaflex.  Patient states that current medication regimen helps to keep patient's pain at a tolerable level however she is ready to repeat injections.  Patient denies any side effects to medications at this time.    Past Medical Histoy  Past Medical History:   Diagnosis Date    Abdominal pain     Arthritis     B12 deficiency     Bilateral low back pain without sciatica 12/22/2015    Chronic nausea     COPD (chronic obstructive pulmonary disease) (HCC) 04/20/2022    Dyslipidemia     Edema     Facial cellulitis     Fall     Fibromyalgia     Fungal infection of skin     breast    GERD (gastroesophageal reflux disease)     History of adenomatous polyp of colon     Hx of blood clots     Hyperlipidemia     IBS (irritable bowel syndrome)     Lumbar radiculopathy     Migraine     Osteoarthritis of left knee     Ovarian cancer (HCC)     Pain in both knees 12/22/2015    Palpitations     Sciatica     Thyroid disease     Urinary incontinence        Surgery History  Past Surgical History:   Procedure Laterality Date    CHOLECYSTECTOMY, LAPAROSCOPIC      COLONOSCOPY Bilateral 04/10/2015    Dr Salcedo-Diverticulosis, internal hemorrhoid, AP x 4, 3 yr recall    CYST INCISION AND DRAINAGE Left     Cheek    HYSTERECTOMY (CERVIX STATUS UNKNOWN)      LOBECTOMY Right     partial    LUMBAR FUSION  04/15/2022    w/hardware-Dr [id number]    LUMBAR FUSION Right 04/13/2022    Dr [id number]    LUMBAR SPINE SURGERY  04/11/2022    Dr Tello-Anterior decompression, ALIF with instrumentation of L5-S1    SKIN CANCER EXCISION      TOTAL KNEE ARTHROPLASTY Bilateral     UPPER GASTROINTESTINAL ENDOSCOPY  04/2015    Dr Salcedo-HH, no h pylori    UPPER GASTROINTESTINAL ENDOSCOPY N/A 10/20/2022    Duplicate    UPPER GASTROINTESTINAL ENDOSCOPY N/A 10/20/2022    Dr Vickers,  EUS, Normal post cholecystectomy extrahepatic biliary tree wo obv filling defects or mass lesions, Normal duodenal major papilla, ampulla, main pancreatic duct and pancreatic  parenchyma, no sprue    UPPER GASTROINTESTINAL ENDOSCOPY  10/20/2022    Dr Dominic Dukes, W EUS  2-3 cm sliding hh, no obvious lesions to explain symptoms, no sprue        Family History  family history includes Cancer in her mother; Heart Attack in her father; Hypertension in her mother. Social History    Social History     Tobacco Use    Smoking status: Every Day     Packs/day: 0.25     Years: 30.00     Pack years: 7.50     Types: Cigarettes    Smokeless tobacco: Never   Substance Use Topics    Alcohol use: No       Allergies  Sulfamethoxazole-trimethoprim, Lyrica [pregabalin], Codeine, Erythromycin, Pcn [penicillins], Chlorhexidine, Sulfa antibiotics, and Talwin [pentazocine]     Current Medications  Current Outpatient Medications   Medication Sig Dispense Refill    furosemide (LASIX) 20 MG tablet Take 1 tablet by mouth daily      potassium chloride (KLOR-CON M) 10 MEQ extended release tablet Take 1 tablet by mouth daily      rosuvastatin (CRESTOR) 20 MG tablet Take 1 tablet by mouth daily      [START ON 3/15/2023] HYDROcodone-acetaminophen (NORCO)  MG per tablet Take 1 tablet by mouth every 4-6 hours as needed for Pain for up to 30 days. 150 tablet 0    tiZANidine (ZANAFLEX) 4 MG tablet Take 1.5 tablets by mouth 3 times daily 135 tablet 2    naloxegol (MOVANTIK) 25 MG TABS tablet Take 1 tablet by mouth every morning (before breakfast) 30 tablet 5    VENTOLIN  (90 Base) MCG/ACT inhaler Inhale 2 puffs into the lungs 3 times daily as needed      promethazine (PHENERGAN) 25 MG suppository as needed      pregabalin (LYRICA) 50 MG capsule in the morning and at bedtime. ondansetron (ZOFRAN) 8 MG tablet as needed      ALPRAZolam (XANAX) 0.5 MG tablet 3 times daily as needed.       escitalopram (LEXAPRO) 10 MG tablet Take 1 tablet by mouth daily 30 tablet 0    promethazine (PHENERGAN) 25 MG tablet Take 1 tablet by mouth 2 times daily as needed for Nausea 30 tablet 0    docusate sodium (COLACE) 100 MG capsule Take 1 capsule by mouth 2 times daily 60 capsule 0    levothyroxine (SYNTHROID) 100 MCG tablet Take 1 tablet by mouth daily 30 tablet 0    dicyclomine (BENTYL) 10 MG capsule Take 1 capsule by mouth 3 times daily as needed (spasms) (Patient taking differently: Take 10 mg by mouth as needed (spasms)) 90 capsule 0    pantoprazole (PROTONIX) 40 MG tablet Take 1 tablet by mouth daily 30 tablet 0    sucralfate (CARAFATE) 1 GM tablet Take 1 tablet by mouth 3 times daily as needed (heartburn) 90 tablet 0     No current facility-administered medications for this encounter. Review of Systems:  Review of Systems   Constitutional:  Positive for activity change. Gastrointestinal:  Negative for constipation. Musculoskeletal:  Positive for arthralgias, back pain, gait problem, myalgias, neck pain and neck stiffness. Neurological:  Negative for weakness and numbness. Psychiatric/Behavioral:  Negative for agitation, self-injury and suicidal ideas. The patient is not nervous/anxious. 14 point ROS negative besides that noted in HPI    Physical Exam:    Vitals:    03/09/23 1307   BP: 128/67   Pulse: 73   Resp: 18   Temp: 98.1 °F (36.7 °C)   TempSrc: Temporal   SpO2: 94%   Weight: 148 lb (67.1 kg)   Height: 5' 5\" (1.651 m)       Body mass index is 24.63 kg/m². Physical Exam  Vitals and nursing note reviewed. Constitutional:       General: She is not in acute distress. Appearance: She is well-developed. HENT:      Head: Normocephalic. Right Ear: External ear normal.      Left Ear: External ear normal.      Nose: Nose normal.   Eyes:      Conjunctiva/sclera: Conjunctivae normal.      Pupils: Pupils are equal, round, and reactive to light. Neck:      Vascular: No JVD. Trachea: No tracheal deviation. Cardiovascular:      Rate and Rhythm: Normal rate. Pulmonary:      Effort: Pulmonary effort is normal.   Abdominal:      General: There is no distension. Tenderness:  There is no abdominal tenderness. Musculoskeletal:      Lumbar back: Spasms, tenderness (Left SI TTP + naeem's finger test) and bony tenderness present. Right hip: Tenderness and bony tenderness present. Left hip: Tenderness and bony tenderness present. Skin:     General: Skin is warm and dry. Neurological:      Mental Status: She is alert and oriented to person, place, and time. Cranial Nerves: No cranial nerve deficit. Psychiatric:         Behavior: Behavior normal.         Thought Content: Thought content normal.         Judgment: Judgment normal.        Labs:  Lab Results   Component Value Date/Time     05/03/2022 05:29 PM     04/18/2011 01:11 PM    K 3.6 05/03/2022 05:29 PM    K 4.6 04/18/2011 01:11 PM     05/03/2022 05:29 PM     04/18/2011 01:11 PM    CO2 23 05/03/2022 05:29 PM    BUN 15 05/03/2022 05:29 PM    CREATININE 0.8 08/17/2022 08:29 AM    CREATININE 1.0 05/03/2022 05:29 PM    CREATININE 0.8 04/18/2011 01:11 PM    GLUCOSE 102 05/03/2022 05:29 PM    CALCIUM 9.0 05/03/2022 05:29 PM        Lab Results   Component Value Date    WBC 7.5 05/03/2022    HGB 11.7 (L) 05/03/2022    HCT 38.3 05/03/2022    MCV 93.9 05/03/2022     05/03/2022         Assessment:  Active Problems:    Pain of both hip joints    Bilateral low back pain without sciatica    Pain in both knees    Lumbar disc disease with radiculopathy    Hip pain, acute, left    SI (sacroiliac) joint dysfunction    Pain management contract agreement    Piriformis syndrome of left side    S/P lumbar fusion  Resolved Problems:    * No resolved hospital problems. *      Plan:  Lumbar disc disease with radiculopathy  Chronic bilateral low back pain without sciatica    Continue medication with refill sent at appointment see refill encounter dated 3/9/2023    HYDROcodone-acetaminophen (NORCO)  MG per tablet;  Take 1 tablet by mouth every 4-6 hours as needed for Pain not to exceed 5 tablets in a 24 hour period for up to 30 days. Intended supply: 30 days  Dispense: 150 tablet; Refill: 0    Chronic myofascial pain  Continue medication with refill sent at appointment see refill encounter for 2/15/2023     tiZANidine (ZANAFLEX) 4 MG tablet; Take 1.5 tablets by mouth 3 times daily  Dispense: 135 tablet; Refill: 2    Schedule Left SI injection with US with NP. Patient has had injection in the past with good results. [x] Follow up    [] 4 weeks   [] 6-8 weeks   [] 10-12 weeks   [x] 3 months  [] Post procedure to evaluate effectiveness of treatment  [] To evaluate medications changes made at office visit. [] To review diagnostics ordered at last visit. [] To evaluate response to therapy    [x] For management of controlled substance  [x] Random UDS as indicated by ORT score or if indicated by abberent behaviors    Discussion: Discussed exam findings and plan of care with patient. Patient agreed with POC and questions were asked and answered. Activity: Discussed exercise as beneficial to pain reduction, encouraged daily stretching with a focus on torso strengthening. Goal:  Pain Management Goals of Therapy:   [] Resolution in pain  [x] Decrease in pain level  [x] Improvement in ADL's  [x] Increase in activities with less pain  [] Decrease in medication      Controlled substance monitoring:    [x] Discussed medication side effects, risk of tolerance and/or dependence, and/or alternative treatment  [] Discussed the detrimental effects of long term narcotic use in younger patients especially women of childbearing years.   [x] No signs and symptoms of potential drug abuse or diversion were identified  [] Signs of potential drug abuse or diversion were identified   [] ORT Score   [] UDS non-compliant   [] See Note  [] Random urine drug screen sent today  [x] Random urine drug screen not completed today   [] Deferred New Patient  [x] Compliant 11/29/2022  [] Not Compliant see note  [] Medication agreement with provider signed today  [x] Medication agreement with provider on file under media 11/22  [] Medication regimen effective and continued   [] New patient continuing current medication while developing POC   [x] On going assessment and evaluation of medication regimen  [] Medication regimen not effective see plan for changes  [x] Phill Dominguez reviewed & on file under media     CC:  MD Sania Tovar, APRN - CNP, 3/9/2023 at 1:30 PM    EMR dragon/transcription disclaimer: Much of this encounter note is electronic transcription/translation of spoken language to printed tach. Electronic translation of spoken language may be erroneous, or at times, nonsensical words or phrases may be inadvertently transcribed.  Although, I have reviewed the note for such errors, some may still exist.

## 2023-03-09 NOTE — PROGRESS NOTES
Clinic Documentation      Education Provided:  [x] Review of Julia Platts  [] Agreement Review  [x] PEG Score Calculated [] PHQ Score Calculated [] ORT Score Calculated    [] Compliance Issues Discussed [] Cognitive Behavior Needs [x] Exercise [] Review of Test [] Financial Issues  [x] Tobacco/Alcohol Use Reviewed [x] Teaching [] New Patient [] Picture Obtained    Physician Plan:  [] Outgoing Referral  [] Pharmacy Consult  [] Test Ordered [x] Prescription Ordered/Changed   [] Obtained Test Results / Consult Notes        Complete if patient is withholding blood thinner for procedure     Blood Thinner Patient is currently taking:      [] Plavix (Hold for 7 days)  [] Aspirin (Hold for 5 days)     [] Pletal (Hold for 2 days)  [] Pradaxa (Hold for 3 days)    [] Effient (Hold for 7 days)  [] Xarelto (Hold for 2 days)    [] Eliquis (Hold for 2 days)  [] Brilinta (Hold for 7 days)    [] Coumadin (Hold for 5 days) - (INR needs to be drawn the day prior to procedure- INR < 2.0)    [] Aggrenox (Hold for 7 days)        [] Patient will stop medication on their own.    [] Blood Thinner Form Faxed for approval to hold.    Provider form faxed to:     Assessment Completed by:  Electronically signed by Kirstin Peraza RN on 3/9/2023 at 1:23 PM

## 2023-03-13 ENCOUNTER — TRANSCRIBE ORDERS (OUTPATIENT)
Dept: ADMINISTRATIVE | Facility: HOSPITAL | Age: 73
End: 2023-03-13
Payer: MEDICARE

## 2023-03-13 DIAGNOSIS — M54.50 LOW BACK PAIN, UNSPECIFIED BACK PAIN LATERALITY, UNSPECIFIED CHRONICITY, UNSPECIFIED WHETHER SCIATICA PRESENT: ICD-10-CM

## 2023-03-13 DIAGNOSIS — Z98.1 ARTHRODESIS STATUS: Primary | ICD-10-CM

## 2023-03-29 ENCOUNTER — HOSPITAL ENCOUNTER (OUTPATIENT)
Dept: PAIN MANAGEMENT | Age: 73
Discharge: HOME | End: 2023-03-29
Payer: MEDICARE

## 2023-03-29 VITALS
RESPIRATION RATE: 18 BRPM | HEART RATE: 55 BPM | SYSTOLIC BLOOD PRESSURE: 115 MMHG | TEMPERATURE: 96.2 F | OXYGEN SATURATION: 94 % | DIASTOLIC BLOOD PRESSURE: 54 MMHG

## 2023-03-29 DIAGNOSIS — M51.16 LUMBAR DISC DISEASE WITH RADICULOPATHY: Primary | ICD-10-CM

## 2023-03-29 PROCEDURE — 2500000003 HC RX 250 WO HCPCS

## 2023-03-29 PROCEDURE — 20611 DRAIN/INJ JOINT/BURSA W/US: CPT

## 2023-03-29 PROCEDURE — 20552 NJX 1/MLT TRIGGER POINT 1/2: CPT | Performed by: NURSE PRACTITIONER

## 2023-03-29 PROCEDURE — 6360000002 HC RX W HCPCS

## 2023-03-29 PROCEDURE — 76942 ECHO GUIDE FOR BIOPSY: CPT | Performed by: NURSE PRACTITIONER

## 2023-03-29 RX ORDER — LIDOCAINE HYDROCHLORIDE 10 MG/ML
2 INJECTION, SOLUTION EPIDURAL; INFILTRATION; INTRACAUDAL; PERINEURAL ONCE
Status: DISCONTINUED | OUTPATIENT
Start: 2023-03-29 | End: 2023-03-31 | Stop reason: HOSPADM

## 2023-03-29 RX ORDER — BUPIVACAINE HYDROCHLORIDE 5 MG/ML
2 INJECTION, SOLUTION EPIDURAL; INTRACAUDAL ONCE
Status: DISCONTINUED | OUTPATIENT
Start: 2023-03-29 | End: 2023-03-31 | Stop reason: HOSPADM

## 2023-03-29 RX ORDER — TRIAMCINOLONE ACETONIDE 40 MG/ML
40 INJECTION, SUSPENSION INTRA-ARTICULAR; INTRAMUSCULAR ONCE
Status: DISCONTINUED | OUTPATIENT
Start: 2023-03-29 | End: 2023-03-31 | Stop reason: HOSPADM

## 2023-03-29 NOTE — PROGRESS NOTES
Procedure:  Level of Consciousness: [x]Alert [x]Oriented []Disoriented []Lethargic  Anxiety Level: [x]Calm []Anxious []Depressed []Other  Skin: []Warm [x]Dry []Cool []Moist []Intact []Other  Cardiovascular: [x]Palpitations: []Never []Occasionally []Frequently  Chest Pain: [x]No []Yes  Respiratory:  [x]Unlabored []Labored []Cough ([] Productive []Unproductive)  HCG Required: [x]No []Yes   Results: []Negative []Positive  Knowledge Level:        [x]Patient/Other verbalized understanding of pre-procedure instructions. [x]Assessment of post-op care needs (transportation, responsible caregiver)        [x]Able to discuss health care problems and how to deal with it.   Factors that Affect Teaching:        Language Barrier: [x]No []Yes - why:        Hearing Loss:        [x]No []Yes            Corrective Device:  []Yes []No        Vision Loss:           []No [x]Yes            Corrective Device:  [x]Yes []No        Memory Loss:       [x]No []Yes            []Short Term []Long Term  Motivational Level:  [x]Asks Questions                  []Extremely Anxious       [x]Seems Interested               []Seems Uninterested                  [x]Denies need for Education  Risk for Injury:  [x]Patient oriented to person, place and time  []History of frequent falls/loss of balance  Nutritional:  []Change in appetite   []Weight Gain   []Weight Loss  Functional:  []Requires assistance with ADL's

## 2023-03-29 NOTE — DISCHARGE INSTRUCTIONS
will be numb for a few hours after the procedure    [x] I understand if a steroid was used it will take effect in 3 - 7 days. I understand that as the numbing medication wears off, the pain may return until the steroids start working. [x] I understand that today I will rest for the next 24 hours and drink plenty of water. [] For Botox for Migraines please do not wear anything constricting around the forehead for 7-10 days post injection. Examples headband, hats, or bandana    [] For Botox for Spasticity start therapy for the affected limb in two weeks. [] Additional instructions: ______________________________________________ ___________________________________________________________________    Sedation:  Was oral sedation given? [] Yes  [x] No    I understand that if I took an oral nerve calming medication I will not drive for  [] 24 hours after taking the medication.     [x] I have received a copy of my discharge instructions and understand the above instructions to the best of my knowledge    Patient Discharged:  [x] Home  [] Hospital  [] Other  ____________________________________________    Via:  [] Ambulatory  [] Wheelchair   [] Stretcher [] EMS       Accompanied By:   [] Significant other  [] Family Member  [] Friend   [] Hospital Staff  []  Ambulance Staff  [x] Other_______________________________________________    Plan:  [] Will return to the office in   [] 1 month   [] 3 months for:  [] Procedure Follow-up  [x] Office Visit   [x] Planned Procedure      Patient Signature: _____________________________________________________    Staff Signature: _______________________________________________________        If you have questions, problems or concerns you may call [phone] and follow the prompts    NALINI White, [facility]-BC

## 2023-03-29 NOTE — PROCEDURES
[facility] Physical & Pain Medicine    Patient Name: Miguel New    : 1950    Age: 74 y.o. Sex: female    Date: 2023    Pre-op Diagnosis: left  Sacroiliac Joint(s) Dysfunction/ Sacroiliitis    Post-op Diagnosis: left  Sacroiliac Joint(s) Dysfunction/ Sacroliliits    Procedure: Ultrasound Guided Injection of  left Sacroiliac Joint(s)     Performing Procedure:  NALINI Saavedra    Patient Vitals for the past 24 hrs:   BP Temp Temp src Pulse Resp SpO2   23 1304 (!) 115/54 (!) 96.2 °F (35.7 °C) Temporal 55 18 94 %       left  Sacroiliac Joint(s)     Description of Procedure:    After voluntary, informed and signed consent obtained the patient was placed in a prone position. Appropriate time out was obtained per policy. The Sacroiliac Joint(s) was palpated for area of maximal tenderness. The area was prepped in an aseptic fashion with CHG swab. The ultrasound transducer was used to confirm the appropriate location. The skin was sprayed with Gebauer's Solution. Under aseptic technique and direct ultrasound visualization a 22 gauge 3 inch spinal needle was introduced into the Sacroiliac Joint(s). After a negative aspiration, a solution of 2 ml of 0.5% Marcaine Plain and 2 ml of 1% Lidocaine Plain and 1 ml of Kenalog (40 mg/ml) was injected into the Sacroiliac Joint(s). The needle was withdrawn and a sterile dressing applied. If this was a bilateral procedure, the same steps were followed on the opposite side. Discharge: The patient tolerated the procedure well. There were no complications during the procedure and the patient was discharged home with discharge instructions. The patient has been instructed to contact the office should there be any complications or questions to arise between today and their next appointment. Plan:    The patient will follow up in the office in approximately 6 weeks.      Reyes Newborn, APRN - CNP, 3/29/2023 at 4:36 PM

## 2023-04-05 ENCOUNTER — TELEPHONE (OUTPATIENT)
Dept: PAIN MANAGEMENT | Age: 73
End: 2023-04-05

## 2023-04-05 DIAGNOSIS — M51.16 LUMBAR DISC DISEASE WITH RADICULOPATHY: Primary | Chronic | ICD-10-CM

## 2023-04-14 ENCOUNTER — HOSPITAL ENCOUNTER (OUTPATIENT)
Dept: CT IMAGING | Facility: HOSPITAL | Age: 73
Discharge: HOME | End: 2023-04-14
Admitting: ORTHOPAEDIC SURGERY
Payer: COMMERCIAL

## 2023-04-14 DIAGNOSIS — Z98.1 ARTHRODESIS STATUS: ICD-10-CM

## 2023-04-14 DIAGNOSIS — M54.50 LOW BACK PAIN, UNSPECIFIED BACK PAIN LATERALITY, UNSPECIFIED CHRONICITY, UNSPECIFIED WHETHER SCIATICA PRESENT: ICD-10-CM

## 2023-04-14 PROCEDURE — 72131 CT LUMBAR SPINE W/O DYE: CPT

## 2023-05-08 DIAGNOSIS — K59.03 THERAPEUTIC OPIOID-INDUCED CONSTIPATION (OIC): ICD-10-CM

## 2023-05-08 DIAGNOSIS — M54.50 CHRONIC BILATERAL LOW BACK PAIN WITHOUT SCIATICA: ICD-10-CM

## 2023-05-08 DIAGNOSIS — G89.29 CHRONIC BILATERAL LOW BACK PAIN WITHOUT SCIATICA: ICD-10-CM

## 2023-05-08 DIAGNOSIS — T40.2X5A THERAPEUTIC OPIOID-INDUCED CONSTIPATION (OIC): ICD-10-CM

## 2023-05-08 RX ORDER — NALOXEGOL OXALATE 25 MG/1
TABLET, FILM COATED ORAL
Qty: 30 TABLET | Refills: 5 | Status: SHIPPED | OUTPATIENT
Start: 2023-05-08

## 2023-05-09 RX ORDER — HYDROCODONE BITARTRATE AND ACETAMINOPHEN 10; 325 MG/1; MG/1
1 TABLET ORAL
Qty: 150 TABLET | Refills: 0 | Status: SHIPPED | OUTPATIENT
Start: 2023-05-13 | End: 2023-06-12

## 2023-05-11 DIAGNOSIS — M79.18 CHRONIC MYOFASCIAL PAIN: ICD-10-CM

## 2023-05-11 DIAGNOSIS — G89.29 CHRONIC MYOFASCIAL PAIN: ICD-10-CM

## 2023-05-12 RX ORDER — TIZANIDINE 4 MG/1
6 TABLET ORAL 3 TIMES DAILY
Qty: 135 TABLET | Refills: 2 | Status: SHIPPED | OUTPATIENT
Start: 2023-05-12 | End: 2023-08-10

## 2023-06-05 ENCOUNTER — HOSPITAL ENCOUNTER (OUTPATIENT)
Dept: PAIN MANAGEMENT | Age: 73
Discharge: HOME | End: 2023-06-05
Payer: MEDICARE

## 2023-06-05 VITALS
BODY MASS INDEX: 25.99 KG/M2 | HEIGHT: 65 IN | DIASTOLIC BLOOD PRESSURE: 54 MMHG | TEMPERATURE: 97.1 F | HEART RATE: 51 BPM | WEIGHT: 156 LBS | RESPIRATION RATE: 16 BRPM | SYSTOLIC BLOOD PRESSURE: 93 MMHG | OXYGEN SATURATION: 95 %

## 2023-06-05 DIAGNOSIS — M54.50 CHRONIC BILATERAL LOW BACK PAIN WITHOUT SCIATICA: ICD-10-CM

## 2023-06-05 DIAGNOSIS — G89.29 CHRONIC BILATERAL LOW BACK PAIN WITHOUT SCIATICA: ICD-10-CM

## 2023-06-05 PROCEDURE — 99213 OFFICE O/P EST LOW 20 MIN: CPT

## 2023-06-05 RX ORDER — HYDROCODONE BITARTRATE AND ACETAMINOPHEN 10; 325 MG/1; MG/1
1 TABLET ORAL
Qty: 150 TABLET | Refills: 0 | Status: SHIPPED | OUTPATIENT
Start: 2023-06-13 | End: 2023-07-13

## 2023-06-05 ASSESSMENT — PAIN DESCRIPTION - ORIENTATION
ORIENTATION_2: RIGHT
ORIENTATION: LOWER

## 2023-06-05 ASSESSMENT — PAIN DESCRIPTION - PAIN TYPE
TYPE_2: CHRONIC PAIN
TYPE: CHRONIC PAIN

## 2023-06-05 ASSESSMENT — PAIN DESCRIPTION - LOCATION
LOCATION_2: HIP
LOCATION: BACK

## 2023-06-05 ASSESSMENT — ENCOUNTER SYMPTOMS: CONSTIPATION: 0

## 2023-06-05 ASSESSMENT — PAIN DESCRIPTION - INTENSITY: RATING_2: 7

## 2023-06-05 ASSESSMENT — PAIN SCALES - GENERAL: PAINLEVEL_OUTOF10: 7

## 2023-06-05 NOTE — PROGRESS NOTES
Clinic Documentation      Education Provided:  [x] Review of Deleta Frees  [] Agreement Review  [x] PEG Score Calculated [] PHQ Score Calculated [] ORT Score Calculated    [] Compliance Issues Discussed [] Cognitive Behavior Needs [x] Exercise [] Review of Test [] Financial Issues  [x] Tobacco/Alcohol Use Reviewed [x] Teaching [] New Patient [] Picture Obtained    Physician Plan:  [] Outgoing Referral  [] Pharmacy Consult  [] Test Ordered [x] Prescription Ordered/Changed   [] Obtained Test Results / Consult Notes        Complete if patient is withholding blood thinner for procedure     Blood Thinner Patient is currently taking:      [] Plavix (Hold for 7 days)  [] Aspirin (Hold for 5 days)     [] Pletal (Hold for 2 days)  [] Pradaxa (Hold for 3 days)    [] Effient (Hold for 7 days)  [] Xarelto (Hold for 2 days)    [] Eliquis (Hold for 2 days)  [] Brilinta (Hold for 7 days)    [] Coumadin (Hold for 5 days) - (INR needs to be drawn the day prior to procedure- INR < 2.0)    [] Aggrenox (Hold for 7 days)        [] Patient will stop medication on their own.    [] Blood Thinner Form Faxed for approval to hold.    Provider form faxed to:     Assessment Completed by:  Electronically signed by Yesi Mixon RN on 6/5/2023 at 2:07 PM
with provider on file under media 11/22  [] Medication regimen effective and continued   [] New patient continuing current medication while developing POC   [x] On going assessment and evaluation of medication regimen  [] Medication regimen not effective see plan for changes  [x] Dacia Listen reviewed & on file under media     CC:  MD Ashish Ennis, APRN - CNP, 6/5/2023 at 2:00 PM    EMR dragon/transcription disclaimer: Much of this encounter note is electronic transcription/translation of spoken language to printed tach. Electronic translation of spoken language may be erroneous, or at times, nonsensical words or phrases may be inadvertently transcribed.  Although, I have reviewed the note for such errors, some may still exist.

## 2023-06-05 NOTE — TELEPHONE ENCOUNTER
1. Chronic bilateral low back pain without sciatica  - HYDROcodone-acetaminophen (NORCO)  MG per tablet; Take 1 tablet by mouth every 4-6 hours as needed for Pain for up to 30 days. Dispense: 150 tablet;  Refill: 0     Continue medication with refill sent at appointment yes; refill sent to medical director at appointment no, see refill encounter dated 6/5/2023     Electronically signed by LIVE Yu CNP on 6/5/2023 at 2:06 PM

## 2023-06-08 NOTE — DISCHARGE SUMMARY
"[facility]   DISCHARGE SUMMARY       Date of Admission: 7/22/2019  Date of Discharge:  7/27/2019  Primary Care Physician: Zak Alan MD    Presenting Problem/History of Present Illness:  Cellulitis [L03.90]     Final Discharge Diagnoses:  Active Hospital Problems    Diagnosis   • Thrush   • Cellulitis of face       Consults: inf dz, ent    Procedures Performed: none    Pertinent Test Results: none    Chief Complaint on Day of Discharge: my swelling and redness is better    History of Present Illness on Day of Discharge: recent swlling, redness and pain improved     Hospital Course:  The patient is a 68 y.o. female who presented to [facility] with left facial erythema, pain, temp to 101 in dr gilbert abscence.SHe declined to be admitted to covering md and arrangements for the patient to be admitted to [facility] with consultations to ent and infectious dz. Over the next 48hrs her pain improved as well as her facial swelling and erythema. ENT did not feel she would require I and d and inf dz directed antbx therapy. SHe improved to the point where she could be discharged to home..      Condition on Discharge:  stable    Physical Exam on Discharge:  /41 (BP Location: Right arm, Patient Position: Sitting)   Pulse 64   Temp 98.2 °F (36.8 °C) (Oral)   Resp 16   Ht 167.6 cm (66\")   Wt 60 kg (132 lb 3.2 oz)   SpO2 95%   BMI 21.34 kg/m²   Physical Exam   Cardiovascular: Normal rate, regular rhythm, normal heart sounds and intact distal pulses.   Pulmonary/Chest: Effort normal and breath sounds normal.   Abdominal: Soft. Bowel sounds are normal.   Nursing note and vitals reviewed.        Discharge Disposition:  Home or Self Care    Discharge Medications:     Discharge Medications      New Medications      Instructions Start Date   nystatin [id number] UNIT/ML suspension  Commonly known as:  MYCOSTATIN   5 mL, Oral, 4 Times Daily         Continue These Medications      Instructions Start " Date   ALPRAZolam 0.5 MG tablet  Commonly known as:  XANAX   0.5 mg, Oral, 3 Times Daily PRN      dicyclomine 10 MG capsule  Commonly known as:  BENTYL   10 mg, Oral, 3 Times Daily PRN      escitalopram 10 MG tablet  Commonly known as:  LEXAPRO   10 mg, Oral, Daily      gabapentin 300 MG capsule  Commonly known as:  NEURONTIN   900 mg, Oral, 3 Times Daily      HYDROcodone-acetaminophen  MG per tablet  Commonly known as:  NORCO   1 tablet, Oral, 4 Times Daily PRN      levothyroxine 100 MCG tablet  Commonly known as:  SYNTHROID, LEVOTHROID   100 mcg, Oral, Daily      linaclotide 290 MCG capsule capsule  Commonly known as:  LINZESS   290 mcg, Oral, Every Morning Before Breakfast      MIRALAX powder  Generic drug:  polyethylene glycol   17 g, Oral, Daily      pantoprazole 40 MG EC tablet  Commonly known as:  PROTONIX   40 mg, 2 Times Daily      promethazine 25 MG tablet  Commonly known as:  PHENERGAN   25 mg, Oral, 2 Times Daily PRN      sucralfate 1 GM/10ML suspension  Commonly known as:  CARAFATE   1 g, Oral, 3 Times Daily Before Meals      tiZANidine 4 MG tablet  Commonly known as:  ZANAFLEX   4 mg, Oral, Every 8 Hours PRN         ASK your doctor about these medications      Instructions Start Date   clindamycin 150 MG capsule  Commonly known as:  CLEOCIN  Ask about: Should I take this medication?   450 mg, Oral, Every 6 Hours Scheduled      mupirocin 2 % ointment  Commonly known as:  BACTROBAN  Ask about: Should I take this medication?   Topical, 2 Times Daily             Discharge Diet:   Diet Instructions     Diet as tolerated                 Activity at Discharge:   Activity Instructions     Activity as tolerated                 Discharge Care Plan/Instructions: take meds as directed    Follow-up Appointments:   No future appointments.    Test Results Pending at Discharge: none    Chito Street MD  08/04/19  8:23 AM    Time: 8:30am           Male

## 2023-06-18 ASSESSMENT — ENCOUNTER SYMPTOMS: BACK PAIN: 1

## 2023-07-05 ENCOUNTER — HOSPITAL ENCOUNTER (OUTPATIENT)
Dept: PAIN MANAGEMENT | Age: 73
Discharge: HOME | End: 2023-07-05
Payer: MEDICARE

## 2023-07-05 VITALS
DIASTOLIC BLOOD PRESSURE: 54 MMHG | TEMPERATURE: 96.5 F | SYSTOLIC BLOOD PRESSURE: 106 MMHG | RESPIRATION RATE: 18 BRPM | HEART RATE: 56 BPM | OXYGEN SATURATION: 100 %

## 2023-07-05 PROCEDURE — 76942 ECHO GUIDE FOR BIOPSY: CPT | Performed by: NURSE PRACTITIONER

## 2023-07-05 PROCEDURE — 20611 DRAIN/INJ JOINT/BURSA W/US: CPT

## 2023-07-05 PROCEDURE — 2500000003 HC RX 250 WO HCPCS

## 2023-07-05 PROCEDURE — 6360000002 HC RX W HCPCS

## 2023-07-05 PROCEDURE — 20552 NJX 1/MLT TRIGGER POINT 1/2: CPT | Performed by: NURSE PRACTITIONER

## 2023-07-05 RX ORDER — BUPIVACAINE HYDROCHLORIDE 5 MG/ML
1 INJECTION, SOLUTION EPIDURAL; INTRACAUDAL ONCE
Status: DISCONTINUED | OUTPATIENT
Start: 2023-07-05 | End: 2023-07-07 | Stop reason: HOSPADM

## 2023-07-05 RX ORDER — ETHYL CHLORIDE 100 %
AEROSOL, SPRAY (ML) TOPICAL PRN
Status: DISCONTINUED | OUTPATIENT
Start: 2023-07-05 | End: 2023-07-07 | Stop reason: HOSPADM

## 2023-07-05 RX ORDER — LIDOCAINE HYDROCHLORIDE 10 MG/ML
1 INJECTION, SOLUTION EPIDURAL; INFILTRATION; INTRACAUDAL; PERINEURAL ONCE
Status: DISCONTINUED | OUTPATIENT
Start: 2023-07-05 | End: 2023-07-07 | Stop reason: HOSPADM

## 2023-07-05 RX ORDER — TRIAMCINOLONE ACETONIDE 40 MG/ML
40 INJECTION, SUSPENSION INTRA-ARTICULAR; INTRAMUSCULAR ONCE
Status: DISCONTINUED | OUTPATIENT
Start: 2023-07-05 | End: 2023-07-07 | Stop reason: HOSPADM

## 2023-07-05 NOTE — DISCHARGE INSTRUCTIONS
[address] Physical And Pain Medicine  Post Procedure Discharge Instructions        YOU HAVE HAD THE FOLLOWING PROCEDURE:                                  [] Occipital Nerve Blocks  [] CTS wrist injection(s)  [] Knee Injection(s)         [] Shoulder Injection(s)   [] Elbow Injection(s)     [] Botox Injection  [] Cervical Trigger Point Injections    [] Thoracic Trigger Point Injections    [] Lumbar Trigger Point Injections  [] Piriformis Trigger Point Injections  [x] SI Joint Injection(s)     [] Trochanteric Bursa Injection(s)       [] Ankle Injection(s)   [] Plantar Fasciitis   []  ______________  Injection(s) [] Botox []  Migraines [] Spasticity    YOU HAVE RECEIVED THE FOLLOWING MEDICATIONS IN YOUR INJECTION(s)  [x] Lidocaine [x] Bupivacaine   [] DepoMedrol (steroid) [] Decadron (steroid)  [x]  Kenalog (steroid)   [] Toradol  [] Supartz [] [place]    [] Botox        PATIENT INFORMATION:   You may experience the following symptoms after your procedure. These symptoms are normal and should not cause concern: You may have an increase in your pain. This may last 24 - 48 hours after your procedure. You may have no change in the pain that you had prior to your injection(s). You may have weakness or numbness in your affected extremity. If this occurs, this may last until numbing the medication wears off. REPORT THE FOLLOWING SYMPTOMS TO YOUR DOCTOR:  Redness, swelling or drainage at the injection site(s)  Unusual pain that interferes with your normal activities of daily living. OTHER INSTRUCTIONS:    [x] I will apply ice to the injection site(s) for at least 24 hours after the procedure. I will rotate the ice on for 20 minutes and off for 20 minutes for at least 24 hours. [x] I will not apply heat for at least 48 hours and I will not take a hot bath or shower for at least 24 hours.      [x] I understand that if Lidocaine or Bupivacaine was used in my injection(s) that the injection site(s)

## 2023-07-11 DIAGNOSIS — G89.29 CHRONIC BILATERAL LOW BACK PAIN WITHOUT SCIATICA: ICD-10-CM

## 2023-07-11 DIAGNOSIS — M54.50 CHRONIC BILATERAL LOW BACK PAIN WITHOUT SCIATICA: ICD-10-CM

## 2023-07-12 RX ORDER — HYDROCODONE BITARTRATE AND ACETAMINOPHEN 10; 325 MG/1; MG/1
1 TABLET ORAL
Qty: 150 TABLET | Refills: 0 | Status: SHIPPED | OUTPATIENT
Start: 2023-07-13 | End: 2023-08-12

## 2023-07-13 ENCOUNTER — TELEPHONE (OUTPATIENT)
Dept: PAIN MANAGEMENT | Age: 73
End: 2023-07-13

## 2023-07-13 DIAGNOSIS — M51.16 LUMBAR DISC DISEASE WITH RADICULOPATHY: Primary | Chronic | ICD-10-CM

## 2023-08-10 DIAGNOSIS — G89.29 CHRONIC MYOFASCIAL PAIN: ICD-10-CM

## 2023-08-10 DIAGNOSIS — G89.29 CHRONIC BILATERAL LOW BACK PAIN WITHOUT SCIATICA: ICD-10-CM

## 2023-08-10 DIAGNOSIS — M54.50 CHRONIC BILATERAL LOW BACK PAIN WITHOUT SCIATICA: ICD-10-CM

## 2023-08-10 DIAGNOSIS — M79.18 CHRONIC MYOFASCIAL PAIN: ICD-10-CM

## 2023-08-12 RX ORDER — HYDROCODONE BITARTRATE AND ACETAMINOPHEN 10; 325 MG/1; MG/1
1 TABLET ORAL
Qty: 150 TABLET | Refills: 0 | Status: SHIPPED | OUTPATIENT
Start: 2023-08-12 | End: 2023-09-11

## 2023-08-14 DIAGNOSIS — G89.29 CHRONIC MYOFASCIAL PAIN: ICD-10-CM

## 2023-08-14 DIAGNOSIS — M79.18 CHRONIC MYOFASCIAL PAIN: ICD-10-CM

## 2023-08-14 RX ORDER — TIZANIDINE 4 MG/1
6 TABLET ORAL 3 TIMES DAILY
Qty: 135 TABLET | Refills: 2 | Status: SHIPPED | OUTPATIENT
Start: 2023-08-14 | End: 2023-11-12

## 2023-09-11 DIAGNOSIS — M54.50 CHRONIC BILATERAL LOW BACK PAIN WITHOUT SCIATICA: ICD-10-CM

## 2023-09-11 DIAGNOSIS — G89.29 CHRONIC BILATERAL LOW BACK PAIN WITHOUT SCIATICA: ICD-10-CM

## 2023-09-11 RX ORDER — HYDROCODONE BITARTRATE AND ACETAMINOPHEN 10; 325 MG/1; MG/1
1 TABLET ORAL
Qty: 150 TABLET | Refills: 0 | Status: SHIPPED | OUTPATIENT
Start: 2023-09-11 | End: 2023-10-11

## 2023-09-12 ENCOUNTER — HOSPITAL ENCOUNTER (OUTPATIENT)
Dept: GENERAL RADIOLOGY | Age: 73
Discharge: HOME | End: 2023-09-12
Payer: MEDICARE

## 2023-09-12 ENCOUNTER — HOSPITAL ENCOUNTER (OUTPATIENT)
Dept: PAIN MANAGEMENT | Age: 73
Discharge: HOME | End: 2023-09-12
Payer: MEDICARE

## 2023-09-12 VITALS
TEMPERATURE: 97.6 F | OXYGEN SATURATION: 90 % | HEART RATE: 55 BPM | HEIGHT: 65 IN | DIASTOLIC BLOOD PRESSURE: 51 MMHG | RESPIRATION RATE: 20 BRPM | SYSTOLIC BLOOD PRESSURE: 78 MMHG | WEIGHT: 148 LBS | BODY MASS INDEX: 24.66 KG/M2

## 2023-09-12 DIAGNOSIS — R26.9 GAIT ABNORMALITY: ICD-10-CM

## 2023-09-12 DIAGNOSIS — R09.89 ABNORMAL LUNG SOUNDS: ICD-10-CM

## 2023-09-12 DIAGNOSIS — R53.1 WEAKNESS: ICD-10-CM

## 2023-09-12 DIAGNOSIS — T84.498A LOOSENING OF HARDWARE IN SPINE (HCC): ICD-10-CM

## 2023-09-12 DIAGNOSIS — Z79.891 ENCOUNTER FOR MONITORING OPIOID MAINTENANCE THERAPY: ICD-10-CM

## 2023-09-12 DIAGNOSIS — R19.15 DECREASED BOWEL SOUNDS: ICD-10-CM

## 2023-09-12 DIAGNOSIS — G89.29 CHRONIC PAIN OF BOTH KNEES: Chronic | ICD-10-CM

## 2023-09-12 DIAGNOSIS — K59.03 THERAPEUTIC OPIOID-INDUCED CONSTIPATION (OIC): ICD-10-CM

## 2023-09-12 DIAGNOSIS — R10.812 LEFT UPPER QUADRANT ABDOMINAL TENDERNESS WITHOUT REBOUND TENDERNESS: ICD-10-CM

## 2023-09-12 DIAGNOSIS — Z51.81 ENCOUNTER FOR MONITORING OPIOID MAINTENANCE THERAPY: ICD-10-CM

## 2023-09-12 DIAGNOSIS — M53.3 SI (SACROILIAC) JOINT DYSFUNCTION: ICD-10-CM

## 2023-09-12 DIAGNOSIS — I45.5 SINUS PAUSE: ICD-10-CM

## 2023-09-12 DIAGNOSIS — T40.2X5A THERAPEUTIC OPIOID-INDUCED CONSTIPATION (OIC): ICD-10-CM

## 2023-09-12 DIAGNOSIS — G89.29 CHRONIC BILATERAL LOW BACK PAIN WITHOUT SCIATICA: Primary | Chronic | ICD-10-CM

## 2023-09-12 DIAGNOSIS — R53.81 PHYSICAL DECONDITIONING: ICD-10-CM

## 2023-09-12 DIAGNOSIS — M51.16 LUMBAR DISC DISEASE WITH RADICULOPATHY: Chronic | ICD-10-CM

## 2023-09-12 DIAGNOSIS — K59.03 DRUG-INDUCED CONSTIPATION: ICD-10-CM

## 2023-09-12 DIAGNOSIS — M25.562 CHRONIC PAIN OF BOTH KNEES: Chronic | ICD-10-CM

## 2023-09-12 DIAGNOSIS — M25.561 CHRONIC PAIN OF BOTH KNEES: Chronic | ICD-10-CM

## 2023-09-12 DIAGNOSIS — M54.50 CHRONIC BILATERAL LOW BACK PAIN WITHOUT SCIATICA: Primary | Chronic | ICD-10-CM

## 2023-09-12 DIAGNOSIS — F11.90 CHRONIC, CONTINUOUS USE OF OPIOIDS: ICD-10-CM

## 2023-09-12 DIAGNOSIS — R29.6 FREQUENT FALLS: ICD-10-CM

## 2023-09-12 DIAGNOSIS — M70.72 ISCHIAL BURSITIS OF LEFT SIDE: ICD-10-CM

## 2023-09-12 DIAGNOSIS — G57.02 PIRIFORMIS SYNDROME OF LEFT SIDE: ICD-10-CM

## 2023-09-12 PROCEDURE — 74018 RADEX ABDOMEN 1 VIEW: CPT

## 2023-09-12 PROCEDURE — 99215 OFFICE O/P EST HI 40 MIN: CPT

## 2023-09-12 RX ORDER — AZITHROMYCIN 500 MG/1
TABLET, FILM COATED ORAL
COMMUNITY
Start: 2023-09-11

## 2023-09-12 RX ORDER — SUCRALFATE ORAL 1 G/10ML
SUSPENSION ORAL
COMMUNITY
Start: 2023-07-12

## 2023-09-12 RX ORDER — ONDANSETRON 4 MG/1
TABLET, ORALLY DISINTEGRATING ORAL
COMMUNITY
Start: 2023-08-28

## 2023-09-12 RX ORDER — PREDNISONE 50 MG/1
TABLET ORAL
COMMUNITY
Start: 2023-09-11

## 2023-09-12 ASSESSMENT — PAIN SCALES - GENERAL: PAINLEVEL_OUTOF10: 10

## 2023-09-12 ASSESSMENT — PAIN DESCRIPTION - PAIN TYPE
TYPE_2: ACUTE PAIN
TYPE: CHRONIC PAIN

## 2023-09-12 ASSESSMENT — PAIN DESCRIPTION - ORIENTATION
ORIENTATION: LOWER
ORIENTATION_2: LEFT

## 2023-09-12 ASSESSMENT — ENCOUNTER SYMPTOMS
BACK PAIN: 1
CONSTIPATION: 0

## 2023-09-12 ASSESSMENT — PAIN DESCRIPTION - LOCATION
LOCATION_2: HIP
LOCATION: BACK

## 2023-09-12 ASSESSMENT — PAIN DESCRIPTION - INTENSITY: RATING_2: 10

## 2023-09-12 NOTE — PROGRESS NOTES
Clinic Documentation      Education Provided:  [x] Review of Beauty Hammed  [] Agreement Review  [x] PEG Score Calculated [] PHQ Score Calculated [] ORT Score Calculated    [] Compliance Issues Discussed [] Cognitive Behavior Needs [x] Exercise [] Review of Test [] Financial Issues  [x] Tobacco/Alcohol Use Reviewed [x] Teaching [] New Patient [] Picture Obtained    Physician Plan:  [x] Outgoing Referral  [] Pharmacy Consult  [x] Test Ordered [x] Prescription Ordered/Changed   [x] Obtained Test Results / Consult Notes        Complete if patient is withholding blood thinner for procedure     Blood Thinner Patient is currently taking:      [] Plavix (Hold for 7 days)  [] Aspirin (Hold for 5 days)     [] Pletal (Hold for 2 days)  [] Pradaxa (Hold for 3 days)    [] Effient (Hold for 7 days)  [] Xarelto (Hold for 2 days)    [] Eliquis (Hold for 2 days)  [] Brilinta (Hold for 7 days)    [] Coumadin (Hold for 5 days) - (INR needs to be drawn the day prior to procedure- INR < 2.0)    [] Aggrenox (Hold for 7 days)        [] Patient will stop medication on their own.    [] Blood Thinner Form Faxed for approval to hold.    Provider form faxed to:     Assessment Completed by:  Electronically signed by Joe Dale RN on 9/12/2023 at 2:11 PM

## 2023-09-12 NOTE — PROGRESS NOTES
[facility]  Office Note    Patient Name: Maryellen Pablo    MR #: [id number]    Account #: [de-identified]    : [id number]    Age: 74 y.o. Sex: female    Date: 2023    PCP: Amanda Solano MD    Chief Complaint:   Chief Complaint   Patient presents with    Back Pain     10/10    Hip Pain     Left, 10/10       History of Present Illness:     Maryellen Pablo is a 74 y.o. female who presents for office for a procedure follow up and office visit. Since last appointment, patient has had multiple falls. On  patient presented to [facility] emergency room stating that she had fell the night before. Patient states that she had \"blacked out\" resulting in a fall hitting the back of her head. Patient states that she had loss of consciousness for \"just a second\". Patient had complained of pains to her right rib, back of her head and back. Patient had a fall the Friday before landing on her right side as well. Per patient's daughter her ribs were swollen and she had pain with moving and coughing. Patient has had multiple falls at least weekly for the past 6 months. At last appointment provider had reviewed patient's chart and spoke to patient about follow-up with cardiology as she had had a 3.5 -second pause in the past that was captured on a Zio patch and cardiologist at that time had recommended a pacemaker to prevent a detrimental injury from a fall. However patient has not followed up on this. Today at office visit patient is still complaining of right chest pain and low back pain. Per patient's daughter patient has not had a bowel movement in greater than 4 days. Patient does take Movantik for opioid-induced constipation. Review of record indicates that patient had possible hardware fracture on her CT of lumbar spine in April. Patient has audible wheezing and a coarse cough which causes increase in pain. Patient is not comfortable in her chair.  Patient

## 2023-09-12 NOTE — PROGRESS NOTES
Palliative Care Initial Visit           Date of Visit: 9/12/2023   Visit Made By: Shlomo Armenta NP    Primary Care Physician: Pcp, Chandni  Office Phone #: None  Fax Phone #: None    Inpatient consult to Palliative Care  Consult performed by: Shlomo Armenta NP  Consult ordered by: Kristen Merritt MD        Number of hospitalizations in the last year: 1     Reason for Palliative Care: Goals of Care and Pain/Symptom Management      Subjective           History of Present Illness  History Obtained by: Patient, Medical Team and Chart Review    61 year old female with history of T2DM (newly diagnosed), bipolar disorder, schizoaffective disorder, breast cancer s/p masectomy in 2006, HTN, hx of complicated UTIs and known cervical mass with metastatic disease was admitted to [facility] on 9/6/2023 for further mgmt of AUR, UTI, and cervical mass. Pt completed abx course. Pt declined further workup from [facility] for cervical mass as she has previously had cancer and does not want treatment. Given pt's psych hx, pt was seen by  and deemed to have capacity for complex MDM. Per PT, pt's mobility score is 23.    Pt endorses lower abd pain and back pain 8/10 and described as aching. Endorses poor appetite without desire and nausea w/o emesis. Denies f/c, CP/SOB, C/D.     Past Medical History  Past Medical History:   Diagnosis Date   • Bipolar I disorder, most recent episode depressed (CMD)    • Breast cancer (CMD)    • Diabetes mellitus (CMD)    • Essential (primary) hypertension    • Schizo affective schizophrenia (CMD)        Past Surgical History  Past Surgical History:   Procedure Laterality Date   • Breast surgery         Past Social History   reports that she has never smoked. She has never used smokeless tobacco. She reports that she does not currently use alcohol. She reports that she does not currently use drugs.          Past Spiritual/Cultural History  [place] Tradition/Shinto Affiliation: Orthodoxy         Past Family  Procedure:  Level of Consciousness: [x]Alert [x]Oriented []Disoriented []Lethargic  Anxiety Level: [x]Calm []Anxious []Depressed []Other  Skin: [x]Warm [x]Dry []Cool []Moist []Intact []Other  Cardiovascular: [x]Palpitations: [x]Never []Occasionally []Frequently  Chest Pain: [x]No []Yes  Respiratory:  [x]Unlabored []Labored []Cough ([] Productive []Unproductive)  HCG Required: [x]No []Yes   Results: []Negative []Positive  Knowledge Level:        [x]Patient/Other verbalized understanding of pre-procedure instructions. [x]Assessment of post-op care needs (transportation, responsible caregiver)        [x]Able to discuss health care problems and how to deal with it.   Factors that Affect Teaching:        Language Barrier: [x]No []Yes - why:        Hearing Loss:        [x]No []Yes            Corrective Device:  []Yes [x]No        Vision Loss:           []No [x]Yes            Corrective Device:  [x]Yes []No        Memory Loss:       [x]No []Yes            []Short Term []Long Term  Motivational Level:  [x]Asks Questions                  []Extremely Anxious       [x]Seems Interested               []Seems Uninterested                  []Denies need for Education  Risk for Injury:  [x]Patient oriented to person, place and time  []History of frequent falls/loss of balance  Nutritional:  []Change in appetite   []Weight Gain   []Weight Loss  Functional:  []Requires assistance with ADL's History  family history is not on file.    Review of Systems  Per HPI      Palliative Care Assessment Tools    Pain Assessment      n/a                   ESAS Scale     n/a    Palliative Performance Scale  Palliative Performance Scale: Ambulation Full. Normal Activity with Effort Some Evidence of Disease. Self-Care Full. Intake Normal or Reduced. Consciousness Level Full.               Functional Assessment Staging (FAST)   n/a    Heart Failure Tools      n/a       Objective      Allergies    Allergies   Allergen Reactions   • Iodine RASH   • Penicillins RASH       Medications:  Scheduled Meds:   • amLODIPine (NORVASC) tablet 10 mg Oral Daily   • sodium chloride (PF) 0.9 % injection 2 mL Intracatheter 2 times per day   • enoxaparin (LOVENOX) injection 40 mg Subcutaneous Daily   • Potassium Standard Replacement Protocol (Levels 3.5 and lower) Does not apply See Admin Instructions   • Magnesium Standard Replacement Protocol Does not apply See Admin Instructions   • insulin lispro (ADMELOG,HumaLOG) - Correction Dose Subcutaneous TID WC   • carvedilol (COREG) tablet 6.25 mg Oral 2 times per day   • polyethylene glycol (MIRALAX) packet 17 g Oral BID   • docusate sodium-sennosides (SENOKOT S) 50-8.6 MG 1 tablet Oral Nightly     Continuous Infusions:   PRN Meds:   • HYDROcodone-acetaminophen (NORCO) 5-325 MG per tablet 1 tablet  1 tablet Oral Q4H PRN   • traMADol (ULTRAM) tablet 50 mg  50 mg Oral Q6H PRN   • melatonin tablet 6 mg  6 mg Oral Nightly PRN   • lidocaine 2% urethral (UROJET) 2 % jelly 10 mL  10 mL Transurethral Once PRN   • sodium chloride 0.9 % flush bag 25 mL  25 mL Intravenous PRN   • sodium chloride 0.9 % flush bag 25 mL  25 mL Intravenous PRN   • acetaminophen (TYLENOL) tablet 650 mg  650 mg Oral Q4H PRN   • polyethylene glycol (MIRALAX) packet 17 g  17 g Oral Daily PRN   • docusate sodium-sennosides (SENOKOT S) 50-8.6 MG 2 tablet  2 tablet Oral Daily PRN   • aluminum-magnesium hydroxide-simethicone  (MAALOX) 200-200-20 MG/5ML suspension 30 mL  30 mL Oral Q4H PRN   • dextrose 50 % injection 25 g  25 g Intravenous PRN   • dextrose 50 % injection 12.5 g  12.5 g Intravenous PRN   • glucagon (GLUCAGEN) injection 1 mg  1 mg Intramuscular PRN   • dextrose (GLUTOSE) 40 % gel 15 g  15 g Oral PRN   • dextrose (GLUTOSE) 40 % gel 30 g  30 g Oral PRN   • naLOXone (NARCAN) injection 0.4 mg  0.4 mg Intravenous PRN       Vital Signs:   Vital Last Value (24 Hour) 24 Hour Range   Temperature 98.2 °F (36.8 °C) (09/11/23 2001) Temp  Min: 98.2 °F (36.8 °C)  Max: 98.4 °F (36.9 °C)   Pulse 87 (09/12/23 0755) Pulse  Min: 82  Max: 90   Arterial   Blood Pressure   No data recorded   Non-Invasive  Blood Pressure (!) 163/89 (09/12/23 0755) BP  Min: 132/74  Max: 166/81   Central Venous Pressure   No data recorded   Respiratory 18 (09/12/23 1338) Resp  Min: 18  Max: 18   SpO2 98 % (09/11/23 2001) SpO2  Min: 97 %  Max: 98 %   Last BM: 1 (09/10/23 1000)    Weight Trends    Wt Readings from Last 10 Encounters:   09/06/23 70.5 kg (155 lb 6.8 oz)   01/25/23 84.4 kg (186 lb)   05/12/22 84.5 kg (186 lb 4.6 oz)       Physical Exam  Vitals and nursing note reviewed.   Neurological:      Mental Status: She is alert and oriented to person, place, and time.         Labs & Imaging    Recent Labs   Lab 09/12/23  0622 09/11/23  0800 09/10/23  0655   SODIUM 134* 134* 136   POTASSIUM 4.1 3.9 3.8   CHLORIDE 99 99 101   CO2 26 26 26   BUN 22* 18 14   CREATININE 0.59 0.63 0.57   CALCIUM 8.9 8.9 8.7   ALBUMIN 2.6* 2.7* 2.6*   BILIRUBIN 0.3 0.3 0.3   ALKPT 72 74 68   GPT 23 24 21   AST 15 18 17   GLUCOSE 112* 68* 111*      Recent Labs   Lab 09/12/23  0622   WBC 11.7*   RBC 3.96*   HGB 10.1*   HCT 31.9*           No results found for any visits on 09/06/23 (from the past 72 hour(s)).     Advance Care Planning/Goals of Care/Discussion      Medical Decision-making capacity: Yes  POAHC/Substitute Decision Maker: None will likely need guardianship if pt  becomes incapacitated  Current   Code Status: Do Not Resuscitate    ACP Document Type: POLST ([place]) In Paper Chart   Discussion: see below      Goals of Care: see below             Assessment          61 year old female with history of T2DM (newly diagnosed), bipolar disorder, schizoaffective disorder, breast cancer s/p masectomy in 2006, HTN, hx of complicated UTIs and known cervical mass with metastatic disease was admitted to [facility] on 9/6/2023 for further mgmt of AUR, UTI, and cervical mass. Pt completed abx course. Pt declined further workup from [facility] for cervical mass as she has previously had cancer and does not want treatment. Given pt's psych hx, pt was seen by  and deemed to have capacity for complex MDM. Per PT, pt's mobility score is 23.    Introduced PC services to pt. Pt is unhomed. She has PO box, but no phone. She has no supports and no emergency contact. She states she is on a waitlist for [facility] and is unsure of current status. Pt expressed hope to be placed so she \"can get stronger.\" She states she has become weak and deconditioned. Per discussion with care team, pt is ambulatory independently, however, this provider did not see pt ambulate. Discussed long-term care plan. Discussed eventual decline with untreated cancer. Discussed sxs mgmt and QOL. Discussed hospice philosophy and delivery of care. She expressed interest in hospice care. Discussed hospice care delivery and explained specific challenges with pt as she does not have direct contact information.      Plan      No new Assessment & Plan notes have been filed under this hospital service since the last note was generated.  Service: Palliative Care        Pt to be DC'd today. Per d/w GIGI Delgado, pt will be dropped off a [place] [employer] who will then assist with transport to shelter.         Total combined time for today's Face to Face encounter was 70 minutes, with > 50% of that time spent counseling and coordinating  care regarding the above.  Discussed With: RN, SW, IM resident    Thank you for involving Palliative Care.  Please contact the covering provider via [employer] ([place])/[employer] ([place]) with further questions or concerns.    Shlomo Armenta NP        Initial Note For Department Use Only          Primary Diagnosis: Cancer  Problem List/Pertinent Diagnosis: Oncologic-Other  LVAD: No  #1 Pre-Visit: No OTHER  #1 Post-Visit: No OTHER  #2 Pre-Visit: Yes  #2 Post-Visit: Yes  #3 Pre-Visit: Yes  #3 Post-Visit: Yes

## 2023-09-15 ENCOUNTER — TELEPHONE (OUTPATIENT)
Dept: PAIN MANAGEMENT | Age: 73
End: 2023-09-15

## 2023-09-15 DIAGNOSIS — K59.03 DRUG-INDUCED CONSTIPATION: Primary | ICD-10-CM

## 2023-09-15 RX ORDER — POLYETHYLENE GLYCOL 3350 17 G/17G
17 POWDER, FOR SOLUTION ORAL 2 TIMES DAILY
Qty: 510 G | Refills: 0 | Status: SHIPPED | OUTPATIENT
Start: 2023-09-15 | End: 2023-09-30

## 2023-09-15 NOTE — TELEPHONE ENCOUNTER
Patient has a large amount of stool on the right - ascending colon. She has gas in the transverse and descending colon. 1. Drug-induced constipation  - polyethylene glycol (GLYCOLAX) 17 GM/SCOOP powder; Take 17 g by mouth 2 times daily for 15 days Take twice daily until regular bowel movements occur. Then take daily  Dispense: 510 g; Refill: 0     Please call patient and give results and orders. Thanks.      Electronically signed by LIVE Salazar CNP on 9/15/2023 at 10:29 AM

## 2023-09-17 PROBLEM — Z79.891 ENCOUNTER FOR MONITORING OPIOID MAINTENANCE THERAPY: Status: ACTIVE | Noted: 2023-09-17

## 2023-09-17 PROBLEM — K59.03 DRUG-INDUCED CONSTIPATION: Status: ACTIVE | Noted: 2023-09-17

## 2023-09-17 PROBLEM — Z51.81 ENCOUNTER FOR MONITORING OPIOID MAINTENANCE THERAPY: Status: ACTIVE | Noted: 2023-09-17

## 2023-09-17 PROBLEM — I45.5 SINUS PAUSE: Status: ACTIVE | Noted: 2023-09-17

## 2023-09-17 PROBLEM — R09.89 ABNORMAL LUNG SOUNDS: Status: ACTIVE | Noted: 2023-09-17

## 2023-09-17 PROBLEM — F11.90 CHRONIC, CONTINUOUS USE OF OPIOIDS: Status: ACTIVE | Noted: 2023-09-17

## 2023-09-17 PROBLEM — T84.498A LOOSENING OF HARDWARE IN SPINE (HCC): Status: ACTIVE | Noted: 2023-09-17

## 2023-09-17 PROBLEM — R10.812 LEFT UPPER QUADRANT ABDOMINAL TENDERNESS WITHOUT REBOUND TENDERNESS: Status: ACTIVE | Noted: 2023-09-17

## 2023-09-17 PROBLEM — R19.15: Status: ACTIVE | Noted: 2023-09-17

## 2023-09-17 ASSESSMENT — ENCOUNTER SYMPTOMS: COUGH: 1

## 2023-09-18 ENCOUNTER — TELEPHONE (OUTPATIENT)
Dept: PAIN MANAGEMENT | Age: 73
End: 2023-09-18

## 2023-09-18 NOTE — TELEPHONE ENCOUNTER
Received call from [facility] with [facility]. She is letting provider know that she visited patient at her home to set up home health. She stated that while discussing with the patient, patient decided she did not want to have home health services at this time. She stated that she reviewed patient's medications with her and while she wishes to be on less medication, overall patient is satisfied with medication she is taking. No home care services are set up for the patient at this time, but if home care is wanted in the future [facility] ([facility]) can be notified.

## 2023-10-04 RX ORDER — TIZANIDINE 4 MG/1
TABLET ORAL
Qty: 135 TABLET | Refills: 2 | OUTPATIENT
Start: 2023-10-04

## 2023-10-04 RX ORDER — HYDROCODONE BITARTRATE AND ACETAMINOPHEN 10; 325 MG/1; MG/1
1 TABLET ORAL
Qty: 150 TABLET | Refills: 0 | OUTPATIENT
Start: 2023-10-04 | End: 2023-11-03

## 2023-10-09 DIAGNOSIS — M54.50 CHRONIC BILATERAL LOW BACK PAIN WITHOUT SCIATICA: ICD-10-CM

## 2023-10-09 DIAGNOSIS — G89.29 CHRONIC BILATERAL LOW BACK PAIN WITHOUT SCIATICA: ICD-10-CM

## 2023-10-10 RX ORDER — HYDROCODONE BITARTRATE AND ACETAMINOPHEN 10; 325 MG/1; MG/1
1 TABLET ORAL
Qty: 150 TABLET | Refills: 0 | Status: SHIPPED | OUTPATIENT
Start: 2023-10-11 | End: 2023-11-10

## 2023-11-01 DIAGNOSIS — M54.50 CHRONIC BILATERAL LOW BACK PAIN WITHOUT SCIATICA: ICD-10-CM

## 2023-11-01 DIAGNOSIS — G89.29 CHRONIC BILATERAL LOW BACK PAIN WITHOUT SCIATICA: ICD-10-CM

## 2023-11-01 RX ORDER — HYDROCODONE BITARTRATE AND ACETAMINOPHEN 10; 325 MG/1; MG/1
1 TABLET ORAL
Qty: 150 TABLET | Refills: 0 | Status: SHIPPED | OUTPATIENT
Start: 2023-11-10 | End: 2023-12-10

## 2023-11-20 ENCOUNTER — HOSPITAL ENCOUNTER (OUTPATIENT)
Dept: PAIN MANAGEMENT | Age: 73
Discharge: HOME | End: 2023-11-20
Payer: MEDICARE

## 2023-11-20 VITALS
RESPIRATION RATE: 16 BRPM | OXYGEN SATURATION: 92 % | BODY MASS INDEX: 26.16 KG/M2 | TEMPERATURE: 97.6 F | WEIGHT: 157 LBS | HEART RATE: 57 BPM | SYSTOLIC BLOOD PRESSURE: 92 MMHG | DIASTOLIC BLOOD PRESSURE: 64 MMHG | HEIGHT: 65 IN

## 2023-11-20 DIAGNOSIS — M54.50 CHRONIC BILATERAL LOW BACK PAIN WITHOUT SCIATICA: ICD-10-CM

## 2023-11-20 DIAGNOSIS — G89.29 CHRONIC BILATERAL LOW BACK PAIN WITHOUT SCIATICA: ICD-10-CM

## 2023-11-20 PROCEDURE — 99215 OFFICE O/P EST HI 40 MIN: CPT

## 2023-11-20 PROCEDURE — 99213 OFFICE O/P EST LOW 20 MIN: CPT | Performed by: NURSE PRACTITIONER

## 2023-11-20 RX ORDER — HYDROCODONE BITARTRATE AND ACETAMINOPHEN 10; 325 MG/1; MG/1
1 TABLET ORAL
Qty: 150 TABLET | Refills: 0 | Status: SHIPPED | OUTPATIENT
Start: 2023-12-09 | End: 2024-01-08

## 2023-11-20 ASSESSMENT — PAIN DESCRIPTION - PAIN TYPE
TYPE: CHRONIC PAIN
TYPE_2: CHRONIC PAIN

## 2023-11-20 ASSESSMENT — PAIN SCALES - GENERAL: PAINLEVEL_OUTOF10: 8

## 2023-11-20 ASSESSMENT — PAIN DESCRIPTION - LOCATION
LOCATION: BACK
LOCATION_2: HIP

## 2023-11-20 ASSESSMENT — ENCOUNTER SYMPTOMS
BACK PAIN: 1
CONSTIPATION: 0

## 2023-11-20 ASSESSMENT — PAIN DESCRIPTION - ORIENTATION
ORIENTATION: LOWER
ORIENTATION_2: LEFT

## 2023-11-20 ASSESSMENT — PAIN DESCRIPTION - INTENSITY: RATING_2: 8

## 2023-11-20 NOTE — PROGRESS NOTES
Clinic Documentation      Education Provided:  [x] Review of Shyann Andrade  [x] Agreement Review  [x] PEG Score Calculated [x] PHQ Score Calculated [x] ORT Score Calculated    [] Compliance Issues Discussed [] Cognitive Behavior Needs [x] Exercise [] Review of Test [] Financial Issues  [x] Tobacco/Alcohol Use Reviewed [x] Teaching [] New Patient [] Picture Obtained    Physician Plan:  [] Outgoing Referral  [] Pharmacy Consult  [] Test Ordered [x] Prescription Ordered/Changed   [] Obtained Test Results / Consult Notes        Complete if patient is withholding blood thinner for procedure     Blood Thinner Patient is currently taking:      [] Plavix (Hold for 7 days)  [] Aspirin (Hold for 5 days)     [] Pletal (Hold for 2 days)  [] Pradaxa (Hold for 3 days)    [] Effient (Hold for 7 days)  [] Xarelto (Hold for 2 days)    [] Eliquis (Hold for 2 days)  [] Brilinta (Hold for 7 days)    [] Coumadin (Hold for 5 days) - (INR needs to be drawn the day prior to procedure- INR < 2.0)    [] Aggrenox (Hold for 7 days)        [] Patient will stop medication on their own.    [] Blood Thinner Form Faxed for approval to hold.    Provider form faxed to:     Assessment Completed by:  Electronically signed by Ga Lama RN on 11/20/2023 at 2:03 PM
Pain agreement reviewed with patient and signed per patient. No questions voiced at the present and patient states understanding of agreement. Copy given to patient.
[] To evaluate response to therapy    [x] For management of controlled substance  [x] Random UDS as indicated by ORT score or if indicated by abberent behaviors    Discussion: Discussed exam findings and plan of care with patient. Patient agreed with POC and questions were asked and answered. Activity: Discussed exercise as beneficial to pain reduction, encouraged daily stretching with a focus on torso strengthening. Goal:  Pain Management Goals of Therapy:   [] Resolution in pain  [x] Decrease in pain level  [x] Improvement in ADL's  [x] Increase in activities with less pain  [] Decrease in medication      Controlled substance monitoring:    [x] Discussed medication side effects, risk of tolerance and/or dependence, and/or alternative treatment  [] Discussed the detrimental effects of long term narcotic use in younger patients especially women of childbearing years. [x] No signs and symptoms of potential drug abuse or diversion were identified  [] Signs of potential drug abuse or diversion were identified   [] ORT Score   [] UDS non-compliant   [] See Note  [] Random urine drug screen sent today  [x] Random urine drug screen not completed today   [] Deferred New Patient  [x] Compliant  [] Not Compliant see note  [] Medication agreement with provider signed today  [x] Medication agreement with provider on file under media   [] Medication regimen effective and continued   [] New patient continuing current medication while developing POC   [x] On going assessment and evaluation of medication regimen  [] Medication regimen not effective see plan for changes  [x] Nico Waite reviewed & on file under media     CC:  MD Melody Bustillo, APRN - CNP, 11/20/2023 at 1:51 PM    EMR dragon/transcription disclaimer: Much of this encounter note is electronic transcription/translation of spoken language to printed tach.  Electronic translation of spoken language may be erroneous, or at times, nonsensical

## 2023-11-20 NOTE — TELEPHONE ENCOUNTER
1. Chronic bilateral low back pain without sciatica  - HYDROcodone-acetaminophen (NORCO)  MG per tablet; Take 1 tablet by mouth every 4-6 hours as needed for Pain for up to 30 days. May fill early due to weekend  Dispense: 150 tablet; Refill: 0      Requested Prescriptions     Signed Prescriptions Disp Refills    HYDROcodone-acetaminophen (NORCO)  MG per tablet 150 tablet 0     Sig: Take 1 tablet by mouth every 4-6 hours as needed for Pain for up to 30 days.  May fill early due to weekend     Authorizing Provider: Elva Ingram       Continue medication with refill sent at appointment yes; refill sent to medical director at appointment no, see refill encounter dated 11/20/2023    Electronically signed by LIVE Montez CNP on 11/20/2023 at 2:02 PM

## 2023-12-04 DIAGNOSIS — M79.18 CHRONIC MYOFASCIAL PAIN: ICD-10-CM

## 2023-12-04 DIAGNOSIS — G89.29 CHRONIC MYOFASCIAL PAIN: ICD-10-CM

## 2023-12-05 RX ORDER — TIZANIDINE 4 MG/1
6 TABLET ORAL 3 TIMES DAILY
Qty: 135 TABLET | Refills: 2 | Status: SHIPPED | OUTPATIENT
Start: 2023-12-05 | End: 2024-03-04

## 2024-01-03 DIAGNOSIS — M54.50 CHRONIC BILATERAL LOW BACK PAIN WITHOUT SCIATICA: ICD-10-CM

## 2024-01-03 DIAGNOSIS — G89.29 CHRONIC BILATERAL LOW BACK PAIN WITHOUT SCIATICA: ICD-10-CM

## 2024-01-03 RX ORDER — HYDROCODONE BITARTRATE AND ACETAMINOPHEN 10; 325 MG/1; MG/1
1 TABLET ORAL
Qty: 150 TABLET | Refills: 0 | Status: SHIPPED | OUTPATIENT
Start: 2024-01-08 | End: 2024-02-07

## 2024-02-05 DIAGNOSIS — G89.29 CHRONIC BILATERAL LOW BACK PAIN WITHOUT SCIATICA: ICD-10-CM

## 2024-02-05 DIAGNOSIS — M54.50 CHRONIC BILATERAL LOW BACK PAIN WITHOUT SCIATICA: ICD-10-CM

## 2024-02-05 RX ORDER — HYDROCODONE BITARTRATE AND ACETAMINOPHEN 10; 325 MG/1; MG/1
1 TABLET ORAL
Qty: 150 TABLET | Refills: 0 | Status: SHIPPED | OUTPATIENT
Start: 2024-02-07 | End: 2024-03-08

## 2024-02-15 ENCOUNTER — HOSPITAL ENCOUNTER (OUTPATIENT)
Dept: PAIN MANAGEMENT | Age: 74
Discharge: HOME | End: 2024-02-15
Payer: MEDICARE

## 2024-02-15 VITALS
BODY MASS INDEX: 25.83 KG/M2 | WEIGHT: 155 LBS | RESPIRATION RATE: 18 BRPM | HEIGHT: 65 IN | DIASTOLIC BLOOD PRESSURE: 70 MMHG | TEMPERATURE: 96.1 F | SYSTOLIC BLOOD PRESSURE: 117 MMHG | OXYGEN SATURATION: 95 % | HEART RATE: 75 BPM

## 2024-02-15 DIAGNOSIS — M53.3 SI (SACROILIAC) JOINT DYSFUNCTION: ICD-10-CM

## 2024-02-15 DIAGNOSIS — M51.16 LUMBAR DISC DISEASE WITH RADICULOPATHY: Chronic | ICD-10-CM

## 2024-02-15 DIAGNOSIS — M54.50 CHRONIC BILATERAL LOW BACK PAIN WITHOUT SCIATICA: Primary | Chronic | ICD-10-CM

## 2024-02-15 DIAGNOSIS — G89.29 CHRONIC BILATERAL LOW BACK PAIN WITHOUT SCIATICA: Primary | Chronic | ICD-10-CM

## 2024-02-15 DIAGNOSIS — T81.9XXS LATE EFFECTS OF SPINE FUSION: Primary | ICD-10-CM

## 2024-02-15 DIAGNOSIS — F11.90 CHRONIC, CONTINUOUS USE OF OPIOIDS: ICD-10-CM

## 2024-02-15 PROCEDURE — 99214 OFFICE O/P EST MOD 30 MIN: CPT | Performed by: NURSE PRACTITIONER

## 2024-02-15 PROCEDURE — 99213 OFFICE O/P EST LOW 20 MIN: CPT

## 2024-02-15 RX ORDER — NALOXONE HYDROCHLORIDE 4 MG/.1ML
1 SPRAY NASAL PRN
Qty: 2 EACH | Refills: 2 | Status: SHIPPED | OUTPATIENT
Start: 2024-02-15

## 2024-02-15 RX ORDER — HYDROCODONE BITARTRATE AND ACETAMINOPHEN 5; 325 MG/1; MG/1
1 TABLET ORAL EVERY 6 HOURS PRN
Qty: 120 TABLET | Refills: 0 | Status: SHIPPED | OUTPATIENT
Start: 2024-03-06 | End: 2024-04-05

## 2024-02-15 NOTE — TELEPHONE ENCOUNTER
1. Chronic bilateral low back pain without sciatica  - HYDROcodone-acetaminophen (NORCO) 5-325 MG per tablet; Take 1 tablet by mouth every 6 hours as needed for Pain for up to 30 days. Intended supply: 30 days Max Daily Amount: 4 tablets  Dispense: 120 tablet; Refill: 0    2. Lumbar disc disease with radiculopathy  - HYDROcodone-acetaminophen (NORCO) 5-325 MG per tablet; Take 1 tablet by mouth every 6 hours as needed for Pain for up to 30 days. Intended supply: 30 days Max Daily Amount: 4 tablets  Dispense: 120 tablet; Refill: 0    3. SI (sacroiliac) joint dysfunction  - HYDROcodone-acetaminophen (NORCO) 5-325 MG per tablet; Take 1 tablet by mouth every 6 hours as needed for Pain for up to 30 days. Intended supply: 30 days Max Daily Amount: 4 tablets  Dispense: 120 tablet; Refill: 0    4. Chronic, continuous use of opioids  - naloxone 4 MG/0.1ML LIQD nasal spray; 1 spray by Nasal route as needed for Opioid Reversal  Dispense: 2 each; Refill: 2      Requested Prescriptions     Pending Prescriptions Disp Refills    HYDROcodone (HYSINGLA ER) 60 MG extended release tablet 30 tablet 0     Sig: Take 60 mg by mouth every 24 hours for 30 days. Max Daily Amount: 60 mg     Signed Prescriptions Disp Refills    HYDROcodone-acetaminophen (NORCO) 5-325 MG per tablet 120 tablet 0     Sig: Take 1 tablet by mouth every 6 hours as needed for Pain for up to 30 days. Intended supply: 30 days Max Daily Amount: 4 tablets     Authorizing Provider: MARIELA HOANG    naloxone 4 MG/0.1ML LIQD nasal spray 2 each 2     Si spray by Nasal route as needed for Opioid Reversal     Authorizing Provider: MARIELA HOANG     Medications Discontinued During This Encounter   Medication Reason    HYDROcodone-acetaminophen (NORCO)  MG per tablet Alternate therapy      Patient has been on hydrocodone for many years and this had been controlling her pain. However the short acting medication is no longer keeping her pain tolerable.     Patient

## 2024-02-15 NOTE — PROGRESS NOTES
Clinic Documentation      Education Provided:  [x] Review of Damon  [] Agreement Review  [x] PEG Score Calculated [] PHQ Score Calculated [] ORT Score Calculated    [] Compliance Issues Discussed [] Cognitive Behavior Needs [x] Exercise [] Review of Test [] Financial Issues  [x] Tobacco/Alcohol Use Reviewed [x] Teaching [] New Patient [] Picture Obtained    Physician Plan:  [] Outgoing Referral  [] Pharmacy Consult  [] Test Ordered [x] Prescription Ordered/Changed   [] Obtained Test Results / Consult Notes        Complete if patient is withholding blood thinner for procedure     Blood Thinner Patient is currently taking:      [] Plavix (Hold for 7 days)  [] Aspirin (Hold for 5 days)     [] Pletal (Hold for 2 days)  [] Pradaxa (Hold for 3 days)    [] Effient (Hold for 7 days)  [] Xarelto (Hold for 2 days)    [] Eliquis (Hold for 2 days)  [] Brilinta (Hold for 7 days)    [] Coumadin (Hold for 5 days) - (INR needs to be drawn the day prior to procedure- INR < 2.0)    [] Aggrenox (Hold for 7 days)        [] Patient will stop medication on their own.    [] Blood Thinner Form Faxed for approval to hold.   Provider form faxed to:     Assessment Completed by:  Electronically signed by Essence Olivarez RN on 2/15/2024 at 1:53 PM  
dysfunction, Ischial bursitis of left side, Piriformis syndrome of left side, Late effects of spine fusion    Patient is allergic to the following medications: Sulfamethoxazole-trimethoprim, Codeine, Erythromycin, Pcn [penicillins], Chlorhexidine, Sulfa antibiotics, and Talwin [pentazocine]. Patient has tried and failed the following medications: Anticonvulsant: gabapentin (Neurontin); Antidepressant: Duloxetine (CYMBALTA); Muscle relaxer:  patient is on Zanaflex, NSAID: contraindicated due to GI problems  Narcotics: morphine, oxycodone/acetaminophen (Percocet, Tylox);    Patient's has failed conservative treatments such as but not limited too physical therapy, injections, TENs unit, and CBT     The following medication was chosen as patient has been on the short acting molecular equivalent.     Continue medication with refill sent at appointment yes; refill sent to medical director at appointment yes, see refill encounter dated 2/15/2024    Electronically signed by LIVE Posey CNP on 2/15/2024 at 2:34 PM       Chronic myofascial pain  No refill needed  Continue medication with refill sent at appointment see refill encounter for 2/15/2023     tiZANidine (ZANAFLEX) 4 MG tablet; Take 1.5 tablets by mouth 3 times daily  Dispense: 135 tablet; Refill: 2    Hold - Left SI injection with US with NP. Patient has had injection in the past with good results.     Pain Medication Agreement Signed  Agreement reviewed and signed 11/20/2023  Patient or Patient's Advocate verbalized understanding of agreement     Encounter for Opioid Maintenance Monitoring  Patient is tested according to risk of opioid misuse, office policy, state regulations and/or providers discretion  Patient risk medium  UDS or Saliva collected & tested on 9/12/2023 compliant    [x] Follow up    [] 4 weeks   [] 6-8 weeks   [x] 10-12 weeks   [] 3 months  [] Post procedure to evaluate effectiveness of treatment  [] To evaluate medications changes made

## 2024-02-16 RX ORDER — HYDROCODONE BITARTRATE 60 MG/1
60 TABLET, EXTENDED RELEASE ORAL EVERY 24 HOURS
Qty: 30 TABLET | Refills: 0 | Status: SHIPPED | OUTPATIENT
Start: 2024-02-16 | End: 2024-03-17

## 2024-03-11 DIAGNOSIS — M53.3 SI (SACROILIAC) JOINT DYSFUNCTION: ICD-10-CM

## 2024-03-11 DIAGNOSIS — G89.29 CHRONIC MYOFASCIAL PAIN: ICD-10-CM

## 2024-03-11 DIAGNOSIS — M79.18 CHRONIC MYOFASCIAL PAIN: ICD-10-CM

## 2024-03-11 DIAGNOSIS — M54.50 CHRONIC BILATERAL LOW BACK PAIN WITHOUT SCIATICA: Chronic | ICD-10-CM

## 2024-03-11 DIAGNOSIS — G89.29 CHRONIC BILATERAL LOW BACK PAIN WITHOUT SCIATICA: Chronic | ICD-10-CM

## 2024-03-11 DIAGNOSIS — M51.16 LUMBAR DISC DISEASE WITH RADICULOPATHY: Chronic | ICD-10-CM

## 2024-03-11 RX ORDER — TIZANIDINE 4 MG/1
6 TABLET ORAL 3 TIMES DAILY
Qty: 135 TABLET | Refills: 2 | Status: SHIPPED | OUTPATIENT
Start: 2024-03-11 | End: 2024-06-09

## 2024-03-20 DIAGNOSIS — M51.16 LUMBAR DISC DISEASE WITH RADICULOPATHY: Chronic | ICD-10-CM

## 2024-03-20 DIAGNOSIS — M53.3 SI (SACROILIAC) JOINT DYSFUNCTION: ICD-10-CM

## 2024-03-20 DIAGNOSIS — M54.50 CHRONIC BILATERAL LOW BACK PAIN WITHOUT SCIATICA: Chronic | ICD-10-CM

## 2024-03-20 DIAGNOSIS — G89.29 CHRONIC BILATERAL LOW BACK PAIN WITHOUT SCIATICA: Chronic | ICD-10-CM

## 2024-03-20 RX ORDER — HYDROCODONE BITARTRATE 60 MG/1
60 TABLET, EXTENDED RELEASE ORAL EVERY 24 HOURS
Qty: 30 TABLET | Refills: 0 | Status: SHIPPED | OUTPATIENT
Start: 2024-03-20 | End: 2024-04-19

## 2024-04-02 DIAGNOSIS — M54.50 CHRONIC BILATERAL LOW BACK PAIN WITHOUT SCIATICA: Chronic | ICD-10-CM

## 2024-04-02 DIAGNOSIS — G89.29 CHRONIC BILATERAL LOW BACK PAIN WITHOUT SCIATICA: Chronic | ICD-10-CM

## 2024-04-02 DIAGNOSIS — M51.16 LUMBAR DISC DISEASE WITH RADICULOPATHY: Chronic | ICD-10-CM

## 2024-04-02 DIAGNOSIS — M53.3 SI (SACROILIAC) JOINT DYSFUNCTION: ICD-10-CM

## 2024-04-03 RX ORDER — HYDROCODONE BITARTRATE AND ACETAMINOPHEN 5; 325 MG/1; MG/1
1 TABLET ORAL EVERY 6 HOURS PRN
Qty: 120 TABLET | Refills: 0 | Status: SHIPPED | OUTPATIENT
Start: 2024-04-05 | End: 2024-05-05

## 2024-04-30 ENCOUNTER — HOSPITAL ENCOUNTER (OUTPATIENT)
Dept: PAIN MANAGEMENT | Age: 74
Discharge: HOME | End: 2024-04-30
Payer: MEDICARE

## 2024-04-30 VITALS
SYSTOLIC BLOOD PRESSURE: 123 MMHG | OXYGEN SATURATION: 92 % | HEART RATE: 62 BPM | RESPIRATION RATE: 16 BRPM | WEIGHT: 153 LBS | HEIGHT: 66 IN | DIASTOLIC BLOOD PRESSURE: 65 MMHG | TEMPERATURE: 96.8 F | BODY MASS INDEX: 24.59 KG/M2

## 2024-04-30 DIAGNOSIS — M51.16 LUMBAR DISC DISEASE WITH RADICULOPATHY: Chronic | ICD-10-CM

## 2024-04-30 DIAGNOSIS — M62.830 MUSCLE SPASM OF BACK: ICD-10-CM

## 2024-04-30 DIAGNOSIS — M53.3 SI (SACROILIAC) JOINT DYSFUNCTION: ICD-10-CM

## 2024-04-30 DIAGNOSIS — G89.29 CHRONIC BILATERAL LOW BACK PAIN WITHOUT SCIATICA: ICD-10-CM

## 2024-04-30 DIAGNOSIS — M54.50 CHRONIC BILATERAL LOW BACK PAIN WITHOUT SCIATICA: ICD-10-CM

## 2024-04-30 DIAGNOSIS — M54.50 CHRONIC BILATERAL LOW BACK PAIN WITHOUT SCIATICA: Primary | ICD-10-CM

## 2024-04-30 DIAGNOSIS — G89.29 CHRONIC BILATERAL LOW BACK PAIN WITHOUT SCIATICA: Primary | ICD-10-CM

## 2024-04-30 DIAGNOSIS — T81.9XXS LATE EFFECTS OF SPINE FUSION: Primary | ICD-10-CM

## 2024-04-30 PROCEDURE — 99214 OFFICE O/P EST MOD 30 MIN: CPT | Performed by: NURSE PRACTITIONER

## 2024-04-30 PROCEDURE — 99213 OFFICE O/P EST LOW 20 MIN: CPT

## 2024-04-30 RX ORDER — HYDROCODONE BITARTRATE AND ACETAMINOPHEN 5; 325 MG/1; MG/1
1 TABLET ORAL EVERY 6 HOURS PRN
Qty: 120 TABLET | Refills: 0 | Status: SHIPPED | OUTPATIENT
Start: 2024-05-04 | End: 2024-06-03

## 2024-04-30 RX ORDER — HYDROCODONE BITARTRATE 80 MG/1
80 TABLET, EXTENDED RELEASE ORAL EVERY 24 HOURS
Qty: 30 TABLET | Refills: 0 | Status: SHIPPED | OUTPATIENT
Start: 2024-04-30 | End: 2024-05-30

## 2024-04-30 RX ORDER — BACLOFEN 10 MG/1
TABLET ORAL
Qty: 60 TABLET | Refills: 2 | Status: SHIPPED | OUTPATIENT
Start: 2024-04-30

## 2024-04-30 RX ORDER — POLYETHYLENE GLYCOL 3350 17 G/17G
17 POWDER, FOR SOLUTION ORAL DAILY PRN
COMMUNITY

## 2024-04-30 ASSESSMENT — PAIN DESCRIPTION - PAIN TYPE
TYPE: CHRONIC PAIN
TYPE_2: CHRONIC PAIN

## 2024-04-30 ASSESSMENT — PAIN SCALES - GENERAL: PAINLEVEL_OUTOF10: 8

## 2024-04-30 ASSESSMENT — PAIN DESCRIPTION - ORIENTATION
ORIENTATION_2: RIGHT
ORIENTATION: LOWER

## 2024-04-30 ASSESSMENT — PAIN DESCRIPTION - LOCATION
LOCATION: BACK
LOCATION_2: HIP

## 2024-04-30 ASSESSMENT — ENCOUNTER SYMPTOMS
CONSTIPATION: 0
BACK PAIN: 1

## 2024-04-30 ASSESSMENT — PAIN DESCRIPTION - INTENSITY: RATING_2: 8

## 2024-04-30 NOTE — PROGRESS NOTES
Clinic Documentation      Education Provided:  [x] Review of Damon  [] Agreement Review  [x] PEG Score Calculated [] PHQ Score Calculated [] ORT Score Calculated    [] Compliance Issues Discussed [] Cognitive Behavior Needs [x] Exercise [] Review of Test [] Financial Issues  [x] Tobacco/Alcohol Use Reviewed [x] Teaching [] New Patient [] Picture Obtained    Physician Plan:  [] Outgoing Referral  [] Pharmacy Consult  [] Test Ordered [x] Prescription Ordered/Changed   [] Obtained Test Results / Consult Notes        Complete if patient is withholding blood thinner for procedure     Blood Thinner Patient is currently taking:      [] Plavix (Hold for 7 days)  [] Aspirin (Hold for 5 days)     [] Pletal (Hold for 2 days)  [] Pradaxa (Hold for 3 days)    [] Effient (Hold for 7 days)  [] Xarelto (Hold for 2 days)    [] Eliquis (Hold for 2 days)  [] Brilinta (Hold for 7 days)    [] Coumadin (Hold for 5 days) - (INR needs to be drawn the day prior to procedure- INR < 2.0)    [] Aggrenox (Hold for 7 days)        [] Patient will stop medication on their own.    [] Blood Thinner Form Faxed for approval to hold.   Provider form faxed to:     Assessment Completed by:  Electronically signed by Essence Olivarez RN on 4/30/2024 at 2:26 PM  
many years and this had been controlling her pain. However the short acting medication is no longer keeping her pain tolerable. Patient understands goal is to have patient on +/- 60 short acting medications per day. The goal is for the long acting to keep her pain at tolerable level.     Patient suffers from chronic pain requires 24 hour around the clock pain medication. According to the 2022 CDC guidelines for treatment of chronic pain, it has been determined it can be more detrimental to rapidly withdraw pain medication once a person has been on medication fore greater than a year.  Methadone should only be prescribed by a provider that fully understands the mechanisms of action and QT intervals should be monitored closely. If patient has exhausted all non-opioid and conservative treatment, long acting abuse deterrent medication is preferred keeping MME less than 90 per day when possible. The patient was given a prescription for Narcan.     The patient suffers from Chronic bilateral low back pain without sciatica, Lumbar disc disease with radiculopathy, SI (sacroiliac) joint dysfunction, Ischial bursitis of left side, Piriformis syndrome of left side, Late effects of spine fusion    Patient is allergic to the following medications: Sulfamethoxazole-trimethoprim, Codeine, Erythromycin, Pcn [penicillins], Chlorhexidine, Sulfa antibiotics, and Talwin [pentazocine]. Patient has tried and failed the following medications: Anticonvulsant: gabapentin (Neurontin); Antidepressant: Duloxetine (CYMBALTA); Muscle relaxer:  patient is on Zanaflex, NSAID: contraindicated due to GI problems  Narcotics: morphine, oxycodone/acetaminophen (Percocet, Tylox);    Patient's has failed conservative treatments such as but not limited too physical therapy, injections, TENs unit, and CBT     The following medication was chosen as patient has been on the short acting molecular equivalent.     Continue medication with refill sent at

## 2024-04-30 NOTE — TELEPHONE ENCOUNTER
1. Chronic bilateral low back pain without sciatica  - HYDROcodone-acetaminophen (NORCO) 5-325 MG per tablet; Take 1 tablet by mouth every 6 hours as needed for Pain for up to 30 days.  Dispense: 120 tablet; Refill: 0    2. Lumbar disc disease with radiculopathy  - HYDROcodone-acetaminophen (NORCO) 5-325 MG per tablet; Take 1 tablet by mouth every 6 hours as needed for Pain for up to 30 days.  Dispense: 120 tablet; Refill: 0    3. SI (sacroiliac) joint dysfunction  - HYDROcodone-acetaminophen (NORCO) 5-325 MG per tablet; Take 1 tablet by mouth every 6 hours as needed for Pain for up to 30 days.  Dispense: 120 tablet; Refill: 0      Requested Prescriptions     Pending Prescriptions Disp Refills    HYDROcodone (HYSINGLA ER) 80 MG extended release tablet 30 tablet 0     Sig: Take 80 mg by mouth every 24 hours for 30 days. Max Daily Amount: 80 mg     Signed Prescriptions Disp Refills    HYDROcodone-acetaminophen (NORCO) 5-325 MG per tablet 120 tablet 0     Sig: Take 1 tablet by mouth every 6 hours as needed for Pain for up to 30 days.     Authorizing Provider: MARIELA HOANG     Increase in hysingla ER Patient was getting about 60 MME with previous hydrocodone script. Patient states that current dose is not effective. She has tried to taking medication at different times to see which was more effective but she states it is not working. Patient has not called for this months prescription as she was waiting to see about changing medication. Instructed will leave breakthrough medication the same this month. Next month Hydrocodone 5 mg will be decreased to TID prn # 90.     Continue medication with refill sent at appointment yes; refill sent to medical director at appointment yes, see refill encounter dated 4/30/2024    Electronically signed by LIVE Posey CNP on 4/30/2024 at 2:55 PM

## 2024-05-07 ENCOUNTER — TELEPHONE (OUTPATIENT)
Dept: PAIN MANAGEMENT | Age: 74
End: 2024-05-07

## 2024-05-07 NOTE — TELEPHONE ENCOUNTER
Patient called nurse line and stated she is taking two medications and something is making her feel \"drunk\".  She said that she thinks one of them needs to be stopped.  She takes baclofen 10 mg 1/2 tab in am and pm and whole tab in hs, and she takes Hysingla 80 mg.  She feels that it is the Hysingla that is causing the symptoms and is asking if she can be decreased back to 60 mg.  Sending note to provider.

## 2024-05-15 NOTE — TELEPHONE ENCOUNTER
Have called patient regarding her medication.  She did not answer.  I have instructed her per Heather to decrease and then stop the Baclofen first, and then if she is still not feeling right with the medication, she can reduce the hysingla strength.  I gave patient the number to the nurse line to call if she has further questions.

## 2024-05-20 NOTE — TELEPHONE ENCOUNTER
Patient called back when office is closed and left message that she cannot continue the medication at all.  She said that she has fallen multiple times and she knows it is from the medication because when she stopped it, the falls stopped.  She would like to get in to see Myra as soon as possible.  Messaging to see about an earlier appointment.

## 2024-05-20 NOTE — TELEPHONE ENCOUNTER
Have talked to patient and scheduled her for office visit tomorrow at 1:30 pm.  She knows to register by 1:00pm with appointment on third floor.

## 2024-05-21 ENCOUNTER — HOSPITAL ENCOUNTER (OUTPATIENT)
Dept: PAIN MANAGEMENT | Age: 74
Discharge: HOME | End: 2024-05-21
Payer: MEDICARE

## 2024-05-21 VITALS
HEART RATE: 68 BPM | HEIGHT: 66 IN | BODY MASS INDEX: 24.11 KG/M2 | RESPIRATION RATE: 16 BRPM | DIASTOLIC BLOOD PRESSURE: 72 MMHG | TEMPERATURE: 96.8 F | WEIGHT: 150 LBS | SYSTOLIC BLOOD PRESSURE: 128 MMHG | OXYGEN SATURATION: 98 %

## 2024-05-21 DIAGNOSIS — K59.03 THERAPEUTIC OPIOID-INDUCED CONSTIPATION (OIC): ICD-10-CM

## 2024-05-21 DIAGNOSIS — M54.50 CHRONIC BILATERAL LOW BACK PAIN WITHOUT SCIATICA: Primary | ICD-10-CM

## 2024-05-21 DIAGNOSIS — G89.29 CHRONIC BILATERAL LOW BACK PAIN WITHOUT SCIATICA: Primary | ICD-10-CM

## 2024-05-21 DIAGNOSIS — T40.2X5A THERAPEUTIC OPIOID-INDUCED CONSTIPATION (OIC): ICD-10-CM

## 2024-05-21 PROCEDURE — 99214 OFFICE O/P EST MOD 30 MIN: CPT

## 2024-05-21 PROCEDURE — 99213 OFFICE O/P EST LOW 20 MIN: CPT

## 2024-05-21 RX ORDER — TIZANIDINE 4 MG/1
6 TABLET ORAL EVERY 8 HOURS PRN
Qty: 135 TABLET | Refills: 2 | Status: SHIPPED | OUTPATIENT
Start: 2024-05-21

## 2024-05-21 RX ORDER — HYDROCODONE BITARTRATE AND ACETAMINOPHEN 10; 325 MG/1; MG/1
1 TABLET ORAL EVERY 6 HOURS PRN
Qty: 120 TABLET | Refills: 0 | Status: SHIPPED | OUTPATIENT
Start: 2024-05-21 | End: 2024-06-20

## 2024-05-21 ASSESSMENT — PAIN DESCRIPTION - INTENSITY: RATING_2: 10

## 2024-05-21 ASSESSMENT — ENCOUNTER SYMPTOMS
BACK PAIN: 1
CONSTIPATION: 0

## 2024-05-21 ASSESSMENT — PAIN SCALES - GENERAL: PAINLEVEL_OUTOF10: 10

## 2024-05-21 ASSESSMENT — PAIN DESCRIPTION - LOCATION
LOCATION: BACK
LOCATION_2: HIP

## 2024-05-21 ASSESSMENT — PAIN DESCRIPTION - PAIN TYPE
TYPE: CHRONIC PAIN
TYPE_2: CHRONIC PAIN

## 2024-05-21 ASSESSMENT — PAIN DESCRIPTION - ORIENTATION
ORIENTATION_2: LEFT
ORIENTATION: LEFT;RIGHT;LOWER

## 2024-05-21 NOTE — PROGRESS NOTES
Clinic Documentation      Education Provided:  [x] Review of Damon  [] Agreement Review  [x] PEG Score Calculated [] PHQ Score Calculated [] ORT Score Calculated    [] Compliance Issues Discussed [] Cognitive Behavior Needs [x] Exercise [] Review of Test [] Financial Issues  [x] Tobacco/Alcohol Use Reviewed [x] Teaching [] New Patient [] Picture Obtained    Physician Plan:  [] Outgoing Referral  [] Pharmacy Consult  [] Test Ordered [x] Prescription Ordered/Changed   [] Obtained Test Results / Consult Notes        Complete if patient is withholding blood thinner for procedure     Blood Thinner Patient is currently taking:      [] Plavix (Hold for 7 days)  [] Aspirin (Hold for 5 days)     [] Pletal (Hold for 2 days)  [] Pradaxa (Hold for 3 days)    [] Effient (Hold for 7 days)  [] Xarelto (Hold for 2 days)    [] Eliquis (Hold for 2 days)  [] Brilinta (Hold for 7 days)    [] Coumadin (Hold for 5 days) - (INR needs to be drawn the day prior to procedure- INR < 2.0)    [] Aggrenox (Hold for 7 days)        [] Patient will stop medication on their own.    [] Blood Thinner Form Faxed for approval to hold.   Provider form faxed to:     Assessment Completed by:  Electronically signed by Kalyani Jameson MA on 5/21/2024 at 1:19 PM  
HGB 11.7 (L) 05/03/2022    HCT 38.3 05/03/2022    MCV 93.9 05/03/2022     05/03/2022         Assessment:  Active Problems:    Pain of both hip joints    Chronic bilateral low back pain without sciatica    Pain in both knees    Lumbar disc disease with radiculopathy    SI (sacroiliac) joint dysfunction    Ischial bursitis of left side    Piriformis syndrome of left side    S/P lumbar fusion    Chronic, continuous use of opioids    Muscle spasm of back  Resolved Problems:    * No resolved hospital problems. *      Plan:  1. Chronic bilateral low back pain without sciatica  - Start tiZANidine (ZANAFLEX) 4 MG tablet; Take 1.5 tablets by mouth every 8 hours as needed (spasms)  Dispense: 135 tablet; Refill: 2    - Start HYDROcodone-acetaminophen (NORCO)  MG per tablet; Take 1 tablet by mouth every 6 hours as needed for Pain for up to 30 days. May fill 5/21/2024 Max Daily Amount: 4 tablets  Dispense: 120 tablet; Refill: 0    - Discontinue Hysingla ER and Baclofen    [x] Follow up    [] 4 weeks   [] 6-8 weeks   [] 10-12 weeks   [x] 3 months  [] Post procedure to evaluate effectiveness of treatment  [x] To evaluate medications changes made at office visit.   [] To review diagnostics ordered at last visit.   [] To evaluate response to therapy    [x] For management of controlled substance  [x] Random UDS as indicated by ORT score or if indicated by abberent behaviors    Discussion: Discussed exam findings and plan of care with patient. Patient agreed with POC and questions were asked and answered.     Activity: Discussed exercise as beneficial to pain reduction, encouraged daily stretching with a focus on torso strengthening.    Goal:  Pain Management Goals of Therapy:   [] Resolution in pain  [x] Decrease in pain level  [x] Improvement in ADL's  [x] Increase in activities with less pain  [] Decrease in medication      Controlled substance monitoring:    [x] Discussed medication side effects, risk of tolerance

## 2024-05-22 RX ORDER — NALOXEGOL OXALATE 25 MG/1
TABLET, FILM COATED ORAL
Qty: 30 TABLET | Refills: 5 | OUTPATIENT
Start: 2024-05-22

## 2024-05-22 NOTE — TELEPHONE ENCOUNTER
05- Called patient as that she was needing a refill on her Movantik advised that she would need an apt.     Patient stated that the only way that she could make an apt is if we took her insurance Medicare and [place] Medicaid.     Explained that we no longer take the [place] Medicaid.      She stated that is what she thought but would need the office to call her PCP and let them know that we no longer take her insurance and that she was needing a refill on her medication.         Called Dr [facility] spoke with Katie  Notified that we no longer accept [place] Medicaid and that the patient is needing a refill on her Movantik.     She stated that they will take care of it.     .      .

## 2024-06-20 DIAGNOSIS — M54.50 CHRONIC BILATERAL LOW BACK PAIN WITHOUT SCIATICA: ICD-10-CM

## 2024-06-20 DIAGNOSIS — G89.29 CHRONIC BILATERAL LOW BACK PAIN WITHOUT SCIATICA: ICD-10-CM

## 2024-06-20 RX ORDER — HYDROCODONE BITARTRATE AND ACETAMINOPHEN 10; 325 MG/1; MG/1
1 TABLET ORAL EVERY 6 HOURS PRN
Qty: 120 TABLET | Refills: 0 | Status: SHIPPED | OUTPATIENT
Start: 2024-06-20 | End: 2024-07-20

## 2024-07-22 DIAGNOSIS — G89.29 CHRONIC BILATERAL LOW BACK PAIN WITHOUT SCIATICA: ICD-10-CM

## 2024-07-22 DIAGNOSIS — M54.50 CHRONIC BILATERAL LOW BACK PAIN WITHOUT SCIATICA: ICD-10-CM

## 2024-07-22 RX ORDER — HYDROCODONE BITARTRATE AND ACETAMINOPHEN 10; 325 MG/1; MG/1
1 TABLET ORAL EVERY 6 HOURS PRN
Qty: 120 TABLET | Refills: 0 | Status: SHIPPED | OUTPATIENT
Start: 2024-07-31 | End: 2024-08-30

## 2024-07-22 RX ORDER — TIZANIDINE 4 MG/1
6 TABLET ORAL EVERY 8 HOURS PRN
Qty: 135 TABLET | Refills: 2 | Status: SHIPPED | OUTPATIENT
Start: 2024-07-22

## 2024-08-22 ENCOUNTER — TELEPHONE (OUTPATIENT)
Dept: PAIN MANAGEMENT | Age: 74
End: 2024-08-22

## 2024-08-22 NOTE — TELEPHONE ENCOUNTER
Patient called for a refill on her norco.  Patient had an ov on 8/14/24 that she no showed.  A letter was mailed to her on that date stating that per her agreement she will be no medications until seen in the office.  She can call the office to see if she can be seen sooner that October.

## 2024-08-28 ENCOUNTER — TELEPHONE (OUTPATIENT)
Dept: PAIN MANAGEMENT | Age: 74
End: 2024-08-28

## 2024-08-28 NOTE — TELEPHONE ENCOUNTER
Patient left message on prescription line on 8/27.  She is calling because she is in a lot of pain and she was made no meds till seen at the next appointment.  Patient called back this morning and left message on third floor line.  Call was forwarded to nurse line patient is calling back about appointment and medication.  She stated that she fell down her steps yesterday and was seen in the hospital for three hours.  Cannot find documentation in chart for [facility] or in care everywhere for outside facility.  Called patient to talk to her but she did not answer.  Patient's voicemail is full at this time so I could not leave a message.

## 2024-09-03 NOTE — TELEPHONE ENCOUNTER
Patient called prescription line today.  Call was forwarded to nurse line.  I attempted to call her back again.  She did not answer.  Voice mail is full so I could not leave a message.

## 2024-09-13 NOTE — OUTREACH NOTE
Medical Week 2 Survey      Responses   Facility patient discharged from?  [place]   Does the patient have one of the following disease processes/diagnoses(primary or secondary)?  Other   Week 2 attempt successful?  No   Unsuccessful attempts  Attempt 1          Tiff Drew RN   Pt to consistently meet at least 75% of EEE via tolerated route that is consistent with goals of care during hospital stay

## 2024-09-16 ENCOUNTER — TELEPHONE (OUTPATIENT)
Dept: PAIN MANAGEMENT | Age: 74
End: 2024-09-16

## 2024-09-16 NOTE — TELEPHONE ENCOUNTER
Patient called prescription line again today about having her refill sent to the pharmacy.  Attempted to call patient back.  She did not answer her phone and her voicemail is full.  I have also called her pharmacy, [facility] and made them aware that patient is calling for refills, but she will need to be seen in the office before we can send any further refills.  Patient does not answer her phone, and I asked if pharmacy happens to speak to her can they let her know we are trying to reach her.

## 2024-09-16 NOTE — TELEPHONE ENCOUNTER
Patient called prescription line again about her refill and call was forwarded to nurse line.  Call returned to patient.  Patient did answer this time.  Patient stated she needs her medication refill sent to the pharmacy.  I explained that I had tried to call her numerous times returning her calls, but then she didn't answer.  Patient stated that she was in too much pain to answer her phone.  I let her know that she would need to be seen in the office before she could have refills.  Patient stated that she didn't come to her appointment because she had another appointment with the cancer doctor, however patient did not cancel pain management appointment.  I let her know that when I called her before I was attempting to offer her an appointment sooner to get in but I do not have the appointment available any longer.  Patient was upset and stated \"if Heather(previous provider) was here she would let me have my medication even if it was four months since I was seen, and Myra knows I fell down the stairs\".  I let her know that it has been four months since she has been seen, and she will need to come to her appointment in order to get refills of medication.  I told her that she could check with third floor scheduling to see if there are any cancellations, and gave her that number to call.  Patient instructed that if there is a cancellation, she must be ready to come to the appointment at that time as it will not be held for her.  Patient verbalized understanding.

## 2024-10-16 ENCOUNTER — HOSPITAL ENCOUNTER (OUTPATIENT)
Dept: PAIN MANAGEMENT | Age: 74
Discharge: HOME | End: 2024-10-16
Payer: MEDICARE

## 2024-10-16 DIAGNOSIS — F11.90 CHRONIC, CONTINUOUS USE OF OPIOIDS: ICD-10-CM

## 2024-10-16 DIAGNOSIS — M54.50 CHRONIC BILATERAL LOW BACK PAIN WITHOUT SCIATICA: Primary | ICD-10-CM

## 2024-10-16 DIAGNOSIS — G89.29 CHRONIC BILATERAL LOW BACK PAIN WITHOUT SCIATICA: Primary | ICD-10-CM

## 2024-10-16 DIAGNOSIS — Z79.891 ENCOUNTER FOR MONITORING OPIOID MAINTENANCE THERAPY: ICD-10-CM

## 2024-10-16 DIAGNOSIS — M25.552 PAIN OF BOTH HIP JOINTS: ICD-10-CM

## 2024-10-16 DIAGNOSIS — M54.50 CHRONIC BILATERAL LOW BACK PAIN WITHOUT SCIATICA: ICD-10-CM

## 2024-10-16 DIAGNOSIS — Z51.81 ENCOUNTER FOR MONITORING OPIOID MAINTENANCE THERAPY: ICD-10-CM

## 2024-10-16 DIAGNOSIS — M25.551 PAIN OF BOTH HIP JOINTS: ICD-10-CM

## 2024-10-16 DIAGNOSIS — G89.29 CHRONIC BILATERAL LOW BACK PAIN WITHOUT SCIATICA: ICD-10-CM

## 2024-10-16 PROCEDURE — 99214 OFFICE O/P EST MOD 30 MIN: CPT

## 2024-10-16 RX ORDER — HYDROCODONE BITARTRATE AND ACETAMINOPHEN 10; 325 MG/1; MG/1
1 TABLET ORAL EVERY 6 HOURS PRN
Qty: 120 TABLET | Refills: 0 | Status: SHIPPED | OUTPATIENT
Start: 2024-10-16 | End: 2024-11-15

## 2024-10-16 ASSESSMENT — ENCOUNTER SYMPTOMS
CONSTIPATION: 0
BACK PAIN: 1

## 2024-10-16 NOTE — TELEPHONE ENCOUNTER
1. Chronic bilateral low back pain without sciatica      Requested Prescriptions     Pending Prescriptions Disp Refills    tiZANidine (ZANAFLEX) 4 MG tablet 135 tablet 2     Sig: Take 1.5 tablets by mouth every 8 hours as needed (spasms)    HYDROcodone-acetaminophen (NORCO)  MG per tablet 120 tablet 0     Sig: Take 1 tablet by mouth every 6 hours as needed for Pain for up to 30 days. May fill 10/16/2024 Max Daily Amount: 4 tablets       Continue medication with refill sent at appointment yes; refill sent to medical director at appointment no, see refill encounter dated 10/16/2024    Electronically signed by LIVE León CNP on 10/16/2024 at 10:35 AM

## 2024-10-16 NOTE — PROGRESS NOTES
Clinic Documentation      Education Provided:  [x] Review of Damon  [] Agreement Review  [x] PEG Score Calculated [] PHQ Score Calculated [] ORT Score Calculated    [] Compliance Issues Discussed [] Cognitive Behavior Needs [x] Exercise [] Review of Test [] Financial Issues  [x] Tobacco/Alcohol Use Reviewed [x] Teaching [] New Patient [] Picture Obtained    Physician Plan:  [] Outgoing Referral  [] Pharmacy Consult  [x] Test Ordered [x] Prescription Ordered/Changed   [] Obtained Test Results / Consult Notes        Complete if patient is withholding blood thinner for procedure     Blood Thinner Patient is currently taking:      [] Plavix (Hold for 7 days)  [] Aspirin (Hold for 5 days)     [] Pletal (Hold for 2 days)  [] Pradaxa (Hold for 3 days)    [] Effient (Hold for 7 days)  [] Xarelto (Hold for 2 days)    [] Eliquis (Hold for 2 days)  [] Brilinta (Hold for 7 days)    [] Coumadin (Hold for 5 days) - (INR needs to be drawn the day prior to procedure- INR < 2.0)    [] Aggrenox (Hold for 7 days)        [] Patient will stop medication on their own.    [] Blood Thinner Form Faxed for approval to hold.   Provider form faxed to:     Assessment Completed by:  Electronically signed by Kalyani Jameson MA on 10/16/2024 at 10:38 AM  
the pain medication keep your pain tolerable to perform the tasks that affect your pain? No    3/9/2023  Patient states that she is having more left sided low back/hip pain. Patient points to left SI joint. Patient was getting SI injections prior to surgery. She states pain will go down her back into her left hip and down laterally to her left knee.     8/2022  Since patient was here last, patient underwent low back surgery. Patient has had low back surgery. Patient's surgery was completed in the 3 stages. She had ALIF with instrumentation of L5/S1 4/11/2022, Right LLIF with instrumentation L4/L5 4/13/2022, and s/p PSF with instrumentation L4/S1. Patient is doing better since surgery. Pain is improving.     2/2022  Since last appointment, patient has been seen by orthopedic surgeon. Patient is going to have back surgery.  Patient states that back surgery is going to be a 3 part surgery.  Dr. Tello will be doing the low back surgery    12/9/2020  presents for procedure follow-up.  Patient had left sacroiliac joint injection and left ischial bursa injection on 8/26/2020 and Lumbar Epidural L3-4 on 9/11/2020. Patient had at least 85% relief of pain from procedure(s) for at least 6 weeks and was able to increase activity after procedure. Patient received enough pain relief from injections that the patient would like to repeat the injection(s).    When patient was seen on 8/26/2020 for procedure, she was having left knee and right right-sided pain and chest due to a fall.  Patient had assessment completed and ordered x-ray of chest and left knee.  Chest x-ray ray revealed no acute process and left knee indicated well-seated normally aligned prosthetic components and no acute bony abnormalities.    Patient presents today for sooner appointment due to increase in pain. Patient has fallen three times since last injection. Patient in unable to sit fully on left hip. Patient had been seen by PCP, no imaging was ordered.

## 2024-10-31 NOTE — PROGRESS NOTES
Ramin Foil  [id number]     04/28/22 1500   Position Activity Restriction   Spinal Precautions No Bending; No Lifting; No Twisting   Subjective   Subjective pt asleep in bed. agrees to therapy    Subjective   Pain 0/10: 8. Catching feeling in back with shooting senstion down posterior LLE    Bed mobility   Supine to Sit Independent   Sit to Supine Independent   Scooting Independent   Transfers   Sit to Stand Independent   Stand to sit Independent   Ambulation   Surface level tile   Device Rolling Walker   Other Apparatus Wheelchair follow   Assistance Stand by assistance; Independent   Quality of Gait Reciprocal pattern. Incorporated turns. Gait Deviations Slow Brielle;Decreased step length;Decreased step height;Shuffles   Distance 225'   More Ambulation? Yes   Ambulation 2   Surface - 2 level tile   Device 2 Rollator   Other Apparatus 2 Wheelchair follow   Assistance 2 Modified Independent   Quality of Gait 2 same as previous   Gait Deviations Slow Brielle;Decreased step length;Decreased step height;Shuffles   Distance 225'   PT Exercises   Exercise Treatment Sitting BLE ex 2x10 with 1.5#. hip abd/add with manual resistance. LAQ's x5 d/t pain in LLE. Ankle DF/PF x20 with yellow theraband   Activity Tolerance   Activity Tolerance Patient tolerated treatment well   Assessment   Assessment pt tolerated amb with RW and rollator, performed with better gait quilty using rollator.  Pt noted slight increase in pain and reported mild fatigue upon return to room    Safety Devices   Type of Devices Bed alarm in place;Call light within reach;Gait belt;Patient at risk for falls   PT Individual Minutes   Time In 1515   Time Out 1600   Minutes [age] y/o female c/o headache for last three weeks, with previous ct-scan. Pt is neurologically in intact. Plan for labs, symptomatic control.

## 2024-11-04 DIAGNOSIS — M54.50 CHRONIC BILATERAL LOW BACK PAIN WITHOUT SCIATICA: ICD-10-CM

## 2024-11-04 DIAGNOSIS — G89.29 CHRONIC BILATERAL LOW BACK PAIN WITHOUT SCIATICA: ICD-10-CM

## 2024-11-04 RX ORDER — HYDROCODONE BITARTRATE AND ACETAMINOPHEN 10; 325 MG/1; MG/1
1 TABLET ORAL EVERY 6 HOURS PRN
Qty: 120 TABLET | Refills: 0 | Status: SHIPPED | OUTPATIENT
Start: 2024-11-15 | End: 2024-12-15

## 2025-05-22 ENCOUNTER — TELEPHONE (OUTPATIENT)
Dept: FAMILY MEDICINE CLINIC | Facility: CLINIC | Age: 75
End: 2025-05-22

## 2025-05-22 NOTE — TELEPHONE ENCOUNTER
Caller: LANE    Relationship: [facility]    Best call back number: [phone]    What is the best time to reach you: MON-FRI 8 AM -8 PM CENTRAL TIME ZONE    Who are you requesting to speak with (clinical staff, provider,  specific staff member): CLINICAL OR PROVIDER    What was the call regarding: [facility] NEEDS THE ORDER STATING (ANY CHRONIC PAIN CONDITION) FOR VERIFICATION     PLEASE CALL WITH VERIFICATION
